# Patient Record
Sex: FEMALE | Race: WHITE | NOT HISPANIC OR LATINO | Employment: OTHER | URBAN - METROPOLITAN AREA
[De-identification: names, ages, dates, MRNs, and addresses within clinical notes are randomized per-mention and may not be internally consistent; named-entity substitution may affect disease eponyms.]

---

## 2017-11-24 ENCOUNTER — HOSPITAL ENCOUNTER (OUTPATIENT)
Dept: MAMMOGRAPHY | Facility: HOSPITAL | Age: 77
Discharge: HOME | End: 2017-11-24

## 2017-11-24 DIAGNOSIS — Z12.31 VISIT FOR SCREENING MAMMOGRAM: ICD-10-CM

## 2018-12-11 ENCOUNTER — HOSPITAL ENCOUNTER (OUTPATIENT)
Dept: MAMMOGRAPHY | Facility: CLINIC | Age: 78
Discharge: HOME | End: 2018-12-11

## 2018-12-11 DIAGNOSIS — Z12.31 VISIT FOR SCREENING MAMMOGRAM: ICD-10-CM

## 2020-01-08 ENCOUNTER — HOSPITAL ENCOUNTER (OUTPATIENT)
Dept: MAMMOGRAPHY | Facility: CLINIC | Age: 80
Discharge: HOME | End: 2020-01-08

## 2020-01-08 DIAGNOSIS — Z12.31 VISIT FOR SCREENING MAMMOGRAM: ICD-10-CM

## 2021-05-24 ENCOUNTER — RECORDS - HEALTHEAST (OUTPATIENT)
Dept: ADMINISTRATIVE | Facility: CLINIC | Age: 81
End: 2021-05-24

## 2021-05-25 ENCOUNTER — RECORDS - HEALTHEAST (OUTPATIENT)
Dept: ADMINISTRATIVE | Facility: CLINIC | Age: 81
End: 2021-05-25

## 2021-05-26 ENCOUNTER — RECORDS - HEALTHEAST (OUTPATIENT)
Dept: ADMINISTRATIVE | Facility: CLINIC | Age: 81
End: 2021-05-26

## 2021-05-27 ENCOUNTER — RECORDS - HEALTHEAST (OUTPATIENT)
Dept: ADMINISTRATIVE | Facility: CLINIC | Age: 81
End: 2021-05-27

## 2021-05-28 ENCOUNTER — RECORDS - HEALTHEAST (OUTPATIENT)
Dept: ADMINISTRATIVE | Facility: CLINIC | Age: 81
End: 2021-05-28

## 2021-06-02 ENCOUNTER — RECORDS - HEALTHEAST (OUTPATIENT)
Dept: ADMINISTRATIVE | Facility: CLINIC | Age: 81
End: 2021-06-02

## 2021-07-13 ENCOUNTER — RECORDS - HEALTHEAST (OUTPATIENT)
Dept: ADMINISTRATIVE | Facility: CLINIC | Age: 81
End: 2021-07-13

## 2021-07-21 ENCOUNTER — RECORDS - HEALTHEAST (OUTPATIENT)
Dept: ADMINISTRATIVE | Facility: CLINIC | Age: 81
End: 2021-07-21

## 2021-07-22 ENCOUNTER — RECORDS - HEALTHEAST (OUTPATIENT)
Dept: SCHEDULING | Facility: CLINIC | Age: 81
End: 2021-07-22

## 2021-07-22 DIAGNOSIS — Z12.31 OTHER SCREENING MAMMOGRAM: ICD-10-CM

## 2023-02-22 ENCOUNTER — OFFICE VISIT (OUTPATIENT)
Dept: FAMILY MEDICINE | Facility: CLINIC | Age: 83
End: 2023-02-22
Payer: COMMERCIAL

## 2023-02-22 VITALS
TEMPERATURE: 98.9 F | SYSTOLIC BLOOD PRESSURE: 126 MMHG | DIASTOLIC BLOOD PRESSURE: 88 MMHG | HEIGHT: 63 IN | BODY MASS INDEX: 23.39 KG/M2 | WEIGHT: 132 LBS | OXYGEN SATURATION: 99 % | HEART RATE: 80 BPM

## 2023-02-22 DIAGNOSIS — R39.89 URINARY PROBLEM: Primary | ICD-10-CM

## 2023-02-22 LAB
ALBUMIN UR-MCNC: NEGATIVE MG/DL
APPEARANCE UR: CLEAR
BACTERIA #/AREA URNS HPF: ABNORMAL /HPF
BILIRUB UR QL STRIP: NEGATIVE
COLOR UR AUTO: YELLOW
GLUCOSE UR STRIP-MCNC: NEGATIVE MG/DL
HGB UR QL STRIP: ABNORMAL
KETONES UR STRIP-MCNC: NEGATIVE MG/DL
LEUKOCYTE ESTERASE UR QL STRIP: ABNORMAL
NITRATE UR QL: NEGATIVE
PH UR STRIP: 6 [PH] (ref 5–7)
RBC #/AREA URNS AUTO: ABNORMAL /HPF
SP GR UR STRIP: 1.02 (ref 1–1.03)
UROBILINOGEN UR STRIP-ACNC: 0.2 E.U./DL
WBC #/AREA URNS AUTO: ABNORMAL /HPF

## 2023-02-22 PROCEDURE — 81001 URINALYSIS AUTO W/SCOPE: CPT | Performed by: FAMILY MEDICINE

## 2023-02-22 PROCEDURE — 99203 OFFICE O/P NEW LOW 30 MIN: CPT | Performed by: FAMILY MEDICINE

## 2023-02-22 RX ORDER — ATORVASTATIN CALCIUM 40 MG/1
40 TABLET, FILM COATED ORAL EVERY EVENING
COMMUNITY
Start: 2022-12-23 | End: 2023-07-14

## 2023-02-22 RX ORDER — METOPROLOL TARTRATE 25 MG/1
25 TABLET, FILM COATED ORAL 2 TIMES DAILY
COMMUNITY
Start: 2022-12-30 | End: 2024-02-21

## 2023-02-22 RX ORDER — LISINOPRIL AND HYDROCHLOROTHIAZIDE 12.5; 2 MG/1; MG/1
TABLET ORAL
COMMUNITY
Start: 2022-11-28 | End: 2024-05-24

## 2023-02-22 RX ORDER — BUDESONIDE 3 MG/1
9 CAPSULE, COATED PELLETS ORAL
COMMUNITY
Start: 2022-12-13 | End: 2024-06-27

## 2023-02-22 RX ORDER — AMOXICILLIN 500 MG/1
CAPSULE ORAL
COMMUNITY
Start: 2022-04-23 | End: 2023-12-07

## 2023-02-22 RX ORDER — CHLORHEXIDINE GLUCONATE ORAL RINSE 1.2 MG/ML
15 SOLUTION DENTAL DAILY PRN
COMMUNITY
Start: 2021-09-03

## 2023-02-22 NOTE — PROGRESS NOTES
"  Assessment & Plan     Urinary problem  No evidence of urinary tract infection.  We discussed things that she may need to return for.  She would like to change her clinic here so she can do that at the  as it is much closer than the [place] clinic.  - UA Macro with Reflex to Micro and Culture - lab collect  - Urine Microscopic         FUTURE APPOINTMENTS:       - Follow-up for annual visit or as needed    No follow-ups on file.    Gissel Ybarra MD  [facility] MICKY Hester is a 82 year old, presenting for the following health issues:  Urinary Problem    A little pain with urination.   Feeling the urge to go and can't.  Patito Goodson CMA    History of Present Illness       Reason for visit:  Bladder inf  Symptom onset:  Today    She eats 0-1 servings of fruits and vegetables daily.She exercises with enough effort to increase her heart rate 9 or less minutes per day.    She is taking medications regularly.       No pain now a  Just having a hard time with passing urine.  This is not happening right now in the past when she has had the symptoms she has had a urinary tract infection.  Today there is no evidence of this and she is also symptom-free right now she and her  moved here to you go from [place] in August unfortunately 2 months later he passed away she does have 2 children in the area and one child in [place].  She goes to [employer] and she is not quite ready to get out and do any volunteering or any thing interactive she would like to wait till the spring she is not having any problems with sleeping it is hard living alone but she is coping well.      Review of Systems   Constitutional, HEENT, cardiovascular, pulmonary, gi and gu systems are negative, except as otherwise noted.      Objective    /88 (BP Location: Right arm, Patient Position: Sitting, Cuff Size: Adult Regular)   Pulse 80   Temp 98.9  F (37.2  C) (Tympanic)   Ht 1.6 m (5' 3\") "   Wt 59.9 kg (132 lb)   SpO2 99%   BMI 23.38 kg/m    Body mass index is 23.38 kg/m .  Physical Exam   GENERAL: healthy, alert and no distress    Results for orders placed or performed in visit on 02/22/23 (from the past 24 hour(s))   UA Macro with Reflex to Micro and Culture - lab collect    Specimen: Urine, Clean Catch   Result Value Ref Range    Color Urine Yellow Colorless, Straw, Light Yellow, Yellow    Appearance Urine Clear Clear    Glucose Urine Negative Negative mg/dL    Bilirubin Urine Negative Negative    Ketones Urine Negative Negative mg/dL    Specific Gravity Urine 1.025 1.003 - 1.035    Blood Urine Trace (A) Negative    pH Urine 6.0 5.0 - 7.0    Protein Albumin Urine Negative Negative mg/dL    Urobilinogen Urine 0.2 0.2, 1.0 E.U./dL    Nitrite Urine Negative Negative    Leukocyte Esterase Urine Trace (A) Negative   Urine Microscopic   Result Value Ref Range    Bacteria Urine Few (A) None Seen /HPF    RBC Urine 0-2 0-2 /HPF /HPF    WBC Urine 0-5 0-5 /HPF /HPF    Narrative    Urine Culture not indicated       Gissel Ybarra M.D.

## 2023-04-12 ENCOUNTER — TELEPHONE (OUTPATIENT)
Dept: ANTICOAGULATION | Facility: CLINIC | Age: 83
End: 2023-04-12
Payer: COMMERCIAL

## 2023-04-12 DIAGNOSIS — Z95.2 S/P TAVR (TRANSCATHETER AORTIC VALVE REPLACEMENT): ICD-10-CM

## 2023-04-12 DIAGNOSIS — I35.9 AORTIC VALVE DISORDER: Primary | ICD-10-CM

## 2023-04-12 PROBLEM — I47.10 SVT (SUPRAVENTRICULAR TACHYCARDIA) (H): Status: ACTIVE | Noted: 2021-11-26

## 2023-04-12 PROBLEM — I63.9 CEREBELLAR STROKE (H): Status: ACTIVE | Noted: 2020-07-02

## 2023-04-12 PROBLEM — R30.0 DYSURIA: Status: ACTIVE | Noted: 2022-06-06

## 2023-04-12 PROBLEM — N18.30 CHRONIC RENAL FAILURE, STAGE 3 (MODERATE) (H): Status: ACTIVE | Noted: 2018-02-19

## 2023-04-12 PROBLEM — M85.80 OSTEOPENIA: Status: ACTIVE | Noted: 2017-03-13

## 2023-04-12 NOTE — TELEPHONE ENCOUNTER
Marta, from [facility] [place] [phone], calls because pt recently has her aoritc valve replaced and a thrombus was found on valve. She needs to be on coumadin. Pt is usually seen at a clinic in [place], but would like to transfer to [facility] in [place] since she lives in [place].   As of right now, she does not have an [facility] PCP or cardiologist. Pt will need to establish care before [facility] is able to manage INR.   Will talk with Marielle Hermosillo [facility], to see how we should proceed.   Lamar Barraza RN

## 2023-04-12 NOTE — TELEPHONE ENCOUNTER
Routing to provide she saw in February.     .ANTICOAGULATION MANAGEMENT    Kaciewalt Hermosillo was referred to [facility] by Cyrus with request for patient to be followed by our site's anticoagulation clinic      Referral information    Warfarin indication Leaflet thrombus   Current INR goal 2.0-3.0   Duration of therapy TBD by cardiology, anticipate for now at least 3-6 months   Date of last office visit 02/22/2023     If you are agreeable, the anticoagulation clinic will assume warfarin management for your patient with you as the responsible provider     Please confirm agreement by reviewing and igning pended referral    Marielle Hermosillo [facility]

## 2023-04-13 ENCOUNTER — TELEPHONE (OUTPATIENT)
Dept: ANTICOAGULATION | Facility: CLINIC | Age: 83
End: 2023-04-13
Payer: COMMERCIAL

## 2023-04-13 NOTE — TELEPHONE ENCOUNTER
Dr. Ybarra has a closed practice due to being at capacity, at this time Kacie would need to establish with another provider at that location before we can take her on for warfarin management.      Other options include [facility] managing warfarin and sending lab orders to [facility] for INR checks in the mean time until Kacie can formally transition care to [facility].      Marielle Hermosillo, PharmD BCACP  Anticoagulation Clinical Pharmacist

## 2023-04-13 NOTE — TELEPHONE ENCOUNTER
Left-non detailed message for TAVR coordinator with [facility] to return call to [facility].    PRATIBHA Lindsey, RN  Anticoagulation Clinic

## 2023-04-13 NOTE — TELEPHONE ENCOUNTER
LVM with Marta from [facility] informing that patient was scheduled on 4/26/23 at 230 with [facility]. [facility]. Writer needs to verify the appt was completed appropriately--waiting on response from CS on this.  Also--LVM with Marta asking when next INR is needed--requested call back--INR team will need to call patient to get scheduled at [place] lab for next INR.    Thanks! Gayla Blanton RN

## 2023-04-14 NOTE — TELEPHONE ENCOUNTER
Marta calling back from [facility] stating patient has not even started warfarin but needs to start before the weekend.  Patient does not have an appointment with a [facility] provider until 5/4/23.  Therefore, Marta will speak with her cardiologist and initiate warfarin today.  The patient will go to the [facility] for lab draws and [facility] will manage warfarin until care can be transferred to a [facility] provider.  A lab appointment has been scheduled for 4/18/23 and Marta will call the patient to notify her.    Marta will fax lab orders and notes to [facility].    SITA LindseyN, RN  Anticoagulation Clinic

## 2023-04-14 NOTE — TELEPHONE ENCOUNTER
Called back Marta and left a VM reminding her that INR orders will need to be sent to the [facility]. Provided fax number of [phone] for [facility].

## 2023-04-14 NOTE — TELEPHONE ENCOUNTER
Marta called back stating the patient will not be able to start warfarin until next Monday. She would like to therefore reschedule the next INR to 04/21/2023. Appt rescheduled.

## 2023-04-18 DIAGNOSIS — Z95.2 S/P TAVR (TRANSCATHETER AORTIC VALVE REPLACEMENT): Primary | ICD-10-CM

## 2023-04-21 ENCOUNTER — APPOINTMENT (OUTPATIENT)
Dept: LAB | Facility: CLINIC | Age: 83
End: 2023-04-21
Payer: COMMERCIAL

## 2023-04-21 LAB — INR BLD: 2.6 (ref 0.9–1.1)

## 2023-04-21 PROCEDURE — 85610 PROTHROMBIN TIME: CPT | Performed by: INTERNAL MEDICINE

## 2023-04-21 PROCEDURE — 36416 COLLJ CAPILLARY BLOOD SPEC: CPT | Performed by: INTERNAL MEDICINE

## 2023-05-04 ENCOUNTER — OFFICE VISIT (OUTPATIENT)
Dept: FAMILY MEDICINE | Facility: CLINIC | Age: 83
End: 2023-05-04
Payer: COMMERCIAL

## 2023-05-04 ENCOUNTER — TELEPHONE (OUTPATIENT)
Dept: FAMILY MEDICINE | Facility: CLINIC | Age: 83
End: 2023-05-04

## 2023-05-04 VITALS
HEIGHT: 63 IN | OXYGEN SATURATION: 97 % | TEMPERATURE: 98.4 F | HEART RATE: 67 BPM | SYSTOLIC BLOOD PRESSURE: 134 MMHG | DIASTOLIC BLOOD PRESSURE: 78 MMHG | WEIGHT: 131 LBS | BODY MASS INDEX: 23.21 KG/M2

## 2023-05-04 DIAGNOSIS — I47.10 SVT (SUPRAVENTRICULAR TACHYCARDIA) (H): ICD-10-CM

## 2023-05-04 DIAGNOSIS — E78.5 HYPERLIPIDEMIA, UNSPECIFIED HYPERLIPIDEMIA TYPE: ICD-10-CM

## 2023-05-04 DIAGNOSIS — C50.012 MALIGNANT NEOPLASM OF AREOLA OF LEFT BREAST IN FEMALE, UNSPECIFIED ESTROGEN RECEPTOR STATUS (H): ICD-10-CM

## 2023-05-04 DIAGNOSIS — Z00.00 ENCOUNTER FOR MEDICARE ANNUAL WELLNESS EXAM: Primary | ICD-10-CM

## 2023-05-04 DIAGNOSIS — N18.30 CHRONIC RENAL FAILURE, STAGE 3 (MODERATE), UNSPECIFIED WHETHER STAGE 3A OR 3B CKD (H): ICD-10-CM

## 2023-05-04 DIAGNOSIS — Z95.2 S/P TAVR (TRANSCATHETER AORTIC VALVE REPLACEMENT): Primary | ICD-10-CM

## 2023-05-04 DIAGNOSIS — Z95.2 S/P TAVR (TRANSCATHETER AORTIC VALVE REPLACEMENT): ICD-10-CM

## 2023-05-04 DIAGNOSIS — I77.9 MILD CAROTID ARTERY DISEASE (H): ICD-10-CM

## 2023-05-04 DIAGNOSIS — M85.80 OSTEOPENIA, UNSPECIFIED LOCATION: ICD-10-CM

## 2023-05-04 LAB
ANION GAP SERPL CALCULATED.3IONS-SCNC: 7 MMOL/L (ref 7–15)
BUN SERPL-MCNC: 20.3 MG/DL (ref 8–23)
CALCIUM SERPL-MCNC: 8.9 MG/DL (ref 8.8–10.2)
CHLORIDE SERPL-SCNC: 105 MMOL/L (ref 98–107)
CHOLEST SERPL-MCNC: 136 MG/DL
CREAT SERPL-MCNC: 0.96 MG/DL (ref 0.51–0.95)
DEPRECATED HCO3 PLAS-SCNC: 26 MMOL/L (ref 22–29)
GFR SERPL CREATININE-BSD FRML MDRD: 58 ML/MIN/1.73M2
GLUCOSE SERPL-MCNC: 97 MG/DL (ref 70–99)
HDLC SERPL-MCNC: 68 MG/DL
HGB BLD-MCNC: 13.9 G/DL (ref 11.7–15.7)
INR PPP: 5.95 (ref 0.85–1.15)
LDLC SERPL CALC-MCNC: 54 MG/DL
NONHDLC SERPL-MCNC: 68 MG/DL
POTASSIUM SERPL-SCNC: 4.5 MMOL/L (ref 3.4–5.3)
SODIUM SERPL-SCNC: 138 MMOL/L (ref 136–145)
TRIGL SERPL-MCNC: 70 MG/DL

## 2023-05-04 PROCEDURE — 99214 OFFICE O/P EST MOD 30 MIN: CPT | Mod: 25 | Performed by: PHYSICIAN ASSISTANT

## 2023-05-04 PROCEDURE — 80061 LIPID PANEL: CPT | Performed by: PHYSICIAN ASSISTANT

## 2023-05-04 PROCEDURE — 36415 COLL VENOUS BLD VENIPUNCTURE: CPT | Performed by: PHYSICIAN ASSISTANT

## 2023-05-04 PROCEDURE — 85018 HEMOGLOBIN: CPT | Performed by: PHYSICIAN ASSISTANT

## 2023-05-04 PROCEDURE — 80048 BASIC METABOLIC PNL TOTAL CA: CPT | Performed by: PHYSICIAN ASSISTANT

## 2023-05-04 PROCEDURE — G0438 PPPS, INITIAL VISIT: HCPCS | Performed by: PHYSICIAN ASSISTANT

## 2023-05-04 PROCEDURE — 85610 PROTHROMBIN TIME: CPT | Performed by: PHYSICIAN ASSISTANT

## 2023-05-04 RX ORDER — WARFARIN SODIUM 5 MG/1
1 TABLET ORAL DAILY
COMMUNITY
Start: 2023-04-14 | End: 2023-10-07

## 2023-05-04 RX ORDER — WARFARIN SODIUM 5 MG/1
TABLET ORAL
COMMUNITY
Start: 2023-04-14 | End: 2023-05-05 | Stop reason: ALTCHOICE

## 2023-05-04 ASSESSMENT — ENCOUNTER SYMPTOMS
CHILLS: 0
DIARRHEA: 0
JOINT SWELLING: 0
ABDOMINAL PAIN: 0
HEADACHES: 0
WEAKNESS: 0
EYE PAIN: 0
COUGH: 0
PALPITATIONS: 0
PARESTHESIAS: 0
SORE THROAT: 0
DIZZINESS: 0
MYALGIAS: 1
SHORTNESS OF BREATH: 0
FEVER: 0
NAUSEA: 0
ARTHRALGIAS: 1
DYSURIA: 0
NERVOUS/ANXIOUS: 0
HEARTBURN: 0
HEMATOCHEZIA: 0
FREQUENCY: 0
HEMATURIA: 0
CONSTIPATION: 1

## 2023-05-04 ASSESSMENT — ACTIVITIES OF DAILY LIVING (ADL)
CURRENT_FUNCTION: TRANSPORTATION REQUIRES ASSISTANCE
CURRENT_FUNCTION: NO ASSISTANCE NEEDED

## 2023-05-04 NOTE — PATIENT INSTRUCTIONS
Patient Education   Personalized Prevention Plan  You are due for the preventive services outlined below.  Your care team is available to assist you in scheduling these services.  If you have already completed any of these items, please share that information with your care team to update in your medical record.  Health Maintenance Due   Topic Date Due     Basic Metabolic Panel  Never done     Osteoporosis Screening  Never done     Cholesterol Lab  Never done     Kidney Microalbumin Urine Test  Never done     ANNUAL REVIEW OF HM ORDERS  Never done     Discuss Advance Care Planning  Never done     Hemoglobin  Never done     Annual Wellness Visit  Never done     Diptheria Tetanus Pertussis (DTAP/TDAP/TD) Vaccine (2 - Td or Tdap) 12/20/2022

## 2023-05-04 NOTE — PROGRESS NOTES
"  SUBJECTIVE:   Kacie is a 83 year old who presents for Preventive Visit.       View : No data to display.            Patient has been advised of split billing requirements and indicates understanding: Yes  Are you in the first 12 months of your Medicare coverage?  No    Healthy Habits:     In general, how would you rate your overall health?  Good    Frequency of exercise:  1 day/week    Duration of exercise:  Less than 15 minutes    Do you usually eat at least 4 servings of fruit and vegetables a day, include whole grains    & fiber and avoid regularly eating high fat or \"junk\" foods?  No    Taking medications regularly:  Yes    Medication side effects:  No muscle aches    Ability to successfully perform activities of daily living:  Transportation requires assistance and no assistance needed    Home Safety:  No safety concerns identified    Hearing Impairment:  No hearing concerns    In the past 6 months, have you been bothered by leaking of urine? Yes    In general, how would you rate your overall mental or emotional health?  Good      PHQ-2 Total Score: 0    Additional concerns today:  No      Have you ever done Advance Care Planning? (For example, a Health Directive, POLST, or a discussion with a medical provider or your loved ones about your wishes): Yes, patient states has an Advance Care Planning document and will bring a copy to the clinic.    Fall risk  Fallen 2 or more times in the past year?: No  Any fall with injury in the past year?: No    Cognitive Screening   1) Repeat 3 items (Leader, Season, Table)    2) Clock draw: NORMAL  3) 3 item recall: Recalls 2 objects   Results: NORMAL clock, 1-2 items recalled: COGNITIVE IMPAIRMENT LESS LIKELY    Mini-CogTM Copyright ALEXYS Cervantes. Licensed by the author for use in [facility]; reprinted with permission ([email or web address]@.[email or web address]). All rights reserved.      Do you have sleep apnea, excessive snoring or daytime drowsiness?: no    Reviewed and updated as needed " this visit by clinical staff    Allergies  Meds              Reviewed and updated as needed this visit by Provider                 Social History     Tobacco Use     Smoking status: Not on file     Smokeless tobacco: Not on file   Vaping Use     Vaping status: Not on file   Substance Use Topics     Alcohol use: Not on file             5/4/2023    12:36 PM   Alcohol Use   Prescreen: >3 drinks/day or >7 drinks/week? No     Do you have a current opioid prescription? No  Do you use any other controlled substances or medications that are not prescribed by a provider? None          Current providers sharing in care for this patient include:   Patient Care Team:  Xin Najera MD as PCP - General (Internal Medicine)  Gissel Ybarra MD as Assigned PCP    The following health maintenance items are reviewed in Epic and correct as of today:  Health Maintenance   Topic Date Due     BMP  Never done     DEXA  Never done     LIPID  Never done     MICROALBUMIN  Never done     ANNUAL REVIEW OF HM ORDERS  Never done     ADVANCE CARE PLANNING  Never done     HEMOGLOBIN  Never done     MEDICARE ANNUAL WELLNESS VISIT  Never done     DTAP/TDAP/TD IMMUNIZATION (2 - Td or Tdap) 12/20/2022     FALL RISK ASSESSMENT  05/04/2024     PHQ-2 (once per calendar year)  Completed     INFLUENZA VACCINE  Completed     Pneumococcal Vaccine: 65+ Years  Completed     URINALYSIS  Completed     ZOSTER IMMUNIZATION  Completed     COVID-19 Vaccine  Completed     IPV IMMUNIZATION  Aged Out     MENINGITIS IMMUNIZATION  Aged Out     BP Readings from Last 3 Encounters:   05/04/23 134/78   02/22/23 126/88    Wt Readings from Last 3 Encounters:   05/04/23 59.4 kg (131 lb)   02/22/23 59.9 kg (132 lb)              Pertinent mammograms are reviewed under the imaging tab.    Review of Systems  CONSTITUTIONAL: NEGATIVE for fever, chills, change in weight  INTEGUMENTARY/SKIN: NEGATIVE for worrisome rashes, moles or lesions  EYES: NEGATIVE for vision  "changes or irritation  ENT/MOUTH: NEGATIVE for ear, mouth and throat problems  RESP: NEGATIVE for significant cough or SOB  BREAST: NEGATIVE for masses, tenderness or discharge  CV: NEGATIVE for chest pain, palpitations or peripheral edema  GI: NEGATIVE for nausea, abdominal pain, heartburn, or change in bowel habits  : NEGATIVE for frequency, dysuria, or hematuria  MUSCULOSKELETAL: NEGATIVE for significant arthralgias or myalgia  NEURO: NEGATIVE for weakness, dizziness or paresthesias  ENDOCRINE: NEGATIVE for temperature intolerance, skin/hair changes  HEME: NEGATIVE for bleeding problems  PSYCHIATRIC: NEGATIVE for changes in mood or affect    OBJECTIVE:   /78   Pulse 67   Temp 98.4  F (36.9  C) (Tympanic)   Ht 1.6 m (5' 3\")   Wt 59.4 kg (131 lb)   SpO2 97%   BMI 23.21 kg/m   Estimated body mass index is 23.21 kg/m  as calculated from the following:    Height as of this encounter: 1.6 m (5' 3\").    Weight as of this encounter: 59.4 kg (131 lb).  Physical Exam  GENERAL APPEARANCE: healthy, alert and no distress  EYES: Eyes grossly normal to inspection, PERRL and conjunctivae and sclerae normal  HENT: ear canals and TM's normal, nose and mouth without ulcers or lesions, oropharynx clear and oral mucous membranes moist  NECK: no adenopathy, no asymmetry, masses, or scars and thyroid normal to palpation  RESP: lungs clear to auscultation - no rales, rhonchi or wheezes  CV: regular rate and rhythm  ABDOMEN: soft, nontender, no hepatosplenomegaly, no masses and bowel sounds normal  MS: no musculoskeletal defects are noted and gait is age appropriate without ataxia  SKIN: no suspicious lesions or rashes  NEURO: Normal strength and tone, sensory exam grossly normal, mentation intact and speech normal  PSYCH: mentation appears normal and affect normal/bright    Diagnostic Test Results:  pending    ASSESSMENT / PLAN:   (Z00.00) Encounter for Medicare annual wellness exam  (primary encounter diagnosis)  Comment: "   Plan:     (N18.30) Chronic renal failure, stage 3 (moderate), unspecified whether stage 3a or 3b CKD (H)  Comment: will recheck labs today - has been stable  Plan: BASIC METABOLIC PANEL, Hemoglobin            (M85.80) Osteopenia, unspecified location  Comment:   Plan: due for DEXA    (E78.5) Hyperlipidemia, unspecified hyperlipidemia type  Comment:   Plan: Lipid panel reflex to direct LDL Non-fasting            (I77.9) Mild carotid artery disease (H)  Comment:   Plan: was being followed by cardiology at [facility]     (I47.1) SVT (supraventricular tachycardia) (H)  Comment:   Plan: followed by outside cardiologist     (C50.012) Malignant neoplasm of areola of left breast in female, unspecified estrogen receptor status (H)  Comment: dx 1999  Plan: in remission     (Z95.2) S/P TAVR (transcatheter aortic valve replacement)  Comment: was started on warfarin by outside cardiologist. Last INR was 2 weeks ago so will need to check today as there has been no monitoring to this point. Will refer to coumadin clinic  Has some noticeable thin skin and bruising of her hands but denies any bleeding elsewhere.   Plan: warfarin ANTICOAGULANT (COUMADIN) 5 MG tablet,         JANTOVEN ANTICOAGULANT 5 MG tablet, INR,         Anticoagulation Clinic Referral              Patient has been advised of split billing requirements and indicates understanding: Yes      COUNSELING:  Reviewed preventive health counseling, as reflected in patient instructions        She has no history on file for tobacco use.      Appropriate preventive services were discussed with this patient, including applicable screening as appropriate for cardiovascular disease, diabetes, osteopenia/osteoporosis, and glaucoma.  As appropriate for age/gender, discussed screening for colorectal cancer, prostate cancer, breast cancer, and cervical cancer. Checklist reviewing preventive services available has been given to the patient.    Reviewed patients plan of care and  provided an AVS. The Basic Care Plan (routine screening as documented in Health Maintenance) for Kacie meets the Care Plan requirement. This Care Plan has been established and reviewed with the Patient.    Irina Francisco PA-C  [facility]

## 2023-05-05 ENCOUNTER — ANTICOAGULATION THERAPY VISIT (OUTPATIENT)
Dept: ANTICOAGULATION | Facility: CLINIC | Age: 83
End: 2023-05-05
Payer: COMMERCIAL

## 2023-05-05 DIAGNOSIS — Z95.2 S/P TAVR (TRANSCATHETER AORTIC VALVE REPLACEMENT): Primary | ICD-10-CM

## 2023-05-05 NOTE — TELEPHONE ENCOUNTER
On call note  Called with critical value of INR 5.95. left message for patient that if bleeding, needs to go to emergency department. If not, hold warfarin and call clinic in am for directions.

## 2023-05-05 NOTE — PROGRESS NOTES
"ANTICOAGULATION  MANAGEMENT: NEW REFERRAL      SUBJECTIVE/OBJECTIVE     Kacie Hermosillo, a 83 year old female  is newly referred to [facility].    Anticoagulation:    Previously on warfarin: No, new to anticoagulation   Warfarin initiation date (approximate): 4/17/23 started 5mg/day   Indication(s): increased gradient on TAVR, s/p TAVR 11/2/21   Goal Range: 2.5-3.5 (cardiologist will clarify if higher range is preferred but that is what PCP ordered)   Anticoagulation Bridge/Overlap: No   Referring provider: from PCP, [facility] started Warfarin then patient had to wait to get established with new PCP    General Dietary/Social Hx:    Typical vitamin K intake: low; consistent     Other dietary considerations: None and Herbal supplements or teas: drinks green tea in the AM, occasionally drinks cranberry juice    Social History:      In the past 2 weeks, patient estimates taking medications as instructed % of time: 100    Results:        Recent labs: (last 7 days)     05/04/23  1338   INR 5.95*       Wt Readings from Last 2 Encounters:   05/04/23 59.4 kg (131 lb)   02/22/23 59.9 kg (132 lb)      Estimated body mass index is 23.21 kg/m  as calculated from the following:    Height as of 5/4/23: 1.6 m (5' 3\").    Weight as of 5/4/23: 59.4 kg (131 lb).  No results found for: AST  Lab Results   Component Value Date    CR 0.96 (H) 05/04/2023     Estimated Creatinine Clearance: 41.6 mL/min (A) (based on SCr of 0.96 mg/dL (H)).    ASSESSMENT       Goal INR 2.5-3.5 standard for indication(s) above  Establishing initial warfarin maintenance dose (on warfarin < 30 days)     Starting warfarin dose adjustment recommended Hold Warfarin x 2 days then overall decrease by 14%    PLAN     Dosing Instructions: Hold x 2 days then overall decrease by 14% and recheck INR on Monday.       Summary  As of 5/5/2023    Full warfarin instructions:  5/5: Hold; 5/6: Hold; Otherwise 2.5 mg every Wed, Sat; 5 mg all " other days   Next INR check:  5/8/2023             Education provided:     Taking warfarin: purpose of warfarin and how it works, take warfarin at same time each day; preferably in the evening, prescribed tablet strength and color, importance of following [facility] instructions vs instructions on the prescription bottle, Importance of taking warfarin as instructed and warfarin tablet strength change; remove and/or discard previous strength from medication supply    Goal range and lab monitoring: goal range and significance of current result, Importance of therapeutic range, Importance of following up at instructed interval and frequency of lab work when starting warfarin and importance of following up when instructed (extends after stability established)    Dietary considerations: importance of consistent vitamin K intake, impact of vitamin K foods on INR, vitamin K content of foods and importance of notifying [facility] to changes in diet    Symptom monitoring: monitoring for bleeding signs and symptoms, monitoring for clotting signs and symptoms, monitoring for stroke signs and symptoms, when to seek medical attention/emergency care and if you hit your head or have a bad fall seek emergency care    Importance of notifying anticoagulation clinic for: changes in medications; a sooner lab recheck maybe needed and if you did not receive dosing instructions on the same day as your labs were checked    Contact [phone] with any changes, questions or concerns.     Education still needed:     New patient education mailed on 5/5/23      Telephone call with Kacie who verbalizes understanding and agrees to plan    Lab visit scheduled    Standing orders placed in Epic: Point of Care INR (Lab 5000)    Plan made with [employer] Pharmacist Zaira Rees, RN  Anticoagulation Clinic  5/5/2023

## 2023-05-05 NOTE — TELEPHONE ENCOUNTER
Received call from Patient  States that she did not get message from on call MD until 10:30 last night.   Patient had already taken her warfarin.  Relayed NEY Francisco's message  Relayed not to take warfarin again until she hears from [facility] and they will advise on next steps.  Discussed red flag symptoms with Patient  Verbalized understanding.  Theo Niño RN

## 2023-05-05 NOTE — TELEPHONE ENCOUNTER
Patient new establish care yesterday - was started on warfarin S/p  TAVR by outside cardiologist and has not had any follow up since other than INR 2 weeks ago that was 2.6 but had not been checked since    INR yesterday 5.95  MD on call spoke with patient and she is holding her warfarin    I did place a referral to our coumadin clinic to start managing. Can we please call and make sure they address this today - I suspect she will hold through the weekend and then we will need to repeat an INR on Monday. I will place standing orders for INR checks in clinic    Irina Francisco PA-C

## 2023-05-05 NOTE — PROGRESS NOTES
Unable to reach patient, left detailed messages with Warfarin instructions.  Hold Warfarin on 5/5 and 5/6 and take 5mg on 5/7 with INR check on 5/8.  Scheduled INR appointment for 5/8 and placed [id number] standing INR order.  New Warfarin education packet mailed to patient on 5/5.  Will try contacting patient again on 5/8.  Reviewed dosing plan with [facility].  Alec HERNANDEZ

## 2023-05-05 NOTE — TELEPHONE ENCOUNTER
[facility] starting INR management today.  Called patient and left message to call the INR clinic to review INR and discuss Warfarin dosing.  INR and Warfarin to be reviewed by [facility].  Alec HERNANDEZ

## 2023-05-05 NOTE — PROGRESS NOTES
Called and spoke to Marta at [facility] ([phone]) and she will clarify INR range with cardiologist and call me back today or Monday.  Alec HERNANDEZ

## 2023-05-05 NOTE — TELEPHONE ENCOUNTER
Call placed to INR clinic  Relayed that Patient has established care with [facility].  Patient is being referred to [facility]   Will route to nurse near Patient and will start following Patient.  Theo Niño RN

## 2023-05-05 NOTE — PROGRESS NOTES
Call placed to Patient   Relayed message and lab results to Patient. Verbalized understanding.  Theo Niño RN

## 2023-05-08 ENCOUNTER — LAB (OUTPATIENT)
Dept: LAB | Facility: CLINIC | Age: 83
End: 2023-05-08
Payer: COMMERCIAL

## 2023-05-08 ENCOUNTER — ANTICOAGULATION THERAPY VISIT (OUTPATIENT)
Dept: ANTICOAGULATION | Facility: CLINIC | Age: 83
End: 2023-05-08

## 2023-05-08 DIAGNOSIS — Z95.2 S/P TAVR (TRANSCATHETER AORTIC VALVE REPLACEMENT): Primary | ICD-10-CM

## 2023-05-08 DIAGNOSIS — Z95.2 S/P TAVR (TRANSCATHETER AORTIC VALVE REPLACEMENT): ICD-10-CM

## 2023-05-08 LAB — INR BLD: 3 (ref 0.9–1.1)

## 2023-05-08 PROCEDURE — 85610 PROTHROMBIN TIME: CPT

## 2023-05-08 PROCEDURE — 36416 COLLJ CAPILLARY BLOOD SPEC: CPT

## 2023-05-08 NOTE — PROGRESS NOTES
ANTICOAGULATION MANAGEMENT     Kacie Hermosillo 83 year old female is on warfarin with therapeutic INR result. (Goal INR 2.5-3.5)    Recent labs: (last 7 days)     05/08/23  1026   INR 3.0*       ASSESSMENT       Source(s): Chart Review and Patient/Caregiver Call       Warfarin doses taken: Warfarin taken as instructed, started on 4/17 with 5mg/day    Diet: No new diet changes identified    Medication/supplement changes: None noted    New illness, injury, or hospitalization: No    Signs or symptoms of bleeding or clotting: No    Previous result: Supratherapeutic  Held 2 days and overall decrease by 14%    Additional findings: Still waiting to hear back from cardiology clinic to confirm INR range         PLAN     Recommended plan for no diet, medication or health factor changes affecting INR     Dosing Instructions: Continue your current warfarin dose with next INR in 4 days       Summary  As of 5/8/2023    Full warfarin instructions:  2.5 mg every Wed, Sat; 5 mg all other days   Next INR check:  5/12/2023             Telephone call with Kacie who verbalizes understanding and agrees to plan    Lab visit scheduled    Education provided:     Goal range and lab monitoring: goal range and significance of current result    Contact [phone] with any changes, questions or concerns.     Plan made per [facility] anticoagulation protocol    Janna Rees, RN  Anticoagulation Clinic  5/8/2023    _______________________________________________________________________     Anticoagulation Episode Summary     Current INR goal:  2.5-3.5   TTR:  --   Target end date:  5/4/2024   Send INR reminders to:  VIRGILIO WEEKS    Indications    S/P TAVR (transcatheter aortic valve replacement) [Z95.2]           Comments:           Anticoagulation Care Providers     Provider Role Specialty Phone number    Irina Francisco PA-C [facility] [phone]

## 2023-05-12 ENCOUNTER — LAB (OUTPATIENT)
Dept: LAB | Facility: CLINIC | Age: 83
End: 2023-05-12
Payer: COMMERCIAL

## 2023-05-12 ENCOUNTER — ANTICOAGULATION THERAPY VISIT (OUTPATIENT)
Dept: ANTICOAGULATION | Facility: CLINIC | Age: 83
End: 2023-05-12

## 2023-05-12 DIAGNOSIS — Z95.2 S/P TAVR (TRANSCATHETER AORTIC VALVE REPLACEMENT): ICD-10-CM

## 2023-05-12 DIAGNOSIS — Z95.2 S/P TAVR (TRANSCATHETER AORTIC VALVE REPLACEMENT): Primary | ICD-10-CM

## 2023-05-12 DIAGNOSIS — N18.30 CHRONIC RENAL FAILURE, STAGE 3 (MODERATE) (H): Primary | ICD-10-CM

## 2023-05-12 LAB
CREAT UR-MCNC: 141.8 MG/DL
INR BLD: 4.2 (ref 0.9–1.1)
MICROALBUMIN UR-MCNC: <12 MG/L
MICROALBUMIN/CREAT UR: NORMAL MG/G{CREAT}

## 2023-05-12 PROCEDURE — 82570 ASSAY OF URINE CREATININE: CPT

## 2023-05-12 PROCEDURE — 82043 UR ALBUMIN QUANTITATIVE: CPT

## 2023-05-12 PROCEDURE — 85610 PROTHROMBIN TIME: CPT

## 2023-05-12 PROCEDURE — 36416 COLLJ CAPILLARY BLOOD SPEC: CPT

## 2023-05-12 NOTE — PROGRESS NOTES
ANTICOAGULATION MANAGEMENT     Kacie Hermosillo 83 year old female is on warfarin with supratherapeutic INR result. (Goal INR 2.5-3.5)    Recent labs: (last 7 days)     05/12/23  1315   INR 4.2*       ASSESSMENT       Source(s): Chart Review and Patient/Caregiver Call       Warfarin doses taken: Warfarin taken as instructed    Diet: No new diet changes identified    Medication/supplement changes: None noted    New illness, injury, or hospitalization: No    Signs or symptoms of bleeding or clotting: No    Previous result: Therapeutic last visit; previously outside of goal range    Additional findings: None         PLAN     Recommended plan for no diet, medication or health factor changes affecting INR     Dosing Instructions: hold dose then decrease your warfarin dose (8.3% change) with next INR in 5 days       Summary  As of 5/12/2023    Full warfarin instructions:  5/12: Hold; Otherwise 2.5 mg every Mon, Wed, Fri; 5 mg all other days   Next INR check:  5/17/2023             Telephone call with Kacie who verbalizes understanding and agrees to plan    Patient elected to schedule next visit 5/17    Education provided:     Goal range and lab monitoring: goal range and significance of current result    Contact [phone] with any changes, questions or concerns.     Plan made per [facility] anticoagulation protocol    Janna Rees, RN  Anticoagulation Clinic  5/12/2023    _______________________________________________________________________     Anticoagulation Episode Summary     Current INR goal:  2.5-3.5   TTR:  --   Target end date:  5/4/2024   Send INR reminders to:  VIRGILIO WEEKS    Indications    S/P TAVR (transcatheter aortic valve replacement) [Z95.2]           Comments:           Anticoagulation Care Providers     Provider Role Specialty Phone number    Irina Francisco PA-C [facility] [phone]

## 2023-05-17 ENCOUNTER — ANTICOAGULATION THERAPY VISIT (OUTPATIENT)
Dept: ANTICOAGULATION | Facility: CLINIC | Age: 83
End: 2023-05-17

## 2023-05-17 ENCOUNTER — LAB (OUTPATIENT)
Dept: LAB | Facility: CLINIC | Age: 83
End: 2023-05-17
Payer: COMMERCIAL

## 2023-05-17 DIAGNOSIS — Z95.2 S/P TAVR (TRANSCATHETER AORTIC VALVE REPLACEMENT): ICD-10-CM

## 2023-05-17 DIAGNOSIS — Z95.2 S/P TAVR (TRANSCATHETER AORTIC VALVE REPLACEMENT): Primary | ICD-10-CM

## 2023-05-17 LAB — INR BLD: 3.7 (ref 0.9–1.1)

## 2023-05-17 PROCEDURE — 85610 PROTHROMBIN TIME: CPT

## 2023-05-17 PROCEDURE — 36416 COLLJ CAPILLARY BLOOD SPEC: CPT

## 2023-05-17 NOTE — PROGRESS NOTES
ANTICOAGULATION MANAGEMENT     Kacie Hermosillo 83 year old female is on warfarin with supratherapeutic INR result. (Goal INR 2.5-3.5)    Recent labs: (last 7 days)     05/17/23  1328   INR 3.7*       ASSESSMENT       Source(s): Chart Review and Patient/Caregiver Call       Warfarin doses taken: Warfarin taken as instructed    Diet: No new diet changes identified    Medication/supplement changes: None noted    New illness, injury, or hospitalization: No    Signs or symptoms of bleeding or clotting: No    Previous result: Supratherapeutic    Additional findings: still no response from cardiology clinic-- will call in AM to follow-up on goal range clarification       PLAN     Recommended plan for no diet, medication or health factor changes affecting INR     Dosing Instructions: decrease your warfarin dose (9.1% change) with next INR no later than Tuesday       Summary  As of 5/17/2023    Full warfarin instructions:  5 mg every Sun, Tue, Thu; 2.5 mg all other days   Next INR check:  5/23/2023             Telephone call with Kacie who agrees to plan and repeated back plan correctly    Lab visit scheduled    Education provided:     Contact [phone] with any changes, questions or concerns.     Plan made per [facility] anticoagulation protocol    Ann Alvarez RN  Anticoagulation Clinic  5/17/2023    _______________________________________________________________________     Anticoagulation Episode Summary     Current INR goal:  2.5-3.5   TTR:  0.0 % (3 d)   Target end date:  5/4/2024   Send INR reminders to:  VIRGILIO WEEKS    Indications    S/P TAVR (transcatheter aortic valve replacement) [Z95.2]           Comments:           Anticoagulation Care Providers     Provider Role Specialty Phone number    Irina Francisco PA-C [facility] [phone]

## 2023-05-23 ENCOUNTER — ANTICOAGULATION THERAPY VISIT (OUTPATIENT)
Dept: ANTICOAGULATION | Facility: CLINIC | Age: 83
End: 2023-05-23

## 2023-05-23 ENCOUNTER — LAB (OUTPATIENT)
Dept: LAB | Facility: CLINIC | Age: 83
End: 2023-05-23
Payer: COMMERCIAL

## 2023-05-23 DIAGNOSIS — Z95.2 S/P TAVR (TRANSCATHETER AORTIC VALVE REPLACEMENT): ICD-10-CM

## 2023-05-23 DIAGNOSIS — Z95.2 S/P TAVR (TRANSCATHETER AORTIC VALVE REPLACEMENT): Primary | ICD-10-CM

## 2023-05-23 LAB — INR BLD: 3.3 (ref 0.9–1.1)

## 2023-05-23 PROCEDURE — 85610 PROTHROMBIN TIME: CPT

## 2023-05-23 PROCEDURE — 36416 COLLJ CAPILLARY BLOOD SPEC: CPT

## 2023-05-23 NOTE — PROGRESS NOTES
Call to [facility]a at [facility] ([phone]) to see about appropriate INR goal range.  She said she will speak directly to cardiologist later this afternoon and will call us back tomorrow with decision.    Praveena Morris RN

## 2023-05-23 NOTE — PROGRESS NOTES
"ANTICOAGULATION MANAGEMENT     Kacie Hermosillo 83 year old female is on warfarin with therapeutic INR result. (Goal INR 2.5-3.5)    Recent labs: (last 7 days)     05/23/23  1344   INR 3.3*       ASSESSMENT       Source(s): Chart Review and Patient/Caregiver Call       Warfarin doses taken: Warfarin taken as instructed    Diet: No new diet changes identified    Medication/supplement changes: None noted    New illness, injury, or hospitalization: No    Signs or symptoms of bleeding or clotting: No    Previous result: Supratherapeutic    Additional findings: Upcoming surgery/procedure \"Back Injection\" 5/30/23. Kacie says she does not have to go off Warfarin for this. She will be having an ablation in her back at some time and said she would let us know what date it is scheduled.     Dosing done today with goal range of 2.5-3.5. I informed Kacie this may be changing depending on what cardiologist recommends and that if the goal range changes we will need to cut back her Warfarin dosing.         PLAN     Recommended plan for no diet, medication or health factor changes affecting INR     Dosing Instructions: Continue your current warfarin dose with next INR in 1 week       Summary  As of 5/23/2023    Full warfarin instructions:  5 mg every Sun, Tue, Thu; 2.5 mg all other days   Next INR check:  5/30/2023             Telephone call with Kacie who verbalizes understanding and agrees to plan and who agrees to plan and repeated back plan correctly    Lab visit scheduled    Education provided:     Goal range and lab monitoring: goal range and significance of current result    Importance of notifying anticoagulation clinic for: upcoming surgeries and procedures 2 weeks in advance    Contact [phone] with any changes, questions or concerns.     Plan made per [facility] anticoagulation protocol    Praveena Morris RN  Anticoagulation Clinic  5/23/2023    _______________________________________________________________________ "     Anticoagulation Episode Summary     Current INR goal:  2.5-3.5   TTR:  31.4 % (1.3 wk)   Target end date:  5/4/2024   Send INR reminders to:  VIRGILIO WEEKS    Indications    S/P TAVR (transcatheter aortic valve replacement) [Z95.2]           Comments:           Anticoagulation Care Providers     Provider Role Specialty Phone number    Irina Francisco PA-C [facility] [phone]

## 2023-05-24 NOTE — PROGRESS NOTES
Dr. Edin Olguin said INR goal range 2.0-3.0 per nurse, Marta.    I will place an updated anticoagulation referral with this range.    Praveena Morris RN

## 2023-05-24 NOTE — PROGRESS NOTES
Call to Kacie to decrease warfarin dose due to change in goal range.    Decreased warfarin dose 10% to 5 mg every Tues/Sat and 2.5 mg row. Kacie repeated back directions correctly.    INR already scheduled for 5/30/23.    Praveena Morris RN

## 2023-05-30 ENCOUNTER — LAB (OUTPATIENT)
Dept: LAB | Facility: CLINIC | Age: 83
End: 2023-05-30
Payer: COMMERCIAL

## 2023-05-30 ENCOUNTER — ANTICOAGULATION THERAPY VISIT (OUTPATIENT)
Dept: ANTICOAGULATION | Facility: CLINIC | Age: 83
End: 2023-05-30

## 2023-05-30 DIAGNOSIS — Z95.2 S/P TAVR (TRANSCATHETER AORTIC VALVE REPLACEMENT): Primary | ICD-10-CM

## 2023-05-30 DIAGNOSIS — Z95.2 S/P TAVR (TRANSCATHETER AORTIC VALVE REPLACEMENT): ICD-10-CM

## 2023-05-30 LAB — INR BLD: 2.7 (ref 0.9–1.1)

## 2023-05-30 PROCEDURE — 36416 COLLJ CAPILLARY BLOOD SPEC: CPT

## 2023-05-30 PROCEDURE — 85610 PROTHROMBIN TIME: CPT

## 2023-05-30 NOTE — PROGRESS NOTES
ANTICOAGULATION MANAGEMENT     Kacie Hermosillo 83 year old female is on warfarin with therapeutic INR result. (Goal INR 2.0-3.0)    Recent labs: (last 7 days)     05/30/23  1350   INR 2.7*       ASSESSMENT       Source(s): Chart Review    Previous INR was Supratherapeutic decreased by 10%    Medication, diet, health changes since last INR chart reviewed; none identified     5/23/23 range changed from 2.5-3.5 to 2.0-3.0     5/30 back injection but did not need to hold Warfarin per patient              PLAN     Recommended plan for no diet, medication or health factor changes affecting INR     Dosing Instructions: Continue your current warfarin dose with next INR in 1 week       Summary  As of 5/30/2023    Full warfarin instructions:  5 mg every Tue, Sat; 2.5 mg all other days   Next INR check:  6/6/2023             Detailed voice message left for Kacie with dosing instructions and follow up date.   5/31: Spoke to patient and reviewed dosing.  Alec HERNANDEZ    Contact [phone] to schedule and with any changes, questions or concerns.     Education provided:     Goal range and lab monitoring: goal range and significance of current result    Contact [phone] with any changes, questions or concerns.     Plan made per [facility] anticoagulation protocol    Janna Rees, RN  Anticoagulation Clinic  5/30/2023    _______________________________________________________________________     Anticoagulation Episode Summary     Current INR goal:  2.0-3.0   TTR:  35.8 % (2.3 wk)   Target end date:  Indefinite   Send INR reminders to:  VIRGILIO WEEKS    Indications    S/P TAVR (transcatheter aortic valve replacement) [Z95.2]           Comments:  2.0-3.0 per Dr. Olguin on 5/24/23         Anticoagulation Care Providers     Provider Role Specialty Phone number    Irina Francisco PA-C Referring [facility] [phone]

## 2023-05-31 ENCOUNTER — TELEPHONE (OUTPATIENT)
Dept: FAMILY MEDICINE | Facility: CLINIC | Age: 83
End: 2023-05-31
Payer: COMMERCIAL

## 2023-05-31 NOTE — TELEPHONE ENCOUNTER
Patient Returning Call    Reason for call:  Wanting to get dosage for anticoagulation medication until next check on 6/6/2023    Information relayed to patient:  I will send a message to the anticoagulation team and ask them to call you     Patient has additional questions:  No    What are your questions/concerns:  Just concerned about medications     Okay to leave a detailed message?: Yes at Cell number on file:    No relevant phone numbers on file.

## 2023-06-06 ENCOUNTER — ANTICOAGULATION THERAPY VISIT (OUTPATIENT)
Dept: ANTICOAGULATION | Facility: CLINIC | Age: 83
End: 2023-06-06

## 2023-06-06 ENCOUNTER — LAB (OUTPATIENT)
Dept: LAB | Facility: CLINIC | Age: 83
End: 2023-06-06
Payer: COMMERCIAL

## 2023-06-06 DIAGNOSIS — Z95.2 S/P TAVR (TRANSCATHETER AORTIC VALVE REPLACEMENT): ICD-10-CM

## 2023-06-06 DIAGNOSIS — Z95.2 S/P TAVR (TRANSCATHETER AORTIC VALVE REPLACEMENT): Primary | ICD-10-CM

## 2023-06-06 LAB — INR BLD: 2.9 (ref 0.9–1.1)

## 2023-06-06 PROCEDURE — 85610 PROTHROMBIN TIME: CPT

## 2023-06-06 PROCEDURE — 36416 COLLJ CAPILLARY BLOOD SPEC: CPT

## 2023-06-06 NOTE — PROGRESS NOTES
ANTICOAGULATION MANAGEMENT     Kacie Hermosillo 83 year old female is on warfarin with therapeutic INR result. (Goal INR 2.0-3.0)    Recent labs: (last 7 days)     06/06/23  1524   INR 2.9*       ASSESSMENT       Source(s): Chart Review and Patient/Caregiver Call       Warfarin doses taken: Warfarin taken as instructed    Diet: No new diet changes identified    Medication/supplement changes: None noted    New illness, injury, or hospitalization: No    Signs or symptoms of bleeding or clotting: No    Previous result: Therapeutic last 2(+) visits    Additional findings: GR decreased to 2.0-3.0 on 5/24/23         PLAN     Recommended plan for no diet, medication or health factor changes affecting INR     Dosing Instructions: Continue your current warfarin dose with next INR in 2 weeks       Summary  As of 6/6/2023    Full warfarin instructions:  5 mg every Tue, Sat; 2.5 mg all other days   Next INR check:  6/20/2023             Telephone call with Kacie who agrees to plan and repeated back plan correctly    Lab visit scheduled    Education provided:     Goal range and lab monitoring: goal range and significance of current result and Importance of therapeutic range    Plan made per [facility] anticoagulation protocol    Fabiana Carvajal RN  Anticoagulation Clinic  6/6/2023    _______________________________________________________________________     Anticoagulation Episode Summary     Current INR goal:  2.0-3.0   TTR:  55.0 % (3.3 wk)   Target end date:  Indefinite   Send INR reminders to:  VIRGILIO WEEKS    Indications    S/P TAVR (transcatheter aortic valve replacement) [Z95.2]           Comments:  2.0-3.0 per Dr. Olguin on 5/24/23         Anticoagulation Care Providers     Provider Role Specialty Phone number    Irina Francisco PA-C Referring Family Medicine [phone]

## 2023-06-09 ENCOUNTER — TELEPHONE (OUTPATIENT)
Dept: FAMILY MEDICINE | Facility: CLINIC | Age: 83
End: 2023-06-09
Payer: COMMERCIAL

## 2023-06-09 NOTE — TELEPHONE ENCOUNTER
QUESTION    Who is Calling: patient    Update: pt is wondering when to come in next for INR, she is already scheduled for the 20th, but the message said that they will see her next week so she needs clarification.     Does caller want a call/response back: Yes     Okay to leave a detailed message?: Yes at Home number on file [phone] (home)

## 2023-06-20 ENCOUNTER — LAB (OUTPATIENT)
Dept: LAB | Facility: CLINIC | Age: 83
End: 2023-06-20
Payer: COMMERCIAL

## 2023-06-20 ENCOUNTER — ANTICOAGULATION THERAPY VISIT (OUTPATIENT)
Dept: ANTICOAGULATION | Facility: CLINIC | Age: 83
End: 2023-06-20

## 2023-06-20 DIAGNOSIS — Z95.2 S/P TAVR (TRANSCATHETER AORTIC VALVE REPLACEMENT): Primary | ICD-10-CM

## 2023-06-20 DIAGNOSIS — Z95.2 S/P TAVR (TRANSCATHETER AORTIC VALVE REPLACEMENT): ICD-10-CM

## 2023-06-20 LAB — INR BLD: 2.4 (ref 0.9–1.1)

## 2023-06-20 PROCEDURE — 36416 COLLJ CAPILLARY BLOOD SPEC: CPT

## 2023-06-20 PROCEDURE — 85610 PROTHROMBIN TIME: CPT

## 2023-06-20 NOTE — PROGRESS NOTES
ANTICOAGULATION MANAGEMENT     Kacie Hermosillo 83 year old female is on warfarin with therapeutic INR result. (Goal INR 2.0-3.0)    Recent labs: (last 7 days)     06/20/23  1125   INR 2.4*       ASSESSMENT       Source(s): Chart Review and Patient/Caregiver Call       Warfarin doses taken: Warfarin taken as instructed    Diet: No new diet changes identified    Medication/supplement changes: None noted    New illness, injury, or hospitalization: No    Signs or symptoms of bleeding or clotting: No    Previous result: Therapeutic last 2(+) visits    Additional findings: None         PLAN     Recommended plan for no diet, medication or health factor changes affecting INR     Dosing Instructions: Continue your current warfarin dose with next INR in 3 weeks       Summary  As of 6/20/2023    Full warfarin instructions:  5 mg every Tue, Sat; 2.5 mg all other days   Next INR check:  7/11/2023             Telephone call with Kacie who verbalizes understanding and agrees to plan    Lab visit scheduled    Education provided:     Goal range and lab monitoring: goal range and significance of current result    Contact [phone] with any changes, questions or concerns.     Plan made per [facility] anticoagulation protocol    Janna Rees, RN  Anticoagulation Clinic  6/20/2023    _______________________________________________________________________     Anticoagulation Episode Summary     Current INR goal:  2.0-3.0   TTR:  71.6 % (1.2 mo)   Target end date:  Indefinite   Send INR reminders to:  VIRGILIO WEEKS    Indications    S/P TAVR (transcatheter aortic valve replacement) [Z95.2]           Comments:  2.0-3.0 per Dr. Olguin on 5/24/23         Anticoagulation Care Providers     Provider Role Specialty Phone number    Irina Francisco PA-C Referring Family Medicine [phone]

## 2023-07-11 ENCOUNTER — ANTICOAGULATION THERAPY VISIT (OUTPATIENT)
Dept: ANTICOAGULATION | Facility: CLINIC | Age: 83
End: 2023-07-11

## 2023-07-11 ENCOUNTER — LAB (OUTPATIENT)
Dept: LAB | Facility: CLINIC | Age: 83
End: 2023-07-11
Payer: COMMERCIAL

## 2023-07-11 DIAGNOSIS — Z95.2 S/P TAVR (TRANSCATHETER AORTIC VALVE REPLACEMENT): Primary | ICD-10-CM

## 2023-07-11 DIAGNOSIS — Z95.2 S/P TAVR (TRANSCATHETER AORTIC VALVE REPLACEMENT): ICD-10-CM

## 2023-07-11 LAB — INR BLD: 2.9 (ref 0.9–1.1)

## 2023-07-11 PROCEDURE — 36416 COLLJ CAPILLARY BLOOD SPEC: CPT

## 2023-07-11 PROCEDURE — 85610 PROTHROMBIN TIME: CPT

## 2023-07-11 NOTE — PROGRESS NOTES
ANTICOAGULATION MANAGEMENT     Kacie Hermosillo 83 year old female is on warfarin with therapeutic INR result. (Goal INR 2.0-3.0)    Recent labs: (last 7 days)     07/11/23  1110   INR 2.9*       ASSESSMENT       Source(s): Chart Review and Patient/Caregiver Call       Warfarin doses taken: Warfarin taken as instructed    Diet: No new diet changes identified    Medication/supplement changes: None noted    New illness, injury, or hospitalization: No    Signs or symptoms of bleeding or clotting: No    Previous result: Therapeutic last 2(+) visits    Additional findings: None         PLAN     Recommended plan for no diet, medication or health factor changes affecting INR     Dosing Instructions: Continue your current warfarin dose with next INR in 4 weeks       Summary  As of 7/11/2023    Full warfarin instructions:  5 mg every Tue, Sat; 2.5 mg all other days   Next INR check:  8/8/2023             Telephone call with Kacie who verbalizes understanding and agrees to plan    Lab visit scheduled    Education provided:     Contact [phone] with any changes, questions or concerns.     Plan made per [facility] anticoagulation protocol    Ann Alvarez RN  Anticoagulation Clinic  7/11/2023    _______________________________________________________________________     Anticoagulation Episode Summary     Current INR goal:  2.0-3.0   TTR:  81.8 % (1.9 mo)   Target end date:  Indefinite   Send INR reminders to:  VIRGILIO WEEKS    Indications    S/P TAVR (transcatheter aortic valve replacement) [Z95.2]           Comments:  2.0-3.0 per Dr. Olguin on 5/24/23         Anticoagulation Care Providers     Provider Role Specialty Phone number    Irina Francisco PA-C Referring Family Medicine [phone]

## 2023-07-12 DIAGNOSIS — E78.5 HYPERLIPIDEMIA, UNSPECIFIED HYPERLIPIDEMIA TYPE: Primary | ICD-10-CM

## 2023-07-12 NOTE — TELEPHONE ENCOUNTER
Routing refill request to provider for review/approval because:  Medication is reported/historical     eHrb Spencer RN

## 2023-07-14 RX ORDER — ATORVASTATIN CALCIUM 40 MG/1
TABLET, FILM COATED ORAL
Qty: 90 TABLET | Refills: 0 | Status: SHIPPED | OUTPATIENT
Start: 2023-07-14 | End: 2023-11-03

## 2023-08-10 ENCOUNTER — LAB (OUTPATIENT)
Dept: LAB | Facility: CLINIC | Age: 83
End: 2023-08-10
Payer: COMMERCIAL

## 2023-08-10 ENCOUNTER — ANTICOAGULATION THERAPY VISIT (OUTPATIENT)
Dept: ANTICOAGULATION | Facility: CLINIC | Age: 83
End: 2023-08-10

## 2023-08-10 DIAGNOSIS — Z95.2 S/P TAVR (TRANSCATHETER AORTIC VALVE REPLACEMENT): Primary | ICD-10-CM

## 2023-08-10 DIAGNOSIS — Z95.2 S/P TAVR (TRANSCATHETER AORTIC VALVE REPLACEMENT): ICD-10-CM

## 2023-08-10 LAB — INR BLD: 1.9 (ref 0.9–1.1)

## 2023-08-10 PROCEDURE — 36416 COLLJ CAPILLARY BLOOD SPEC: CPT

## 2023-08-10 PROCEDURE — 85610 PROTHROMBIN TIME: CPT

## 2023-08-10 NOTE — PROGRESS NOTES
ANTICOAGULATION MANAGEMENT     Kacie Hermosillo 83 year old female is on warfarin with subtherapeutic INR result. (Goal INR 2.0-3.0)    Recent labs: (last 7 days)     08/10/23  1319   INR 1.9*       ASSESSMENT     Source(s): Chart Review and Patient/Caregiver Call     Warfarin doses taken: Warfarin taken as instructed  Diet: No new diet changes identified  Medication/supplement changes: None noted  New illness, injury, or hospitalization: No  Signs or symptoms of bleeding or clotting: No  Previous result: Therapeutic last 2(+) visits  Additional findings: None       PLAN     Recommended plan for no diet, medication or health factor changes affecting INR     Dosing Instructions: Continue your current warfarin dose with next INR in 2 weeks       Summary  As of 8/10/2023      Full warfarin instructions:  5 mg every Tue, Sat; 2.5 mg all other days   Next INR check:  8/24/2023               Telephone call with Kacie who verbalizes understanding and agrees to plan    Lab visit scheduled    Education provided:   Goal range and lab monitoring: goal range and significance of current result  Contact [phone] with any changes, questions or concerns.     Plan made per [facility] anticoagulation protocol    Janna Rees RN  Anticoagulation Clinic  8/10/2023    _______________________________________________________________________     Anticoagulation Episode Summary       Current INR goal:  2.0-3.0   TTR:  84.6 % (2.9 mo)   Target end date:  Indefinite   Send INR reminders to:  VIRGILIO WEEKS    Indications    S/P TAVR (transcatheter aortic valve replacement) [Z95.2]             Comments:  2.0-3.0 per Dr. Olguin on 5/24/23             Anticoagulation Care Providers       Provider Role Specialty Phone number    Irina Francisco PA-C Referring [facility] Medicine [phone]

## 2023-08-24 ENCOUNTER — LAB (OUTPATIENT)
Dept: LAB | Facility: CLINIC | Age: 83
End: 2023-08-24
Payer: COMMERCIAL

## 2023-08-24 ENCOUNTER — ANTICOAGULATION THERAPY VISIT (OUTPATIENT)
Dept: ANTICOAGULATION | Facility: CLINIC | Age: 83
End: 2023-08-24

## 2023-08-24 DIAGNOSIS — Z95.2 S/P TAVR (TRANSCATHETER AORTIC VALVE REPLACEMENT): ICD-10-CM

## 2023-08-24 DIAGNOSIS — Z95.2 S/P TAVR (TRANSCATHETER AORTIC VALVE REPLACEMENT): Primary | ICD-10-CM

## 2023-08-24 LAB — INR BLD: 1.6 (ref 0.9–1.1)

## 2023-08-24 PROCEDURE — 85610 PROTHROMBIN TIME: CPT

## 2023-08-24 PROCEDURE — 36416 COLLJ CAPILLARY BLOOD SPEC: CPT

## 2023-08-24 NOTE — PROGRESS NOTES
ANTICOAGULATION MANAGEMENT     Kacie Hermosillo 83 year old female is on warfarin with subtherapeutic INR result. (Goal INR 2.0-3.0)    Recent labs: (last 7 days)     08/24/23  1338   INR 1.6*       ASSESSMENT     Source(s): Chart Review  Previous INR was Subtherapeutic  Medication, diet, health changes since last INR chart reviewed; none identified         PLAN     Unable to reach Kacie today.    Left message to take a booster dose of warfarin,  5 mg tonight. Request call back for assessment.    Follow up required to confirm warfarin dose taken and assess for changes and discuss dosing instructions and confirm understanding of instructions      Ann Alvarez RN  Anticoagulation Clinic  8/24/2023

## 2023-08-25 NOTE — PROGRESS NOTES
ANTICOAGULATION MANAGEMENT     Kacie Hermosillo 83 year old female is on warfarin with subtherapeutic INR result. (Goal INR 2.0-3.0)    Recent labs: (last 7 days)     08/24/23  1338   INR 1.6*       ASSESSMENT     Source(s): Chart Review and Patient/Caregiver Call     Warfarin doses taken: Warfarin taken as instructed  Diet: No new diet changes identified  Medication/supplement changes: None noted  New illness, injury, or hospitalization: No  Signs or symptoms of bleeding or clotting: No  Previous result: Subtherapeutic  Additional findings: None       PLAN     Recommended plan for no diet, medication or health factor changes affecting INR     Dosing Instructions: Increase your warfarin dose (11.1% change) with next INR in 2 weeks       Summary  As of 8/24/2023      Full warfarin instructions:  5 mg every Tue, Thu, Sat; 2.5 mg all other days   Next INR check:  9/7/2023               Telephone call with Kacie who verbalizes understanding and agrees to plan    Lab visit scheduled    Education provided:   Goal range and lab monitoring: goal range and significance of current result  Contact [phone] with any changes, questions or concerns.     Plan made per [facility] anticoagulation protocol    Janna eRes, RN  Anticoagulation Clinic  8/25/2023    _______________________________________________________________________     Anticoagulation Episode Summary       Current INR goal:  2.0-3.0   TTR:  73.0 % (3.4 mo)   Target end date:  Indefinite   Send INR reminders to:  VIRGILIO WEEKS    Indications    S/P TAVR (transcatheter aortic valve replacement) [Z95.2]             Comments:  2.0-3.0 per Dr. Olguin on 5/24/23             Anticoagulation Care Providers       Provider Role Specialty Phone number    Irina Francisco PA-C Referring Family Medicine [phone]

## 2023-09-07 ENCOUNTER — LAB (OUTPATIENT)
Dept: LAB | Facility: CLINIC | Age: 83
End: 2023-09-07
Payer: COMMERCIAL

## 2023-09-07 ENCOUNTER — ANTICOAGULATION THERAPY VISIT (OUTPATIENT)
Dept: ANTICOAGULATION | Facility: CLINIC | Age: 83
End: 2023-09-07

## 2023-09-07 DIAGNOSIS — Z95.2 S/P TAVR (TRANSCATHETER AORTIC VALVE REPLACEMENT): Primary | ICD-10-CM

## 2023-09-07 DIAGNOSIS — Z95.2 S/P TAVR (TRANSCATHETER AORTIC VALVE REPLACEMENT): ICD-10-CM

## 2023-09-07 LAB — INR BLD: 1.5 (ref 0.9–1.1)

## 2023-09-07 PROCEDURE — 85610 PROTHROMBIN TIME: CPT

## 2023-09-07 PROCEDURE — 36416 COLLJ CAPILLARY BLOOD SPEC: CPT

## 2023-09-07 NOTE — PROGRESS NOTES
ANTICOAGULATION MANAGEMENT     Kacie Hermosillo 83 year old female is on warfarin with subtherapeutic INR result. (Goal INR 2.0-3.0)    Recent labs: (last 7 days)     09/07/23  1030   INR 1.5*       ASSESSMENT     Source(s): Chart Review and Patient/Caregiver Call     Warfarin doses taken: Warfarin taken as instructed denied any missed doses   Diet: No new diet changes identified  Medication/supplement changes: None noted  New illness, injury, or hospitalization: No  Signs or symptoms of bleeding or clotting: No  Previous result: Subtherapeutic increased by 11.1%  Additional findings: None       PLAN     Recommended plan for no diet, medication or health factor changes affecting INR     Dosing Instructions: booster dose then Increase your warfarin dose (10% change) with next INR in 1 week       Summary  As of 9/7/2023      Full warfarin instructions:  9/7: 7.5 mg; Otherwise 2.5 mg every Mon, Wed, Fri; 5 mg all other days   Next INR check:  9/14/2023               Telephone call with Kacie who verbalizes understanding and agrees to plan    Lab visit scheduled    Education provided:   Goal range and lab monitoring: goal range and significance of current result  Contact [phone] with any changes, questions or concerns.     Plan made per [facility] anticoagulation protocol    Janna Rees, RN  Anticoagulation Clinic  9/7/2023    _______________________________________________________________________     Anticoagulation Episode Summary       Current INR goal:  2.0-3.0   TTR:  64.3 % (3.9 mo)   Target end date:  Indefinite   Send INR reminders to:  VIRGILIO WEEKS    Indications    S/P TAVR (transcatheter aortic valve replacement) [Z95.2]             Comments:  2.0-3.0 per Dr. Olguin on 5/24/23             Anticoagulation Care Providers       Provider Role Specialty Phone number    Irina Francisco PA-C Referring [facility] Medicine [phone]

## 2023-09-14 ENCOUNTER — ANTICOAGULATION THERAPY VISIT (OUTPATIENT)
Dept: ANTICOAGULATION | Facility: CLINIC | Age: 83
End: 2023-09-14

## 2023-09-14 ENCOUNTER — LAB (OUTPATIENT)
Dept: LAB | Facility: CLINIC | Age: 83
End: 2023-09-14
Payer: COMMERCIAL

## 2023-09-14 DIAGNOSIS — Z95.2 S/P TAVR (TRANSCATHETER AORTIC VALVE REPLACEMENT): Primary | ICD-10-CM

## 2023-09-14 DIAGNOSIS — Z95.2 S/P TAVR (TRANSCATHETER AORTIC VALVE REPLACEMENT): ICD-10-CM

## 2023-09-14 LAB — INR BLD: 2.4 (ref 0.9–1.1)

## 2023-09-14 PROCEDURE — 36416 COLLJ CAPILLARY BLOOD SPEC: CPT

## 2023-09-14 PROCEDURE — 85610 PROTHROMBIN TIME: CPT

## 2023-09-14 NOTE — PROGRESS NOTES
ANTICOAGULATION MANAGEMENT     Kacie Hermosillo 83 year old female is on warfarin with therapeutic INR result. (Goal INR 2.0-3.0)    Recent labs: (last 7 days)     09/14/23  0909   INR 2.4*       ASSESSMENT     Source(s): Chart Review  Previous INR was Subtherapeutic-boost and 10% increase  Medication, diet, health changes since last INR chart reviewed; none identified         PLAN     Recommended plan for no diet, medication or health factor changes affecting INR     Dosing Instructions: Continue your current warfarin dose with next INR in 7-10 days       Summary  As of 9/14/2023      Full warfarin instructions:  2.5 mg every Mon, Wed, Fri; 5 mg all other days   Next INR check:  9/25/2023               Detailed voice message left for Kacie with dosing instructions and follow up date.     Contact [phone] to schedule and with any changes, questions or concerns.     Education provided:   Please call back if any changes to your diet, medications or how you've been taking warfarin  Goal range and lab monitoring: goal range and significance of current result  Contact [phone] with any changes, questions or concerns.     Plan made per [facility] anticoagulation protocol    Praveena Morris RN  Anticoagulation Clinic  9/14/2023    _______________________________________________________________________     Anticoagulation Episode Summary       Current INR goal:  2.0-3.0   TTR:  63.2 % (4.1 mo)   Target end date:  Indefinite   Send INR reminders to:  VIRGILIO WEEKS    Indications    S/P TAVR (transcatheter aortic valve replacement) [Z95.2]             Comments:  2.0-3.0 per Dr. Olguin on 5/24/23             Anticoagulation Care Providers       Provider Role Specialty Phone number    Irina Francisco PA-C Referring Family Medicine [phone]

## 2023-09-21 ENCOUNTER — LAB (OUTPATIENT)
Dept: LAB | Facility: CLINIC | Age: 83
End: 2023-09-21
Payer: COMMERCIAL

## 2023-09-21 ENCOUNTER — ANTICOAGULATION THERAPY VISIT (OUTPATIENT)
Dept: ANTICOAGULATION | Facility: CLINIC | Age: 83
End: 2023-09-21

## 2023-09-21 DIAGNOSIS — Z95.2 S/P TAVR (TRANSCATHETER AORTIC VALVE REPLACEMENT): Primary | ICD-10-CM

## 2023-09-21 DIAGNOSIS — Z95.2 S/P TAVR (TRANSCATHETER AORTIC VALVE REPLACEMENT): ICD-10-CM

## 2023-09-21 LAB — INR BLD: 2.7 (ref 0.9–1.1)

## 2023-09-21 PROCEDURE — 36416 COLLJ CAPILLARY BLOOD SPEC: CPT

## 2023-09-21 PROCEDURE — 85610 PROTHROMBIN TIME: CPT

## 2023-09-21 NOTE — PROGRESS NOTES
ANTICOAGULATION MANAGEMENT     Kacie Hermosillo 83 year old female is on warfarin with therapeutic INR result. (Goal INR 2.0-3.0)    Recent labs: (last 7 days)     09/21/23  0948   INR 2.7*       ASSESSMENT     Source(s): Chart Review and Patient/Caregiver Call     Warfarin doses taken: Warfarin taken as instructed  Diet: No new diet changes identified  Medication/supplement changes: None noted  New illness, injury, or hospitalization: No  Signs or symptoms of bleeding or clotting: No  Previous result: Therapeutic last visit; previously outside of goal range  Additional findings: None       PLAN     Recommended plan for no diet, medication or health factor changes affecting INR     Dosing Instructions: Continue your current warfarin dose with next INR in 3 weeks       Summary  As of 9/21/2023      Full warfarin instructions:  2.5 mg every Mon, Wed, Fri; 5 mg all other days   Next INR check:  10/12/2023               Telephone call with Kacie who verbalizes understanding and agrees to plan    Lab visit scheduled    Education provided:   Contact [phone] with any changes, questions or concerns.     Plan made per [facility] anticoagulation protocol    Ann Alvarez RN  Anticoagulation Clinic  9/21/2023    _______________________________________________________________________     Anticoagulation Episode Summary       Current INR goal:  2.0-3.0   TTR:  65.2 % (4.3 mo)   Target end date:  Indefinite   Send INR reminders to:  VIRGILIO WEEKS    Indications    S/P TAVR (transcatheter aortic valve replacement) [Z95.2]             Comments:  2.0-3.0 per Dr. Olguin on 5/24/23             Anticoagulation Care Providers       Provider Role Specialty Phone number    Irina Francisco PA-C Referring Family Medicine [phone]

## 2023-10-05 DIAGNOSIS — Z95.2 S/P TAVR (TRANSCATHETER AORTIC VALVE REPLACEMENT): ICD-10-CM

## 2023-10-07 RX ORDER — WARFARIN SODIUM 5 MG/1
TABLET ORAL
Qty: 90 TABLET | Refills: 3 | Status: SHIPPED | OUTPATIENT
Start: 2023-10-07

## 2023-10-12 ENCOUNTER — ANTICOAGULATION THERAPY VISIT (OUTPATIENT)
Dept: ANTICOAGULATION | Facility: CLINIC | Age: 83
End: 2023-10-12

## 2023-10-12 ENCOUNTER — LAB (OUTPATIENT)
Dept: LAB | Facility: CLINIC | Age: 83
End: 2023-10-12
Payer: COMMERCIAL

## 2023-10-12 DIAGNOSIS — Z95.2 S/P TAVR (TRANSCATHETER AORTIC VALVE REPLACEMENT): ICD-10-CM

## 2023-10-12 DIAGNOSIS — Z95.2 S/P TAVR (TRANSCATHETER AORTIC VALVE REPLACEMENT): Primary | ICD-10-CM

## 2023-10-12 LAB — INR BLD: 3.3 (ref 0.9–1.1)

## 2023-10-12 PROCEDURE — 36416 COLLJ CAPILLARY BLOOD SPEC: CPT

## 2023-10-12 PROCEDURE — 85610 PROTHROMBIN TIME: CPT

## 2023-10-12 NOTE — PROGRESS NOTES
ANTICOAGULATION MANAGEMENT     Kacie Hermosillo 83 year old female is on warfarin with supratherapeutic INR result. (Goal INR 2.0-3.0)    Recent labs: (last 7 days)     10/12/23  0957   INR 3.3*       ASSESSMENT     Source(s): Chart Review  Previous INR was Therapeutic last 2(+) visits  Medication, diet, health changes since last INR chart reviewed; none identified         PLAN     Recommended plan for no diet, medication or health factor changes affecting INR     Dosing Instructions: Continue your current warfarin dose with next INR in 2 weeks       Summary  As of 10/12/2023      Full warfarin instructions:  2.5 mg every Mon, Wed, Fri; 5 mg all other days   Next INR check:  10/26/2023               Detailed voice message left for Kacie with dosing instructions and follow up date.     Contact [phone] to schedule and with any changes, questions or concerns.     Education provided:   Please call back if any changes to your diet, medications or how you've been taking warfarin  Goal range and lab monitoring: goal range and significance of current result and Importance of following up at instructed interval  Symptom monitoring: monitoring for bleeding signs and symptoms  Contact [phone] with any changes, questions or concerns.     Plan made per [facility] anticoagulation protocol    Praveena Morris RN  Anticoagulation Clinic  10/12/2023    _______________________________________________________________________     Anticoagulation Episode Summary       Current INR goal:  2.0-3.0   TTR:  63.1% (5 mo)   Target end date:  Indefinite   Send INR reminders to:  VIRGILIO WEEKS    Indications    S/P TAVR (transcatheter aortic valve replacement) [Z95.2]             Comments:  2.0-3.0 per Dr. Olguin on 5/24/23             Anticoagulation Care Providers       Provider Role Specialty Phone number    Irina Francisco PA-C Referring Family Medicine [phone]

## 2023-10-26 ENCOUNTER — ANTICOAGULATION THERAPY VISIT (OUTPATIENT)
Dept: ANTICOAGULATION | Facility: CLINIC | Age: 83
End: 2023-10-26

## 2023-10-26 ENCOUNTER — LAB (OUTPATIENT)
Dept: LAB | Facility: CLINIC | Age: 83
End: 2023-10-26
Payer: COMMERCIAL

## 2023-10-26 DIAGNOSIS — Z95.2 S/P TAVR (TRANSCATHETER AORTIC VALVE REPLACEMENT): Primary | ICD-10-CM

## 2023-10-26 DIAGNOSIS — Z95.2 S/P TAVR (TRANSCATHETER AORTIC VALVE REPLACEMENT): ICD-10-CM

## 2023-10-26 LAB — INR BLD: 2.4 (ref 0.9–1.1)

## 2023-10-26 PROCEDURE — 85610 PROTHROMBIN TIME: CPT

## 2023-10-26 PROCEDURE — 36416 COLLJ CAPILLARY BLOOD SPEC: CPT

## 2023-10-26 NOTE — PROGRESS NOTES
ANTICOAGULATION MANAGEMENT     Kacie Hermosillo 83 year old female is on warfarin with therapeutic INR result. (Goal INR 2.0-3.0)    Recent labs: (last 7 days)     10/26/23  1006   INR 2.4*       ASSESSMENT     Source(s): Chart Review  Previous INR was Supratherapeutic  Medication, diet, health changes since last INR chart reviewed; none identified         PLAN     Recommended plan for no diet, medication or health factor changes affecting INR     Dosing Instructions: Continue your current warfarin dose with next INR in 3 weeks       Summary  As of 10/26/2023      Full warfarin instructions:  2.5 mg every Mon, Wed, Fri; 5 mg all other days   Next INR check:  11/16/2023               Detailed voice message left for Kacie with dosing instructions and follow up date.     Contact [phone] to schedule and with any changes, questions or concerns.     Education provided:   Please call back if any changes to your diet, medications or how you've been taking warfarin  Goal range and lab monitoring: goal range and significance of current result  Symptom monitoring: monitoring for bleeding signs and symptoms and monitoring for clotting signs and symptoms  Contact [phone] with any changes, questions or concerns.     Plan made per [facility] anticoagulation protocol    Praveena Morris RN  Anticoagulation Clinic  10/26/2023    _______________________________________________________________________     Anticoagulation Episode Summary       Current INR goal:  2.0-3.0   TTR:  63.4% (5.5 mo)   Target end date:  Indefinite   Send INR reminders to:  VIRGILIO WEEKS    Indications    S/P TAVR (transcatheter aortic valve replacement) [Z95.2]             Comments:  2.0-3.0 per Dr. Olguin on 5/24/23             Anticoagulation Care Providers       Provider Role Specialty Phone number    Irina Francisco PA-C Referring Family Medicine [phone]

## 2023-11-03 DIAGNOSIS — E78.5 HYPERLIPIDEMIA, UNSPECIFIED HYPERLIPIDEMIA TYPE: ICD-10-CM

## 2023-11-03 RX ORDER — ATORVASTATIN CALCIUM 40 MG/1
40 TABLET, FILM COATED ORAL EVERY MORNING
Qty: 90 TABLET | Refills: 1 | Status: SHIPPED | OUTPATIENT
Start: 2023-11-03 | End: 2024-02-07

## 2023-11-03 NOTE — TELEPHONE ENCOUNTER
"Prescription approved per [facility] Refill Protocol.     Requested Prescriptions   Pending Prescriptions Disp Refills    atorvastatin (LIPITOR) 40 MG tablet [Pharmacy Med Name: ATORVASTATIN CALCIUM 40MG TABS] 90 tablet 1     Sig: TAKE ONE TABLET BY MOUTH EVERY EVENING       [facility]ins Protocol Passed - 11/3/2023 11:07 AM        Passed - LDL on file in past 12 months     Recent Labs   Lab Test 05/04/23  1338   LDL 54             Passed - No abnormal creatine kinase in past 12 months     No lab results found.             Passed - Recent (12 mo) or future (30 days) visit within the authorizing provider's specialty     Patient has had an office visit with the authorizing provider or a provider within the authorizing providers department within the previous 12 mos or has a future within next 30 days. See \"Patient Info\" tab in inbasket, or \"Choose Columns\" in Meds & Orders section of the refill encounter.              Passed - Medication is active on med list        Passed - Patient is age 18 or older        Passed - No active pregnancy on record        Passed - No positive pregnancy test in past 12 months                 Maxine Gabriel RN 11/03/23 11:58 AM   "

## 2023-11-16 ENCOUNTER — LAB (OUTPATIENT)
Dept: LAB | Facility: CLINIC | Age: 83
End: 2023-11-16
Payer: COMMERCIAL

## 2023-11-16 ENCOUNTER — ANTICOAGULATION THERAPY VISIT (OUTPATIENT)
Dept: ANTICOAGULATION | Facility: CLINIC | Age: 83
End: 2023-11-16

## 2023-11-16 DIAGNOSIS — Z95.2 S/P TAVR (TRANSCATHETER AORTIC VALVE REPLACEMENT): ICD-10-CM

## 2023-11-16 LAB — INR BLD: 2.1 (ref 0.9–1.1)

## 2023-11-16 PROCEDURE — 85610 PROTHROMBIN TIME: CPT

## 2023-11-16 PROCEDURE — 36416 COLLJ CAPILLARY BLOOD SPEC: CPT

## 2023-11-16 NOTE — PROGRESS NOTES
ANTICOAGULATION MANAGEMENT     Kacie Hermosillo 83 year old female is on warfarin with therapeutic INR result. (Goal INR 2.0-3.0)    Recent labs: (last 7 days)     11/16/23  0942   INR 2.1*       ASSESSMENT     Source(s): Chart Review  Previous INR was Therapeutic last 2(+) visits  Medication, diet, health changes since last INR chart reviewed; none identified         PLAN       Dosing Instructions: Continue your current warfarin dose with next INR in 4 weeks       Summary  As of 11/16/2023      Full warfarin instructions:  2.5 mg every Mon, Wed, Fri; 5 mg all other days   Next INR check:  12/14/2023               Detailed voice message left for Kacie with dosing instructions and follow up date.     Contact [phone] to schedule and with any changes, questions or concerns.     Education provided:   Please call back if any changes to your diet, medications or how you've been taking warfarin  Contact [phone] with any changes, questions or concerns.     Plan made per [facility] anticoagulation protocol    Gissel Lopes RN  Anticoagulation Clinic  11/16/2023    _______________________________________________________________________     Anticoagulation Episode Summary       Current INR goal:  2.0-3.0   TTR:  67.5% (6.2 mo)   Target end date:  Indefinite   Send INR reminders to:  VIRGILIO WEEKS    Indications    S/P TAVR (transcatheter aortic valve replacement) [Z95.2]             Comments:  2.0-3.0 per Dr. Olguin on 5/24/23             Anticoagulation Care Providers       Provider Role Specialty Phone number    Irina Francisco PA-C Referring Family Medicine [phone]

## 2023-11-25 ENCOUNTER — OFFICE VISIT (OUTPATIENT)
Dept: FAMILY MEDICINE | Facility: CLINIC | Age: 83
End: 2023-11-25
Payer: COMMERCIAL

## 2023-11-25 VITALS
SYSTOLIC BLOOD PRESSURE: 121 MMHG | HEART RATE: 74 BPM | BODY MASS INDEX: 22.92 KG/M2 | TEMPERATURE: 98.5 F | DIASTOLIC BLOOD PRESSURE: 77 MMHG | WEIGHT: 129.38 LBS | OXYGEN SATURATION: 97 %

## 2023-11-25 DIAGNOSIS — N30.01 ACUTE CYSTITIS WITH HEMATURIA: Primary | ICD-10-CM

## 2023-11-25 LAB
ALBUMIN UR-MCNC: >=300 MG/DL
APPEARANCE UR: ABNORMAL
BACTERIA #/AREA URNS HPF: ABNORMAL /HPF
BILIRUB UR QL STRIP: ABNORMAL
COLOR UR AUTO: YELLOW
GLUCOSE UR STRIP-MCNC: NEGATIVE MG/DL
HGB UR QL STRIP: ABNORMAL
HYALINE CASTS #/AREA URNS LPF: ABNORMAL /LPF
KETONES UR STRIP-MCNC: 15 MG/DL
LEUKOCYTE ESTERASE UR QL STRIP: ABNORMAL
NITRATE UR QL: POSITIVE
PH UR STRIP: 5.5 [PH] (ref 5–8)
RBC #/AREA URNS AUTO: ABNORMAL /HPF
SP GR UR STRIP: 1.02 (ref 1–1.03)
SQUAMOUS #/AREA URNS AUTO: ABNORMAL /LPF
UROBILINOGEN UR STRIP-ACNC: 1 E.U./DL
WBC #/AREA URNS AUTO: ABNORMAL /HPF

## 2023-11-25 PROCEDURE — 81001 URINALYSIS AUTO W/SCOPE: CPT | Performed by: PHYSICIAN ASSISTANT

## 2023-11-25 PROCEDURE — 87186 SC STD MICRODIL/AGAR DIL: CPT | Performed by: PHYSICIAN ASSISTANT

## 2023-11-25 PROCEDURE — 99214 OFFICE O/P EST MOD 30 MIN: CPT | Performed by: PHYSICIAN ASSISTANT

## 2023-11-25 PROCEDURE — 87086 URINE CULTURE/COLONY COUNT: CPT | Performed by: PHYSICIAN ASSISTANT

## 2023-11-25 RX ORDER — NITROFURANTOIN 25; 75 MG/1; MG/1
100 CAPSULE ORAL 2 TIMES DAILY
Qty: 14 CAPSULE | Refills: 0 | Status: SHIPPED | OUTPATIENT
Start: 2023-11-25 | End: 2023-12-02

## 2023-11-25 ASSESSMENT — PAIN SCALES - GENERAL: PAINLEVEL: NO PAIN (0)

## 2023-11-25 NOTE — PROGRESS NOTES
SUBJECTIVE:   Kacie Hermosillo is a 83 year old female presenting with a chief complaint of   Chief Complaint   Patient presents with    UTI     Frequent urination, blood in urin        She is an established patient of [facility].    UTI    Onset of symptoms was 2hour(s).  Course of illness is same  Severity mild  Current and associated symptoms frequency, urgency, and blood in urine  Treatment and measures tried None  Predisposing factors include history of frequent UTI's--last one was over a year ago  Patient denies rigors, flank pain, temperature > 101 degrees F., and vomiting      -is on warfarin, no other bleeding noted    Review of Systems    Past Medical History:   Diagnosis Date    Breast cancer (H) 1999    Hx antineoplastic chemotherapy 1999    Hx of radiation therapy 1999     Family History   Problem Relation Age of Onset    Breast Cancer Maternal Aunt     Hereditary Breast and Ovarian Cancer Syndrome No family hx of      Current Outpatient Medications   Medication Sig Dispense Refill    aspirin (ASA) 81 MG EC tablet Take 81 mg by mouth daily      atorvastatin (LIPITOR) 40 MG tablet TAKE ONE TABLET BY MOUTH EVERY EVENING 90 tablet 1    lisinopril-hydrochlorothiazide (ZESTORETIC) 20-12.5 MG tablet Take 0.5 Tablets by mouth once daily.      metoprolol tartrate (LOPRESSOR) 25 MG tablet Take 25 mg by mouth 2 times daily      nitroFURantoin macrocrystal-monohydrate (MACROBID) 100 MG capsule Take 1 capsule (100 mg) by mouth 2 times daily for 7 days 14 capsule 0    warfarin ANTICOAGULANT (JANTOVEN ANTICOAGULANT) 5 MG tablet 1/2 tab (2.5mg) on Mon, Wed, Fri and 1 tab (5mg) all other days 90 tablet 3    amoxicillin (AMOXIL) 500 MG capsule TAKE 4 CAPSULES BY MOUTH 1 HOUR PRIOR TO DENTAL APPOINTMENT (Patient not taking: Reported on 11/25/2023)      budesonide (ENTOCORT EC) 3 MG EC capsule Take 9 mg by mouth (Patient not taking: Reported on 11/25/2023)      chlorhexidine (PERIDEX) 0.12 % solution BRUSH INTO TISSUES  DAILY (Patient not taking: Reported on 11/25/2023)       Social History     Tobacco Use    Smoking status: Never    Smokeless tobacco: Never   Substance Use Topics    Alcohol use: Not on file       OBJECTIVE  /77   Pulse 74   Temp 98.5  F (36.9  C)   Wt 58.7 kg (129 lb 6 oz)   SpO2 97%   BMI 22.92 kg/m      Physical Exam  Vitals reviewed.   Constitutional:       Appearance: Normal appearance.   Cardiovascular:      Rate and Rhythm: Normal rate and regular rhythm.   Pulmonary:      Effort: Pulmonary effort is normal.      Breath sounds: Normal breath sounds and air entry.   Abdominal:      General: Abdomen is flat. Bowel sounds are normal. There is no distension.      Palpations: Abdomen is soft.      Tenderness: There is no abdominal tenderness. There is no right CVA tenderness or left CVA tenderness.   Neurological:      Mental Status: She is alert.         Labs:  Results for orders placed or performed in visit on 11/25/23 (from the past 24 hour(s))   UA with Microscopic reflex to Culture - Clinic Collect    Specimen: Urine, Midstream   Result Value Ref Range    Color Urine Yellow Colorless, Straw, Light Yellow, Yellow    Appearance Urine Slightly Cloudy (A) Clear    Glucose Urine Negative Negative mg/dL    Bilirubin Urine Small (A) Negative    Ketones Urine 15 (A) Negative mg/dL    Specific Gravity Urine 1.025 1.005 - 1.030    Blood Urine Large (A) Negative    pH Urine 5.5 5.0 - 8.0    Protein Albumin Urine >=300 (A) Negative mg/dL    Urobilinogen Urine 1.0 0.2, 1.0 E.U./dL    Nitrite Urine Positive (A) Negative    Leukocyte Esterase Urine Small (A) Negative   Urine Microscopic Exam   Result Value Ref Range    Bacteria Urine Moderate (A) None Seen /HPF    RBC Urine  (A) 0-2 /HPF /HPF    WBC Urine 10-25 (A) 0-5 /HPF /HPF    Squamous Epithelials Urine Few (A) None Seen /LPF    Hyaline Casts Urine 2-5 (A) None Seen /LPF       ASSESSMENT:      ICD-10-CM    1. Acute cystitis with hematuria  N30.01 UA  with Microscopic reflex to Culture - Clinic Collect     Urine Microscopic Exam     Urine Culture     nitroFURantoin macrocrystal-monohydrate (MACROBID) 100 MG capsule           Medical Decision Making:    Differential Diagnosis:  UTI: UTI, Dysuria, Pyelonephritis, and Kidney Stone    Serious Comorbid Conditions:  Adult:  Anticoagulation    PLAN:    UTI Adult:  macrobid BID x 7d.  To follow-up with INR clinic on Monday    Followup:    If not improving or if condition worsens, follow up with your Primary Care Provider    There are no Patient Instructions on file for this visit.

## 2023-11-26 LAB — BACTERIA UR CULT: ABNORMAL

## 2023-12-07 ENCOUNTER — OFFICE VISIT (OUTPATIENT)
Dept: FAMILY MEDICINE | Facility: CLINIC | Age: 83
End: 2023-12-07
Payer: COMMERCIAL

## 2023-12-07 ENCOUNTER — TELEPHONE (OUTPATIENT)
Dept: FAMILY MEDICINE | Facility: CLINIC | Age: 83
End: 2023-12-07

## 2023-12-07 VITALS
DIASTOLIC BLOOD PRESSURE: 73 MMHG | SYSTOLIC BLOOD PRESSURE: 119 MMHG | HEART RATE: 71 BPM | OXYGEN SATURATION: 96 % | TEMPERATURE: 97.4 F | RESPIRATION RATE: 20 BRPM

## 2023-12-07 DIAGNOSIS — N39.0 URINARY TRACT INFECTION IN FEMALE: Primary | ICD-10-CM

## 2023-12-07 DIAGNOSIS — R30.0 DYSURIA: ICD-10-CM

## 2023-12-07 DIAGNOSIS — N18.31 CHRONIC RENAL FAILURE, STAGE 3A (H): ICD-10-CM

## 2023-12-07 DIAGNOSIS — Z79.01 LONG TERM CURRENT USE OF ANTICOAGULANT THERAPY: ICD-10-CM

## 2023-12-07 LAB
ALBUMIN UR-MCNC: 100 MG/DL
APPEARANCE UR: ABNORMAL
BACTERIA #/AREA URNS HPF: ABNORMAL /HPF
BILIRUB UR QL STRIP: NEGATIVE
COLOR UR AUTO: YELLOW
GLUCOSE UR STRIP-MCNC: NEGATIVE MG/DL
HGB UR QL STRIP: ABNORMAL
KETONES UR STRIP-MCNC: ABNORMAL MG/DL
LEUKOCYTE ESTERASE UR QL STRIP: ABNORMAL
NITRATE UR QL: POSITIVE
PH UR STRIP: 5.5 [PH] (ref 5–8)
RBC #/AREA URNS AUTO: ABNORMAL /HPF
SP GR UR STRIP: 1.02 (ref 1–1.03)
SQUAMOUS #/AREA URNS AUTO: ABNORMAL /LPF
UROBILINOGEN UR STRIP-ACNC: 0.2 E.U./DL
WBC #/AREA URNS AUTO: >100 /HPF
WBC CLUMPS #/AREA URNS HPF: PRESENT /HPF
YEAST #/AREA URNS HPF: ABNORMAL /HPF

## 2023-12-07 PROCEDURE — 87086 URINE CULTURE/COLONY COUNT: CPT | Performed by: NURSE PRACTITIONER

## 2023-12-07 PROCEDURE — 81001 URINALYSIS AUTO W/SCOPE: CPT | Performed by: NURSE PRACTITIONER

## 2023-12-07 PROCEDURE — 99214 OFFICE O/P EST MOD 30 MIN: CPT | Performed by: NURSE PRACTITIONER

## 2023-12-07 PROCEDURE — 87186 SC STD MICRODIL/AGAR DIL: CPT | Performed by: NURSE PRACTITIONER

## 2023-12-07 RX ORDER — CEPHALEXIN 500 MG/1
500 CAPSULE ORAL 3 TIMES DAILY
Qty: 21 CAPSULE | Refills: 0 | Status: SHIPPED | OUTPATIENT
Start: 2023-12-07 | End: 2023-12-14

## 2023-12-07 NOTE — PATIENT INSTRUCTIONS
As expected, your bladder infection is back.    We will call you if the urine culture shows that the bacteria causing the infection is resistant to the antibiotic we prescribed.    Push fluids.    Return if fevers or side/back pain develop or follow-up if not doing much better after 3 days of antibiotics.    Call your INR nurse to schedule an INR spotcheck on Monday due to the cephalexin antibiotic.    Consider discussing vaginal estrogen with your primary care provider to prevent UTIs. May not be appropriate with breast cancer in the past - primary care to discuss if interested.

## 2023-12-07 NOTE — TELEPHONE ENCOUNTER
Pt called back and is wondering if the abx needs to be extended. She said symptoms improved but came back again today after the 7 days of abx. please advise  Ada Maguire RN on 12/7/2023 at 1:12 PM     Pt would like a call back at [phone]

## 2023-12-07 NOTE — TELEPHONE ENCOUNTER
Are you asking if I can see her? I have some availability for tomorrow if that is what you are asking.     Lore

## 2023-12-07 NOTE — PROGRESS NOTES
Assessment & Plan     Dysuria    - UA Macroscopic with reflex to Microscopic and Culture - Clinic Collect  - Urine Microscopic Exam  - Urine Culture    Urinary tract infection in female    - cephALEXin (KEFLEX) 500 MG capsule  Dispense: 21 capsule; Refill: 0    Long term current use of anticoagulant therapy      Chronic renal failure, stage 3a (H)      Patient who is on warfarin with a history of recently treated UTI with nitrofurantoin.     Usual UTI symptoms returned today.  No signs of complicated UTI today.    History and UA consistent with UTI.  Push fluids.  Return in 3 days if no better, sooner if worsening.    Brief discussion about vaginal estrogen to prevent UTIs in older people.  Explained that she can discuss risk and benefit with her primary care provider given history of breast cancer.  Patient and family very worried about repeated UTIs.    Spotcheck INR on Monday.  Patient will contact her INR nurse to set this up.             Return in about 2 days (around 12/9/2023) for If no better.    Sulma Garay [facility] RAJINDER Hester is a 83 year old female who presents to clinic today for the following health issues:  Chief Complaint   Patient presents with    UTI     X this morning. No itching, burning sensation. Frequency and urgency. No foul oder, blood with urine per pt daughter. No vaginal discharge. UTI about 2 weeks ago per pt daughter and just completed abx 5 days.      HPI    Urinary symptoms starting today.  He is on warfarin.  Has not had any other UTIs other than 2 weeks ago.  Has had runs of difficult to treat UTIs in the past.    Symptoms Include:   Hematuria, Frequency [x], urgency [x]    Denies:  Flank pain, fevers     History includes: Was treated for UTI with Macrobid starting on November 25.  Get better in the expected timeframe.  She has a slightly lower GFR of 58.  Culture showed pansensitive E. coli with the exception of  ampicillin.    Family was told she was given Macrobid due to decreased effects on the INR.  Is on warfarin.  Has had a TAVR.          Review of Systems    See HPI        Objective    /73 (BP Location: Left arm, Patient Position: Sitting, Cuff Size: Adult Regular)   Pulse 71   Temp 97.4  F (36.3  C) (Oral)   Resp 20   SpO2 96%   Physical Exam  Constitutional:       Appearance: Normal appearance.   Pulmonary:      Effort: Pulmonary effort is normal.   Abdominal:      Tenderness: There is no right CVA tenderness or left CVA tenderness.   Neurological:      Mental Status: She is alert.            Results for orders placed or performed in visit on 12/07/23 (from the past 24 hour(s))   UA Macroscopic with reflex to Microscopic and Culture - Clinic Collect    Specimen: Urine, Clean Catch   Result Value Ref Range    Color Urine Yellow Colorless, Straw, Light Yellow, Yellow    Appearance Urine Slightly Cloudy (A) Clear    Glucose Urine Negative Negative mg/dL    Bilirubin Urine Negative Negative    Ketones Urine Trace (A) Negative mg/dL    Specific Gravity Urine 1.025 1.005 - 1.030    Blood Urine Large (A) Negative    pH Urine 5.5 5.0 - 8.0    Protein Albumin Urine 100 (A) Negative mg/dL    Urobilinogen Urine 0.2 0.2, 1.0 E.U./dL    Nitrite Urine Positive (A) Negative    Leukocyte Esterase Urine Moderate (A) Negative   Urine Microscopic Exam   Result Value Ref Range    Bacteria Urine Many (A) None Seen /HPF    RBC Urine  (A) 0-2 /HPF /HPF    WBC Urine >100 (A) 0-5 /HPF /HPF    Squamous Epithelials Urine Few (A) None Seen /LPF    WBC Clumps Urine Present (A) None Seen /HPF    Budding Yeast Urine Many (A) None Seen /HPF

## 2023-12-07 NOTE — TELEPHONE ENCOUNTER
Patient would like to be seen for UTI.  She has painful urination.  Was seen in urgent care last week and took the antibiotics.  The patient reports the symptoms started this morning.  The patient did state the symptoms did resolve and returned this am.  No fevers. No nausea or vomiting. No blood in the urine.  Please call [phone] with call back. Ok to work in this afternoon.    Thank you    Marielle PEÑA RN

## 2023-12-08 LAB — BACTERIA UR CULT: ABNORMAL

## 2023-12-18 ENCOUNTER — ANTICOAGULATION THERAPY VISIT (OUTPATIENT)
Dept: ANTICOAGULATION | Facility: CLINIC | Age: 83
End: 2023-12-18

## 2023-12-18 ENCOUNTER — LAB (OUTPATIENT)
Dept: LAB | Facility: CLINIC | Age: 83
End: 2023-12-18
Payer: COMMERCIAL

## 2023-12-18 DIAGNOSIS — Z95.2 S/P TAVR (TRANSCATHETER AORTIC VALVE REPLACEMENT): Primary | ICD-10-CM

## 2023-12-18 DIAGNOSIS — Z95.2 S/P TAVR (TRANSCATHETER AORTIC VALVE REPLACEMENT): ICD-10-CM

## 2023-12-18 LAB — INR BLD: 2.5 (ref 0.9–1.1)

## 2023-12-18 PROCEDURE — 85610 PROTHROMBIN TIME: CPT

## 2023-12-18 PROCEDURE — 36416 COLLJ CAPILLARY BLOOD SPEC: CPT

## 2023-12-18 NOTE — PROGRESS NOTES
ANTICOAGULATION MANAGEMENT     Kacie Hermosillo 83 year old female is on warfarin with therapeutic INR result. (Goal INR 2.0-3.0)    Recent labs: (last 7 days)     12/18/23  1345   INR 2.5*       ASSESSMENT     Warfarin Lab Questionnaire    Warfarin Doses Last 7 Days      12/18/2023     1:40 PM   Dose in Tablet or Mg   TAB or MG? tablet (tab)           12/18/2023   Warfarin Lab Questionnaire   Missed doses within past 14 days? No   Changes in diet or alcohol within past 14 days? No   Medication changes since last result? Yes: Macrobid 11/25-12/2, Keflex 12/7-12/14   Injuries or illness since last result? UTI   New shortness of breath, severe headaches or sudden changes in vision since last result? No   Abnormal bleeding since last result? No   Upcoming surgery, procedure? No   Best number to call with results? [phone]     Previous result: Therapeutic last 2(+) visits  Additional findings: None       PLAN     Recommended plan for temporary change(s) affecting INR     Dosing Instructions: Continue your current warfarin dose with next INR in 4 weeks       Summary  As of 12/18/2023      Full warfarin instructions:  2.5 mg every Mon, Wed, Fri; 5 mg all other days   Next INR check:  1/15/2024               Detailed voice message left for Kacie with dosing instructions and follow up date.     Contact [phone] to schedule and with any changes, questions or concerns.     Education provided:   Goal range and lab monitoring: goal range and significance of current result  Contact [phone] with any changes, questions or concerns.     Plan made per [facility] anticoagulation protocol    Janna Rees, RN  Anticoagulation Clinic  12/18/2023    _______________________________________________________________________     Anticoagulation Episode Summary       Current INR goal:  2.0-3.0   TTR:  72.3% (7.3 mo)   Target end date:  Indefinite   Send INR reminders to:  VIRGILIO WEEKS    Indications    S/P TAVR (transcatheter  aortic valve replacement) [Z95.2]             Comments:  2.0-3.0 per Dr. Olguin on 5/24/23             Anticoagulation Care Providers       Provider Role Specialty Phone number    Irina Francisco PA-C [facility] [phone]

## 2023-12-25 ENCOUNTER — OFFICE VISIT (OUTPATIENT)
Dept: FAMILY MEDICINE | Facility: CLINIC | Age: 83
End: 2023-12-25
Payer: COMMERCIAL

## 2023-12-25 VITALS
OXYGEN SATURATION: 97 % | HEART RATE: 79 BPM | SYSTOLIC BLOOD PRESSURE: 128 MMHG | DIASTOLIC BLOOD PRESSURE: 83 MMHG | RESPIRATION RATE: 16 BRPM | TEMPERATURE: 97.9 F

## 2023-12-25 DIAGNOSIS — N30.01 ACUTE CYSTITIS WITH HEMATURIA: ICD-10-CM

## 2023-12-25 DIAGNOSIS — R30.0 DYSURIA: Primary | ICD-10-CM

## 2023-12-25 LAB
ALBUMIN UR-MCNC: >=300 MG/DL
APPEARANCE UR: ABNORMAL
BACTERIA #/AREA URNS HPF: ABNORMAL /HPF
BILIRUB UR QL STRIP: NEGATIVE
COLOR UR AUTO: YELLOW
GLUCOSE UR STRIP-MCNC: NEGATIVE MG/DL
HGB UR QL STRIP: ABNORMAL
KETONES UR STRIP-MCNC: ABNORMAL MG/DL
LEUKOCYTE ESTERASE UR QL STRIP: ABNORMAL
NITRATE UR QL: NEGATIVE
PH UR STRIP: 5.5 [PH] (ref 5–8)
RBC #/AREA URNS AUTO: ABNORMAL /HPF
SP GR UR STRIP: >=1.03 (ref 1–1.03)
SQUAMOUS #/AREA URNS AUTO: ABNORMAL /LPF
UROBILINOGEN UR STRIP-ACNC: 0.2 E.U./DL
WBC #/AREA URNS AUTO: ABNORMAL /HPF
WBC CLUMPS #/AREA URNS HPF: PRESENT /HPF

## 2023-12-25 PROCEDURE — 87086 URINE CULTURE/COLONY COUNT: CPT | Performed by: STUDENT IN AN ORGANIZED HEALTH CARE EDUCATION/TRAINING PROGRAM

## 2023-12-25 PROCEDURE — 81001 URINALYSIS AUTO W/SCOPE: CPT | Performed by: STUDENT IN AN ORGANIZED HEALTH CARE EDUCATION/TRAINING PROGRAM

## 2023-12-25 PROCEDURE — 99213 OFFICE O/P EST LOW 20 MIN: CPT | Performed by: STUDENT IN AN ORGANIZED HEALTH CARE EDUCATION/TRAINING PROGRAM

## 2023-12-25 PROCEDURE — 87186 SC STD MICRODIL/AGAR DIL: CPT | Performed by: STUDENT IN AN ORGANIZED HEALTH CARE EDUCATION/TRAINING PROGRAM

## 2023-12-25 RX ORDER — CEFDINIR 300 MG/1
300 CAPSULE ORAL 2 TIMES DAILY
Qty: 10 CAPSULE | Refills: 0 | Status: SHIPPED | OUTPATIENT
Start: 2023-12-25 | End: 2023-12-30

## 2023-12-25 NOTE — PROGRESS NOTES
Assessment & Plan     Acute cystitis with hematuria  Dysuria  Will treat patient's acute uncomplicated cystitis, with antibiotics at this time, culture pending and will call if a different antibiotic is needed   Educated patient that for symptomatic relief she my use Azo (Phenazopyridine) as needed, side effects of medications reviewed.   additionally encouraged patient to increase fluid intake, practice good hygiene (wiping front to back, voiding after sexual intercourse), and avoidance of deodorant sprays.   Educated patient if recurrence is a concern to follow up with her PCP to discuss low-dose antimicrobial prophylaxis therapy; Discussed  a topical vaginal estrogen cream, started at a low dose per patient request, this typically does not have a significant increase of risk for thromboembolism- vaginally vs oral or transdermal.   Additionally we discussed if symptoms do not improve after starting today's treatment (or if symptoms worsen) to follow up in 5-7 days.  - UA Macroscopic with reflex to Microscopic and Culture - Clinic Collect  - Urine Microscopic Exam  - Urine Culture  - cefdinir (OMNICEF) 300 MG capsule  Dispense: 10 capsule; Refill: 0  - conjugated estrogens (PREMARIN) 0.625 MG/GM vaginal cream  Dispense: 30 g; Refill: 0     22 minutes spent by me on the date of the encounter doing chart review, history and exam, documentation and further activities per the note    No follow-ups on file.    Laenne Mays MD  [facility]     Kaice is a 83 year old female who presents to clinic today for the following health issues:  Chief Complaint   Patient presents with    UTI     Started this morning, pain with urinating, urgency to urinate, 3x in the last 5wks that pt dx with UTI, no blood in urine     HPI    6AM could tell something was different. Stabbing pain in her pelvis along with urinating more often, urgency.     UTI    Onset of symptoms was this  morning.  Course of illness is worsening  Severity moderate  Current and associated symptoms dysuria, frequency, urgency, suprapubic pain and pressure, and voiding in small amounts  Treatment and measures tried None  Predisposing factors include history of frequent UTI's  Patient denies rigors, flank pain, temperature > 101 degrees F., vomiting, vaginal discharge, and vaginal odor or vaginal itching    Review of Systems  Constitutional, HEENT, cardiovascular, pulmonary, gi and gu systems are negative, except as otherwise noted.      Objective    /83   Pulse 79   Temp 97.9  F (36.6  C) (Oral)   Resp 16   SpO2 97%   Physical Exam   GENERAL: healthy, alert and no distress  RESP: lungs clear to auscultation - no rales, rhonchi or wheezes  CV: regular rate and rhythm, normal S1 S2, no S3 or S4, no murmur, click or rub, no peripheral edema and peripheral pulses strong  ABDOMEN: soft, nontender, no hepatosplenomegaly, no masses and bowel sounds normal  BACK: no CVA tenderness, no paralumbar tenderness    Results for orders placed or performed in visit on 12/25/23 (from the past 24 hour(s))   UA Macroscopic with reflex to Microscopic and Culture - Clinic Collect    Specimen: Urine, Clean Catch   Result Value Ref Range    Color Urine Yellow Colorless, Straw, Light Yellow, Yellow    Appearance Urine Cloudy (A) Clear    Glucose Urine Negative Negative mg/dL    Bilirubin Urine Negative Negative    Ketones Urine Trace (A) Negative mg/dL    Specific Gravity Urine >=1.030 1.005 - 1.030    Blood Urine Large (A) Negative    pH Urine 5.5 5.0 - 8.0    Protein Albumin Urine >=300 (A) Negative mg/dL    Urobilinogen Urine 0.2 0.2, 1.0 E.U./dL    Nitrite Urine Negative Negative    Leukocyte Esterase Urine Moderate (A) Negative   Urine Microscopic Exam   Result Value Ref Range    Bacteria Urine Moderate (A) None Seen /HPF    RBC Urine  (A) 0-2 /HPF /HPF    WBC Urine 25-50 (A) 0-5 /HPF /HPF    Squamous Epithelials Urine Few  (A) None Seen /LPF    WBC Clumps Urine Present (A) None Seen /HPF

## 2023-12-26 ENCOUNTER — TELEPHONE (OUTPATIENT)
Dept: ANTICOAGULATION | Facility: CLINIC | Age: 83
End: 2023-12-26
Payer: COMMERCIAL

## 2023-12-26 ENCOUNTER — TELEPHONE (OUTPATIENT)
Dept: FAMILY MEDICINE | Facility: CLINIC | Age: 83
End: 2023-12-26
Payer: COMMERCIAL

## 2023-12-26 DIAGNOSIS — Z95.2 S/P TAVR (TRANSCATHETER AORTIC VALVE REPLACEMENT): Primary | ICD-10-CM

## 2023-12-26 DIAGNOSIS — N30.01 ACUTE CYSTITIS WITH HEMATURIA: Primary | ICD-10-CM

## 2023-12-26 RX ORDER — ESTRADIOL 0.1 MG/G
0.5 CREAM VAGINAL
Qty: 42 G | Refills: 0 | Status: SHIPPED | OUTPATIENT
Start: 2023-12-28 | End: 2024-09-10

## 2023-12-26 NOTE — TELEPHONE ENCOUNTER
ANTICOAGULATION  MANAGEMENT     Interacting Medication Review    Interacting medication(s):  Cefdinir  with warfarin.    Duration: 5 days (12/25 to 12/30)    Indication: UTI    New medication?: Yes, interaction may increase INR and risk of bleeding. Closer INR monitoring recommended.        PLAN     Continue your current warfarin dose with next INR in 2-3 days        Summary  As of 12/26/2023      Full warfarin instructions:  2.5 mg every Mon, Wed, Fri; 5 mg all other days   Next INR check:  12/28/2023               Telephone call with Kacie who verbalizes understanding and agrees to plan    New recheck date updated on anticoagulation calendar     Plan made per [facility] anticoagulation protocol    Praveena Morris RN  Anticoagulation Clinic

## 2023-12-26 NOTE — TELEPHONE ENCOUNTER
Reason for Call:  Other prescription    Detailed comments: patients daughter called and states patient had a medication for UTI    1)  Conjugated Estrogen  Premarin but [employer] is charging too much.    Wants to know if [facility] may have cheaper for her?    Would like to pickup today.    Please contact her.  Thank you.    Phone Number Patient can be reached at: Other phone number:  [phone] *    Best Time: any    Can we leave a detailed message on this number? NO    Call taken on 12/26/2023 at 1:32 PM by Cesilia Faulkner

## 2023-12-27 LAB — BACTERIA UR CULT: ABNORMAL

## 2023-12-27 NOTE — TELEPHONE ENCOUNTER
Call and spoke with daughter and daughter states she would like rx sent to [facility] in [place]. Daughter is also wondering if there is another cream that provider can prescribe that could work for UTI also as this mediation costed $400 at [employer].    Dolores Jordan on 12/26/2023 at 7:41 PM

## 2023-12-27 NOTE — TELEPHONE ENCOUNTER
Called and spoke with patient, Premarin was $400 will plan to provide prescription for estrace cream to see if this is a more affordable alternative.     Leanne Mays MD

## 2023-12-28 ENCOUNTER — LAB (OUTPATIENT)
Dept: LAB | Facility: CLINIC | Age: 83
End: 2023-12-28
Payer: COMMERCIAL

## 2023-12-28 ENCOUNTER — ANTICOAGULATION THERAPY VISIT (OUTPATIENT)
Dept: ANTICOAGULATION | Facility: CLINIC | Age: 83
End: 2023-12-28

## 2023-12-28 DIAGNOSIS — Z95.2 S/P TAVR (TRANSCATHETER AORTIC VALVE REPLACEMENT): ICD-10-CM

## 2023-12-28 DIAGNOSIS — Z95.2 S/P TAVR (TRANSCATHETER AORTIC VALVE REPLACEMENT): Primary | ICD-10-CM

## 2023-12-28 LAB — INR BLD: 1.6 (ref 0.9–1.1)

## 2023-12-28 PROCEDURE — 36416 COLLJ CAPILLARY BLOOD SPEC: CPT

## 2023-12-28 PROCEDURE — 85610 PROTHROMBIN TIME: CPT

## 2023-12-28 NOTE — PROGRESS NOTES
ANTICOAGULATION MANAGEMENT     Kacie Hermosillo 83 year old female is on warfarin with subtherapeutic INR result. (Goal INR 2.0-3.0)    Recent labs: (last 7 days)     12/28/23  1511   INR 1.6*       ASSESSMENT     Source(s): Chart Review  Previous INR was Therapeutic last 2(+) visits  Medication, diet, health changes since last INR-- cefdinir 12/25-12/30       PLAN     Unable to reach Kacie today.    Left message to continue current dose of warfarin 5 mg tonight. Request call back for assessment.    Follow up required to confirm warfarin dose taken and assess for changes and discuss out of range result       Ann Alvarez RN  Anticoagulation Clinic  12/28/2023

## 2023-12-29 NOTE — PROGRESS NOTES
ANTICOAGULATION MANAGEMENT     Kacie Hermosillo 83 year old female is on warfarin with subtherapeutic INR result. (Goal INR 2.0-3.0)    Recent labs: (last 7 days)     12/28/23  1511   INR 1.6*       ASSESSMENT     Source(s): Chart Review and Patient/Caregiver Call     Warfarin doses taken: Warfarin taken as instructed  Diet: slightly altered with the holidays  Medication/supplement changes: continues with cefdinir-- last doses tomorrow  New illness, injury, or hospitalization: No  Signs or symptoms of bleeding or clotting: No  Previous result: Therapeutic last 2(+) visits  Additional findings: None       PLAN     Recommended plan for temporary change(s) affecting INR     Dosing Instructions: Continue your current warfarin dose with next INR in 1 week       Summary  As of 12/28/2023      Full warfarin instructions:  2.5 mg every Mon, Wed, Fri; 5 mg all other days   Next INR check:  1/4/2024               Telephone call with Kacie who verbalizes understanding and agrees to plan    Check at provider office visit    Education provided:   Contact [phone] with any changes, questions or concerns.     Plan made per [facility] anticoagulation protocol    Ann Alvaerz RN  Anticoagulation Clinic  12/29/2023    _______________________________________________________________________     Anticoagulation Episode Summary       Current INR goal:  2.0-3.0   TTR:  71.6% (7.6 mo)   Target end date:  Indefinite   Send INR reminders to:  VIRGILIO WEEKS    Indications    S/P TAVR (transcatheter aortic valve replacement) [Z95.2]             Comments:  2.0-3.0 per Dr. Olguin on 5/24/23             Anticoagulation Care Providers       Provider Role Specialty Phone number    Irina Francisco PA-C Referring Family Medicine [phone]

## 2024-01-04 ENCOUNTER — ANTICOAGULATION THERAPY VISIT (OUTPATIENT)
Dept: ANTICOAGULATION | Facility: CLINIC | Age: 84
End: 2024-01-04

## 2024-01-04 ENCOUNTER — OFFICE VISIT (OUTPATIENT)
Dept: FAMILY MEDICINE | Facility: CLINIC | Age: 84
End: 2024-01-04
Payer: COMMERCIAL

## 2024-01-04 VITALS
BODY MASS INDEX: 23.57 KG/M2 | TEMPERATURE: 97.3 F | OXYGEN SATURATION: 97 % | WEIGHT: 133 LBS | HEART RATE: 69 BPM | SYSTOLIC BLOOD PRESSURE: 128 MMHG | DIASTOLIC BLOOD PRESSURE: 66 MMHG | HEIGHT: 63 IN

## 2024-01-04 DIAGNOSIS — M70.62 TROCHANTERIC BURSITIS OF LEFT HIP: ICD-10-CM

## 2024-01-04 DIAGNOSIS — Z95.2 S/P TAVR (TRANSCATHETER AORTIC VALVE REPLACEMENT): Primary | ICD-10-CM

## 2024-01-04 DIAGNOSIS — I47.10 SVT (SUPRAVENTRICULAR TACHYCARDIA) (H): ICD-10-CM

## 2024-01-04 DIAGNOSIS — I77.9 MILD CAROTID ARTERY DISEASE (H): ICD-10-CM

## 2024-01-04 DIAGNOSIS — N18.31 CHRONIC RENAL FAILURE, STAGE 3A (H): ICD-10-CM

## 2024-01-04 DIAGNOSIS — C50.012 MALIGNANT NEOPLASM OF AREOLA OF LEFT BREAST IN FEMALE, UNSPECIFIED ESTROGEN RECEPTOR STATUS (H): ICD-10-CM

## 2024-01-04 DIAGNOSIS — Z95.2 S/P TAVR (TRANSCATHETER AORTIC VALVE REPLACEMENT): ICD-10-CM

## 2024-01-04 DIAGNOSIS — N39.0 RECURRENT UTI: Primary | ICD-10-CM

## 2024-01-04 LAB
ALBUMIN UR-MCNC: NEGATIVE MG/DL
APPEARANCE UR: CLEAR
BACTERIA #/AREA URNS HPF: ABNORMAL /HPF
BILIRUB UR QL STRIP: NEGATIVE
COLOR UR AUTO: YELLOW
GLUCOSE UR STRIP-MCNC: NEGATIVE MG/DL
HGB UR QL STRIP: ABNORMAL
INR BLD: 3.4 (ref 0.9–1.1)
KETONES UR STRIP-MCNC: NEGATIVE MG/DL
LEUKOCYTE ESTERASE UR QL STRIP: NEGATIVE
NITRATE UR QL: NEGATIVE
PH UR STRIP: 5.5 [PH] (ref 5–7)
RBC #/AREA URNS AUTO: ABNORMAL /HPF
SP GR UR STRIP: >=1.03 (ref 1–1.03)
SQUAMOUS #/AREA URNS AUTO: ABNORMAL /LPF
UROBILINOGEN UR STRIP-ACNC: 0.2 E.U./DL
WBC #/AREA URNS AUTO: ABNORMAL /HPF

## 2024-01-04 PROCEDURE — 36416 COLLJ CAPILLARY BLOOD SPEC: CPT | Performed by: PHYSICIAN ASSISTANT

## 2024-01-04 PROCEDURE — 85610 PROTHROMBIN TIME: CPT | Performed by: PHYSICIAN ASSISTANT

## 2024-01-04 PROCEDURE — 81001 URINALYSIS AUTO W/SCOPE: CPT | Performed by: PHYSICIAN ASSISTANT

## 2024-01-04 PROCEDURE — 99215 OFFICE O/P EST HI 40 MIN: CPT | Performed by: PHYSICIAN ASSISTANT

## 2024-01-04 NOTE — PROGRESS NOTES
Assessment & Plan     (N39.0) Recurrent UTI  (primary encounter diagnosis)  Comment: she will star the premarin to see if any help. Fortunately other than the last 1.5 months with 3 recurrent infections, she had gone a few years without infection. Hopefully we are able to clear this infection and it will not continue but we did discuss the use of prophylactic antibiotics should she continue to have frequent UTI's. She has been off abx for almost a week so did repeat UA today and it does appear to be clear of infection. Will monitor  Plan: UA Macroscopic with reflex to Microscopic and         Culture - Clinic Collect, UA Microscopic with         Reflex to Culture            (M70.62) Trochanteric bursitis of left hip  Comment: pain seems consistent with IT band issues. Will have her start with physical therapy  Plan: Physical Therapy Referral            (Z95.2) S/P TAVR (transcatheter aortic valve replacement)  Comment:   Plan: followed by cardiology    (I77.9) Mild carotid artery disease (H24)  Comment:   Plan: followed by cardiology    (C50.012) Malignant neoplasm of areola of left breast in female, unspecified estrogen receptor status (H)  Comment:   Plan: in remission    (N18.31) Chronic renal failure, stage 3a (H)  Comment:   Plan: stable.     (I47.10) SVT (supraventricular tachycardia)  Comment:   Plan: on warfarin. Followed by coumadin clinic           47 minutes spent by me on the date of the encounter doing chart review, review of outside records, review of test results, interpretation of tests, patient visit, documentation, and discussion with family     JO Ruelas [facility] MICKY Hester is a 83 year old, presenting for the following health issues:  UC Follow-Up        1/4/2024     1:32 PM   Additional Questions   Roomed by SHARMIN Celis       History of Present Illness       Reason for visit:  Follow up from my urgent care visits    She eats 0-1 servings of fruits  and vegetables daily.She consumes 1 sweetened beverage(s) daily.She exercises with enough effort to increase her heart rate 9 or less minutes per day.  She exercises with enough effort to increase her heart rate 3 or less days per week.   She is taking medications regularly.         ED/UC Followup:    Facility:  [facility]   Date of visit: 12/7/23 and 12/25/23  Reason for visit: UTI  Current Status: She said she is doing better.     Here to follow up on recent UTI history  Has had 3 cultured UTI's - all caused by E coli, all with same suceptibility pattern  First was on 11/25 - treated with macrobd  2nd was on 12/7 - treated with keflex  3rd was on 12/25 - treated with omnicef     She did have the typical urinary symptoms with all 3 (frequency, urgency, some pressure and pain)  Those symptoms did seem to improve on the antibiotics but within a few days of stopping would return    She has had UTI's before but has been several months/years  At the last UC visit he also prescribed premarin cream for her to start - she did not pick it up right away because it was going to cost her $400   Her daughter was able to find it cheaper so she does have it but hasn't started it    She is feeling much better today - finished the antibiotic on 12/30 so has been off all medication for ~5 days    She also has a few other concerns -     She struggles with her bowls and some 'urgency' issues  Will feel like she has to go and if she doesn't get to a bathroom in time she will have an accident. This happened while in the hospital  She notes she can have very 'explosive' stools but can also struggle with very hard stools where she has to strain    Has some urinary incontinence at times as well     Also having some pain in her left leg  No specific injury  Hurts to lay on her left side  She has a h/o low back pain/chronic back pain. Was going to a chiropractor for awhile and felt it helped her back but has not helped her  "leg at all  She does do pool exercises [facility] and will walk on the track. The warm water feels good on her leg and even the walking on the track seems to be okay   Worse when she sits for awhile    Review of Systems   Remainder of ROS obtained and found to be negative other than that which was documented above        Objective    /66   Pulse 69   Temp 97.3  F (36.3  C) (Tympanic)   Ht 1.6 m (5' 3\")   Wt 60.3 kg (133 lb)   SpO2 97%   BMI 23.56 kg/m    Body mass index is 23.56 kg/m .  Physical Exam   GENERAL: healthy, alert and no distress  EYES: Eyes grossly normal to inspection  RESP: lungs clear to auscultation - no rales, rhonchi or wheezes  CV: regular rates and rhythm, normal S1 S2, no S3 or S4, and no murmur, click or rub    Diagnostic Tests:   Repeat UA and INR today before leaving    Reviewed previous UA's and cultures        "

## 2024-01-04 NOTE — PROGRESS NOTES
ANTICOAGULATION MANAGEMENT     Kacie Hermosillo 83 year old female is on warfarin with supratherapeutic INR result. (Goal INR 2.0-3.0)    Recent labs: (last 7 days)     01/04/24  1509   INR 3.4*       ASSESSMENT     Source(s): Chart Review and Patient/Caregiver Call     Warfarin doses taken: More warfarin taken than planned which may be affecting INR - pt thinks that she accidentally took an extra dose of warfarin over the past week, unsure of exact date.   Diet: No new diet changes identified  Medication/supplement changes: None noted - Cefdinir competed on 12/29/23. Reports feeling much better, no further s/s of UTI  New illness, injury, or hospitalization: No  Signs or symptoms of bleeding or clotting: No  Previous result: Subtherapeutic  Additional findings: None       PLAN     Recommended plan for temporary change(s) affecting INR     Dosing Instructions: partial hold then continue your current warfarin dose with next INR in 2 weeks       Summary  As of 1/4/2024      Full warfarin instructions:  1/4: 2.5 mg; Otherwise 2.5 mg every Mon, Wed, Fri; 5 mg all other days   Next INR check:  1/15/2024               Telephone call with Kacie who verbalizes understanding and agrees to plan    Pt already has a lab appointment scheduled on 1/15/24.     Education provided:   Goal range and lab monitoring: goal range and significance of current result and Importance of therapeutic range    Plan made per [facility] anticoagulation protocol    Travis Martinez RN  Anticoagulation Clinic  1/4/2024    _______________________________________________________________________     Anticoagulation Episode Summary       Current INR goal:  2.0-3.0   TTR:  71.1% (7.8 mo)   Target end date:  Indefinite   Send INR reminders to:  VIRGILIO WEEKS    Indications    S/P TAVR (transcatheter aortic valve replacement) [Z95.2]             Comments:  2.0-3.0 per Dr. Olguin on 5/24/23             Anticoagulation Care Providers       Provider Role Specialty  Phone number    Irina Francisco PA-C [facility] Medicine [phone]

## 2024-01-05 ENCOUNTER — THERAPY VISIT (OUTPATIENT)
Dept: PHYSICAL THERAPY | Facility: CLINIC | Age: 84
End: 2024-01-05
Payer: COMMERCIAL

## 2024-01-05 DIAGNOSIS — M70.62 TROCHANTERIC BURSITIS OF LEFT HIP: ICD-10-CM

## 2024-01-05 PROCEDURE — 97530 THERAPEUTIC ACTIVITIES: CPT | Mod: GP

## 2024-01-05 PROCEDURE — 97161 PT EVAL LOW COMPLEX 20 MIN: CPT | Mod: GP

## 2024-01-05 PROCEDURE — 97110 THERAPEUTIC EXERCISES: CPT | Mod: GP

## 2024-01-05 NOTE — PROGRESS NOTES
PHYSICAL THERAPY EVALUATION  Type of Visit: Evaluation    See electronic medical record for Abuse and Falls Screening details.    Subjective       Presenting condition or subjective complaint: B hip/leg pain. She originally noticed while working with a chiro. She is unsure if that treatment caused her symptoms. She describes pain present all day in thighs (knees to hips)  Date of onset: 11/01/23    Relevant medical history: Arthritis; Cancer   Dates & types of surgery:      Prior diagnostic imaging/testing results:       Prior therapy history for the same diagnosis, illness or injury: No      Living Environment  Social support: Alone   Type of home: [facility]   Stairs to enter the home: No   Is there a railing: No   Ramp: No   Stairs inside the home: No   Is there a railing: No   Help at home: Home management tasks (cooking, cleaning)  Equipment owned: Walker; Walker with wheels     Employment:      Hobbies/Interests: empbroidering.    Patient goals for therapy: walking without limping.    Pain assessment: See objective evaluation for additional pain details     Objective   HIP EVALUATION  PAIN: Pain Level at Rest: 8/10  Pain Level with Use: 9/10  Pain is Exacerbated By: sitting (15-20), walking (15-20), transitional movements (STS, bed mobility, car transfers).   Pain is Relieved By: otc medications and rest    POSTURE:  Forward flexed posture with rounded shoulders and forward head. Pt sits and rests in supine with increased hip flexion, L hip slightly internally rotated, and unable to lay L LE fully extended.     GAIT:   Weightbearing Status: WBAT  Assistive Device(s): None  Gait Deviations: Antalgic  Base of support increased  Stance time decreased  Stride length decreased  Anna decreased  LLE increased hip internal rotation and decreased knee/hip extension      ROM/STRENGTH:   (Degrees) Left AROM Left MMT  Right AROM Right MMT   Hip Flexion  4  4+   Hip Extension       Hip Abduction  3+  3+   Hip Adduction        Hip Internal Rotation  4  4+   Hip External Rotation  4  4+   Knee Flexion  4  4+   Knee Extension  4  4+   Lumbar Side glide 75% 75%   Lumbar Flexion 75% (high pain)   Lumbar Extension Not to neutral.    Pain: with all lumbar motions (likely due to WB)    PELVIC/SI SCREEN: WNL    LE FLEXIBILITY:  unable to fully extend L hip to neutral or fully extend L knee when hip is in a less flexed position. Slight HS restriction B.     SPECIAL TESTS:  Pt unable to side lie due to pain. Unable to test positively for trochanteric pain.     PALPATION:  TTP at B (L>R) iliac crest, ASIS, greater trochanter, Piriformis, TFL, and ITB.     JOINT MOBILITY: B hip joint mobility WNL with no early or bony end feels.     Assessment & Plan   CLINICAL IMPRESSIONS  Medical Diagnosis: Trochanteric Bursitis of Left Hip    Treatment Diagnosis: Bilateral thigh pain, gait abnormalities.   Impression/Assessment: Patient is a 83 year old female with B LE and hip pain complaints.  The following significant findings have been identified: Pain, Decreased ROM/flexibility, Decreased strength, Decreased proprioception, Impaired gait, Impaired muscle performance, Decreased activity tolerance, and Impaired posture. These impairments interfere with their ability to perform self care tasks, recreational activities, household chores, driving , household mobility, and community mobility as compared to previous level of function.     Clinical Decision Making (Complexity):  Clinical Presentation: Stable/Uncomplicated  Clinical Presentation Rationale: based on medical and personal factors listed in PT evaluation  Clinical Decision Making (Complexity): Low complexity    PLAN OF CARE  Treatment Interventions:  Modalities: Cryotherapy, E-stim, Hot Pack, Ultrasound  Interventions: Gait Training, Manual Therapy, Neuromuscular Re-education, Therapeutic Activity, Therapeutic Exercise, Self-Care/Home Management    Long Term Goals     PT Goal 1  Goal Identifier: LTG 1  - Sit or stand tolerance  Goal Description: Pt will report no more than 2/10 discomfort with sitting or standing for at least 30 minutes in order to improve tolerance to required daily activities.  Target Date: 03/15/24  PT Goal 2  Goal Identifier: LTG 2 - Transitional movements  Goal Description: Pt will report no more than 2/10 pain with transitional movements in all settings in order to improve tolerance to required dialy mobility.  Target Date: 03/15/24  PT Goal 3  Goal Identifier: LTG 3 - Walking  Goal Description: Pt will demonstrate a TUG score of no more than 13.5 seconds with least restrictive gait aid in order to reduce risk of falling.  Target Date: 03/15/24      Frequency of Treatment: 1x/week  Duration of Treatment: 10 weeks    Education Assessment:        Risks and benefits of evaluation/treatment have been explained.   Patient/Family/caregiver agrees with Plan of Care.     Evaluation Time:           Signing Clinician: DENISE Carrasco [facility]                                                                                   OUTPATIENT PHYSICAL THERAPY      PLAN OF TREATMENT FOR OUTPATIENT REHABILITATION   Patient's Last Name, First Name, Kacie Dubose YOB: 1940   Provider's Name   [facility]   Medical Record No.  [id number]     Onset Date: 11/01/23  Start of Care Date: 01/05/24     Medical Diagnosis:  Trochanteric Bursitis of Left Hip      PT Treatment Diagnosis:  Bilateral thigh pain, gait abnormalities. Plan of Treatment  Frequency/Duration: 1x/week/ 10 weeks    Certification date from 01/05/24 to 03/15/24         See note for plan of treatment details and functional goals     Elif Muro, PT                         I CERTIFY THE NEED FOR THESE SERVICES FURNISHED UNDER        THIS PLAN OF TREATMENT AND WHILE UNDER MY CARE     (Physician attestation of this document indicates review and  certification of the therapy plan).              Referring Provider:  Irina Francisco    Initial Assessment  See Epic Evaluation- Start of Care Date: 01/05/24

## 2024-01-11 ENCOUNTER — THERAPY VISIT (OUTPATIENT)
Dept: PHYSICAL THERAPY | Facility: CLINIC | Age: 84
End: 2024-01-11
Payer: COMMERCIAL

## 2024-01-11 DIAGNOSIS — M70.62 TROCHANTERIC BURSITIS OF LEFT HIP: Primary | ICD-10-CM

## 2024-01-11 PROCEDURE — 97140 MANUAL THERAPY 1/> REGIONS: CPT | Mod: GP | Performed by: PHYSICAL THERAPIST

## 2024-01-11 PROCEDURE — 97110 THERAPEUTIC EXERCISES: CPT | Mod: GP | Performed by: PHYSICAL THERAPIST

## 2024-01-18 ENCOUNTER — ANTICOAGULATION THERAPY VISIT (OUTPATIENT)
Dept: ANTICOAGULATION | Facility: CLINIC | Age: 84
End: 2024-01-18

## 2024-01-18 ENCOUNTER — LAB (OUTPATIENT)
Dept: LAB | Facility: CLINIC | Age: 84
End: 2024-01-18
Payer: COMMERCIAL

## 2024-01-18 DIAGNOSIS — Z95.2 S/P TAVR (TRANSCATHETER AORTIC VALVE REPLACEMENT): Primary | ICD-10-CM

## 2024-01-18 DIAGNOSIS — Z95.2 S/P TAVR (TRANSCATHETER AORTIC VALVE REPLACEMENT): ICD-10-CM

## 2024-01-18 LAB — INR BLD: 2.6 (ref 0.9–1.1)

## 2024-01-18 PROCEDURE — 36416 COLLJ CAPILLARY BLOOD SPEC: CPT

## 2024-01-18 PROCEDURE — 85610 PROTHROMBIN TIME: CPT

## 2024-01-18 NOTE — PROGRESS NOTES
ANTICOAGULATION MANAGEMENT     Kacie Hermosillo 83 year old female is on warfarin with therapeutic INR result. (Goal INR 2.0-3.0)    Recent labs: (last 7 days)     01/18/24  1018   INR 2.6*       ASSESSMENT     Source(s): Chart Review and Patient/Caregiver Call     Warfarin doses taken: Warfarin taken as instructed  Diet: No new diet changes identified  Medication/supplement changes: None noted  New illness, injury, or hospitalization: No  Signs or symptoms of bleeding or clotting: No  Previous result: Supratherapeutic  Additional findings:  [place]. Patient requested checking INR lab on 2-15.       PLAN     Recommended plan for no diet, medication or health factor changes affecting INR     Dosing Instructions: Continue your current warfarin dose with next INR in 4 weeks       Summary  As of 1/18/2024      Full warfarin instructions:  2.5 mg every Mon, Wed, Fri; 5 mg all other days   Next INR check:  2/15/2024               Telephone call with Kacie who verbalizes understanding and agrees to plan    Lab visit scheduled    Education provided:   Please call back if any changes to your diet, medications or how you've been taking warfarin  Contact [phone]  with any changes, questions or concerns.     Plan made per [facility] anticoagulation protocol    Jodie Lunsford, RN  Anticoagulation Clinic  1/18/2024    _______________________________________________________________________     Anticoagulation Episode Summary       Current INR goal:  2.0-3.0   TTR:  69.9% (8.3 mo)   Target end date:  Indefinite   Send INR reminders to:  VIRGILIO WEEKS    Indications    S/P TAVR (transcatheter aortic valve replacement) [Z95.2]             Comments:  2.0-3.0 per Dr. Olguin on 5/24/23             Anticoagulation Care Providers       Provider Role Specialty Phone number    Irina Francisco PA-C Referring Family Medicine [phone]

## 2024-01-19 ENCOUNTER — THERAPY VISIT (OUTPATIENT)
Dept: PHYSICAL THERAPY | Facility: CLINIC | Age: 84
End: 2024-01-19
Payer: COMMERCIAL

## 2024-01-19 DIAGNOSIS — M70.62 TROCHANTERIC BURSITIS OF LEFT HIP: Primary | ICD-10-CM

## 2024-01-19 PROCEDURE — 97140 MANUAL THERAPY 1/> REGIONS: CPT | Mod: GP | Performed by: PHYSICAL THERAPIST

## 2024-01-19 PROCEDURE — 97110 THERAPEUTIC EXERCISES: CPT | Mod: GP | Performed by: PHYSICAL THERAPIST

## 2024-01-19 PROCEDURE — 97112 NEUROMUSCULAR REEDUCATION: CPT | Mod: GP | Performed by: PHYSICAL THERAPIST

## 2024-02-07 ENCOUNTER — MYC REFILL (OUTPATIENT)
Dept: FAMILY MEDICINE | Facility: CLINIC | Age: 84
End: 2024-02-07
Payer: COMMERCIAL

## 2024-02-07 DIAGNOSIS — E78.5 HYPERLIPIDEMIA, UNSPECIFIED HYPERLIPIDEMIA TYPE: ICD-10-CM

## 2024-02-08 RX ORDER — ATORVASTATIN CALCIUM 40 MG/1
40 TABLET, FILM COATED ORAL EVERY MORNING
Qty: 90 TABLET | Refills: 1 | Status: SHIPPED | OUTPATIENT
Start: 2024-02-08 | End: 2024-05-24

## 2024-02-20 ENCOUNTER — LAB (OUTPATIENT)
Dept: LAB | Facility: CLINIC | Age: 84
End: 2024-02-20
Payer: COMMERCIAL

## 2024-02-20 ENCOUNTER — ANTICOAGULATION THERAPY VISIT (OUTPATIENT)
Dept: ANTICOAGULATION | Facility: CLINIC | Age: 84
End: 2024-02-20

## 2024-02-20 DIAGNOSIS — Z95.2 S/P TAVR (TRANSCATHETER AORTIC VALVE REPLACEMENT): Primary | ICD-10-CM

## 2024-02-20 DIAGNOSIS — Z95.2 S/P TAVR (TRANSCATHETER AORTIC VALVE REPLACEMENT): ICD-10-CM

## 2024-02-20 LAB — INR BLD: 1.3 (ref 0.9–1.1)

## 2024-02-20 PROCEDURE — 85610 PROTHROMBIN TIME: CPT

## 2024-02-20 PROCEDURE — 36416 COLLJ CAPILLARY BLOOD SPEC: CPT

## 2024-02-20 NOTE — PROGRESS NOTES
"ANTICOAGULATION MANAGEMENT       ASSESSMENT     Source(s): Chart Review and Patient/Caregiver Call     Warfarin doses taken: Less warfarin taken than planned which may be affecting INR-she's not sure exactly how she was taking Warfarin while she was on vacation. She forgot her dosing instructions at home so took it as she thought she remembered the dosing should be. She does know she was taking the dose \"backwards\" when she got home starting 2/15/24.  Diet:  2 week trip may be affecting diet and INR  Medication/supplement changes: None noted  New illness, injury, or hospitalization: No  Signs or symptoms of bleeding or clotting: No  Previous result: Therapeutic last visit; previously outside of goal range  Additional findings:  Kacie read dosing back to me for every day until her INR on 2/27/24       PLAN     Recommended plan for temporary change(s) affecting INR     Dosing Instructions: booster dose then continue your current warfarin dose with next INR in 1 week       Summary  As of 2/20/2024      Full warfarin instructions:  2/20: 7.5 mg; Otherwise 2.5 mg every Mon, Wed, Fri; 5 mg all other days   Next INR check:  2/27/2024               Telephone call with Kacie who verbalizes understanding and agrees to plan and who agrees to plan and repeated back plan correctly    Lab visit scheduled    Education provided:   Goal range and lab monitoring: goal range and significance of current result, Importance of therapeutic range, and Importance of following up at instructed interval  Symptom monitoring: monitoring for clotting signs and symptoms, monitoring for stroke signs and symptoms, and when to seek medical attention/emergency care  Importance of notifying anticoagulation clinic for: needing clarification of dosing  Contact [phone] with any changes, questions or concerns.     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

## 2024-02-20 NOTE — PROGRESS NOTES
ANTICOAGULATION MANAGEMENT     Kacie Hermosillo 83 year old female is on warfarin with subtherapeutic INR result. (Goal INR 2.0-3.0)    Recent labs: (last 7 days)     02/20/24  1052   INR 1.3*       ASSESSMENT     Source(s): Chart Review  Previous INR was Therapeutic last visit; previously outside of goal range  Medication, diet, health changes since last INR chart reviewed; none identified    Last INR 5 weeks ago. [place] 1[place]29[place]14.     PLAN     Unable to reach Kacie today.    Left message to take a booster dose of warfarin,  7.5 mg tonight. Request call back for assessment. MyChart Message also.    Follow up required to confirm warfarin dose taken and assess for changes, discuss out of range result , and discuss dosing instructions and confirm understanding of instructions    Plan made with MURALI ULI Morris RN  Anticoagulation Clinic  2/20/2024

## 2024-02-21 DIAGNOSIS — I10 ESSENTIAL HYPERTENSION: Primary | ICD-10-CM

## 2024-02-21 DIAGNOSIS — I47.10 SVT (SUPRAVENTRICULAR TACHYCARDIA) (H): ICD-10-CM

## 2024-02-21 RX ORDER — METOPROLOL TARTRATE 25 MG/1
25 TABLET, FILM COATED ORAL 2 TIMES DAILY
Qty: 180 TABLET | Refills: 3 | Status: ON HOLD | OUTPATIENT
Start: 2024-02-21

## 2024-02-29 ENCOUNTER — ANTICOAGULATION THERAPY VISIT (OUTPATIENT)
Dept: ANTICOAGULATION | Facility: CLINIC | Age: 84
End: 2024-02-29

## 2024-02-29 ENCOUNTER — LAB (OUTPATIENT)
Dept: LAB | Facility: CLINIC | Age: 84
End: 2024-02-29
Payer: COMMERCIAL

## 2024-02-29 DIAGNOSIS — N18.30 CHRONIC RENAL FAILURE, STAGE 3 (MODERATE) (H): ICD-10-CM

## 2024-02-29 DIAGNOSIS — Z95.2 S/P TAVR (TRANSCATHETER AORTIC VALVE REPLACEMENT): ICD-10-CM

## 2024-02-29 DIAGNOSIS — Z95.2 S/P TAVR (TRANSCATHETER AORTIC VALVE REPLACEMENT): Primary | ICD-10-CM

## 2024-02-29 LAB
HGB BLD-MCNC: 14.3 G/DL (ref 11.7–15.7)
INR BLD: 2 (ref 0.9–1.1)

## 2024-02-29 PROCEDURE — 85610 PROTHROMBIN TIME: CPT

## 2024-02-29 PROCEDURE — 36415 COLL VENOUS BLD VENIPUNCTURE: CPT

## 2024-02-29 PROCEDURE — 85018 HEMOGLOBIN: CPT

## 2024-02-29 NOTE — PROGRESS NOTES
ANTICOAGULATION MANAGEMENT     Kacie Hermosillo 83 year old female is on warfarin with therapeutic INR result. (Goal INR 2.0-3.0)    Recent labs: (last 7 days)     02/29/24  1121   INR 2.0*       ASSESSMENT     Source(s): Chart Review  Previous INR was Subtherapeutic-boost and return to previous dosing  Medication, diet, health changes since last INR chart reviewed; none identified         PLAN     Recommended plan for no diet, medication or health factor changes affecting INR     Dosing Instructions: Continue your current warfarin dose with next INR in 2 weeks       Summary  As of 2/29/2024      Full warfarin instructions:  2.5 mg every Mon, Wed, Fri; 5 mg all other days   Next INR check:  3/14/2024               Detailed voice message left for Kacie with dosing instructions and follow up date.   Sent [employer] message with dosing and follow up instructions    Contact [phone] to schedule and with any changes, questions or concerns.     Education provided:   Please call back if any changes to your diet, medications or how you've been taking warfarin  Goal range and lab monitoring: goal range and significance of current result and Importance of following up at instructed interval  Symptom monitoring: monitoring for bleeding signs and symptoms and monitoring for clotting signs and symptoms  Written instructions provided  Contact [phone] with any changes, questions or concerns.     Plan made per [facility] anticoagulation protocol    Praveena Morris RN  Anticoagulation Clinic  2/29/2024    _______________________________________________________________________     Anticoagulation Episode Summary       Current INR goal:  2.0-3.0   TTR:  65.1% (9.7 mo)   Target end date:  Indefinite   Send INR reminders to:  VIRGILIO WEEKS    Indications    S/P TAVR (transcatheter aortic valve replacement) [Z95.2]             Comments:  2.0-3.0 per Dr. 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

## 2024-03-03 ENCOUNTER — HEALTH MAINTENANCE LETTER (OUTPATIENT)
Age: 84
End: 2024-03-03

## 2024-03-06 PROBLEM — M70.62 TROCHANTERIC BURSITIS OF LEFT HIP: Status: RESOLVED | Noted: 2024-01-05 | Resolved: 2024-03-06

## 2024-03-06 NOTE — PROGRESS NOTES
01/19/24 0500   Appointment Info   Signing clinician's name / credentials Katina Zambrano PT DPT   Total/Authorized Visits 10   Visits Used 3   Medical Diagnosis Trochanteric Bursitis of Left Hip   PT Tx Diagnosis Bilateral thigh pain, gait abnormalities.   Other pertinent information leaving for [place] 1/30   Quick Adds Certification   Progress Note/Certification   Start of Care Date 01/05/24   Onset of illness/injury or Date of Surgery 11/01/23   Therapy Frequency 1x/week   Predicted Duration 10 weeks   Certification date from 01/05/24   Certification date to 03/15/24   GOALS   PT Goals 3   PT Goal 1   Goal Identifier LTG 1 - Sit or stand tolerance   Goal Description Pt will report no more than 2/10 discomfort with sitting or standing for at least 30 minutes in order to improve tolerance to required daily activities.   Goal Progress reports no pain with sitting, just up and moving   Target Date 03/15/24   PT Goal 2   Goal Identifier LTG 2 - Transitional movements   Goal Description Pt will report no more than 2/10 pain with transitional movements in all settings in order to improve tolerance to required dialy mobility.   Target Date 03/15/24   Goal Progress contineus w/ walking   PT Goal 3   Goal Identifier LTG 3 - Walking   Goal Description Pt will demonstrate a TUG score of no more than 13.5 seconds with least restrictive gait aid in order to reduce risk of falling.   Goal Progress 15 seconds   Target Date 03/15/24   Subjective Report   Subjective Report Kacie reports that is doing alright, some anterior hip pain. Using walker inside house, notes that this takes most of the stress off the area.   Objective Measures   Objective Measures Objective Measure 1   Treatment Interventions (PT)   Interventions Therapeutic Procedure/Exercise;Therapeutic Activity;Manual Therapy;Neuromuscular Re-education   Therapeutic Procedure/Exercise   Therapeutic Procedures: strength, endurance, ROM, flexibillity minutes (05422) 25  "  Ther Proc 1 Nustep   Ther Proc 1 - Details Level 4 x 5 minutes, for general mobility and strengthening   PTRx Ther Proc 1 Single Knee to Chest   PTRx Ther Proc 1 - Details x 30 sec B   PTRx Ther Proc 2 Piriformis Stretch Above 90 Degress Supine   PTRx Ther Proc 2 - Details x 30 seconds B   PTRx Ther Proc 3 Piriformis Stretch Below 90 Degrees Supine   PTRx Ther Proc 3 - Details HEP   PTRx Ther Proc 4 Roll Outs Hooklying   PTRx Ther Proc 4 - Details x 10 - small motion.    PTRx Ther Proc 5 Bridging #1   PTRx Ther Proc 5 - Details x 10 , adding 3 second holds   PTRx Ther Proc 6 Sit to Stand No Hands   PTRx Ther Proc 6 - Details Chair w/ airex cushion, no UE assist. cueing eccentric control.   PTRx Ther Proc 7 Seated Hip Abduction with Band   PTRx Ther Proc 7 - Details YTB   Skilled Intervention guided strengthening and stretching to address symptoms, requires cueing and modifications as appropriate for exercises   Patient Response/Progress fatigues quickly with exercises, L hip limits more then R hip   Therapeutic Procedures Ther Proc 2;Ther Proc 3   Ther Proc 2 Standing hip abduction   Ther Proc 2 - Details at counter   Ther Proc 3 Standing hip extension   Ther Proc 3 - Details x 10   Therapeutic Activity   Therapeutic Activities: dynamic activities to improve functional performance minutes (27285) 2   Ther Act 2 Pain management   Ther Act 2 - Details review ice, heat, activity modification for home management   Neuromuscular Re-education   Neuromuscular re-ed of mvmt, balance, coord, kinesthetic sense, posture, proprioception minutes (63609) 12   Neuromuscular Re-education Neuro Re-ed 2;Neuro Re-ed 3   Neuro Re-ed 1 Toe taps   Neuro Re-ed 1 - Details onto 4\" step,   Skilled Intervention balance and proprioception   Patient Response/Progress fatigues quickly, difficulty w/ SL stance/toe taps requires UE assist   Neuro Re-ed 2 Side stepping   Neuro Re-ed 2 - Details x 15' B, fatigues quickly   Neuro Re-ed 3 Balance "   Neuro Re-ed 3 - Details Narrow ANTELMO, EC, head turns, static EC   Manual Therapy   Manual Therapy: Mobilization, MFR, MLD, friction massage minutes (09904) 15   Manual Therapy 1 STM   Manual Therapy 1 - Details STM to L lateral leg, through ITB, gluteal musculature. Completed B, but more time on L > R   Skilled Intervention improve mobility, decreased pain   Patient Response/Progress tolerates well   Plan   Home program HO provided, ptrx   Plan for next session Continue progression mobility, functional strength. MT as needed. balance   Total Session Time   Timed Code Treatment Minutes 54   Total Treatment Time (sum of timed and untimed services) 54       DISCHARGE  Reason for Discharge: Patient chooses to discontinue therapy.  Patient has failed to schedule further appointments.    Equipment Issued: na    Discharge Plan: Patient to continue home program.    Referring Provider:  Irina Hubbard*

## 2024-03-14 ENCOUNTER — ANTICOAGULATION THERAPY VISIT (OUTPATIENT)
Dept: ANTICOAGULATION | Facility: CLINIC | Age: 84
End: 2024-03-14

## 2024-03-14 ENCOUNTER — TELEPHONE (OUTPATIENT)
Dept: FAMILY MEDICINE | Facility: CLINIC | Age: 84
End: 2024-03-14

## 2024-03-14 ENCOUNTER — LAB (OUTPATIENT)
Dept: LAB | Facility: CLINIC | Age: 84
End: 2024-03-14
Payer: COMMERCIAL

## 2024-03-14 DIAGNOSIS — Z95.2 S/P TAVR (TRANSCATHETER AORTIC VALVE REPLACEMENT): ICD-10-CM

## 2024-03-14 DIAGNOSIS — Z95.2 S/P TAVR (TRANSCATHETER AORTIC VALVE REPLACEMENT): Primary | ICD-10-CM

## 2024-03-14 LAB — INR BLD: 3.3 (ref 0.9–1.1)

## 2024-03-14 PROCEDURE — 85610 PROTHROMBIN TIME: CPT

## 2024-03-14 PROCEDURE — 36416 COLLJ CAPILLARY BLOOD SPEC: CPT

## 2024-03-14 NOTE — PROGRESS NOTES
ANTICOAGULATION MANAGEMENT     Kacie Hermosillo 83 year old female is on warfarin with supratherapeutic INR result. (Goal INR 2.0-3.0)    Recent labs: (last 7 days)     03/14/24  1019   INR 3.3*       ASSESSMENT     Source(s): Chart Review and Patient/Caregiver Call     Warfarin doses taken: Warfarin taken as instructed  Diet: No new diet changes identified  Medication/supplement changes: None noted  New illness, injury, or hospitalization: No  Signs or symptoms of bleeding or clotting: No  Previous result: Therapeutic last visit; previously outside of goal range  Additional findings: None       PLAN     Recommended plan for no diet, medication or health factor changes affecting INR     Dosing Instructions: decrease your warfarin dose (9.1% change) with next INR in 2 weeks       Summary  As of 3/14/2024      Full warfarin instructions:  5 mg every Sun, Tue, Thu; 2.5 mg all other days   Next INR check:  3/28/2024               Telephone call with Kacie who agrees to plan and repeated back plan correctly    Lab visit scheduled    Education provided:   Contact [phone] with any changes, questions or concerns.     Plan made per [facility] anticoagulation protocol    Ann Alvarez RN  Anticoagulation Clinic  3/14/2024    _______________________________________________________________________     Anticoagulation Episode Summary       Current INR goal:  2.0-3.0   TTR:  65.6% (10.2 mo)   Target end date:  Indefinite   Send INR reminders to:  VIRGILIO WEEKS    Indications    S/P TAVR (transcatheter aortic valve replacement) [Z95.2]             Comments:  2.0-3.0 per Dr. 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

## 2024-03-14 NOTE — TELEPHONE ENCOUNTER
See anticoagulation encounter from today.    Ann Alvarez RN  [facility] Anticoagulation  [phone]

## 2024-03-26 ENCOUNTER — ANTICOAGULATION THERAPY VISIT (OUTPATIENT)
Dept: ANTICOAGULATION | Facility: CLINIC | Age: 84
End: 2024-03-26

## 2024-03-26 ENCOUNTER — LAB (OUTPATIENT)
Dept: LAB | Facility: CLINIC | Age: 84
End: 2024-03-26
Payer: COMMERCIAL

## 2024-03-26 DIAGNOSIS — Z95.2 S/P TAVR (TRANSCATHETER AORTIC VALVE REPLACEMENT): ICD-10-CM

## 2024-03-26 DIAGNOSIS — Z95.2 S/P TAVR (TRANSCATHETER AORTIC VALVE REPLACEMENT): Primary | ICD-10-CM

## 2024-03-26 LAB — INR BLD: 2.7 (ref 0.9–1.1)

## 2024-03-26 PROCEDURE — 85610 PROTHROMBIN TIME: CPT

## 2024-03-26 PROCEDURE — 36415 COLL VENOUS BLD VENIPUNCTURE: CPT

## 2024-03-26 NOTE — PROGRESS NOTES
ANTICOAGULATION MANAGEMENT     Kacie Hermosillo 83 year old female is on warfarin with therapeutic INR result. (Goal INR 2.0-3.0)    Recent labs: (last 7 days)     03/26/24  1138   INR 2.7*       ASSESSMENT     Source(s): Chart Review  Previous INR was Supratherapeutic dosing decreased   Medication, diet, health changes since last INR chart reviewed; none identified         PLAN     Recommended plan for no diet, medication or health factor changes affecting INR     Dosing Instructions: Continue your current warfarin dose with next INR in 3 weeks       Summary  As of 3/26/2024      Full warfarin instructions:  5 mg every Sun, Tue, Thu; 2.5 mg all other days   Next INR check:  4/16/2024               Detailed voice message left for Kacie with dosing instructions and follow up date.   Sent [employer] message with dosing and follow up instructions    Contact [phone] to schedule and with any changes, questions or concerns.     Education provided:   Goal range and lab monitoring: goal range and significance of current result  Contact [phone] with any changes, questions or concerns.     Plan made per [facility] anticoagulation protocol    Janna Rees, RN  Anticoagulation Clinic  3/26/2024    _______________________________________________________________________     Anticoagulation Episode Summary       Current INR goal:  2.0-3.0   TTR:  65.0% (10.6 mo)   Target end date:  Indefinite   Send INR reminders to:  VIRGILIO WEEKS    Indications    S/P TAVR (transcatheter aortic valve replacement) [Z95.2]             Comments:  2.0-3.0 per Dr. 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

## 2024-04-18 ENCOUNTER — ANTICOAGULATION THERAPY VISIT (OUTPATIENT)
Dept: ANTICOAGULATION | Facility: CLINIC | Age: 84
End: 2024-04-18

## 2024-04-18 ENCOUNTER — LAB (OUTPATIENT)
Dept: LAB | Facility: CLINIC | Age: 84
End: 2024-04-18
Payer: COMMERCIAL

## 2024-04-18 DIAGNOSIS — Z95.2 S/P TAVR (TRANSCATHETER AORTIC VALVE REPLACEMENT): ICD-10-CM

## 2024-04-18 DIAGNOSIS — Z95.2 S/P TAVR (TRANSCATHETER AORTIC VALVE REPLACEMENT): Primary | ICD-10-CM

## 2024-04-18 LAB — INR BLD: 2.1 (ref 0.9–1.1)

## 2024-04-18 PROCEDURE — 36416 COLLJ CAPILLARY BLOOD SPEC: CPT

## 2024-04-18 PROCEDURE — 85610 PROTHROMBIN TIME: CPT

## 2024-04-18 NOTE — PROGRESS NOTES
ANTICOAGULATION MANAGEMENT     Kacie Hermosillo 84 year old female is on warfarin with therapeutic INR result. (Goal INR 2.0-3.0)    Recent labs: (last 7 days)     04/18/24  1037   INR 2.1*       ASSESSMENT     Source(s): Chart Review  Previous INR was Therapeutic last visit; previously outside of goal range  Medication, diet, health changes since last INR chart reviewed; none identified         PLAN     Recommended plan for no diet, medication or health factor changes affecting INR     Dosing Instructions: Continue your current warfarin dose with next INR in 4 weeks       Summary  As of 4/18/2024      Full warfarin instructions:  5 mg every Sun, Tue, Thu; 2.5 mg all other days   Next INR check:  5/16/2024               Detailed voice message left for Kacie with dosing instructions and follow up date. Informed she can check at [facility] on 5/6 if she'd prefer.    Contact [phone] to schedule and with any changes, questions or concerns.     Education provided:   Please call back if any changes to your diet, medications or how you've been taking warfarin  Goal range and lab monitoring: goal range and significance of current result    Plan made per [facility] anticoagulation protocol    Ann Alvarez RN  Anticoagulation Clinic  4/18/2024    _______________________________________________________________________     Anticoagulation Episode Summary       Current INR goal:  2.0-3.0   TTR:  67.4% (11.3 mo)   Target end date:  Indefinite   Send INR reminders to:  VIRGILIO WEEKS    Indications    S/P TAVR (transcatheter aortic valve replacement) [Z95.2]             Comments:  2.0-3.0 per Dr. 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

## 2024-04-22 ENCOUNTER — TRANSFERRED RECORDS (OUTPATIENT)
Dept: HEALTH INFORMATION MANAGEMENT | Facility: CLINIC | Age: 84
End: 2024-04-22
Payer: COMMERCIAL

## 2024-05-21 ENCOUNTER — TRANSFERRED RECORDS (OUTPATIENT)
Dept: HEALTH INFORMATION MANAGEMENT | Facility: CLINIC | Age: 84
End: 2024-05-21
Payer: COMMERCIAL

## 2024-05-23 ENCOUNTER — DOCUMENTATION ONLY (OUTPATIENT)
Dept: ANTICOAGULATION | Facility: CLINIC | Age: 84
End: 2024-05-23
Payer: COMMERCIAL

## 2024-05-23 DIAGNOSIS — Z95.2 S/P TAVR (TRANSCATHETER AORTIC VALVE REPLACEMENT): Primary | ICD-10-CM

## 2024-05-23 DIAGNOSIS — I63.9 CEREBELLAR STROKE (H): ICD-10-CM

## 2024-05-23 NOTE — PROGRESS NOTES
ANTICOAGULATION CLINIC REFERRAL RENEWAL REQUEST       An annual renewal order is required for all patients referred to [facility] Anticoagulation Clinic.?  Please review and sign the pended referral order for Kacie Hermosillo.       ANTICOAGULATION SUMMARY      Warfarin indication(s)   Stroke and Bio TAVR    Mechanical heart valve present?  NO       Current goal range   INR: 2.0-3.0     Goal appropriate for indication? Goal of 2.0-3.0 verified with Dr. Olguin , on 5/24/23 due to cardiac CT result on 4/10/23     Time in Therapeutic Range (TTR)  (Goal > 60%) 67.4%       Office visit with referring provider's group within last year yes on 1/4/2024       Praveena Morris RN  [facility] Anticoagulation Clinic

## 2024-05-24 ENCOUNTER — MYC MEDICAL ADVICE (OUTPATIENT)
Dept: ANTICOAGULATION | Facility: CLINIC | Age: 84
End: 2024-05-24

## 2024-05-24 ENCOUNTER — OFFICE VISIT (OUTPATIENT)
Dept: FAMILY MEDICINE | Facility: CLINIC | Age: 84
End: 2024-05-24
Payer: COMMERCIAL

## 2024-05-24 ENCOUNTER — ANTICOAGULATION THERAPY VISIT (OUTPATIENT)
Dept: ANTICOAGULATION | Facility: CLINIC | Age: 84
End: 2024-05-24

## 2024-05-24 VITALS
HEIGHT: 63 IN | OXYGEN SATURATION: 98 % | TEMPERATURE: 97.9 F | SYSTOLIC BLOOD PRESSURE: 128 MMHG | BODY MASS INDEX: 23.57 KG/M2 | DIASTOLIC BLOOD PRESSURE: 72 MMHG | HEART RATE: 70 BPM | WEIGHT: 133 LBS

## 2024-05-24 DIAGNOSIS — I63.9 CEREBELLAR STROKE (H): ICD-10-CM

## 2024-05-24 DIAGNOSIS — N18.31 CHRONIC RENAL FAILURE, STAGE 3A (H): ICD-10-CM

## 2024-05-24 DIAGNOSIS — E78.5 HYPERLIPIDEMIA, UNSPECIFIED HYPERLIPIDEMIA TYPE: ICD-10-CM

## 2024-05-24 DIAGNOSIS — Z95.2 S/P TAVR (TRANSCATHETER AORTIC VALVE REPLACEMENT): Primary | ICD-10-CM

## 2024-05-24 DIAGNOSIS — M81.0 AGE-RELATED OSTEOPOROSIS WITHOUT CURRENT PATHOLOGICAL FRACTURE: ICD-10-CM

## 2024-05-24 DIAGNOSIS — I10 ESSENTIAL HYPERTENSION: ICD-10-CM

## 2024-05-24 DIAGNOSIS — Z12.31 VISIT FOR SCREENING MAMMOGRAM: ICD-10-CM

## 2024-05-24 DIAGNOSIS — Z00.00 ENCOUNTER FOR MEDICARE ANNUAL WELLNESS EXAM: Primary | ICD-10-CM

## 2024-05-24 DIAGNOSIS — Z95.2 S/P TAVR (TRANSCATHETER AORTIC VALVE REPLACEMENT): ICD-10-CM

## 2024-05-24 LAB
ANION GAP SERPL CALCULATED.3IONS-SCNC: 10 MMOL/L (ref 7–15)
BUN SERPL-MCNC: 13.6 MG/DL (ref 8–23)
CALCIUM SERPL-MCNC: 9.2 MG/DL (ref 8.8–10.2)
CHLORIDE SERPL-SCNC: 109 MMOL/L (ref 98–107)
CHOLEST SERPL-MCNC: 126 MG/DL
CREAT SERPL-MCNC: 1.09 MG/DL (ref 0.51–0.95)
DEPRECATED HCO3 PLAS-SCNC: 24 MMOL/L (ref 22–29)
EGFRCR SERPLBLD CKD-EPI 2021: 50 ML/MIN/1.73M2
FASTING STATUS PATIENT QL REPORTED: YES
FASTING STATUS PATIENT QL REPORTED: YES
GLUCOSE SERPL-MCNC: 104 MG/DL (ref 70–99)
HDLC SERPL-MCNC: 62 MG/DL
INR BLD: 2.7 (ref 0.9–1.1)
LDLC SERPL CALC-MCNC: 51 MG/DL
NONHDLC SERPL-MCNC: 64 MG/DL
POTASSIUM SERPL-SCNC: 4.5 MMOL/L (ref 3.4–5.3)
SODIUM SERPL-SCNC: 143 MMOL/L (ref 135–145)
TRIGL SERPL-MCNC: 64 MG/DL

## 2024-05-24 PROCEDURE — 85610 PROTHROMBIN TIME: CPT | Performed by: PHYSICIAN ASSISTANT

## 2024-05-24 PROCEDURE — 80048 BASIC METABOLIC PNL TOTAL CA: CPT | Performed by: PHYSICIAN ASSISTANT

## 2024-05-24 PROCEDURE — 80061 LIPID PANEL: CPT | Performed by: PHYSICIAN ASSISTANT

## 2024-05-24 PROCEDURE — 36415 COLL VENOUS BLD VENIPUNCTURE: CPT | Performed by: PHYSICIAN ASSISTANT

## 2024-05-24 PROCEDURE — G0439 PPPS, SUBSEQ VISIT: HCPCS | Performed by: PHYSICIAN ASSISTANT

## 2024-05-24 RX ORDER — LISINOPRIL AND HYDROCHLOROTHIAZIDE 12.5; 2 MG/1; MG/1
1 TABLET ORAL DAILY
Qty: 90 TABLET | Refills: 1 | Status: ON HOLD | OUTPATIENT
Start: 2024-05-24

## 2024-05-24 RX ORDER — ATORVASTATIN CALCIUM 40 MG/1
40 TABLET, FILM COATED ORAL EVERY MORNING
Qty: 90 TABLET | Refills: 3 | Status: ON HOLD | OUTPATIENT
Start: 2024-05-24

## 2024-05-24 SDOH — HEALTH STABILITY: PHYSICAL HEALTH: ON AVERAGE, HOW MANY DAYS PER WEEK DO YOU ENGAGE IN MODERATE TO STRENUOUS EXERCISE (LIKE A BRISK WALK)?: 0 DAYS

## 2024-05-24 ASSESSMENT — SOCIAL DETERMINANTS OF HEALTH (SDOH): HOW OFTEN DO YOU GET TOGETHER WITH FRIENDS OR RELATIVES?: TWICE A WEEK

## 2024-05-24 NOTE — PATIENT INSTRUCTIONS
"For the cold/allergy symptoms:   Can try a daily zyrtec or claritin (if there is a component of allergies)    Try Afrin nasal spray - this is a decongestant. I only want you to use it over the weekend to see if it helps relieve the nasal congestion (which may also help with the drainage in the throat)    Can also try mucinex decongestant     Warm tea with honey can help a sore throat and also sometimes help the calm down the cough      Preventive Care Advice   This is general advice we often give to help people stay healthy. Your care team may have specific advice just for you. Please talk to your care team about your own preventive care needs.  Lifestyle  Exercise at least 150 minutes each week (30 minutes a day, 5 days a week).  Do muscle strengthening activities 2 days a week. These help control your weight and prevent disease.  No smoking.  Wear sunscreen to prevent skin cancer.  Have your home tested for radon every 2 to 5 years. Radon is a colorless, odorless gas that can harm your lungs. To learn more, go to www.health.[facility].mn. and search for \"Radon in Homes.\"  Keep guns unloaded and locked up in a safe place like a safe or gun vault, or, use a gun lock and hide the keys. Always lock away bullets separately. To learn more, visit [place].gov and search for \"safe gun storage.\"  Nutrition  Eat 5 or more servings of fruits and vegetables each day.  Try wheat bread, brown rice and whole grain pasta (instead of white bread, rice, and pasta).  Get enough calcium and vitamin D. Check the label on foods and aim for 100% of the RDA (recommended daily allowance).  Regular exams  Have a dental exam and cleaning every 6 months.  See your health care team every year to talk about:  Any changes in your health.  Any medicines your care team has prescribed.  Preventive care, family planning, and ways to prevent chronic diseases.  Shots (vaccines)   HPV shots (up to age 26), if you've never had them before.  Hepatitis B " shots (up to age 59), if you've never had them before.  COVID-19 shot: Get this shot when it's due.  Flu shot: Get a flu shot every year.  Tetanus shot: Get a tetanus shot every 10 years.  Pneumococcal, hepatitis A, and RSV shots: Ask your care team if you need these based on your risk.  Shingles shot (for age 50 and up).  General health tests  Diabetes screening:  Starting at age 35, Get screened for diabetes at least every 3 years.  If you are younger than age 35, ask your care team if you should be screened for diabetes.  Cholesterol test: At age 39, start having a cholesterol test every 5 years, or more often if advised.  Bone density scan (DEXA): At age 50, ask your care team if you should have this scan for osteoporosis (brittle bones).  Hepatitis C: Get tested at least once in your life.  Abdominal aortic aneurysm screening: Talk to your doctor about having this screening if you:  Have ever smoked; and  Are biologically male; and  Are between the ages of 65 and 75.  STIs (sexually transmitted infections)  Before age 24: Ask your care team if you should be screened for STIs.  After age 24: Get screened for STIs if you're at risk. You are at risk for STIs (including HIV) if:  You are sexually active with more than one person.  You don't use condoms every time.  You or a partner was diagnosed with a sexually transmitted infection.  If you are at risk for HIV, ask about PrEP medicine to prevent HIV.  Get tested for HIV at least once in your life, whether you are at risk for HIV or not.  Cancer screening tests  Cervical cancer screening: If you have a cervix, begin getting regular cervical cancer screening tests at age 21. Most people who have regular screenings with normal results can stop after age 65. Talk about this with your provider.  Breast cancer scan (mammogram): If you've ever had breasts, begin having regular mammograms starting at age 40. This is a scan to check for breast cancer.  Colon cancer  screening: It is important to start screening for colon cancer at age 45.  Have a colonoscopy test every 10 years (or more often if you're at risk) Or, ask your provider about stool tests like a FIT test every year or Cologuard test every 3 years.  To learn more about your testing options, visit: www.[employer]/428070.pdf.  For help making a decision, visit: mariely/ry47411.  Prostate cancer screening test: If you have a prostate and are age 55 to 69, ask your provider if you would benefit from a yearly prostate cancer screening test.  Lung cancer screening: If you are a current or former smoker age 50 to 80, ask your care team if ongoing lung cancer screenings are right for you.  For informational purposes only. Not to replace the advice of your health care provider. Copyright   2023 [employer]. All rights reserved. Clinically reviewed by the [facility] Transitions Program. [employer] [id number] - REV 04/24.

## 2024-05-24 NOTE — PROGRESS NOTES
Preventive Care Visit  [facility] MICKY Francisco PA-C, Family Medicine  May 24, 2024      Assessment & Plan       ICD-10-CM    1. Encounter for Medicare annual wellness exam  Z00.00 DX Bone Density      2. Visit for screening mammogram  Z12.31       3. Chronic renal failure, stage 3a (H)  N18.31 BASIC METABOLIC PANEL     lisinopril-hydrochlorothiazide (ZESTORETIC) 20-12.5 MG tablet     BASIC METABOLIC PANEL      4. Hyperlipidemia, unspecified hyperlipidemia type  E78.5 Lipid panel reflex to direct LDL Non-fasting     atorvastatin (LIPITOR) 40 MG tablet     Lipid panel reflex to direct LDL Non-fasting      5. Essential hypertension  I10 lisinopril-hydrochlorothiazide (ZESTORETIC) 20-12.5 MG tablet      6. Age-related osteoporosis without current pathological fracture  M81.0 DX Bone Density      7. S/P TAVR (transcatheter aortic valve replacement)  Z95.2 INR point of care      8. Cerebellar stroke (H)  I63.9 INR point of care            Patient has been advised of split billing requirements and indicates understanding: Yes        Counseling  Appropriate preventive services were discussed with this patient, including applicable screening as appropriate for fall prevention, nutrition, physical activity, Tobacco-use cessation, weight loss and cognition.  Checklist reviewing preventive services available has been given to the patient.  Reviewed patient's diet, addressing concerns and/or questions.   She is at risk for psychosocial distress and has been provided with information to reduce risk.   Updated plan of care.  Patient reported difficulty with activities of daily living were addressed today.Information on urinary incontinence and treatment options given to patient.       Travis Hester is a 84 year old, presenting for the following:  Physical        5/24/2024     9:59 AM   Additional Questions   Roomed by SHARMIN Celis       Health Care Directive  Patient does not have a Health Care  Directive or Living Will: Patient states has Advance Directive and will bring in a copy to clinic.    HPI    Seeing [facility] regarding back pain - they asked that she get a DEXA scan      5/24/2024   General Health   How would you rate your overall physical health? Good   Feel stress (tense, anxious, or unable to sleep) Only a little   (!) STRESS CONCERN      5/24/2024   Nutrition   Diet: Regular (no restrictions)         5/24/2024   Exercise   Days per week of moderate/strenous exercise 0 days   (!) EXERCISE CONCERN      5/24/2024   Social Factors   Frequency of gathering with friends or relatives Twice a week   Worry food won't last until get money to buy more No   Food not last or not have enough money for food? No   Do you have housing?  Yes   Are you worried about losing your housing? No   Lack of transportation? No   Unable to get utilities (heat,electricity)? No         5/24/2024   Fall Risk   Fallen 2 or more times in the past year? No   Trouble with walking or balance? No          5/24/2024   Activities of Daily Living- Home Safety   Needs help with the following daily activites Shopping   Safety concerns in the home None of the above         5/24/2024   Dental   Dentist two times every year? Yes         5/24/2024   Hearing Screening   Hearing concerns? None of the above         5/24/2024   Driving Risk Screening   Patient/family members have concerns about driving No         5/24/2024   General Alertness/Fatigue Screening   Have you been more tired than usual lately? No         5/24/2024   Urinary Incontinence Screening   Bothered by leaking urine in past 6 months Yes         5/24/2024   TB Screening   Were you born outside of the [place]? No         Today's PHQ-2 Score:       5/24/2024    10:04 AM   PHQ-2 ( 1999 Pf[employer])   Q1: Little interest or pleasure in doing things 0   Q2: Feeling down, depressed or hopeless 0   PHQ-2 Score 0   Q1: Little interest or pleasure in doing things Not at all   Q2: Feeling down,  depressed or hopeless Not at all   PHQ-2 Score 0           5/24/2024   Substance Use   Alcohol more than 3/day or more than 7/wk No   Do you have a current opioid prescription? No   How severe/bad is pain from 1 to 10? 8/10   Do you use any other substances recreationally? No     Social History     Tobacco Use    Smoking status: Never    Smokeless tobacco: Never          Mammogram Screening - After age 74- determine frequency with patient based on health status, life expectancy and patient goals        Reviewed and updated as needed this visit by Provider                    BP Readings from Last 3 Encounters:   05/24/24 128/72   01/04/24 128/66   12/25/23 128/83    Wt Readings from Last 3 Encounters:   05/24/24 60.3 kg (133 lb)   01/04/24 60.3 kg (133 lb)   11/25/23 58.7 kg (129 lb 6 oz)                  Current providers sharing in care for this patient include:  Patient Care Team:  Irina Francisco PA-C as PCP - General (Family Medicine)  Irina Francisco PA-C as Assigned PCP    The following health maintenance items are reviewed in Epic and correct as of today:  Health Maintenance   Topic Date Due    RSV VACCINE (Pregnancy & 60+) (1 - 1-dose 60+ series) Never done    MAMMO SCREENING  01/08/2021    DTAP/TDAP/TD IMMUNIZATION (2 - Td or Tdap) 12/20/2022    COVID-19 Vaccine (7 - 2023-24 season) 02/05/2024    BMP  05/04/2024    LIPID  05/04/2024    ANNUAL REVIEW OF HM ORDERS  05/04/2024    MICROALBUMIN  05/12/2024    HEMOGLOBIN  02/28/2025    MEDICARE ANNUAL WELLNESS VISIT  05/24/2025    FALL RISK ASSESSMENT  05/24/2025    ADVANCE CARE PLANNING  05/05/2028    DEXA  03/16/2032    PHQ-2 (once per calendar year)  Completed    INFLUENZA VACCINE  Completed    Pneumococcal Vaccine: 65+ Years  Completed    URINALYSIS  Completed    ZOSTER IMMUNIZATION  Completed    IPV IMMUNIZATION  Aged Out    HPV IMMUNIZATION  Aged Out    MENINGITIS IMMUNIZATION  Aged Out    RSV MONOCLONAL ANTIBODY  Aged Out  "        Review of Systems  CONSTITUTIONAL: NEGATIVE for fever, chills, change in weight  INTEGUMENTARY/SKIN: NEGATIVE for worrisome rashes, moles or lesions  EYES: NEGATIVE for vision changes or irritation  ENT/MOUTH: NEGATIVE for ear, mouth and throat problems  RESP: NEGATIVE for significant cough or SOB  BREAST: NEGATIVE for masses, tenderness or discharge  CV: NEGATIVE for chest pain, palpitations or peripheral edema  GI: NEGATIVE for nausea, abdominal pain, heartburn, or change in bowel habits  : NEGATIVE for frequency, dysuria, or hematuria  MUSCULOSKELETAL: NEGATIVE for significant arthralgias or myalgia  NEURO: NEGATIVE for weakness, dizziness or paresthesias  ENDOCRINE: NEGATIVE for temperature intolerance, skin/hair changes  HEME: NEGATIVE for bleeding problems  PSYCHIATRIC: NEGATIVE for changes in mood or affect     Objective    Exam  /72   Pulse 70   Temp 97.9  F (36.6  C) (Tympanic)   Ht 1.6 m (5' 3\")   Wt 60.3 kg (133 lb)   SpO2 98%   BMI 23.56 kg/m     Estimated body mass index is 23.56 kg/m  as calculated from the following:    Height as of this encounter: 1.6 m (5' 3\").    Weight as of this encounter: 60.3 kg (133 lb).    Physical Exam  GENERAL: alert and no distress  EYES: Eyes grossly normal to inspection, PERRL and conjunctivae and sclerae normal  HENT: ear canals and TM's normal, nose and mouth without ulcers or lesions  NECK: no adenopathy, no asymmetry, masses, or scars  RESP: lungs clear to auscultation - no rales, rhonchi or wheezes  CV: regular rate and rhythm, normal S1 S2, no S3 or S4, no murmur, click or rub, no peripheral edema  ABDOMEN: soft, nontender, no hepatosplenomegaly, no masses and bowel sounds normal  MS: no gross musculoskeletal defects noted, no edema  SKIN: no suspicious lesions or rashes  NEURO: Normal strength and tone, mentation intact and speech normal  PSYCH: mentation appears normal, affect normal/bright         5/24/2024   Mini Cog   Clock Draw Score 2 " Normal   3 Item Recall 2 objects recalled   Mini Cog Total Score 4            Signed Electronically by: Irina Francisco PA-C

## 2024-05-24 NOTE — PROGRESS NOTES
ANTICOAGULATION MANAGEMENT     Kacie Hermosillo 84 year old female is on warfarin with therapeutic INR result. (Goal INR 2.0-3.0)    Recent labs: (last 7 days)     05/24/24  1043   INR 2.7*       ASSESSMENT     Source(s): Chart Review  Previous INR was Therapeutic last 2(+) visits  Medication, diet, health changes since last INR chart reviewed; none identified         PLAN     Recommended plan for no diet, medication or health factor changes affecting INR     Dosing Instructions: Continue your current warfarin dose with next INR in 6 weeks       Summary  As of 5/24/2024      Full warfarin instructions:  5 mg every Sun, Tue, Thu; 2.5 mg all other days   Next INR check:  7/5/2024               Detailed voice message left for Kacie with dosing instructions and follow up date.   Sent [employer] message with dosing and follow up instructions    Contact [phone] to schedule and with any changes, questions or concerns.     Education provided:   Please call back if any changes to your diet, medications or how you've been taking warfarin  Goal range and lab monitoring: goal range and significance of current result  Symptom monitoring: monitoring for bleeding signs and symptoms and monitoring for clotting signs and symptoms  Written instructions provided  Contact [phone] with any changes, questions or concerns.     Plan made per [facility] anticoagulation protocol    Praveena Morris RN  Anticoagulation Clinic  5/24/2024    _______________________________________________________________________     Anticoagulation Episode Summary       Current INR goal:  2.0-3.0   TTR:  71.9% (1 y)   Target end date:  Indefinite   Send INR reminders to:  VIRGILIO WEEKS    Indications    S/P TAVR (transcatheter aortic valve replacement) [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

## 2024-05-28 ENCOUNTER — TRANSFERRED RECORDS (OUTPATIENT)
Dept: HEALTH INFORMATION MANAGEMENT | Facility: CLINIC | Age: 84
End: 2024-05-28
Payer: COMMERCIAL

## 2024-05-31 ENCOUNTER — TELEPHONE (OUTPATIENT)
Dept: FAMILY MEDICINE | Facility: CLINIC | Age: 84
End: 2024-05-31

## 2024-05-31 DIAGNOSIS — C50.012 MALIGNANT NEOPLASM OF AREOLA OF LEFT BREAST IN FEMALE, UNSPECIFIED ESTROGEN RECEPTOR STATUS (H): Primary | ICD-10-CM

## 2024-05-31 DIAGNOSIS — R93.7 ABNORMAL MAGNETIC RESONANCE IMAGING OF SPINE: ICD-10-CM

## 2024-05-31 NOTE — TELEPHONE ENCOUNTER
Received call from Zohra Travis NP from [facility] calling to report an incidental finding on her MRI of L/S that was ordered for her lumbar radiculopathy.    Patient has a H/O breast CA.     MRI findings show wide spread metastatic disease throughout her spine, patient was notified yesterday by NP and they will treat her lumbar radiculopathy she has follow up appointment scheduled for 6/3/24 with [facility].    Noted patient had VV with Irina BOWLING today but this was canceled.    Zohra Travis NP is wanting to make sure patient will be treated for CA. Oncology referral pended, message routed to provider for consideration.    Julie Behrendt RN

## 2024-05-31 NOTE — TELEPHONE ENCOUNTER
Tried contacting patient - had a VV set up with me to discuss MRI although it was done through [facility] and I do not have the report.     Saw note (after visit was canceled) regarding findings and did try to call patient at 5:15pm just to check in and see if she had questions. Did not answer home phone and no voicemail set up for mobile phone.     Referral to oncology placed. Will try to reach out to patient next week    Irina Francisco PA-C

## 2024-06-02 ENCOUNTER — PATIENT OUTREACH (OUTPATIENT)
Dept: ONCOLOGY | Facility: CLINIC | Age: 84
End: 2024-06-02
Payer: COMMERCIAL

## 2024-06-03 ENCOUNTER — TRANSFERRED RECORDS (OUTPATIENT)
Dept: HEALTH INFORMATION MANAGEMENT | Facility: CLINIC | Age: 84
End: 2024-06-03
Payer: COMMERCIAL

## 2024-06-03 NOTE — PROGRESS NOTES
"New Patient Oncology Nurse Navigator Note     Referring provider: Irina Francisco PA-C     Referring Clinic/Organization: [facility]     Referred to (specialty:) Medical Oncology      Date Referral Received: May 31, 2024     Evaluation for:  C50.012 (ICD-10-CM) - Malignant neoplasm of areola of left breast in female, unspecified estrogen receptor status (H)  R93.7 (ICD-10-CM) - Abnormal magnetic resonance imaging of spine     Clinical History (per Nurse review of records provided):      Breast cancer (H) 1999  Chemotherapy 1999  Radiation therapy 1999     She has a history of T12 compression fracture which added to her pain after a fall.     10/11/22 - lumbar MRI at [facility] and report needed    Radiofrequency ablation at [facility] on 8/15/23(right) and 8/22/223 (left).     Kacie met with Dr. Ramirez Hwang at [facility] on 4/22/24 for cervical neck pain and bilateral lumbar back pain with lumbar pain present for past four years.     Lumbar Xrays performed:      6/3/24 lumbar MRI Report embedded in Dr. Coley 6/6 progress note:      6/3 1023 - Telephoned and left voice message for Kacie requesting call back.     6/4 1158 - Telephoned and spoke with Kacie. She recalls very little about her diagnosis or care in 1999 although does state at least radiation was at a \"little place right on [address] across from [facility].\" This may have been [facility]. ([facility] at that time).      6/7 1216 - Telephoned and spoke with Kacie to assist in scheduling medical oncology consult. She will check with her daughter about transportation for 6/11 at 11:00am, and call me back. She returned call and is willing to move forward with that appointment.    She prefers to be seen at our [place] location.    Patient resides in [place], [place].     Do you have any previous cancer diagnoses? Yes    Have you received radiation therapy in the past? Yes    Records Location: Care " Everywhere, Media, and See Bookmarked material     Records Needed: 1999  Path reports-biopsy and surgery (per protocol)  Pathology reviews (per protocol)  Breast imaging for past 5 years  Systemic imaging (per protocol)  Med onc notes, rad notes, OP note, surgeons note (per protocol)  Genetic results (per protocol)  Echo or MUGA results (per protocol)  Radiation summary (per protocol)  Chemotherapy summary (per protocol)  2024  [facility] consult and progress notes  MRI report and imaging from [facility]  Imaging and report needed from 10/11/22 MRI at [facility].   Imaging and report needed from 6/324 lumbar MRI

## 2024-06-04 ENCOUNTER — PRE VISIT (OUTPATIENT)
Dept: ONCOLOGY | Facility: CLINIC | Age: 84
End: 2024-06-04
Payer: COMMERCIAL

## 2024-06-04 ENCOUNTER — TELEPHONE (OUTPATIENT)
Dept: FAMILY MEDICINE | Facility: CLINIC | Age: 84
End: 2024-06-04
Payer: COMMERCIAL

## 2024-06-04 NOTE — TELEPHONE ENCOUNTER
Patient called today to schedule with oncology.   RN notified patient of the phone number on referral and let her know that the oncology nurse navigator had tried to call her.     She will call them.  Maxine Gabriel RN on 6/4/2024 at 10:07 AM

## 2024-06-04 NOTE — TELEPHONE ENCOUNTER
Action 2024 11:09 AM ABT   Action Taken Records from [facility] received and sent to HIM for upload and to DAVID Chairez     Action 2024 12:46 PM ABT   Action Taken Called [facility] to check if  they have more records that was mentioned before (IB from RN stating that Dr. Johnson and Dr. Prather were both previous providers with [facility]), spoke to Radha and she states that 90 pages were faxed on 24 attn to Teddy ([facility] member) with a date range from  - May 2008. Verbally requested records again but was given some push back.    Checked incoming faxes from - - nothing for this patient. Reached out to [facility] team, they do not have records besides what was scanned into chart. - sent another faxed request for records.    3:50 PM  Request was denied again by [facility], call back to [facility] and spoke to Radha again, states that she found the 90 pages and can try refaxing records again If she is able to open the file)     Action 2024 8:21 AM ABT   Action Taken Paper Records for RT from [facility] received and emailed to HIM for upload and to DAVID Chairez.    91 cover     Action 2024 3:35 PM ABT   Action Taken Called [facility] HIM and spoke to Randy, they have DICOM/paper records that can be mailed out (they are sending all that they have). Faxed over FedEx label to Randy @ [phone]  [employer] Trackin     Action 2024 9:56 AM ABT   Action Taken Called [facility] Him and spoke to Radha, states that a paper chart was ordered but unsure how long it will take for them to retrieve paper records.      RECORDS STATUS - BREAST    RECORDS REQUESTED FROM: [facility] / [facility] /[facility]    Appt Date: TBD NN WQ    Records Needed:   Path reports-biopsy and surgery (per protocol)-maybe @ [facility]   Pathology reviews (per protocol)  Breast imaging for past 5 years  Systemic imaging (per protocol) - early  scans were done at [facility] onc notes, rad notes, OP note, surgeons note (per  protocol)  Genetic results (per protocol)- No  Echo or MUGA results (per protocol)- Yes @ [facility] in [facility] (can't recall the exact name)   Radiation summary (per protocol)- [facility]   Chemotherapy summary (per protocol)- [facility]   2024  [facility] consult and progress notes  MRI report and imaging from [facility]    Action    Action Taken 6/4/2024 3:38pm KEB   I called pt Kacie - she answered questions about her medical hx. I called [facility] to see if they need an ADALI Ph: [phone]-I spoke to Radha in [facility] said they will need an ADALI. I called Kacie again- she will stop by [facility] ([address] in [place]) on Thursday around lunch time.     I called [facility] [phone]- I sent a request for recs and imaging to Fax: [phone]- they will mail her an ADALI and include our fax # on the release.     I called pt Kacie again - I explained that [facility] will be mailing her an ADALI and she will still need to complete an ADALI form for [facility]. Kacie doesn't have access to a printer.     I sent the [facility] Team a message letting them know that Kacie needs to complete these two ROIs. Neither outside facility will take verbal consent over the phone.       NOTES DETAILS STATUS   OFFICE NOTE from referring provider  Irina Francisco PA-C    OFFICE NOTE from medical oncologist     OFFICE NOTE from surgeon     OFFICE NOTE from radiation oncologist     DISCHARGE SUMMARY from hospital     DISCHARGE REPORT from the ER     OPERATIVE REPORT     MEDICATION LIST     CLINICAL TRIAL TREATMENTS TO DATE     LABS     REQUEST BLOCKS FOR ALL BREAST CANCER PTS     PATHOLOGY REPORTS  (Tissue diagnosis, Stage, ER/VT percentage positive and intensity of staining, HER2 IHC, FISH, and all biopsies from breast and any distant metastasis)                     PATHOLOGY FEDEX TRACKING:   TRACKING #:   GENONOMIC TESTING     TYPE:   (Next Generation Sequencing, including Foundation One testing, and Oncotype  score)     IMAGING (NEED IMAGES & REPORT)     CT SCANS     MRI     MAMMO     ULTRASOUND     PET     BONE SCAN     BRAIN MRI     IMAGE DISC FEDEX TRACKING    TRACKING # :

## 2024-06-11 ENCOUNTER — LAB (OUTPATIENT)
Dept: LAB | Facility: CLINIC | Age: 84
End: 2024-06-11
Payer: COMMERCIAL

## 2024-06-11 ENCOUNTER — PATIENT OUTREACH (OUTPATIENT)
Dept: ONCOLOGY | Facility: HOSPITAL | Age: 84
End: 2024-06-11

## 2024-06-11 ENCOUNTER — TELEPHONE (OUTPATIENT)
Dept: FAMILY MEDICINE | Facility: CLINIC | Age: 84
End: 2024-06-11
Payer: COMMERCIAL

## 2024-06-11 ENCOUNTER — E-VISIT (OUTPATIENT)
Dept: FAMILY MEDICINE | Facility: CLINIC | Age: 84
End: 2024-06-11
Payer: COMMERCIAL

## 2024-06-11 ENCOUNTER — ONCOLOGY VISIT (OUTPATIENT)
Dept: ONCOLOGY | Facility: HOSPITAL | Age: 84
End: 2024-06-11
Attending: PHYSICIAN ASSISTANT
Payer: COMMERCIAL

## 2024-06-11 ENCOUNTER — HOSPITAL ENCOUNTER (EMERGENCY)
Facility: HOSPITAL | Age: 84
Discharge: HOME | End: 2024-06-11
Attending: EMERGENCY MEDICINE | Admitting: EMERGENCY MEDICINE
Payer: COMMERCIAL

## 2024-06-11 ENCOUNTER — LAB (OUTPATIENT)
Dept: INFUSION THERAPY | Facility: HOSPITAL | Age: 84
End: 2024-06-11
Attending: PHYSICIAN ASSISTANT
Payer: COMMERCIAL

## 2024-06-11 ENCOUNTER — NURSE TRIAGE (OUTPATIENT)
Dept: NURSING | Facility: CLINIC | Age: 84
End: 2024-06-11

## 2024-06-11 ENCOUNTER — MYC MEDICAL ADVICE (OUTPATIENT)
Dept: FAMILY MEDICINE | Facility: CLINIC | Age: 84
End: 2024-06-11

## 2024-06-11 VITALS
DIASTOLIC BLOOD PRESSURE: 82 MMHG | HEART RATE: 66 BPM | HEIGHT: 63 IN | SYSTOLIC BLOOD PRESSURE: 162 MMHG | OXYGEN SATURATION: 96 % | WEIGHT: 133.6 LBS | RESPIRATION RATE: 18 BRPM | TEMPERATURE: 98.6 F | BODY MASS INDEX: 23.67 KG/M2

## 2024-06-11 VITALS
SYSTOLIC BLOOD PRESSURE: 168 MMHG | DIASTOLIC BLOOD PRESSURE: 72 MMHG | HEIGHT: 63 IN | RESPIRATION RATE: 16 BRPM | WEIGHT: 133.6 LBS | OXYGEN SATURATION: 96 % | BODY MASS INDEX: 23.67 KG/M2 | HEART RATE: 68 BPM | TEMPERATURE: 98.4 F

## 2024-06-11 DIAGNOSIS — C50.012 MALIGNANT NEOPLASM OF AREOLA OF LEFT BREAST IN FEMALE, UNSPECIFIED ESTROGEN RECEPTOR STATUS (H): ICD-10-CM

## 2024-06-11 DIAGNOSIS — N30.00 ACUTE CYSTITIS WITHOUT HEMATURIA: Primary | ICD-10-CM

## 2024-06-11 DIAGNOSIS — N39.0 URINARY TRACT INFECTION WITHOUT HEMATURIA, SITE UNSPECIFIED: ICD-10-CM

## 2024-06-11 DIAGNOSIS — R93.7 ABNORMAL MAGNETIC RESONANCE IMAGING OF SPINE: ICD-10-CM

## 2024-06-11 DIAGNOSIS — N30.00 ACUTE CYSTITIS WITHOUT HEMATURIA: ICD-10-CM

## 2024-06-11 LAB
ALBUMIN SERPL BCG-MCNC: 4 G/DL (ref 3.5–5.2)
ALBUMIN UR-MCNC: 10 MG/DL
ALP SERPL-CCNC: 91 U/L (ref 40–150)
ALT SERPL W P-5'-P-CCNC: 27 U/L (ref 0–50)
ANION GAP SERPL CALCULATED.3IONS-SCNC: 10 MMOL/L (ref 7–15)
APPEARANCE UR: CLEAR
AST SERPL W P-5'-P-CCNC: 33 U/L (ref 0–45)
BACTERIA #/AREA URNS HPF: ABNORMAL /HPF
BASOPHILS # BLD AUTO: 0.1 10E3/UL (ref 0–0.2)
BASOPHILS NFR BLD AUTO: 1 %
BILIRUB SERPL-MCNC: 0.6 MG/DL
BILIRUB UR QL STRIP: NEGATIVE
BUN SERPL-MCNC: 18.3 MG/DL (ref 8–23)
CALCIUM SERPL-MCNC: 9.3 MG/DL (ref 8.8–10.2)
CHLORIDE SERPL-SCNC: 107 MMOL/L (ref 98–107)
COLOR UR AUTO: ABNORMAL
CREAT SERPL-MCNC: 1.02 MG/DL (ref 0.51–0.95)
DEPRECATED HCO3 PLAS-SCNC: 25 MMOL/L (ref 22–29)
EGFRCR SERPLBLD CKD-EPI 2021: 54 ML/MIN/1.73M2
EOSINOPHIL # BLD AUTO: 0.1 10E3/UL (ref 0–0.7)
EOSINOPHIL NFR BLD AUTO: 1 %
ERYTHROCYTE [DISTWIDTH] IN BLOOD BY AUTOMATED COUNT: 15.2 % (ref 10–15)
GLUCOSE SERPL-MCNC: 109 MG/DL (ref 70–99)
GLUCOSE UR STRIP-MCNC: NEGATIVE MG/DL
HCT VFR BLD AUTO: 45.1 % (ref 35–47)
HGB BLD-MCNC: 14.7 G/DL (ref 11.7–15.7)
HGB UR QL STRIP: ABNORMAL
IMM GRANULOCYTES # BLD: 0 10E3/UL
IMM GRANULOCYTES NFR BLD: 0 %
KETONES UR STRIP-MCNC: NEGATIVE MG/DL
LEUKOCYTE ESTERASE UR QL STRIP: ABNORMAL
LYMPHOCYTES # BLD AUTO: 1.5 10E3/UL (ref 0.8–5.3)
LYMPHOCYTES NFR BLD AUTO: 15 %
MCH RBC QN AUTO: 29.4 PG (ref 26.5–33)
MCHC RBC AUTO-ENTMCNC: 32.6 G/DL (ref 31.5–36.5)
MCV RBC AUTO: 90 FL (ref 78–100)
MONOCYTES # BLD AUTO: 0.9 10E3/UL (ref 0–1.3)
MONOCYTES NFR BLD AUTO: 9 %
MUCOUS THREADS #/AREA URNS LPF: PRESENT /LPF
NEUTROPHILS # BLD AUTO: 7.2 10E3/UL (ref 1.6–8.3)
NEUTROPHILS NFR BLD AUTO: 73 %
NITRATE UR QL: POSITIVE
NRBC # BLD AUTO: 0 10E3/UL
NRBC BLD AUTO-RTO: 0 /100
PH UR STRIP: 5.5 [PH] (ref 5–7)
PLATELET # BLD AUTO: 289 10E3/UL (ref 150–450)
POTASSIUM SERPL-SCNC: 4.3 MMOL/L (ref 3.4–5.3)
PROT SERPL-MCNC: 6.9 G/DL (ref 6.4–8.3)
RBC # BLD AUTO: 5 10E6/UL (ref 3.8–5.2)
RBC URINE: 2 /HPF
SODIUM SERPL-SCNC: 142 MMOL/L (ref 135–145)
SP GR UR STRIP: 1.01 (ref 1–1.03)
UROBILINOGEN UR STRIP-MCNC: <2 MG/DL
WBC # BLD AUTO: 9.8 10E3/UL (ref 4–11)
WBC URINE: 142 /HPF

## 2024-06-11 PROCEDURE — 99204 OFFICE O/P NEW MOD 45 MIN: CPT | Performed by: INTERNAL MEDICINE

## 2024-06-11 PROCEDURE — 87086 URINE CULTURE/COLONY COUNT: CPT | Performed by: EMERGENCY MEDICINE

## 2024-06-11 PROCEDURE — 99283 EMERGENCY DEPT VISIT LOW MDM: CPT

## 2024-06-11 PROCEDURE — 81001 URINALYSIS AUTO W/SCOPE: CPT | Performed by: EMERGENCY MEDICINE

## 2024-06-11 PROCEDURE — G0463 HOSPITAL OUTPT CLINIC VISIT: HCPCS | Performed by: INTERNAL MEDICINE

## 2024-06-11 PROCEDURE — 250N000013 HC RX MED GY IP 250 OP 250 PS 637: Performed by: EMERGENCY MEDICINE

## 2024-06-11 PROCEDURE — 87186 SC STD MICRODIL/AGAR DIL: CPT | Performed by: EMERGENCY MEDICINE

## 2024-06-11 PROCEDURE — 99421 OL DIG E/M SVC 5-10 MIN: CPT | Performed by: PHYSICIAN ASSISTANT

## 2024-06-11 PROCEDURE — 85025 COMPLETE CBC W/AUTO DIFF WBC: CPT

## 2024-06-11 PROCEDURE — 36415 COLL VENOUS BLD VENIPUNCTURE: CPT

## 2024-06-11 PROCEDURE — 80053 COMPREHEN METABOLIC PANEL: CPT

## 2024-06-11 PROCEDURE — 86300 IMMUNOASSAY TUMOR CA 15-3: CPT

## 2024-06-11 RX ORDER — CEFDINIR 300 MG/1
300 CAPSULE ORAL 2 TIMES DAILY
Qty: 20 CAPSULE | Refills: 0 | Status: SHIPPED | OUTPATIENT
Start: 2024-06-11 | End: 2024-06-21

## 2024-06-11 RX ORDER — CEPHALEXIN 500 MG/1
500 CAPSULE ORAL 3 TIMES DAILY
Qty: 28 CAPSULE | Refills: 0 | Status: SHIPPED | OUTPATIENT
Start: 2024-06-11 | End: 2024-06-11

## 2024-06-11 RX ORDER — CEPHALEXIN 500 MG/1
500 CAPSULE ORAL ONCE
Status: COMPLETED | OUTPATIENT
Start: 2024-06-11 | End: 2024-06-11

## 2024-06-11 RX ORDER — CEPHALEXIN 500 MG/1
500 CAPSULE ORAL 3 TIMES DAILY
Qty: 21 CAPSULE | Refills: 0 | Status: SHIPPED | OUTPATIENT
Start: 2024-06-11 | End: 2024-06-18

## 2024-06-11 RX ADMIN — CEPHALEXIN 500 MG: 500 CAPSULE ORAL at 21:21

## 2024-06-11 ASSESSMENT — COLUMBIA-SUICIDE SEVERITY RATING SCALE - C-SSRS
6. HAVE YOU EVER DONE ANYTHING, STARTED TO DO ANYTHING, OR PREPARED TO DO ANYTHING TO END YOUR LIFE?: NO
2. HAVE YOU ACTUALLY HAD ANY THOUGHTS OF KILLING YOURSELF IN THE PAST MONTH?: NO
1. IN THE PAST MONTH, HAVE YOU WISHED YOU WERE DEAD OR WISHED YOU COULD GO TO SLEEP AND NOT WAKE UP?: NO

## 2024-06-11 ASSESSMENT — ACTIVITIES OF DAILY LIVING (ADL)
ADLS_ACUITY_SCORE: 33
ADLS_ACUITY_SCORE: 33

## 2024-06-11 ASSESSMENT — PAIN SCALES - GENERAL: PAINLEVEL: EXTREME PAIN (9)

## 2024-06-11 NOTE — TELEPHONE ENCOUNTER
Please call family and patient -   I can treat based on her last culture in December and use a similar antibiotic based on those results. Ideally we would get a current sample as noted in previous message. I will place orders for a UA and if they are able to come and leave urine sample that would be preferred and then start the antibiotic (sent to [facility]) while awaiting results.     Please also notify anti coag clinic as she will need to have her INR checked more frquently due to medication    Irina Francisco PA-C

## 2024-06-11 NOTE — LETTER
"6/11/2024      Kacie Hermosillo  [address] [facility] [address]      Dear Colleague,    Thank you for referring your patient, Kacie Hermosillo, to the [facility]. Please see a copy of my visit note below.    Oncology Rooming Note    June 11, 2024 11:05 AM   Kacie Hermosillo is a 84 year old female who presents for:    Chief Complaint   Patient presents with     Oncology Clinic Visit     Initial consult related to Malignant neoplasm of areola of left breast in female, unspecified estrogen receptor status.     Initial Vitals: BP (!) 178/75 (BP Location: Right arm, Patient Position: Sitting, Cuff Size: Adult Regular)   Pulse 68   Temp 98.4  F (36.9  C) (Oral)   Resp 16   Ht 1.6 m (5' 3\")   Wt 60.6 kg (133 lb 9.6 oz)   SpO2 96%   BMI 23.67 kg/m   Estimated body mass index is 23.67 kg/m  as calculated from the following:    Height as of this encounter: 1.6 m (5' 3\").    Weight as of this encounter: 60.6 kg (133 lb 9.6 oz). Body surface area is 1.64 meters squared.  Extreme Pain (9) Comment: Data Unavailable   No LMP recorded. Patient is postmenopausal.  Allergies reviewed: Yes  Medications reviewed: Yes    Medications: Medication refills not needed today.  Pharmacy name entered into Sape:    [facility] [place], [place] - [address] [facility] [place], [place] - [address] [id number] [place], [place] - [address] [facility] [id number] [place], [place] - [address] [facility] [address]    Frailty Screening:   Is the patient here for a new oncology consult visit in cancer care? 1. Yes. Over the past month, have you experienced difficulty or required a caregiver to assist with:   1. Balance, walking or general mobility (including any falls)? NO  2. Completion of self-care tasks such as bathing, dressing, toileting, grooming/hygiene?  NO  3. Concentration or " memory that affects your daily life?  NO       Clinical concerns: Initial consult.        Evelia Tate [facility] Hematology and Oncology Consult Note    Patient: Kacie Hermosillo  MRN: [id number]  Date of Service: Jun 11, 2024       Reason for Visit    I was consulted by   Irina Francisco PA-C   For suspicion of metastatic breast cancer      Encounter Diagnoses Assessment and Plan:    Problem List Items Addressed This Visit       Malignant neoplasm of areola of left breast in female (H)    Relevant Orders    Comprehensive metabolic panel    CBC with Platelets & Differential    Ca27.29  breast tumor marker    PET Oncology (Eyes to Thighs)     Other Visit Diagnoses       Abnormal magnetic resonance imaging of spine              Patient with a history of breast cancer in 1995.  She was treated with partial mastectomy radiation therapy and 5 years of tamoxifen.  Pathology and operative report are not available at this time.  A recent MRI of the lumbar spine for back pain showed widespread metastatic disease.    Exam of the breast today shows enlargement of the left breast and a possible left axillary lymph node.    PET scan along with blood count, blood chemistry and breast CA 27.29 tumor marker have been ordered.  Follow-up when results are available.      ______________________________________________________________________________    Staging History  Cancer Staging   No matching staging information was found for the patient.    ECOG Performance    History of Present Illness    Ms. Kacie Hermosillo is a 84 year old woman with a history of aortic valve disease and TA RV valve replacement.  She has a long history of back pain.  A recent MRI of the lumbar spine for her back pain showed widespread metastatic disease.  The patient describes her back pain as 4 out of 10.  She does get relief from Tylenol.  It is not apparent that her back pain has increased over time.    Otherwise  "she feels well.  She is maintaining her weight.  Her appetite and digestion are normal.  She does not have chills, fevers or sweats.  She does not have cough, chest pain or shortness of breath at rest.  She has constipation and diarrhea for which she is taking Citrucel fiber.  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    Review of systems.  Pertinent Findings are included in the History of Present Illness    Physical Exam    BP (!) 168/72 (BP Location: Right arm, Patient Position: Sitting, Cuff Size: Adult Regular)   Pulse 68   Temp 98.4  F (36.9  C) (Oral)   Resp 16   Ht 1.6 m (5' 3\")   Wt 60.6 kg (133 lb 9.6 oz)   SpO2 96%   BMI 23.67 kg/m       GENERAL APPEARANCE: 84-year-old woman in no apparent distress.  HEAD: Atraumatic; normocephalic; without lesions.  EYES: Conjunctiva, corneas and eyelids normal; pupils equal, round, reactive to light; No Icterus.  MOUTH/OROPHARYNX: Oral mucosa intact.  NECK: Supple with no nodes.  LUNGS:  Clear to auscultation and percussion with no extra sounds.  HEART: Regular rhythm and rate; S1 and S2 normal; systolic murmur noted  ABDOMEN: Soft; no masses or tenderness, no hepatosplenomegaly.  NEUROLOGIC: Alert and oriented.  No obvious focal findings.  EXTREMITIES: No cyanosis, or edema.  SKIN: No abnormal bruising or bleeding. No suspicious lesions noted on exposed skin.  PSYCHIATRIC: Mental status forgetful no apparent psychiatric issues  The right breast is normal to inspection and palpation.  The left breast shows lumpectomy and radiation changes.  There is enlargement of the breast.  There is a soft swelling in the left axilla about 2 cm in diameter.    Medications:    Current Outpatient Medications   Medication Sig Dispense Refill     aspirin (ASA) 81 MG EC tablet Take 81 mg by mouth daily       atorvastatin (LIPITOR) 40 MG tablet Take 1 tablet (40 mg) by mouth every morning 90 tablet 3     estradiol (ESTRACE) 0.1 MG/GM vaginal cream " Place 0.5 g vaginally twice a week Can use daily for 2 weeks and then twice a week after that. 42 g 0     lisinopril-hydrochlorothiazide (ZESTORETIC) 20-12.5 MG tablet Take 1 tablet by mouth daily 90 tablet 1     metoprolol tartrate (LOPRESSOR) 25 MG tablet Take 1 tablet (25 mg) by mouth 2 times daily 180 tablet 3     warfarin ANTICOAGULANT (JANTOVEN ANTICOAGULANT) 5 MG tablet 1/2 tab (2.5mg) on Mon, Wed, Fri and 1 tab (5mg) all other days 90 tablet 3     budesonide (ENTOCORT EC) 3 MG EC capsule Take 9 mg by mouth (Patient not taking: Reported on 11/25/2023)       chlorhexidine (PERIDEX) 0.12 % solution BRUSH INTO TISSUES DAILY (Patient not taking: Reported on 11/25/2023)       conjugated estrogens (PREMARIN) 0.625 MG/GM vaginal cream Place 0.5 g vaginally twice a week (Patient not taking: Reported on 6/11/2024) 30 g 0     No current facility-administered medications for this visit.           Past History    Past Medical History:   Diagnosis Date     Breast cancer (H) 1999     Hx antineoplastic chemotherapy 1999     Hx of radiation therapy 1999     Past Surgical History:   Procedure Laterality Date     BIOPSY BREAST       HYSTERECTOMY       LUMPECTOMY BREAST Left 1999     OOPHORECTOMY Bilateral      No Known Allergies  Family History   Problem Relation Age of Onset     Breast Cancer Maternal Aunt      Hereditary Breast and Ovarian Cancer Syndrome No family hx of      Social History     Socioeconomic History     Marital status:      Spouse name: None     Number of children: None     Years of education: None     Highest education level: None   Tobacco Use     Smoking status: Never     Smokeless tobacco: Never     Social Determinants of Health     Financial Resource Strain: Low Risk  (5/24/2024)    Financial Resource Strain      Within the past 12 months, have you or your family members you live with been unable to get utilities (heat, electricity) when it was really needed?: No   Food Insecurity: Low Risk   (5/24/2024)    Food Insecurity      Within the past 12 months, did you worry that your food would run out before you got money to buy more?: No      Within the past 12 months, did the food you bought just not last and you didn t have money to get more?: No   Transportation Needs: Low Risk  (5/24/2024)    Transportation Needs      Within the past 12 months, has lack of transportation kept you from medical appointments, getting your medicines, non-medical meetings or appointments, work, or from getting things that you need?: No   Physical Activity: Unknown (5/24/2024)    Exercise Vital Sign      Days of Exercise per Week: 0 days   Stress: No Stress Concern Present (5/24/2024)    [place] [employer] - Occupational Stress Questionnaire      Feeling of Stress : Only a little   Social Connections: Unknown (5/24/2024)    Social Connection and Isolation Panel [NHANES]      Frequency of Social Gatherings with Friends and Family: Twice a week   Housing Stability: Low Risk  (5/24/2024)    Housing Stability      Do you have housing? : Yes      Are you worried about losing your housing?: No           Lab Results    No results found for this or any previous visit (from the past 240 hour(s)).    Imaging Results    No results found.      I spent 50 minutes on the patient's visit today.  This included preparation for the visit, face-to-face time with the patient and documentation following the visit.  It did not include teaching or procedure time.    Signed by: Peter E. Friedell, MD          Again, thank you for allowing me to participate in the care of your patient.        Sincerely,        Peter E. Friedell, MD

## 2024-06-11 NOTE — TELEPHONE ENCOUNTER
Patient called stating she is having problems starting the e-visit as requested.     Writer spent a long time trying to walk patient through step by step but she is unsuccessful in her attempts.     Please call patient to further advise for her suspected UTI.     Message routed to [facility] team for follow up.     Julie Behrendt RN     Tia from respiratory here to assist/trouble shoot patients CPAP

## 2024-06-11 NOTE — PROGRESS NOTES
[facility]: Cancer Care                                                                                          I called Kacie to let her know that we would like some additional blood work to be done.  Tumor marker, CA 27.29 was not drawn today.    I called our lab department and they are unable to add it to the labs that were already drawn.    Kacie is scheduled to have a PET scan on Tuesday, 6/25.  I am hopeful we can coordinate to have her get labs drawn that same day.    I was unable to get a hold of Kacie.  I left her a detailed message indicating the intention of my call.  Will wait for her to call me back.    Signature:  Arlene Franco  RN Care Coordinator  [facility]  Cancer [facility]

## 2024-06-11 NOTE — TELEPHONE ENCOUNTER
Call placed to patient.  Relayed NEY Francisco's message.  States that she has appointment with another provider at 3 pm.  Will be here about 4 pm to leave UA.  Theo Niño RN

## 2024-06-11 NOTE — TELEPHONE ENCOUNTER
Pt gave verbal consent to speak with daughter in-law Alec.     Pt was seen today for a possible UTI - pt is trying to get a urine sample and states that she has been drinking a lot of fluids but that she is unable to urinate or only go a few drops and that her bladder feels full.     Pt states that she has been drinking fluids for the past 4 hours and that she cannot urinate, has the urge to go and states that that her bladder hurts but that she cannot get any urine to come out     Per disposition: Go to ED Now     Pt will be going to ED now for evaluation     Care advice given per protocol and when to call back. Pt verbalized understanding and agrees to plan of care.    Heaven Peres RN  [place] Nurse Advisor  6:51 PM 6/11/2024        Reason for Disposition   [1] Unable to urinate (or only a few drops) > 4 hours AND [2] bladder feels very full (e.g., palpable bladder or strong urge to urinate)    Additional Information   Negative: Shock suspected (e.g., cold/pale/clammy skin, too weak to stand, low BP, rapid pulse)   Negative: Sounds like a life-threatening emergency to the triager   Negative: Followed a female genital area injury (e.g., labia, vagina, vulva)   Negative: Followed a male genital area injury (e.g., penis, scrotum)   Negative: Vaginal discharge   Negative: Pus (white, yellow) or bloody discharge from end of penis   Negative: [1] Taking antibiotic for urinary tract infection (UTI) AND [2] female   Negative: [1] Taking antibiotic for urinary tract infection (UTI) AND [2] male   Negative: [1] Pain or burning with passing urine (urination) AND [2] pregnant   Negative: [1] Pain or burning with passing urine (urination) AND [2] postpartum (from 0 to 6 weeks after delivery)   Negative: [1] Pain or burning with passing urine (urination) AND [2] female   Negative: [1] Pain or burning with passing urine (urination) AND [2] male   Negative: Pain or itching in the vulvar area   Negative: Pain in scrotum is  main symptom   Negative: Blood in the urine is main symptom   Negative: Symptoms arising from use of a urinary catheter (e.g., Coude, Aragon)    Protocols used: Urinary Symptoms-A-AH

## 2024-06-11 NOTE — TELEPHONE ENCOUNTER
Irina  Patient and daughter called and said Kacie probably has a UTI.  Symptoms are  burning on urination, difficulty urinating, no fever.  Urine is light colored.  Daughter said you might order an antibiotic?

## 2024-06-11 NOTE — TELEPHONE ENCOUNTER
It would be best to see if they could do an e visit and would daughter be able to get a urine sample from patient so that we are able to culture something out so that if the antibiotic we start doesn't work we can figure out how to adjust treatment?     Irina Francisco PA-C

## 2024-06-11 NOTE — PROGRESS NOTES
[facility]: Cancer Care                                                                                          Situation: Chart reviewed by RN Care Coordinator.    Background: Kacie was seen in clinic today for consultation regarding metastatic breast cancer.    Assessment: History of breast cancer in 1995.  Was treated with partial mastectomy, radiation therapy and 5 years of tamoxifen.  Recent MRI shows widespread metastatic disease.    Plan/Recommendations: PET scan to be done.  Labs to be drawn today.  Patient is to follow-up in a couple weeks to review all results.      Signature:  Arlene Franco RN Care Coordinator  [facility]  Cancer [facility]

## 2024-06-11 NOTE — PROGRESS NOTES
[facility] Hematology and Oncology Consult Note    Patient: Kacie Hermosillo  MRN: [id number]  Date of Service: Jun 11, 2024       Reason for Visit    I was consulted by   Irina Francisco PA-C   For suspicion of metastatic breast cancer      Encounter Diagnoses Assessment and Plan:    Problem List Items Addressed This Visit       Malignant neoplasm of areola of left breast in female (H)    Relevant Orders    Comprehensive metabolic panel    CBC with Platelets & Differential    Ca27.29  breast tumor marker    PET Oncology (Eyes to Thighs)     Other Visit Diagnoses       Abnormal magnetic resonance imaging of spine              Patient with a history of breast cancer in 1995.  She was treated with partial mastectomy radiation therapy and 5 years of tamoxifen.  Pathology and operative report are not available at this time.  A recent MRI of the lumbar spine for back pain showed widespread metastatic disease.    Exam of the breast today shows enlargement of the left breast and a possible left axillary lymph node.    PET scan along with blood count, blood chemistry and breast CA 27.29 tumor marker have been ordered.  Follow-up when results are available.      ______________________________________________________________________________    Staging History  Cancer Staging   No matching staging information was found for the patient.    ECOG Performance    History of Present Illness    Ms. Kacie Hermosillo is a 84 year old woman with a history of aortic valve disease and TA RV valve replacement.  She has a long history of back pain.  A recent MRI of the lumbar spine for her back pain showed widespread metastatic disease.  The patient describes her back pain as 4 out of 10.  She does get relief from Tylenol.  It is not apparent that her back pain has increased over time.    Otherwise she feels well.  She is maintaining her weight.  Her appetite and digestion are normal.  She does not have chills, fevers or  "sweats.  She does not have cough, chest pain or shortness of breath at rest.  She has constipation and diarrhea for which she is taking Citrucel fiber.  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    Review of systems.  Pertinent Findings are included in the History of Present Illness    Physical Exam    BP (!) 168/72 (BP Location: Right arm, Patient Position: Sitting, Cuff Size: Adult Regular)   Pulse 68   Temp 98.4  F (36.9  C) (Oral)   Resp 16   Ht 1.6 m (5' 3\")   Wt 60.6 kg (133 lb 9.6 oz)   SpO2 96%   BMI 23.67 kg/m       GENERAL APPEARANCE: 84-year-old woman in no apparent distress.  HEAD: Atraumatic; normocephalic; without lesions.  EYES: Conjunctiva, corneas and eyelids normal; pupils equal, round, reactive to light; No Icterus.  MOUTH/OROPHARYNX: Oral mucosa intact.  NECK: Supple with no nodes.  LUNGS:  Clear to auscultation and percussion with no extra sounds.  HEART: Regular rhythm and rate; S1 and S2 normal; systolic murmur noted  ABDOMEN: Soft; no masses or tenderness, no hepatosplenomegaly.  NEUROLOGIC: Alert and oriented.  No obvious focal findings.  EXTREMITIES: No cyanosis, or edema.  SKIN: No abnormal bruising or bleeding. No suspicious lesions noted on exposed skin.  PSYCHIATRIC: Mental status forgetful no apparent psychiatric issues  The right breast is normal to inspection and palpation.  The left breast shows lumpectomy and radiation changes.  There is enlargement of the breast.  There is a soft swelling in the left axilla about 2 cm in diameter.    Medications:    Current Outpatient Medications   Medication Sig Dispense Refill    aspirin (ASA) 81 MG EC tablet Take 81 mg by mouth daily      atorvastatin (LIPITOR) 40 MG tablet Take 1 tablet (40 mg) by mouth every morning 90 tablet 3    estradiol (ESTRACE) 0.1 MG/GM vaginal cream Place 0.5 g vaginally twice a week Can use daily for 2 weeks and then twice a week after that. 42 g 0    " lisinopril-hydrochlorothiazide (ZESTORETIC) 20-12.5 MG tablet Take 1 tablet by mouth daily 90 tablet 1    metoprolol tartrate (LOPRESSOR) 25 MG tablet Take 1 tablet (25 mg) by mouth 2 times daily 180 tablet 3    warfarin ANTICOAGULANT (JANTOVEN ANTICOAGULANT) 5 MG tablet 1/2 tab (2.5mg) on Mon, Wed, Fri and 1 tab (5mg) all other days 90 tablet 3    budesonide (ENTOCORT EC) 3 MG EC capsule Take 9 mg by mouth (Patient not taking: Reported on 11/25/2023)      chlorhexidine (PERIDEX) 0.12 % solution BRUSH INTO TISSUES DAILY (Patient not taking: Reported on 11/25/2023)      conjugated estrogens (PREMARIN) 0.625 MG/GM vaginal cream Place 0.5 g vaginally twice a week (Patient not taking: Reported on 6/11/2024) 30 g 0     No current facility-administered medications for this visit.           Past History    Past Medical History:   Diagnosis Date    Breast cancer (H) 1999    Hx antineoplastic chemotherapy 1999    Hx of radiation therapy 1999     Past Surgical History:   Procedure Laterality Date    BIOPSY BREAST      HYSTERECTOMY      LUMPECTOMY BREAST Left 1999    OOPHORECTOMY Bilateral      No Known Allergies  Family History   Problem Relation Age of Onset    Breast Cancer Maternal Aunt     Hereditary Breast and Ovarian Cancer Syndrome No family hx of      Social History     Socioeconomic History    Marital status:      Spouse name: None    Number of children: None    Years of education: None    Highest education level: None   Tobacco Use    Smoking status: Never    Smokeless tobacco: Never     Social Determinants of Health     Financial Resource Strain: Low Risk  (5/24/2024)    Financial Resource Strain     Within the past 12 months, have you or your family members you live with been unable to get utilities (heat, electricity) when it was really needed?: No   Food Insecurity: Low Risk  (5/24/2024)    Food Insecurity     Within the past 12 months, did you worry that your food would run out before you got money to  buy more?: No     Within the past 12 months, did the food you bought just not last and you didn t have money to get more?: No   Transportation Needs: Low Risk  (5/24/2024)    Transportation Needs     Within the past 12 months, has lack of transportation kept you from medical appointments, getting your medicines, non-medical meetings or appointments, work, or from getting things that you need?: No   Physical Activity: Unknown (5/24/2024)    Exercise Vital Sign     Days of Exercise per Week: 0 days   Stress: No Stress Concern Present (5/24/2024)    [employer] [place] [employer] - Occupational Stress Questionnaire     Feeling of Stress : Only a little   Social Connections: Unknown (5/24/2024)    Social Connection and Isolation Panel [NHANES]     Frequency of Social Gatherings with Friends and Family: Twice a week   Housing Stability: Low Risk  (5/24/2024)    Housing Stability     Do you have housing? : Yes     Are you worried about losing your housing?: No           Lab Results    No results found for this or any previous visit (from the past 240 hour(s)).    Imaging Results    No results found.      I spent 50 minutes on the patient's visit today.  This included preparation for the visit, face-to-face time with the patient and documentation following the visit.  It did not include teaching or procedure time.    Signed by: Peter E. Friedell, MD

## 2024-06-11 NOTE — TELEPHONE ENCOUNTER
Spoke with pt and message given, explained how to do evisit. pt will call back if unable  Ada Maguire RN on 6/11/2024 at 12:53 PM

## 2024-06-11 NOTE — PROGRESS NOTES
"Oncology Rooming Note    June 11, 2024 11:05 AM   Kacie Heromsillo is a 84 year old female who presents for:    Chief Complaint   Patient presents with    Oncology Clinic Visit     Initial consult related to Malignant neoplasm of areola of left breast in female, unspecified estrogen receptor status.     Initial Vitals: BP (!) 178/75 (BP Location: Right arm, Patient Position: Sitting, Cuff Size: Adult Regular)   Pulse 68   Temp 98.4  F (36.9  C) (Oral)   Resp 16   Ht 1.6 m (5' 3\")   Wt 60.6 kg (133 lb 9.6 oz)   SpO2 96%   BMI 23.67 kg/m   Estimated body mass index is 23.67 kg/m  as calculated from the following:    Height as of this encounter: 1.6 m (5' 3\").    Weight as of this encounter: 60.6 kg (133 lb 9.6 oz). Body surface area is 1.64 meters squared.  Extreme Pain (9) Comment: Data Unavailable   No LMP recorded. Patient is postmenopausal.  Allergies reviewed: Yes  Medications reviewed: Yes    Medications: Medication refills not needed today.  Pharmacy name entered into [employer]:    [facility] [place], [place] [facility] [address] [facility] [facility] [place] [place] - [address] [id number] [place], [place] - [address] [facility] #[id number] [place], [place] - [address] AT [facility] [place] [address]    Frailty Screening:   Is the patient here for a new oncology consult visit in cancer care? 1. Yes. Over the past month, have you experienced difficulty or required a caregiver to assist with:   1. Balance, walking or general mobility (including any falls)? NO  2. Completion of self-care tasks such as bathing, dressing, toileting, grooming/hygiene?  NO  3. Concentration or memory that affects your daily life?  NO       Clinical concerns: Initial consult.        Evelia Tate CMA              "

## 2024-06-11 NOTE — TELEPHONE ENCOUNTER
Call placed to Patient.  No answer.  Left message with call back number for Patient to return call.  Theo Niño RN

## 2024-06-12 ENCOUNTER — TELEPHONE (OUTPATIENT)
Dept: FAMILY MEDICINE | Facility: CLINIC | Age: 84
End: 2024-06-12
Payer: COMMERCIAL

## 2024-06-12 ENCOUNTER — TELEPHONE (OUTPATIENT)
Dept: ANTICOAGULATION | Facility: CLINIC | Age: 84
End: 2024-06-12
Payer: COMMERCIAL

## 2024-06-12 NOTE — TELEPHONE ENCOUNTER
Patient calls to inquire about which antibiotic to start. She was seen in ED yesterday for UTI and had Evisit as well.   ED provider ordered UA/UC. UC pending. He prescribed Keflex. I advised patient to start antibiotic that was ordered by ED Keflex. As the ER result nurse will be the one getting Culture results and ensuring susceptibility etc. She will not  cefdinir that was also prescribed.     Tiffany Lomeli RN

## 2024-06-12 NOTE — TELEPHONE ENCOUNTER
"ANTICOAGULATION  MANAGEMENT: Discharge Review    Kacie Hermosillo chart reviewed for anticoagulation continuity of care    Emergency room visit on 6/11/24 for UTI.    Discharge disposition: Home    Results:  No results for input(s): \"INR\", \"[id number]\", \"F2\", \"ALMWH\", \"AAUFH\" in the last 168 hours.    Anticoagulation inpatient management:     not applicable     Anticoagulation discharge instructions:     Warfarin dosing: home regimen continued   Bridging: No   INR goal change: No      Medication changes affecting anticoagulation: Yes: started on keflex    Additional factors affecting anticoagulation: Yes: UTI     PLAN     Routing to provider for clarification of antibiotics as ER added keflex but did not remove the cefdinir that was ordered yesterday.      Ann Alvarez RN  [facility] Anticoagulation  [phone]  "

## 2024-06-12 NOTE — TELEPHONE ENCOUNTER
Called and spoke with patient.  She has not picked up either medication yet.  States that the ER did not discuss the other antibiotic at all and was unaware both were still ordered.     Will route to [facility] staff to review with PCP or covering provider and notify patient which medication she should  and take.     Notified patient to schedule an INR appointment ~4 days after starting either medication. Patient verbalized understanding and in agreement with this plan.      Ann Alvarez RN  [facility] Anticoagulation  [phone]

## 2024-06-12 NOTE — ED PROVIDER NOTES
EMERGENCY DEPARTMENT ENCOUNTER      NAME: Kacie Hermosillo  AGE: 84 year old female  YOB: 1940  MRN: [id number]  EVALUATION DATE & TIME: 2024  7:58 PM    PCP: Irina Francisco    ED PROVIDER: Edin Romero D.O.      Chief Complaint   Patient presents with    Urinary Retention    Dysuria       FINAL IMPRESSION:  1. Urinary tract infection without hematuria, site unspecified        ED COURSE & MEDICAL DECISION MAKIN:09 PM I met with the patient to gather history and to perform my initial exam. I discussed the plan for care while in the Emergency Department.  8:56 PM We discussed the plan for discharge and the patient is agreeable. Reviewed supportive cares, symptomatic treatment, outpatient follow up, and reasons to return to the Emergency Department. Patient to be discharged by ED RN.           Pertinent Labs & Imaging studies reviewed. (See chart for details)  84 year old female presents to the Emergency Department for evaluation of dysuria and difficulty with urination.  Initial concern was for urinary retention versus UTI versus other acute process.  She had no symptoms that be suggestive of pyelonephritis.  She was clinically stable here.  When she arrived she reported that she was unable to urinate at home, however she was able to completely evacuate her bladder here.  Therefore urinary catheter was not indicated.  UA was performed, and does confirm a UTI.  Due to her underlying renal function she is not a candidate for Pyridium, but will discharge on Keflex and have follow-up as an outpatient with primary care provider.  Patient was comfortable with this plan.  Return precautions were discussed.    Medical Decision Making  Obtained supplemental history:Supplemental history obtained?: Documented in chart  Reviewed external records: External records reviewed?: Documented in chart  Care impacted by chronic illness:Cancer/Chemotherapy, Cerebrovascular Disease, Chronic Kidney  Disease, Heart Disease, and Hypertension  Care significantly affected by social determinants of health:N/A  Did you consider but not order tests?: Work up considered but not performed and documented in chart, if applicable  Did you interpret images independently?: Independent interpretation of ECG and images noted in documentation, when applicable.  Consultation discussion with other provider:Did you involve another provider (consultant, , pharmacy, etc.)?: No  Discharge. I prescribed additional prescription strength medication(s) as charted. See documentation for any additional details.    At the conclusion of the encounter I discussed the results of all of the tests and the disposition. The questions were answered. The patient or family acknowledged understanding and was agreeable with the care plan.        HPI    Patient information was obtained from: Patient    Use of : N/A        Kacie Hermosillo is a 84 year old female who presents urinary symptoms.    Per patient and her family member, she has been having difficulty urinating all day with dysuria. She went in the clinic and was unable to urinate during the appointment. They sent her home with a specimen cup and she was eventually able to get about 20 ml urine in it. Upon arrival, patient was able to urinate and feels like she emptied her bladder.   Patient denies any fever, nausea, vomiting, diarrhea, or abdominal pain.      PAST MEDICAL HISTORY:  Past Medical History:   Diagnosis Date    Breast cancer (H) 1999    Hx antineoplastic chemotherapy 1999    Hx of radiation therapy 1999       PAST SURGICAL HISTORY:  Past Surgical History:   Procedure Laterality Date    BIOPSY BREAST      HYSTERECTOMY      LUMPECTOMY BREAST Left 1999    OOPHORECTOMY Bilateral          CURRENT MEDICATIONS:    No current facility-administered medications for this encounter.     Current Outpatient Medications   Medication Sig Dispense Refill    cephALEXin (KEFLEX) 500 MG  capsule Take 1 capsule (500 mg) by mouth 3 times daily for 7 days 21 capsule 0    aspirin (ASA) 81 MG EC tablet Take 81 mg by mouth daily      atorvastatin (LIPITOR) 40 MG tablet Take 1 tablet (40 mg) by mouth every morning 90 tablet 3    budesonide (ENTOCORT EC) 3 MG EC capsule Take 9 mg by mouth (Patient not taking: Reported on 11/25/2023)      cefdinir (OMNICEF) 300 MG capsule Take 1 capsule (300 mg) by mouth 2 times daily for 10 days 20 capsule 0    chlorhexidine (PERIDEX) 0.12 % solution BRUSH INTO TISSUES DAILY (Patient not taking: Reported on 11/25/2023)      conjugated estrogens (PREMARIN) 0.625 MG/GM vaginal cream Place 0.5 g vaginally twice a week (Patient not taking: Reported on 6/11/2024) 30 g 0    estradiol (ESTRACE) 0.1 MG/GM vaginal cream Place 0.5 g vaginally twice a week Can use daily for 2 weeks and then twice a week after that. 42 g 0    lisinopril-hydrochlorothiazide (ZESTORETIC) 20-12.5 MG tablet Take 1 tablet by mouth daily 90 tablet 1    metoprolol tartrate (LOPRESSOR) 25 MG tablet Take 1 tablet (25 mg) by mouth 2 times daily 180 tablet 3    warfarin ANTICOAGULANT (JANTOVEN ANTICOAGULANT) 5 MG tablet 1/2 tab (2.5mg) on Mon, Wed, Fri and 1 tab (5mg) all other days 90 tablet 3         ALLERGIES:  No Known Allergies    FAMILY HISTORY:  Family History   Problem Relation Age of Onset    Breast Cancer Maternal Aunt     Hereditary Breast and Ovarian Cancer Syndrome No family hx of        SOCIAL HISTORY:  Social History     Socioeconomic History    Marital status:    Tobacco Use    Smoking status: Never    Smokeless tobacco: Never     Social Determinants of Health     Financial Resource Strain: Low Risk  (5/24/2024)    Financial Resource Strain     Within the past 12 months, have you or your family members you live with been unable to get utilities (heat, electricity) when it was really needed?: No   Food Insecurity: Low Risk  (5/24/2024)    Food Insecurity     Within the past 12 months, did  "you worry that your food would run out before you got money to buy more?: No     Within the past 12 months, did the food you bought just not last and you didn t have money to get more?: No   Transportation Needs: Low Risk  (5/24/2024)    Transportation Needs     Within the past 12 months, has lack of transportation kept you from medical appointments, getting your medicines, non-medical meetings or appointments, work, or from getting things that you need?: No   Physical Activity: Unknown (5/24/2024)    Exercise Vital Sign     Days of Exercise per Week: 0 days   Stress: No Stress Concern Present (5/24/2024)    Citizen of [place] of Occupational Health - Occupational Stress Questionnaire     Feeling of Stress : Only a little   Social Connections: Unknown (5/24/2024)    Social Connection and Isolation Panel [NHANES]     Frequency of Social Gatherings with Friends and Family: Twice a week   Housing Stability: Low Risk  (5/24/2024)    Housing Stability     Do you have housing? : Yes     Are you worried about losing your housing?: No       VITALS:  Patient Vitals for the past 24 hrs:   BP Temp Temp src Pulse Resp SpO2 Height Weight   06/11/24 1954 129/72 98.6  F (37  C) Oral 75 18 94 % 1.6 m (5' 3\") 60.6 kg (133 lb 9.6 oz)       PHYSICAL EXAM    VITAL SIGNS: /72   Pulse 75   Temp 98.6  F (37  C) (Oral)   Resp 18   Ht 1.6 m (5' 3\")   Wt 60.6 kg (133 lb 9.6 oz)   SpO2 94%   BMI 23.67 kg/m      General Appearance: Well-appearing, well-nourished, no acute distress   Head:  Normocephalic, without obvious abnormality, atraumatic  Eyes:  PERRL, conjunctiva/corneas clear, EOM's intact,  ENT:  Lips, mucosa, and tongue normal, membranes are moist without pallor  Neck:  Normal ROM, symmetrical, trachea midline    Cardio:  Regular rate and rhythm, no murmur, rub or gallop, 2+ pulses symmetric in all extremities  Pulm:  Clear to auscultation bilaterally, respirations unlabored,  Abdomen:  Soft, non-tender, no rebound or " guarding.  Musculoskeletal: Full ROM, no edema, no cyanosis, good ROM of major joints  Integument:  Warm, Dry, No erythema, No rash.    Neurologic:  Alert & oriented.  No focal deficits appreciated.      LABS  Results for orders placed or performed during the hospital encounter of 06/11/24 (from the past 24 hour(s))   UA with Microscopic reflex to Culture    Specimen: Urine, Midstream   Result Value Ref Range    Color Urine Light Yellow Colorless, Straw, Light Yellow, Yellow    Appearance Urine Clear Clear    Glucose Urine Negative Negative mg/dL    Bilirubin Urine Negative Negative    Ketones Urine Negative Negative mg/dL    Specific Gravity Urine 1.012 1.001 - 1.030    Blood Urine 0.06 mg/dL (A) Negative    pH Urine 5.5 5.0 - 7.0    Protein Albumin Urine 10 (A) Negative mg/dL    Urobilinogen Urine <2.0 <2.0 mg/dL    Nitrite Urine Positive (A) Negative    Leukocyte Esterase Urine 500 Ju/uL (A) Negative    Bacteria Urine Moderate (A) None Seen /HPF    Mucus Urine Present (A) None Seen /LPF    RBC Urine 2 <=2 /HPF    WBC Urine 142 (H) <=5 /HPF    Narrative    Urine Culture ordered based on laboratory criteria         RADIOLOGY  No orders to display            MEDICATIONS GIVEN IN THE EMERGENCY:  Medications   cephALEXin (KEFLEX) capsule 500 mg (500 mg Oral $Given 6/11/24 2121)       NEW PRESCRIPTIONS STARTED AT TODAY'S ER VISIT  Current Discharge Medication List        START taking these medications    Details   cephALEXin (KEFLEX) 500 MG capsule Take 1 capsule (500 mg) by mouth 3 times daily for 7 days  Qty: 21 capsule, Refills: 0              I, Katey Montgomery, am serving as a scribe to document services personally performed by Edin Romero D.O., based on my observations and the provider's statements to me.  I, Edin Romero D.O., attest that Katey Montgomery is acting in a scribe capacity, has observed my performance of the services and has documented them in accordance with my direction.     Edin Romero  MALGORZATA  Emergency Medicine  [facility] EMERGENCY DEPARTMENT  [facility] [facility] [address]  [phone]  Dept: [phone]       Edin Romero DO  06/11/24 2128

## 2024-06-12 NOTE — ED TRIAGE NOTES
Patient was seen in her clinic this morning for UTI symptoms. Patient was unable to urinate during appointment. Patient was sent home with a specimen cup, but has only urinated once with 20 mL of output. Clinic recommended being seen for retention.      Triage Assessment (Adult)       Row Name 06/11/24 1956          Triage Assessment    Airway WDL WDL        Respiratory WDL    Respiratory WDL WDL        Skin Circulation/Temperature WDL    Skin Circulation/Temperature WDL WDL        Cardiac WDL    Cardiac WDL WDL        Peripheral/Neurovascular WDL    Peripheral Neurovascular WDL WDL        Cognitive/Neuro/Behavioral WDL    Cognitive/Neuro/Behavioral WDL WDL

## 2024-06-13 LAB
BACTERIA UR CULT: ABNORMAL
CANCER AG27-29 SERPL-ACNC: 16.2 U/ML

## 2024-06-13 NOTE — TELEPHONE ENCOUNTER
Call placed to patient   No answer; voicemail left requesting call back to Clinic RN at [phone]    Herb Spencer, Clinic RN  [facility]

## 2024-06-13 NOTE — PROGRESS NOTES
[facility]: Cancer Care                                                                                          Unable To Contact    Clinical Data: RN Care Coordinator Outreach    Outreach attempted x 1.  Left message on patient's voicemail with call back information and requested return call.    Plan: RN Care Coordinator will try to reach patient again by the end of the day.    Patient is to have a lab drawn prior to follow-up in clinic.  I am trying to coordinate this to be done same day as her PET scan.      Signature:  Arlene Franco RN Care Coordinator  [facility]  [facility]

## 2024-06-13 NOTE — TELEPHONE ENCOUNTER
Either would be okay assuming her culture ends up with the same sensitivities as the last one (still pending). If she hasn't picked either one up yet, I would have her do the sandrita Francisco PA-C

## 2024-06-14 ENCOUNTER — TELEPHONE (OUTPATIENT)
Dept: NURSING | Facility: CLINIC | Age: 84
End: 2024-06-14
Payer: COMMERCIAL

## 2024-06-14 NOTE — TELEPHONE ENCOUNTER
"[facility]    Reason for call: Lab Result Notification     Lab Result (including Rx patient on, if applicable).  If culture, copy of lab report at bottom.  Lab Result: See below    ED Rx: cephALEXin (KEFLEX) 500 MG capsule - Take 1 capsule (500 mg) by mouth 3 times daily for 7 days (SUSCEPTIBLE)    Creatinine Level (mg/dl)   Creatinine   Date Value Ref Range Status   06/11/2024 1.02 (H) 0.51 - 0.95 mg/dL Final    Creatinine clearance (ml/min), if applicable    Serum creatinine: 1.02 mg/dL (H) 06/11/24 1214  Estimated creatinine clearance: 39.3 mL/min (A)   Dysuria,   RN Recommendations/Instructions per [place] ED lab result protocol:   [facility] ED lab result protocol utilized: Urine culture      Patient's current Symptoms:   \"It's working\".  \"I can't get over how fast UTI drugs work\".  Patient denies any new or worsening symptoms.     Patient/care giver notified to contact your PCP clinic or return to the Emergency department if your:  Symptoms do not resolve after completing antibiotic.  Symptoms worsen or other concerning symptoms.       KISHORE STRONG, DAVID  "

## 2024-06-17 ENCOUNTER — MYC MEDICAL ADVICE (OUTPATIENT)
Dept: ANTICOAGULATION | Facility: CLINIC | Age: 84
End: 2024-06-17
Payer: COMMERCIAL

## 2024-06-18 NOTE — TELEPHONE ENCOUNTER
Call placed to Patient.  States that she was able to  antibiotic.  Is feeling much better now.  Theo Niño RN

## 2024-06-20 ENCOUNTER — TELEPHONE (OUTPATIENT)
Dept: ANTICOAGULATION | Facility: CLINIC | Age: 84
End: 2024-06-20
Payer: COMMERCIAL

## 2024-06-20 NOTE — TELEPHONE ENCOUNTER
ANTICOAGULATION     Kacie Hermosillo is overdue for an INR check.     Spoke with pt and scheduled lab appointment on 6/25    Praveena Morris RN

## 2024-06-24 ENCOUNTER — TELEPHONE (OUTPATIENT)
Dept: FAMILY MEDICINE | Facility: CLINIC | Age: 84
End: 2024-06-24
Payer: COMMERCIAL

## 2024-06-24 NOTE — TELEPHONE ENCOUNTER
Wanted to see who called her. No telephone encounter available on reason for missed call. Patient has PET scan tomorrow. Called number in notes for that appointment and got number for patient to call for information on PET scan tomorrow.    Gayle Perez RN on 6/24/2024 at 12:01 PM

## 2024-06-25 ENCOUNTER — HOSPITAL ENCOUNTER (OUTPATIENT)
Dept: PET IMAGING | Facility: HOSPITAL | Age: 84
Discharge: HOME | End: 2024-06-25
Attending: INTERNAL MEDICINE
Payer: COMMERCIAL

## 2024-06-25 ENCOUNTER — ANTICOAGULATION THERAPY VISIT (OUTPATIENT)
Dept: ANTICOAGULATION | Facility: CLINIC | Age: 84
End: 2024-06-25

## 2024-06-25 ENCOUNTER — LAB (OUTPATIENT)
Dept: LAB | Facility: CLINIC | Age: 84
End: 2024-06-25
Payer: COMMERCIAL

## 2024-06-25 ENCOUNTER — TELEPHONE (OUTPATIENT)
Dept: FAMILY MEDICINE | Facility: CLINIC | Age: 84
End: 2024-06-25

## 2024-06-25 ENCOUNTER — APPOINTMENT (OUTPATIENT)
Dept: LAB | Facility: HOSPITAL | Age: 84
End: 2024-06-25
Attending: INTERNAL MEDICINE
Payer: COMMERCIAL

## 2024-06-25 DIAGNOSIS — C50.012 MALIGNANT NEOPLASM OF AREOLA OF LEFT BREAST IN FEMALE, UNSPECIFIED ESTROGEN RECEPTOR STATUS (H): ICD-10-CM

## 2024-06-25 DIAGNOSIS — I63.9 CEREBELLAR STROKE (H): ICD-10-CM

## 2024-06-25 DIAGNOSIS — Z95.2 S/P TAVR (TRANSCATHETER AORTIC VALVE REPLACEMENT): Primary | ICD-10-CM

## 2024-06-25 DIAGNOSIS — N18.30 CHRONIC RENAL FAILURE, STAGE 3 (MODERATE) (H): Primary | ICD-10-CM

## 2024-06-25 DIAGNOSIS — Z95.2 S/P TAVR (TRANSCATHETER AORTIC VALVE REPLACEMENT): ICD-10-CM

## 2024-06-25 LAB
GLUCOSE BLDC GLUCOMTR-MCNC: 87 MG/DL (ref 70–99)
INR BLD: 2.2 (ref 0.9–1.1)

## 2024-06-25 PROCEDURE — A9552 F18 FDG: HCPCS | Performed by: INTERNAL MEDICINE

## 2024-06-25 PROCEDURE — 250N000011 HC RX IP 250 OP 636: Mod: JZ | Performed by: INTERNAL MEDICINE

## 2024-06-25 PROCEDURE — 343N000001 HC RX 343: Performed by: INTERNAL MEDICINE

## 2024-06-25 PROCEDURE — 85610 PROTHROMBIN TIME: CPT

## 2024-06-25 PROCEDURE — 36416 COLLJ CAPILLARY BLOOD SPEC: CPT

## 2024-06-25 PROCEDURE — 78816 PET IMAGE W/CT FULL BODY: CPT | Mod: PI

## 2024-06-25 PROCEDURE — 82962 GLUCOSE BLOOD TEST: CPT

## 2024-06-25 PROCEDURE — 71260 CT THORAX DX C+: CPT

## 2024-06-25 RX ORDER — IOPAMIDOL 755 MG/ML
66 INJECTION, SOLUTION INTRAVASCULAR ONCE
Status: COMPLETED | OUTPATIENT
Start: 2024-06-25 | End: 2024-06-25

## 2024-06-25 RX ORDER — FLUDEOXYGLUCOSE F 18 200 MCI/ML
8-18 INJECTION, SOLUTION INTRAVENOUS ONCE
Status: COMPLETED | OUTPATIENT
Start: 2024-06-25 | End: 2024-06-25

## 2024-06-25 RX ADMIN — FLUDEOXYGLUCOSE F 18 10.15 MILLICURIE: 200 INJECTION, SOLUTION INTRAVENOUS at 09:26

## 2024-06-25 RX ADMIN — IOPAMIDOL 66 ML: 755 INJECTION, SOLUTION INTRAVENOUS at 10:34

## 2024-06-25 NOTE — TELEPHONE ENCOUNTER
Kacie's daughter called back and all her questions have been answered.  Will close this encounter.

## 2024-06-25 NOTE — PROGRESS NOTES
ANTICOAGULATION MANAGEMENT     Kacie Hermosillo 84 year old female is on warfarin with therapeutic INR result. (Goal INR 2.0-3.0)    Recent labs: (last 7 days)     06/25/24  1519   INR 2.2*       ASSESSMENT     Source(s): Chart Review and Patient/Caregiver Call     Warfarin doses taken: Warfarin taken as instructed  Diet: No new diet changes identified  Medication/supplement changes: Completed Keflex over a week ago and feels just fine.  New illness, injury, or hospitalization: No  Signs or symptoms of bleeding or clotting: No  Previous result: Therapeutic last 2(+) visits  Additional findings: None       PLAN     Recommended plan for no diet, medication or health factor changes affecting INR     Dosing Instructions: Continue your current warfarin dose with next INR in 6 weeks       Summary  As of 6/25/2024      Full warfarin instructions:  5 mg every Sun, Tue, Thu; 2.5 mg all other days   Next INR check:  8/6/2024               Telephone call with Kacie who verbalizes understanding and agrees to plan    Patient offered & declined to schedule next visit    Education provided:   Please call back if any changes to your diet, medications or how you've been taking warfarin  Goal range and lab monitoring: goal range and significance of current result  Importance of notifying anticoagulation clinic for: changes in medications; a sooner lab recheck maybe needed  Contact [phone]  with any changes, questions or concerns.     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

## 2024-06-27 ENCOUNTER — ONCOLOGY VISIT (OUTPATIENT)
Dept: ONCOLOGY | Facility: HOSPITAL | Age: 84
End: 2024-06-27
Attending: INTERNAL MEDICINE
Payer: COMMERCIAL

## 2024-06-27 VITALS
RESPIRATION RATE: 16 BRPM | DIASTOLIC BLOOD PRESSURE: 54 MMHG | WEIGHT: 133.9 LBS | HEART RATE: 74 BPM | HEIGHT: 63 IN | SYSTOLIC BLOOD PRESSURE: 118 MMHG | BODY MASS INDEX: 23.73 KG/M2 | TEMPERATURE: 97.8 F | OXYGEN SATURATION: 97 %

## 2024-06-27 DIAGNOSIS — C79.51 MALIGNANT NEOPLASM METASTATIC TO BONE (H): Primary | ICD-10-CM

## 2024-06-27 PROCEDURE — 99214 OFFICE O/P EST MOD 30 MIN: CPT | Performed by: INTERNAL MEDICINE

## 2024-06-27 PROCEDURE — G0463 HOSPITAL OUTPT CLINIC VISIT: HCPCS | Performed by: INTERNAL MEDICINE

## 2024-06-27 RX ORDER — ACETAMINOPHEN 325 MG/1
650 TABLET ORAL EVERY 8 HOURS PRN
Status: ON HOLD | COMMUNITY

## 2024-06-27 RX ORDER — METHYLCELLULOSE 500 MG/1
500 TABLET ORAL EVERY MORNING
Status: ON HOLD | COMMUNITY

## 2024-06-27 ASSESSMENT — PAIN SCALES - GENERAL: PAINLEVEL: EXTREME PAIN (8)

## 2024-06-27 NOTE — H&P (VIEW-ONLY)
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  She was treated with partial mastectomy radiation therapy and 5 years of tamoxifen.  Pathology and operative report are not available at this time.  A recent MRI of the lumbar spine for back pain showed widespread metastatic disease.    PET scan is only positive for bony metastases.  27.29 tumor marker is normal at 16.2.   Referral is placed to interventional radiology for CT biopsy of the left posterior iliac crest.      ______________________________________________________________________________      History of Present Illness    Ms. Kacie Hermosillo is a 84 year old woman with a history of aortic valve disease and TA RV valve replacement.  She has a long history of back pain.  A recent MRI of the lumbar spine for her back pain showed widespread metastatic disease.  The patient describes her back pain as 4 out of 10.  She does get relief from Tylenol.  It is not apparent that her back pain has increased over time.    Otherwise she feels well.  She is maintaining her weight.  Her appetite and digestion are normal.  She does not have chills, fevers or sweats.  She does not have cough, chest pain or shortness of breath at rest.  She has constipation and diarrhea for which she is taking Citrucel fiber.  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    Review of systems.  Pertinent Findings are included in the History of Present  "Illness    Physical Exam    /54 (BP Location: Left arm, Patient Position: Sitting, Cuff Size: Adult Regular)   Pulse 74   Temp 97.8  F (36.6  C) (Oral)   Resp 16   Ht 1.6 m (5' 3\")   Wt 60.7 kg (133 lb 14.4 oz)   SpO2 97%   BMI 23.72 kg/m       Not examined this visit    Medications:    Current Outpatient Medications   Medication Sig Dispense Refill    acetaminophen (TYLENOL) 325 MG tablet Take 650 mg by mouth every 8 hours as needed for mild pain      aspirin (ASA) 81 MG EC tablet Take 81 mg by mouth daily      atorvastatin (LIPITOR) 40 MG tablet Take 1 tablet (40 mg) by mouth every morning 90 tablet 3    estradiol (ESTRACE) 0.1 MG/GM vaginal cream Place 0.5 g vaginally twice a week Can use daily for 2 weeks and then twice a week after that. 42 g 0    lisinopril-hydrochlorothiazide (ZESTORETIC) 20-12.5 MG tablet Take 1 tablet by mouth daily 90 tablet 1    methylcellulose (CITRUCEL) 500 MG TABS tablet Take 500 mg by mouth daily      metoprolol tartrate (LOPRESSOR) 25 MG tablet Take 1 tablet (25 mg) by mouth 2 times daily 180 tablet 3    UNABLE TO FIND D Charlie 1 capsule daily for UTI prevention.      warfarin ANTICOAGULANT (JANTOVEN ANTICOAGULANT) 5 MG tablet 1/2 tab (2.5mg) on Mon, Wed, Fri and 1 tab (5mg) all other days 90 tablet 3    chlorhexidine (PERIDEX) 0.12 % solution BRUSH INTO TISSUES DAILY (Patient not taking: Reported on 11/25/2023)       No current facility-administered medications for this visit.           Past History    Past Medical History:   Diagnosis Date    Breast cancer (H) 1999    Hx antineoplastic chemotherapy 1999    Hx of radiation therapy 1999     Past Surgical History:   Procedure Laterality Date    BIOPSY BREAST      HYSTERECTOMY      LUMPECTOMY BREAST Left 1999    OOPHORECTOMY Bilateral      No Known Allergies  Family History   Problem Relation Age of Onset    Breast Cancer Maternal Aunt     Hereditary Breast and Ovarian Cancer Syndrome No family hx of      Social History "     Socioeconomic History    Marital status:      Spouse name: None    Number of children: None    Years of education: None    Highest education level: None   Tobacco Use    Smoking status: Never    Smokeless tobacco: Never     Social Determinants of Health     Financial Resource Strain: Low Risk  (5/24/2024)    Financial Resource Strain     Within the past 12 months, have you or your family members you live with been unable to get utilities (heat, electricity) when it was really needed?: No   Food Insecurity: Low Risk  (5/24/2024)    Food Insecurity     Within the past 12 months, did you worry that your food would run out before you got money to buy more?: No     Within the past 12 months, did the food you bought just not last and you didn t have money to get more?: No   Transportation Needs: Low Risk  (5/24/2024)    Transportation Needs     Within the past 12 months, has lack of transportation kept you from medical appointments, getting your medicines, non-medical meetings or appointments, work, or from getting things that you need?: No   Physical Activity: Unknown (5/24/2024)    Exercise Vital Sign     Days of Exercise per Week: 0 days   Stress: No Stress Concern Present (5/24/2024)    [place] [employer] - Occupational Stress Questionnaire     Feeling of Stress : Only a little   Social Connections: Unknown (5/24/2024)    Social Connection and Isolation Panel [NHANES]     Frequency of Social Gatherings with Friends and Family: Twice a week   Housing Stability: Low Risk  (5/24/2024)    Housing Stability     Do you have housing? : Yes     Are you worried about losing your housing?: No           Lab Results    Recent Results (from the past 240 hour(s))   Glucose by meter    Collection Time: 06/25/24  9:27 AM   Result Value Ref Range    GLUCOSE BY METER POCT 87 70 - 99 mg/dL   INR point of care    Collection Time: 06/25/24  3:19 PM   Result Value Ref Range    INR 2.2 (H) 0.9 - 1.1        Imaging Results    PET Oncology Whole Body    Result Date: 6/25/2024  EXAM: PET ONCOLOGY WHOLE BODY, CT CHEST/ABDOMEN/PELVIS W CONTRAST LOCATION: [facility] DATE: 6/25/2024 INDICATION: Breast cancer, left, restaging. Malignant neoplasm of nipple and areola, left female breast. Prior left breast lumpectomy. Prior radiation therapy and hormonal therapy. Recent lumbar spine MRI suggesting widespread metastatic disease. Subsequent treatment strategy. COMPARISON: CT chest abdomen pelvis 9/29/2021 reviewed. CONTRAST: 66 mL Isovue-370 TECHNIQUE: Serum glucose level 87 mg/dL. One hour post intravenous administration of 10.2 mCi F-18 FDG, PET imaging was performed from the skull vertex to feet, utilizing attenuation correction with concurrent axial CT and PET/CT image fusion. Separate diagnostic CT of the chest, abdomen, and pelvis was performed. Dose reduction techniques were used. PET/CT FINDINGS: Posttreatment changes left breast. No evidence of local recurrence. Surgical clips left axillary region. No FDG avid adenopathy in the left axilla or elsewhere. Several scattered sclerotic skeletal lesions consistent with osseous metastases, some of the larger of which demonstrate focal FDG activity, including T8 vertebral body, L1 vertebral body (SUVmax 5.9), L2 vertebral body, right L4 vertebral body (SUVmax 6.0), several sites in the bony pelvis including the sacrum and both iliac bones as well as the left posterior acetabulum and proximal left femur. Some of the sclerotic lesions are tiny, too small for PET characterization. CT FINDINGS: Moderate senescent intracranial changes. TAVR. Mild atherosclerotic calcifications including the coronary arteries. Bandlike and reticular opacities scattered throughout the lungs suggesting areas of scarring and/or atelectasis. Cholecystectomy. Biliary prominence from reservoir effect postcholecystectomy. Benign calcified splenic granuloma. Moderate colonic  diverticulosis, greatest in the sigmoid region. Hysterectomy. Bony demineralization. Severe chronic compression fracture T12. Mild scattered degenerative changes in the spine.     IMPRESSION: Several scattered osseous metastases, predominantly involving the lower spine and bony pelvis.    CT Chest/Abdomen/Pelvis w Contrast    Result Date: 6/25/2024  EXAM: PET ONCOLOGY WHOLE BODY, CT CHEST/ABDOMEN/PELVIS W CONTRAST LOCATION: [facility] DATE: 6/25/2024 INDICATION: Breast cancer, left, restaging. Malignant neoplasm of nipple and areola, left female breast. Prior left breast lumpectomy. Prior radiation therapy and hormonal therapy. Recent lumbar spine MRI suggesting widespread metastatic disease. Subsequent treatment strategy. COMPARISON: CT chest abdomen pelvis 9/29/2021 reviewed. CONTRAST: 66 mL Isovue-370 TECHNIQUE: Serum glucose level 87 mg/dL. One hour post intravenous administration of 10.2 mCi F-18 FDG, PET imaging was performed from the skull vertex to feet, utilizing attenuation correction with concurrent axial CT and PET/CT image fusion. Separate diagnostic CT of the chest, abdomen, and pelvis was performed. Dose reduction techniques were used. PET/CT FINDINGS: Posttreatment changes left breast. No evidence of local recurrence. Surgical clips left axillary region. No FDG avid adenopathy in the left axilla or elsewhere. Several scattered sclerotic skeletal lesions consistent with osseous metastases, some of the larger of which demonstrate focal FDG activity, including T8 vertebral body, L1 vertebral body (SUVmax 5.9), L2 vertebral body, right L4 vertebral body (SUVmax 6.0), several sites in the bony pelvis including the sacrum and both iliac bones as well as the left posterior acetabulum and proximal left femur. Some of the sclerotic lesions are tiny, too small for PET characterization. CT FINDINGS: Moderate senescent intracranial changes. TAVR. Mild atherosclerotic calcifications  including the coronary arteries. Bandlike and reticular opacities scattered throughout the lungs suggesting areas of scarring and/or atelectasis. Cholecystectomy. Biliary prominence from reservoir effect postcholecystectomy. Benign calcified splenic granuloma. Moderate colonic diverticulosis, greatest in the sigmoid region. Hysterectomy. Bony demineralization. Severe chronic compression fracture T12. Mild scattered degenerative changes in the spine.     IMPRESSION: Several scattered osseous metastases, predominantly involving the lower spine and bony pelvis.       I spent 32 minutes on the patient's visit today.  This included preparation for the visit, face-to-face time with the patient and documentation following the visit.  It did not include teaching or procedure time.    Signed by: Peter E. Friedell, MD

## 2024-06-27 NOTE — LETTER
"6/27/2024      Kacie Hermosillo  [address] [facility] [address]      Dear Colleague,    Thank you for referring your patient, Kacie Hermosillo, to the [facility]. Please see a copy of my visit note below.    Oncology Rooming Note    June 27, 2024 2:35 PM   Kacie Hermosillo is a 84 year old female who presents for:    Chief Complaint   Patient presents with     Oncology Clinic Visit     Return visit to review CT and PET scan 06/25/24. 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       Initial Vitals: /54 (BP Location: Left arm, Patient Position: Sitting, Cuff Size: Adult Regular)   Pulse 74   Temp 97.8  F (36.6  C) (Oral)   Resp 16   Ht 1.6 m (5' 3\")   Wt 60.7 kg (133 lb 14.4 oz)   SpO2 97%   BMI 23.72 kg/m   Estimated body mass index is 23.72 kg/m  as calculated from the following:    Height as of this encounter: 1.6 m (5' 3\").    Weight as of this encounter: 60.7 kg (133 lb 14.4 oz). Body surface area is 1.64 meters squared.  Extreme Pain (8) Comment: Data Unavailable   No LMP recorded. Patient is postmenopausal.  Allergies reviewed: Yes  Medications reviewed: Yes    Medications: Medication refills not needed today.  Pharmacy name entered into [employer]    [facility] [place], [place] [facility] [address] [facility] [place], [place] [facility] [address] [id number] [place], [place] - [address] [id number] [place], [place] - [address] [facility] [address]    Frailty Screening:   Is the patient here for a new oncology consult visit in cancer care? 2. No      Clinical concerns: lower back pain 8/10.  Dr Friedell was notified.      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  She was treated with partial mastectomy radiation therapy and 5 years of tamoxifen.  Pathology and operative report are not available at this time.  A recent MRI of the lumbar spine for back pain showed widespread metastatic disease.    PET scan is only positive for bony metastases.  27.29 tumor marker is normal at 16.2.   Referral is placed to interventional radiology for CT biopsy of the left posterior iliac crest.      ______________________________________________________________________________      History of Present Illness    Ms. Kacie Hermosillo is a 84 year old woman with a history of aortic valve disease and TA RV valve replacement.  She has a long history of back pain.  A recent MRI of the lumbar spine for her back pain showed widespread metastatic disease.  The patient describes her back pain as 4 out of 10.  She does get relief from Tylenol.  It is not apparent that her back pain has increased over time.    Otherwise she feels well.  She is maintaining her weight.  Her appetite and digestion are normal.  She does not have chills, fevers or sweats.  She does not have cough, chest pain or shortness of breath at rest.  She has constipation and diarrhea for which she is taking Citrucel fiber.  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    Review of systems.  Pertinent Findings are included in the History of Present Illness    Physical Exam    /54 (BP Location: Left arm, Patient Position: Sitting, Cuff Size: Adult Regular)   Pulse 74   Temp  "97.8  F (36.6  C) (Oral)   Resp 16   Ht 1.6 m (5' 3\")   Wt 60.7 kg (133 lb 14.4 oz)   SpO2 97%   BMI 23.72 kg/m       Not examined this visit    Medications:    Current Outpatient Medications   Medication Sig Dispense Refill     acetaminophen (TYLENOL) 325 MG tablet Take 650 mg by mouth every 8 hours as needed for mild pain       aspirin (ASA) 81 MG EC tablet Take 81 mg by mouth daily       atorvastatin (LIPITOR) 40 MG tablet Take 1 tablet (40 mg) by mouth every morning 90 tablet 3     estradiol (ESTRACE) 0.1 MG/GM vaginal cream Place 0.5 g vaginally twice a week Can use daily for 2 weeks and then twice a week after that. 42 g 0     lisinopril-hydrochlorothiazide (ZESTORETIC) 20-12.5 MG tablet Take 1 tablet by mouth daily 90 tablet 1     methylcellulose (CITRUCEL) 500 MG TABS tablet Take 500 mg by mouth daily       metoprolol tartrate (LOPRESSOR) 25 MG tablet Take 1 tablet (25 mg) by mouth 2 times daily 180 tablet 3     UNABLE TO FIND D Charlie 1 capsule daily for UTI prevention.       warfarin ANTICOAGULANT (JANTOVEN ANTICOAGULANT) 5 MG tablet 1/2 tab (2.5mg) on Mon, Wed, Fri and 1 tab (5mg) all other days 90 tablet 3     chlorhexidine (PERIDEX) 0.12 % solution BRUSH INTO TISSUES DAILY (Patient not taking: Reported on 11/25/2023)       No current facility-administered medications for this visit.           Past History    Past Medical History:   Diagnosis Date     Breast cancer (H) 1999     Hx antineoplastic chemotherapy 1999     Hx of radiation therapy 1999     Past Surgical History:   Procedure Laterality Date     BIOPSY BREAST       HYSTERECTOMY       LUMPECTOMY BREAST Left 1999     OOPHORECTOMY Bilateral      No Known Allergies  Family History   Problem Relation Age of Onset     Breast Cancer Maternal Aunt      Hereditary Breast and Ovarian Cancer Syndrome No family hx of      Social History     Socioeconomic History     Marital status:      Spouse name: None     Number of children: None     Years of " education: None     Highest education level: None   Tobacco Use     Smoking status: Never     Smokeless tobacco: Never     Social Determinants of Health     Financial Resource Strain: Low Risk  (5/24/2024)    Financial Resource Strain      Within the past 12 months, have you or your family members you live with been unable to get utilities (heat, electricity) when it was really needed?: No   Food Insecurity: Low Risk  (5/24/2024)    Food Insecurity      Within the past 12 months, did you worry that your food would run out before you got money to buy more?: No      Within the past 12 months, did the food you bought just not last and you didn t have money to get more?: No   Transportation Needs: Low Risk  (5/24/2024)    Transportation Needs      Within the past 12 months, has lack of transportation kept you from medical appointments, getting your medicines, non-medical meetings or appointments, work, or from getting things that you need?: No   Physical Activity: Unknown (5/24/2024)    Exercise Vital Sign      Days of Exercise per Week: 0 days   Stress: No Stress Concern Present (5/24/2024)    [place] [employer] - Occupational Stress Questionnaire      Feeling of Stress : Only a little   Social Connections: Unknown (5/24/2024)    Social Connection and Isolation Panel [NHANES]      Frequency of Social Gatherings with Friends and Family: Twice a week   Housing Stability: Low Risk  (5/24/2024)    Housing Stability      Do you have housing? : Yes      Are you worried about losing your housing?: No           Lab Results    Recent Results (from the past 240 hour(s))   Glucose by meter    Collection Time: 06/25/24  9:27 AM   Result Value Ref Range    GLUCOSE BY METER POCT 87 70 - 99 mg/dL   INR point of care    Collection Time: 06/25/24  3:19 PM   Result Value Ref Range    INR 2.2 (H) 0.9 - 1.1       Imaging Results    PET Oncology Whole Body    Result Date: 6/25/2024  EXAM: PET ONCOLOGY WHOLE BODY, CT  CHEST/ABDOMEN/PELVIS W CONTRAST LOCATION: [facility] DATE: 6/25/2024 INDICATION: Breast cancer, left, restaging. Malignant neoplasm of nipple and areola, left female breast. Prior left breast lumpectomy. Prior radiation therapy and hormonal therapy. Recent lumbar spine MRI suggesting widespread metastatic disease. Subsequent treatment strategy. COMPARISON: CT chest abdomen pelvis 9/29/2021 reviewed. CONTRAST: 66 mL Isovue-370 TECHNIQUE: Serum glucose level 87 mg/dL. One hour post intravenous administration of 10.2 mCi F-18 FDG, PET imaging was performed from the skull vertex to feet, utilizing attenuation correction with concurrent axial CT and PET/CT image fusion. Separate diagnostic CT of the chest, abdomen, and pelvis was performed. Dose reduction techniques were used. PET/CT FINDINGS: Posttreatment changes left breast. No evidence of local recurrence. Surgical clips left axillary region. No FDG avid adenopathy in the left axilla or elsewhere. Several scattered sclerotic skeletal lesions consistent with osseous metastases, some of the larger of which demonstrate focal FDG activity, including T8 vertebral body, L1 vertebral body (SUVmax 5.9), L2 vertebral body, right L4 vertebral body (SUVmax 6.0), several sites in the bony pelvis including the sacrum and both iliac bones as well as the left posterior acetabulum and proximal left femur. Some of the sclerotic lesions are tiny, too small for PET characterization. CT FINDINGS: Moderate senescent intracranial changes. TAVR. Mild atherosclerotic calcifications including the coronary arteries. Bandlike and reticular opacities scattered throughout the lungs suggesting areas of scarring and/or atelectasis. Cholecystectomy. Biliary prominence from reservoir effect postcholecystectomy. Benign calcified splenic granuloma. Moderate colonic diverticulosis, greatest in the sigmoid region. Hysterectomy. Bony demineralization. Severe chronic compression  fracture T12. Mild scattered degenerative changes in the spine.     IMPRESSION: Several scattered osseous metastases, predominantly involving the lower spine and bony pelvis.    CT Chest/Abdomen/Pelvis w Contrast    Result Date: 6/25/2024  EXAM: PET ONCOLOGY WHOLE BODY, CT CHEST/ABDOMEN/PELVIS W CONTRAST LOCATION: [facility] DATE: 6/25/2024 INDICATION: Breast cancer, left, restaging. Malignant neoplasm of nipple and areola, left female breast. Prior left breast lumpectomy. Prior radiation therapy and hormonal therapy. Recent lumbar spine MRI suggesting widespread metastatic disease. Subsequent treatment strategy. COMPARISON: CT chest abdomen pelvis 9/29/2021 reviewed. CONTRAST: 66 mL Isovue-370 TECHNIQUE: Serum glucose level 87 mg/dL. One hour post intravenous administration of 10.2 mCi F-18 FDG, PET imaging was performed from the skull vertex to feet, utilizing attenuation correction with concurrent axial CT and PET/CT image fusion. Separate diagnostic CT of the chest, abdomen, and pelvis was performed. Dose reduction techniques were used. PET/CT FINDINGS: Posttreatment changes left breast. No evidence of local recurrence. Surgical clips left axillary region. No FDG avid adenopathy in the left axilla or elsewhere. Several scattered sclerotic skeletal lesions consistent with osseous metastases, some of the larger of which demonstrate focal FDG activity, including T8 vertebral body, L1 vertebral body (SUVmax 5.9), L2 vertebral body, right L4 vertebral body (SUVmax 6.0), several sites in the bony pelvis including the sacrum and both iliac bones as well as the left posterior acetabulum and proximal left femur. Some of the sclerotic lesions are tiny, too small for PET characterization. CT FINDINGS: Moderate senescent intracranial changes. TAVR. Mild atherosclerotic calcifications including the coronary arteries. Bandlike and reticular opacities scattered throughout the lungs suggesting areas of  scarring and/or atelectasis. Cholecystectomy. Biliary prominence from reservoir effect postcholecystectomy. Benign calcified splenic granuloma. Moderate colonic diverticulosis, greatest in the sigmoid region. Hysterectomy. Bony demineralization. Severe chronic compression fracture T12. Mild scattered degenerative changes in the spine.     IMPRESSION: Several scattered osseous metastases, predominantly involving the lower spine and bony pelvis.       I spent 32 minutes on the patient's visit today.  This included preparation for the visit, face-to-face time with the patient and documentation following the visit.  It did not include teaching or procedure time.    Signed by: Peter E. Friedell, MD          Again, thank you for allowing me to participate in the care of your patient.        Sincerely,        Peter E. Friedell, MD

## 2024-06-27 NOTE — PROGRESS NOTES
"Oncology Rooming Note    June 27, 2024 2:35 PM   Kacie Hermosillo is a 84 year old female who presents for:    Chief Complaint   Patient presents with    Oncology Clinic Visit     Return visit to review CT and PET scan 06/25/24. 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       Initial Vitals: /54 (BP Location: Left arm, Patient Position: Sitting, Cuff Size: Adult Regular)   Pulse 74   Temp 97.8  F (36.6  C) (Oral)   Resp 16   Ht 1.6 m (5' 3\")   Wt 60.7 kg (133 lb 14.4 oz)   SpO2 97%   BMI 23.72 kg/m   Estimated body mass index is 23.72 kg/m  as calculated from the following:    Height as of this encounter: 1.6 m (5' 3\").    Weight as of this encounter: 60.7 kg (133 lb 14.4 oz). Body surface area is 1.64 meters squared.  Extreme Pain (8) Comment: Data Unavailable   No LMP recorded. Patient is postmenopausal.  Allergies reviewed: Yes  Medications reviewed: Yes    Medications: Medication refills not needed today.  Pharmacy name entered into [facility]:    [facility] [place], [place] - [address] [facility] [place], [place] - [address] [id number] [place], [place] - [address] [id number] [place], [place] - [address] [facility] [address]    Frailty Screening:   Is the patient here for a new oncology consult visit in cancer care? 2. No      Clinical concerns: lower back pain 8/10.  Dr Friedell was notified.      Laura Freeman, [facility]            "

## 2024-06-27 NOTE — PROGRESS NOTES
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  She was treated with partial mastectomy radiation therapy and 5 years of tamoxifen.  Pathology and operative report are not available at this time.  A recent MRI of the lumbar spine for back pain showed widespread metastatic disease.    PET scan is only positive for bony metastases.  27.29 tumor marker is normal at 16.2.   Referral is placed to interventional radiology for CT biopsy of the left posterior iliac crest.      ______________________________________________________________________________      History of Present Illness    Ms. Kacie Hermosillo is a 84 year old woman with a history of aortic valve disease and TA RV valve replacement.  She has a long history of back pain.  A recent MRI of the lumbar spine for her back pain showed widespread metastatic disease.  The patient describes her back pain as 4 out of 10.  She does get relief from Tylenol.  It is not apparent that her back pain has increased over time.    Otherwise she feels well.  She is maintaining her weight.  Her appetite and digestion are normal.  She does not have chills, fevers or sweats.  She does not have cough, chest pain or shortness of breath at rest.  She has constipation and diarrhea for which she is taking Citrucel fiber.  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    Review of systems.  Pertinent Findings are included in the History of Present  "Illness    Physical Exam    /54 (BP Location: Left arm, Patient Position: Sitting, Cuff Size: Adult Regular)   Pulse 74   Temp 97.8  F (36.6  C) (Oral)   Resp 16   Ht 1.6 m (5' 3\")   Wt 60.7 kg (133 lb 14.4 oz)   SpO2 97%   BMI 23.72 kg/m       Not examined this visit    Medications:    Current Outpatient Medications   Medication Sig Dispense Refill    acetaminophen (TYLENOL) 325 MG tablet Take 650 mg by mouth every 8 hours as needed for mild pain      aspirin (ASA) 81 MG EC tablet Take 81 mg by mouth daily      atorvastatin (LIPITOR) 40 MG tablet Take 1 tablet (40 mg) by mouth every morning 90 tablet 3    estradiol (ESTRACE) 0.1 MG/GM vaginal cream Place 0.5 g vaginally twice a week Can use daily for 2 weeks and then twice a week after that. 42 g 0    lisinopril-hydrochlorothiazide (ZESTORETIC) 20-12.5 MG tablet Take 1 tablet by mouth daily 90 tablet 1    methylcellulose (CITRUCEL) 500 MG TABS tablet Take 500 mg by mouth daily      metoprolol tartrate (LOPRESSOR) 25 MG tablet Take 1 tablet (25 mg) by mouth 2 times daily 180 tablet 3    UNABLE TO FIND D Charlie 1 capsule daily for UTI prevention.      warfarin ANTICOAGULANT (JANTOVEN ANTICOAGULANT) 5 MG tablet 1/2 tab (2.5mg) on Mon, Wed, Fri and 1 tab (5mg) all other days 90 tablet 3    chlorhexidine (PERIDEX) 0.12 % solution BRUSH INTO TISSUES DAILY (Patient not taking: Reported on 11/25/2023)       No current facility-administered medications for this visit.           Past History    Past Medical History:   Diagnosis Date    Breast cancer (H) 1999    Hx antineoplastic chemotherapy 1999    Hx of radiation therapy 1999     Past Surgical History:   Procedure Laterality Date    BIOPSY BREAST      HYSTERECTOMY      LUMPECTOMY BREAST Left 1999    OOPHORECTOMY Bilateral      No Known Allergies  Family History   Problem Relation Age of Onset    Breast Cancer Maternal Aunt     Hereditary Breast and Ovarian Cancer Syndrome No family hx of      Social History "     Socioeconomic History    Marital status:      Spouse name: None    Number of children: None    Years of education: None    Highest education level: None   Tobacco Use    Smoking status: Never    Smokeless tobacco: Never     Social Determinants of Health     Financial Resource Strain: Low Risk  (5/24/2024)    Financial Resource Strain     Within the past 12 months, have you or your family members you live with been unable to get utilities (heat, electricity) when it was really needed?: No   Food Insecurity: Low Risk  (5/24/2024)    Food Insecurity     Within the past 12 months, did you worry that your food would run out before you got money to buy more?: No     Within the past 12 months, did the food you bought just not last and you didn t have money to get more?: No   Transportation Needs: Low Risk  (5/24/2024)    Transportation Needs     Within the past 12 months, has lack of transportation kept you from medical appointments, getting your medicines, non-medical meetings or appointments, work, or from getting things that you need?: No   Physical Activity: Unknown (5/24/2024)    Exercise Vital Sign     Days of Exercise per Week: 0 days   Stress: No Stress Concern Present (5/24/2024)    [employer] [place] [employer] - Occupational Stress Questionnaire     Feeling of Stress : Only a little   Social Connections: Unknown (5/24/2024)    Social Connection and Isolation Panel [NHANES]     Frequency of Social Gatherings with Friends and Family: Twice a week   Housing Stability: Low Risk  (5/24/2024)    Housing Stability     Do you have housing? : Yes     Are you worried about losing your housing?: No           Lab Results    Recent Results (from the past 240 hour(s))   Glucose by meter    Collection Time: 06/25/24  9:27 AM   Result Value Ref Range    GLUCOSE BY METER POCT 87 70 - 99 mg/dL   INR point of care    Collection Time: 06/25/24  3:19 PM   Result Value Ref Range    INR 2.2 (H) 0.9 - 1.1        Imaging Results    PET Oncology Whole Body    Result Date: 6/25/2024  EXAM: PET ONCOLOGY WHOLE BODY, CT CHEST/ABDOMEN/PELVIS W CONTRAST LOCATION: [facility] DATE: 6/25/2024 INDICATION: Breast cancer, left, restaging. Malignant neoplasm of nipple and areola, left female breast. Prior left breast lumpectomy. Prior radiation therapy and hormonal therapy. Recent lumbar spine MRI suggesting widespread metastatic disease. Subsequent treatment strategy. COMPARISON: CT chest abdomen pelvis 9/29/2021 reviewed. CONTRAST: 66 mL Isovue-370 TECHNIQUE: Serum glucose level 87 mg/dL. One hour post intravenous administration of 10.2 mCi F-18 FDG, PET imaging was performed from the skull vertex to feet, utilizing attenuation correction with concurrent axial CT and PET/CT image fusion. Separate diagnostic CT of the chest, abdomen, and pelvis was performed. Dose reduction techniques were used. PET/CT FINDINGS: Posttreatment changes left breast. No evidence of local recurrence. Surgical clips left axillary region. No FDG avid adenopathy in the left axilla or elsewhere. Several scattered sclerotic skeletal lesions consistent with osseous metastases, some of the larger of which demonstrate focal FDG activity, including T8 vertebral body, L1 vertebral body (SUVmax 5.9), L2 vertebral body, right L4 vertebral body (SUVmax 6.0), several sites in the bony pelvis including the sacrum and both iliac bones as well as the left posterior acetabulum and proximal left femur. Some of the sclerotic lesions are tiny, too small for PET characterization. CT FINDINGS: Moderate senescent intracranial changes. TAVR. Mild atherosclerotic calcifications including the coronary arteries. Bandlike and reticular opacities scattered throughout the lungs suggesting areas of scarring and/or atelectasis. Cholecystectomy. Biliary prominence from reservoir effect postcholecystectomy. Benign calcified splenic granuloma. Moderate colonic  diverticulosis, greatest in the sigmoid region. Hysterectomy. Bony demineralization. Severe chronic compression fracture T12. Mild scattered degenerative changes in the spine.     IMPRESSION: Several scattered osseous metastases, predominantly involving the lower spine and bony pelvis.    CT Chest/Abdomen/Pelvis w Contrast    Result Date: 6/25/2024  EXAM: PET ONCOLOGY WHOLE BODY, CT CHEST/ABDOMEN/PELVIS W CONTRAST LOCATION: [facility] DATE: 6/25/2024 INDICATION: Breast cancer, left, restaging. Malignant neoplasm of nipple and areola, left female breast. Prior left breast lumpectomy. Prior radiation therapy and hormonal therapy. Recent lumbar spine MRI suggesting widespread metastatic disease. Subsequent treatment strategy. COMPARISON: CT chest abdomen pelvis 9/29/2021 reviewed. CONTRAST: 66 mL Isovue-370 TECHNIQUE: Serum glucose level 87 mg/dL. One hour post intravenous administration of 10.2 mCi F-18 FDG, PET imaging was performed from the skull vertex to feet, utilizing attenuation correction with concurrent axial CT and PET/CT image fusion. Separate diagnostic CT of the chest, abdomen, and pelvis was performed. Dose reduction techniques were used. PET/CT FINDINGS: Posttreatment changes left breast. No evidence of local recurrence. Surgical clips left axillary region. No FDG avid adenopathy in the left axilla or elsewhere. Several scattered sclerotic skeletal lesions consistent with osseous metastases, some of the larger of which demonstrate focal FDG activity, including T8 vertebral body, L1 vertebral body (SUVmax 5.9), L2 vertebral body, right L4 vertebral body (SUVmax 6.0), several sites in the bony pelvis including the sacrum and both iliac bones as well as the left posterior acetabulum and proximal left femur. Some of the sclerotic lesions are tiny, too small for PET characterization. CT FINDINGS: Moderate senescent intracranial changes. TAVR. Mild atherosclerotic calcifications  including the coronary arteries. Bandlike and reticular opacities scattered throughout the lungs suggesting areas of scarring and/or atelectasis. Cholecystectomy. Biliary prominence from reservoir effect postcholecystectomy. Benign calcified splenic granuloma. Moderate colonic diverticulosis, greatest in the sigmoid region. Hysterectomy. Bony demineralization. Severe chronic compression fracture T12. Mild scattered degenerative changes in the spine.     IMPRESSION: Several scattered osseous metastases, predominantly involving the lower spine and bony pelvis.       I spent 32 minutes on the patient's visit today.  This included preparation for the visit, face-to-face time with the patient and documentation following the visit.  It did not include teaching or procedure time.    Signed by: Peter E. Friedell, MD

## 2024-06-28 ENCOUNTER — E-VISIT (OUTPATIENT)
Dept: URGENT CARE | Facility: CLINIC | Age: 84
End: 2024-06-28
Payer: COMMERCIAL

## 2024-06-28 ENCOUNTER — TELEPHONE (OUTPATIENT)
Dept: ANTICOAGULATION | Facility: CLINIC | Age: 84
End: 2024-06-28

## 2024-06-28 ENCOUNTER — PATIENT OUTREACH (OUTPATIENT)
Dept: ONCOLOGY | Facility: HOSPITAL | Age: 84
End: 2024-06-28

## 2024-06-28 DIAGNOSIS — N39.0 ACUTE UTI (URINARY TRACT INFECTION): ICD-10-CM

## 2024-06-28 DIAGNOSIS — I63.9 CEREBELLAR STROKE (H): ICD-10-CM

## 2024-06-28 DIAGNOSIS — N30.00 ACUTE CYSTITIS WITHOUT HEMATURIA: Primary | ICD-10-CM

## 2024-06-28 DIAGNOSIS — Z95.2 S/P TAVR (TRANSCATHETER AORTIC VALVE REPLACEMENT): Primary | ICD-10-CM

## 2024-06-28 PROCEDURE — 99421 OL DIG E/M SVC 5-10 MIN: CPT | Performed by: EMERGENCY MEDICINE

## 2024-06-28 RX ORDER — SULFAMETHOXAZOLE/TRIMETHOPRIM 800-160 MG
1 TABLET ORAL 2 TIMES DAILY
Qty: 14 TABLET | Refills: 0 | Status: SHIPPED | OUTPATIENT
Start: 2024-06-28 | End: 2024-07-05

## 2024-06-28 NOTE — PATIENT INSTRUCTIONS
Urinary Tract Infection (UTI) in Women: Care Instructions  Overview     A urinary tract infection (UTI) is an infection caused by bacteria. It can happen anywhere in the urinary tract. A UTI can happen in the:  Kidneys.  Ureters, the tubes that connect the kidneys to the bladder.  Bladder.  Urethra, where the urine comes out.  Most UTIs are bladder infections. They often cause pain or burning when you urinate.  Most UTIs can be cured with antibiotics. If you are prescribed antibiotics, be sure to complete your treatment so that the infection does not get worse.  Follow-up care is a key part of your treatment and safety. Be sure to make and go to all appointments, and call your doctor if you are having problems. It's also a good idea to know your test results and keep a list of the medicines you take.  How can you care for yourself at home?  Take your antibiotics as directed. Do not stop taking them just because you feel better. You need to take the full course of antibiotics.  Drink extra water and other fluids for the next day or two. This will help make the urine less concentrated and help wash out the bacteria that are causing the infection. (If you have kidney, heart, or liver disease and have to limit fluids, talk with your doctor before you increase the amount of fluids you drink.)  Avoid drinks that are carbonated or have caffeine. They can irritate the bladder.  Urinate often. Try to empty your bladder each time.  To relieve pain, take a hot bath or lay a heating pad set on low over your lower belly or genital area. Never go to sleep with a heating pad in place.  To prevent UTIs  Drink plenty of water each day. This helps you urinate often, which clears bacteria from your system. (If you have kidney, heart, or liver disease and have to limit fluids, talk with your doctor before you increase the amount of fluids you drink.)  Urinate when you need to.  If you are sexually active, urinate right after you have  "sex.  Change sanitary pads often.  Avoid douches, bubble baths, feminine hygiene sprays, and other feminine hygiene products that have deodorants.  After going to the bathroom, wipe from front to back.  When should you call for help?   Call your doctor now or seek immediate medical care if:    You have new or worse fever, chills, nausea, or vomiting.     You have new pain in your back just below your rib cage. This is called flank pain.     There is new blood or pus in your urine.     You have any problems with your antibiotic medicine.   Watch closely for changes in your health, and be sure to contact your doctor if:    You are not getting better after taking an antibiotic for 2 days.     Your symptoms go away but then come back.   Where can you learn more?  Go to https://www.[employer].net/patiented  Enter K848 in the search box to learn more about \"Urinary Tract Infection (UTI) in Women: Care Instructions.\"  Current as of: November 15, 2023               Content Version: 14.0    3635-8251 [employer].   Care instructions adapted under license by your healthcare professional. If you have questions about a medical condition or this instruction, always ask your healthcare professional. [employer] disclaims any warranty or liability for your use of this information.      "

## 2024-06-28 NOTE — TELEPHONE ENCOUNTER
"ANTICOAGULATION  MANAGEMENT     Interacting Medication Review    Interacting medication(s): Sulfamethoxazole-Trimethoprim (Bactrim) with warfarin.    Duration: 7 days (6/28 to 7/5)    Indication: UTI    New medication?: Yes, interaction may increase INR and risk of bleeding. Closer INR monitoring recommended. and With Sulfamethoxazole-Trimethoprim, [facility] protocol Appendix G recommends empiric reduction of warfarin dose in therapeutic/supratherapeutic patients.        PLAN     Temporarily adjust warfarin dose during interaction (10% change) with next INR in 5 days        Summary  As of 6/28/2024      Full warfarin instructions:  5 mg every Tue, Thu; 2.5 mg all other days   Next INR check:  7/1/2024               Telephone call with Kacie who verbalizes understanding and agrees to plan    New recheck date updated on anticoagulation calendar     ACC priority set/moved to \"high\" for new major drug interaction    Plan made per [facility] anticoagulation protocol    Jalyn Mclean RN  Anticoagulation Clinic   "

## 2024-06-28 NOTE — PROGRESS NOTES
[facility]: Cancer Care                                                                                          Situation: Chart reviewed by RN Care Coordinator.    Background: Patient was seen in clinic yesterday for follow-up regarding breast cancer.  There is suspicion of metastatic disease.      Assessment: History of breast cancer.  Recent MRI of the lumbar spine shows widespread metastatic disease.    Plan/Recommendations: Referral for CT-guided bone biopsy placed patient is to return once results are in.      Signature:  Arlene Franco  RN Care Coordinator  [facility]

## 2024-07-01 ENCOUNTER — ANTICOAGULATION THERAPY VISIT (OUTPATIENT)
Dept: ANTICOAGULATION | Facility: CLINIC | Age: 84
End: 2024-07-01

## 2024-07-01 ENCOUNTER — LAB (OUTPATIENT)
Dept: LAB | Facility: CLINIC | Age: 84
End: 2024-07-01
Payer: COMMERCIAL

## 2024-07-01 DIAGNOSIS — Z95.2 S/P TAVR (TRANSCATHETER AORTIC VALVE REPLACEMENT): Primary | ICD-10-CM

## 2024-07-01 DIAGNOSIS — I63.9 CEREBELLAR STROKE (H): ICD-10-CM

## 2024-07-01 DIAGNOSIS — Z95.2 S/P TAVR (TRANSCATHETER AORTIC VALVE REPLACEMENT): ICD-10-CM

## 2024-07-01 LAB — INR BLD: 2.8 (ref 0.9–1.1)

## 2024-07-01 PROCEDURE — 36416 COLLJ CAPILLARY BLOOD SPEC: CPT

## 2024-07-01 PROCEDURE — 85610 PROTHROMBIN TIME: CPT

## 2024-07-01 NOTE — PROGRESS NOTES
ANTICOAGULATION MANAGEMENT     Kacie Hermosillo 84 year old female is on warfarin with therapeutic INR result. (Goal INR 2.0-3.0)    Recent labs: (last 7 days)     07/01/24  1523   INR 2.8*       ASSESSMENT     Source(s): Chart Review  Previous INR was Therapeutic last 2(+) visits  Medication, diet, health changes since last INR chart reviewed; none identified  Bactrim bid 6/28-7/4 for UTI. Empiric 10% dose reduction of Warfarin started 6/28.         PLAN     Recommended plan for temporary change(s) affecting INR     Dosing Instructions: Continue your current warfarin dose with next INR in 4 days       Summary  As of 7/1/2024      Full warfarin instructions:  5 mg every Tue, Thu; 2.5 mg all other days   Next INR check:  7/5/2024               Detailed voice message left for Kacie with dosing instructions and follow up date.   Sent [employer] message with dosing and follow up instructions    Contact [phone]  to schedule and with any changes, questions or concerns.     Education provided: Goal range and lab monitoring: goal range and significance of current result and Importance of following up at instructed interval  Written instructions provided  Contact [phone] with any changes, questions or concerns.     Plan made per [facility] anticoagulation protocol    Praveena Morris RN  Anticoagulation Clinic  7/1/2024    _______________________________________________________________________     Anticoagulation Episode Summary       Current INR goal:  2.0-3.0   TTR:  72.5% (1 y)   Target end date:  Indefinite   Send INR reminders to:  [address]    Indications    S/P TAVR (transcatheter aortic valve replacement) [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

## 2024-07-03 ENCOUNTER — TELEPHONE (OUTPATIENT)
Dept: INTERVENTIONAL RADIOLOGY/VASCULAR | Facility: CLINIC | Age: 84
End: 2024-07-03
Payer: COMMERCIAL

## 2024-07-03 ENCOUNTER — TELEPHONE (OUTPATIENT)
Dept: ANTICOAGULATION | Facility: CLINIC | Age: 84
End: 2024-07-03
Payer: COMMERCIAL

## 2024-07-03 DIAGNOSIS — Z95.2 S/P TAVR (TRANSCATHETER AORTIC VALVE REPLACEMENT): Primary | ICD-10-CM

## 2024-07-03 DIAGNOSIS — I63.9 CEREBELLAR STROKE (H): ICD-10-CM

## 2024-07-03 NOTE — TELEPHONE ENCOUNTER
----- Message from Radha MCNALLY sent at 7/3/2024  3:06 PM CDT -----  Regarding: FW: Upcoming bx  Looks like this is for the [place] team.  ----- Message -----  From: Aurora Hernandez, RN  Sent: 7/3/2024   2:51 PM CDT  To: [facility]  Subject: Upcoming bx                                      Hi!    Kacie has a deep bone biopsy (L Iliac crest) on 7/11. INR will need to be 1.8 or less.     Thank you!    Aurora ELAM, RN  Interventional Radiology  [phone]

## 2024-07-03 NOTE — TELEPHONE ENCOUNTER
MIRTHA-PROCEDURAL ANTICOAGULATION  MANAGEMENT    Writer  requesting pre-procedure hold orders for warfarin and review for bridging      Procedure date: 7/11/24       Procedure:  deep bone biopsy      Procedure location and phone number (if external): [place]     Number of warfarin hold days requested and/or target INR: INR < 1.8    Pre-op date: Not applicable      Routing to Anticoagulation Pharmacist for review.        Ann Alvarez RN

## 2024-07-03 NOTE — TELEPHONE ENCOUNTER
SUZAN-PROCEDURAL ANTICOAGULATION  MANAGEMENT    ASSESSMENT     Warfarin interruption plan for Deep Bone Biopsy on 7/11/24.    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     8/1/2023-Continue warfarin indefinitely per Dr. Olguin ([facility])    Suzan-Procedure Risk stratification for thromboembolism: Unable to stratify (2022 Chest guidelines)    There are currently no guidelines addressing suzan-procedural bridging in this indication. The decision to bridging is based on the risk of bleeding weighed against the risk of clotting.      RECOMMENDATION     Pre-Procedure:  7/5 INR; notify pharmacist if outside goal range    Hold warfarin for 3 days, until after procedure starting: Mon 7/8     No Bridge; full reversal not anticipated    Post-Procedure:  Resume warfarin dose if okay with provider doing procedure on night of procedure, 7/11/24 PM: 7.5 mg x 1 day, then 5 mg x 1 day then then resume current dose    Recheck INR ~ 7 days after resuming warfarin     Plan routed to referring provider for approval  ?   Zaira Tena, [facility]    SUBJECTIVE/OBJECTIVE     Kacie Hermosillo, a 84 year old female    Wt Readings from Last 3 Encounters:   06/27/24 60.7 kg (133 lb 14.4 oz)   06/11/24 60.6 kg (133 lb 9.6 oz)   06/11/24 60.6 kg (133 lb 9.6 oz)      Ideal body weight: 52.4 kg (115 lb 8.3 oz)  Adjusted ideal body weight: 55.7 kg (122 lb 14 oz)     Estimated body mass index is 23.72 kg/m  as  "calculated from the following:    Height as of 6/27/24: 1.6 m (5' 3\").    Weight as of 6/27/24: 60.7 kg (133 lb 14.4 oz).    Lab Results   Component Value Date    INR 2.8 (H) 07/01/2024    INR 2.2 (H) 06/25/2024    INR 2.7 (H) 05/24/2024     Lab Results   Component Value Date    HGB 14.7 06/11/2024    HCT 45.1 06/11/2024     06/11/2024     Lab Results   Component Value Date    CR 1.02 (H) 06/11/2024    CR 1.09 (H) 05/24/2024    CR 0.96 (H) 05/04/2023     Estimated Creatinine Clearance: 39.3 mL/min (A) (based on SCr of 1.02 mg/dL (H)).    "

## 2024-07-05 ENCOUNTER — ANTICOAGULATION THERAPY VISIT (OUTPATIENT)
Dept: ANTICOAGULATION | Facility: CLINIC | Age: 84
End: 2024-07-05

## 2024-07-05 ENCOUNTER — LAB (OUTPATIENT)
Dept: LAB | Facility: CLINIC | Age: 84
End: 2024-07-05
Payer: COMMERCIAL

## 2024-07-05 DIAGNOSIS — I63.9 CEREBELLAR STROKE (H): ICD-10-CM

## 2024-07-05 DIAGNOSIS — Z95.2 S/P TAVR (TRANSCATHETER AORTIC VALVE REPLACEMENT): ICD-10-CM

## 2024-07-05 DIAGNOSIS — Z95.2 S/P TAVR (TRANSCATHETER AORTIC VALVE REPLACEMENT): Primary | ICD-10-CM

## 2024-07-05 LAB — INR BLD: 5.3 (ref 0.9–1.1)

## 2024-07-05 PROCEDURE — 36416 COLLJ CAPILLARY BLOOD SPEC: CPT

## 2024-07-05 PROCEDURE — 85610 PROTHROMBIN TIME: CPT

## 2024-07-05 NOTE — PROGRESS NOTES
ANTICOAGULATION MANAGEMENT     Kacie Hermosillo 84 year old female is on warfarin with supratherapeutic INR result. (Goal INR 2.0-3.0)    Recent labs: (last 7 days)     07/05/24  1457   INR 5.3*       ASSESSMENT     Source(s): Chart Review and Patient/Caregiver Call     Warfarin doses taken: Empiric 10% dose reduction started 6/28/24 (from 5 mg Sun/Tue/Thur and 2.5 mg all other days TO 5 mg Tue/Thur and 2.5 mg all other days)  Diet:  Did have one alcoholic beverage last night, doesn't drink much.  Medication/supplement changes: Bactrim 6/28-7/5.  New illness, injury, or hospitalization: No  Signs or symptoms of bleeding or clotting: No  Previous result: Therapeutic last 2(+) visits  Additional findings: Bone biopsy 7/11/24, goal is to have INR 1.8 or less. See hold plan from 7/3/24 telephone encounter.       PLAN     Recommended plan for temporary change(s) affecting INR     Dosing Instructions: hold 2 doses then continue your current warfarin dose with next INR in 3 days       Summary  As of 7/5/2024      Full warfarin instructions:  7/5: Hold; 7/6: Hold; 7/9: Hold; Otherwise 5 mg every Tue, Thu; 2.5 mg all other days   Next INR check:  7/8/2024               Telephone call with Kacie who verbalizes understanding and agrees to plan and who agrees to plan and repeated back plan correctly    Lab visit scheduled    Education provided: Goal range and lab monitoring: goal range and significance of current result, Importance of therapeutic range, and Importance of following up at instructed interval  Symptom monitoring: monitoring for bleeding signs and symptoms  Contact [phone] with any changes, questions or concerns.     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

## 2024-07-05 NOTE — TELEPHONE ENCOUNTER
Due to critical INR of 5.3 on 7/5/24, plan was modified.  Discuss with RPH after INR is done on 7/8/24.  Praveena Morris RN

## 2024-07-06 ENCOUNTER — TELEPHONE (OUTPATIENT)
Dept: INTERVENTIONAL RADIOLOGY/VASCULAR | Facility: CLINIC | Age: 84
End: 2024-07-06
Payer: COMMERCIAL

## 2024-07-08 ENCOUNTER — ANTICOAGULATION THERAPY VISIT (OUTPATIENT)
Dept: ANTICOAGULATION | Facility: CLINIC | Age: 84
End: 2024-07-08

## 2024-07-08 ENCOUNTER — LAB (OUTPATIENT)
Dept: LAB | Facility: CLINIC | Age: 84
End: 2024-07-08
Payer: COMMERCIAL

## 2024-07-08 DIAGNOSIS — Z95.2 S/P TAVR (TRANSCATHETER AORTIC VALVE REPLACEMENT): ICD-10-CM

## 2024-07-08 DIAGNOSIS — I63.9 CEREBELLAR STROKE (H): ICD-10-CM

## 2024-07-08 LAB — INR BLD: 2 (ref 0.9–1.1)

## 2024-07-08 PROCEDURE — 36416 COLLJ CAPILLARY BLOOD SPEC: CPT

## 2024-07-08 PROCEDURE — 85610 PROTHROMBIN TIME: CPT

## 2024-07-08 NOTE — PROGRESS NOTES
ANTICOAGULATION MANAGEMENT     Kacie Hermosillo 84 year old female is on warfarin with therapeutic INR result. (Goal INR 2.0-3.0)    Recent labs: (last 7 days)     07/08/24  1435   INR 2.0*       ASSESSMENT     Source(s): Chart Review and Patient/Caregiver Call     Warfarin doses taken: Held for elevated INR  recently which may be affecting INR  Diet: No new diet changes identified  Medication/supplement changes:  Bactrim from 6/28/24-7/5/24  New illness, injury, or hospitalization: No  Signs or symptoms of bleeding or clotting: No  Previous result: Supratherapeutic  Additional findings: Upcoming surgery/procedure deep bone bx on 7/11/24. INR has to be 1.8 or less. Zaira Tena, [facility] recommends a 2 day hold instead of a 3 day hold prior to procedure due to elevated INR previously. Then follow procedure plan TE dated 7/3/24. [facility] also recommends going back to previous dosing plan before it was reduced for bactrim interaction.  Day by day dosing discussed with pt  Pt seeing doctor on 7/18/24 to discuss bx results. Pt will recheck INR in [facility] lab which is close to the doctors office before appointment.       PLAN     Recommended plan for ongoing change(s) affecting INR     Dosing Instructions:  7/8/24 2.5 mg, 7/9/24 hold,7/10/24 hold,7/11/24 7.5 mg in the evening of procedure if approved by doctor,7/12/24 5 mg, 7/13/24 2.5 mg,7/14/24 5 mg,7/15/24 2.5 mg,7/16/24 5 mg, 7/17/24 2.5 mg  with next INR in 10 days       Summary  As of 7/8/2024      Full warfarin instructions:  7/9: Hold; 7/10: Hold; 7/11: 7.5 mg; 7/12: 5 mg; Otherwise 5 mg every Sun, Tue, Thu; 2.5 mg all other days   Next INR check:  7/18/2024               Telephone call with Kacie who agrees to plan and repeated back plan correctly(mychart sent)    Lab visit scheduled    Education provided: Please call back if any changes to your diet, medications or how you've been taking warfarin  Contact [phone] with any changes, questions or concerns.      Plan made with [facility] Pharmacist Zaira Lopes, RN  Anticoagulation Clinic  7/8/2024    _______________________________________________________________________     Anticoagulation Episode Summary       Current INR goal:  2.0-3.0   TTR:  71.0% (1 y)   Target end date:  Indefinite   Send INR reminders to:  VIRGILIO WEEKS    Indications    S/P TAVR (transcatheter aortic valve replacement) [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

## 2024-07-10 ENCOUNTER — TELEPHONE (OUTPATIENT)
Dept: INTERVENTIONAL RADIOLOGY/VASCULAR | Facility: CLINIC | Age: 84
End: 2024-07-10
Payer: COMMERCIAL

## 2024-07-11 ENCOUNTER — HOSPITAL ENCOUNTER (OUTPATIENT)
Dept: CT IMAGING | Facility: HOSPITAL | Age: 84
Discharge: HOME | End: 2024-07-11
Attending: INTERNAL MEDICINE | Admitting: INTERNAL MEDICINE
Payer: COMMERCIAL

## 2024-07-11 VITALS
OXYGEN SATURATION: 98 % | SYSTOLIC BLOOD PRESSURE: 104 MMHG | RESPIRATION RATE: 14 BRPM | DIASTOLIC BLOOD PRESSURE: 59 MMHG | TEMPERATURE: 98.1 F | HEART RATE: 73 BPM

## 2024-07-11 DIAGNOSIS — C79.51 MALIGNANT NEOPLASM METASTATIC TO BONE (H): ICD-10-CM

## 2024-07-11 LAB
HGB BLD-MCNC: 13.5 G/DL (ref 11.7–15.7)
INR PPP: 1.36 (ref 0.85–1.15)
PLATELET # BLD AUTO: 247 10E3/UL (ref 150–450)

## 2024-07-11 PROCEDURE — 272N000045 CT BONE BIOPSY DEEP

## 2024-07-11 PROCEDURE — 85049 AUTOMATED PLATELET COUNT: CPT | Performed by: RADIOLOGY

## 2024-07-11 PROCEDURE — 88342 IMHCHEM/IMCYTCHM 1ST ANTB: CPT | Mod: TC | Performed by: INTERNAL MEDICINE

## 2024-07-11 PROCEDURE — 250N000011 HC RX IP 250 OP 636: Performed by: RADIOLOGY

## 2024-07-11 PROCEDURE — 85018 HEMOGLOBIN: CPT | Performed by: RADIOLOGY

## 2024-07-11 PROCEDURE — 85610 PROTHROMBIN TIME: CPT | Performed by: RADIOLOGY

## 2024-07-11 PROCEDURE — 99152 MOD SED SAME PHYS/QHP 5/>YRS: CPT

## 2024-07-11 PROCEDURE — 36415 COLL VENOUS BLD VENIPUNCTURE: CPT | Performed by: RADIOLOGY

## 2024-07-11 PROCEDURE — 88333 PATH CONSLTJ SURG CYTO XM 1: CPT | Mod: TC | Performed by: INTERNAL MEDICINE

## 2024-07-11 PROCEDURE — 99207 HER 2 NEU FISH: CPT

## 2024-07-11 PROCEDURE — 88377 M/PHMTRC ALYS ISHQUANT/SEMIQ: CPT | Performed by: INTERNAL MEDICINE

## 2024-07-11 RX ORDER — ACETAMINOPHEN 325 MG/1
650 TABLET ORAL EVERY 4 HOURS PRN
Status: DISCONTINUED | OUTPATIENT
Start: 2024-07-11 | End: 2024-07-12 | Stop reason: HOSPADM

## 2024-07-11 RX ORDER — FLUMAZENIL 0.1 MG/ML
0.2 INJECTION, SOLUTION INTRAVENOUS
Status: DISCONTINUED | OUTPATIENT
Start: 2024-07-11 | End: 2024-07-12 | Stop reason: HOSPADM

## 2024-07-11 RX ORDER — FENTANYL CITRATE 50 UG/ML
25-50 INJECTION, SOLUTION INTRAMUSCULAR; INTRAVENOUS EVERY 5 MIN PRN
Status: DISCONTINUED | OUTPATIENT
Start: 2024-07-11 | End: 2024-07-12 | Stop reason: HOSPADM

## 2024-07-11 RX ORDER — NALOXONE HYDROCHLORIDE 0.4 MG/ML
0.2 INJECTION, SOLUTION INTRAMUSCULAR; INTRAVENOUS; SUBCUTANEOUS
Status: DISCONTINUED | OUTPATIENT
Start: 2024-07-11 | End: 2024-07-12 | Stop reason: HOSPADM

## 2024-07-11 RX ORDER — NALOXONE HYDROCHLORIDE 0.4 MG/ML
0.4 INJECTION, SOLUTION INTRAMUSCULAR; INTRAVENOUS; SUBCUTANEOUS
Status: DISCONTINUED | OUTPATIENT
Start: 2024-07-11 | End: 2024-07-12 | Stop reason: HOSPADM

## 2024-07-11 RX ADMIN — FENTANYL CITRATE 25 MCG: 50 INJECTION, SOLUTION INTRAMUSCULAR; INTRAVENOUS at 11:11

## 2024-07-11 RX ADMIN — MIDAZOLAM HYDROCHLORIDE 0.5 MG: 1 INJECTION, SOLUTION INTRAMUSCULAR; INTRAVENOUS at 11:09

## 2024-07-11 RX ADMIN — MIDAZOLAM HYDROCHLORIDE 1 MG: 1 INJECTION, SOLUTION INTRAMUSCULAR; INTRAVENOUS at 10:56

## 2024-07-11 RX ADMIN — FENTANYL CITRATE 50 MCG: 50 INJECTION, SOLUTION INTRAMUSCULAR; INTRAVENOUS at 11:00

## 2024-07-11 NOTE — INTERVAL H&P NOTE
I have reviewed the surgical (or preoperative) H&P that is linked to this encounter, and examined the patient. There are no significant changes    Clinical Conditions Present on Arrival:  Clinically Significant Risk Factors Present on Admission                # Drug Induced Coagulation Defect: home medication list includes an anticoagulant medication  # Drug Induced Platelet Defect: home medication list includes an antiplatelet medication

## 2024-07-11 NOTE — SEDATION DOCUMENTATION
Patient Name: Kacie Hermosillo  Medical Record Number: [id number]  Today's Date: 7/11/2024    Procedure: Deep bone Biopsy  Proceduralist: Dr. Thomas    Procedure Start: 1100  Procedure end: 1122  Sedation medications administered: 1.5 mg midazolam and 75 mcg fentanyl   Sedation time: 22 minutes      Other Notes: Pt arrived to CT room 1 from  [facility] [place] [facility] . Consent reviewed. Pt denies any questions or concerns regarding procedure. Pt positioned Prone and monitored per protocol. Pt tolerated procedure without any noted complications. VSS on Transfer. Pt transferred back to  [facility] [place] 5 .

## 2024-07-12 LAB
PATH REPORT.COMMENTS IMP SPEC: ABNORMAL
PATH REPORT.COMMENTS IMP SPEC: YES
PATH REPORT.FINAL DX SPEC: ABNORMAL
PATH REPORT.GROSS SPEC: ABNORMAL
PATH REPORT.MICROSCOPIC SPEC OTHER STN: ABNORMAL
PATH REPORT.MICROSCOPIC SPEC OTHER STN: ABNORMAL
PATH REPORT.RELEVANT HX SPEC: ABNORMAL
PHOTO IMAGE: ABNORMAL

## 2024-07-12 PROCEDURE — 88341 IMHCHEM/IMCYTCHM EA ADD ANTB: CPT | Mod: 26 | Performed by: PATHOLOGY

## 2024-07-12 PROCEDURE — 88360 TUMOR IMMUNOHISTOCHEM/MANUAL: CPT | Mod: 26 | Performed by: PATHOLOGY

## 2024-07-12 PROCEDURE — 88305 TISSUE EXAM BY PATHOLOGIST: CPT | Mod: 26 | Performed by: PATHOLOGY

## 2024-07-12 PROCEDURE — 88333 PATH CONSLTJ SURG CYTO XM 1: CPT | Mod: 26 | Performed by: PATHOLOGY

## 2024-07-12 PROCEDURE — 88342 IMHCHEM/IMCYTCHM 1ST ANTB: CPT | Mod: 26 | Performed by: PATHOLOGY

## 2024-07-12 NOTE — PROGRESS NOTES
Chart reviewed and plan made with [facility] RN at time of encounter.    Zaira Tena, [facility]

## 2024-07-18 ENCOUNTER — ONCOLOGY VISIT (OUTPATIENT)
Dept: ONCOLOGY | Facility: HOSPITAL | Age: 84
End: 2024-07-18
Attending: INTERNAL MEDICINE
Payer: COMMERCIAL

## 2024-07-18 ENCOUNTER — ANTICOAGULATION THERAPY VISIT (OUTPATIENT)
Dept: ANTICOAGULATION | Facility: CLINIC | Age: 84
End: 2024-07-18

## 2024-07-18 ENCOUNTER — PATIENT OUTREACH (OUTPATIENT)
Dept: ONCOLOGY | Facility: HOSPITAL | Age: 84
End: 2024-07-18

## 2024-07-18 ENCOUNTER — LAB (OUTPATIENT)
Dept: LAB | Facility: CLINIC | Age: 84
End: 2024-07-18
Payer: COMMERCIAL

## 2024-07-18 VITALS
RESPIRATION RATE: 18 BRPM | OXYGEN SATURATION: 95 % | TEMPERATURE: 98.1 F | BODY MASS INDEX: 23.39 KG/M2 | WEIGHT: 132 LBS | DIASTOLIC BLOOD PRESSURE: 68 MMHG | HEART RATE: 73 BPM | SYSTOLIC BLOOD PRESSURE: 100 MMHG | HEIGHT: 63 IN

## 2024-07-18 DIAGNOSIS — D49.89 NEOPLASM OF UNSPECIFIED BEHAVIOR OF OTHER SPECIFIED SITES: ICD-10-CM

## 2024-07-18 DIAGNOSIS — Z95.2 S/P TAVR (TRANSCATHETER AORTIC VALVE REPLACEMENT): ICD-10-CM

## 2024-07-18 DIAGNOSIS — C50.911 MALIGNANT NEOPLASM OF BOTH BREASTS IN FEMALE, ESTROGEN RECEPTOR NEGATIVE, UNSPECIFIED SITE OF BREAST (H): ICD-10-CM

## 2024-07-18 DIAGNOSIS — I63.9 CEREBELLAR STROKE (H): ICD-10-CM

## 2024-07-18 DIAGNOSIS — Z17.1 MALIGNANT NEOPLASM OF BOTH BREASTS IN FEMALE, ESTROGEN RECEPTOR NEGATIVE, UNSPECIFIED SITE OF BREAST (H): ICD-10-CM

## 2024-07-18 DIAGNOSIS — R53.83 FATIGUE: ICD-10-CM

## 2024-07-18 DIAGNOSIS — Z12.31 ENCOUNTER FOR SCREENING MAMMOGRAM FOR MALIGNANT NEOPLASM OF BREAST: ICD-10-CM

## 2024-07-18 DIAGNOSIS — Z95.2 S/P TAVR (TRANSCATHETER AORTIC VALVE REPLACEMENT): Primary | ICD-10-CM

## 2024-07-18 DIAGNOSIS — C50.912 MALIGNANT NEOPLASM OF BOTH BREASTS IN FEMALE, ESTROGEN RECEPTOR NEGATIVE, UNSPECIFIED SITE OF BREAST (H): ICD-10-CM

## 2024-07-18 DIAGNOSIS — C79.51 MALIGNANT NEOPLASM METASTATIC TO BONE (H): Primary | ICD-10-CM

## 2024-07-18 LAB — INR BLD: 2.7 (ref 0.9–1.1)

## 2024-07-18 PROCEDURE — 36416 COLLJ CAPILLARY BLOOD SPEC: CPT

## 2024-07-18 PROCEDURE — G0463 HOSPITAL OUTPT CLINIC VISIT: HCPCS | Performed by: INTERNAL MEDICINE

## 2024-07-18 PROCEDURE — 85610 PROTHROMBIN TIME: CPT

## 2024-07-18 PROCEDURE — 99215 OFFICE O/P EST HI 40 MIN: CPT | Performed by: INTERNAL MEDICINE

## 2024-07-18 PROCEDURE — G2211 COMPLEX E/M VISIT ADD ON: HCPCS | Performed by: INTERNAL MEDICINE

## 2024-07-18 RX ORDER — LIDOCAINE/PRILOCAINE 2.5 %-2.5%
CREAM (GRAM) TOPICAL PRN
Qty: 30 G | Refills: 2 | Status: ON HOLD | OUTPATIENT
Start: 2024-07-18

## 2024-07-18 ASSESSMENT — PAIN SCALES - GENERAL: PAINLEVEL: SEVERE PAIN (7)

## 2024-07-18 NOTE — PROGRESS NOTES
ANTICOAGULATION MANAGEMENT     Kacie Hermosillo 84 year old female is on warfarin with therapeutic INR result. (Goal INR 2.0-3.0)    Recent labs: (last 7 days)     07/18/24  1500   INR 2.7*       ASSESSMENT     Source(s): Chart Review  Previous INR was Subtherapeutic-bone biopsy 7/11  Medication, diet, health changes since last INR chart reviewed; none identified    Need to verify when antibiotic was done. Port placement being done soon?     PLAN     Unable to reach Kacie today.    Left message to continue current dose of warfarin 5 mg tonight. Request call back for assessment.    Follow up required to confirm warfarin dose taken and assess for changes and discuss dosing instructions and confirm understanding of instructions    Praveena Morris RN  Anticoagulation Clinic  7/18/2024

## 2024-07-18 NOTE — LETTER
"7/18/2024      Kacie Hermosillo  [address] [facility] [address]      Dear Colleague,    Thank you for referring your patient, Kacie Hermosillo, to the [facility]. Please see a copy of my visit note below.    Oncology Rooming Note    July 18, 2024 3:23 PM   Kacie Hermosillo is a 84 year old female who presents for:    Chief Complaint   Patient presents with     Oncology Clinic Visit     Returning patient consult: Malignant neoplasm metastatic to bone, Malignant neoplasm of areola of left breast in female Bone Bx results follow up.     Initial Vitals: /68   Pulse 73   Temp 98.1  F (36.7  C) (Tympanic)   Resp 18   Ht 1.6 m (5' 2.99\")   Wt 59.9 kg (132 lb)   SpO2 95%   BMI 23.39 kg/m   Estimated body mass index is 23.39 kg/m  as calculated from the following:    Height as of this encounter: 1.6 m (5' 2.99\").    Weight as of this encounter: 59.9 kg (132 lb). Body surface area is 1.63 meters squared.  Severe Pain (7) Comment: Data Unavailable   No LMP recorded. Patient is postmenopausal.  Allergies reviewed: Yes  Medications reviewed: Yes    Medications: Medication refills not needed today.  Pharmacy name entered into [facility]:    [facility] [place], [place] [facility] [address] [facility] [place], [place] [facility] [address] [id number] [place], [place] - [address] [id number] [place], [place] - [address] [facility] [address]    Frailty Screening:   Is the patient here for a new oncology consult visit in cancer care? 2. No      Clinical concerns:  RETURN CCSL - bone biopsy results follow up.            Jodi Rockwell MA              [facility] Hematology and Oncology Progress Note    Patient: Kacie Hermosillo  MRN: [id number]  Date of Service: Jul 18, 2024             ECOG Performance    1 - Can't do physically strenuous work, but fully ambyulatory " "and can do light sedentary work          ______________________________________________________________________________  Oncologic history  Metastatic breast cancer with bony metastatic disease.  ER negative AL negative HER2/amarilis 2+ FISH is pending    History of Present Illness    Ms. Kacie Hermosillo is a pleasant 84-year-old white female who was having back pain.  She had an MRI on 28 May which showed multiple areas of bony metastatic disease.  She does has a history of breast cancer in 1995 which was treated with surgery and radiation followed by chemotherapy as per the patient she also took a pill for 5 years.  She then had a PET scan done which showed PET avid metastatic disease in the bony skeleton.  Patient was then referred for a bone biopsy which was done and she is here to discuss the results.  She does complain of back pain which she thinks is chronic in nature she does think that it has become slightly worse lately.  She is accompanied by her daughter.      Review of systems  A comprehensive 12 point review of system was done that was negative except what is mentioned in the history of present illness    Past History    Past Medical History:   Diagnosis Date     Breast cancer (H) 01/01/1999     Heart valve replaced      Hx antineoplastic chemotherapy 01/01/1999     Hx of radiation therapy 01/01/1999     Hypertension        Past Surgical History:   Procedure Laterality Date     BIOPSY BREAST       HYSTERECTOMY       LUMPECTOMY BREAST Left 1999     OOPHORECTOMY Bilateral        Physical Exam    /68   Pulse 73   Temp 98.1  F (36.7  C) (Tympanic)   Resp 18   Ht 1.6 m (5' 2.99\")   Wt 59.9 kg (132 lb)   SpO2 95%   BMI 23.39 kg/m      General: alert, awake, not in acute distress  HEENT: Head: Normal, normocephalic, atraumatic.  Eye: Normal external eye, conjunctiva, lids cornea, MARI.  Nose: Normal external nose, mucus membranes and septum.  Pharynx: Normal buccal mucosa. Normal pharynx.  Neck / " Thyroid: Supple, no masses, nodes, nodules or enlargement.  Lymphatics: No abnormally enlarged lymph nodes.  Chest: Normal chest wall and respirations. Clear to auscultation.  No crackles or rhonchi's  Heart: S1 S2 RRR, no murmur.   Abdomen: abdomen is soft without significant tenderness, masses, organomegaly or guarding  Extremities: normal strength, tone, and muscle mass  Skin: normal. no rash or abnormalities  CNS: non focal.    Lab Results    Recent Results (from the past 240 hour(s))   Hemoglobin    Collection Time: 07/11/24  9:34 AM   Result Value Ref Range    Hemoglobin 13.5 11.7 - 15.7 g/dL   Platelet count    Collection Time: 07/11/24  9:34 AM   Result Value Ref Range    Platelet Count 247 150 - 450 10e3/uL   INR    Collection Time: 07/11/24  9:34 AM   Result Value Ref Range    INR 1.36 (H) 0.85 - 1.15   Surgical Pathology Exam    Collection Time: 07/11/24 10:55 AM   Result Value Ref Range    Case Report       Surgical Pathology Report                         Case: [id number]                                  Authorizing Provider:  Friedell, Peter E, MD      Collected:           07/11/2024 10:55 AM          Ordering Location:     [facility]      Received:            07/11/2024 11:32 AM                                 [facility] CT                                                           Pathologist:           Ra Sevilla MD                                                        Specimen:    Iliac Crest, Left, Multiple bone lesions, hx of breast cancer                              Final Diagnosis       BONE, LEFT ILIAC CREST, NEEDLE BIOPSY:  -METASTATIC CARCINOMA, CONSISTENT WITH ORIGIN IN THE BREAST  -PLEASE SEE COMMENT        Comment       The combined morphologic and immunophenotypic features of this lesion are most consistent with metastatic breast carcinoma.  Suggest correlation with clinical and radiographic findings.    Due to the indeterminate status of HER2 expression by  immunohistochemistry, additional analysis by FISH is being performed (ordered 7/12/2024).  The results will be reported separately in the electronic health record.        Clinical Information       Multiple bone lesions.      Gross Description       A(A). Iliac Crest, Left, Multiple bone lesions, hx of breast cancer:  Biopsy was performed under CT guidance by Dr. Fabricio Martinez with 3 pass(es) from which:                 3 Air-dried smear(s)  1 vial with 3 core(s) in 10% Formalin   Specimen in 10% Formalin at 10:55 on 7/11/24  1 core block(s) are prepared at [facility].    Assisted by:  JUANITO Chacon    GROSS DESCRIPTION:  The specimen consists of fragments of pink-tan to gray tissue in aggregate measuring 0.9 x 0.4 x 0.2 cm in greatest dimensions.  TE-1C  Time placed in formalin: 10:55 AM, 7/11/2024.    IMMEDIATE CYTOLOGIC EVALUATION (CORE IMPRINTS):  Passes 1-3: Adequate.  Ra Sevilla MD      Microscopic Description       Diff-Quik stained touch imprint slides show blood, leukocytes, and occasional atypical epithelial cell clusters.  The atypical epithelial cells are relatively small, but have high nuclear-cytoplasmic ratios, coarsely granular chromatin, and inconspicuous nucleoli.  H&E-stained needle biopsy sections show fragments of bone with hypercellular marrow (35-40%) with several clusters of relatively bland but atypical epithelial cells infiltrating stroma, provoking a myxoid reaction.  The fragments of bone show mild to moderate reactive changes.  Otherwise, all 3 cell lines are represented.  Due to clinical concern for the possibility of breast carcinoma, immunohistochemical stains are performed.  The results are as follows:    GATA3: Strongly positive in the vast majority of epithelial cells  Cytokeratin-7: Strongly positive in the vast majority of epithelial cells  Cytokeratin-20: Negative  TTF-1: Negative  Estrogen receptor: Negative (absence of nuclear staining, all cells)  Progesterone  receptor: Negative (absence of nuclear staining, all cells)  HER2: Indeterminate (2+ out of 3 membrane staining); additional analysis by FISH is pending (ordered 7/12/2024)    All controls stain appropriately.        Special Stains       Note - Estrogen and Progesterone Receptor Immunoperoxidase Stains:  These stains were done using the following criteria:    Specimen fixative: Formalin-fixed paraffin-embedded sections    Detection system: Biotin-free multimer-based technology detection system ([employer])    Retrieval method: CC1 pretreatment; a darius-based buffer with a slightly basic PH used at an elevated                                        temperature (95+5 degrees C)    Clone:   Estrogen receptor-SP1, Rabbit monoclonal ([place])     Progesterone receptor-1E2, Rabbit monoclonal ([place])        Scoring method (CAP/ASCO guidelines):                                       Intensity of nuclear stain: strong vs weak vs absent                                       Percentage of cells stained:                                         Indeterminate (Internal control cells present; no immunoreactivity of either cells or internal controls)                                         Negative (Percentage of cells with nuclear positivity is less than 1%)                                         Positive (Percentage of cells with nuclear positivity is equal to or greater than 1%)    REFERENCES:  1) Viji DUPONT, Jane GALVEZ, Frank M, et al. Estrogen and progesterone receptor testing in breast cancer:      ASCO/CAP guideline update. Arch Pathol Lab Med doi: 10.5858/arpa.1920-5431-RL.      * This assay has not been validated on decalcified tissues. Results should be interpreted with caution given       the possibility of false negativity on decalcified specimens.     Note - HER2/amarilis Immunoperoxidase Stain:    This stain was done using the following criteria:    Specimen fixative: Formalin-fixed paraffin-embedded sections     Detection system: Biotin-free multimer-based technology detection system ([employer])    Retrieval method: CC1 pretreatment; a darius-based buffer with a slightly basic PH used at an elevated     temperature (95 plus or minus 5 degrees C)    Clone:   4B5, Rabbit monoclonal ([employer])      Scoring method (CAP/ASCO guidelines):     IHC 3+ - Positive (circumferential membrane staining that is complete, intense, and     within greater than 10% of tumor cells)       IHC 2+ - Equivocal (circumferential membrane staining that is incomplete and/or     weak/moderate and within greater than 10% of tumor cells or complete and     circumferential membrane staining that is intense and less than or equal to10% of tumor     cells)       IHC1+ - Negative (incomplete membrane staining that is faint/barely perceptible     and within greater than 10% of tumor cells)       IHC 0 - Negative (no staining is observed or membrane staining that is incomplete and     is faint/barely perceptible and within less than or equal to 10% of tumor cells)    REFERENCES:  1) Jennifer AC, Jane MOSQUERAH, Viji KH, et al. HER2 testing in breast cancer: American Society of Clinical      Oncology/College of American Pathologists clinical practice guideline focused update. Arch Pathol Lab      Med. 2018;142(11):6958-3244.     * HER2/amarilis stain performed using the [place] [employer]'s FDA approved methodology.     ** This assay has not been validated on decalcified tissues. Results should be interpreted with caution given       the possibility of false negativity on decalcified specimens.       MCRS Yes (A) N/A    Performing Labs       The technical component of this testing was completed at [facility].    Stain controls for all stains resulted within this report have been reviewed and show appropriate reactivity.       Case Images     HER 2 AMARILIS FISH    Collection Time: 07/11/24 10:55 AM   Result Value  Ref Range    Interpretation       Three unstained slides of paraffin-embedded tissue were received and processed by the [facility] on 7-15-24 for HER2 FISH testing. However, the areas of metastatic carcinoma marked on the IHC slide were not identified on the accompaning H&E-stained slide or on the slide processed for FISH; thus, assessment by FISH of the HER2 status of this specimen could not be performed. If additional tissue is obtained in the future, FISH can be performed at that time.       Billing Comment:  Credited all but probe fee      INR point of care    Collection Time: 07/18/24  3:00 PM   Result Value Ref Range    INR 2.7 (H) 0.9 - 1.1         Imaging    CT Bone Biopsy Deep    Result Date: 7/11/2024  EXAM: 1. PERCUTANEOUS BIOPSY LEFT ILIAC BONE LESION 2. CT GUIDANCE 3. CONSCIOUS SEDATION LOCATION: [facility] DATE: 7/11/2024 INDICATION:  Malignant neoplasm metastatic to bone (H) TECHNIQUE: Dose reduction techniques were used. PROCEDURE: Informed consent obtained. Site marked. Prior images reviewed. Required items made available. Patient identity confirmed verbally and with arm band. Patient reevaluated immediately before administering sedation. Universal protocol was followed. Time out performed. The site was prepped and draped in sterile fashion. 10 mL of 1% lidocaine was infused into the local soft tissues. Using standard technique and under direct CT guidance, a core bone biopsy device was used to obtain three core biopsies. Tissue was submitted to Pathology. The patient tolerated the procedure well. No immediate complications. SEDATION: Versed 1.5 mg. Fentanyl 75 mcg. The procedure was performed with administration intravenous conscious sedation with appropriate preoperative, intraoperative, and postoperative evaluation. 22 minutes of supervised face to face conscious sedation time was provided by a radiology nurse under my direct supervision.     IMPRESSION: 1.   Successful CT-guided biopsy left iliac pelvic bone lesion biopsy. Reference CPT Codes: 71342, 23695, 06342    PET Oncology Whole Body    Result Date: 6/25/2024  EXAM: PET ONCOLOGY WHOLE BODY, CT CHEST/ABDOMEN/PELVIS W CONTRAST LOCATION: [facility] DATE: 6/25/2024 INDICATION: Breast cancer, left, restaging. Malignant neoplasm of nipple and areola, left female breast. Prior left breast lumpectomy. Prior radiation therapy and hormonal therapy. Recent lumbar spine MRI suggesting widespread metastatic disease. Subsequent treatment strategy. COMPARISON: CT chest abdomen pelvis 9/29/2021 reviewed. CONTRAST: 66 mL Isovue-370 TECHNIQUE: Serum glucose level 87 mg/dL. One hour post intravenous administration of 10.2 mCi F-18 FDG, PET imaging was performed from the skull vertex to feet, utilizing attenuation correction with concurrent axial CT and PET/CT image fusion. Separate diagnostic CT of the chest, abdomen, and pelvis was performed. Dose reduction techniques were used. PET/CT FINDINGS: Posttreatment changes left breast. No evidence of local recurrence. Surgical clips left axillary region. No FDG avid adenopathy in the left axilla or elsewhere. Several scattered sclerotic skeletal lesions consistent with osseous metastases, some of the larger of which demonstrate focal FDG activity, including T8 vertebral body, L1 vertebral body (SUVmax 5.9), L2 vertebral body, right L4 vertebral body (SUVmax 6.0), several sites in the bony pelvis including the sacrum and both iliac bones as well as the left posterior acetabulum and proximal left femur. Some of the sclerotic lesions are tiny, too small for PET characterization. CT FINDINGS: Moderate senescent intracranial changes. TAVR. Mild atherosclerotic calcifications including the coronary arteries. Bandlike and reticular opacities scattered throughout the lungs suggesting areas of scarring and/or atelectasis. Cholecystectomy. Biliary prominence from reservoir  effect postcholecystectomy. Benign calcified splenic granuloma. Moderate colonic diverticulosis, greatest in the sigmoid region. Hysterectomy. Bony demineralization. Severe chronic compression fracture T12. Mild scattered degenerative changes in the spine.     IMPRESSION: Several scattered osseous metastases, predominantly involving the lower spine and bony pelvis.    CT Chest/Abdomen/Pelvis w Contrast    Result Date: 6/25/2024  EXAM: PET ONCOLOGY WHOLE BODY, CT CHEST/ABDOMEN/PELVIS W CONTRAST LOCATION: [facility] DATE: 6/25/2024 INDICATION: Breast cancer, left, restaging. Malignant neoplasm of nipple and areola, left female breast. Prior left breast lumpectomy. Prior radiation therapy and hormonal therapy. Recent lumbar spine MRI suggesting widespread metastatic disease. Subsequent treatment strategy. COMPARISON: CT chest abdomen pelvis 9/29/2021 reviewed. CONTRAST: 66 mL Isovue-370 TECHNIQUE: Serum glucose level 87 mg/dL. One hour post intravenous administration of 10.2 mCi F-18 FDG, PET imaging was performed from the skull vertex to feet, utilizing attenuation correction with concurrent axial CT and PET/CT image fusion. Separate diagnostic CT of the chest, abdomen, and pelvis was performed. Dose reduction techniques were used. PET/CT FINDINGS: Posttreatment changes left breast. No evidence of local recurrence. Surgical clips left axillary region. No FDG avid adenopathy in the left axilla or elsewhere. Several scattered sclerotic skeletal lesions consistent with osseous metastases, some of the larger of which demonstrate focal FDG activity, including T8 vertebral body, L1 vertebral body (SUVmax 5.9), L2 vertebral body, right L4 vertebral body (SUVmax 6.0), several sites in the bony pelvis including the sacrum and both iliac bones as well as the left posterior acetabulum and proximal left femur. Some of the sclerotic lesions are tiny, too small for PET characterization. CT FINDINGS:  Moderate senescent intracranial changes. TAVR. Mild atherosclerotic calcifications including the coronary arteries. Bandlike and reticular opacities scattered throughout the lungs suggesting areas of scarring and/or atelectasis. Cholecystectomy. Biliary prominence from reservoir effect postcholecystectomy. Benign calcified splenic granuloma. Moderate colonic diverticulosis, greatest in the sigmoid region. Hysterectomy. Bony demineralization. Severe chronic compression fracture T12. Mild scattered degenerative changes in the spine.     IMPRESSION: Several scattered osseous metastases, predominantly involving the lower spine and bony pelvis.         Assessment and Plan    Metastatic breast cancer  This is an 84-year-old white female who was diagnosed with breast cancer in 1995 now presenting with multiple metastases.  A biopsy was done which has been reported is consistent with metastatic cancer of breast origin.  The tumor cells are strongly GATA3 positive and positive for CK7.  This is negative for ER and MT and HER2 was 2+.  HER2/amarilis by FISH is pending.  The diagnosis prognosis and various treatment options for metastatic breast cancers were discussed with the patient and her daughter.  It is interesting to note that she had breast cancer in 1995 and did receive 5 years of hormonal therapy.  I would like to obtain the pathology report from that surgery if it is at all possible.  However as far as treatment options are concerned hormonal therapy is not an option.  We need to wait for the HER2/amarilis testing by FISH to make a final decision for the most appropriate treatment for this 84-year-old probably is going to be HER2 directed therapy like Fam trastuzumab.  This will be effective if she is HER2 positive by FISH and is also an approved treatment for patients while HER2 low.  If patient is found to be HER2 negative by FISH other options would be a combination of chemotherapy with immunotherapy but that would be  significantly toxic for this patient so targeting HER2/amarilis will continue to be the best option.  All this was discussed with the patient and her daughter in detail they had multiple questions all of them were answered to their satisfaction.  I will proceed with getting a port put in and consider starting her on fam-trastuzumab in about 2 weeks.  1) await HER2/amarilis testing by FISH but targeting HER2 is probably the best treatment option so we will consider planning to start patient on fam-trastuzumab  2) plan to get a port put in and a cardiac echo prior to starting treatment  3) plan to obtain renal pathology report from her original diagnosis of breast cancer.  Will also do a mammogram to rule out any new lesion in the breast  Signed by: Hadley Gann MD        CC: Irina Francisco PA-C     Again, thank you for allowing me to participate in the care of your patient.        Sincerely,        Hadley Gann MD

## 2024-07-18 NOTE — PROGRESS NOTES
ANTICOAGULATION MANAGEMENT     Kacie Hermosillo 84 year old female is on warfarin with {Ther/Sub/Supra:219432} INR result. (Goal INR {Anticoag Goal:327316})    Recent labs: (last 7 days)     07/18/24  1500   INR 2.7*       ASSESSMENT     {accassessment:907993}       PLAN     {INRPLAN:082667}

## 2024-07-18 NOTE — PROGRESS NOTES
[facility]: Cancer Care Plan of Care Education Note                                    Discussion with Patient:                                                      Met with Kacie when she came to clinic for follow up to review her  pathology. She has decided to transfer care from Dr. Friedell to Dr. Gann.  Plan is for her to receive Enhertu, treatment plan is not yet entered so will call to follow up and review the specifics about the treatment plan once writer is available to review take home meds, etc.  -IR Referral for port placement was made today and port demo was used for education.  She understands that IR will call  her directly to schedule.Port placement will need to be done prior to her treatment start.She would like emla cream for her port, order will be placed.  -Chemo folder was provided and materials were reviewed. Reviewed the Home Instructions after Chemo Handout in detail and she expressed understanding.  Confirmed that she does have a thermometer at home.  She does struggle with oral fluids and discussed strategies to manage. She will monitor her fluid intake more closely, will start implementing using water bottle to help with this.  She does relay that she has no difficulty with eating.  -Educated about what to bring to his infusion visit, light lunch or snacks that are peanut free, any special drinks that he prefers. Shared that snacks, coffee, tea  juice and soda are available in the infusion area.  Also suggested that she may wish to bring light reading material, ear- phones, phone, computer and/or charging cords. Warm blankets are provided    -She does identify needs for transportation when her children are unable to bring her to her apts. SW referral placed today and she understands that Domonique will reach out to her early next week to discuss.  She has weakness/fatigue and  difficulty walking longer distances. She requests order for wheel chair, Order placed today and provided for  her to take to DME store of her choice.    -Will call her early next week to review specifics in treatment plan/take home meds and will confirm that port placement is scheduled.       Assessment:                                                          Plan of Care Education     Plan of Care Education Review:   Assessment completed with:: Patient;Children    Plan of Care Education   Yearly learning assessment completed?: Yes (see Education tab)  Diagnosis:: Malignant neoplasm metastatic to bone (H  Does patient understand diagnosis?: Yes  Vascular access education provided for:: Port  Supportive services education provided for:: Social work (transportation)  Safety/self care at home reviewed with patient:: Yes  Coping - concerns/fears reviewed with patient:: Yes  When to call provider:: Bleeding;Increased shortness of breath;New/worsening pain;Shaking chills;Temperature >100.4F;Uncontrolled diarrhea/constipation;Uncontrolled nausea/vomiting  Reasons for deferring treatment reviewed with patient:: Yes  Additional education provided for: : Neutropenic precautions;Bleeding precautions  Procedure education provided for: : Port/PICC placement    Evaluation of Learning  Patient Education Provided: Yes  Readiness:: Eager  Method:: Booklet/Handout;Explanation  Response:: Verbalizes understanding           Intervention/Education provided during outreach:                                                       Will call early next week to follow up and complete the chemo teach once the treatment plan is officially in place.  Will monitor for scheduling    Confirmed patient has clinic and triage numbers    Signature:  Mihaela Koo RN

## 2024-07-18 NOTE — PROGRESS NOTES
"Oncology Rooming Note    July 18, 2024 3:23 PM   Kacie Hermosillo is a 84 year old female who presents for:    Chief Complaint   Patient presents with    Oncology Clinic Visit     Returning patient consult: Malignant neoplasm metastatic to bone, Malignant neoplasm of areola of left breast in female Bone Bx results follow up.     Initial Vitals: /68   Pulse 73   Temp 98.1  F (36.7  C) (Tympanic)   Resp 18   Ht 1.6 m (5' 2.99\")   Wt 59.9 kg (132 lb)   SpO2 95%   BMI 23.39 kg/m   Estimated body mass index is 23.39 kg/m  as calculated from the following:    Height as of this encounter: 1.6 m (5' 2.99\").    Weight as of this encounter: 59.9 kg (132 lb). Body surface area is 1.63 meters squared.  Severe Pain (7) Comment: Data Unavailable   No LMP recorded. Patient is postmenopausal.  Allergies reviewed: Yes  Medications reviewed: Yes    Medications: Medication refills not needed today.  Pharmacy name entered into [facility]:    [facility] [place], [place] - [address] [facility] [place], [place] [address] [id number] [place], [place] - [address] [id number] [place], [place] - [address] [facility] [address]    Frailty Screening:   Is the patient here for a new oncology consult visit in cancer care? 2. No      Clinical concerns:  RETURN [facility] - bone biopsy results follow up.            Jodi Rockwell MA            "

## 2024-07-18 NOTE — PROGRESS NOTES
"[facility] Hematology and Oncology Progress Note    Patient: Kacie Hermosillo  MRN: [id number]  Date of Service: Jul 18, 2024             ECOG Performance    1 - Can't do physically strenuous work, but fully ambyulatory and can do light sedentary work          ______________________________________________________________________________  Oncologic history  Metastatic breast cancer with bony metastatic disease.  ER negative DE negative HER2/amarilis 2+ FISH is pending    History of Present Illness    Ms. Kacie Hermosillo is a pleasant 84-year-old white female who was having back pain.  She had an MRI on 28 May which showed multiple areas of bony metastatic disease.  She does has a history of breast cancer in 1995 which was treated with surgery and radiation followed by chemotherapy as per the patient she also took a pill for 5 years.  She then had a PET scan done which showed PET avid metastatic disease in the bony skeleton.  Patient was then referred for a bone biopsy which was done and she is here to discuss the results.  She does complain of back pain which she thinks is chronic in nature she does think that it has become slightly worse lately.  She is accompanied by her daughter.      Review of systems  A comprehensive 12 point review of system was done that was negative except what is mentioned in the history of present illness    Past History    Past Medical History:   Diagnosis Date    Breast cancer (H) 01/01/1999    Heart valve replaced     Hx antineoplastic chemotherapy 01/01/1999    Hx of radiation therapy 01/01/1999    Hypertension        Past Surgical History:   Procedure Laterality Date    BIOPSY BREAST      HYSTERECTOMY      LUMPECTOMY BREAST Left 1999    OOPHORECTOMY Bilateral        Physical Exam    /68   Pulse 73   Temp 98.1  F (36.7  C) (Tympanic)   Resp 18   Ht 1.6 m (5' 2.99\")   Wt 59.9 kg (132 lb)   SpO2 95%   BMI 23.39 kg/m      General: alert, awake, not in acute " distress  HEENT: Head: Normal, normocephalic, atraumatic.  Eye: Normal external eye, conjunctiva, lids cornea, MARI.  Nose: Normal external nose, mucus membranes and septum.  Pharynx: Normal buccal mucosa. Normal pharynx.  Neck / Thyroid: Supple, no masses, nodes, nodules or enlargement.  Lymphatics: No abnormally enlarged lymph nodes.  Chest: Normal chest wall and respirations. Clear to auscultation.  No crackles or rhonchi's  Heart: S1 S2 RRR, no murmur.   Abdomen: abdomen is soft without significant tenderness, masses, organomegaly or guarding  Extremities: normal strength, tone, and muscle mass  Skin: normal. no rash or abnormalities  CNS: non focal.    Lab Results    Recent Results (from the past 240 hour(s))   Hemoglobin    Collection Time: 07/11/24  9:34 AM   Result Value Ref Range    Hemoglobin 13.5 11.7 - 15.7 g/dL   Platelet count    Collection Time: 07/11/24  9:34 AM   Result Value Ref Range    Platelet Count 247 150 - 450 10e3/uL   INR    Collection Time: 07/11/24  9:34 AM   Result Value Ref Range    INR 1.36 (H) 0.85 - 1.15   Surgical Pathology Exam    Collection Time: 07/11/24 10:55 AM   Result Value Ref Range    Case Report       Surgical Pathology Report                         Case: [id number]                                  Authorizing Provider:  Friedell, Peter E, MD      Collected:           07/11/2024 10:55 AM          Ordering Location:     [facility]      Received:            07/11/2024 11:32 AM                                 [facility] CT                                                           Pathologist:           Ra Sevilla MD                                                        Specimen:    Iliac Crest, Left, Multiple bone lesions, hx of breast cancer                              Final Diagnosis       BONE, LEFT ILIAC CREST, NEEDLE BIOPSY:  -METASTATIC CARCINOMA, CONSISTENT WITH ORIGIN IN THE BREAST  -PLEASE SEE COMMENT        Comment       The combined  morphologic and immunophenotypic features of this lesion are most consistent with metastatic breast carcinoma.  Suggest correlation with clinical and radiographic findings.    Due to the indeterminate status of HER2 expression by immunohistochemistry, additional analysis by FISH is being performed (ordered 7/12/2024).  The results will be reported separately in the electronic health record.        Clinical Information       Multiple bone lesions.      Gross Description       A(A). Iliac Crest, Left, Multiple bone lesions, hx of breast cancer:  Biopsy was performed under CT guidance by Dr. Fabricio Martinez with 3 pass(es) from which:                 3 Air-dried smear(s)  1 vial with 3 core(s) in 10% Formalin   Specimen in 10% Formalin at 10:55 on 7/11/24  1 core block(s) are prepared at [facility].    Assisted by:  JUANITO Chacon    GROSS DESCRIPTION:  The specimen consists of fragments of pink-tan to gray tissue in aggregate measuring 0.9 x 0.4 x 0.2 cm in greatest dimensions.  TE-1C  Time placed in formalin: 10:55 AM, 7/11/2024.    IMMEDIATE CYTOLOGIC EVALUATION (CORE IMPRINTS):  Passes 1-3: Adequate.  Ra Sevilla MD      Microscopic Description       Diff-Quik stained touch imprint slides show blood, leukocytes, and occasional atypical epithelial cell clusters.  The atypical epithelial cells are relatively small, but have high nuclear-cytoplasmic ratios, coarsely granular chromatin, and inconspicuous nucleoli.  H&E-stained needle biopsy sections show fragments of bone with hypercellular marrow (35-40%) with several clusters of relatively bland but atypical epithelial cells infiltrating stroma, provoking a myxoid reaction.  The fragments of bone show mild to moderate reactive changes.  Otherwise, all 3 cell lines are represented.  Due to clinical concern for the possibility of breast carcinoma, immunohistochemical stains are performed.  The results are as follows:    GATA3: Strongly positive in the vast  majority of epithelial cells  Cytokeratin-7: Strongly positive in the vast majority of epithelial cells  Cytokeratin-20: Negative  TTF-1: Negative  Estrogen receptor: Negative (absence of nuclear staining, all cells)  Progesterone receptor: Negative (absence of nuclear staining, all cells)  HER2: Indeterminate (2+ out of 3 membrane staining); additional analysis by FISH is pending (ordered 7/12/2024)    All controls stain appropriately.        Special Stains       Note - Estrogen and Progesterone Receptor Immunoperoxidase Stains:  These stains were done using the following criteria:    Specimen fixative: Formalin-fixed paraffin-embedded sections    Detection system: Biotin-free multimer-based technology detection system ([employer])    Retrieval method: CC1 pretreatment; a darius-based buffer with a slightly basic PH used at an elevated                                        temperature (95+5 degrees C)    Clone:   Estrogen receptor-SP1, Rabbit monoclonal ([place])     Progesterone receptor-1E2, Rabbit monoclonal ([place])        Scoring method (CAP/ASCO guidelines):                                       Intensity of nuclear stain: strong vs weak vs absent                                       Percentage of cells stained:                                         Indeterminate (Internal control cells present; no immunoreactivity of either cells or internal controls)                                         Negative (Percentage of cells with nuclear positivity is less than 1%)                                         Positive (Percentage of cells with nuclear positivity is equal to or greater than 1%)    REFERENCES:  1) Viji KH, Jane MOSQUERAH, Tanyat M, et al. Estrogen and progesterone receptor testing in breast cancer:      ASCO/CAP guideline update. Arch Pathol Lab Med doi: 10.5858/arpa.8061-8529-BR.      * This assay has not been validated on decalcified tissues. Results should be interpreted with caution given       the  possibility of false negativity on decalcified specimens.     Note - HER2/amarilis Immunoperoxidase Stain:    This stain was done using the following criteria:    Specimen fixative: Formalin-fixed paraffin-embedded sections    Detection system: Biotin-free multimer-based technology detection system ([employer])    Retrieval method: CC1 pretreatment; a darius-based buffer with a slightly basic PH used at an elevated     temperature (95 plus or minus 5 degrees C)    Clone:   4B5, Rabbit monoclonal ([place] [employer])      Scoring method (CAP/ASCO guidelines):     IHC 3+ - Positive (circumferential membrane staining that is complete, intense, and     within greater than 10% of tumor cells)       IHC 2+ - Equivocal (circumferential membrane staining that is incomplete and/or     weak/moderate and within greater than 10% of tumor cells or complete and     circumferential membrane staining that is intense and less than or equal to10% of tumor     cells)       IHC1+ - Negative (incomplete membrane staining that is faint/barely perceptible     and within greater than 10% of tumor cells)       IHC 0 - Negative (no staining is observed or membrane staining that is incomplete and     is faint/barely perceptible and within less than or equal to 10% of tumor cells)    REFERENCES:  1) Jennifer AC, Jane GALVEZ, Viji KH, et al. HER2 testing in breast cancer: American Society of Clinical      Oncology/College of American Pathologists clinical practice guideline focused update. Arch Pathol Lab      Med. 2018;142(11):4138-4626.     * HER2/amarilis stain performed using the [place] [employer]'s FDA approved methodology.     ** This assay has not been validated on decalcified tissues. Results should be interpreted with caution given       the possibility of false negativity on decalcified specimens.       MCRS Yes (A) N/A    Performing Labs       The technical component of this testing was completed at [facility]  [facility].    Stain controls for all stains resulted within this report have been reviewed and show appropriate reactivity.       Case Images     HER 2 DOMENIC FISH    Collection Time: 07/11/24 10:55 AM   Result Value Ref Range    Interpretation       Three unstained slides of paraffin-embedded tissue were received and processed by the [facility] on 7-15-24 for HER2 FISH testing. However, the areas of metastatic carcinoma marked on the IHC slide were not identified on the accompaning H&E-stained slide or on the slide processed for FISH; thus, assessment by FISH of the HER2 status of this specimen could not be performed. If additional tissue is obtained in the future, FISH can be performed at that time.       Billing Comment:  Credited all but probe fee      INR point of care    Collection Time: 07/18/24  3:00 PM   Result Value Ref Range    INR 2.7 (H) 0.9 - 1.1         Imaging    CT Bone Biopsy Deep    Result Date: 7/11/2024  EXAM: 1. PERCUTANEOUS BIOPSY LEFT ILIAC BONE LESION 2. CT GUIDANCE 3. CONSCIOUS SEDATION LOCATION: [facility] DATE: 7/11/2024 INDICATION:  Malignant neoplasm metastatic to bone (H) TECHNIQUE: Dose reduction techniques were used. PROCEDURE: Informed consent obtained. Site marked. Prior images reviewed. Required items made available. Patient identity confirmed verbally and with arm band. Patient reevaluated immediately before administering sedation. Universal protocol was followed. Time out performed. The site was prepped and draped in sterile fashion. 10 mL of 1% lidocaine was infused into the local soft tissues. Using standard technique and under direct CT guidance, a core bone biopsy device was used to obtain three core biopsies. Tissue was submitted to Pathology. The patient tolerated the procedure well. No immediate complications. SEDATION: Versed 1.5 mg. Fentanyl 75 mcg. The procedure was performed with administration intravenous conscious sedation  with appropriate preoperative, intraoperative, and postoperative evaluation. 22 minutes of supervised face to face conscious sedation time was provided by a radiology nurse under my direct supervision.     IMPRESSION: 1.  Successful CT-guided biopsy left iliac pelvic bone lesion biopsy. Reference CPT Codes: 12003, 06401, 26323    PET Oncology Whole Body    Result Date: 6/25/2024  EXAM: PET ONCOLOGY WHOLE BODY, CT CHEST/ABDOMEN/PELVIS W CONTRAST LOCATION: [facility] DATE: 6/25/2024 INDICATION: Breast cancer, left, restaging. Malignant neoplasm of nipple and areola, left female breast. Prior left breast lumpectomy. Prior radiation therapy and hormonal therapy. Recent lumbar spine MRI suggesting widespread metastatic disease. Subsequent treatment strategy. COMPARISON: CT chest abdomen pelvis 9/29/2021 reviewed. CONTRAST: 66 mL Isovue-370 TECHNIQUE: Serum glucose level 87 mg/dL. One hour post intravenous administration of 10.2 mCi F-18 FDG, PET imaging was performed from the skull vertex to feet, utilizing attenuation correction with concurrent axial CT and PET/CT image fusion. Separate diagnostic CT of the chest, abdomen, and pelvis was performed. Dose reduction techniques were used. PET/CT FINDINGS: Posttreatment changes left breast. No evidence of local recurrence. Surgical clips left axillary region. No FDG avid adenopathy in the left axilla or elsewhere. Several scattered sclerotic skeletal lesions consistent with osseous metastases, some of the larger of which demonstrate focal FDG activity, including T8 vertebral body, L1 vertebral body (SUVmax 5.9), L2 vertebral body, right L4 vertebral body (SUVmax 6.0), several sites in the bony pelvis including the sacrum and both iliac bones as well as the left posterior acetabulum and proximal left femur. Some of the sclerotic lesions are tiny, too small for PET characterization. CT FINDINGS: Moderate senescent intracranial changes. TAVR. Mild  atherosclerotic calcifications including the coronary arteries. Bandlike and reticular opacities scattered throughout the lungs suggesting areas of scarring and/or atelectasis. Cholecystectomy. Biliary prominence from reservoir effect postcholecystectomy. Benign calcified splenic granuloma. Moderate colonic diverticulosis, greatest in the sigmoid region. Hysterectomy. Bony demineralization. Severe chronic compression fracture T12. Mild scattered degenerative changes in the spine.     IMPRESSION: Several scattered osseous metastases, predominantly involving the lower spine and bony pelvis.    CT Chest/Abdomen/Pelvis w Contrast    Result Date: 6/25/2024  EXAM: PET ONCOLOGY WHOLE BODY, CT CHEST/ABDOMEN/PELVIS W CONTRAST LOCATION: [facility] DATE: 6/25/2024 INDICATION: Breast cancer, left, restaging. Malignant neoplasm of nipple and areola, left female breast. Prior left breast lumpectomy. Prior radiation therapy and hormonal therapy. Recent lumbar spine MRI suggesting widespread metastatic disease. Subsequent treatment strategy. COMPARISON: CT chest abdomen pelvis 9/29/2021 reviewed. CONTRAST: 66 mL Isovue-370 TECHNIQUE: Serum glucose level 87 mg/dL. One hour post intravenous administration of 10.2 mCi F-18 FDG, PET imaging was performed from the skull vertex to feet, utilizing attenuation correction with concurrent axial CT and PET/CT image fusion. Separate diagnostic CT of the chest, abdomen, and pelvis was performed. Dose reduction techniques were used. PET/CT FINDINGS: Posttreatment changes left breast. No evidence of local recurrence. Surgical clips left axillary region. No FDG avid adenopathy in the left axilla or elsewhere. Several scattered sclerotic skeletal lesions consistent with osseous metastases, some of the larger of which demonstrate focal FDG activity, including T8 vertebral body, L1 vertebral body (SUVmax 5.9), L2 vertebral body, right L4 vertebral body (SUVmax 6.0), several  sites in the bony pelvis including the sacrum and both iliac bones as well as the left posterior acetabulum and proximal left femur. Some of the sclerotic lesions are tiny, too small for PET characterization. CT FINDINGS: Moderate senescent intracranial changes. TAVR. Mild atherosclerotic calcifications including the coronary arteries. Bandlike and reticular opacities scattered throughout the lungs suggesting areas of scarring and/or atelectasis. Cholecystectomy. Biliary prominence from reservoir effect postcholecystectomy. Benign calcified splenic granuloma. Moderate colonic diverticulosis, greatest in the sigmoid region. Hysterectomy. Bony demineralization. Severe chronic compression fracture T12. Mild scattered degenerative changes in the spine.     IMPRESSION: Several scattered osseous metastases, predominantly involving the lower spine and bony pelvis.         Assessment and Plan    Metastatic breast cancer  This is an 84-year-old white female who was diagnosed with breast cancer in 1995 now presenting with multiple metastases.  A biopsy was done which has been reported is consistent with metastatic cancer of breast origin.  The tumor cells are strongly GATA3 positive and positive for CK7.  This is negative for ER and NC and HER2 was 2+.  HER2/amarilis by FISH is pending.  The diagnosis prognosis and various treatment options for metastatic breast cancers were discussed with the patient and her daughter.  It is interesting to note that she had breast cancer in 1995 and did receive 5 years of hormonal therapy.  I would like to obtain the pathology report from that surgery if it is at all possible.  However as far as treatment options are concerned hormonal therapy is not an option.  We need to wait for the HER2/amarilis testing by FISH to make a final decision for the most appropriate treatment for this 84-year-old probably is going to be HER2 directed therapy like Fam trastuzumab.  This will be effective if she is HER2  positive by FISH and is also an approved treatment for patients while HER2 low.  If patient is found to be HER2 negative by FISH other options would be a combination of chemotherapy with immunotherapy but that would be significantly toxic for this patient so targeting HER2/amarilis will continue to be the best option.  All this was discussed with the patient and her daughter in detail they had multiple questions all of them were answered to their satisfaction.  I will proceed with getting a port put in and consider starting her on fam-trastuzumab in about 2 weeks.    Patient has difficulty walking and needs a wheelchair and her inability to move prevents the patient from accomplishing MR ADL and places the patient at a reasonable risk of morbidity or mortality if she attempts to perform an MR ADL. not having a wheelchair also prevents her from completing an MR ADL within a reasonable timeframe.      1) await HER2/amarilis testing by FISH but targeting HER2 is probably the best treatment option so we will consider planning to start patient on fam-trastuzumab  2) plan to get a port put in and a cardiac echo prior to starting treatment  3) plan to obtain renal pathology report from her original diagnosis of breast cancer.  Will also do a mammogram to rule out any new lesion in the breast  Signed by: Hadley Gann MD        CC: Irina Francisco PA-C

## 2024-07-18 NOTE — PROGRESS NOTES
[facility]: Cancer Care                                                                                          -7/18/24 Called [facility] to follow up on the Her-2 testing that is in process at this time. Spoke with one of the directors of the [facility] and she relays that they had received a small sample that has been cut through repeatedly, unable to identify tumor at this time. Sample is not diagnostic. If there is more tissure available on a block they could try again.  -Called [facility] and they relayed that all of the specimen #[id number]  was sent to [place]  -Called [facility]  and they verified that there is still tissue in the block, there is not a lot but there is some tissue that remains.  -Called and spoke with Dr. Sevilla, Pathologist, and discussed the above. He created new request in case builder for new testing,  to please cut from the block.(New Her-2 testing needed to be created as new slides will be made).   Will watch for path to result  7/23/24 0725  Called [facility] and confirmed that new slides for Her-2 were sent to [facility] for processing.  7/23/24 6214 Called [facility] at the [place] and confirmed that they did receive the new slides for the Her-2 testing on 7/19. A new entry under the lab tab should be created in the future to monitor the result from Epic,  Will continue to monitor for the result    Signature:  Mihaela Koo RN

## 2024-07-19 ENCOUNTER — TELEPHONE (OUTPATIENT)
Dept: ANTICOAGULATION | Facility: CLINIC | Age: 84
End: 2024-07-19
Payer: COMMERCIAL

## 2024-07-19 NOTE — PROGRESS NOTES
ANTICOAGULATION MANAGEMENT     Kacie Hermosillo 84 year old female is on warfarin with therapeutic INR result. (Goal INR 2.0-3.0)    Recent labs: (last 7 days)     07/18/24  1500   INR 2.7*       ASSESSMENT     Source(s): Chart Review     Warfarin doses taken: Reviewed in chart  Diet: No new diet changes identified  Medication/supplement changes: None noted  New illness, injury, or hospitalization: No-saw oncology today regarding bone biopsy results.  Signs or symptoms of bleeding or clotting: No  Previous result: Subtherapeutic as needed for bone biopsy  Additional findings: Per Oncology visit 7/18/24 -    MRI on 28 May which showed multiple areas of bony metastatic disease. She does has a history of breast cancer in 1995 which was treated with surgery and radiation followed by chemotherapy as per the patient she also took a pill for 5 years. She then had a PET scan done which showed PET avid metastatic disease in the bony skeleton. Patient was then referred for a bone biopsy which was done and she is here to discuss the results. She does complain of back pain which she thinks is chronic in nature she does think that it has become slightly worse lately. She is accompanied by her daughter.     -Plan is for her to receive Enhertu (no interaction with Warfarin)  -IR Referral for port placement was made today and port demo was used for education.    -She does identify needs for transportation when her children are unable to bring her to her apts. SW referral placed today and she understands that Domonique will reach out to her early next week to discuss.          PLAN     Unable to reach Kacie by phone today.    Left message to take 2.5 mg Friday and Saturday, 5 mg Sunday this weekend. Request call back for assessment. MyChart Message sent also    Follow up required to confirm warfarin dose taken and assess for changes    [facility] being consulted due to multiple variables.    Praveena Morris, RN  Anticoagulation  Clinic  7/19/2024

## 2024-07-19 NOTE — PROGRESS NOTES
ANTICOAGULATION MANAGEMENT         ASSESSMENT     Source(s): Chart Review and Patient/Caregiver Call     Warfarin doses taken: Warfarin taken as instructed  Diet: No new diet changes identified  Medication/supplement changes: None noted  New illness, injury, or hospitalization: No  Signs or symptoms of bleeding or clotting: No  Previous result: Subtherapeutic  Additional findings:  Port placement is scheduled 7/30/24. Message to [facility] today for hold plan.       PLAN     Recommended plan for temporary change(s) and ongoing change(s) affecting INR     Dosing Instructions: Continue your current warfarin dose with next INR in 1 week       Summary  As of 7/18/2024      Full warfarin instructions:  5 mg every Sun, Tue, Thu; 2.5 mg all other days   Next INR check:  7/25/2024               Telephone call with Kacie who verbalizes understanding and agrees to plan and who agrees to plan and repeated back plan correctly    Lab visit scheduled    Education provided: Please call back if any changes to your diet, medications or how you've been taking warfarin  Goal range and lab monitoring: goal range and significance of current result and Importance of following up at instructed interval  Contact [phone] with any changes, questions or concerns.     Plan made with [facility] Pharmacist Zaira Morris, RN  Anticoagulation Clinic  7/19/2024    _______________________________________________________________________     Anticoagulation Episode Summary       Current INR goal:  2.0-3.0   TTR:  69.2% (1 y)   Target end date:  Indefinite   Send INR reminders to:  VIRGILIO WEEKS    Indications    S/P TAVR (transcatheter aortic valve replacement) [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 Olguin on 5/24/23  (4/10/23-Cardiac CT: Radiographic features of valve leaflet thickening with concurrent   hypoattenuation and reduced leaflet motion all highly suggestive of valve   leaflet thrombosis)              Anticoagulation Care Providers       Provider Role Specialty Phone number    Irina Francisco PA-C Referring Family Medicine [phone]

## 2024-07-19 NOTE — TELEPHONE ENCOUNTER
MIRTHA-PROCEDURAL ANTICOAGULATION  MANAGEMENT    Kacie requesting pre-procedure hold orders for warfarin and review for bridging      Procedure date: 7/30/24       Procedure:  Port placement      Procedure location and phone number (if external): [place]     Number of warfarin hold days requested and/or target INR: unknown    Pre-op date: Not applicable      Routing to Anticoagulation Pharmacist for review.      Praveena Morris RN

## 2024-07-22 ENCOUNTER — PATIENT OUTREACH (OUTPATIENT)
Dept: CARE COORDINATION | Facility: CLINIC | Age: 84
End: 2024-07-22
Payer: COMMERCIAL

## 2024-07-22 NOTE — PROGRESS NOTES
Dosing calendar updated per [employer] Message received from patient about doses taken this Saturday and Sunday.  INR appointment is made for 7/25.  Praveena Morris RN

## 2024-07-22 NOTE — TELEPHONE ENCOUNTER
Social Work - Intervention  [facility]  Data/Intervention:    Patient Name: Kacie Hermosillo Goes By: Kacie    /Age: 1940 (84 year old)     Visit Type: telephone  Referral Source: JORDEN Craft  Reason for Referral: Transportation     Psychosocial Information/Concerns:  Kacie is an 84 year old with a diagnosis of metastatic breast cancer with bony metastatic disease. She has a history of breast cancer. She follows with Dr. Gann. She is coping well at time of call. She has good family support. She knows her daughters will drive her to most of her appts but would like to discuss other options. SW gave information for [employer] [facility] - [phone]. Also she doesn't believe [employer] comes to her area. SW can also assist with ordering a cab ride if no other options.     SW encouraged Kacie and her family to call as needs arise.      Intervention/Education/Resources Provided:  Introduced SW role   Provided transportation resources   Assessed support system     Assessment/Plan:  Provided patient/family with contact information and availability.    KASSIDY An, [facility]  Adult Oncology Clinics  [place] (M,W), [place] (T) & [place] (Th)  *I am off Friday  Office: [phone]

## 2024-07-23 RX ORDER — PROCHLORPERAZINE MALEATE 10 MG
10 TABLET ORAL EVERY 6 HOURS PRN
Qty: 30 TABLET | Refills: 2 | Status: ON HOLD | OUTPATIENT
Start: 2024-07-28

## 2024-07-23 RX ORDER — ONDANSETRON 8 MG/1
8 TABLET, FILM COATED ORAL EVERY 8 HOURS PRN
Qty: 30 TABLET | Refills: 2 | Status: ON HOLD | OUTPATIENT
Start: 2024-07-28

## 2024-07-23 RX ORDER — DEXAMETHASONE 4 MG/1
8 TABLET ORAL DAILY
Qty: 6 TABLET | Refills: 2 | Status: SHIPPED | OUTPATIENT
Start: 2024-07-28 | End: 2024-10-06

## 2024-07-23 NOTE — PROGRESS NOTES
[facility]: Cancer Care                                                                                          Chemo education was started on 7/18/24 when she was at clinic for follow up with Dr. Gann. (See separate encounter for additional details) At the time of her visit the treatment plan was not in place so writer shared that she would call to follow up with the specifics of her treatment plan.  -Port placement was scheduled on 7/30/24. Emla cream already ordered and she has picked up from the pharmacy. Instructed how to use  emla and did advise not to apply near the glue after she has the port placed. May consider waiting to apply until C2    -Treatment plan was placed for Enhertu, 21 day cycle.    -Take home meds ordered are:  Ondansetron (ZOFRAN), Prochlorperazine (Compazine) to be taken as needed for nausea.    Dexamethasone (4 mg tablet)Take 2 tablets (8 mg) by mouth daily Take for 3 days, starting the day after chemo. Take with food. Reviewed side effects of steroid and how to take.    Medications released from the treatment plan and sent to [facility] in [place]. She understands that she will need to  3 medications at the pharmacy.    -Of note, order for wheel chair was placed on 7/18. Per [facility] additional documentation  is needed. Will discuss with Dr. Gann about adding the additional needed information his note. Kacie states that her  back is very weak  and sore from her disease and she needs  the wheel chair as she is simply unable to ambulate for any amount of distance.    Signature:  Mihaela Koo RN

## 2024-07-24 ENCOUNTER — TELEPHONE (OUTPATIENT)
Dept: INTERVENTIONAL RADIOLOGY/VASCULAR | Facility: HOSPITAL | Age: 84
End: 2024-07-24
Payer: COMMERCIAL

## 2024-07-24 NOTE — TELEPHONE ENCOUNTER
Prachit instructions were shared with Kacie and daughter, Sulma.    Sulma has some concerns that after her last procedure 2 weeks ago at [facility], Kacie collapsed when she arrived home due to dehydration.    I suggested that Sulma discuss Kacie's symptoms with the care team on 7/30. The team could give her extra IV fluids as necessary and monitor her closely. Sulma also plans to bring in some extra food for Kacie as well. No further questions or issues.

## 2024-07-24 NOTE — TELEPHONE ENCOUNTER
"MIRTHA-PROCEDURAL ANTICOAGULATION  MANAGEMENT    ASSESSMENT     Warfarin interruption plan for Port placement on 7/30/24.    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 Consult pharmacist if INR outside goal range or high end of goal range ( 2.7-3.0)  ?   Zaira Tena [facility]    SUBJECTIVE/OBJECTIVE     Kacie Hermosillo, a 84 year old female    Goal INR Range: 2.0-3.0     Wt Readings from Last 3 Encounters:   07/18/24 59.9 kg (132 lb)   06/27/24 60.7 kg (133 lb 14.4 oz)   06/11/24 60.6 kg (133 lb 9.6 oz)      Ideal body weight: 52.4 kg (115 lb 7.7 oz)  Adjusted ideal body weight: 55.4 kg (122 lb 1.4 oz)     Estimated body mass index is 23.39 kg/m  as calculated from the following:    Height as of 7/18/24: 1.6 m (5' 2.99\").    Weight as of 7/18/24: 59.9 kg (132 lb).    Lab Results   Component Value Date    INR 2.7 (H) 07/18/2024    INR 1.36 (H) 07/11/2024    INR 2.0 (H) 07/08/2024     Lab Results   Component Value Date    HGB 13.5 07/11/2024    HCT 45.1 06/11/2024     07/11/2024     Lab Results   Component Value Date    CR 1.02 (H) 06/11/2024    CR 1.09 (H) 05/24/2024    CR 0.96 (H) 05/04/2023     Estimated Creatinine Clearance: 38.8 mL/min (A) (based on SCr of 1.02 mg/dL (H)).    "

## 2024-07-25 ENCOUNTER — ANTICOAGULATION THERAPY VISIT (OUTPATIENT)
Dept: ANTICOAGULATION | Facility: CLINIC | Age: 84
End: 2024-07-25

## 2024-07-25 ENCOUNTER — LAB (OUTPATIENT)
Dept: LAB | Facility: CLINIC | Age: 84
End: 2024-07-25
Payer: COMMERCIAL

## 2024-07-25 DIAGNOSIS — I63.9 CEREBELLAR STROKE (H): ICD-10-CM

## 2024-07-25 DIAGNOSIS — Z95.2 S/P TAVR (TRANSCATHETER AORTIC VALVE REPLACEMENT): Primary | ICD-10-CM

## 2024-07-25 DIAGNOSIS — Z95.2 S/P TAVR (TRANSCATHETER AORTIC VALVE REPLACEMENT): ICD-10-CM

## 2024-07-25 LAB — INR BLD: 2.3 (ref 0.9–1.1)

## 2024-07-25 PROCEDURE — 36416 COLLJ CAPILLARY BLOOD SPEC: CPT

## 2024-07-25 PROCEDURE — 85610 PROTHROMBIN TIME: CPT

## 2024-07-25 NOTE — PROGRESS NOTES
ANTICOAGULATION MANAGEMENT     Kacie Hermosillo 84 year old female is on warfarin with therapeutic INR result. (Goal INR 2.0-3.0)    Recent labs: (last 7 days)     07/25/24  1315   INR 2.3*       ASSESSMENT     Source(s): Chart Review and Patient/Caregiver Call     Warfarin doses taken: Warfarin taken as instructed  Diet: No new diet changes identified  Medication/supplement changes:  7/28 scripts for Zofran and Compazine available, 8/2 Decadron for 3 days, starting the day after chemo   New illness, injury, or hospitalization: metastatic breast cancer with bony metastatic disease  Signs or symptoms of bleeding or clotting: No  Previous result: Therapeutic last visit; previously outside of goal range  Additional findings:  7/30 port placement, INR <3.0 per IR protocol, planning to start chemo (ENHERTU) on 8/1/24       PLAN     Recommended plan for temporary change(s) affecting INR     Dosing Instructions: Continue your current warfarin dose with next INR in 1 week       Summary  As of 7/25/2024      Full warfarin instructions:  5 mg every Sun, Tue, Thu; 2.5 mg all other days   Next INR check:  8/1/2024               Telephone call with Kacie who verbalizes understanding and agrees to plan    Venous INR order placed so INR can be checked during chemo lab appointment on 8/1/24     Education provided: Goal range and lab monitoring: goal range and significance of current result  Contact [phone] with any changes, questions or concerns.     Plan made per [facility] anticoagulation protocol    Janna Rees, RN  Anticoagulation Clinic  7/25/2024    _______________________________________________________________________     Anticoagulation Episode Summary       Current INR goal:  2.0-3.0   TTR:  69.2% (1 y)   Target end date:  Indefinite   Send INR reminders to:  VIRGILIO WEEKS    Indications    S/P TAVR (transcatheter aortic valve replacement) [Z95.2]  Cerebellar stroke (H) [I63.9]             Comments:  2.0-3.0 per  Dr. 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

## 2024-07-26 LAB
CYTOGENETICS ADDENDUM: NORMAL
INTERPRETATION: NORMAL

## 2024-07-29 NOTE — PROGRESS NOTES
Interventional Radiology - Pre-Procedure Note:  Outpatient - [facility]  07/30/2024     Procedure Requested: Port placement  Requested by: Neymar Gann MD    History and Physical Reviewed: H&P documented within 30 days (by Neymar Gann MD on 07/18/2024). I have personally reviewed the patient's medical history and have updated the medical record as necessary.    Brief HPI: Kacie Hermosillo is a 84 year old female with a PMH of HTN, TAVR, and recurrent breast cancer now metastatic to bone (originally diagnosed 1995) s/p chemotherapy, radiation, and LEFT mastectomy who presents for a port placement. Further treatment plan includes chemotherapy. No plans for radiation or surgery. Requesting port placement.     IMAGING:  EXAM: PET ONCOLOGY WHOLE BODY, CT CHEST/ABDOMEN/PELVIS W CONTRAST  LOCATION: [facility]  DATE: 6/25/2024     INDICATION: Breast cancer, left, restaging. Malignant neoplasm of nipple and areola, left female breast. Prior left breast lumpectomy. Prior radiation therapy and hormonal therapy. Recent lumbar spine MRI suggesting widespread metastatic disease.   Subsequent treatment strategy.  COMPARISON: CT chest abdomen pelvis 9/29/2021 reviewed.  CONTRAST: 66 mL Isovue-370  TECHNIQUE: Serum glucose level 87 mg/dL. One hour post intravenous administration of 10.2 mCi F-18 FDG, PET imaging was performed from the skull vertex to feet, utilizing attenuation correction with concurrent axial CT and PET/CT image fusion. Separate   diagnostic CT of the chest, abdomen, and pelvis was performed. Dose reduction techniques were used.     PET/CT FINDINGS: Posttreatment changes left breast. No evidence of local recurrence. Surgical clips left axillary region. No FDG avid adenopathy in the left axilla or elsewhere. Several scattered sclerotic skeletal lesions consistent with osseous   metastases, some of the larger of which demonstrate  focal FDG activity, including T8 vertebral body, L1 vertebral body (SUVmax 5.9), L2 vertebral body, right L4 vertebral body (SUVmax 6.0), several sites in the bony pelvis including the sacrum and both   iliac bones as well as the left posterior acetabulum and proximal left femur. Some of the sclerotic lesions are tiny, too small for PET characterization.     CT FINDINGS: Moderate senescent intracranial changes. TAVR. Mild atherosclerotic calcifications including the coronary arteries. Bandlike and reticular opacities scattered throughout the lungs suggesting areas of scarring and/or atelectasis.   Cholecystectomy. Biliary prominence from reservoir effect postcholecystectomy. Benign calcified splenic granuloma. Moderate colonic diverticulosis, greatest in the sigmoid region. Hysterectomy. Bony demineralization. Severe chronic compression fracture   T12. Mild scattered degenerative changes in the spine.                                                                      IMPRESSION:  Several scattered osseous metastases, predominantly involving the lower spine and bony pelvis.    NPO: Midnight  ANTICOAGULANTS:  Warfarin daily, recommend INR <3.0 for procedure, INR today 2.36  ASA 81 mg  ANTIBIOTICS: Ancef 2 g    ALLERGIES  No Known Allergies      LABS:  INR   Date Value Ref Range Status   07/30/2024 2.36 (H) 0.85 - 1.15 Final      Hemoglobin   Date Value Ref Range Status   07/11/2024 13.5 11.7 - 15.7 g/dL Final     Platelet Count   Date Value Ref Range Status   07/11/2024 247 150 - 450 10e3/uL Final     Creatinine   Date Value Ref Range Status   06/11/2024 1.02 (H) 0.51 - 0.95 mg/dL Final     Potassium   Date Value Ref Range Status   06/11/2024 4.3 3.4 - 5.3 mmol/L Final         EXAM:  BP (!) 143/69   Pulse 64   Temp 98.2  F (36.8  C)   Resp 16   SpO2 99%    General: Stable. In no acute distress.    Neuro: Alert and oriented x 3. No focal deficits.  Psych: Appropriate mood and affect. Linear/coherent thought  process.   Resp: Normal respirations. Lungs clear to auscultation bilaterally.  Cardio: S1S2, regular rate and rhythm, without murmur, clicks or rubs.  Skin: Warm and dry. Without excoriations, ecchymosis, erythema, lesions or open sores on chest.      Pre-Sedation Assessment:  Mallampati Airway Classification:  II - Faucial pillars and soft palate may be seen, but uvula is masked by the base of the tongue  Previous reaction to anesthesia/sedation:  No  Sedation plan based on assessment: Moderate (conscious) sedation  ASA Classification: Class 3 - SEVERE SYSTEMIC DISEASE, DEFINITE FUNCTIONAL LIMITATIONS.   Code Status: FULL CODE      ASSESSMENT/PLAN:   Port placement with sedation, laterality per proceduralist. No contraindications to RIGHT sided placement.    Procedural education reviewed with patient/family in detail including, but not limited to risks, benefits and alternatives with understanding verbalized by patient/family.    Total time spent on the date of the encounter: 20 minutes.      MCKAYLA DUMONT CNP  Interventional Radiology

## 2024-07-30 ENCOUNTER — HOSPITAL ENCOUNTER (OUTPATIENT)
Dept: INTERVENTIONAL RADIOLOGY/VASCULAR | Facility: HOSPITAL | Age: 84
Discharge: HOME | End: 2024-07-30
Attending: INTERNAL MEDICINE | Admitting: RADIOLOGY
Payer: COMMERCIAL

## 2024-07-30 VITALS
OXYGEN SATURATION: 99 % | HEART RATE: 68 BPM | SYSTOLIC BLOOD PRESSURE: 148 MMHG | RESPIRATION RATE: 18 BRPM | DIASTOLIC BLOOD PRESSURE: 68 MMHG | TEMPERATURE: 98.2 F

## 2024-07-30 DIAGNOSIS — C79.51 MALIGNANT NEOPLASM METASTATIC TO BONE (H): ICD-10-CM

## 2024-07-30 LAB — INR PPP: 2.36 (ref 0.85–1.15)

## 2024-07-30 PROCEDURE — 99152 MOD SED SAME PHYS/QHP 5/>YRS: CPT

## 2024-07-30 PROCEDURE — C1788 PORT, INDWELLING, IMP: HCPCS

## 2024-07-30 PROCEDURE — C1769 GUIDE WIRE: HCPCS

## 2024-07-30 PROCEDURE — 36415 COLL VENOUS BLD VENIPUNCTURE: CPT

## 2024-07-30 PROCEDURE — 85610 PROTHROMBIN TIME: CPT

## 2024-07-30 PROCEDURE — 250N000009 HC RX 250

## 2024-07-30 PROCEDURE — 272N000500 HC NEEDLE CR2

## 2024-07-30 PROCEDURE — 250N000011 HC RX IP 250 OP 636

## 2024-07-30 PROCEDURE — 250N000011 HC RX IP 250 OP 636: Performed by: RADIOLOGY

## 2024-07-30 RX ORDER — ONDANSETRON 2 MG/ML
4 INJECTION INTRAMUSCULAR; INTRAVENOUS
Status: DISCONTINUED | OUTPATIENT
Start: 2024-07-30 | End: 2024-07-31 | Stop reason: HOSPADM

## 2024-07-30 RX ORDER — HEPARIN SODIUM (PORCINE) LOCK FLUSH IV SOLN 100 UNIT/ML 100 UNIT/ML
500 SOLUTION INTRAVENOUS ONCE
Status: COMPLETED | OUTPATIENT
Start: 2024-07-30 | End: 2024-07-30

## 2024-07-30 RX ORDER — NALOXONE HYDROCHLORIDE 0.4 MG/ML
0.4 INJECTION, SOLUTION INTRAMUSCULAR; INTRAVENOUS; SUBCUTANEOUS
Status: DISCONTINUED | OUTPATIENT
Start: 2024-07-30 | End: 2024-07-31 | Stop reason: HOSPADM

## 2024-07-30 RX ORDER — NALOXONE HYDROCHLORIDE 0.4 MG/ML
0.2 INJECTION, SOLUTION INTRAMUSCULAR; INTRAVENOUS; SUBCUTANEOUS
Status: DISCONTINUED | OUTPATIENT
Start: 2024-07-30 | End: 2024-07-31 | Stop reason: HOSPADM

## 2024-07-30 RX ORDER — SODIUM CHLORIDE 9 MG/ML
INJECTION, SOLUTION INTRAVENOUS CONTINUOUS
Status: DISCONTINUED | OUTPATIENT
Start: 2024-07-30 | End: 2024-07-31 | Stop reason: HOSPADM

## 2024-07-30 RX ORDER — LIDOCAINE 40 MG/G
CREAM TOPICAL
Status: DISCONTINUED | OUTPATIENT
Start: 2024-07-30 | End: 2024-07-31 | Stop reason: HOSPADM

## 2024-07-30 RX ORDER — CEFAZOLIN SODIUM/WATER 2 G/20 ML
2 SYRINGE (ML) INTRAVENOUS
Status: COMPLETED | OUTPATIENT
Start: 2024-07-30 | End: 2024-07-30

## 2024-07-30 RX ORDER — FLUMAZENIL 0.1 MG/ML
0.2 INJECTION, SOLUTION INTRAVENOUS
Status: DISCONTINUED | OUTPATIENT
Start: 2024-07-30 | End: 2024-07-31 | Stop reason: HOSPADM

## 2024-07-30 RX ORDER — FENTANYL CITRATE 50 UG/ML
25-50 INJECTION, SOLUTION INTRAMUSCULAR; INTRAVENOUS EVERY 5 MIN PRN
Status: DISCONTINUED | OUTPATIENT
Start: 2024-07-30 | End: 2024-07-31 | Stop reason: HOSPADM

## 2024-07-30 RX ADMIN — Medication 2 G: at 09:43

## 2024-07-30 RX ADMIN — MIDAZOLAM HYDROCHLORIDE 0.5 MG: 1 INJECTION, SOLUTION INTRAMUSCULAR; INTRAVENOUS at 10:11

## 2024-07-30 RX ADMIN — LIDOCAINE HYDROCHLORIDE 1 ML: 10 INJECTION, SOLUTION INFILTRATION; PERINEURAL at 10:14

## 2024-07-30 RX ADMIN — FENTANYL CITRATE 25 MCG: 50 INJECTION, SOLUTION INTRAMUSCULAR; INTRAVENOUS at 10:13

## 2024-07-30 RX ADMIN — MIDAZOLAM HYDROCHLORIDE 1 MG: 1 INJECTION, SOLUTION INTRAMUSCULAR; INTRAVENOUS at 10:02

## 2024-07-30 RX ADMIN — FENTANYL CITRATE 50 MCG: 50 INJECTION, SOLUTION INTRAMUSCULAR; INTRAVENOUS at 10:04

## 2024-07-30 RX ADMIN — Medication 500 UNITS: at 10:13

## 2024-07-30 NOTE — IR NOTE
Patient Name: Kacie Hermosillo  Medical Record Number: [id number]  Today's Date: 7/30/2024    Procedure: port placement  Proceduralist: Giuseppe    Sedation medications administered: 1.5 mg midazolam and 75 mcg fentanyl   Sedation time: 25 minutes

## 2024-07-30 NOTE — DISCHARGE INSTRUCTIONS
Port Placement Procedure Discharge Instructions:  You had a port placed. A port is a small medical device that is placed under the skin and is connected to a vein with a catheter (thin, flexible tube). Ports can be used to administer IV medications (including chemotherapy), fluids or blood products or for blood lab draws. Please follow the below instructions after your procedure:    Care Instructions:  - If you received sedation for your procedure, do not drive or operate heavy machinery for the rest of the day.  - You may shower beginning tomorrow (post procedure day #1). Do not scrub site until well healed; pat dry gently with a towel.  - You likely have skin adhesive over your port site. Skin adhesive works like a bandage to keep the site covered and protected. Do not use antibiotic ointment or creams/lotions over adhesive as it can break it down. The skin adhesive will peel off on its own (typically in 5-14 days).  - Avoid submerging the port site under water (ex: tub baths, Jacuzzis, lakes, hot tubs and pools) for 10 days or until your site is well healed.  - You may have some discomfort, minimal swelling, redness and/or bruising at your port site/procedure site. You may take over the counter pain medication for discomfort (follow the package directions) or apply an ice pack wrapped in a towel over the site (rotating 20 minutes with ice pack on and 20 minutes with ice pack off) for comfort as needed. It can take several days for these to resolve.  - Avoid heavy lifting (greater than 10 pounds) and strenuous activities for 2 days following your procedure.   - If you experience significant bleeding at site, apply pressure with hands above the clavicle bone, sit upright and seek immediate medical assistance.  - Ports need to be flushed approximately every 4-6 weeks, if not being used more frequently. Follow up with the provider who ordered your port placement for further instructions for this.    Seek medical  evaluation or contact Tristen LOMAS RN Line at [phone] if you experience the following:  - Uncontrolled bleeding from port site  - Fever (greater than 101 F (38.3C))  - Purulent (yellow/green/foul smelling) drainage from port insertion site  - Increasing pain at port site  - Increasing redness at port site

## 2024-07-30 NOTE — PRE-PROCEDURE
GENERAL PRE-PROCEDURE:   Procedure:  Port placement  Date/Time:  7/30/2024 9:37 AM    Written consent obtained?: Yes    Risks and benefits: Risks, benefits and alternatives were discussed    Consent given by:  Patient  Patient states understanding of procedure being performed: Yes    Patient's understanding of procedure matches consent: Yes    Procedure consent matches procedure scheduled: Yes    Expected level of sedation:  Moderate  Appropriately NPO:  Yes  ASA Class:  3  Mallampati  :  Grade 2- soft palate, base of uvula, tonsillar pillars, and portion of posterior pharyngeal wall visible  Lungs:  Lungs clear with good breath sounds bilaterally  Heart:  Normal heart sounds and rate  History & Physical reviewed:  History and physical reviewed and no updates needed  Statement of review:  I have reviewed the lab findings, diagnostic data, medications, and the plan for sedation

## 2024-07-31 ENCOUNTER — PATIENT OUTREACH (OUTPATIENT)
Dept: ONCOLOGY | Facility: HOSPITAL | Age: 84
End: 2024-07-31
Payer: COMMERCIAL

## 2024-07-31 ENCOUNTER — DOCUMENTATION ONLY (OUTPATIENT)
Dept: ONCOLOGY | Facility: CLINIC | Age: 84
End: 2024-07-31

## 2024-07-31 ENCOUNTER — TELEPHONE (OUTPATIENT)
Dept: ANTICOAGULATION | Facility: CLINIC | Age: 84
End: 2024-07-31
Payer: COMMERCIAL

## 2024-07-31 DIAGNOSIS — Z95.2 S/P TAVR (TRANSCATHETER AORTIC VALVE REPLACEMENT): Primary | ICD-10-CM

## 2024-07-31 DIAGNOSIS — I63.9 CEREBELLAR STROKE (H): ICD-10-CM

## 2024-07-31 NOTE — TELEPHONE ENCOUNTER
"MIRTHA-PROCEDURAL ANTICOAGULATION  MANAGEMENT    ASSESSMENT     Warfarin interruption plan for Back Injection on 8/6/24.    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 Olguin, [facility], to hold warfarin 3-5 dayswithout bridge for cortizone knee injection      RECOMMENDATION     Spoke to DAVID Hallman at [facility]-No aspirin hold.     Pre-Procedure:  Hold warfarin for 5 days, until after procedure starting: Thur 8/1/24   No Bridge  Okay to continue aspirin    Post-Procedure:  Resume warfarin dose if okay with provider doing procedure on night of procedure, 8/6 PM: 7.5 mg x 1 day then 5 mg x 1 day then continue current dose  Recheck INR ~ 7 days after resuming warfarin     Plan routed to referring provider for approval  ?   Zaira Tena, [facility]    SUBJECTIVE/OBJECTIVE     Kaciewalt Hermosillo, a 84 year old female    Goal INR Range: 2.0-3.0     Wt Readings from Last 3 Encounters:   07/18/24 59.9 kg (132 lb)   06/27/24 60.7 kg (133 lb 14.4 oz)   06/11/24 60.6 kg (133 lb 9.6 oz)      Ideal body weight: 52.4 kg (115 lb 7.7 oz)  Adjusted ideal body weight: 55.4 kg (122 lb 1.4 oz)     Estimated body mass index is 23.39 kg/m  as calculated from the following:    Height as of 7/18/24: 1.6 m (5' 2.99\").    Weight as of 7/18/24: 59.9 kg (132 lb).    Lab Results   Component Value Date    INR 2.36 (H) 07/30/2024    INR 2.3 (H) 07/25/2024    INR 2.7 (H) 07/18/2024     Lab Results   Component Value Date    HGB 13.5 07/11/2024    HCT 45.1 06/11/2024     07/11/2024     Lab Results   Component Value Date    CR 1.02 (H) 06/11/2024    CR 1.09 (H) 05/24/2024    CR 0.96 (H) 05/04/2023     Estimated Creatinine Clearance: 38.8 mL/min (A) (based on SCr of 1.02 mg/dL (H)).    "

## 2024-07-31 NOTE — PROGRESS NOTES
Situation: Patient chart reviewed by care coordinator.    Background: Patient is scheduled for Enhertu infusion on 8/1/24. Per [facility] [employer] team. Insurance has denied the Enhertu stating it is not medically necessary. Letter of Medical Necessity was completed today by Dr. Gann and will be sent to insurance to review. Per [facility] [employer], Appeals on denials take at bare minimum 7-10 days.     Plan/Recommendations: Dr. Gann has contacted patient/her daughter and discussed the above and recommends that patient's appointments be rescheduled in two weeks. Scheduling team has reached out to patient and rescheduled apts to 8/15/24    Mihaela Koo RN

## 2024-07-31 NOTE — TELEPHONE ENCOUNTER
MIRTHA-PROCEDURAL ANTICOAGULATION  MANAGEMENT    Kacie requesting pre-procedure hold orders for warfarin and review for bridging      Procedure date: 8/6/24       Procedure:  Cortisone injection      Procedure location and phone number (if external):  [facility] in [place]     Number of warfarin hold days requested and/or target INR: 5 days    Pre-op date: Not applicable      Routing to Anticoagulation Pharmacist for review.     [facility] pool: [employer] WEEKS    Per [employer] Message from daughter this morning: Kacie has a cortisone injection scheduled on Tuesday 8/6 at 10:30 at [facility] in [place]. They told her to stop warfarin 5 days before. Today is the last day to take the warfarin. Should she stop the aspirin as well? She will resume the warfarin on Tuesday 8/6 after the shot.   Thanks- Barbie Kearns daughter   [phone]     Praveena Morris RN

## 2024-07-31 NOTE — TELEPHONE ENCOUNTER
I spoke with Kacie and her daughter Sulma. I will also send a [employer] Message with hold and resumption instructions for Warfarin.  They had no other questions at this time.      Summary  As of 7/31/2024      Full warfarin instructions:  8/1: Hold; 8/2: Hold; 8/3: Hold; 8/4: Hold; 8/5: Hold; 8/6: 7.5 mg; 8/7: 5 mg; Otherwise 5 mg every Sun, Tue, Thu; 2.5 mg all other days   Next INR check:  8/13/2024                 Praveena Morris RN  Anticoagulation Clinic

## 2024-08-04 ENCOUNTER — E-VISIT (OUTPATIENT)
Dept: URGENT CARE | Facility: CLINIC | Age: 84
End: 2024-08-04
Payer: COMMERCIAL

## 2024-08-04 DIAGNOSIS — N39.0 ACUTE UTI (URINARY TRACT INFECTION): Primary | ICD-10-CM

## 2024-08-04 PROCEDURE — 99421 OL DIG E/M SVC 5-10 MIN: CPT | Performed by: PHYSICIAN ASSISTANT

## 2024-08-04 RX ORDER — CEFDINIR 300 MG/1
300 CAPSULE ORAL 2 TIMES DAILY
Qty: 14 CAPSULE | Refills: 0 | Status: SHIPPED | OUTPATIENT
Start: 2024-08-04 | End: 2024-08-11

## 2024-08-04 NOTE — PATIENT INSTRUCTIONS
Dear Kacie Hermosillo    After reviewing your responses, I've been able to diagnose you with a urinary tract infection, which is a common infection of the bladder with bacteria.  This is not a sexually transmitted infection, though urinating immediately after intercourse can help prevent infections.  Drinking lots of fluids is also helpful to clear your current infection and prevent the next one.      I have sent a prescription for antibiotics to your pharmacy to treat this infection.    It is important that you take all of your prescribed medication even if your symptoms are improving after a few doses.  Taking all of your medicine helps prevent the symptoms from returning.     If your symptoms worsen, you develop pain in your back or stomach, develop fevers, or are not improving in 5 days, please contact your primary care provider for an appointment or visit any of our convenient Walk-in or Urgent Care Centers to be seen, which can be found on our website here.    Thanks again for choosing us as your health care partner,    Simone Sutherland PA-C

## 2024-08-05 ENCOUNTER — PATIENT OUTREACH (OUTPATIENT)
Dept: ONCOLOGY | Facility: HOSPITAL | Age: 84
End: 2024-08-05
Payer: COMMERCIAL

## 2024-08-05 NOTE — PROGRESS NOTES
[facility]: Cancer Care                                                                                          Called  Kacie and relayed that the Enhertu chemo  was approved and she should keep her apt on 8/15 as scheduled. Confirmed that she has picked up her premeds for compazine, ondansetron and dexamethasone and understands how to take them. Also confirmed that the amended MD note was successful with helping her get the wheel chair approved. They will be deliverng her wheel chair this week. She would also like me to call Sulma, her daughter with an update.  Called Sulma to relay the above update and also reviewed premeds with her. They did complete ADALI while at clinic on the day of the consult , writer did see this completed, but this was not scanned into chart by ancillary staff. . Encouraged that they complete new AADLI when they return to clinic on 8/15    Signature:  Mihaela Koo RN

## 2024-08-12 ENCOUNTER — LAB (OUTPATIENT)
Dept: LAB | Facility: CLINIC | Age: 84
End: 2024-08-12
Payer: COMMERCIAL

## 2024-08-12 ENCOUNTER — ANTICOAGULATION THERAPY VISIT (OUTPATIENT)
Dept: ANTICOAGULATION | Facility: CLINIC | Age: 84
End: 2024-08-12

## 2024-08-12 DIAGNOSIS — Z95.2 S/P TAVR (TRANSCATHETER AORTIC VALVE REPLACEMENT): ICD-10-CM

## 2024-08-12 DIAGNOSIS — I63.9 CEREBELLAR STROKE (H): ICD-10-CM

## 2024-08-12 DIAGNOSIS — Z95.2 S/P TAVR (TRANSCATHETER AORTIC VALVE REPLACEMENT): Primary | ICD-10-CM

## 2024-08-12 LAB — INR BLD: 2.2 (ref 0.9–1.1)

## 2024-08-12 PROCEDURE — 36416 COLLJ CAPILLARY BLOOD SPEC: CPT

## 2024-08-12 PROCEDURE — 85610 PROTHROMBIN TIME: CPT

## 2024-08-12 NOTE — PROGRESS NOTES
ANTICOAGULATION MANAGEMENT     Kacie Hermosillo 84 year old female is on warfarin with therapeutic INR result. (Goal INR 2.0-3.0)    Recent labs: (last 7 days)     08/12/24  1205   INR 2.2*       ASSESSMENT     Source(s): Chart Review and Patient/Caregiver Call     Warfarin doses taken: Warfarin taken as instructed recently held for 5 days, resumed on 8/6  Diet: No new diet changes identified  Medication/supplement changes:  8/4-8/11 Cefdinir , 8/16 Dexamethasone for 3 days starting the day after chemo  New illness, injury, or hospitalization: 8/6 ortho injection  Signs or symptoms of bleeding or clotting: No  Previous result: Therapeutic last 2(+) visits  Additional findings:  8/15 upcoming 1st ENHERTU chemo infusion  (picked up her premeds for compazine, ondansetron, dexamethasone)       PLAN     Recommended plan for temporary change(s) affecting INR     Dosing Instructions: Continue your current warfarin dose with next INR in 1 week       Summary  As of 8/12/2024      Full warfarin instructions:  5 mg every Sun, Tue, Thu; 2.5 mg all other days   Next INR check:  8/19/2024               Telephone call with Kacie who verbalizes understanding and agrees to plan    Lab visit scheduled    Education provided: Goal range and lab monitoring: goal range and significance of current result  Contact [phone] with any changes, questions or concerns.     Plan made per [facility] anticoagulation protocol    Janna Rees, RN  Anticoagulation Clinic  8/12/2024    _______________________________________________________________________     Anticoagulation Episode Summary       Current INR goal:  2.0-3.0   TTR:  70.7% (1 y)   Target end date:  Indefinite   Send INR reminders to:  [facility] [place]    Indications    S/P TAVR (transcatheter aortic valve replacement) [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

## 2024-08-15 ENCOUNTER — LAB (OUTPATIENT)
Dept: INFUSION THERAPY | Facility: HOSPITAL | Age: 84
End: 2024-08-15
Attending: PHYSICIAN ASSISTANT
Payer: COMMERCIAL

## 2024-08-15 ENCOUNTER — PATIENT OUTREACH (OUTPATIENT)
Dept: ONCOLOGY | Facility: HOSPITAL | Age: 84
End: 2024-08-15

## 2024-08-15 ENCOUNTER — ONCOLOGY VISIT (OUTPATIENT)
Dept: ONCOLOGY | Facility: HOSPITAL | Age: 84
End: 2024-08-15
Attending: INTERNAL MEDICINE
Payer: COMMERCIAL

## 2024-08-15 VITALS
OXYGEN SATURATION: 96 % | BODY MASS INDEX: 23.88 KG/M2 | RESPIRATION RATE: 16 BRPM | DIASTOLIC BLOOD PRESSURE: 89 MMHG | HEIGHT: 63 IN | TEMPERATURE: 97.9 F | HEART RATE: 59 BPM | SYSTOLIC BLOOD PRESSURE: 159 MMHG | WEIGHT: 134.8 LBS

## 2024-08-15 DIAGNOSIS — C79.51 MALIGNANT NEOPLASM METASTATIC TO BONE (H): Primary | ICD-10-CM

## 2024-08-15 LAB
ALBUMIN SERPL BCG-MCNC: 3.7 G/DL (ref 3.5–5.2)
ALP SERPL-CCNC: 94 U/L (ref 40–150)
ALT SERPL W P-5'-P-CCNC: 28 U/L (ref 0–50)
ANION GAP SERPL CALCULATED.3IONS-SCNC: 10 MMOL/L (ref 7–15)
AST SERPL W P-5'-P-CCNC: 31 U/L (ref 0–45)
BASOPHILS # BLD AUTO: 0.1 10E3/UL (ref 0–0.2)
BASOPHILS NFR BLD AUTO: 1 %
BILIRUB SERPL-MCNC: 0.6 MG/DL
BUN SERPL-MCNC: 23.4 MG/DL (ref 8–23)
CALCIUM SERPL-MCNC: 8.5 MG/DL (ref 8.8–10.4)
CHLORIDE SERPL-SCNC: 105 MMOL/L (ref 98–107)
CREAT SERPL-MCNC: 1.05 MG/DL (ref 0.51–0.95)
EGFRCR SERPLBLD CKD-EPI 2021: 52 ML/MIN/1.73M2
EOSINOPHIL # BLD AUTO: 0.1 10E3/UL (ref 0–0.7)
EOSINOPHIL NFR BLD AUTO: 2 %
ERYTHROCYTE [DISTWIDTH] IN BLOOD BY AUTOMATED COUNT: 15.6 % (ref 10–15)
GLUCOSE SERPL-MCNC: 94 MG/DL (ref 70–99)
HCO3 SERPL-SCNC: 23 MMOL/L (ref 22–29)
HCT VFR BLD AUTO: 37.8 % (ref 35–47)
HGB BLD-MCNC: 12.8 G/DL (ref 11.7–15.7)
IMM GRANULOCYTES # BLD: 0 10E3/UL
IMM GRANULOCYTES NFR BLD: 0 %
LYMPHOCYTES # BLD AUTO: 1 10E3/UL (ref 0.8–5.3)
LYMPHOCYTES NFR BLD AUTO: 15 %
MCH RBC QN AUTO: 30 PG (ref 26.5–33)
MCHC RBC AUTO-ENTMCNC: 33.9 G/DL (ref 31.5–36.5)
MCV RBC AUTO: 89 FL (ref 78–100)
MONOCYTES # BLD AUTO: 0.6 10E3/UL (ref 0–1.3)
MONOCYTES NFR BLD AUTO: 9 %
NEUTROPHILS # BLD AUTO: 4.9 10E3/UL (ref 1.6–8.3)
NEUTROPHILS NFR BLD AUTO: 73 %
NRBC # BLD AUTO: 0 10E3/UL
NRBC BLD AUTO-RTO: 0 /100
PLATELET # BLD AUTO: 247 10E3/UL (ref 150–450)
POTASSIUM SERPL-SCNC: 4.6 MMOL/L (ref 3.4–5.3)
PROT SERPL-MCNC: 6.4 G/DL (ref 6.4–8.3)
RBC # BLD AUTO: 4.26 10E6/UL (ref 3.8–5.2)
SODIUM SERPL-SCNC: 138 MMOL/L (ref 135–145)
WBC # BLD AUTO: 6.8 10E3/UL (ref 4–11)

## 2024-08-15 PROCEDURE — 36591 DRAW BLOOD OFF VENOUS DEVICE: CPT | Performed by: PHYSICIAN ASSISTANT

## 2024-08-15 PROCEDURE — 250N000011 HC RX IP 250 OP 636: Performed by: PHYSICIAN ASSISTANT

## 2024-08-15 PROCEDURE — 80053 COMPREHEN METABOLIC PANEL: CPT | Performed by: PHYSICIAN ASSISTANT

## 2024-08-15 PROCEDURE — G2211 COMPLEX E/M VISIT ADD ON: HCPCS | Performed by: INTERNAL MEDICINE

## 2024-08-15 PROCEDURE — 99215 OFFICE O/P EST HI 40 MIN: CPT | Performed by: INTERNAL MEDICINE

## 2024-08-15 PROCEDURE — G0463 HOSPITAL OUTPT CLINIC VISIT: HCPCS | Performed by: INTERNAL MEDICINE

## 2024-08-15 PROCEDURE — 85025 COMPLETE CBC W/AUTO DIFF WBC: CPT | Performed by: PHYSICIAN ASSISTANT

## 2024-08-15 RX ORDER — HEPARIN SODIUM (PORCINE) LOCK FLUSH IV SOLN 100 UNIT/ML 100 UNIT/ML
300-600 SOLUTION INTRAVENOUS
Status: COMPLETED | OUTPATIENT
Start: 2024-08-15 | End: 2024-08-15

## 2024-08-15 RX ADMIN — HEPARIN 500 UNITS: 100 SYRINGE at 11:29

## 2024-08-15 ASSESSMENT — PAIN SCALES - GENERAL: PAINLEVEL: NO PAIN (0)

## 2024-08-15 NOTE — PROGRESS NOTES
"[facility] Hematology and Oncology Progress Note    Patient: Kacie Hermosillo  MRN: [id number]  Date of Service: Aug 15, 2024             ECOG Performance    2 - Ambulatory and independent in all ADLs; cannot work; up > 50% of the time          ______________________________________________________________________________  Oncologic history  Metastatic breast cancer with bone mets only ER negative SC negative HER2/amarilis 2+ and negative by FISH.  HER2/amarilis low    Remote diagnosis of breast cancer in 1995 treated with surgery chemotherapy radiation and 5 years of hormonal therapy.  Pathology report not available.    History of Present Illness    Ms. Kacie Hermosillo is here in follow-up.  The plan was to start her on fam-trastuzumab as I did not think she would tolerate a combination of chemotherapy and targeted therapy.  She does complain of back pain which she takes Tylenol.  She reports some improvement in her pain with Tylenol but reports that the pain has been there for many years.  She denies any significant decrease in appetite or weight loss.    Review of systems  A comprehensive 12 point review of system was done that was negative except what is mentioned in the history of present illness    Past History    Past Medical History:   Diagnosis Date    Breast cancer (H) 01/01/1999    Heart valve replaced     Hx antineoplastic chemotherapy 01/01/1999    Hx of radiation therapy 01/01/1999    Hypertension        Past Surgical History:   Procedure Laterality Date    BIOPSY BREAST      HYSTERECTOMY      IR CHEST PORT PLACEMENT > 5 YRS OF AGE  7/30/2024    LUMPECTOMY BREAST Left 1999    OOPHORECTOMY Bilateral        Physical Exam    BP (!) 159/89   Pulse 59   Temp 97.9  F (36.6  C)   Resp 16   Ht 1.6 m (5' 3\")   Wt 61.1 kg (134 lb 12.8 oz)   SpO2 96%   BMI 23.88 kg/m      General: alert, awake, not in acute distress  HEENT: Head: Normal, normocephalic, atraumatic.  Eye: Normal external eye, conjunctiva, " lids cornea, MARI.  Nose: Normal external nose, mucus membranes and septum.  Pharynx: Normal buccal mucosa. Normal pharynx.  Neck / Thyroid: Supple, no masses, nodes, nodules or enlargement.  Lymphatics: No abnormally enlarged lymph nodes.  Chest: Normal chest wall and respirations. Clear to auscultation.  No crackles or rhonchi's  Heart: S1 S2 RRR, no murmur.   Abdomen: abdomen is soft without significant tenderness, masses, organomegaly or guarding  Extremities: normal strength, tone, and muscle mass  Skin: normal. no rash or abnormalities  CNS: non focal.    Lab Results    Recent Results (from the past 240 hour(s))   INR point of care    Collection Time: 08/12/24 12:05 PM   Result Value Ref Range    INR 2.2 (H) 0.9 - 1.1   Comprehensive metabolic panel    Collection Time: 08/15/24 10:08 AM   Result Value Ref Range    Sodium 138 135 - 145 mmol/L    Potassium 4.6 3.4 - 5.3 mmol/L    Carbon Dioxide (CO2) 23 22 - 29 mmol/L    Anion Gap 10 7 - 15 mmol/L    Urea Nitrogen 23.4 (H) 8.0 - 23.0 mg/dL    Creatinine 1.05 (H) 0.51 - 0.95 mg/dL    GFR Estimate 52 (L) >60 mL/min/1.73m2    Calcium 8.5 (L) 8.8 - 10.4 mg/dL    Chloride 105 98 - 107 mmol/L    Glucose 94 70 - 99 mg/dL    Alkaline Phosphatase 94 40 - 150 U/L    AST 31 0 - 45 U/L    ALT 28 0 - 50 U/L    Protein Total 6.4 6.4 - 8.3 g/dL    Albumin 3.7 3.5 - 5.2 g/dL    Bilirubin Total 0.6 <=1.2 mg/dL   CBC with platelets and differential    Collection Time: 08/15/24 10:08 AM   Result Value Ref Range    WBC Count 6.8 4.0 - 11.0 10e3/uL    RBC Count 4.26 3.80 - 5.20 10e6/uL    Hemoglobin 12.8 11.7 - 15.7 g/dL    Hematocrit 37.8 35.0 - 47.0 %    MCV 89 78 - 100 fL    MCH 30.0 26.5 - 33.0 pg    MCHC 33.9 31.5 - 36.5 g/dL    RDW 15.6 (H) 10.0 - 15.0 %    Platelet Count 247 150 - 450 10e3/uL    % Neutrophils 73 %    % Lymphocytes 15 %    % Monocytes 9 %    % Eosinophils 2 %    % Basophils 1 %    % Immature Granulocytes 0 %    NRBCs per 100 WBC 0 <1 /100    Absolute  Neutrophils 4.9 1.6 - 8.3 10e3/uL    Absolute Lymphocytes 1.0 0.8 - 5.3 10e3/uL    Absolute Monocytes 0.6 0.0 - 1.3 10e3/uL    Absolute Eosinophils 0.1 0.0 - 0.7 10e3/uL    Absolute Basophils 0.1 0.0 - 0.2 10e3/uL    Absolute Immature Granulocytes 0.0 <=0.4 10e3/uL    Absolute NRBCs 0.0 10e3/uL         Imaging    IR Chest Port Placement > 5 Yrs of Age    Result Date: 7/30/2024  [facility] LOCATION: [facility] DATE: 7/30/2024 PROCEDURE: IMPLANTABLE VENOUS CHEST PORT PLACEMENT (POWER INJECTABLE) INTERVENTIONAL RADIOLOGIST: Gaurav Chin MD. INDICATION: 84-year-old female with breast carcinoma in need of chemotherapy access. CONSENT: The risks, benefits and alternatives of implantable venous chest port placement were discussed with the patient  in detail. All questions were answered. Informed consent was given to proceed with the procedure. MODERATE SEDATION: Versed 1.5 mg IV; Fentanyl 75 mcg IV.  During the timeout, immediately prior to the administration of medications, the patient was reassessed for adequacy to receive conscious sedation. Under physician supervision, Versed and fentanyl were administered for moderate sedation. Pulse oximetry, heart rate and blood pressure were continuously monitored by an independent trained observer. The physician spent 25 minutes of face-to-face sedation time with the patient. CONTRAST: None. ANTIBIOTICS: Ancef 2 g IV. ADDITIONAL MEDICATIONS: None. FLUOROSCOPIC TIME: 0.4 minutes. RADIATION DOSE: Air Kerma: 2 mGy. COMPLICATIONS: No immediate complications. STERILE BARRIER TECHNIQUE: Maximum sterile barrier technique was used. Cutaneous antisepsis was performed at the operative site with application of 2% chlorhexidine and large sterile drape. Prior to the procedure, the  and assistant performed hand hygiene and wore hat, mask, sterile gown, and sterile gloves during the entire procedure. PROCEDURE:  Using real-time ultrasound guidance the  right internal jugular vein was accessed. A subcutaneous pocket was created and irrigated with sterile normal saline. The catheter tubing was tunneled in an antegrade fashion from the port pocket to the dermatotomy site. Over a guidewire, and under direct fluoroscopic visualization a peel-away sheath was advanced over the wire. Through the peel-away sheath, the catheter tubing was advanced until the tip was at the cavoatrial junction. The catheter tubing was cut to length and attached firmly to the port. The port was placed within the subcutaneous pocket and tested. The port pocket incision was closed with layered absorbable suture and surgical glue. The dermatotomy site was closed with surgical glue. FINDINGS: Ultrasound demonstrates an anechoic and compressible jugular vein. A permanent image was stored. At the completion of the study the port tip lies near the cavoatrial junction.     IMPRESSION:  1.  Successful implantable venous chest port placement as detailed above. 2.  The implantable venous chest port was placed under fluoroscopic guidance and is ready for use immediately.          Assessment and Plan    Metastatic breast cancer with bone mets HER2/amarilis low  Patient is here in follow-up.  The plan was to start her on fam-trastuzumab.  We did send a letter of medical necessity and it was finally approved by her insurance.  A port has been put in.  The plan was to start treatment today but she has not had her echo done.  I would like a recent cardiac echo before starting fam-trastuzumab.  We have been able to get a stat appointment for tomorrow.  We will get the echo done and start treatment early next week.  The side effects of chemotherapy including but not limited to alopecia tiredness fatigue GI symptoms of nausea vomiting diarrhea liver function abnormalities changes in systolic function of the heart and rare symptom of interstitial lung disease was discussed with her at length.  She will get her first  dose early next week and I will see her 3 weeks after that for her second dose.  Once she starts tolerating this treatment well I will alternate her visits between [facility] and [facility] as [facility] is much closer for her.    Bone metastases  Patient does has bone metastases we will plan to start her on Zometa along with her fam-trastuzumab.    Signed by: Hadley Gann MD        CC: Irina Francisco PA-C

## 2024-08-15 NOTE — PROGRESS NOTES
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    Weight as of this encounter: 61.1 kg (134 lb 12.8 oz). Body surface area is 1.65 meters squared.  No Pain (0) Comment: Data Unavailable   No LMP recorded. Patient is postmenopausal.  Allergies reviewed: Yes  Medications reviewed: Yes    Medications: Medication refills not needed today.  Pharmacy name entered into [employer]:    [facility] [place], [place] - [address] [facility] [place], [place] - [address] [id number] - [place] - [address] [facility] #[id number] - [place], [place] - [address] [facility][id number] - [place], [place] - [address] [place] [address] DRUG STORE #[id number] - [place], [place] - [address] AT [address] [place] [address] & [facility]    Frailty Screening:   Is the patient here for a new oncology consult visit in cancer care? 2. No      Clinical concerns:  first treatment       Vi Sawyer            "

## 2024-08-15 NOTE — LETTER
"8/15/2024      Kacie Hermosillo  [address] [facility] [address]      Dear Colleague,    Thank you for referring your patient, Kacie Hermosillo, to the [facility] CANCER [facility]. Please see a copy of my visit note below.    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    Weight as of this encounter: 61.1 kg (134 lb 12.8 oz). Body surface area is 1.65 meters squared.  No Pain (0) Comment: Data Unavailable   No LMP recorded. Patient is postmenopausal.  Allergies reviewed: Yes  Medications reviewed: Yes    Medications: Medication refills not needed today.  Pharmacy name entered into [employer]:    [facility] [place], [place] - [address]  [facility] [place], [place] - [address] [facility] [address][id number] - [place], [place] - [address] [facility] #[id number] - [place], [place] - [address] [facility][id number] - [place], [place] - [address] [place] [address] DRUG STORE #[id number] - [place], [place] - [address] [facility] AT [address] [place] [address] & [facility]    Frailty Screening:   Is the patient here for a new oncology consult visit in cancer care? 2. No      Clinical concerns:  first treatment       Vi Sawyer              [facility] Hematology and Oncology Progress Note    Patient: Kacie Hermosillo  MRN: [id number]  Date of Service: Aug 15, 2024 " "            ECOG Performance    2 - Ambulatory and independent in all ADLs; cannot work; up > 50% of the time          ______________________________________________________________________________  Oncologic history  Metastatic breast cancer with bone mets only ER negative DC negative HER2/amarilis 2+ and negative by FISH.  HER2/amarilis low    Remote diagnosis of breast cancer in 1995 treated with surgery chemotherapy radiation and 5 years of hormonal therapy.  Pathology report not available.    History of Present Illness    Ms. Kacie Hermosillo is here in follow-up.  The plan was to start her on fam-trastuzumab as I did not think she would tolerate a combination of chemotherapy and targeted therapy.  She does complain of back pain which she takes Tylenol.  She reports some improvement in her pain with Tylenol but reports that the pain has been there for many years.  She denies any significant decrease in appetite or weight loss.    Review of systems  A comprehensive 12 point review of system was done that was negative except what is mentioned in the history of present illness    Past History    Past Medical History:   Diagnosis Date     Breast cancer (H) 01/01/1999     Heart valve replaced      Hx antineoplastic chemotherapy 01/01/1999     Hx of radiation therapy 01/01/1999     Hypertension        Past Surgical History:   Procedure Laterality Date     BIOPSY BREAST       HYSTERECTOMY       IR CHEST PORT PLACEMENT > 5 YRS OF AGE  7/30/2024     LUMPECTOMY BREAST Left 1999     OOPHORECTOMY Bilateral        Physical Exam    BP (!) 159/89   Pulse 59   Temp 97.9  F (36.6  C)   Resp 16   Ht 1.6 m (5' 3\")   Wt 61.1 kg (134 lb 12.8 oz)   SpO2 96%   BMI 23.88 kg/m      General: alert, awake, not in acute distress  HEENT: Head: Normal, normocephalic, atraumatic.  Eye: Normal external eye, conjunctiva, lids cornea, MARI.  Nose: Normal external nose, mucus membranes and septum.  Pharynx: Normal buccal mucosa. Normal " pharynx.  Neck / Thyroid: Supple, no masses, nodes, nodules or enlargement.  Lymphatics: No abnormally enlarged lymph nodes.  Chest: Normal chest wall and respirations. Clear to auscultation.  No crackles or rhonchi's  Heart: S1 S2 RRR, no murmur.   Abdomen: abdomen is soft without significant tenderness, masses, organomegaly or guarding  Extremities: normal strength, tone, and muscle mass  Skin: normal. no rash or abnormalities  CNS: non focal.    Lab Results    Recent Results (from the past 240 hour(s))   INR point of care    Collection Time: 08/12/24 12:05 PM   Result Value Ref Range    INR 2.2 (H) 0.9 - 1.1   Comprehensive metabolic panel    Collection Time: 08/15/24 10:08 AM   Result Value Ref Range    Sodium 138 135 - 145 mmol/L    Potassium 4.6 3.4 - 5.3 mmol/L    Carbon Dioxide (CO2) 23 22 - 29 mmol/L    Anion Gap 10 7 - 15 mmol/L    Urea Nitrogen 23.4 (H) 8.0 - 23.0 mg/dL    Creatinine 1.05 (H) 0.51 - 0.95 mg/dL    GFR Estimate 52 (L) >60 mL/min/1.73m2    Calcium 8.5 (L) 8.8 - 10.4 mg/dL    Chloride 105 98 - 107 mmol/L    Glucose 94 70 - 99 mg/dL    Alkaline Phosphatase 94 40 - 150 U/L    AST 31 0 - 45 U/L    ALT 28 0 - 50 U/L    Protein Total 6.4 6.4 - 8.3 g/dL    Albumin 3.7 3.5 - 5.2 g/dL    Bilirubin Total 0.6 <=1.2 mg/dL   CBC with platelets and differential    Collection Time: 08/15/24 10:08 AM   Result Value Ref Range    WBC Count 6.8 4.0 - 11.0 10e3/uL    RBC Count 4.26 3.80 - 5.20 10e6/uL    Hemoglobin 12.8 11.7 - 15.7 g/dL    Hematocrit 37.8 35.0 - 47.0 %    MCV 89 78 - 100 fL    MCH 30.0 26.5 - 33.0 pg    MCHC 33.9 31.5 - 36.5 g/dL    RDW 15.6 (H) 10.0 - 15.0 %    Platelet Count 247 150 - 450 10e3/uL    % Neutrophils 73 %    % Lymphocytes 15 %    % Monocytes 9 %    % Eosinophils 2 %    % Basophils 1 %    % Immature Granulocytes 0 %    NRBCs per 100 WBC 0 <1 /100    Absolute Neutrophils 4.9 1.6 - 8.3 10e3/uL    Absolute Lymphocytes 1.0 0.8 - 5.3 10e3/uL    Absolute Monocytes 0.6 0.0 - 1.3 10e3/uL     Absolute Eosinophils 0.1 0.0 - 0.7 10e3/uL    Absolute Basophils 0.1 0.0 - 0.2 10e3/uL    Absolute Immature Granulocytes 0.0 <=0.4 10e3/uL    Absolute NRBCs 0.0 10e3/uL         Imaging    IR Chest Port Placement > 5 Yrs of Age    Result Date: 7/30/2024  [facility] LOCATION: [facility] DATE: 7/30/2024 PROCEDURE: IMPLANTABLE VENOUS CHEST PORT PLACEMENT (POWER INJECTABLE) INTERVENTIONAL RADIOLOGIST: Gaurav Chin MD. INDICATION: 84-year-old female with breast carcinoma in need of chemotherapy access. CONSENT: The risks, benefits and alternatives of implantable venous chest port placement were discussed with the patient  in detail. All questions were answered. Informed consent was given to proceed with the procedure. MODERATE SEDATION: Versed 1.5 mg IV; Fentanyl 75 mcg IV.  During the timeout, immediately prior to the administration of medications, the patient was reassessed for adequacy to receive conscious sedation. Under physician supervision, Versed and fentanyl were administered for moderate sedation. Pulse oximetry, heart rate and blood pressure were continuously monitored by an independent trained observer. The physician spent 25 minutes of face-to-face sedation time with the patient. CONTRAST: None. ANTIBIOTICS: Ancef 2 g IV. ADDITIONAL MEDICATIONS: None. FLUOROSCOPIC TIME: 0.4 minutes. RADIATION DOSE: Air Kerma: 2 mGy. COMPLICATIONS: No immediate complications. STERILE BARRIER TECHNIQUE: Maximum sterile barrier technique was used. Cutaneous antisepsis was performed at the operative site with application of 2% chlorhexidine and large sterile drape. Prior to the procedure, the  and assistant performed hand hygiene and wore hat, mask, sterile gown, and sterile gloves during the entire procedure. PROCEDURE:  Using real-time ultrasound guidance the right internal jugular vein was accessed. A subcutaneous pocket was created and irrigated with sterile normal saline. The  catheter tubing was tunneled in an antegrade fashion from the port pocket to the dermatotomy site. Over a guidewire, and under direct fluoroscopic visualization a peel-away sheath was advanced over the wire. Through the peel-away sheath, the catheter tubing was advanced until the tip was at the cavoatrial junction. The catheter tubing was cut to length and attached firmly to the port. The port was placed within the subcutaneous pocket and tested. The port pocket incision was closed with layered absorbable suture and surgical glue. The dermatotomy site was closed with surgical glue. FINDINGS: Ultrasound demonstrates an anechoic and compressible jugular vein. A permanent image was stored. At the completion of the study the port tip lies near the cavoatrial junction.     IMPRESSION:  1.  Successful implantable venous chest port placement as detailed above. 2.  The implantable venous chest port was placed under fluoroscopic guidance and is ready for use immediately.          Assessment and Plan    Metastatic breast cancer with bone mets HER2/amarilis low  Patient is here in follow-up.  The plan was to start her on fam-trastuzumab.  We did send a letter of medical necessity and it was finally approved by her insurance.  A port has been put in.  The plan was to start treatment today but she has not had her echo done.  I would like a recent cardiac echo before starting fam-trastuzumab.  We have been able to get a stat appointment for tomorrow.  We will get the echo done and start treatment early next week.  The side effects of chemotherapy including but not limited to alopecia tiredness fatigue GI symptoms of nausea vomiting diarrhea liver function abnormalities changes in systolic function of the heart and rare symptom of interstitial lung disease was discussed with her at length.  She will get her first dose early next week and I will see her 3 weeks after that for her second dose.  Once she starts tolerating this treatment  well I will alternate her visits between [facility] and [facility] as [facility] is much closer for her.    Bone metastases  Patient does has bone metastases we will plan to start her on Zometa along with her fam-trastuzumab.    Signed by: Hadley Gann MD        CC: Irina Francisco PA-C       Again, thank you for allowing me to participate in the care of your patient.        Sincerely,        Hadley Gann MD

## 2024-08-15 NOTE — PROGRESS NOTES
[facility]: Cancer Care                                                                                          Kacie scheduled for Echo at [facility] 8/16/24. D1C1 chemo rescheduled for 8/19/24. Per Dr. Gann she does not need to see provider or have labs redrawn on 8/19/24. Kacie will see Dr. Gann, have labs, and treatment 3 weeks out from D1C1.    Infusion staff updated.    Sulma Varner RN  08/15/24  11:46 AM

## 2024-08-16 ENCOUNTER — HOSPITAL ENCOUNTER (OUTPATIENT)
Dept: CARDIOLOGY | Facility: HOSPITAL | Age: 84
Discharge: HOME | End: 2024-08-16
Attending: INTERNAL MEDICINE | Admitting: INTERNAL MEDICINE
Payer: COMMERCIAL

## 2024-08-16 DIAGNOSIS — C79.51 MALIGNANT NEOPLASM METASTATIC TO BONE (H): ICD-10-CM

## 2024-08-16 DIAGNOSIS — D49.89 NEOPLASM OF UNSPECIFIED BEHAVIOR OF OTHER SPECIFIED SITES: ICD-10-CM

## 2024-08-16 LAB — LVEF ECHO: NORMAL

## 2024-08-16 PROCEDURE — 93306 TTE W/DOPPLER COMPLETE: CPT | Mod: 26 | Performed by: INTERNAL MEDICINE

## 2024-08-16 PROCEDURE — 255N000002 HC RX 255 OP 636: Performed by: INTERNAL MEDICINE

## 2024-08-16 PROCEDURE — C8929 TTE W OR WO FOL WCON,DOPPLER: HCPCS

## 2024-08-16 RX ADMIN — PERFLUTREN 2 ML: 6.52 INJECTION, SUSPENSION INTRAVENOUS at 15:15

## 2024-08-19 ENCOUNTER — MYC MEDICAL ADVICE (OUTPATIENT)
Dept: ONCOLOGY | Facility: HOSPITAL | Age: 84
End: 2024-08-19

## 2024-08-19 ENCOUNTER — ANTICOAGULATION THERAPY VISIT (OUTPATIENT)
Dept: ANTICOAGULATION | Facility: CLINIC | Age: 84
End: 2024-08-19

## 2024-08-19 ENCOUNTER — INFUSION THERAPY VISIT (OUTPATIENT)
Dept: INFUSION THERAPY | Facility: HOSPITAL | Age: 84
End: 2024-08-19
Attending: INTERNAL MEDICINE
Payer: COMMERCIAL

## 2024-08-19 VITALS
SYSTOLIC BLOOD PRESSURE: 138 MMHG | DIASTOLIC BLOOD PRESSURE: 92 MMHG | RESPIRATION RATE: 16 BRPM | TEMPERATURE: 97.4 F | HEART RATE: 67 BPM | OXYGEN SATURATION: 98 %

## 2024-08-19 DIAGNOSIS — Z17.1 MALIGNANT NEOPLASM OF BOTH BREASTS IN FEMALE, ESTROGEN RECEPTOR NEGATIVE, UNSPECIFIED SITE OF BREAST (H): ICD-10-CM

## 2024-08-19 DIAGNOSIS — C50.912 MALIGNANT NEOPLASM OF BOTH BREASTS IN FEMALE, ESTROGEN RECEPTOR NEGATIVE, UNSPECIFIED SITE OF BREAST (H): ICD-10-CM

## 2024-08-19 DIAGNOSIS — Z95.2 S/P TAVR (TRANSCATHETER AORTIC VALVE REPLACEMENT): Primary | ICD-10-CM

## 2024-08-19 DIAGNOSIS — C50.911 MALIGNANT NEOPLASM OF BOTH BREASTS IN FEMALE, ESTROGEN RECEPTOR NEGATIVE, UNSPECIFIED SITE OF BREAST (H): ICD-10-CM

## 2024-08-19 DIAGNOSIS — Z95.2 S/P TAVR (TRANSCATHETER AORTIC VALVE REPLACEMENT): ICD-10-CM

## 2024-08-19 DIAGNOSIS — C79.51 MALIGNANT NEOPLASM METASTATIC TO BONE (H): Primary | ICD-10-CM

## 2024-08-19 DIAGNOSIS — I63.9 CEREBELLAR STROKE (H): ICD-10-CM

## 2024-08-19 LAB — INR PPP: 2.52 (ref 0.85–1.15)

## 2024-08-19 PROCEDURE — 96413 CHEMO IV INFUSION 1 HR: CPT

## 2024-08-19 PROCEDURE — 258N000003 HC RX IP 258 OP 636: Performed by: INTERNAL MEDICINE

## 2024-08-19 PROCEDURE — 36591 DRAW BLOOD OFF VENOUS DEVICE: CPT

## 2024-08-19 PROCEDURE — 250N000011 HC RX IP 250 OP 636: Performed by: INTERNAL MEDICINE

## 2024-08-19 PROCEDURE — 85610 PROTHROMBIN TIME: CPT

## 2024-08-19 PROCEDURE — 96375 TX/PRO/DX INJ NEW DRUG ADDON: CPT

## 2024-08-19 RX ORDER — ALBUTEROL SULFATE 0.83 MG/ML
2.5 SOLUTION RESPIRATORY (INHALATION)
Status: DISCONTINUED | OUTPATIENT
Start: 2024-08-19 | End: 2024-08-19 | Stop reason: HOSPADM

## 2024-08-19 RX ORDER — ALBUTEROL SULFATE 90 UG/1
1-2 AEROSOL, METERED RESPIRATORY (INHALATION)
Status: CANCELLED
Start: 2024-08-19

## 2024-08-19 RX ORDER — ALBUTEROL SULFATE 90 UG/1
1-2 AEROSOL, METERED RESPIRATORY (INHALATION)
Status: DISCONTINUED | OUTPATIENT
Start: 2024-08-19 | End: 2024-08-19 | Stop reason: HOSPADM

## 2024-08-19 RX ORDER — PALONOSETRON 0.05 MG/ML
0.25 INJECTION, SOLUTION INTRAVENOUS ONCE
Status: COMPLETED | OUTPATIENT
Start: 2024-08-19 | End: 2024-08-19

## 2024-08-19 RX ORDER — METHYLPREDNISOLONE SODIUM SUCCINATE 125 MG/2ML
125 INJECTION, POWDER, LYOPHILIZED, FOR SOLUTION INTRAMUSCULAR; INTRAVENOUS
Status: CANCELLED
Start: 2024-08-19

## 2024-08-19 RX ORDER — HEPARIN SODIUM (PORCINE) LOCK FLUSH IV SOLN 100 UNIT/ML 100 UNIT/ML
5 SOLUTION INTRAVENOUS
Status: DISCONTINUED | OUTPATIENT
Start: 2024-08-19 | End: 2024-08-19 | Stop reason: HOSPADM

## 2024-08-19 RX ORDER — DIPHENHYDRAMINE HYDROCHLORIDE 50 MG/ML
50 INJECTION INTRAMUSCULAR; INTRAVENOUS
Status: CANCELLED
Start: 2024-08-19

## 2024-08-19 RX ORDER — ALBUTEROL SULFATE 0.83 MG/ML
2.5 SOLUTION RESPIRATORY (INHALATION)
Status: CANCELLED | OUTPATIENT
Start: 2024-08-19

## 2024-08-19 RX ORDER — MEPERIDINE HYDROCHLORIDE 50 MG/ML
25 INJECTION INTRAMUSCULAR; INTRAVENOUS; SUBCUTANEOUS EVERY 30 MIN PRN
Status: DISCONTINUED | OUTPATIENT
Start: 2024-08-19 | End: 2024-08-19 | Stop reason: HOSPADM

## 2024-08-19 RX ORDER — EPINEPHRINE 1 MG/ML
0.3 INJECTION, SOLUTION INTRAMUSCULAR; SUBCUTANEOUS EVERY 5 MIN PRN
Status: CANCELLED | OUTPATIENT
Start: 2024-08-19

## 2024-08-19 RX ORDER — MEPERIDINE HYDROCHLORIDE 50 MG/ML
25 INJECTION INTRAMUSCULAR; INTRAVENOUS; SUBCUTANEOUS EVERY 30 MIN PRN
Status: CANCELLED | OUTPATIENT
Start: 2024-08-19

## 2024-08-19 RX ORDER — EPINEPHRINE 1 MG/ML
0.3 INJECTION, SOLUTION INTRAMUSCULAR; SUBCUTANEOUS EVERY 5 MIN PRN
Status: DISCONTINUED | OUTPATIENT
Start: 2024-08-19 | End: 2024-08-19 | Stop reason: HOSPADM

## 2024-08-19 RX ORDER — DIPHENHYDRAMINE HYDROCHLORIDE 50 MG/ML
50 INJECTION INTRAMUSCULAR; INTRAVENOUS
Status: DISCONTINUED | OUTPATIENT
Start: 2024-08-19 | End: 2024-08-19 | Stop reason: HOSPADM

## 2024-08-19 RX ORDER — LORAZEPAM 2 MG/ML
0.5 INJECTION INTRAMUSCULAR EVERY 4 HOURS PRN
Status: CANCELLED | OUTPATIENT
Start: 2024-08-19

## 2024-08-19 RX ORDER — METHYLPREDNISOLONE SODIUM SUCCINATE 125 MG/2ML
125 INJECTION, POWDER, LYOPHILIZED, FOR SOLUTION INTRAMUSCULAR; INTRAVENOUS
Status: DISCONTINUED | OUTPATIENT
Start: 2024-08-19 | End: 2024-08-19 | Stop reason: HOSPADM

## 2024-08-19 RX ORDER — PALONOSETRON 0.05 MG/ML
0.25 INJECTION, SOLUTION INTRAVENOUS ONCE
Status: CANCELLED | OUTPATIENT
Start: 2024-08-19

## 2024-08-19 RX ORDER — HEPARIN SODIUM (PORCINE) LOCK FLUSH IV SOLN 100 UNIT/ML 100 UNIT/ML
5 SOLUTION INTRAVENOUS
Status: CANCELLED | OUTPATIENT
Start: 2024-08-19

## 2024-08-19 RX ORDER — HEPARIN SODIUM,PORCINE 10 UNIT/ML
5-20 VIAL (ML) INTRAVENOUS DAILY PRN
Status: CANCELLED | OUTPATIENT
Start: 2024-08-19

## 2024-08-19 RX ADMIN — HEPARIN 5 ML: 100 SYRINGE at 15:42

## 2024-08-19 RX ADMIN — DEXTROSE MONOHYDRATE 250 ML: 50 INJECTION, SOLUTION INTRAVENOUS at 13:29

## 2024-08-19 RX ADMIN — FOSAPREPITANT: 150 INJECTION, POWDER, LYOPHILIZED, FOR SOLUTION INTRAVENOUS at 13:37

## 2024-08-19 RX ADMIN — PALONOSETRON 0.25 MG: 0.05 INJECTION, SOLUTION INTRAVENOUS at 13:30

## 2024-08-19 RX ADMIN — FAM-TRASTUZUMAB DERUXTECAN-NXKI 300 MG: 100 INJECTION, POWDER, LYOPHILIZED, FOR SOLUTION INTRAVENOUS at 14:16

## 2024-08-19 NOTE — PROGRESS NOTES
ANTICOAGULATION MANAGEMENT     Kacie Hermosillo 84 year old female is on warfarin with therapeutic INR result. (Goal INR 2.0-3.0)    Recent labs: (last 7 days)     08/19/24  1316   INR 2.52*       ASSESSMENT     Source(s): Chart Review  Previous INR was Therapeutic last 2(+) visits  Medication, diet, health changes since last INR chart reviewed; none identified  Patient's pre-meds and chemo  Fosaprepitant which can decrease serum concentration of warfarin  Decardron which may enhance the anticoagulation effect of warfarin  Enhertu which has no anticipated interaction with warfarin       PLAN     Recommended plan for temporary change(s) affecting INR     Dosing Instructions: Continue your current warfarin dose with next INR in 1 week       Summary  As of 8/19/2024      Full warfarin instructions:  5 mg every Sun, Tue, Thu; 2.5 mg all other days   Next INR check:  8/26/2024               Detailed voice message left for Kacie with dosing instructions and follow up date.   Sent [employer] message with dosing and follow up instructions    Contact [phone] to schedule and with any changes, questions or concerns.     Education provided: Please call back if any changes to your diet, medications or how you've been taking warfarin  Goal range and lab monitoring: goal range and significance of current result  Interaction IS anticipated between warfarin and fosaprepitant    Plan made with [facility] Pharmacist Zaira Alvarez, RN  Anticoagulation Clinic  8/19/2024    _______________________________________________________________________     Anticoagulation Episode Summary       Current INR goal:  2.0-3.0   TTR:  72.6% (1 y)   Target end date:  Indefinite   Send INR reminders to:  VIRGILIO WEEKS    Indications    S/P TAVR (transcatheter aortic valve replacement) [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

## 2024-08-19 NOTE — PROGRESS NOTES
Infusion Nursing Note:  Kacie Hermosillo presents today for Day 1 Cycle 1 Enhertu.    Patient seen by provider today: No   present during visit today: Not Applicable.    Note: Kacie arrives ambulatory to [facility] accompanied by her daughter. She has low back pain that is not new for her. She was planning on a cortisone shot but was postponed due to a recent UTI. She is planning to reschedule but has to be off blood thinners for 5 days. She is currently taking Tylenol for back pain. Kacie was premedicated with Emend/Dexamethasone and Aloxi. First dose of Enhertu infused of 90 minutes. She tolerated well. Hand out for Home Instructions after chemotherapy reviewed. Medications reviewed, side effects and when to call the provider. Encouraged 8-10 glasses of fluids for a few days after chemo. Home meds Zofran, Compazine and Dexamethasone have been picked up from the pharmacy. Instructed Kacie and her daughter on home Dexamethasone instructions as well as Zofran and Compazine. They verbalized understanding.      Intravenous Access:  Labs drawn without difficulty.  Implanted Port.    Treatment Conditions:  Lab Results   Component Value Date    HGB 12.8 08/15/2024    WBC 6.8 08/15/2024    ANEUTAUTO 4.9 08/15/2024     08/15/2024        Lab Results   Component Value Date     08/15/2024    POTASSIUM 4.6 08/15/2024    CR 1.05 (H) 08/15/2024    MIGUEL 8.5 (L) 08/15/2024    BILITOTAL 0.6 08/15/2024    ALBUMIN 3.7 08/15/2024    ALT 28 08/15/2024    AST 31 08/15/2024       Results reviewed, labs MET treatment parameters, ok to proceed with treatment.      Post Infusion Assessment:  Patient tolerated infusion without incident.  Blood return noted pre and post infusion.  Site patent and intact, free from redness, edema or discomfort.  No evidence of extravasations.  Access discontinued per protocol.       Discharge Plan:   Discharge instructions reviewed with: Family.  Patient and/or family  verbalized understanding of discharge instructions and all questions answered.  AVS to patient via [employer].  Patient will return 9/10 for next appointment.   Patient discharged in stable condition accompanied by: daughter.  Departure Mode: Ambulatory.      Lilli Hanley RN

## 2024-08-20 DIAGNOSIS — C50.912 MALIGNANT NEOPLASM OF BOTH BREASTS IN FEMALE, ESTROGEN RECEPTOR NEGATIVE, UNSPECIFIED SITE OF BREAST (H): ICD-10-CM

## 2024-08-20 DIAGNOSIS — Z17.1 MALIGNANT NEOPLASM OF BOTH BREASTS IN FEMALE, ESTROGEN RECEPTOR NEGATIVE, UNSPECIFIED SITE OF BREAST (H): ICD-10-CM

## 2024-08-20 DIAGNOSIS — C50.911 MALIGNANT NEOPLASM OF BOTH BREASTS IN FEMALE, ESTROGEN RECEPTOR NEGATIVE, UNSPECIFIED SITE OF BREAST (H): ICD-10-CM

## 2024-08-20 DIAGNOSIS — C79.51 MALIGNANT NEOPLASM METASTATIC TO BONE (H): ICD-10-CM

## 2024-08-20 RX ORDER — TRAMADOL HYDROCHLORIDE 50 MG/1
50 TABLET ORAL EVERY 8 HOURS PRN
Qty: 30 TABLET | Refills: 0 | Status: ON HOLD | OUTPATIENT
Start: 2024-08-20

## 2024-08-20 RX ORDER — TRAMADOL HYDROCHLORIDE 50 MG/1
50 TABLET ORAL EVERY 8 HOURS PRN
Qty: 30 TABLET | Refills: 0 | Status: SHIPPED | OUTPATIENT
Start: 2024-08-20 | End: 2024-08-20

## 2024-08-20 NOTE — TELEPHONE ENCOUNTER
[facility]: Cancer Care                                                                                          Called Sulma, patient's daughter, to follow up on the chart message. Clarified that per Dr. Gann, Enhertu causes  full hair loss  in 25-30 % of patients. Hair thinning or hair changes can also occur. Depending on how her mother experiences hair loss, if a wig/cranial prosthesis is desired, a prescription/order can be entered for this. They will advise in the future.  She was encouraged yesterday by infusion team to inquire about additional intervention to help manage back pain. Her mother does not want a narcotic. Shared that Dr. Gann recommends 50 mg of Tramadol, every 8 hours as needed for moderate pain that is not controlled by tylenol. Kacie can continue to take tylenol, max daily dose of  3000 mg, and not more than 1000 mg in 8 hours and then layer on the tramadol when needed. Confirmed no drug allergies, and they wish to have the prescription go to the [facility] in [place].    Signature:  Mihaela Koo RN

## 2024-08-22 ENCOUNTER — PATIENT OUTREACH (OUTPATIENT)
Dept: ONCOLOGY | Facility: HOSPITAL | Age: 84
End: 2024-08-22
Payer: COMMERCIAL

## 2024-08-22 NOTE — PROGRESS NOTES
[facility]: Cancer Care Follow-Up Note                                    Discussion with Patient:                                                      Called Kacie to follow up post D1C1 Enhertu administered on 8/19/24. She has taken her dexamethasone for 3 days post chemo. She reports that she is feeling better than she has in a while. She is eating well, has good energy and reports that her pain is now 0/10. Relayed that the dexamethasone can help with increasing energy and appetite and decreasing pain. Her pain has historically gotten up to 7/10 and she only takes one tylenol per day. Shared that  when the dexamethasone leaves her system she may need to start taking more medication to control her pain and give her better quality of life. She does have latitude to increase her tylenol dosing to 3000 mg per day, not more than 1000 mg in 8 hours. She can also start using the tramdol that was prescribed for her. She stated understanding.   She is having no nausea and is urinating well and having regular stools, no bowel movements.  She has our contact information  for triage and after hours number and was encouraged to call if any questions or concerns arise prior to her next apt.      Dates of Treatment:                                                      Infusion given in last 28 days       Administered MAR Action Medication Dose Rate Visit    08/19/2024 14:16 New Bag FAM-trastuzumab deruxtecan-nxki (ENHERTU) 300 mg in D5W 125 mL infusion 300 mg 83.3 mL/hr Infusion Therapy Visit on 08/19/2024 in [facility] [place]            Assessment:                                                      See documentation above.        Intervention/Education provided during outreach:                                                       Plan as stated above    Confirmed patient has clinic and triage numbers    Signature:  Mihaela Koo RN

## 2024-08-27 ENCOUNTER — MYC MEDICAL ADVICE (OUTPATIENT)
Dept: ANTICOAGULATION | Facility: CLINIC | Age: 84
End: 2024-08-27
Payer: COMMERCIAL

## 2024-08-29 ENCOUNTER — E-VISIT (OUTPATIENT)
Dept: URGENT CARE | Facility: CLINIC | Age: 84
End: 2024-08-29
Payer: COMMERCIAL

## 2024-08-29 ENCOUNTER — MYC MEDICAL ADVICE (OUTPATIENT)
Dept: FAMILY MEDICINE | Facility: CLINIC | Age: 84
End: 2024-08-29

## 2024-08-29 DIAGNOSIS — R30.0 DYSURIA: Primary | ICD-10-CM

## 2024-08-29 PROCEDURE — 99421 OL DIG E/M SVC 5-10 MIN: CPT | Performed by: PHYSICIAN ASSISTANT

## 2024-08-29 NOTE — TELEPHONE ENCOUNTER
Irina Francisco:    Patient submitted E-visit today, requesting medication. Please advise.        DAVID Quesada

## 2024-08-29 NOTE — TELEPHONE ENCOUNTER
No consent to communicate on file with daughter.   RN called patient. Explained the importance of treating the bacteria with the right antibiotic especially with her being on chemo.     Patient stated that she would need to try to find someone to take her to the clinic so she could leave a specimen. RN explained it could be this evening or early tomorrow morning given the weather. She has no one to give her a ride. Patient is unsure how she would have picked up the medication if received.     Daughter is in [place].     Patient will call back and let me know if she can get a ride to clinic.  Maxine Gabriel RN on 8/29/2024 at 4:32 PM

## 2024-08-29 NOTE — TELEPHONE ENCOUNTER
The daughter called and reports they submitted an evisit and have not heard back. The daughter is extremely upset this has not been addressed.  It was sent 4 hours ago. The daughter states the provider has sent in antibiotics in the past without urine.  The patient is getting chemo and is not feeling well at all.     The daughter was advised to have her seen in urgent care for more urgent needs. The daughter states she does not want that, she has been given antibiotics in the past without getting a urine. The daughter was notified PCP is not in clinic at this time.  She requested another provider review this message. Again she was advised to be seen in urgent care.    Pharmacy pended.    Thank you    Marielle PEÑA RN

## 2024-08-29 NOTE — TELEPHONE ENCOUNTER
Patient called and stated she would be coming to the clinic to give  urine sample.   Maxine Gabriel RN on 8/29/2024 at 4:46 PM

## 2024-08-29 NOTE — TELEPHONE ENCOUNTER
Patient called to notify she is unable to complete UA today due to the weather. Patient states she will complete this tomorrow morning.    DAVID Connor [facility]

## 2024-08-29 NOTE — TELEPHONE ENCOUNTER
The Evisit was submitted to JAIRO, not Irina.  In this situation, with her elderly mother on chemo getting a urine specimen is even more important so we can make sure we are not dealing with a resistant infection that would need different treatment    I cannot cancel the Evisit that was submitted to UC but I can orde a UA.  I would recommend we get this sample prior to treatment    Dr. Praveena Red, DO

## 2024-08-29 NOTE — PATIENT INSTRUCTIONS
Dear Kacie Hermosillo,     After reviewing your responses, I would like you to come in for a urine test to make sure we treat you correctly. This urine test is to evaluate you for a possible urinary tract infection, and should be scheduled for today or tomorrow. Schedule a Lab Only appointment here.     Lab appointments are not available at most locations on the weekends. If no Lab Only appointment is available, you should be seen in any of our convenient Walk-in or Urgent Care Centers, which can be found on our website here.     You will receive instructions with your results in [employer] once they are available.     If your symptoms worsen, you develop pain in your back or stomach, develop fevers, or are not improving in 5 days, please contact your primary care provider for an appointment or visit a Walk-in or Urgent Care Center to be seen.     Thanks again for choosing us as your health care partner,     Magaly Hawthorne PA-C

## 2024-08-30 ENCOUNTER — TELEPHONE (OUTPATIENT)
Dept: ONCOLOGY | Facility: HOSPITAL | Age: 84
End: 2024-08-30

## 2024-08-30 ENCOUNTER — LAB (OUTPATIENT)
Dept: LAB | Facility: CLINIC | Age: 84
End: 2024-08-30
Payer: COMMERCIAL

## 2024-08-30 DIAGNOSIS — N39.0 ACUTE UTI (URINARY TRACT INFECTION): Primary | ICD-10-CM

## 2024-08-30 DIAGNOSIS — R30.0 DYSURIA: ICD-10-CM

## 2024-08-30 LAB
ALBUMIN UR-MCNC: 30 MG/DL
APPEARANCE UR: ABNORMAL
BACTERIA #/AREA URNS HPF: ABNORMAL /HPF
BILIRUB UR QL STRIP: NEGATIVE
COLOR UR AUTO: YELLOW
GLUCOSE UR STRIP-MCNC: NEGATIVE MG/DL
HGB UR QL STRIP: ABNORMAL
KETONES UR STRIP-MCNC: NEGATIVE MG/DL
LEUKOCYTE ESTERASE UR QL STRIP: ABNORMAL
NITRATE UR QL: POSITIVE
PH UR STRIP: 5.5 [PH] (ref 5–7)
RBC #/AREA URNS AUTO: ABNORMAL /HPF
SP GR UR STRIP: 1.02 (ref 1–1.03)
SQUAMOUS #/AREA URNS AUTO: ABNORMAL /LPF
UROBILINOGEN UR STRIP-ACNC: 0.2 E.U./DL
WBC #/AREA URNS AUTO: ABNORMAL /HPF

## 2024-08-30 PROCEDURE — 87186 SC STD MICRODIL/AGAR DIL: CPT

## 2024-08-30 PROCEDURE — 81001 URINALYSIS AUTO W/SCOPE: CPT

## 2024-08-30 PROCEDURE — 87086 URINE CULTURE/COLONY COUNT: CPT

## 2024-08-30 PROCEDURE — 87088 URINE BACTERIA CULTURE: CPT

## 2024-08-30 RX ORDER — CEFDINIR 300 MG/1
300 CAPSULE ORAL 2 TIMES DAILY
Qty: 14 CAPSULE | Refills: 0 | Status: SHIPPED | OUTPATIENT
Start: 2024-08-30 | End: 2024-09-06

## 2024-08-30 NOTE — PROGRESS NOTES
1. Acute UTI (urinary tract infection)    - cefdinir (OMNICEF) 300 MG capsule; Take 1 capsule (300 mg) by mouth 2 times daily for 7 days.  Dispense: 14 capsule; Refill: 0  Medication sent to the [facility]

## 2024-08-30 NOTE — TELEPHONE ENCOUNTER
DIL left a message that Kacie has a UTI and is wondering if there is a specific abx that should be used considering her cancer treatment. She went on to say, call Kacie back with the recommendation. AVERY Foster RN, OCN, CBCN   Statement Selected

## 2024-08-31 LAB — BACTERIA UR CULT: ABNORMAL

## 2024-09-03 ENCOUNTER — TELEPHONE (OUTPATIENT)
Dept: ONCOLOGY | Facility: HOSPITAL | Age: 84
End: 2024-09-03
Payer: COMMERCIAL

## 2024-09-03 NOTE — TELEPHONE ENCOUNTER
Patient's daughter Sulma calls with some general questions. She is wondering if she can be given a list of antibiotics that are safe with patient's current medications. Currently patient is being treated for a UTI. Sulma is advised that antibiotics will be recommended at the time of infection depending on what is being treated. Patient is having diarrhea with the antibiotic, Sulma is advised on home cares and monitoring of diarrhea. If symptoms are getting worse Sulma will the primary care provider that prescribed antibiotic. She is also wondering what to do if there are emergent questions during after hours. Sulma is instructed that she will need to call the main clinic number and this will direct patient to an on call provider. Sulma is also requesting a refill of Dexamethasone, per chart review patient should have 2 refills at the pharmacy. Sulma verbalizes understanding and denies further questions at this time.    Tonya Peacock RN

## 2024-09-10 ENCOUNTER — LAB (OUTPATIENT)
Dept: INFUSION THERAPY | Facility: HOSPITAL | Age: 84
End: 2024-09-10
Attending: INTERNAL MEDICINE
Payer: COMMERCIAL

## 2024-09-10 ENCOUNTER — ONCOLOGY VISIT (OUTPATIENT)
Dept: ONCOLOGY | Facility: HOSPITAL | Age: 84
End: 2024-09-10
Attending: INTERNAL MEDICINE
Payer: COMMERCIAL

## 2024-09-10 ENCOUNTER — ANTICOAGULATION THERAPY VISIT (OUTPATIENT)
Dept: ANTICOAGULATION | Facility: CLINIC | Age: 84
End: 2024-09-10

## 2024-09-10 VITALS
WEIGHT: 133.1 LBS | HEIGHT: 63 IN | BODY MASS INDEX: 23.58 KG/M2 | SYSTOLIC BLOOD PRESSURE: 134 MMHG | RESPIRATION RATE: 16 BRPM | DIASTOLIC BLOOD PRESSURE: 78 MMHG | OXYGEN SATURATION: 99 % | HEART RATE: 62 BPM | TEMPERATURE: 98.6 F

## 2024-09-10 DIAGNOSIS — C79.51 MALIGNANT NEOPLASM METASTATIC TO BONE (H): Primary | ICD-10-CM

## 2024-09-10 DIAGNOSIS — I63.9 CEREBELLAR STROKE (H): ICD-10-CM

## 2024-09-10 DIAGNOSIS — Z17.1 MALIGNANT NEOPLASM OF BOTH BREASTS IN FEMALE, ESTROGEN RECEPTOR NEGATIVE, UNSPECIFIED SITE OF BREAST (H): ICD-10-CM

## 2024-09-10 DIAGNOSIS — N30.01 ACUTE CYSTITIS WITH HEMATURIA: ICD-10-CM

## 2024-09-10 DIAGNOSIS — Z12.31 ENCOUNTER FOR SCREENING MAMMOGRAM FOR MALIGNANT NEOPLASM OF BREAST: ICD-10-CM

## 2024-09-10 DIAGNOSIS — Z95.2 S/P TAVR (TRANSCATHETER AORTIC VALVE REPLACEMENT): ICD-10-CM

## 2024-09-10 DIAGNOSIS — Z95.2 S/P TAVR (TRANSCATHETER AORTIC VALVE REPLACEMENT): Primary | ICD-10-CM

## 2024-09-10 DIAGNOSIS — D49.89 NEOPLASM OF UNSPECIFIED BEHAVIOR OF OTHER SPECIFIED SITES: ICD-10-CM

## 2024-09-10 DIAGNOSIS — C50.911 MALIGNANT NEOPLASM OF BOTH BREASTS IN FEMALE, ESTROGEN RECEPTOR NEGATIVE, UNSPECIFIED SITE OF BREAST (H): ICD-10-CM

## 2024-09-10 DIAGNOSIS — C50.912 MALIGNANT NEOPLASM OF BOTH BREASTS IN FEMALE, ESTROGEN RECEPTOR NEGATIVE, UNSPECIFIED SITE OF BREAST (H): ICD-10-CM

## 2024-09-10 LAB
ALBUMIN SERPL BCG-MCNC: 3.4 G/DL (ref 3.5–5.2)
ALP SERPL-CCNC: 98 U/L (ref 40–150)
ALT SERPL W P-5'-P-CCNC: 31 U/L (ref 0–50)
ANION GAP SERPL CALCULATED.3IONS-SCNC: 13 MMOL/L (ref 7–15)
AST SERPL W P-5'-P-CCNC: 27 U/L (ref 0–45)
BASOPHILS # BLD AUTO: 0.1 10E3/UL (ref 0–0.2)
BASOPHILS NFR BLD AUTO: 2 %
BILIRUB SERPL-MCNC: 0.4 MG/DL
BUN SERPL-MCNC: 13.2 MG/DL (ref 8–23)
CALCIUM SERPL-MCNC: 8.2 MG/DL (ref 8.8–10.4)
CANCER AG15-3 SERPL-ACNC: 10 U/ML
CHLORIDE SERPL-SCNC: 99 MMOL/L (ref 98–107)
CREAT SERPL-MCNC: 0.97 MG/DL (ref 0.51–0.95)
EGFRCR SERPLBLD CKD-EPI 2021: 57 ML/MIN/1.73M2
EOSINOPHIL # BLD AUTO: 0.3 10E3/UL (ref 0–0.7)
EOSINOPHIL NFR BLD AUTO: 6 %
ERYTHROCYTE [DISTWIDTH] IN BLOOD BY AUTOMATED COUNT: 14.7 % (ref 10–15)
GLUCOSE SERPL-MCNC: 90 MG/DL (ref 70–99)
HCO3 SERPL-SCNC: 23 MMOL/L (ref 22–29)
HCT VFR BLD AUTO: 35.2 % (ref 35–47)
HGB BLD-MCNC: 12.1 G/DL (ref 11.7–15.7)
IMM GRANULOCYTES # BLD: 0 10E3/UL
IMM GRANULOCYTES NFR BLD: 0 %
INR PPP: 2.35 (ref 0.85–1.15)
LDH SERPL L TO P-CCNC: 247 U/L (ref 0–250)
LYMPHOCYTES # BLD AUTO: 1.1 10E3/UL (ref 0.8–5.3)
LYMPHOCYTES NFR BLD AUTO: 22 %
MCH RBC QN AUTO: 30 PG (ref 26.5–33)
MCHC RBC AUTO-ENTMCNC: 34.4 G/DL (ref 31.5–36.5)
MCV RBC AUTO: 87 FL (ref 78–100)
MONOCYTES # BLD AUTO: 0.6 10E3/UL (ref 0–1.3)
MONOCYTES NFR BLD AUTO: 11 %
NEUTROPHILS # BLD AUTO: 3 10E3/UL (ref 1.6–8.3)
NEUTROPHILS NFR BLD AUTO: 58 %
NRBC # BLD AUTO: 0 10E3/UL
NRBC BLD AUTO-RTO: 0 /100
PLATELET # BLD AUTO: 241 10E3/UL (ref 150–450)
POTASSIUM SERPL-SCNC: 3.9 MMOL/L (ref 3.4–5.3)
PROT SERPL-MCNC: 5.7 G/DL (ref 6.4–8.3)
RBC # BLD AUTO: 4.04 10E6/UL (ref 3.8–5.2)
SODIUM SERPL-SCNC: 135 MMOL/L (ref 135–145)
WBC # BLD AUTO: 5.1 10E3/UL (ref 4–11)

## 2024-09-10 PROCEDURE — 96413 CHEMO IV INFUSION 1 HR: CPT

## 2024-09-10 PROCEDURE — 99214 OFFICE O/P EST MOD 30 MIN: CPT | Performed by: INTERNAL MEDICINE

## 2024-09-10 PROCEDURE — 96367 TX/PROPH/DG ADDL SEQ IV INF: CPT

## 2024-09-10 PROCEDURE — 85610 PROTHROMBIN TIME: CPT

## 2024-09-10 PROCEDURE — 85004 AUTOMATED DIFF WBC COUNT: CPT

## 2024-09-10 PROCEDURE — G2211 COMPLEX E/M VISIT ADD ON: HCPCS | Performed by: INTERNAL MEDICINE

## 2024-09-10 PROCEDURE — 96375 TX/PRO/DX INJ NEW DRUG ADDON: CPT

## 2024-09-10 PROCEDURE — 250N000011 HC RX IP 250 OP 636: Performed by: INTERNAL MEDICINE

## 2024-09-10 PROCEDURE — G0463 HOSPITAL OUTPT CLINIC VISIT: HCPCS | Performed by: INTERNAL MEDICINE

## 2024-09-10 PROCEDURE — 86300 IMMUNOASSAY TUMOR CA 15-3: CPT

## 2024-09-10 PROCEDURE — 258N000003 HC RX IP 258 OP 636: Performed by: INTERNAL MEDICINE

## 2024-09-10 PROCEDURE — 82040 ASSAY OF SERUM ALBUMIN: CPT

## 2024-09-10 PROCEDURE — 83615 LACTATE (LD) (LDH) ENZYME: CPT

## 2024-09-10 PROCEDURE — 36591 DRAW BLOOD OFF VENOUS DEVICE: CPT

## 2024-09-10 RX ORDER — LORAZEPAM 2 MG/ML
0.5 INJECTION INTRAMUSCULAR EVERY 4 HOURS PRN
Status: CANCELLED | OUTPATIENT
Start: 2024-09-10

## 2024-09-10 RX ORDER — HEPARIN SODIUM (PORCINE) LOCK FLUSH IV SOLN 100 UNIT/ML 100 UNIT/ML
5 SOLUTION INTRAVENOUS
Status: CANCELLED | OUTPATIENT
Start: 2024-09-10

## 2024-09-10 RX ORDER — HEPARIN SODIUM (PORCINE) LOCK FLUSH IV SOLN 100 UNIT/ML 100 UNIT/ML
5 SOLUTION INTRAVENOUS
OUTPATIENT
Start: 2024-10-22

## 2024-09-10 RX ORDER — HEPARIN SODIUM,PORCINE 10 UNIT/ML
5-20 VIAL (ML) INTRAVENOUS DAILY PRN
OUTPATIENT
Start: 2024-10-22

## 2024-09-10 RX ORDER — HEPARIN SODIUM,PORCINE 10 UNIT/ML
5-20 VIAL (ML) INTRAVENOUS DAILY PRN
Status: CANCELLED | OUTPATIENT
Start: 2024-10-01

## 2024-09-10 RX ORDER — HEPARIN SODIUM (PORCINE) LOCK FLUSH IV SOLN 100 UNIT/ML 100 UNIT/ML
5 SOLUTION INTRAVENOUS
Status: CANCELLED | OUTPATIENT
Start: 2024-10-01

## 2024-09-10 RX ORDER — METHYLPREDNISOLONE SODIUM SUCCINATE 125 MG/2ML
125 INJECTION, POWDER, LYOPHILIZED, FOR SOLUTION INTRAMUSCULAR; INTRAVENOUS
Status: DISCONTINUED | OUTPATIENT
Start: 2024-09-10 | End: 2024-09-10 | Stop reason: HOSPADM

## 2024-09-10 RX ORDER — METHYLPREDNISOLONE SODIUM SUCCINATE 125 MG/2ML
125 INJECTION, POWDER, LYOPHILIZED, FOR SOLUTION INTRAMUSCULAR; INTRAVENOUS
Status: CANCELLED
Start: 2024-09-10

## 2024-09-10 RX ORDER — METHYLPREDNISOLONE SODIUM SUCCINATE 125 MG/2ML
125 INJECTION, POWDER, LYOPHILIZED, FOR SOLUTION INTRAMUSCULAR; INTRAVENOUS
Status: CANCELLED
Start: 2024-10-01

## 2024-09-10 RX ORDER — ALBUTEROL SULFATE 0.83 MG/ML
2.5 SOLUTION RESPIRATORY (INHALATION)
OUTPATIENT
Start: 2024-10-22

## 2024-09-10 RX ORDER — PALONOSETRON 0.05 MG/ML
0.25 INJECTION, SOLUTION INTRAVENOUS ONCE
Status: CANCELLED | OUTPATIENT
Start: 2024-09-10

## 2024-09-10 RX ORDER — EPINEPHRINE 1 MG/ML
0.3 INJECTION, SOLUTION INTRAMUSCULAR; SUBCUTANEOUS EVERY 5 MIN PRN
Status: DISCONTINUED | OUTPATIENT
Start: 2024-09-10 | End: 2024-09-10 | Stop reason: HOSPADM

## 2024-09-10 RX ORDER — MEPERIDINE HYDROCHLORIDE 50 MG/ML
25 INJECTION INTRAMUSCULAR; INTRAVENOUS; SUBCUTANEOUS EVERY 30 MIN PRN
Status: DISCONTINUED | OUTPATIENT
Start: 2024-09-10 | End: 2024-09-10 | Stop reason: HOSPADM

## 2024-09-10 RX ORDER — ALBUTEROL SULFATE 90 UG/1
1-2 AEROSOL, METERED RESPIRATORY (INHALATION)
Status: CANCELLED
Start: 2024-10-01

## 2024-09-10 RX ORDER — DIPHENHYDRAMINE HYDROCHLORIDE 50 MG/ML
50 INJECTION INTRAMUSCULAR; INTRAVENOUS
Status: CANCELLED
Start: 2024-10-01

## 2024-09-10 RX ORDER — EPINEPHRINE 1 MG/ML
0.3 INJECTION, SOLUTION INTRAMUSCULAR; SUBCUTANEOUS EVERY 5 MIN PRN
OUTPATIENT
Start: 2024-10-22

## 2024-09-10 RX ORDER — ALBUTEROL SULFATE 0.83 MG/ML
2.5 SOLUTION RESPIRATORY (INHALATION)
Status: DISCONTINUED | OUTPATIENT
Start: 2024-09-10 | End: 2024-09-10 | Stop reason: HOSPADM

## 2024-09-10 RX ORDER — ALBUTEROL SULFATE 90 UG/1
1-2 AEROSOL, METERED RESPIRATORY (INHALATION)
Start: 2024-10-22

## 2024-09-10 RX ORDER — MEPERIDINE HYDROCHLORIDE 50 MG/ML
25 INJECTION INTRAMUSCULAR; INTRAVENOUS; SUBCUTANEOUS EVERY 30 MIN PRN
Status: CANCELLED | OUTPATIENT
Start: 2024-09-10

## 2024-09-10 RX ORDER — METHYLPREDNISOLONE SODIUM SUCCINATE 125 MG/2ML
125 INJECTION, POWDER, LYOPHILIZED, FOR SOLUTION INTRAMUSCULAR; INTRAVENOUS
Start: 2024-10-22

## 2024-09-10 RX ORDER — ALBUTEROL SULFATE 0.83 MG/ML
2.5 SOLUTION RESPIRATORY (INHALATION)
Status: CANCELLED | OUTPATIENT
Start: 2024-09-10

## 2024-09-10 RX ORDER — ALBUTEROL SULFATE 0.83 MG/ML
2.5 SOLUTION RESPIRATORY (INHALATION)
Status: CANCELLED | OUTPATIENT
Start: 2024-10-01

## 2024-09-10 RX ORDER — ALBUTEROL SULFATE 90 UG/1
1-2 AEROSOL, METERED RESPIRATORY (INHALATION)
Status: CANCELLED
Start: 2024-09-10

## 2024-09-10 RX ORDER — ESTRADIOL 0.1 MG/G
0.5 CREAM VAGINAL
Qty: 42 G | Refills: 1 | Status: ON HOLD | OUTPATIENT
Start: 2024-09-12

## 2024-09-10 RX ORDER — HEPARIN SODIUM,PORCINE 10 UNIT/ML
5-20 VIAL (ML) INTRAVENOUS DAILY PRN
Status: CANCELLED | OUTPATIENT
Start: 2024-09-10

## 2024-09-10 RX ORDER — DIPHENHYDRAMINE HYDROCHLORIDE 50 MG/ML
50 INJECTION INTRAMUSCULAR; INTRAVENOUS
Start: 2024-10-22

## 2024-09-10 RX ORDER — ALBUTEROL SULFATE 90 UG/1
1-2 AEROSOL, METERED RESPIRATORY (INHALATION)
Status: DISCONTINUED | OUTPATIENT
Start: 2024-09-10 | End: 2024-09-10 | Stop reason: HOSPADM

## 2024-09-10 RX ORDER — PALONOSETRON 0.05 MG/ML
0.25 INJECTION, SOLUTION INTRAVENOUS ONCE
Status: COMPLETED | OUTPATIENT
Start: 2024-09-10 | End: 2024-09-10

## 2024-09-10 RX ORDER — HEPARIN SODIUM (PORCINE) LOCK FLUSH IV SOLN 100 UNIT/ML 100 UNIT/ML
5 SOLUTION INTRAVENOUS
Status: DISCONTINUED | OUTPATIENT
Start: 2024-09-10 | End: 2024-09-10 | Stop reason: HOSPADM

## 2024-09-10 RX ORDER — DIPHENHYDRAMINE HYDROCHLORIDE 50 MG/ML
50 INJECTION INTRAMUSCULAR; INTRAVENOUS
Status: CANCELLED
Start: 2024-09-10

## 2024-09-10 RX ORDER — EPINEPHRINE 1 MG/ML
0.3 INJECTION, SOLUTION INTRAMUSCULAR; SUBCUTANEOUS EVERY 5 MIN PRN
Status: CANCELLED | OUTPATIENT
Start: 2024-09-10

## 2024-09-10 RX ORDER — MEPERIDINE HYDROCHLORIDE 50 MG/ML
25 INJECTION INTRAMUSCULAR; INTRAVENOUS; SUBCUTANEOUS EVERY 30 MIN PRN
OUTPATIENT
Start: 2024-10-22

## 2024-09-10 RX ORDER — EPINEPHRINE 1 MG/ML
0.3 INJECTION, SOLUTION INTRAMUSCULAR; SUBCUTANEOUS EVERY 5 MIN PRN
Status: CANCELLED | OUTPATIENT
Start: 2024-10-01

## 2024-09-10 RX ORDER — DIPHENHYDRAMINE HYDROCHLORIDE 50 MG/ML
50 INJECTION INTRAMUSCULAR; INTRAVENOUS
Status: DISCONTINUED | OUTPATIENT
Start: 2024-09-10 | End: 2024-09-10 | Stop reason: HOSPADM

## 2024-09-10 RX ORDER — MEPERIDINE HYDROCHLORIDE 50 MG/ML
25 INJECTION INTRAMUSCULAR; INTRAVENOUS; SUBCUTANEOUS EVERY 30 MIN PRN
Status: CANCELLED | OUTPATIENT
Start: 2024-10-01

## 2024-09-10 RX ADMIN — PALONOSETRON 0.25 MG: 0.05 INJECTION, SOLUTION INTRAVENOUS at 10:30

## 2024-09-10 RX ADMIN — FAM-TRASTUZUMAB DERUXTECAN-NXKI 300 MG: 100 INJECTION, POWDER, LYOPHILIZED, FOR SOLUTION INTRAVENOUS at 11:08

## 2024-09-10 RX ADMIN — DEXTROSE MONOHYDRATE 250 ML: 50 INJECTION, SOLUTION INTRAVENOUS at 10:30

## 2024-09-10 RX ADMIN — FOSAPREPITANT: 150 INJECTION, POWDER, LYOPHILIZED, FOR SOLUTION INTRAVENOUS at 10:39

## 2024-09-10 RX ADMIN — ZOLEDRONIC ACID 3.3 MG: 4 INJECTION, SOLUTION, CONCENTRATE INTRAVENOUS at 11:48

## 2024-09-10 RX ADMIN — HEPARIN 5 ML: 100 SYRINGE at 12:07

## 2024-09-10 RX ADMIN — SODIUM CHLORIDE 250 ML: 9 INJECTION, SOLUTION INTRAVENOUS at 11:48

## 2024-09-10 ASSESSMENT — PAIN SCALES - GENERAL: PAINLEVEL: NO PAIN (0)

## 2024-09-10 NOTE — PROGRESS NOTES
[facility] Hematology and Oncology Progress Note    Patient: Kacie Hermosillo  MRN: [id number]  Date of Service: Sep 10, 2024             ECOG Performance    2 - Ambulatory and independent in all ADLs; cannot work; up > 50% of the time          ______________________________________________________________________________  Oncologic history  Metastatic breast cancer with bone mets only ER negative [place] negative HER2/amarilis 2+ and negative by FISH.  HER2/amarilis low    Remote diagnosis of breast cancer in 1995 treated with surgery chemotherapy radiation and 5 years of hormonal therapy.  Pathology report not available.    Treatment  Patient started on fam-trastuzumab on 8/19/2024    History of Present Illness    Ms. Kacie Hermosillo is here in follow-up.  She is here for her second cycle of chemotherapy with fam-trastuzumab.  She seems to have tolerated the first cycle well without any significant problem.  She denies having any significant nausea vomiting or diarrhea.  She did not develop any significant cytopenias.  She did feel fatigued after she stopped her dexamethasone.  She also developed a UTI with Klebsiella and was treated with antibiotics.  She is feeling well today.  She does complain of some pain in her lower back for which she takes Tylenol which is controlling her pain.    Review of systems  A comprehensive 12 point review of system was done that was negative except what is mentioned in the history of present illness    Past History    Past Medical History:   Diagnosis Date    Breast cancer (H) 01/01/1999    Heart valve replaced     Hx antineoplastic chemotherapy 01/01/1999    Hx of radiation therapy 01/01/1999    Hypertension        Past Surgical History:   Procedure Laterality Date    BIOPSY BREAST      HYSTERECTOMY      IR CHEST PORT PLACEMENT > 5 YRS OF AGE  7/30/2024    LUMPECTOMY BREAST Left 1999    OOPHORECTOMY Bilateral        Physical Exam    /78   Pulse 62   Temp 98.6  F (37  C)    "Resp 16   Ht 1.6 m (5' 3\")   Wt 60.4 kg (133 lb 1.6 oz)   SpO2 99%   BMI 23.58 kg/m      General: alert, awake, not in acute distress  HEENT: Head: Normal, normocephalic, atraumatic.  Eye: Normal external eye, conjunctiva, lids cornea, MARI.  Nose: Normal external nose, mucus membranes and septum.  Pharynx: Normal buccal mucosa. Normal pharynx.  Neck / Thyroid: Supple, no masses, nodes, nodules or enlargement.  Lymphatics: No abnormally enlarged lymph nodes.  Chest: Normal chest wall and respirations. Clear to auscultation.  No crackles or rhonchi's  Heart: S1 S2 RRR, no murmur.   Abdomen: abdomen is soft without significant tenderness, masses, organomegaly or guarding  Extremities: normal strength, tone, and muscle mass  Skin: normal. no rash or abnormalities  CNS: non focal.    Lab Results    Recent Results (from the past 240 hour(s))   Comprehensive metabolic panel    Collection Time: 09/10/24  8:44 AM   Result Value Ref Range    Sodium 135 135 - 145 mmol/L    Potassium 3.9 3.4 - 5.3 mmol/L    Carbon Dioxide (CO2) 23 22 - 29 mmol/L    Anion Gap 13 7 - 15 mmol/L    Urea Nitrogen 13.2 8.0 - 23.0 mg/dL    Creatinine 0.97 (H) 0.51 - 0.95 mg/dL    GFR Estimate 57 (L) >60 mL/min/1.73m2    Calcium 8.2 (L) 8.8 - 10.4 mg/dL    Chloride 99 98 - 107 mmol/L    Glucose 90 70 - 99 mg/dL    Alkaline Phosphatase 98 40 - 150 U/L    AST 27 0 - 45 U/L    ALT 31 0 - 50 U/L    Protein Total 5.7 (L) 6.4 - 8.3 g/dL    Albumin 3.4 (L) 3.5 - 5.2 g/dL    Bilirubin Total 0.4 <=1.2 mg/dL   Lactate Dehydrogenase    Collection Time: 09/10/24  8:44 AM   Result Value Ref Range    Lactate Dehydrogenase 247 0 - 250 U/L   INR    Collection Time: 09/10/24  8:44 AM   Result Value Ref Range    INR 2.35 (H) 0.85 - 1.15   CBC with platelets and differential    Collection Time: 09/10/24  8:44 AM   Result Value Ref Range    WBC Count 5.1 4.0 - 11.0 10e3/uL    RBC Count 4.04 3.80 - 5.20 10e6/uL    Hemoglobin 12.1 11.7 - 15.7 g/dL    Hematocrit 35.2 " 35.0 - 47.0 %    MCV 87 78 - 100 fL    MCH 30.0 26.5 - 33.0 pg    MCHC 34.4 31.5 - 36.5 g/dL    RDW 14.7 10.0 - 15.0 %    Platelet Count 241 150 - 450 10e3/uL    % Neutrophils 58 %    % Lymphocytes 22 %    % Monocytes 11 %    % Eosinophils 6 %    % Basophils 2 %    % Immature Granulocytes 0 %    NRBCs per 100 WBC 0 <1 /100    Absolute Neutrophils 3.0 1.6 - 8.3 10e3/uL    Absolute Lymphocytes 1.1 0.8 - 5.3 10e3/uL    Absolute Monocytes 0.6 0.0 - 1.3 10e3/uL    Absolute Eosinophils 0.3 0.0 - 0.7 10e3/uL    Absolute Basophils 0.1 0.0 - 0.2 10e3/uL    Absolute Immature Granulocytes 0.0 <=0.4 10e3/uL    Absolute NRBCs 0.0 10e3/uL         Imaging    Echocardiogram Complete    Result Date: 2024  [id number]^DAVID^CARLOS^[place] [address]  Name: URIAH OLIVEROS MRN: [id number] : 1940 Study Date: 2024 02:35 PM Age: 84 yrs Gender: Female Patient Location: [facility] Reason For Study: Malignant neoplasm metastatic to bone (H), Neoplasm of unspecifi Ordering Physician: CARLOS HERNANDEZ Referring Physician: CARLOS HERNANDEZ Performed By: DEXTER  BSA: 1.6 m2 Height: 63 in Weight: 133 lb HR: 65 BP: 156/82 mmHg ______________________________________________________________________________ Procedure Complete Echo Adult. Definity (NDC #22259-773) given intravenously. ______________________________________________________________________________ Interpretation Summary  There is borderline concentric left ventricular hypertrophy. The visual ejection fraction is 55-60%. Global peak LV longitudinal strain is averaged at -14.4%. This suggests abnormal strain (normal <-18%). There is mild to moderate lateral wall hypokinesis. The right ventricle is normal in size and function. There is a RYAN 3 bioprosthetic valve present in aortic valve position. The prosthetic valve gradients are normal. There is no paravalvular regurgitation  present. There is no comparison study available. ______________________________________________________________________________ Left Ventricle The left ventricle is normal in size. There is borderline concentric left ventricular hypertrophy. The visual ejection fraction is 55-60%. Global peak LV longitudinal strain is averaged at -14.4%. This suggests abnormal strain (normal <-18%). There is mild to moderate lateral wall hypokinesis.  Right Ventricle The right ventricle is normal in size and function.  Atria The left atrium is mildly dilated. Right atrial size is normal. There is no color Doppler evidence of an atrial shunt.  Mitral Valve The mitral valve leaflets appear thickened, but open well. There is mild mitral annular calcification. There is mild (1+) mitral regurgitation. There is no mitral valve stenosis.  Tricuspid Valve Tricuspid valve leaflets appear normal. There is mild (1+) tricuspid regurgitation.  Aortic Valve The aortic valve is trileaflet with aortic valve sclerosis. There is a RYAN 3 bioprosthetic valve present in aortic valve position. The prosthetic valve gradients are normal . There is no paravalvular regurgitation present.  Pulmonic Valve Normal pulmonic valve. There is trace pulmonic valvular regurgitation.  Vessels The aorta root is normal. Normal size ascending aorta. IVC diameter <2.1 cm collapsing >50% with sniff suggests a normal RA pressure of 3 mmHg.  Pericardium There is no pericardial effusion.  ______________________________________________________________________________ MMode/2D Measurements & Calculations IVSd: 1.1 cm LVIDd: 4.5 cm LVIDs: 3.0 cm LVPWd: 1.2 cm FS: 32.2 % LV mass(C)d: 178.6 grams LV mass(C)dI: 109.8 grams/m2  Ao root diam: 3.2 cm LA dimension: 3.3 cm asc Aorta Diam: 3.7 cm LA/Ao: 1.0 LVOT diam: 1.8 cm LVOT area: 2.5 cm2 Ao root diam index Ht(cm/m): 2.0 Ao root diam index BSA (cm/m2): 1.9 Asc Ao diam index BSA (cm/m2): 2.3 Asc Ao diam index Ht(cm/m): 2.3 EF  Biplane: 57.3 % LA Volume Indexed (AL/bp): 36.0 ml/m2 RV Base: 2.1 cm  RWT: 0.52 TAPSE: 1.8 cm  Time Measurements Aortic HR: 64.0 BPM MM HR: 69.0 BPM  Doppler Measurements & Calculations MV E max frantz: 98.0 cm/sec MV A max frantz: 119.3 cm/sec MV E/A: 0.82 MV dec slope: 371.0 cm/sec2 MV dec time: 0.26 sec Ao V2 max: 218.4 cm/sec Ao max P.0 mmHg Ao V2 mean: 170.5 cm/sec Ao mean P.8 mmHg Ao V2 VTI: 56.4 cm CARYL(I,D): 0.82 cm2 CARYL(V,D): 0.89 cm2 Ao acc time: 0.10 sec LV V1 max P.4 mmHg LV V1 max: 77.1 cm/sec LV V1 VTI: 18.3 cm CO(LVOT): 3.0 l/min CI(LVOT): 1.8 l/min/m2 SV(LVOT): 46.4 ml SI(LVOT): 28.5 ml/m2 PA acc time: 0.11 sec PI end-d frantz: 102.1 cm/sec  TR max frantz: 280.5 cm/sec TR max P.5 mmHg AV Frantz Ratio (DI): 0.35 CARYL Index (cm2/m2): 0.51 E/E': 16.6 E/E' av.3 Lateral E/e': 16.7 Medial E/e': 18.0 Peak E' Frantz: 5.9 cm/sec RV S Frantz: 11.1 cm/sec  ______________________________________________________________________________ Report approved by: Jatin Barroso 2024 04:21 PM            Assessment and Plan    Metastatic breast cancer with bone mets HER2/amarilis low  Patient is here for her second cycle of treatment.  She tolerated the first cycle of fam-trastuzumab without any significant problems.  I will give her second dose today without any changes she will come back in 3 weeks for her next cycle.  I will plan to restage after the fourth cycle.    Bone metastases  Patient does has bone metastases we will plan to start her on Zometa along with her fam-trastuzumab.  Orders have been put in and we will check with finance if it can be approved for today otherwise it would be started on her next visit    Recurrent UTIs  Patient has a history of recurrent UTIs she had another 1 about 10 days ago and had Klebsiella in her urine.  She was given antibiotics.  She was told to call us if she has symptoms again    Signed by: Hadley Gann MD        CC: Irina Francisco PA-C

## 2024-09-10 NOTE — LETTER
"9/10/2024      Kacie Hermosillo  [address] [facility] [address]      Dear Colleague,    Thank you for referring your patient, Kacie Hermosillo, to the [facility]. Please see a copy of my visit note below.    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    Weight as of this encounter: 60.4 kg (133 lb 1.6 oz). Body surface area is 1.64 meters squared.  No Pain (0) Comment: Data Unavailable   No LMP recorded. Patient is postmenopausal.  Allergies reviewed: Yes  Medications reviewed: Yes    Medications: Medication refills not needed today.  Pharmacy name entered into FusionOps:    [facility] [place], [place] - [address]  [facility] [place], [place] - [address] [id number] - [place], [place] - [address] [facility] STORE #[id number] - [place], [place] - [address] [facility][id number] - [place], [place] - [address] [place] [address] DRUG STORE #[id number] - [place], [place] - [address] [facility] AT [address] [place] [address] & [facility]    Frailty Screening:   Is the patient here for a new oncology consult visit in cancer care? 2. No      Clinical concerns: None      Ann Chowdhury LPN               [facility] Oncology Progress Note    Patient: Kacie Hermosillo  MRN: [id number]  Date of Service: Sep 10, 2024             ECOG " "Performance    2 - Ambulatory and independent in all ADLs; cannot work; up > 50% of the time          ______________________________________________________________________________  Oncologic history  Metastatic breast cancer with bone mets only ER negative OR negative HER2/amarilis 2+ and negative by FISH.  HER2/amarilis low    Remote diagnosis of breast cancer in 1995 treated with surgery chemotherapy radiation and 5 years of hormonal therapy.  Pathology report not available.    Treatment  Patient started on fam-trastuzumab on 8/19/2024    History of Present Illness    Ms. Kacie Hermosillo is here in follow-up.  She is here for her second cycle of chemotherapy with fam-trastuzumab.  She seems to have tolerated the first cycle well without any significant problem.  She denies having any significant nausea vomiting or diarrhea.  She did not develop any significant cytopenias.  She did feel fatigued after she stopped her dexamethasone.  She also developed a UTI with Klebsiella and was treated with antibiotics.  She is feeling well today.  She does complain of some pain in her lower back for which she takes Tylenol which is controlling her pain.    Review of systems  A comprehensive 12 point review of system was done that was negative except what is mentioned in the history of present illness    Past History    Past Medical History:   Diagnosis Date     Breast cancer (H) 01/01/1999     Heart valve replaced      Hx antineoplastic chemotherapy 01/01/1999     Hx of radiation therapy 01/01/1999     Hypertension        Past Surgical History:   Procedure Laterality Date     BIOPSY BREAST       HYSTERECTOMY       IR CHEST PORT PLACEMENT > 5 YRS OF AGE  7/30/2024     LUMPECTOMY BREAST Left 1999     OOPHORECTOMY Bilateral        Physical Exam    /78   Pulse 62   Temp 98.6  F (37  C)   Resp 16   Ht 1.6 m (5' 3\")   Wt 60.4 kg (133 lb 1.6 oz)   SpO2 99%   BMI 23.58 kg/m      General: alert, awake, not in acute " distress  HEENT: Head: Normal, normocephalic, atraumatic.  Eye: Normal external eye, conjunctiva, lids cornea, MARI.  Nose: Normal external nose, mucus membranes and septum.  Pharynx: Normal buccal mucosa. Normal pharynx.  Neck / Thyroid: Supple, no masses, nodes, nodules or enlargement.  Lymphatics: No abnormally enlarged lymph nodes.  Chest: Normal chest wall and respirations. Clear to auscultation.  No crackles or rhonchi's  Heart: S1 S2 RRR, no murmur.   Abdomen: abdomen is soft without significant tenderness, masses, organomegaly or guarding  Extremities: normal strength, tone, and muscle mass  Skin: normal. no rash or abnormalities  CNS: non focal.    Lab Results    Recent Results (from the past 240 hour(s))   Comprehensive metabolic panel    Collection Time: 09/10/24  8:44 AM   Result Value Ref Range    Sodium 135 135 - 145 mmol/L    Potassium 3.9 3.4 - 5.3 mmol/L    Carbon Dioxide (CO2) 23 22 - 29 mmol/L    Anion Gap 13 7 - 15 mmol/L    Urea Nitrogen 13.2 8.0 - 23.0 mg/dL    Creatinine 0.97 (H) 0.51 - 0.95 mg/dL    GFR Estimate 57 (L) >60 mL/min/1.73m2    Calcium 8.2 (L) 8.8 - 10.4 mg/dL    Chloride 99 98 - 107 mmol/L    Glucose 90 70 - 99 mg/dL    Alkaline Phosphatase 98 40 - 150 U/L    AST 27 0 - 45 U/L    ALT 31 0 - 50 U/L    Protein Total 5.7 (L) 6.4 - 8.3 g/dL    Albumin 3.4 (L) 3.5 - 5.2 g/dL    Bilirubin Total 0.4 <=1.2 mg/dL   Lactate Dehydrogenase    Collection Time: 09/10/24  8:44 AM   Result Value Ref Range    Lactate Dehydrogenase 247 0 - 250 U/L   INR    Collection Time: 09/10/24  8:44 AM   Result Value Ref Range    INR 2.35 (H) 0.85 - 1.15   CBC with platelets and differential    Collection Time: 09/10/24  8:44 AM   Result Value Ref Range    WBC Count 5.1 4.0 - 11.0 10e3/uL    RBC Count 4.04 3.80 - 5.20 10e6/uL    Hemoglobin 12.1 11.7 - 15.7 g/dL    Hematocrit 35.2 35.0 - 47.0 %    MCV 87 78 - 100 fL    MCH 30.0 26.5 - 33.0 pg    MCHC 34.4 31.5 - 36.5 g/dL    RDW 14.7 10.0 - 15.0 %    Platelet  Count 241 150 - 450 10e3/uL    % Neutrophils 58 %    % Lymphocytes 22 %    % Monocytes 11 %    % Eosinophils 6 %    % Basophils 2 %    % Immature Granulocytes 0 %    NRBCs per 100 WBC 0 <1 /100    Absolute Neutrophils 3.0 1.6 - 8.3 10e3/uL    Absolute Lymphocytes 1.1 0.8 - 5.3 10e3/uL    Absolute Monocytes 0.6 0.0 - 1.3 10e3/uL    Absolute Eosinophils 0.3 0.0 - 0.7 10e3/uL    Absolute Basophils 0.1 0.0 - 0.2 10e3/uL    Absolute Immature Granulocytes 0.0 <=0.4 10e3/uL    Absolute NRBCs 0.0 10e3/uL         Imaging    Echocardiogram Complete    Result Date: 2024  [id number] [id number]^DAVID^CARLOS^[place] [address]  Name: URIAH OLIVEROS MRN: [id number] : 1940 Study Date: 2024 02:35 PM Age: 84 yrs Gender: Female Patient Location: [facility] Reason For Study: Malignant neoplasm metastatic to bone (H), Neoplasm of unspecifi Ordering Physician: CARLOS HERNANDEZ Referring Physician: CARLOS HERNANDEZ Performed By: DEXTER  BSA: 1.6 m2 Height: 63 in Weight: 133 lb HR: 65 BP: 156/82 mmHg ______________________________________________________________________________ Procedure Complete Echo Adult. Definity (NDC #54581-811) given intravenously. ______________________________________________________________________________ Interpretation Summary  There is borderline concentric left ventricular hypertrophy. The visual ejection fraction is 55-60%. Global peak LV longitudinal strain is averaged at -14.4%. This suggests abnormal strain (normal <-18%). There is mild to moderate lateral wall hypokinesis. The right ventricle is normal in size and function. There is a RYAN 3 bioprosthetic valve present in aortic valve position. The prosthetic valve gradients are normal. There is no paravalvular regurgitation present. There is no comparison study available. ______________________________________________________________________________  Left Ventricle The left ventricle is normal in size. There is borderline concentric left ventricular hypertrophy. The visual ejection fraction is 55-60%. Global peak LV longitudinal strain is averaged at -14.4%. This suggests abnormal strain (normal <-18%). There is mild to moderate lateral wall hypokinesis.  Right Ventricle The right ventricle is normal in size and function.  Atria The left atrium is mildly dilated. Right atrial size is normal. There is no color Doppler evidence of an atrial shunt.  Mitral Valve The mitral valve leaflets appear thickened, but open well. There is mild mitral annular calcification. There is mild (1+) mitral regurgitation. There is no mitral valve stenosis.  Tricuspid Valve Tricuspid valve leaflets appear normal. There is mild (1+) tricuspid regurgitation.  Aortic Valve The aortic valve is trileaflet with aortic valve sclerosis. There is a RYAN 3 bioprosthetic valve present in aortic valve position. The prosthetic valve gradients are normal . There is no paravalvular regurgitation present.  Pulmonic Valve Normal pulmonic valve. There is trace pulmonic valvular regurgitation.  Vessels The aorta root is normal. Normal size ascending aorta. IVC diameter <2.1 cm collapsing >50% with sniff suggests a normal RA pressure of 3 mmHg.  Pericardium There is no pericardial effusion.  ______________________________________________________________________________ MMode/2D Measurements & Calculations IVSd: 1.1 cm LVIDd: 4.5 cm LVIDs: 3.0 cm LVPWd: 1.2 cm FS: 32.2 % LV mass(C)d: 178.6 grams LV mass(C)dI: 109.8 grams/m2  Ao root diam: 3.2 cm LA dimension: 3.3 cm asc Aorta Diam: 3.7 cm LA/Ao: 1.0 LVOT diam: 1.8 cm LVOT area: 2.5 cm2 Ao root diam index Ht(cm/m): 2.0 Ao root diam index BSA (cm/m2): 1.9 Asc Ao diam index BSA (cm/m2): 2.3 Asc Ao diam index Ht(cm/m): 2.3 EF Biplane: 57.3 % LA Volume Indexed (AL/bp): 36.0 ml/m2 RV Base: 2.1 cm  RWT: 0.52 TAPSE: 1.8 cm  Time Measurements Aortic HR: 64.0 BPM  MM HR: 69.0 BPM  Doppler Measurements & Calculations MV E max frantz: 98.0 cm/sec MV A max frantz: 119.3 cm/sec MV E/A: 0.82 MV dec slope: 371.0 cm/sec2 MV dec time: 0.26 sec Ao V2 max: 218.4 cm/sec Ao max P.0 mmHg Ao V2 mean: 170.5 cm/sec Ao mean P.8 mmHg Ao V2 VTI: 56.4 cm CARYL(I,D): 0.82 cm2 CARYL(V,D): 0.89 cm2 Ao acc time: 0.10 sec LV V1 max P.4 mmHg LV V1 max: 77.1 cm/sec LV V1 VTI: 18.3 cm CO(LVOT): 3.0 l/min CI(LVOT): 1.8 l/min/m2 SV(LVOT): 46.4 ml SI(LVOT): 28.5 ml/m2 PA acc time: 0.11 sec PI end-d frantz: 102.1 cm/sec  TR max frantz: 280.5 cm/sec TR max P.5 mmHg AV Frantz Ratio (DI): 0.35 CARYL Index (cm2/m2): 0.51 E/E': 16.6 E/E' av.3 Lateral E/e': 16.7 Medial E/e': 18.0 Peak E' Frantz: 5.9 cm/sec RV S Frantz: 11.1 cm/sec  ______________________________________________________________________________ Report approved by: Jatin Barroso 2024 04:21 PM            Assessment and Plan    Metastatic breast cancer with bone mets HER2/amarilis low  Patient is here for her second cycle of treatment.  She tolerated the first cycle of fam-trastuzumab without any significant problems.  I will give her second dose today without any changes she will come back in 3 weeks for her next cycle.  I will plan to restage after the fourth cycle.    Bone metastases  Patient does has bone metastases we will plan to start her on Zometa along with her fam-trastuzumab.  Orders have been put in and we will check with finance if it can be approved for today otherwise it would be started on her next visit    Recurrent UTIs  Patient has a history of recurrent UTIs she had another 1 about 10 days ago and had Klebsiella in her urine.  She was given antibiotics.  She was told to call us if she has symptoms again    Signed by: Hadley Gann MD        CC: Irina Francisco PA-C       Again, thank you for allowing me to participate in the care of your patient.        Sincerely,        Hadley Gann MD

## 2024-09-10 NOTE — PROGRESS NOTES
"ANTICOAGULATION MANAGEMENT     Kacie Hermosillo 84 year old female is on warfarin with therapeutic INR result. (Goal INR 2.0-3.0)    Recent labs: (last 7 days)     09/10/24  0844   INR 2.35*       ASSESSMENT     Source(s): Chart Review and Patient/Caregiver Call     Warfarin doses taken: Warfarin taken as instructed  Diet: No new diet changes identified  Medication/supplement changes: None noted  New illness, injury, or hospitalization: No  Signs or symptoms of bleeding or clotting: No  Previous result: Therapeutic last 2(+) visits  Additional findings: had chemo today       PLAN     Recommended plan for temporary change(s) affecting INR     Dosing Instructions: Continue your current warfarin dose with next INR in 1 week due to chemo medications (did not check 1 week post-chemo last round so unsure how the medications are affecting the INR)    Summary  As of 9/10/2024      Full warfarin instructions:  5 mg every Sun, Tue, Thu; 2.5 mg all other days   Next INR check:  9/17/2024               Telephone call with Kacie who verbalizes understanding and agrees to plan    Patient offered & declined to schedule next visit-- stated \"I am not going to make it anyway since I can't drive so no sense in scheduling something\"    Education provided: Interaction IS anticipated between warfarin and both fosaprepitant and decadron  Symptom monitoring: when to seek medical attention/emergency care  Contact [phone] with any changes, questions or concerns.     Plan made per [facility] anticoagulation protocol    Ann Alvarez RN  9/10/2024  Anticoagulation Clinic  [employer] for routing messages: clifton WEEKS  [facility] patient phone line: [phone]        _______________________________________________________________________     Anticoagulation Episode Summary       Current INR goal:  2.0-3.0   TTR:  78.7% (1 y)   Target end date:  Indefinite   Send INR reminders to:  VIRGILIO WEEKS    Indications    S/P TAVR (transcatheter " aortic valve replacement) [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

## 2024-09-10 NOTE — PROGRESS NOTES
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    Weight as of this encounter: 60.4 kg (133 lb 1.6 oz). Body surface area is 1.64 meters squared.  No Pain (0) Comment: Data Unavailable   No LMP recorded. Patient is postmenopausal.  Allergies reviewed: Yes  Medications reviewed: Yes    Medications: Medication refills not needed today.  Pharmacy name entered into P[employer]:    [facility] [place]  [place] - [address] [facility] [place], [place] - [address] [facility] #[id number] - [place] - [address] [facility] STORE #[id number] - [place], [place] - [address] [facility] #[id number] - [place], [place] - [address] [place] [address] DRUG STORE #[id number] - [place], [place] - [address] AT [address] [place] [address] & [facility]    Frailty Screening:   Is the patient here for a new oncology consult visit in cancer care? 2. No      Clinical concerns: None      Ann Chowdhury LPN             "

## 2024-09-18 ENCOUNTER — MYC MEDICAL ADVICE (OUTPATIENT)
Dept: ANTICOAGULATION | Facility: CLINIC | Age: 84
End: 2024-09-18
Payer: COMMERCIAL

## 2024-09-23 ENCOUNTER — TELEPHONE (OUTPATIENT)
Dept: ONCOLOGY | Facility: HOSPITAL | Age: 84
End: 2024-09-23
Payer: COMMERCIAL

## 2024-09-23 ENCOUNTER — PATIENT OUTREACH (OUTPATIENT)
Dept: ONCOLOGY | Facility: HOSPITAL | Age: 84
End: 2024-09-23
Payer: COMMERCIAL

## 2024-09-23 NOTE — PROGRESS NOTES
[facility]: Cancer Care                                                                                          Sulma. patient's daughter, consent to communicate is on file dated 7/22/24, called about her mother's  chemo and treatment tolerance.   Sulma stated that her mother has experienced  diarrhea last week, took 2 tablets after first loose stool and one after each subsequent stool, total  of 4 tablets. The following day, she had another  episodes of diarrhea, took 2 imodium and the diarrhea stopped. She has not had diarrhea since that time, 4 days have passed. She is also fatigued. Sulma states that her mother is complaining about her treatment and how she feels but she is not helping herself to feel better. Her mom is not sure that she wants to continue  treatment ,  wonders about spacing her treatments further apart. She does not take care of herself well, only drinks 20 oz of fluid per day. They have encouraged her to increase her intake but she does not do this. She is also having some memory problems. They are looking at options of how to best support her Shared that with the last clinic visit, Dr. Gann's note indicated that she was tolerating treatment well. Fatigue is not uncommon with treatment. Also shared that diarrhea is a side effect of treatment. Reviewed the directions for imodium and shared that Kacie can take 8 tablets of imodium per day. She she only had 2 days of diarrhea with 4 tablets on one day and 2 the next, her diarrhea is controlled at this time on her treatment. If she takes/ gets close to taking the maximum amount of imodium then alternate medication can be added.  She is not at that point at this time with the symptoms above.  Per Sulma, Kacie is very stubborn about drinking water and rarely gets above 20 oz. Discussed the importance of good hydration to help replenish after diarrhea,  prevent dehydration which also causes fatigue, and to also keep flushing the urinary system to  help prevent infection which she has a history of. Encouraged to continue t support hydration and include items other than water, broth, juice, popsicles, ice chips, jello, gatorade, soda etc. Did share that there are protocols for treatment and the efficacy of treatment is based on treatment given at certain time intervals. There are times that treatment is dose reduced, depending on symptoms/tolerance/ blood counts. Her symptoms are overall managed and we will continue to support Kacie with her treatment side effects. With the concerns that they are having inquired if they wanted to change follow up to be with Dr Gann instead, she will consider. She would like writer to also reiterate the above to Kacie as she thinks that she will listen to writer differently than she will to her. They will continue to support Kacie.    - Called Kacie to follow up. Reviewed the information above and verified how she is taking imodium, only 2 days of diarrhea, no diarrhea now for 4 days but does feel that her bowels are gurgly today. She does have imodium to take if she does have any episodes today,. We did discuss oral intake and goal to drink a few glasses of fluid per day, needs more that 20 oz per day which is her baseline. Reviewed fluid options other than water that can help her with  her fluid goals. Shared that fatigue is an expected side effect of treatment, shared to alternate rest and activity, stay hydrated, eat small frequent meals. She has not side effects other than diarrhea, controlled with imodium  and fatigue. Support and encouragement provided. Asked that she call out office if any questions or concerns arise.     -Spoke with Sulma after talking with Kacie, she did feel encouraged after we had spoken, They do want to change apt to be with Dr. Gann so Kacie can discuss quality of life issues, survival  time with and without treatment.  Apts rescheduled to 10/2.    Signature:  Mihaela Koo RN

## 2024-09-23 NOTE — TELEPHONE ENCOUNTER
Mihaela Koo RN care coordinator called Sulma regarding questions/concerns. See further documentation in patient outreach encounter.    Tonya Peacock RN

## 2024-09-23 NOTE — TELEPHONE ENCOUNTER
Patient's daughter Sulma calls and leaves a message on triage voicemail with questions regarding patient's chemo plan. They are wondering if treatments can be pushed out to longer intervals in between. The other option being discussed is stopping treatment. Sulma is requesting that Mihaela Koo RN care coordinator call her back to discuss.    Tonya Peacock RN

## 2024-10-02 ENCOUNTER — ONCOLOGY VISIT (OUTPATIENT)
Dept: ONCOLOGY | Facility: HOSPITAL | Age: 84
End: 2024-10-02
Attending: INTERNAL MEDICINE
Payer: COMMERCIAL

## 2024-10-02 ENCOUNTER — PATIENT OUTREACH (OUTPATIENT)
Dept: ONCOLOGY | Facility: HOSPITAL | Age: 84
End: 2024-10-02

## 2024-10-02 ENCOUNTER — INFUSION THERAPY VISIT (OUTPATIENT)
Dept: INFUSION THERAPY | Facility: HOSPITAL | Age: 84
End: 2024-10-02
Attending: INTERNAL MEDICINE
Payer: COMMERCIAL

## 2024-10-02 ENCOUNTER — ANTICOAGULATION THERAPY VISIT (OUTPATIENT)
Dept: ANTICOAGULATION | Facility: CLINIC | Age: 84
End: 2024-10-02

## 2024-10-02 VITALS
SYSTOLIC BLOOD PRESSURE: 140 MMHG | HEART RATE: 68 BPM | DIASTOLIC BLOOD PRESSURE: 78 MMHG | WEIGHT: 130 LBS | TEMPERATURE: 97.7 F | HEIGHT: 63 IN | BODY MASS INDEX: 23.04 KG/M2 | RESPIRATION RATE: 16 BRPM | OXYGEN SATURATION: 98 %

## 2024-10-02 DIAGNOSIS — I63.9 CEREBELLAR STROKE (H): ICD-10-CM

## 2024-10-02 DIAGNOSIS — C79.51 MALIGNANT NEOPLASM METASTATIC TO BONE (H): Primary | ICD-10-CM

## 2024-10-02 DIAGNOSIS — N39.0 URINARY TRACT INFECTION WITHOUT HEMATURIA, SITE UNSPECIFIED: ICD-10-CM

## 2024-10-02 DIAGNOSIS — I50.32 CHRONIC HEART FAILURE WITH PRESERVED EJECTION FRACTION (H): ICD-10-CM

## 2024-10-02 DIAGNOSIS — Z95.2 S/P TAVR (TRANSCATHETER AORTIC VALVE REPLACEMENT): ICD-10-CM

## 2024-10-02 DIAGNOSIS — Z95.2 S/P TAVR (TRANSCATHETER AORTIC VALVE REPLACEMENT): Primary | ICD-10-CM

## 2024-10-02 DIAGNOSIS — N39.0 URINARY TRACT INFECTION WITHOUT HEMATURIA, SITE UNSPECIFIED: Primary | ICD-10-CM

## 2024-10-02 LAB
ALBUMIN SERPL BCG-MCNC: 3.5 G/DL (ref 3.5–5.2)
ALBUMIN UR-MCNC: 30 MG/DL
ALP SERPL-CCNC: 93 U/L (ref 40–150)
ALT SERPL W P-5'-P-CCNC: 24 U/L (ref 0–50)
ANION GAP SERPL CALCULATED.3IONS-SCNC: 14 MMOL/L (ref 7–15)
APPEARANCE UR: ABNORMAL
AST SERPL W P-5'-P-CCNC: 24 U/L (ref 0–45)
BACTERIA #/AREA URNS HPF: ABNORMAL /HPF
BASOPHILS # BLD AUTO: 0.1 10E3/UL (ref 0–0.2)
BASOPHILS NFR BLD AUTO: 1 %
BILIRUB SERPL-MCNC: 0.4 MG/DL
BILIRUB UR QL STRIP: NEGATIVE
BUN SERPL-MCNC: 22.2 MG/DL (ref 8–23)
CALCIUM SERPL-MCNC: 9 MG/DL (ref 8.8–10.4)
CHLORIDE SERPL-SCNC: 97 MMOL/L (ref 98–107)
COLOR UR AUTO: YELLOW
CREAT SERPL-MCNC: 1.18 MG/DL (ref 0.51–0.95)
EGFRCR SERPLBLD CKD-EPI 2021: 45 ML/MIN/1.73M2
EOSINOPHIL # BLD AUTO: 0.1 10E3/UL (ref 0–0.7)
EOSINOPHIL NFR BLD AUTO: 2 %
ERYTHROCYTE [DISTWIDTH] IN BLOOD BY AUTOMATED COUNT: 14.8 % (ref 10–15)
GLUCOSE SERPL-MCNC: 96 MG/DL (ref 70–99)
GLUCOSE UR STRIP-MCNC: NEGATIVE MG/DL
HCO3 SERPL-SCNC: 20 MMOL/L (ref 22–29)
HCT VFR BLD AUTO: 32.3 % (ref 35–47)
HGB BLD-MCNC: 11.1 G/DL (ref 11.7–15.7)
HGB UR QL STRIP: ABNORMAL
IMM GRANULOCYTES # BLD: 0.1 10E3/UL
IMM GRANULOCYTES NFR BLD: 1 %
INR PPP: 2.53 (ref 0.85–1.15)
KETONES UR STRIP-MCNC: NEGATIVE MG/DL
LEUKOCYTE ESTERASE UR QL STRIP: ABNORMAL
LYMPHOCYTES # BLD AUTO: 1.1 10E3/UL (ref 0.8–5.3)
LYMPHOCYTES NFR BLD AUTO: 13 %
MCH RBC QN AUTO: 29.9 PG (ref 26.5–33)
MCHC RBC AUTO-ENTMCNC: 34.4 G/DL (ref 31.5–36.5)
MCV RBC AUTO: 87 FL (ref 78–100)
MONOCYTES # BLD AUTO: 0.8 10E3/UL (ref 0–1.3)
MONOCYTES NFR BLD AUTO: 11 %
NEUTROPHILS # BLD AUTO: 5.7 10E3/UL (ref 1.6–8.3)
NEUTROPHILS NFR BLD AUTO: 73 %
NITRATE UR QL: NEGATIVE
NRBC # BLD AUTO: 0 10E3/UL
NRBC BLD AUTO-RTO: 0 /100
PH UR STRIP: 6 [PH] (ref 5–7)
PLATELET # BLD AUTO: 266 10E3/UL (ref 150–450)
POTASSIUM SERPL-SCNC: 3.9 MMOL/L (ref 3.4–5.3)
PROT SERPL-MCNC: 5.8 G/DL (ref 6.4–8.3)
RBC # BLD AUTO: 3.71 10E6/UL (ref 3.8–5.2)
RBC URINE: 6 /HPF
SODIUM SERPL-SCNC: 131 MMOL/L (ref 135–145)
SP GR UR STRIP: 1.01 (ref 1–1.03)
SQUAMOUS EPITHELIAL: 18 /HPF
UROBILINOGEN UR STRIP-MCNC: <2 MG/DL
WBC # BLD AUTO: 7.9 10E3/UL (ref 4–11)
WBC CLUMPS #/AREA URNS HPF: PRESENT /HPF
WBC URINE: >182 /HPF

## 2024-10-02 PROCEDURE — 250N000011 HC RX IP 250 OP 636: Performed by: INTERNAL MEDICINE

## 2024-10-02 PROCEDURE — 85004 AUTOMATED DIFF WBC COUNT: CPT | Performed by: INTERNAL MEDICINE

## 2024-10-02 PROCEDURE — 80053 COMPREHEN METABOLIC PANEL: CPT | Performed by: INTERNAL MEDICINE

## 2024-10-02 PROCEDURE — 85610 PROTHROMBIN TIME: CPT

## 2024-10-02 PROCEDURE — 99215 OFFICE O/P EST HI 40 MIN: CPT | Performed by: INTERNAL MEDICINE

## 2024-10-02 PROCEDURE — G0463 HOSPITAL OUTPT CLINIC VISIT: HCPCS | Mod: 25 | Performed by: INTERNAL MEDICINE

## 2024-10-02 PROCEDURE — 96375 TX/PRO/DX INJ NEW DRUG ADDON: CPT

## 2024-10-02 PROCEDURE — 96413 CHEMO IV INFUSION 1 HR: CPT

## 2024-10-02 PROCEDURE — 96367 TX/PROPH/DG ADDL SEQ IV INF: CPT

## 2024-10-02 PROCEDURE — 36591 DRAW BLOOD OFF VENOUS DEVICE: CPT | Performed by: INTERNAL MEDICINE

## 2024-10-02 PROCEDURE — 81001 URINALYSIS AUTO W/SCOPE: CPT

## 2024-10-02 PROCEDURE — 258N000003 HC RX IP 258 OP 636: Performed by: INTERNAL MEDICINE

## 2024-10-02 RX ORDER — MEPERIDINE HYDROCHLORIDE 50 MG/ML
25 INJECTION INTRAMUSCULAR; INTRAVENOUS; SUBCUTANEOUS EVERY 30 MIN PRN
Status: DISCONTINUED | OUTPATIENT
Start: 2024-10-02 | End: 2024-10-02 | Stop reason: HOSPADM

## 2024-10-02 RX ORDER — HEPARIN SODIUM (PORCINE) LOCK FLUSH IV SOLN 100 UNIT/ML 100 UNIT/ML
5 SOLUTION INTRAVENOUS
Status: CANCELLED | OUTPATIENT
Start: 2024-10-02

## 2024-10-02 RX ORDER — DIPHENHYDRAMINE HYDROCHLORIDE 50 MG/ML
50 INJECTION INTRAMUSCULAR; INTRAVENOUS
Status: DISCONTINUED | OUTPATIENT
Start: 2024-10-02 | End: 2024-10-02 | Stop reason: HOSPADM

## 2024-10-02 RX ORDER — DIPHENHYDRAMINE HYDROCHLORIDE 50 MG/ML
50 INJECTION INTRAMUSCULAR; INTRAVENOUS
Status: CANCELLED
Start: 2024-10-02

## 2024-10-02 RX ORDER — HEPARIN SODIUM,PORCINE 10 UNIT/ML
5-20 VIAL (ML) INTRAVENOUS DAILY PRN
Status: CANCELLED | OUTPATIENT
Start: 2024-10-02

## 2024-10-02 RX ORDER — METHYLPREDNISOLONE SODIUM SUCCINATE 125 MG/2ML
125 INJECTION INTRAMUSCULAR; INTRAVENOUS
Status: CANCELLED
Start: 2024-10-02

## 2024-10-02 RX ORDER — EPINEPHRINE 1 MG/ML
0.3 INJECTION, SOLUTION INTRAMUSCULAR; SUBCUTANEOUS EVERY 5 MIN PRN
Status: DISCONTINUED | OUTPATIENT
Start: 2024-10-02 | End: 2024-10-02 | Stop reason: HOSPADM

## 2024-10-02 RX ORDER — EPINEPHRINE 1 MG/ML
0.3 INJECTION, SOLUTION INTRAMUSCULAR; SUBCUTANEOUS EVERY 5 MIN PRN
Status: CANCELLED | OUTPATIENT
Start: 2024-10-02

## 2024-10-02 RX ORDER — PALONOSETRON 0.05 MG/ML
0.25 INJECTION, SOLUTION INTRAVENOUS ONCE
Status: COMPLETED | OUTPATIENT
Start: 2024-10-02 | End: 2024-10-02

## 2024-10-02 RX ORDER — MEPERIDINE HYDROCHLORIDE 50 MG/ML
25 INJECTION INTRAMUSCULAR; INTRAVENOUS; SUBCUTANEOUS EVERY 30 MIN PRN
Status: CANCELLED | OUTPATIENT
Start: 2024-10-02

## 2024-10-02 RX ORDER — METHYLPREDNISOLONE SODIUM SUCCINATE 125 MG/2ML
125 INJECTION INTRAMUSCULAR; INTRAVENOUS
Status: DISCONTINUED | OUTPATIENT
Start: 2024-10-02 | End: 2024-10-02 | Stop reason: HOSPADM

## 2024-10-02 RX ORDER — ALBUTEROL SULFATE 0.83 MG/ML
2.5 SOLUTION RESPIRATORY (INHALATION)
Status: CANCELLED | OUTPATIENT
Start: 2024-10-02

## 2024-10-02 RX ORDER — LEVOFLOXACIN 500 MG/1
500 TABLET, FILM COATED ORAL DAILY
Qty: 5 TABLET | Refills: 0 | Status: ON HOLD | OUTPATIENT
Start: 2024-10-02 | End: 2024-10-09

## 2024-10-02 RX ORDER — ALBUTEROL SULFATE 90 UG/1
1-2 INHALANT RESPIRATORY (INHALATION)
Status: DISCONTINUED | OUTPATIENT
Start: 2024-10-02 | End: 2024-10-02 | Stop reason: HOSPADM

## 2024-10-02 RX ORDER — LORAZEPAM 2 MG/ML
0.5 INJECTION INTRAMUSCULAR EVERY 4 HOURS PRN
Status: CANCELLED | OUTPATIENT
Start: 2024-10-02

## 2024-10-02 RX ORDER — ALBUTEROL SULFATE 0.83 MG/ML
2.5 SOLUTION RESPIRATORY (INHALATION)
Status: DISCONTINUED | OUTPATIENT
Start: 2024-10-02 | End: 2024-10-02 | Stop reason: HOSPADM

## 2024-10-02 RX ORDER — PALONOSETRON 0.05 MG/ML
0.25 INJECTION, SOLUTION INTRAVENOUS ONCE
Status: CANCELLED | OUTPATIENT
Start: 2024-10-02

## 2024-10-02 RX ORDER — ALBUTEROL SULFATE 90 UG/1
1-2 INHALANT RESPIRATORY (INHALATION)
Status: CANCELLED
Start: 2024-10-02

## 2024-10-02 RX ORDER — HEPARIN SODIUM (PORCINE) LOCK FLUSH IV SOLN 100 UNIT/ML 100 UNIT/ML
5 SOLUTION INTRAVENOUS
Status: DISCONTINUED | OUTPATIENT
Start: 2024-10-02 | End: 2024-10-02 | Stop reason: HOSPADM

## 2024-10-02 RX ADMIN — SODIUM CHLORIDE 500 ML: 9 INJECTION, SOLUTION INTRAVENOUS at 10:15

## 2024-10-02 RX ADMIN — HEPARIN 5 ML: 100 SYRINGE at 11:55

## 2024-10-02 RX ADMIN — DEXAMETHASONE SODIUM PHOSPHATE: 10 INJECTION, SOLUTION INTRAMUSCULAR; INTRAVENOUS at 10:49

## 2024-10-02 RX ADMIN — DEXTROSE MONOHYDRATE 250 ML: 50 INJECTION, SOLUTION INTRAVENOUS at 10:38

## 2024-10-02 RX ADMIN — PALONOSETRON 0.25 MG: 0.05 INJECTION, SOLUTION INTRAVENOUS at 10:27

## 2024-10-02 RX ADMIN — FAM-TRASTUZUMAB DERUXTECAN-NXKI 300 MG: 100 INJECTION, POWDER, LYOPHILIZED, FOR SOLUTION INTRAVENOUS at 11:23

## 2024-10-02 ASSESSMENT — PAIN SCALES - GENERAL: PAINLEVEL: NO PAIN (0)

## 2024-10-02 NOTE — PROGRESS NOTES
ANTICOAGULATION MANAGEMENT     Kacie Hermosillo 84 year old female is on warfarin with therapeutic INR result. (Goal INR 2.0-3.0)    Recent labs: (last 7 days)     10/02/24  0835   INR 2.53*       ASSESSMENT     Source(s): Chart Review  Previous INR was Therapeutic last 2(+) visits  Medication, diet, health changes since last INR   10/2/24 Enhertu chemo infusion today  Patient's pre-meds and chemo (8/19/24 [facility] recommended recheck INR 1 week post infusion which patient has refused)  Fosaprepitant which can decrease serum concentration of warfarin  Decardron which may enhance the anticoagulation effect of warfarin  Enhertu which has no anticipated interaction with warfarin  10/2/24: Positive UA-started on Levaquin for 5 days         PLAN     Recommended plan for temporary change(s) affecting INR     Dosing Instructions: Continue your current warfarin dose with next INR in 5 days       Summary  As of 10/2/2024      Full warfarin instructions:  5 mg every Sun, Tue, Thu; 2.5 mg all other days   Next INR check:  10/7/2024               Detailed voice message left for Kacie with dosing instructions and follow up date.   Sent [email or web address] message with dosing and follow up instructions  1500: Spoke to patient's daughter, Sulma.  She said that it is very tough for the family to keep up with all the appointments and she will talk to the other family members to see if someone can take her in for an INR check early next week.       Contact [phone] to schedule and with any changes, questions or concerns.     Education provided: Goal range and lab monitoring: goal range and significance of current result  Contact [phone] with any changes, questions or concerns.     Plan made per [facility] anticoagulation protocol    Janna Rees, RN  10/2/2024  Anticoagulation Clinic  [employer] for routing messages: clifton WEEKS  [facility] patient phone line:  [phone]        _______________________________________________________________________     Anticoagulation Episode Summary       Current INR goal:  2.0-3.0   TTR:  79.0% (1 y)   Target end date:  Indefinite   Send INR reminders to:  VIRGILIO WEEKS    Indications    S/P TAVR (transcatheter aortic valve replacement) [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

## 2024-10-02 NOTE — PROGRESS NOTES
[facility]: Cancer Care                                                                                          Called Sulma, daughter of Kacie, consent to communicate on file dated 7/22/24. Relayed that the UA today was positive and antibiotic, Levaquin, was sent to [facility] at 2:16. Reviewed instructions, Take 1 tablet (500 mg) by mouth daily for 5 days.  Also encouraged good hydration.    Will follow up later this week with Brain MRI imaging results. She has writer's contact information for future reference if any questions or concerns arise.    Signature:  Mihaela Koo RN

## 2024-10-02 NOTE — PROGRESS NOTES
"Oncology Rooming Note    October 2, 2024 9:05 AM   Kacie Hermosillo is a 84 year old female who presents for:    Chief Complaint   Patient presents with    Oncology Clinic Visit     Malignant neoplasm metastatic to bone     Initial Vitals: BP (!) 140/78 (BP Location: Left arm, Patient Position: Sitting, Cuff Size: Adult Regular)   Pulse 68   Temp 97.7  F (36.5  C) (Tympanic)   Resp 16   Ht 1.6 m (5' 3\")   Wt 59 kg (130 lb)   SpO2 98%   BMI 23.03 kg/m   Estimated body mass index is 23.03 kg/m  as calculated from the following:    Height as of this encounter: 1.6 m (5' 3\").    Weight as of this encounter: 59 kg (130 lb). Body surface area is 1.62 meters squared.  No Pain (0) Comment: Data Unavailable   No LMP recorded. Patient is postmenopausal.  Allergies reviewed: Yes  Medications reviewed: Yes    Medications: Medication refills not needed today.  Pharmacy name entered into [employer]:    [facility] [place]  [place], [place] - [address] [address] [facility] [place], [place] - [address] [facility][id number] - [place], [place] - [address] [facility] #[id number] - [place], [place] - [address] [facility][id number] - [place], [place] - [address] [place] [address] DRUG STORE #[id number] - [place], [place] - [address] [facility] AT [address] [place] [address] & [facility]    Frailty Screening:   Is the patient here for a new oncology consult visit in cancer care? 2. No      Clinical concerns:   Seizures, about 6-8 times in the past 3 months. 2 seizures yesterday, lasting about 15-20 seconds each time per Barbie - Daughter. Reported that pt will go unresponsive, stiff, no recollection eyes rolled back.     UTI symptoms - burning sensation when voiding, vaginal pain off/on, slight urgency and frequency, haven't notice any urine color changes. X2 days      Lucila Braga MA            "

## 2024-10-02 NOTE — LETTER
"10/2/2024      Kacie Hermosillo  [address] [place] [address]      Dear Colleague,    Thank you for referring your patient, Kacie Hermosillo, to the [facility]. Please see a copy of my visit note below.    Oncology Rooming Note    October 2, 2024 9:05 AM   Kacie Hermosillo is a 84 year old female who presents for:    Chief Complaint   Patient presents with     Oncology Clinic Visit     Malignant neoplasm metastatic to bone     Initial Vitals: BP (!) 140/78 (BP Location: Left arm, Patient Position: Sitting, Cuff Size: Adult Regular)   Pulse 68   Temp 97.7  F (36.5  C) (Tympanic)   Resp 16   Ht 1.6 m (5' 3\")   Wt 59 kg (130 lb)   SpO2 98%   BMI 23.03 kg/m   Estimated body mass index is 23.03 kg/m  as calculated from the following:    Height as of this encounter: 1.6 m (5' 3\").    Weight as of this encounter: 59 kg (130 lb). Body surface area is 1.62 meters squared.  No Pain (0) Comment: Data Unavailable   No LMP recorded. Patient is postmenopausal.  Allergies reviewed: Yes  Medications reviewed: Yes    Medications: Medication refills not needed today.  Pharmacy name entered into [employer]:    [facility], [place] - [address]  [facility] [place], [place] - [address] [facility] #[id number] - [place], [place] - [address] [facility] #[id number] - [place], [place] - [address] [facility] #[id number] - [place], [place] - [address] [place] [address] DRUG STORE #[id number] - [place], [place] - [address] AT [address] [place] [address] & [facility]    Frailty Screening:   Is the patient here for a new oncology consult visit in cancer care? 2. No      Clinical concerns:   Seizures, about 6-8 times in the past 3 months. 2 seizures yesterday, lasting about 15-20 seconds each time per Barbie - Daughter. Reported that pt will go unresponsive, stiff, no " recollection eyes rolled back.     UTI symptoms - burning sensation when voiding, vaginal pain off/on, slight urgency and frequency, haven't notice any urine color changes. X2 days      Lucila Braga MA              [facility] Hematology and Oncology Progress Note    Patient: Kacie Hermosillo  MRN: [id number]  Date of Service: Oct 2, 2024             ECOG Performance    2 - Ambulatory and independent in all ADLs; cannot work; up > 50% of the time          ______________________________________________________________________________  Oncologic history  Metastatic breast cancer with bone mets only ER negative CT negative HER2/amarilis 2+ and negative by FISH.  HER2/amarilis low    Remote diagnosis of breast cancer in 1995 treated with surgery chemotherapy radiation and 5 years of hormonal therapy.  Pathology report not available.    Treatment  Patient started on fam-trastuzumab on 8/19/2024    History of Present Illness    Ms. Kacie Hermosillo is here in follow-up.  She had some issues in her last cycle of chemo and is accompanied by her daughter and has multiple questions.  She has diarrhea for a couple of days when she had 8-10 loose stools.  She did not take any Imodium.  When she did start taking the Imodium her stools resolved.  She is also having back pain but on the days she takes 2 Tylenol in the morning the pain is well-controlled.  She does complains of being tired and fatigued.  She and her daughter had multiple questions about continuing treatment and the side effects.    Review of systems  A comprehensive 12 point review of system was done that was negative except what is mentioned in the history of present illness    Past History    Past Medical History:   Diagnosis Date     Breast cancer (H) 01/01/1999     Heart valve replaced      Hx antineoplastic chemotherapy 01/01/1999     Hx of radiation therapy 01/01/1999     Hypertension        Past Surgical History:   Procedure Laterality Date     BIOPSY BREAST    "    HYSTERECTOMY       IR CHEST PORT PLACEMENT > 5 YRS OF AGE  7/30/2024     LUMPECTOMY BREAST Left 1999     OOPHORECTOMY Bilateral        Physical Exam    BP (!) 140/78 (BP Location: Left arm, Patient Position: Sitting, Cuff Size: Adult Regular)   Pulse 68   Temp 97.7  F (36.5  C) (Tympanic)   Resp 16   Ht 1.6 m (5' 3\")   Wt 59 kg (130 lb)   SpO2 98%   BMI 23.03 kg/m      General: alert, awake, not in acute distress  HEENT: Head: Normal, normocephalic, atraumatic.  Eye: Normal external eye, conjunctiva, lids cornea, MARI.  Nose: Normal external nose, mucus membranes and septum.  Pharynx: Normal buccal mucosa. Normal pharynx.  Neck / Thyroid: Supple, no masses, nodes, nodules or enlargement.  Lymphatics: No abnormally enlarged lymph nodes.  Chest: Normal chest wall and respirations. Clear to auscultation.  No crackles or rhonchi's  Heart: S1 S2 RRR, no murmur.   Abdomen: abdomen is soft without significant tenderness, masses, organomegaly or guarding  Extremities: normal strength, tone, and muscle mass  Skin: normal. no rash or abnormalities  CNS: non focal.    Lab Results    Recent Results (from the past 240 hour(s))   Comprehensive metabolic panel    Collection Time: 10/02/24  8:35 AM   Result Value Ref Range    Sodium 131 (L) 135 - 145 mmol/L    Potassium 3.9 3.4 - 5.3 mmol/L    Carbon Dioxide (CO2) 20 (L) 22 - 29 mmol/L    Anion Gap 14 7 - 15 mmol/L    Urea Nitrogen 22.2 8.0 - 23.0 mg/dL    Creatinine 1.18 (H) 0.51 - 0.95 mg/dL    GFR Estimate 45 (L) >60 mL/min/1.73m2    Calcium 9.0 8.8 - 10.4 mg/dL    Chloride 97 (L) 98 - 107 mmol/L    Glucose 96 70 - 99 mg/dL    Alkaline Phosphatase 93 40 - 150 U/L    AST 24 0 - 45 U/L    ALT 24 0 - 50 U/L    Protein Total 5.8 (L) 6.4 - 8.3 g/dL    Albumin 3.5 3.5 - 5.2 g/dL    Bilirubin Total 0.4 <=1.2 mg/dL   INR    Collection Time: 10/02/24  8:35 AM   Result Value Ref Range    INR 2.53 (H) 0.85 - 1.15   CBC with platelets and differential    Collection Time: 10/02/24 "  8:35 AM   Result Value Ref Range    WBC Count 7.9 4.0 - 11.0 10e3/uL    RBC Count 3.71 (L) 3.80 - 5.20 10e6/uL    Hemoglobin 11.1 (L) 11.7 - 15.7 g/dL    Hematocrit 32.3 (L) 35.0 - 47.0 %    MCV 87 78 - 100 fL    MCH 29.9 26.5 - 33.0 pg    MCHC 34.4 31.5 - 36.5 g/dL    RDW 14.8 10.0 - 15.0 %    Platelet Count 266 150 - 450 10e3/uL    % Neutrophils 73 %    % Lymphocytes 13 %    % Monocytes 11 %    % Eosinophils 2 %    % Basophils 1 %    % Immature Granulocytes 1 %    NRBCs per 100 WBC 0 <1 /100    Absolute Neutrophils 5.7 1.6 - 8.3 10e3/uL    Absolute Lymphocytes 1.1 0.8 - 5.3 10e3/uL    Absolute Monocytes 0.8 0.0 - 1.3 10e3/uL    Absolute Eosinophils 0.1 0.0 - 0.7 10e3/uL    Absolute Basophils 0.1 0.0 - 0.2 10e3/uL    Absolute Immature Granulocytes 0.1 <=0.4 10e3/uL    Absolute NRBCs 0.0 10e3/uL   UA reflex to Microscopic - lab collect    Collection Time: 10/02/24  9:16 AM   Result Value Ref Range    Color Urine Yellow Colorless, Straw, Light Yellow, Yellow    Appearance Urine Cloudy (A) Clear    Glucose Urine Negative Negative mg/dL    Bilirubin Urine Negative Negative    Ketones Urine Negative Negative mg/dL    Specific Gravity Urine 1.012 1.001 - 1.030    Blood Urine 0.1 mg/dL (A) Negative    pH Urine 6.0 5.0 - 7.0    Protein Albumin Urine 30 (A) Negative mg/dL    Urobilinogen Urine <2.0 <2.0 mg/dL    Nitrite Urine Negative Negative    Leukocyte Esterase Urine 500 Ju/uL (A) Negative    Bacteria Urine Few (A) None Seen /HPF    RBC Urine 6 (H) <=2 /HPF    WBC Urine >182 (H) <=5 /HPF    Squamous Epithelials Urine 18 (H) <=1 /HPF    WBC Clumps Urine Present (A) None Seen /HPF         Imaging    No results found.        Assessment and Plan    Metastatic breast cancer with bone mets HER2/amarilis low  Patient is here in follow-up.  She is due for her third cycle of chemotherapy.  She and her daughter had multiple questions about continuing chemo.  They were interested in knowing average survival's with and without  treatment.  This was discussed at length with them.  I explained to them that the benefit in survival is significant in patients with metastatic breast cancer.  Getting information on whether she is having a response or not will be very helpful in making decisions.  Most of the side effect she is having are manageable.  I would recommend 1 more cycle of chemo and seeing her in 4 weeks and doing a PET scan before that.  We can then have a discussion on decisions moving forward.  After asking multiple questions they are okay with.    Bone metastases  Patient to continue taking Zometa periodically    Recurrent UTIs  Patient has a history of recurrent UTIs she was complaining of symptoms today UA and reflex culture will be done today.  We will follow-up on that    Cardiac monitoring  Patient does need cardiac monitoring as she needs monitoring of her ejection fraction on HER2 directed agent.  An echo will be done prior to her next visit    Seizure-like symptoms  Patient's daughter described symptoms that they saw directly and if she phased out and had rapid movement of her hand for a little bit she has no postictal state and they do not look like classic seizures however they are frequent enough and do not have a  Nation the brain metastases need to be ruled out.  A brain MRI will be done within the next week.      Signed by: Hadley Gann MD        CC: Irina Francisco PA-C       Again, thank you for allowing me to participate in the care of your patient.        Sincerely,        Hadley Gann MD

## 2024-10-02 NOTE — PROGRESS NOTES
[facility] Hematology and Oncology Progress Note    Patient: Kacie Hermosillo  MRN: [id number]  Date of Service: Oct 2, 2024             ECOG Performance    2 - Ambulatory and independent in all ADLs; cannot work; up > 50% of the time          ______________________________________________________________________________  Oncologic history  Metastatic breast cancer with bone mets only ER negative MD negative HER2/amarilis 2+ and negative by FISH.  HER2/amarilis low    Remote diagnosis of breast cancer in 1995 treated with surgery chemotherapy radiation and 5 years of hormonal therapy.  Pathology report not available.    Treatment  Patient started on fam-trastuzumab on 8/19/2024    History of Present Illness    Ms. Kacie Hermosillo is here in follow-up.  She had some issues in her last cycle of chemo and is accompanied by her daughter and has multiple questions.  She has diarrhea for a couple of days when she had 8-10 loose stools.  She did not take any Imodium.  When she did start taking the Imodium her stools resolved.  She is also having back pain but on the days she takes 2 Tylenol in the morning the pain is well-controlled.  She does complains of being tired and fatigued.  She and her daughter had multiple questions about continuing treatment and the side effects.    Review of systems  A comprehensive 12 point review of system was done that was negative except what is mentioned in the history of present illness    Past History    Past Medical History:   Diagnosis Date    Breast cancer (H) 01/01/1999    Heart valve replaced     Hx antineoplastic chemotherapy 01/01/1999    Hx of radiation therapy 01/01/1999    Hypertension        Past Surgical History:   Procedure Laterality Date    BIOPSY BREAST      HYSTERECTOMY      IR CHEST PORT PLACEMENT > 5 YRS OF AGE  7/30/2024    LUMPECTOMY BREAST Left 1999    OOPHORECTOMY Bilateral        Physical Exam    BP (!) 140/78 (BP Location: Left arm, Patient Position: Sitting,  "Cuff Size: Adult Regular)   Pulse 68   Temp 97.7  F (36.5  C) (Tympanic)   Resp 16   Ht 1.6 m (5' 3\")   Wt 59 kg (130 lb)   SpO2 98%   BMI 23.03 kg/m      General: alert, awake, not in acute distress  HEENT: Head: Normal, normocephalic, atraumatic.  Eye: Normal external eye, conjunctiva, lids cornea, MARI.  Nose: Normal external nose, mucus membranes and septum.  Pharynx: Normal buccal mucosa. Normal pharynx.  Neck / Thyroid: Supple, no masses, nodes, nodules or enlargement.  Lymphatics: No abnormally enlarged lymph nodes.  Chest: Normal chest wall and respirations. Clear to auscultation.  No crackles or rhonchi's  Heart: S1 S2 RRR, no murmur.   Abdomen: abdomen is soft without significant tenderness, masses, organomegaly or guarding  Extremities: normal strength, tone, and muscle mass  Skin: normal. no rash or abnormalities  CNS: non focal.    Lab Results    Recent Results (from the past 240 hour(s))   Comprehensive metabolic panel    Collection Time: 10/02/24  8:35 AM   Result Value Ref Range    Sodium 131 (L) 135 - 145 mmol/L    Potassium 3.9 3.4 - 5.3 mmol/L    Carbon Dioxide (CO2) 20 (L) 22 - 29 mmol/L    Anion Gap 14 7 - 15 mmol/L    Urea Nitrogen 22.2 8.0 - 23.0 mg/dL    Creatinine 1.18 (H) 0.51 - 0.95 mg/dL    GFR Estimate 45 (L) >60 mL/min/1.73m2    Calcium 9.0 8.8 - 10.4 mg/dL    Chloride 97 (L) 98 - 107 mmol/L    Glucose 96 70 - 99 mg/dL    Alkaline Phosphatase 93 40 - 150 U/L    AST 24 0 - 45 U/L    ALT 24 0 - 50 U/L    Protein Total 5.8 (L) 6.4 - 8.3 g/dL    Albumin 3.5 3.5 - 5.2 g/dL    Bilirubin Total 0.4 <=1.2 mg/dL   INR    Collection Time: 10/02/24  8:35 AM   Result Value Ref Range    INR 2.53 (H) 0.85 - 1.15   CBC with platelets and differential    Collection Time: 10/02/24  8:35 AM   Result Value Ref Range    WBC Count 7.9 4.0 - 11.0 10e3/uL    RBC Count 3.71 (L) 3.80 - 5.20 10e6/uL    Hemoglobin 11.1 (L) 11.7 - 15.7 g/dL    Hematocrit 32.3 (L) 35.0 - 47.0 %    MCV 87 78 - 100 fL    MCH " 29.9 26.5 - 33.0 pg    MCHC 34.4 31.5 - 36.5 g/dL    RDW 14.8 10.0 - 15.0 %    Platelet Count 266 150 - 450 10e3/uL    % Neutrophils 73 %    % Lymphocytes 13 %    % Monocytes 11 %    % Eosinophils 2 %    % Basophils 1 %    % Immature Granulocytes 1 %    NRBCs per 100 WBC 0 <1 /100    Absolute Neutrophils 5.7 1.6 - 8.3 10e3/uL    Absolute Lymphocytes 1.1 0.8 - 5.3 10e3/uL    Absolute Monocytes 0.8 0.0 - 1.3 10e3/uL    Absolute Eosinophils 0.1 0.0 - 0.7 10e3/uL    Absolute Basophils 0.1 0.0 - 0.2 10e3/uL    Absolute Immature Granulocytes 0.1 <=0.4 10e3/uL    Absolute NRBCs 0.0 10e3/uL   UA reflex to Microscopic - lab collect    Collection Time: 10/02/24  9:16 AM   Result Value Ref Range    Color Urine Yellow Colorless, Straw, Light Yellow, Yellow    Appearance Urine Cloudy (A) Clear    Glucose Urine Negative Negative mg/dL    Bilirubin Urine Negative Negative    Ketones Urine Negative Negative mg/dL    Specific Gravity Urine 1.012 1.001 - 1.030    Blood Urine 0.1 mg/dL (A) Negative    pH Urine 6.0 5.0 - 7.0    Protein Albumin Urine 30 (A) Negative mg/dL    Urobilinogen Urine <2.0 <2.0 mg/dL    Nitrite Urine Negative Negative    Leukocyte Esterase Urine 500 Ju/uL (A) Negative    Bacteria Urine Few (A) None Seen /HPF    RBC Urine 6 (H) <=2 /HPF    WBC Urine >182 (H) <=5 /HPF    Squamous Epithelials Urine 18 (H) <=1 /HPF    WBC Clumps Urine Present (A) None Seen /HPF         Imaging    No results found.        Assessment and Plan    Metastatic breast cancer with bone mets HER2/amarilis low  Patient is here in follow-up.  She is due for her third cycle of chemotherapy.  She and her daughter had multiple questions about continuing chemo.  They were interested in knowing average survival's with and without treatment.  This was discussed at length with them.  I explained to them that the benefit in survival is significant in patients with metastatic breast cancer.  Getting information on whether she is having a response or not  will be very helpful in making decisions.  Most of the side effect she is having are manageable.  I would recommend 1 more cycle of chemo and seeing her in 4 weeks and doing a PET scan before that.  We can then have a discussion on decisions moving forward.  After asking multiple questions they are okay with.    Bone metastases  Patient to continue taking Zometa periodically    Recurrent UTIs  Patient has a history of recurrent UTIs she was complaining of symptoms today UA and reflex culture will be done today.  We will follow-up on that    Cardiac monitoring  Patient does need cardiac monitoring as she needs monitoring of her ejection fraction on HER2 directed agent.  An echo will be done prior to her next visit    Seizure-like symptoms  Patient's daughter described symptoms that they saw directly and if she phased out and had rapid movement of her hand for a little bit she has no postictal state and they do not look like classic seizures however they are frequent enough and do not have a  Nation the brain metastases need to be ruled out.  A brain MRI will be done within the next week.      Signed by: Hadley Gann MD        CC: Irina Francisco PA-C

## 2024-10-02 NOTE — PROGRESS NOTES
Infusion Nursing Note:  Kacie Hermosillo presents today for C3D1 Enhertu.    Patient seen by provider today: Yes: Dr. Gann   present during visit today: Not Applicable.    Note: Dr. Gann ordered patient to have 500ml of normal saline IV for some s/s of dehydration, such as slight dizziness when standing.      Intravenous Access:  Implanted Port.    Treatment Conditions:  Results reviewed, labs MET treatment parameters, ok to proceed with treatment.      Post Infusion Assessment:  Patient tolerated infusion without incident.  Site patent and intact, free from redness, edema or discomfort.  No evidence of extravasations.  Access discontinued per protocol.       Discharge Plan:   Patient and/or family verbalized understanding of discharge instructions and all questions answered.      Lilibeth Jennings RN

## 2024-10-03 ENCOUNTER — ANCILLARY PROCEDURE (OUTPATIENT)
Dept: MRI IMAGING | Facility: CLINIC | Age: 84
End: 2024-10-03
Attending: INTERNAL MEDICINE
Payer: COMMERCIAL

## 2024-10-03 ENCOUNTER — PATIENT OUTREACH (OUTPATIENT)
Dept: ONCOLOGY | Facility: HOSPITAL | Age: 84
End: 2024-10-03

## 2024-10-03 DIAGNOSIS — N39.0 URINARY TRACT INFECTION WITHOUT HEMATURIA, SITE UNSPECIFIED: ICD-10-CM

## 2024-10-03 DIAGNOSIS — C79.51 MALIGNANT NEOPLASM METASTATIC TO BONE (H): ICD-10-CM

## 2024-10-03 PROCEDURE — A9585 GADOBUTROL INJECTION: HCPCS | Performed by: RADIOLOGY

## 2024-10-03 PROCEDURE — 70553 MRI BRAIN STEM W/O & W/DYE: CPT | Performed by: RADIOLOGY

## 2024-10-03 RX ORDER — HEPARIN SODIUM (PORCINE) LOCK FLUSH IV SOLN 100 UNIT/ML 100 UNIT/ML
5 SOLUTION INTRAVENOUS
Status: DISPENSED | OUTPATIENT
Start: 2024-10-03

## 2024-10-03 RX ORDER — GADOBUTROL 604.72 MG/ML
6 INJECTION INTRAVENOUS ONCE
Status: COMPLETED | OUTPATIENT
Start: 2024-10-03 | End: 2024-10-03

## 2024-10-03 RX ADMIN — HEPARIN SODIUM (PORCINE) LOCK FLUSH IV SOLN 100 UNIT/ML 5 ML: 100 SOLUTION at 10:31

## 2024-10-03 RX ADMIN — GADOBUTROL 6 ML: 604.72 INJECTION INTRAVENOUS at 10:44

## 2024-10-03 NOTE — PROGRESS NOTES
[facility]: Cancer Care Follow-Up Note                                    Discussion with Patient:                                                      Called Kacie and ARABELLA that writer was calling regarding imaging results, Will call Sulma to relay. Invited call back if any questions on results.  Called Sulma, daughter of Kacie, to follow up on the Brain MRI results from earlier today. Shared that Dr. Gann reviewed and the brain MRI is normal, did not show any cancer. Keep future apts for ECHO , PET and follow up as scheduled. She will relay the results to her mother.      Dates of Treatment:                                                      Infusion given in last 28 days       Administered MAR Action Medication Dose Rate Visit    09/10/2024 11:08 New Bag FAM-trastuzumab deruxtecan-nxki (ENHERTU) 300 mg in D5W 125 mL infusion 300 mg 250 mL/hr Infusion Therapy [facility]10[facility]    10/02/2024 11:23 New Bag FAM-trastuzumab deruxtecan-nxki (ENHERTU) 300 mg in D5W 125 mL infusion 300 mg 250 mL/hr Infusion Therapy Visit on 10/02/2024 in [facility]            Assessment:                                                      Brain MRI normal, no cancer        Intervention/Education provided during outreach:                                                       See above    Confirmed patient has clinic and triage numbers, They will keep the apts as scheduled.    Signature:  Mihaela Koo RN

## 2024-10-06 ENCOUNTER — APPOINTMENT (OUTPATIENT)
Dept: CT IMAGING | Facility: HOSPITAL | Age: 84
DRG: 312 | End: 2024-10-06
Attending: EMERGENCY MEDICINE
Payer: COMMERCIAL

## 2024-10-06 ENCOUNTER — HOSPITAL ENCOUNTER (INPATIENT)
Facility: HOSPITAL | Age: 84
LOS: 3 days | Discharge: NURSING, REHAB OR LONG-TERM CARE | DRG: 312 | End: 2024-10-09
Attending: EMERGENCY MEDICINE | Admitting: INTERNAL MEDICINE
Payer: COMMERCIAL

## 2024-10-06 ENCOUNTER — APPOINTMENT (OUTPATIENT)
Dept: RADIOLOGY | Facility: HOSPITAL | Age: 84
DRG: 312 | End: 2024-10-06
Attending: EMERGENCY MEDICINE
Payer: COMMERCIAL

## 2024-10-06 ENCOUNTER — OFFICE VISIT (OUTPATIENT)
Dept: FAMILY MEDICINE | Facility: CLINIC | Age: 84
End: 2024-10-06
Payer: COMMERCIAL

## 2024-10-06 VITALS
SYSTOLIC BLOOD PRESSURE: 86 MMHG | OXYGEN SATURATION: 97 % | TEMPERATURE: 97.4 F | HEART RATE: 75 BPM | RESPIRATION RATE: 18 BRPM | DIASTOLIC BLOOD PRESSURE: 55 MMHG

## 2024-10-06 DIAGNOSIS — S01.81XA LACERATION OF FOREHEAD, INITIAL ENCOUNTER: ICD-10-CM

## 2024-10-06 DIAGNOSIS — C79.51 MALIGNANT NEOPLASM METASTATIC TO BONE (H): ICD-10-CM
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
## 2024-10-06 DIAGNOSIS — R55 SYNCOPE, UNSPECIFIED SYNCOPE TYPE: Primary | ICD-10-CM

## 2024-10-06 DIAGNOSIS — I47.29 NSVT (NONSUSTAINED VENTRICULAR TACHYCARDIA) (H): ICD-10-CM

## 2024-10-06 DIAGNOSIS — S09.90XA CLOSED HEAD INJURY WITHOUT LOSS OF CONSCIOUSNESS, INITIAL ENCOUNTER: ICD-10-CM

## 2024-10-06 DIAGNOSIS — E87.1 HYPONATREMIA: ICD-10-CM

## 2024-10-06 DIAGNOSIS — R79.1 ELEVATED INR: ICD-10-CM

## 2024-10-06 DIAGNOSIS — E83.51 HYPOCALCEMIA: ICD-10-CM

## 2024-10-06 DIAGNOSIS — Z79.01 CHRONIC ANTICOAGULATION: ICD-10-CM

## 2024-10-06 DIAGNOSIS — Z17.1 MALIGNANT NEOPLASM OF BOTH BREASTS IN FEMALE, ESTROGEN RECEPTOR NEGATIVE, UNSPECIFIED SITE OF BREAST (H): ICD-10-CM

## 2024-10-06 DIAGNOSIS — S01.81XA LACERATION OF BROW WITHOUT COMPLICATION, INITIAL ENCOUNTER: ICD-10-CM
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
## 2024-10-06 DIAGNOSIS — C50.911 MALIGNANT NEOPLASM OF BOTH BREASTS IN FEMALE, ESTROGEN RECEPTOR NEGATIVE, UNSPECIFIED SITE OF BREAST (H): ICD-10-CM

## 2024-10-06 DIAGNOSIS — C50.912 MALIGNANT NEOPLASM OF BOTH BREASTS IN FEMALE, ESTROGEN RECEPTOR NEGATIVE, UNSPECIFIED SITE OF BREAST (H): ICD-10-CM

## 2024-10-06 DIAGNOSIS — S22.080A T12 COMPRESSION FRACTURE, INITIAL ENCOUNTER (H): ICD-10-CM

## 2024-10-06 DIAGNOSIS — S59.901A ELBOW INJURY, RIGHT, INITIAL ENCOUNTER: ICD-10-CM

## 2024-10-06 LAB
ALBUMIN SERPL BCG-MCNC: 3.4 G/DL (ref 3.5–5.2)
ALP SERPL-CCNC: 83 U/L (ref 40–150)
ALT SERPL W P-5'-P-CCNC: 80 U/L (ref 0–50)
ANION GAP SERPL CALCULATED.3IONS-SCNC: 13 MMOL/L (ref 7–15)
APTT PPP: 45 SECONDS (ref 22–38)
AST SERPL W P-5'-P-CCNC: 50 U/L (ref 0–45)
BASOPHILS # BLD AUTO: 0 10E3/UL (ref 0–0.2)
BASOPHILS NFR BLD AUTO: 0 %
BILIRUB SERPL-MCNC: 0.3 MG/DL
BUN SERPL-MCNC: 36.1 MG/DL (ref 8–23)
CALCIUM SERPL-MCNC: 7.2 MG/DL (ref 8.8–10.4)
CHLORIDE SERPL-SCNC: 94 MMOL/L (ref 98–107)
CREAT SERPL-MCNC: 1.38 MG/DL (ref 0.51–0.95)
D DIMER PPP FEU-MCNC: 1.21 UG/ML FEU (ref 0–0.5)
EGFRCR SERPLBLD CKD-EPI 2021: 38 ML/MIN/1.73M2
EOSINOPHIL # BLD AUTO: 0 10E3/UL (ref 0–0.7)
EOSINOPHIL NFR BLD AUTO: 0 %
ERYTHROCYTE [DISTWIDTH] IN BLOOD BY AUTOMATED COUNT: 15.3 % (ref 10–15)
ETHANOL SERPL-MCNC: <0.01 G/DL
GLUCOSE SERPL-MCNC: 184 MG/DL (ref 70–99)
HCO3 SERPL-SCNC: 20 MMOL/L (ref 22–29)
HCT VFR BLD AUTO: 30.5 % (ref 35–47)
HGB BLD-MCNC: 10.7 G/DL (ref 11.7–15.7)
IMM GRANULOCYTES # BLD: 0.2 10E3/UL
IMM GRANULOCYTES NFR BLD: 1 %
INR PPP: 4.62 (ref 0.85–1.15)
LYMPHOCYTES # BLD AUTO: 0.9 10E3/UL (ref 0.8–5.3)
LYMPHOCYTES NFR BLD AUTO: 6 %
MAGNESIUM SERPL-MCNC: 1.9 MG/DL (ref 1.7–2.3)
MCH RBC QN AUTO: 30.6 PG (ref 26.5–33)
MCHC RBC AUTO-ENTMCNC: 35.1 G/DL (ref 31.5–36.5)
MCV RBC AUTO: 87 FL (ref 78–100)
MONOCYTES # BLD AUTO: 0.6 10E3/UL (ref 0–1.3)
MONOCYTES NFR BLD AUTO: 4 %
NEUTROPHILS # BLD AUTO: 12.8 10E3/UL (ref 1.6–8.3)
NEUTROPHILS NFR BLD AUTO: 89 %
NRBC # BLD AUTO: 0 10E3/UL
NRBC BLD AUTO-RTO: 0 /100
PLATELET # BLD AUTO: 332 10E3/UL (ref 150–450)
POTASSIUM SERPL-SCNC: 3.2 MMOL/L (ref 3.4–5.3)
PROT SERPL-MCNC: 5.6 G/DL (ref 6.4–8.3)
RBC # BLD AUTO: 3.5 10E6/UL (ref 3.8–5.2)
SODIUM SERPL-SCNC: 127 MMOL/L (ref 135–145)
TROPONIN T SERPL HS-MCNC: 28 NG/L
TROPONIN T SERPL HS-MCNC: 32 NG/L
WBC # BLD AUTO: 14.4 10E3/UL (ref 4–11)

## 2024-10-06 PROCEDURE — 0HQ1XZZ REPAIR FACE SKIN, EXTERNAL APPROACH: ICD-10-PCS | Performed by: EMERGENCY MEDICINE

## 2024-10-06 PROCEDURE — 80053 COMPREHEN METABOLIC PANEL: CPT | Performed by: EMERGENCY MEDICINE

## 2024-10-06 PROCEDURE — 72128 CT CHEST SPINE W/O DYE: CPT

## 2024-10-06 PROCEDURE — 271N000002 HC RX 271: Performed by: EMERGENCY MEDICINE

## 2024-10-06 PROCEDURE — 72125 CT NECK SPINE W/O DYE: CPT

## 2024-10-06 PROCEDURE — 85730 THROMBOPLASTIN TIME PARTIAL: CPT | Performed by: EMERGENCY MEDICINE

## 2024-10-06 PROCEDURE — 70450 CT HEAD/BRAIN W/O DYE: CPT

## 2024-10-06 PROCEDURE — 73080 X-RAY EXAM OF ELBOW: CPT | Mod: RT

## 2024-10-06 PROCEDURE — 93005 ELECTROCARDIOGRAM TRACING: CPT | Performed by: EMERGENCY MEDICINE

## 2024-10-06 PROCEDURE — 87040 BLOOD CULTURE FOR BACTERIA: CPT | Performed by: EMERGENCY MEDICINE

## 2024-10-06 PROCEDURE — 250N000013 HC RX MED GY IP 250 OP 250 PS 637: Performed by: EMERGENCY MEDICINE

## 2024-10-06 PROCEDURE — 85379 FIBRIN DEGRADATION QUANT: CPT | Performed by: EMERGENCY MEDICINE

## 2024-10-06 PROCEDURE — 258N000003 HC RX IP 258 OP 636: Performed by: INTERNAL MEDICINE

## 2024-10-06 PROCEDURE — 71046 X-RAY EXAM CHEST 2 VIEWS: CPT

## 2024-10-06 PROCEDURE — 90714 TD VACC NO PRESV 7 YRS+ IM: CPT | Performed by: EMERGENCY MEDICINE

## 2024-10-06 PROCEDURE — 36415 COLL VENOUS BLD VENIPUNCTURE: CPT | Performed by: EMERGENCY MEDICINE

## 2024-10-06 PROCEDURE — 999N000104 CT LUMBAR SPINE RECONSTRUCTED

## 2024-10-06 PROCEDURE — 120N000004 HC R&B MS OVERFLOW

## 2024-10-06 PROCEDURE — 250N000011 HC RX IP 250 OP 636: Performed by: EMERGENCY MEDICINE

## 2024-10-06 PROCEDURE — 82077 ASSAY SPEC XCP UR&BREATH IA: CPT | Performed by: EMERGENCY MEDICINE

## 2024-10-06 PROCEDURE — 85025 COMPLETE CBC W/AUTO DIFF WBC: CPT | Performed by: EMERGENCY MEDICINE

## 2024-10-06 PROCEDURE — 99223 1ST HOSP IP/OBS HIGH 75: CPT | Performed by: INTERNAL MEDICINE

## 2024-10-06 PROCEDURE — 90471 IMMUNIZATION ADMIN: CPT | Performed by: EMERGENCY MEDICINE

## 2024-10-06 PROCEDURE — 99207 PR NON-BILLABLE SERV PER CHARTING: CPT | Performed by: FAMILY MEDICINE

## 2024-10-06 PROCEDURE — 85610 PROTHROMBIN TIME: CPT | Performed by: EMERGENCY MEDICINE

## 2024-10-06 PROCEDURE — 258N000003 HC RX IP 258 OP 636: Performed by: EMERGENCY MEDICINE

## 2024-10-06 PROCEDURE — 74177 CT ABD & PELVIS W/CONTRAST: CPT

## 2024-10-06 PROCEDURE — 84484 ASSAY OF TROPONIN QUANT: CPT | Performed by: EMERGENCY MEDICINE

## 2024-10-06 PROCEDURE — 83735 ASSAY OF MAGNESIUM: CPT | Performed by: EMERGENCY MEDICINE

## 2024-10-06 PROCEDURE — 250N000009 HC RX 250: Performed by: EMERGENCY MEDICINE

## 2024-10-06 RX ORDER — LIDOCAINE 40 MG/G
CREAM TOPICAL
Status: DISCONTINUED | OUTPATIENT
Start: 2024-10-06 | End: 2024-10-09 | Stop reason: HOSPADM

## 2024-10-06 RX ORDER — SODIUM CHLORIDE 9 MG/ML
INJECTION, SOLUTION INTRAVENOUS CONTINUOUS
Status: DISCONTINUED | OUTPATIENT
Start: 2024-10-06 | End: 2024-10-09

## 2024-10-06 RX ORDER — ACETAMINOPHEN 325 MG/1
975 TABLET ORAL 3 TIMES DAILY
Status: DISCONTINUED | OUTPATIENT
Start: 2024-10-07 | End: 2024-10-09 | Stop reason: HOSPADM

## 2024-10-06 RX ORDER — ASPIRIN 81 MG/1
81 TABLET ORAL EVERY MORNING
Status: DISCONTINUED | OUTPATIENT
Start: 2024-10-07 | End: 2024-10-09 | Stop reason: HOSPADM

## 2024-10-06 RX ORDER — TRAMADOL HYDROCHLORIDE 50 MG/1
50 TABLET ORAL EVERY 8 HOURS PRN
Status: DISCONTINUED | OUTPATIENT
Start: 2024-10-06 | End: 2024-10-09 | Stop reason: HOSPADM

## 2024-10-06 RX ORDER — CALCIUM CARBONATE 500 MG/1
1000 TABLET, CHEWABLE ORAL 4 TIMES DAILY PRN
Status: DISCONTINUED | OUTPATIENT
Start: 2024-10-06 | End: 2024-10-09 | Stop reason: HOSPADM

## 2024-10-06 RX ORDER — POTASSIUM CHLORIDE 1500 MG/1
20 TABLET, EXTENDED RELEASE ORAL ONCE
Status: COMPLETED | OUTPATIENT
Start: 2024-10-06 | End: 2024-10-06

## 2024-10-06 RX ORDER — LIDOCAINE 40 MG/G
CREAM TOPICAL
Status: DISCONTINUED | OUTPATIENT
Start: 2024-10-06 | End: 2024-10-06

## 2024-10-06 RX ORDER — METHYLCELLULOSE 4000CPS 30 %
POWDER (GRAM) MISCELLANEOUS ONCE
Status: COMPLETED | OUTPATIENT
Start: 2024-10-06 | End: 2024-10-06

## 2024-10-06 RX ORDER — METOPROLOL TARTRATE 25 MG/1
25 TABLET, FILM COATED ORAL 2 TIMES DAILY
Status: DISCONTINUED | OUTPATIENT
Start: 2024-10-06 | End: 2024-10-08

## 2024-10-06 RX ORDER — IOPAMIDOL 755 MG/ML
65 INJECTION, SOLUTION INTRAVASCULAR ONCE
Status: COMPLETED | OUTPATIENT
Start: 2024-10-06 | End: 2024-10-06

## 2024-10-06 RX ORDER — LISINOPRIL AND HYDROCHLOROTHIAZIDE 12.5; 2 MG/1; MG/1
1 TABLET ORAL DAILY
Status: DISCONTINUED | OUTPATIENT
Start: 2024-10-07 | End: 2024-10-08

## 2024-10-06 RX ORDER — ACETAMINOPHEN 325 MG/1
650 TABLET ORAL ONCE
Status: COMPLETED | OUTPATIENT
Start: 2024-10-06 | End: 2024-10-06

## 2024-10-06 RX ORDER — LEVOFLOXACIN 500 MG/1
500 TABLET, FILM COATED ORAL DAILY
Status: COMPLETED | OUTPATIENT
Start: 2024-10-07 | End: 2024-10-07

## 2024-10-06 RX ORDER — ATORVASTATIN CALCIUM 40 MG/1
40 TABLET, FILM COATED ORAL EVERY MORNING
Status: DISCONTINUED | OUTPATIENT
Start: 2024-10-07 | End: 2024-10-09 | Stop reason: HOSPADM

## 2024-10-06 RX ADMIN — Medication 3 ML: at 18:24

## 2024-10-06 RX ADMIN — IOPAMIDOL 65 ML: 755 INJECTION, SOLUTION INTRAVENOUS at 20:12

## 2024-10-06 RX ADMIN — SODIUM CHLORIDE 500 ML: 9 INJECTION, SOLUTION INTRAVENOUS at 21:59

## 2024-10-06 RX ADMIN — SODIUM CHLORIDE: 9 INJECTION, SOLUTION INTRAVENOUS at 23:05

## 2024-10-06 RX ADMIN — POTASSIUM CHLORIDE 20 MEQ: 1500 TABLET, EXTENDED RELEASE ORAL at 23:00

## 2024-10-06 RX ADMIN — CLOSTRIDIUM TETANI TOXOID ANTIGEN (FORMALDEHYDE INACTIVATED) AND CORYNEBACTERIUM DIPHTHERIAE TOXOID ANTIGEN (FORMALDEHYDE INACTIVATED) 0.5 ML: 5; 2 INJECTION, SUSPENSION INTRAMUSCULAR at 19:59

## 2024-10-06 RX ADMIN — METHYLCELLULOSE: 2 POWDER, FOR SOLUTION ORAL at 18:24

## 2024-10-06 RX ADMIN — ACETAMINOPHEN 650 MG: 325 TABLET ORAL at 21:14

## 2024-10-06 ASSESSMENT — COLUMBIA-SUICIDE SEVERITY RATING SCALE - C-SSRS
2. HAVE YOU ACTUALLY HAD ANY THOUGHTS OF KILLING YOURSELF IN THE PAST MONTH?: NO
6. HAVE YOU EVER DONE ANYTHING, STARTED TO DO ANYTHING, OR PREPARED TO DO ANYTHING TO END YOUR LIFE?: NO
1. IN THE PAST MONTH, HAVE YOU WISHED YOU WERE DEAD OR WISHED YOU COULD GO TO SLEEP AND NOT WAKE UP?: NO

## 2024-10-06 ASSESSMENT — ACTIVITIES OF DAILY LIVING (ADL)
ADLS_ACUITY_SCORE: 35

## 2024-10-06 NOTE — ED NOTES
Patient had 10 beat run of VT at 1825. Nurse was bedside during the episode, patient denies symptoms. Rhythm strip printed and given to Dr. Woodward for review.

## 2024-10-06 NOTE — PROGRESS NOTES
ASSESSMENT/PLAN:      ICD-10-CM    1. Fall, initial encounter  W19.XXXA       2. Closed head injury without loss of consciousness, initial encounter  S09.90XA       3. Laceration of brow without complication, initial encounter  S01.81XA       4. Chronic anticoagulation  Z79.01       5. Elbow injury, right, initial encounter  S59.901A           Patient Instructions   [facility] ER for further evaluation and imaging of fall hitting head, no LOC, patient on chronic anticoagulation therapy ,now dizziness, laceration of the right brow, injured right elbow patient       Reviewed medication instructions and side effects. Follow up if experiences side effects.     I reviewed supportive care, otc meds to use if needed, expected course, and signs of concern.  Follow up as needed or if she does not improve within  1-2 days or if worsens in any way.  Reviewed red flag symptoms and is to go to the ER if experiences any of these.     The use of Dragon/[employer] dictation services may have been used to construct the content in this note; any grammatical or spelling errors are non-intentional. Please contact the author of this note directly if you are in need of any clarification.                  Patient presents with:  Fall: Pt states I was a little dizzy this morning. Fall and injured my right elbow and cut above right eyebrow       Subjective     Kacie Hermosillo is a 84 year old female who presents to clinic today for the following health issues:    HPI       Patient with history of fall this a.m.  Hit head, no LOC, not having dizziness. laceration of the right brow,injured right elbow   patient on chronic anticoagulation therapy          Past Medical History:   Diagnosis Date    Breast cancer (H) 01/01/1999    Heart valve replaced     Hx antineoplastic chemotherapy 01/01/1999    Hx of radiation therapy 01/01/1999    Hypertension      Social History     Tobacco Use    Smoking status: Never    Smokeless tobacco: Never    Substance Use Topics    Alcohol use: Yes     Comment: Rarely       Current Outpatient Medications   Medication Sig Dispense Refill    acetaminophen (TYLENOL) 325 MG tablet Take 650 mg by mouth every 8 hours as needed for mild pain      aspirin (ASA) 81 MG EC tablet Take 81 mg by mouth daily      atorvastatin (LIPITOR) 40 MG tablet Take 1 tablet (40 mg) by mouth every morning 90 tablet 3    dexAMETHasone (DECADRON) 4 MG tablet Take 2 tablets (8 mg) by mouth daily Take for 3 days, starting the day after chemo. Take with food. 6 tablet 2    estradiol (ESTRACE) 0.1 MG/GM vaginal cream Place 0.5 g vaginally twice a week. Can use daily for 2 weeks and then twice a week after that. 42 g 1    levofloxacin (LEVAQUIN) 500 MG tablet Take 1 tablet (500 mg) by mouth daily for 5 days. 5 tablet 0    lidocaine-prilocaine (EMLA) 2.5-2.5 % external cream Apply topically as needed for other (Apply small amount to port site 30-60 minutes prior to port access to minimize discomfort) 30 g 2    lisinopril-hydrochlorothiazide (ZESTORETIC) 20-12.5 MG tablet Take 1 tablet by mouth daily 90 tablet 1    methylcellulose (CITRUCEL) 500 MG TABS tablet Take 500 mg by mouth daily      metoprolol tartrate (LOPRESSOR) 25 MG tablet Take 1 tablet (25 mg) by mouth 2 times daily 180 tablet 3    ondansetron (ZOFRAN) 8 MG tablet Take 1 tablet (8 mg) by mouth every 8 hours as needed for nausea (vomiting) 30 tablet 2    prochlorperazine (COMPAZINE) 10 MG tablet Take 1 tablet (10 mg) by mouth every 6 hours as needed for nausea or vomiting 30 tablet 2    traMADol (ULTRAM) 50 MG tablet Take 1 tablet (50 mg) by mouth every 8 hours as needed for moderate to severe pain 30 tablet 0    UNABLE TO FIND D Charlie 1 capsule daily for UTI prevention.      warfarin ANTICOAGULANT (JANTOVEN ANTICOAGULANT) 5 MG tablet 1/2 tab (2.5mg) on Mon, Wed, Fri and 1 tab (5mg) all other days 90 tablet 3    chlorhexidine (PERIDEX) 0.12 % solution BRUSH INTO TISSUES DAILY (Patient not  taking: Reported on 11/25/2023)       No Known Allergies      ROS are negative, except as otherwise noted HPI      Objective    BP (!) 86/55   Pulse 75   Temp 97.4  F (36.3  C) (Oral)   Resp 18   SpO2 97%   There is no height or weight on file to calculate BMI.  Physical Exam   GENERAL: alert and mild distress  HENT: -Contusion and laceration over right brow, mild oozing of blood from laceration, no active bleeding   NEURO:Alert, normal speech, answering questions to recall recent fall, gait not assessed patient in  wheelchair    Diagnostic Test Results:  Labs reviewed in Epic  none

## 2024-10-06 NOTE — ED PROVIDER NOTES
EMERGENCY DEPARTMENT ENCOUNTER      NAME: Kacie Hermosillo  AGE: 84 year old female  YOB: 1940  MRN: [id number]  EVALUATION DATE & TIME: 10/6/2024  5:37 PM    PCP: Irina Francisco    ED PROVIDER: Carlyle Woodward MD      Chief Complaint   Patient presents with    Fall         FINAL IMPRESSION:  1. Fall, initial encounter    2. NSVT (nonsustained ventricular tachycardia) (H)    3. Laceration of forehead, initial encounter    4. Hyponatremia    5. Elevated INR    6. T12 compression fracture, initial encounter (H)          ED COURSE & MEDICAL DECISION MAKING:    Pertinent Labs & Imaging studies reviewed. (See chart for details)  84 year old female presents to the Emergency Department for evaluation of syncope versus lightheadedness versus trip injuring head on blood thinners and injury and right elbow    Patient has metastatic breast cancer undergoing chemotherapy.  Recently seen by oncology for diarrhea.    Diarrhea is better today.  Patient states she did not pass out but was dizzy and fell but does not recall all the details    On exam is a laceration right brow skin tear right elbow    No midline back tenderness but while laying on the gurney says her back is hurting    ED Course as of 10/06/24 2158   Sun Oct 06, 2024   1859 DDX diagnosis includes mechanical fall versus syncope with seizure, subarachnoid hemorrhage, ruptured abdominal aortic aneurysm, aortic dissection, perforated gastric/duodenal ulcer, ruptured ectopic pregnancy, stroke/TIA, acute coronary syndrome, pulmonary embolism, arrhythmia (atrioventricular block/symptomatic bradycardia, Jennifer-Parkinson-White syndrome, Brugada syndrome, hypertrophic cardiomyopathy, long QT syndrome, arrhythmogenic right ventricular dysplasia), aortic stenosis, hypoglycemia, vasovagal, or orthostatic hypotension.     1900 Is doing chemotherapy for metastatic lung cancer is on warfarin   1900 D-Dimer Quantitative(!): 1.21  Warfarin supratherapeutic at  4.62 therefore highly doubt pulmonary emboli as cause of syncope.  He did have a short nonsustained run of V. tach while here   1900 Appears asymptomatic when the attack occurred   1900 Troponin T, High Sensitivity(!): 28  Will trend   1900 Elevated white blood cell count will obtain chest x-ray to look for pneumonia as well as UA   1900 No abdominal tenderness.  Doubt appendicitis   1901 Labs show hyponatremia.  Likely from recent diarrhea.  Mild increase in AST and ALT but has known metastatic cancer.  Bilirubin normal therefore obstructive process less likely   1902 Will obtain CT head and neck since head trauma on warfarin that is supratherapeutic   1932 Primary survey negative.  Skin survey notable for laceration right brow, right elbow skin tear and pain, no back pain but laying on a gurney says her back hurts because it is uncomfortable.  Does have known compression fractures.  Last tetanus 2012.   2100 Right elbow x-ray negative for fracture   2148 Creatinine(!): 1.38   2148 Sodium(!): 127   2148 Potassium(!): 3.2  Consistent with dehydration   2148 WBC(!): 14.4  Concern for possible infection.  UA is pending.  X-ray without pneumonia.  Abdomen pelvis CT does not show any obvious source for infection.   2148 Did have diarrhea recently but no diarrhea here in the ER.  Will send C. difficile and stool culture   2148 Will give oral potassium   2153 Send a blood culture   2153 Imaging does not reveal source for infection   2153 Likely mildly hypotensive secondary to dehydration   2153 Will send stool   2154 Rossy D-dimer but INR supratherapeutic currently doubt pulmonary emboli especially with no chest pain or shortness of breath   2154 Mildly elevated LFTs with normal bilirubin doubt cholangitis   2154 Tetanus updated since last tetanus 2012   Imaging shows T12 compression fracture.  Likely new.  Neuro intact.    Discussed with hospitalist who agrees to admit patient to cardiac telemetry    Laceration repaired  with absorbable sutures.  And run of V. tach here.  Will give oral potassium for slightly decreased potassium and check magnesium    Elevated white blood cell count, no known source of infection    5:39 PM I met with the patient and family and performed my initial physical exam. I spoke with them about the workup going forward.  6:37 PM RN noticed that patient was having a few runs of V. tach.  Upon looking at the strip, patient had a 12 run of V. tach.  The strips were saved into the chart.  Will continue monitoring.  7:17 PM I performed the laceration repair on the patient.   9:38 PM I updated patient.   9:45 PM I spoke with Dr. Hopkins, hospitalist, about the patient and the plan for admission.     Medical Decision Making  Obtained supplemental history:Supplemental history obtained?: Family Member/Significant Other  Reviewed external records: External records reviewed?: Outpatient Record: 10/6/2020 [facility]  Care impacted by chronic illness:Anticoagulated State and Cancer/Chemotherapy  Care significantly affected by social determinants of health:N/A  Did you consider but not order tests?: Work up considered but not performed and documented in chart, if applicable  Did you interpret images independently?: Independent interpretation of ECG and images noted in documentation, when applicable.  Consultation discussion with other provider:Did you involve another provider (consultant, MH, pharmacy, etc.)?: I discussed the care with another health care provider, see documentation for details.  Admit.    At the conclusion of the encounter I discussed the results of all of the tests and the disposition. The questions were answered. The patient or family acknowledged understanding and was agreeable with the care plan.     MEDICATIONS GIVEN IN THE EMERGENCY:  Medications   lidocaine 1 % 0.1-1 mL (has no administration in time range)   lidocaine (LMX4) cream (has no administration in time range)    sodium chloride (PF) 0.9% PF flush 3 mL (3 mLs Intracatheter $Given 10/6/24 1801)   sodium chloride (PF) 0.9% PF flush 3 mL (has no administration in time range)   sodium chloride 0.9% BOLUS 500 mL (has no administration in time range)   potassium chloride jerzy ER (KLOR-CON M20) CR tablet 20 mEq (has no administration in time range)   lido-EPINEPHrine-tetracaine (LET) topical gel GEL (3 mLs Topical $Given 10/6/24 1824)   methylcellulose powder ( Topical $Given 10/6/24 1824)   acetaminophen (TYLENOL) tablet 650 mg (650 mg Oral $Given 10/6/24 2114)   Td (tetanus & diphtheria toxoids) -  adult formulation - for ages 7 years and older (0.5 mLs Intramuscular $Given 10/6/24 1959)   iopamidol (ISOVUE-370) solution 65 mL (65 mLs Intravenous $Given 10/6/24 2012)       NEW PRESCRIPTIONS STARTED AT TODAY'S ER VISIT  New Prescriptions    No medications on file          =================================================================    HPI    Patient information was obtained from: Patient    Use of : N/A        Kacie Hermosillo is a 84 year old female with a pertinent history of anticoagulation use, severe aortic valve stenosis, chronic renal failure stage III, cerebellar stroke, hypertension, hyperlipidemia, malignant neoplasm metastatic to bone, who presents to this ED via EMS for evaluation of injuries following a fall.    Patient shares that she lives independently at home.  This morning, she went to get out of bed and she fell onto a carpeted region.  She states that she remembers getting weak and falling.  She states that upon falling she was unable to get up herself, therefore she laid on the ground for quite some time until her family came and found her.  Family shared that patient did not call for help, but rather family was trying to contact her and was unable to get a hold of her so they went to find out what was wrong.  That is when they found her.  Family shares that patient was unable to walk after  getting up from the fall.    Patient shares having right elbow pain, right shoulder pain, and a cut above her right eyebrow.    Family shares that patient did just recently have a chemotherapy infusion last week.  They state that it was her third infusion.  They state that normally after her infusions her first 2 days are fine, but her third day following the treatment is bad.  Today is the third day following treatment.  Family shares the patient has breast cancer that is metastasized to the bone.    Family shares that patient does take Coumadin at a baby aspirin daily.  Denies taking her pain medications today.  Family shares that patient has bilateral lower extremity swelling that seems to be at baseline today.    Otherwise in normal state of health. No further concerns at this time.    Per chart review: Patient was seen on 10/2/2024 at [facility] for evaluation of malignant neoplasm metastatic to the bone.  Patient started on family-trastuzumab on 8/19/2024.  Is taking somewhat periodically.  UA and reflex culture done.  A brain MRI was ordered for the next week.  Patient was seen on 10/6/2024 at [facility] for evaluation of fall.  Due to severity of patient's symptoms, patient was sent to the emergency department for further evaluation and treatment.    REVIEW OF SYSTEMS   As per HPI.    PAST MEDICAL HISTORY:  Past Medical History:   Diagnosis Date    Breast cancer (H) 01/01/1999    Heart valve replaced     Hx antineoplastic chemotherapy 01/01/1999    Hx of radiation therapy 01/01/1999    Hypertension        PAST SURGICAL HISTORY:  Past Surgical History:   Procedure Laterality Date    BIOPSY BREAST      HYSTERECTOMY      IR CHEST PORT PLACEMENT > 5 YRS OF AGE  7/30/2024    LUMPECTOMY BREAST Left 1999    OOPHORECTOMY Bilateral            CURRENT MEDICATIONS:    acetaminophen (TYLENOL) 325 MG tablet  aspirin (ASA) 81 MG EC tablet  atorvastatin (LIPITOR) 40 MG  tablet  chlorhexidine (PERIDEX) 0.12 % solution  estradiol (ESTRACE) 0.1 MG/GM vaginal cream  levofloxacin (LEVAQUIN) 500 MG tablet  lidocaine-prilocaine (EMLA) 2.5-2.5 % external cream  lisinopril-hydrochlorothiazide (ZESTORETIC) 20-12.5 MG tablet  methylcellulose (CITRUCEL) 500 MG TABS tablet  metoprolol tartrate (LOPRESSOR) 25 MG tablet  ondansetron (ZOFRAN) 8 MG tablet  prochlorperazine (COMPAZINE) 10 MG tablet  traMADol (ULTRAM) 50 MG tablet  UNABLE TO FIND  warfarin ANTICOAGULANT (JANTOVEN ANTICOAGULANT) 5 MG tablet        ALLERGIES:  No Known Allergies    FAMILY HISTORY:  Family History   Problem Relation Age of Onset    Breast Cancer Maternal Aunt     Hereditary Breast and Ovarian Cancer Syndrome No family hx of        SOCIAL HISTORY:   Social History     Socioeconomic History    Marital status:    Tobacco Use    Smoking status: Never    Smokeless tobacco: Never   Vaping Use    Vaping status: Never Used   Substance and Sexual Activity    Alcohol use: Yes     Comment: Rarely     Social Determinants of Health     Financial Resource Strain: Low Risk  (5/24/2024)    Financial Resource Strain     Within the past 12 months, have you or your family members you live with been unable to get utilities (heat, electricity) when it was really needed?: No   Food Insecurity: Low Risk  (5/24/2024)    Food Insecurity     Within the past 12 months, did you worry that your food would run out before you got money to buy more?: No     Within the past 12 months, did the food you bought just not last and you didn t have money to get more?: No   Transportation Needs: Low Risk  (5/24/2024)    Transportation Needs     Within the past 12 months, has lack of transportation kept you from medical appointments, getting your medicines, non-medical meetings or appointments, work, or from getting things that you need?: No   Physical Activity: Unknown (5/24/2024)    Exercise Vital Sign     Days of Exercise per Week: 0 days   Stress: No  "Stress Concern Present (5/24/2024)    [employer] - Occupational Stress Questionnaire     Feeling of Stress : Only a little   Social Connections: Unknown (5/24/2024)    Social Connection and Isolation Panel [NHANES]     Frequency of Social Gatherings with Friends and Family: Twice a week   Housing Stability: Low Risk  (5/24/2024)    Housing Stability     Do you have housing? : Yes     Are you worried about losing your housing?: No       VITALS:  BP 95/53   Pulse 84   Temp 97.9  F (36.6  C) (Temporal)   Resp 14   Ht 1.6 m (5' 3\")   Wt 59 kg (130 lb 1.1 oz)   SpO2 96%   BMI 23.04 kg/m      PHYSICAL EXAM    BP 95/53   Pulse 84   Temp 97.9  F (36.6  C) (Temporal)   Resp 14   Ht 1.6 m (5' 3\")   Wt 59 kg (130 lb 1.1 oz)   SpO2 96%   BMI 23.04 kg/m      General Appearance: Alert, cooperative, normal speech and facial symmetry,  appears stated age.    Primary survey:     Airway patent  Breath sounds: bilateral breath sounds  Cardiovascular: 2+ radial pulses and DP pulses  Disability GCS 15    Secondary survey    Head:  Normocephalic, without obvious abnormality, atraumatic  Eyes:  PERRL,pupils midsized, conjunctiva/corneas clear, EOM's intact, no orbital injury  ENT: Laceration right brow. mild tenderness to palpation right brow.  No epistaxis.  Extraocular movements are intact.    Neck:  No midline cervical spine tenderness.  No paraspinal tenderness.  Chest:  No tenderness or deformity, no crepitus  Cardio:  Regular rate and rhythm, S1 and S2 normal, no murmur, rub    or gallop, 2+ pulses symmetric in all extremities, edema of the lower extremities bilaterally.  Pulm:  Clear to auscultation bilaterally, respirations unlabored   Back:   no CVA tenderness, no spinal tenderness  Abdomen:  Soft, non-tender, no rebound or guarding, no pelvic pain to compression  Extremities:  No obvious deformity, all joints palpated in place with full range of motion.   no tenderness over joints or " extremities, no cyanosis or edema, full function and range of motion, pulses equal in all extremities, normal cap refill  Skin:   Skin tears to right elbow.  Laceration over the right eyebrow.  Neuro:  Awake, alert, responsive to voice, follows commands, normal speech, No gross motor weakness or sensory loss, moves all extremities, baseline ambulation, GCS 15.    Results for orders placed or performed during the hospital encounter of 10/06/24   Head CT w/o contrast    Impression    IMPRESSION:  HEAD CT:  1.  No acute intracranial process.  2.  Innumerable sclerotic lesions throughout visualized bones of head, chest, spine, and pelvis; most consistent with metastases.    CERVICAL SPINE CT:  1.  No definite fracture.  2.  Degenerative changes, as described.    THORACIC SPINE CT:  1.  Slight loss of height superior aspect of T4, probably chronic.   2.  Marked compression of T12, age indeterminate however there is suggestion of acute/subacute component. Associated retropulsion with mild canal narrowing.   3.  Degenerative changes, as described.    LUMBAR SPINE CT:  1.  No definite fracture.  2.  Degenerative changes, as described.   CT Cervical Spine w/o Contrast    Impression    IMPRESSION:  HEAD CT:  1.  No acute intracranial process.  2.  Innumerable sclerotic lesions throughout visualized bones of head, chest, spine, and pelvis; most consistent with metastases.    CERVICAL SPINE CT:  1.  No definite fracture.  2.  Degenerative changes, as described.    THORACIC SPINE CT:  1.  Slight loss of height superior aspect of T4, probably chronic.   2.  Marked compression of T12, age indeterminate however there is suggestion of acute/subacute component. Associated retropulsion with mild canal narrowing.   3.  Degenerative changes, as described.    LUMBAR SPINE CT:  1.  No definite fracture.  2.  Degenerative changes, as described.   Chest XR,  PA & LAT    Impression    IMPRESSION: Heart size and pulmonary vessels normal.  Previous TAVR. Calcified granuloma left base with lungs otherwise clear. No pleural effusion evident. Right-sided Port-A-Cath. Surgical clips left axilla. Compression of low thoracic vertebral body.     CT Thoracic Spine w/o Contrast    Impression    IMPRESSION:  HEAD CT:  1.  No acute intracranial process.  2.  Innumerable sclerotic lesions throughout visualized bones of head, chest, spine, and pelvis; most consistent with metastases.    CERVICAL SPINE CT:  1.  No definite fracture.  2.  Degenerative changes, as described.    THORACIC SPINE CT:  1.  Slight loss of height superior aspect of T4, probably chronic.   2.  Marked compression of T12, age indeterminate however there is suggestion of acute/subacute component. Associated retropulsion with mild canal narrowing.   3.  Degenerative changes, as described.    LUMBAR SPINE CT:  1.  No definite fracture.  2.  Degenerative changes, as described.   CT Abdomen Pelvis w Contrast    Impression    IMPRESSION:   1.  Nothing for bleeds in the abdomen or pelvis.    2.  Diffuse skeletal metastases similar to previous.   Elbow XR, G/E 3 views, right    Impression    IMPRESSION: No acute displaced fracture identified. Joint spaces are maintained. There is no elbow joint effusion. Bones are demineralized.   CT Lumbar Spine Reconstructed    Impression    IMPRESSION:  HEAD CT:  1.  No acute intracranial process.  2.  Innumerable sclerotic lesions throughout visualized bones of head, chest, spine, and pelvis; most consistent with metastases.    CERVICAL SPINE CT:  1.  No definite fracture.  2.  Degenerative changes, as described.    THORACIC SPINE CT:  1.  Slight loss of height superior aspect of T4, probably chronic.   2.  Marked compression of T12, age indeterminate however there is suggestion of acute/subacute component. Associated retropulsion with mild canal narrowing.   3.  Degenerative changes, as described.    LUMBAR SPINE CT:  1.  No definite fracture.  2.  Degenerative  changes, as described.   Comprehensive metabolic panel   Result Value Ref Range    Sodium 127 (L) 135 - 145 mmol/L    Potassium 3.2 (L) 3.4 - 5.3 mmol/L    Carbon Dioxide (CO2) 20 (L) 22 - 29 mmol/L    Anion Gap 13 7 - 15 mmol/L    Urea Nitrogen 36.1 (H) 8.0 - 23.0 mg/dL    Creatinine 1.38 (H) 0.51 - 0.95 mg/dL    GFR Estimate 38 (L) >60 mL/min/1.73m2    Calcium 7.2 (L) 8.8 - 10.4 mg/dL    Chloride 94 (L) 98 - 107 mmol/L    Glucose 184 (H) 70 - 99 mg/dL    Alkaline Phosphatase 83 40 - 150 U/L    AST 50 (H) 0 - 45 U/L    ALT 80 (H) 0 - 50 U/L    Protein Total 5.6 (L) 6.4 - 8.3 g/dL    Albumin 3.4 (L) 3.5 - 5.2 g/dL    Bilirubin Total 0.3 <=1.2 mg/dL   Result Value Ref Range    INR 4.62 (H) 0.85 - 1.15   Partial thromboplastin time   Result Value Ref Range    aPTT 45 (H) 22 - 38 Seconds   Result Value Ref Range    Alcohol ethyl <0.01 <=0.01 g/dL   D dimer quantitative   Result Value Ref Range    D-Dimer Quantitative 1.21 (H) 0.00 - 0.50 ug/mL FEU   Result Value Ref Range    Troponin T, High Sensitivity 28 (H) <=14 ng/L   CBC with platelets and differential   Result Value Ref Range    WBC Count 14.4 (H) 4.0 - 11.0 10e3/uL    RBC Count 3.50 (L) 3.80 - 5.20 10e6/uL    Hemoglobin 10.7 (L) 11.7 - 15.7 g/dL    Hematocrit 30.5 (L) 35.0 - 47.0 %    MCV 87 78 - 100 fL    MCH 30.6 26.5 - 33.0 pg    MCHC 35.1 31.5 - 36.5 g/dL    RDW 15.3 (H) 10.0 - 15.0 %    Platelet Count 332 150 - 450 10e3/uL    % Neutrophils 89 %    % Lymphocytes 6 %    % Monocytes 4 %    % Eosinophils 0 %    % Basophils 0 %    % Immature Granulocytes 1 %    NRBCs per 100 WBC 0 <1 /100    Absolute Neutrophils 12.8 (H) 1.6 - 8.3 10e3/uL    Absolute Lymphocytes 0.9 0.8 - 5.3 10e3/uL    Absolute Monocytes 0.6 0.0 - 1.3 10e3/uL    Absolute Eosinophils 0.0 0.0 - 0.7 10e3/uL    Absolute Basophils 0.0 0.0 - 0.2 10e3/uL    Absolute Immature Granulocytes 0.2 <=0.4 10e3/uL    Absolute NRBCs 0.0 10e3/uL       RADIOLOGY:  Reviewed all pertinent imaging. Please see  official radiology report.  CT Lumbar Spine Reconstructed   Final Result   IMPRESSION:   HEAD CT:   1.  No acute intracranial process.   2.  Innumerable sclerotic lesions throughout visualized bones of head, chest, spine, and pelvis; most consistent with metastases.      CERVICAL SPINE CT:   1.  No definite fracture.   2.  Degenerative changes, as described.      THORACIC SPINE CT:   1.  Slight loss of height superior aspect of T4, probably chronic.    2.  Marked compression of T12, age indeterminate however there is suggestion of acute/subacute component. Associated retropulsion with mild canal narrowing.    3.  Degenerative changes, as described.      LUMBAR SPINE CT:   1.  No definite fracture.   2.  Degenerative changes, as described.      CT Abdomen Pelvis w Contrast   Final Result   IMPRESSION:    1.  Nothing for bleeds in the abdomen or pelvis.      2.  Diffuse skeletal metastases similar to previous.      CT Thoracic Spine w/o Contrast   Final Result   IMPRESSION:   HEAD CT:   1.  No acute intracranial process.   2.  Innumerable sclerotic lesions throughout visualized bones of head, chest, spine, and pelvis; most consistent with metastases.      CERVICAL SPINE CT:   1.  No definite fracture.   2.  Degenerative changes, as described.      THORACIC SPINE CT:   1.  Slight loss of height superior aspect of T4, probably chronic.    2.  Marked compression of T12, age indeterminate however there is suggestion of acute/subacute component. Associated retropulsion with mild canal narrowing.    3.  Degenerative changes, as described.      LUMBAR SPINE CT:   1.  No definite fracture.   2.  Degenerative changes, as described.      CT Cervical Spine w/o Contrast   Final Result   IMPRESSION:   HEAD CT:   1.  No acute intracranial process.   2.  Innumerable sclerotic lesions throughout visualized bones of head, chest, spine, and pelvis; most consistent with metastases.      CERVICAL SPINE CT:   1.  No definite fracture.    2.  Degenerative changes, as described.      THORACIC SPINE CT:   1.  Slight loss of height superior aspect of T4, probably chronic.    2.  Marked compression of T12, age indeterminate however there is suggestion of acute/subacute component. Associated retropulsion with mild canal narrowing.    3.  Degenerative changes, as described.      LUMBAR SPINE CT:   1.  No definite fracture.   2.  Degenerative changes, as described.      Head CT w/o contrast   Final Result   IMPRESSION:   HEAD CT:   1.  No acute intracranial process.   2.  Innumerable sclerotic lesions throughout visualized bones of head, chest, spine, and pelvis; most consistent with metastases.      CERVICAL SPINE CT:   1.  No definite fracture.   2.  Degenerative changes, as described.      THORACIC SPINE CT:   1.  Slight loss of height superior aspect of T4, probably chronic.    2.  Marked compression of T12, age indeterminate however there is suggestion of acute/subacute component. Associated retropulsion with mild canal narrowing.    3.  Degenerative changes, as described.      LUMBAR SPINE CT:   1.  No definite fracture.   2.  Degenerative changes, as described.      Chest XR,  PA & LAT   Final Result   IMPRESSION: Heart size and pulmonary vessels normal. Previous TAVR. Calcified granuloma left base with lungs otherwise clear. No pleural effusion evident. Right-sided Port-A-Cath. Surgical clips left axilla. Compression of low thoracic vertebral body.         Elbow XR, G/E 3 views, right   Final Result   IMPRESSION: No acute displaced fracture identified. Joint spaces are maintained. There is no elbow joint effusion. Bones are demineralized.          EKG:    Performed at: 1750    Impression: Sinus rhythm with premature atrial complexes  Left axis deviation  LBBB  Abnormal ECG    Rate: 76 bpm  Rhythm: Sinus  Axis: -41  VT Interval: 146 MS  QRS Interval: 124 MS  QTc Interval: 481 MS  ST Changes: N/A  Comparison: No previous ECGs available.    I have  independently reviewed and interpreted the EKG(s) documented above.    PROCEDURES:   PROCEDURE: Laceration Repair   INDICATIONS: Laceration   PROCEDURE PROVIDER: Dr Dewey Woodward   SITE: Above right eyebrow   TYPE/SIZE: simple, clean, and no foreign body visualized  2.5 cm (total length)   FUNCTIONAL ASSESSMENT: Distal sensation, circulation, and motor intact   MEDICATION: Topical LET   PREPARATION: irrigation with Normal saline   DEBRIDEMENT: no debridement and wound explored, no foreign body found   CLOSURE:  Superficial layer closed with 5 stitches of 5-0 Fast Absorbing simple interrupted    Total number of sutures/staples placed: 5         I, Teresa Moore, am serving as a scribe to document services personally performed by Carlyle Woodward MD based on my observation and the provider's statements to me. I, Carlyle Woodward MD, attest that Teresa Moore is acting in a scribe capacity, has observed my performance of the services and has documented them in accordance with my direction.    Calryle Woodward MD  [facility] EMERGENCY DEPARTMENT  [address] [place] [id number]  [phone]        Carlyle Woodward MD  10/06/24 0376

## 2024-10-06 NOTE — PATIENT INSTRUCTIONS
[facility] for further evaluation and imaging of fall hitting head, no LOC, patient on chronic anticoagulation therapy ,now dizziness, laceration of the right brow, injured right elbow patient

## 2024-10-06 NOTE — ED TRIAGE NOTES
She lives alone and fell this morning. One of her daughters called her this afternoon and that is how they found out she fell. She layed on the floor for some time then got up on her own. She is not sure if there was LOC. She sustained a head laceration. She is on blood thinners. Trauma alert called.

## 2024-10-07 ENCOUNTER — APPOINTMENT (OUTPATIENT)
Dept: CARDIOLOGY | Facility: HOSPITAL | Age: 84
DRG: 312 | End: 2024-10-07
Attending: INTERNAL MEDICINE
Payer: COMMERCIAL

## 2024-10-07 ENCOUNTER — APPOINTMENT (OUTPATIENT)
Dept: OCCUPATIONAL THERAPY | Facility: HOSPITAL | Age: 84
DRG: 312 | End: 2024-10-07
Attending: HOSPITALIST
Payer: COMMERCIAL

## 2024-10-07 ENCOUNTER — APPOINTMENT (OUTPATIENT)
Dept: NEUROLOGY | Facility: HOSPITAL | Age: 84
DRG: 312 | End: 2024-10-07
Attending: INTERNAL MEDICINE
Payer: COMMERCIAL

## 2024-10-07 PROBLEM — R55 SYNCOPE: Status: ACTIVE | Noted: 2024-10-07

## 2024-10-07 PROBLEM — Z66 DNR (DO NOT RESUSCITATE): Status: ACTIVE | Noted: 2024-10-07

## 2024-10-07 LAB
ALBUMIN SERPL BCG-MCNC: 2.9 G/DL (ref 3.5–5.2)
ALBUMIN UR-MCNC: NEGATIVE MG/DL
ALP SERPL-CCNC: 72 U/L (ref 40–150)
ALT SERPL W P-5'-P-CCNC: 59 U/L (ref 0–50)
ANION GAP SERPL CALCULATED.3IONS-SCNC: 10 MMOL/L (ref 7–15)
APPEARANCE UR: CLEAR
AST SERPL W P-5'-P-CCNC: 42 U/L (ref 0–45)
BASOPHILS # BLD AUTO: 0 10E3/UL (ref 0–0.2)
BASOPHILS NFR BLD AUTO: 0 %
BILIRUB SERPL-MCNC: 0.4 MG/DL
BILIRUB UR QL STRIP: NEGATIVE
BUN SERPL-MCNC: 29.9 MG/DL (ref 8–23)
CALCIUM SERPL-MCNC: 6.7 MG/DL (ref 8.8–10.4)
CHLORIDE SERPL-SCNC: 101 MMOL/L (ref 98–107)
COLOR UR AUTO: ABNORMAL
CREAT SERPL-MCNC: 1.29 MG/DL (ref 0.51–0.95)
EGFRCR SERPLBLD CKD-EPI 2021: 41 ML/MIN/1.73M2
EOSINOPHIL # BLD AUTO: 0.1 10E3/UL (ref 0–0.7)
EOSINOPHIL NFR BLD AUTO: 1 %
ERYTHROCYTE [DISTWIDTH] IN BLOOD BY AUTOMATED COUNT: 15.1 % (ref 10–15)
GLUCOSE SERPL-MCNC: 92 MG/DL (ref 70–99)
GLUCOSE UR STRIP-MCNC: NEGATIVE MG/DL
HCO3 SERPL-SCNC: 20 MMOL/L (ref 22–29)
HCT VFR BLD AUTO: 26.2 % (ref 35–47)
HGB BLD-MCNC: 9.3 G/DL (ref 11.7–15.7)
HGB UR QL STRIP: ABNORMAL
IMM GRANULOCYTES # BLD: 0.1 10E3/UL
IMM GRANULOCYTES NFR BLD: 1 %
INR PPP: 4.14 (ref 0.85–1.15)
KETONES UR STRIP-MCNC: NEGATIVE MG/DL
LEUKOCYTE ESTERASE UR QL STRIP: NEGATIVE
LVEF ECHO: NORMAL
LYMPHOCYTES # BLD AUTO: 1.4 10E3/UL (ref 0.8–5.3)
LYMPHOCYTES NFR BLD AUTO: 18 %
MAGNESIUM SERPL-MCNC: 1.9 MG/DL (ref 1.7–2.3)
MCH RBC QN AUTO: 30.7 PG (ref 26.5–33)
MCHC RBC AUTO-ENTMCNC: 35.5 G/DL (ref 31.5–36.5)
MCV RBC AUTO: 87 FL (ref 78–100)
MONOCYTES # BLD AUTO: 0.4 10E3/UL (ref 0–1.3)
MONOCYTES NFR BLD AUTO: 5 %
NEUTROPHILS # BLD AUTO: 5.8 10E3/UL (ref 1.6–8.3)
NEUTROPHILS NFR BLD AUTO: 75 %
NITRATE UR QL: NEGATIVE
NRBC # BLD AUTO: 0 10E3/UL
NRBC BLD AUTO-RTO: 0 /100
NT-PROBNP SERPL-MCNC: 495 PG/ML (ref 0–1800)
PH UR STRIP: 5.5 [PH] (ref 5–7)
PLATELET # BLD AUTO: 255 10E3/UL (ref 150–450)
POTASSIUM SERPL-SCNC: 3.8 MMOL/L (ref 3.4–5.3)
PROT SERPL-MCNC: 4.8 G/DL (ref 6.4–8.3)
RBC # BLD AUTO: 3.03 10E6/UL (ref 3.8–5.2)
RBC URINE: <1 /HPF
SODIUM SERPL-SCNC: 131 MMOL/L (ref 135–145)
SP GR UR STRIP: 1.02 (ref 1–1.03)
SQUAMOUS EPITHELIAL: <1 /HPF
TROPONIN T SERPL HS-MCNC: 32 NG/L
TROPONIN T SERPL HS-MCNC: 33 NG/L
UROBILINOGEN UR STRIP-MCNC: <2 MG/DL
WBC # BLD AUTO: 7.7 10E3/UL (ref 4–11)
WBC URINE: 1 /HPF

## 2024-10-07 PROCEDURE — 99232 SBSQ HOSP IP/OBS MODERATE 35: CPT | Performed by: HOSPITALIST

## 2024-10-07 PROCEDURE — 99223 1ST HOSP IP/OBS HIGH 75: CPT | Performed by: INTERNAL MEDICINE

## 2024-10-07 PROCEDURE — 36415 COLL VENOUS BLD VENIPUNCTURE: CPT | Performed by: INTERNAL MEDICINE

## 2024-10-07 PROCEDURE — 250N000013 HC RX MED GY IP 250 OP 250 PS 637: Performed by: INTERNAL MEDICINE

## 2024-10-07 PROCEDURE — 120N000004 HC R&B MS OVERFLOW

## 2024-10-07 PROCEDURE — 95816 EEG AWAKE AND DROWSY: CPT

## 2024-10-07 PROCEDURE — 81001 URINALYSIS AUTO W/SCOPE: CPT | Performed by: EMERGENCY MEDICINE

## 2024-10-07 PROCEDURE — 83880 ASSAY OF NATRIURETIC PEPTIDE: CPT | Performed by: HOSPITALIST

## 2024-10-07 PROCEDURE — 80053 COMPREHEN METABOLIC PANEL: CPT | Performed by: INTERNAL MEDICINE

## 2024-10-07 PROCEDURE — 93306 TTE W/DOPPLER COMPLETE: CPT

## 2024-10-07 PROCEDURE — 83735 ASSAY OF MAGNESIUM: CPT | Performed by: INTERNAL MEDICINE

## 2024-10-07 PROCEDURE — 250N000009 HC RX 250: Performed by: INTERNAL MEDICINE

## 2024-10-07 PROCEDURE — 258N000003 HC RX IP 258 OP 636: Performed by: INTERNAL MEDICINE

## 2024-10-07 PROCEDURE — 93306 TTE W/DOPPLER COMPLETE: CPT | Mod: 26 | Performed by: GENERAL ACUTE CARE HOSPITAL

## 2024-10-07 PROCEDURE — 85025 COMPLETE CBC W/AUTO DIFF WBC: CPT | Performed by: INTERNAL MEDICINE

## 2024-10-07 PROCEDURE — 85610 PROTHROMBIN TIME: CPT | Performed by: INTERNAL MEDICINE

## 2024-10-07 PROCEDURE — 84484 ASSAY OF TROPONIN QUANT: CPT | Performed by: INTERNAL MEDICINE

## 2024-10-07 PROCEDURE — 97166 OT EVAL MOD COMPLEX 45 MIN: CPT | Mod: GO

## 2024-10-07 RX ORDER — NALOXONE HYDROCHLORIDE 0.4 MG/ML
0.4 INJECTION, SOLUTION INTRAMUSCULAR; INTRAVENOUS; SUBCUTANEOUS
Status: DISCONTINUED | OUTPATIENT
Start: 2024-10-07 | End: 2024-10-09 | Stop reason: HOSPADM

## 2024-10-07 RX ORDER — POTASSIUM CHLORIDE 1500 MG/1
20 TABLET, EXTENDED RELEASE ORAL ONCE
Status: COMPLETED | OUTPATIENT
Start: 2024-10-07 | End: 2024-10-07

## 2024-10-07 RX ORDER — NALOXONE HYDROCHLORIDE 0.4 MG/ML
0.2 INJECTION, SOLUTION INTRAMUSCULAR; INTRAVENOUS; SUBCUTANEOUS
Status: DISCONTINUED | OUTPATIENT
Start: 2024-10-07 | End: 2024-10-09 | Stop reason: HOSPADM

## 2024-10-07 RX ADMIN — ASPIRIN 81 MG: 81 TABLET, COATED ORAL at 09:26

## 2024-10-07 RX ADMIN — ATORVASTATIN CALCIUM 40 MG: 40 TABLET, FILM COATED ORAL at 09:27

## 2024-10-07 RX ADMIN — ACETAMINOPHEN 975 MG: 325 TABLET ORAL at 16:09

## 2024-10-07 RX ADMIN — ACETAMINOPHEN 975 MG: 325 TABLET ORAL at 09:26

## 2024-10-07 RX ADMIN — POTASSIUM CHLORIDE 20 MEQ: 1500 TABLET, EXTENDED RELEASE ORAL at 09:27

## 2024-10-07 RX ADMIN — LIDOCAINE: 40 CREAM TOPICAL at 05:16

## 2024-10-07 RX ADMIN — LEVOFLOXACIN 500 MG: 500 TABLET, FILM COATED ORAL at 09:26

## 2024-10-07 RX ADMIN — SODIUM CHLORIDE: 9 INJECTION, SOLUTION INTRAVENOUS at 13:45

## 2024-10-07 RX ADMIN — METHYLCELLULOSE 500 MG: 500 TABLET ORAL at 09:29

## 2024-10-07 RX ADMIN — ACETAMINOPHEN 975 MG: 325 TABLET ORAL at 20:50

## 2024-10-07 ASSESSMENT — ACTIVITIES OF DAILY LIVING (ADL)
DEPENDENT_IADLS:: CLEANING;LAUNDRY;SHOPPING;MEDICATION MANAGEMENT;TRANSPORTATION
ADLS_ACUITY_SCORE: 35
ADLS_ACUITY_SCORE: 38
ADLS_ACUITY_SCORE: 35
ADLS_ACUITY_SCORE: 36
ADLS_ACUITY_SCORE: 44
ADLS_ACUITY_SCORE: 35
ADLS_ACUITY_SCORE: 44
ADLS_ACUITY_SCORE: 36
ADLS_ACUITY_SCORE: 36
ADLS_ACUITY_SCORE: 44
ADLS_ACUITY_SCORE: 36
ADLS_ACUITY_SCORE: 44
ADLS_ACUITY_SCORE: 38
ADLS_ACUITY_SCORE: 38
ADLS_ACUITY_SCORE: 36
ADLS_ACUITY_SCORE: 36
ADLS_ACUITY_SCORE: 44
ADLS_ACUITY_SCORE: 38

## 2024-10-07 NOTE — PROGRESS NOTES
"   10/07/24 1500   Appointment Info   Signing Clinician's Name / Credentials (OT) DILIP La   Living Environment   People in Home alone   Current Living Arrangements house   Home Accessibility no concerns   Transportation Anticipated family or friend will provide   Living Environment Comments Pt lives in single-level home. Bathroom had walk-in shower w/ grab bars and shower chair   Self-Care   Usual Activity Tolerance moderate   Current Activity Tolerance fair   Equipment Currently Used at Home walker, standard;grab bar, tub/shower   Fall history within last six months yes   Number of times patient has fallen within last six months   (Pt reports she has not fallen in the last 6 months. Daughter reports that she falls \"all of the time\" but doesn't tell anyone.)   Activity/Exercise/Self-Care Comment Pt reports being ind w/ ADLs at baseline. Per daughter, pt has been struggling and falling frequently at home.   Instrumental Activities of Daily Living (IADL)   IADL Comments Pt reports being ind w/ IADLs at baseline. Daughter says she helps w/ medication management, grocery shopping, meal preparation, and transportation   General Information   Onset of Illness/Injury or Date of Surgery 10/06/24   Referring Physician Anthony Sprague MD   Patient/Family Therapy Goal Statement (OT) Return home   Additional Occupational Profile Info/Pertinent History of Current Problem Per chart review, \"84 year old female with a past medical history of aortic valve replacement on warfarin anticoagulation use, chronic renal failure stage III, cerebellar stroke, hypertension, hyperlipidemia, breast cancer metastatic to bone, who presents to the ED for evaluation of injuries following a fall.\"   Existing Precautions/Restrictions fall   Limitations/Impairments safety/cognitive   General Observations and Info Initial BP measured supine>sit: 85/57. Pt returned to supine, BP: 92/50. Holding treatment for the day   Cognitive Status " Examination   Orientation Status orientation to person, place and time   Pain Assessment   Patient Currently in Pain No   Range of Motion Comprehensive   General Range of Motion no range of motion deficits identified   Strength Comprehensive (MMT)   General Manual Muscle Testing (MMT) Assessment no strength deficits identified   Muscle Tone Assessment   Muscle Tone Quick Adds No deficits were identified   Bed Mobility   Bed Mobility rolling right;scooting/bridging;supine-sit;sit-supine   Rolling Right [place] (Bed Mobility) independent   Scooting/Bridging [place] (Bed Mobility) dependent (less than 25% patient effort);2 person assist   Supine-Sit [place] (Bed Mobility) supervision;verbal cues   Sit-Supine [place] (Bed Mobility) minimum assist (75% patient effort)   Assistive Device (Bed Mobility) bed rails;draw sheet   Transfers   Transfer Comments Unable to trsf d/t hypotensive   Balance   Balance Assessment sitting static balance;sitting dynamic balance   Sitting Balance: Static independent   Sitting Balance: Dynamic independent   Position, Sitting Balance unsupported;sitting edge of bed   Activities of Daily Living   BADL Assessment/Intervention lower body dressing;grooming;toileting   Lower Body Dressing Assessment/Training   Comment, (Lower Body Dressing) Per clincial reasoning, anticipated pt would have difficulty w/ LB dressing tasks d/t decreased activity tolerance, balance deficits, and decreased strength/endurance.   [place] Level (Lower Body Dressing) not tested   Grooming Assessment/Training   [place] Level (Grooming) not tested   Comment, (Grooming) Per clincial reasoning, anticipated pt would have difficulty w/ g/h tasks d/t decreased activity tolerance, balance deficits, and decreased strength/endurance.   Toileting   Comment, (Toileting) Per clincial reasoning, anticipated pt would have difficulty w/ toileting trsfs and toileting tasks d/t decreased activity tolerance,  balance deficits, and decreased strength/endurance.   [place] Level (Toileting) not tested   Clinical Impression   Criteria for Skilled Therapeutic Interventions Met (OT) Yes, treatment indicated   OT Diagnosis Decreased activity tolerance, decreased safety and ind w/ ADLs/IADLs   Influenced by the following impairments Syncope, T12 fracture, hypotensive   OT Problem List-Impairments impacting ADL problems related to;activity tolerance impaired;balance;cognition;mobility;strength   Assessment of Occupational Performance 3-5 Performance Deficits   Identified Performance Deficits trsfs, mobility/ambulation, toileting, g/h, LB dressing, cognition   Planned Therapy Interventions (OT) ADL retraining;IADL retraining;balance training;cognition;strengthening;transfer training;home program guidelines;progressive activity/exercise   Clinical Decision Making Complexity (OT) detailed assessment/moderate complexity   Risk & Benefits of therapy have been explained evaluation/treatment results reviewed;care plan/treatment goals reviewed;risks/benefits reviewed;current/potential barriers reviewed;participants voiced agreement with care plan;participants included;patient;daughter   OT Total Evaluation Time   OT Eval, Moderate Complexity Minutes (44713) 15   OT Goals   Therapy Frequency (OT) 5 times/week   OT Predicted Duration/Target Date for Goal Attainment 10/14/24   OT Goals Lower Body Dressing;Hygiene/Grooming;Transfers;Toilet Transfer/Toileting;Cognition   OT: Hygiene/Grooming supervision/stand-by assist;using adaptive equipment;while standing   OT: Lower Body Dressing Supervision/stand-by assist;using adaptive equipment;including set-up/clothing retrieval   OT: Transfer Supervision/stand-by assist;with assistive device   OT: Toilet Transfer/Toileting Supervision/stand-by assist;toilet transfer;cleaning and garment management;using adaptive equipment   OT: Cognitive Patient/caregiver will verbalize understanding of  cognitive assessment results/recommendations as needed for safe discharge planning   OT Discharge Planning   OT Plan Toileting, g/h, LB dressing, trsfs, SLUMS   OT Discharge Recommendation (DC Rec) Transitional Care Facility   OT Rationale for DC Rec Pt currently lives alone and is ind at baseline w/ ADLs/IADLs. Pt currently experiencing frequent falls and returning home may be unsafe, cognitive deficits noted. Hypotensive symptoms may limit progress w/ therapy. TCU recommended to increase strength/endurance, safety and ind w/ ADLs/IADLs, and cognitive support.   OT Brief overview of current status Hypotensive - Ind supine>sit, min.A sit>supine.   Total Session Time   Total Session Time (sum of timed and untimed services) 15

## 2024-10-07 NOTE — PROGRESS NOTES
[facility]    Medicine Progress Note - Hospitalist Service    Date of Admission:  10/6/2024    Assessment & Plan    Principal Problem:    Syncope  Active Problems:    Chronic renal failure, stage 3 (moderate) (H)    Essential hypertension    S/P TAVR (transcatheter aortic valve replacement)    Malignant neoplasm metastatic to bone (H)    Hyponatremia    Elevated INR    NSVT (nonsustained ventricular tachycardia) (H)    T12 compression fracture, initial encounter (H)    Fall, initial encounter    Laceration of forehead, initial encounter    DNR (do not resuscitate)      84 year old female with a past medical history of aortic valve replacement on warfarin anticoagulation use, chronic renal failure stage III, cerebellar stroke, hypertension, hyperlipidemia, breast cancer metastatic to bone, who presents to the ED for evaluation of injuries following a fall.  Admitted with ALTAGRACIA, hyponatremia and hypokalemia, T12 fracture and had nonsustained V. tach run in the ER    10/7/2024-patient family at the bedside, does endorse recent diarrhea and after chemo, her electrolytes are off so that could be contributing to syncope event, there was also NSVT and other differential is neurological.  Patient EEG) plan.  She is being hydrated for correction of hyponatremia and electrolytes.  She may need PT OT as well for some deconditioning component from her chemo and metastatic disease.    Discussed with the patient at bedside confirmed to be DNR.    Will continue supportive care send appreciate cardiology follow-up echo pending.  No significant further telemetry events after electrolyte repletion.  She does appear to have a murmur on exam.  She just had an echo 2 months ago that showed preserved ejection fraction with some lateral wall hypokinesis and bioprosthetic aortic valve.  Supratherapeutic INR but no active bleed-had a full trauma workup, CT head benign there was T12 questionable acute compression  fracture, denies active pain there, will continue Coumadin management with pharmacy team.      Syncopal episode  - Etiology is unclear  - Concern for dehydration and hypotension versus seizure  - Patient also had a run of nonsustained V. tach in the ER  - Initial troponin slightly elevated  - Continue to monitor on telemetry   - Check echo and serial troponin  - Cardiology consult, appreciate input    Acute head trauma  - S/P fall  - CT head and neck is negative for acute changes.  - Will need repeat CT head in the morning as patient is anticoagulated with warfarin  -PT/OT evaluation when cleared by neurosurgery    Acute kidney injury  - Probably secondary dehydration  - Had episodes of diarrhea at home  - Hold home lisinopril/hydrochlorothiazide  - Start IV fluid hydration  - Monitor renal function    Marked compression of T12  - S/P fall   -CT thoracic spine shows Slight loss of height superior aspect of T4, probably chronic. Marked compression of T12, age indeterminate however there is suggestion of acute/subacute component. Associated retropulsion with mild canal narrowing.  -Neurosurgery consult, appreciate input  -Tylenol and tramadol for pain control    Hyponatremia and hypokalemia  -Probably secondary to dehydration  -Replace potassium per protocol  -Start IV fluid and monitor sodium level    Hypertension  - Blood pressure is on the low side  - Hold home lisinopril/HCTZ and metoprolol  - Check echo  - Continue IV fluid support    History of severe aortic stenosis  - S/P bioprosthetic valve replacement  -Hold warfarin as INR is elevated  -Monitor INR  -Restart warfarin when cleared by neurosurgery    History of recent UTI  - Resume Levaquin, patient has only 1 dose left.  - Check UA    History of metastatic breast cancer  - S/P chemotherapy  - Follow-up with oncology as outpatient  - Question of seizure as per neurology note on 10/2/24   - Negative brain MRI on 10/3/2024 for acute intracranial pathology  -  Check EEG          Diet: Low Saturated Fat Na <2400 mg    DVT Prophylaxis: Warfarin and INR>4  Aragon Catheter: Not present  Lines: PRESENT             Cardiac Monitoring: ACTIVE order. Indication: Tachyarrhythmias, acute (48 hours)  Code Status: No CPR- Do NOT Intubate      Clinically Significant Risk Factors Present on Admission        # Hypokalemia: Lowest K = 3.2 mmol/L in last 2 days, will replace as needed  # Hyponatremia: Lowest Na = 127 mmol/L in last 2 days, will monitor as appropriate      # Hypoalbuminemia: Lowest albumin = 2.9 g/dL at 10/7/2024  5:18 AM, will monitor as appropriate  # Drug Induced Coagulation Defect: home medication list includes an anticoagulant medication  # Drug Induced Platelet Defect: home medication list includes an antiplatelet medication   # Hypertension: Noted on problem list    # Anemia: based on hgb <11                  Disposition Plan     Medically Ready for Discharge: Anticipated in 2-4 Days             Anthony Sprague MD  Hospitalist Service  [facility]  Securely message with [employer] (more info)  Text page via [employer] Paging/Directory   ______________________________________________________________________    Interval History   Comfortable -denies active discomfort or chest pain    Physical Exam   Vital Signs: Temp: 97.7  F (36.5  C) Temp src: Oral BP: 97/54 Pulse: 87   Resp: 19 SpO2: 98 % O2 Device: None (Room air)    Weight: 130 lbs 1.14 oz    Alert awake  Vision Baseline  Neck supple  Oral mucosa moist  bilateral air entry heard,  S1-S2 normal  Abdomen is soft no tenderness  Extremities are fully mobile and there is no visible swelling noted  No skin cyanosis  Neurologically- no new Gross deficits from baseline-Moving all 4 extremities  Psych-mood okay and appropriate to circumstances      Medical Decision Making       40 MINUTES SPENT BY ME on the date of service doing chart review, history, exam, documentation & further activities per the note.       Data     I have personally reviewed the following data over the past 24 hrs:    7.7  \   9.3 (L)   / 255     131 (L) 101 29.9 (H) /  92   3.8 20 (L) 1.29 (H) \     ALT: 59 (H) AST: 42 AP: 72 TBILI: 0.4   ALB: 2.9 (L) TOT PROTEIN: 4.8 (L) LIPASE: N/A     Trop: 33 (H) BNP: 495     INR:  4.14 (H) PTT:  45 (H)   D-dimer:  1.21 (H) Fibrinogen:  N/A       Imaging results reviewed over the past 24 hrs:   Recent Results (from the past 24 hour(s))   Elbow XR, G/E 3 views, right    Narrative    EXAM: XR ELBOW RIGHT G/E 3 VIEWS  LOCATION: [facility]  DATE: 10/6/2024    INDICATION: Fall, elbow pain.  COMPARISON: None.      Impression    IMPRESSION: No acute displaced fracture identified. Joint spaces are maintained. There is no elbow joint effusion. Bones are demineralized.   Chest XR,  PA & LAT    Narrative    EXAM: XR CHEST 2 VIEWS  LOCATION: [facility]  DATE: 10/6/2024    INDICATION: syncope, elevated WBC  COMPARISON: None.      Impression    IMPRESSION: Heart size and pulmonary vessels normal. Previous TAVR. Calcified granuloma left base with lungs otherwise clear. No pleural effusion evident. Right-sided Port-A-Cath. Surgical clips left axilla. Compression of low thoracic vertebral body.     Head CT w/o contrast    Narrative    EXAM: CT HEAD W/O CONTRAST, CT CERVICAL SPINE W/O CONTRAST, CT THORACIC SPINE W/O CONTRAST, CT LUMBAR SPINE RECONSTRUCTED  LOCATION: [facility]  DATE: 10/6/2024    INDICATION: Head, neck, and back injury  COMPARISON: None.  TECHNIQUE:   1) Routine CT Head without IV contrast. Multiplanar reformats. Dose reduction techniques were used.   2) Routine CT Cervical Spine without IV contrast. Multiplanar reformats. Dose reduction techniques were used.   3) Routine CT Thoracic Spine without IV contrast. Multiplanar reformats. Dose reduction techniques were used.   4) Routine CT Lumbar Spine without IV contrast. Multiplanar  reformats. Dose reduction techniques were used.     FINDINGS:   HEAD CT:   INTRACRANIAL CONTENTS: No intracranial hemorrhage, extraaxial collection, or mass effect.  Small chronic infarction with mild chronic infarction left parietal occipital region. Small chronic infarctions cerebellar hemispheres bilaterally. Moderate   presumed chronic small vessel ischemic changes. Moderate generalized volume loss. No hydrocephalus.     VISUALIZED ORBITS/SINUSES/MASTOIDS: No intraorbital abnormality. No paranasal sinus mucosal disease. No middle ear or mastoid effusion.    BONES/SOFT TISSUES: No acute abnormality.    CERVICAL SPINE CT:  VERTEBRA: Normal vertebral body heights. Mild reversal of cervical lordosis. Slight anterior subluxation C2 on C3 and C7 on T1. No definite fracture.    CANAL/FORAMINA: Moderate degenerative changes with mild canal narrowing C4-5, moderate C5-6. Multilevel prominent neural foraminal narrowing.    PARASPINAL: No extraspinal abnormality. Visualized lung fields are clear.    THORACIC SPINE CT:  VERTEBRA: Slight loss of height superior aspect of T4, probably chronic. Marked compression of T12, age indeterminate however there is suggestion of acute/subacute component. Associated retropulsion with mild canal narrowing.  No definite other   fractures.     CANAL/FORAMINA: Multilevel mild neural foraminal narrowing.    PARASPINAL: No extraspinal abnormality.    LUMBAR SPINE CT:  VERTEBRA: Normal vertebral body heights and alignment. No fracture or posttraumatic subluxation.     CANAL/FORAMINA: Mild degenerative changes with mild canal narrowing at L3-4 and L4-5. Multilevel prominent neural foraminal narrowing. Slight posterior subluxation L2 on L3.    PARASPINAL: No extraspinal abnormality.    Innumerable sclerotic lesions throughout visualized bones; most consistent with metastases.      Impression    IMPRESSION:  HEAD CT:  1.  No acute intracranial process.  2.  Innumerable sclerotic lesions throughout  visualized bones of head, chest, spine, and pelvis; most consistent with metastases.    CERVICAL SPINE CT:  1.  No definite fracture.  2.  Degenerative changes, as described.    THORACIC SPINE CT:  1.  Slight loss of height superior aspect of T4, probably chronic.   2.  Marked compression of T12, age indeterminate however there is suggestion of acute/subacute component. Associated retropulsion with mild canal narrowing.   3.  Degenerative changes, as described.    LUMBAR SPINE CT:  1.  No definite fracture.  2.  Degenerative changes, as described.   CT Cervical Spine w/o Contrast    Narrative    EXAM: CT HEAD W/O CONTRAST, CT CERVICAL SPINE W/O CONTRAST, CT THORACIC SPINE W/O CONTRAST, CT LUMBAR SPINE RECONSTRUCTED  LOCATION: [facility]  DATE: 10/6/2024    INDICATION: Head, neck, and back injury  COMPARISON: None.  TECHNIQUE:   1) Routine CT Head without IV contrast. Multiplanar reformats. Dose reduction techniques were used.   2) Routine CT Cervical Spine without IV contrast. Multiplanar reformats. Dose reduction techniques were used.   3) Routine CT Thoracic Spine without IV contrast. Multiplanar reformats. Dose reduction techniques were used.   4) Routine CT Lumbar Spine without IV contrast. Multiplanar reformats. Dose reduction techniques were used.     FINDINGS:   HEAD CT:   INTRACRANIAL CONTENTS: No intracranial hemorrhage, extraaxial collection, or mass effect.  Small chronic infarction with mild chronic infarction left parietal occipital region. Small chronic infarctions cerebellar hemispheres bilaterally. Moderate   presumed chronic small vessel ischemic changes. Moderate generalized volume loss. No hydrocephalus.     VISUALIZED ORBITS/SINUSES/MASTOIDS: No intraorbital abnormality. No paranasal sinus mucosal disease. No middle ear or mastoid effusion.    BONES/SOFT TISSUES: No acute abnormality.    CERVICAL SPINE CT:  VERTEBRA: Normal vertebral body heights. Mild reversal of  cervical lordosis. Slight anterior subluxation C2 on C3 and C7 on T1. No definite fracture.    CANAL/FORAMINA: Moderate degenerative changes with mild canal narrowing C4-5, moderate C5-6. Multilevel prominent neural foraminal narrowing.    PARASPINAL: No extraspinal abnormality. Visualized lung fields are clear.    THORACIC SPINE CT:  VERTEBRA: Slight loss of height superior aspect of T4, probably chronic. Marked compression of T12, age indeterminate however there is suggestion of acute/subacute component. Associated retropulsion with mild canal narrowing.  No definite other   fractures.     CANAL/FORAMINA: Multilevel mild neural foraminal narrowing.    PARASPINAL: No extraspinal abnormality.    LUMBAR SPINE CT:  VERTEBRA: Normal vertebral body heights and alignment. No fracture or posttraumatic subluxation.     CANAL/FORAMINA: Mild degenerative changes with mild canal narrowing at L3-4 and L4-5. Multilevel prominent neural foraminal narrowing. Slight posterior subluxation L2 on L3.    PARASPINAL: No extraspinal abnormality.    Innumerable sclerotic lesions throughout visualized bones; most consistent with metastases.      Impression    IMPRESSION:  HEAD CT:  1.  No acute intracranial process.  2.  Innumerable sclerotic lesions throughout visualized bones of head, chest, spine, and pelvis; most consistent with metastases.    CERVICAL SPINE CT:  1.  No definite fracture.  2.  Degenerative changes, as described.    THORACIC SPINE CT:  1.  Slight loss of height superior aspect of T4, probably chronic.   2.  Marked compression of T12, age indeterminate however there is suggestion of acute/subacute component. Associated retropulsion with mild canal narrowing.   3.  Degenerative changes, as described.    LUMBAR SPINE CT:  1.  No definite fracture.  2.  Degenerative changes, as described.   CT Thoracic Spine w/o Contrast    Narrative    EXAM: CT HEAD W/O CONTRAST, CT CERVICAL SPINE W/O CONTRAST, CT THORACIC SPINE W/O  CONTRAST, CT LUMBAR SPINE RECONSTRUCTED  LOCATION: [facility]  DATE: 10/6/2024    INDICATION: Head, neck, and back injury  COMPARISON: None.  TECHNIQUE:   1) Routine CT Head without IV contrast. Multiplanar reformats. Dose reduction techniques were used.   2) Routine CT Cervical Spine without IV contrast. Multiplanar reformats. Dose reduction techniques were used.   3) Routine CT Thoracic Spine without IV contrast. Multiplanar reformats. Dose reduction techniques were used.   4) Routine CT Lumbar Spine without IV contrast. Multiplanar reformats. Dose reduction techniques were used.     FINDINGS:   HEAD CT:   INTRACRANIAL CONTENTS: No intracranial hemorrhage, extraaxial collection, or mass effect.  Small chronic infarction with mild chronic infarction left parietal occipital region. Small chronic infarctions cerebellar hemispheres bilaterally. Moderate   presumed chronic small vessel ischemic changes. Moderate generalized volume loss. No hydrocephalus.     VISUALIZED ORBITS/SINUSES/MASTOIDS: No intraorbital abnormality. No paranasal sinus mucosal disease. No middle ear or mastoid effusion.    BONES/SOFT TISSUES: No acute abnormality.    CERVICAL SPINE CT:  VERTEBRA: Normal vertebral body heights. Mild reversal of cervical lordosis. Slight anterior subluxation C2 on C3 and C7 on T1. No definite fracture.    CANAL/FORAMINA: Moderate degenerative changes with mild canal narrowing C4-5, moderate C5-6. Multilevel prominent neural foraminal narrowing.    PARASPINAL: No extraspinal abnormality. Visualized lung fields are clear.    THORACIC SPINE CT:  VERTEBRA: Slight loss of height superior aspect of T4, probably chronic. Marked compression of T12, age indeterminate however there is suggestion of acute/subacute component. Associated retropulsion with mild canal narrowing.  No definite other   fractures.     CANAL/FORAMINA: Multilevel mild neural foraminal narrowing.    PARASPINAL: No extraspinal  abnormality.    LUMBAR SPINE CT:  VERTEBRA: Normal vertebral body heights and alignment. No fracture or posttraumatic subluxation.     CANAL/FORAMINA: Mild degenerative changes with mild canal narrowing at L3-4 and L4-5. Multilevel prominent neural foraminal narrowing. Slight posterior subluxation L2 on L3.    PARASPINAL: No extraspinal abnormality.    Innumerable sclerotic lesions throughout visualized bones; most consistent with metastases.      Impression    IMPRESSION:  HEAD CT:  1.  No acute intracranial process.  2.  Innumerable sclerotic lesions throughout visualized bones of head, chest, spine, and pelvis; most consistent with metastases.    CERVICAL SPINE CT:  1.  No definite fracture.  2.  Degenerative changes, as described.    THORACIC SPINE CT:  1.  Slight loss of height superior aspect of T4, probably chronic.   2.  Marked compression of T12, age indeterminate however there is suggestion of acute/subacute component. Associated retropulsion with mild canal narrowing.   3.  Degenerative changes, as described.    LUMBAR SPINE CT:  1.  No definite fracture.  2.  Degenerative changes, as described.   CT Abdomen Pelvis w Contrast    Narrative    EXAM: CT ABDOMEN PELVIS W CONTRAST  LOCATION: [facility]  DATE: 10/6/2024    INDICATION: fall, elevated INR. Breast cancer.  COMPARISON: 6/25/2024 PET scan.  TECHNIQUE: CT scan of the abdomen and pelvis was performed following injection of IV contrast. Multiplanar reformats were obtained. Dose reduction techniques were used.  CONTRAST: 65ml [id number]    FINDINGS:   LOWER CHEST: Normal.    HEPATOBILIARY: Gallbladder removed. Prominent bile ducts similar to previous consistent with reservoir effect.    PANCREAS: Normal.    SPLEEN: Normal.    ADRENAL GLANDS: Normal.    KIDNEYS/BLADDER: Normal.    BOWEL: Scattered diverticula but nothing for acute diverticulitis. No hematomas or hemorrhage.    LYMPH NODES: No lymphadenopathy.    VASCULATURE:  Normal.    PELVIC ORGANS: Postop change.    MUSCULOSKELETAL: Diffuse tiny sclerotic skeletal metastases throughout the visualized skeleton similar to previous. Stable T12 advanced compression fracture.      Impression    IMPRESSION:   1.  Nothing for bleeds in the abdomen or pelvis.    2.  Diffuse skeletal metastases similar to previous.   CT Lumbar Spine Reconstructed    Narrative    EXAM: CT HEAD W/O CONTRAST, CT CERVICAL SPINE W/O CONTRAST, CT THORACIC SPINE W/O CONTRAST, CT LUMBAR SPINE RECONSTRUCTED  LOCATION: [facility]  DATE: 10/6/2024    INDICATION: Head, neck, and back injury  COMPARISON: None.  TECHNIQUE:   1) Routine CT Head without IV contrast. Multiplanar reformats. Dose reduction techniques were used.   2) Routine CT Cervical Spine without IV contrast. Multiplanar reformats. Dose reduction techniques were used.   3) Routine CT Thoracic Spine without IV contrast. Multiplanar reformats. Dose reduction techniques were used.   4) Routine CT Lumbar Spine without IV contrast. Multiplanar reformats. Dose reduction techniques were used.     FINDINGS:   HEAD CT:   INTRACRANIAL CONTENTS: No intracranial hemorrhage, extraaxial collection, or mass effect.  Small chronic infarction with mild chronic infarction left parietal occipital region. Small chronic infarctions cerebellar hemispheres bilaterally. Moderate   presumed chronic small vessel ischemic changes. Moderate generalized volume loss. No hydrocephalus.     VISUALIZED ORBITS/SINUSES/MASTOIDS: No intraorbital abnormality. No paranasal sinus mucosal disease. No middle ear or mastoid effusion.    BONES/SOFT TISSUES: No acute abnormality.    CERVICAL SPINE CT:  VERTEBRA: Normal vertebral body heights. Mild reversal of cervical lordosis. Slight anterior subluxation C2 on C3 and C7 on T1. No definite fracture.    CANAL/FORAMINA: Moderate degenerative changes with mild canal narrowing C4-5, moderate C5-6. Multilevel prominent neural  foraminal narrowing.    PARASPINAL: No extraspinal abnormality. Visualized lung fields are clear.    THORACIC SPINE CT:  VERTEBRA: Slight loss of height superior aspect of T4, probably chronic. Marked compression of T12, age indeterminate however there is suggestion of acute/subacute component. Associated retropulsion with mild canal narrowing.  No definite other   fractures.     CANAL/FORAMINA: Multilevel mild neural foraminal narrowing.    PARASPINAL: No extraspinal abnormality.    LUMBAR SPINE CT:  VERTEBRA: Normal vertebral body heights and alignment. No fracture or posttraumatic subluxation.     CANAL/FORAMINA: Mild degenerative changes with mild canal narrowing at L3-4 and L4-5. Multilevel prominent neural foraminal narrowing. Slight posterior subluxation L2 on L3.    PARASPINAL: No extraspinal abnormality.    Innumerable sclerotic lesions throughout visualized bones; most consistent with metastases.      Impression    IMPRESSION:  HEAD CT:  1.  No acute intracranial process.  2.  Innumerable sclerotic lesions throughout visualized bones of head, chest, spine, and pelvis; most consistent with metastases.    CERVICAL SPINE CT:  1.  No definite fracture.  2.  Degenerative changes, as described.    THORACIC SPINE CT:  1.  Slight loss of height superior aspect of T4, probably chronic.   2.  Marked compression of T12, age indeterminate however there is suggestion of acute/subacute component. Associated retropulsion with mild canal narrowing.   3.  Degenerative changes, as described.    LUMBAR SPINE CT:  1.  No definite fracture.  2.  Degenerative changes, as described.

## 2024-10-07 NOTE — PLAN OF CARE
Problem: Adult Inpatient Plan of Care  Goal: Optimal Comfort and Wellbeing  Intervention: Monitor Pain and Promote Comfort  Recent Flowsheet Documentation  Taken 10/7/2024 0516 by Isidoro Guerra RN  Pain Management Interventions: medication (see MAR)     Problem: Adult Inpatient Plan of Care  Goal: Absence of Hospital-Acquired Illness or Injury  Intervention: Prevent Infection  Recent Flowsheet Documentation  Taken 10/6/2024 2359 by Isidoro Guerra RN  Infection Prevention: hand hygiene promoted     Problem: Adult Inpatient Plan of Care  Goal: Absence of Hospital-Acquired Illness or Injury  Intervention: Identify and Manage Fall Risk  Recent Flowsheet Documentation  Taken 10/6/2024 2359 by Isidoro Guerra RN  Safety Promotion/Fall Prevention:   activity supervised   room near nurse's station  Intervention: Prevent Skin Injury  Recent Flowsheet Documentation  Taken 10/7/2024 0526 by Isidoro Guerra RN  Body Position:   turned   right   side-lying 30 degrees  Intervention: Prevent Infection  Recent Flowsheet Documentation  Taken 10/6/2024 2359 by Isidoro Guerra RN  Infection Prevention: hand hygiene promoted   Goal Outcome Evaluation:        Pt A/O x 4, c/o shoulder pain 5/10 x2 ,declined PRN pain med once and PRN lidocaine cream applied to shoulder at 0516 am, NS running at 75 ml/hr.   Bladder scan with 344 ml; oral input about 450 ml. Pt is bedrest, No stool this shift. 2nd bladder scan was up to 600 ml and pt urinated in the brief; 3rd bladder scan was 525 ml straight cath pt with 500 ml output. Continuous to monitor.

## 2024-10-07 NOTE — CONSULTS
[facility]    Neurosurgery Consultation     Date of Admission:  10/6/2024  Date of Consult (When I saw the patient): 10/07/24    Assessment & Plan   Kacie Hermosillo is a 84 year old female who was admitted on 10/6/2024. I was asked to see the patient for T12 fracture.    Principal Problem:    Syncope  Active Problems:    Chronic renal failure, stage 3 (moderate) (H)    Essential hypertension    S/P TAVR (transcatheter aortic valve replacement)    Malignant neoplasm metastatic to bone (H)    Hyponatremia    Elevated INR    NSVT (nonsustained ventricular tachycardia) (H)    T12 compression fracture, initial encounter (H)    Fall, initial encounter    Laceration of forehead, initial encounter    Plan:   Will attempt to obtain prior spine images for comparison   Discussed would recommend repeat thoracic MRI for further evaluation and characteristics of T12 fracture as patient with little to no pain in correlation of fracture low suspicion of fracture being acute   Patient would like to not have further images as her back is not her current concern   Activity as tolerated   Contact neurosurgery should pain increase with activity  Further recommendations once able to compare to prior images         I have discussed the following assessment and plan with Dr. Coello  who is in agreement with initial plan and will follow up with further consultation recommendations.    Cheryl Velázquez PA-C  [facility] Neurosurgery  O: [phone]          Code Status    No CPR- Do NOT Intubate    Reason for Consult   Reason for consult: I was asked by Dr. Sprague to evaluate this patient for T12 compression fracture.    Primary Care Physician   Irina Francisco    Chief Complaint   fall    History is obtained from the patient    History of Present Illness   Kacie Hermosillo is a 84 year old female with history of aortic valve replacement on warfarin anticoagulation use, chronic renal failure stage III,  cerebellar stroke, hypertension, hyperlipidemia, breast cancer metastatic to bone presented to ED after a fall while at home. She states that she got up to go to the bathroom and became dizzy causing her to fall forward hitting her head. She states that she was able to get up on her own and denies any LOC though in chart review appears that she laid on the floor until family came to help get her up. She currently denies any headaches, nausea emesis or visual changes. She denies any back pain or radicular lower extremity pain numbness tingling or weakness. Her daughter in law at bedside states that she had a fracture around 3 years ago of which she was treated with a brace for 3 months and thoughts are that it may be the same fracture seen on CT. She has been undergoing chemotherapy for bony spine metastasis with oncology and notes no changes in her previous back symptoms following the fall. Discussed that we could obtain updated MRI of which she states she would not be interested in further images at this time.     Past Medical History   I have reviewed this patient's medical history and updated it with pertinent information if needed.   Past Medical History:   Diagnosis Date    Breast cancer (H) 01/01/1999    Heart valve replaced     Hx antineoplastic chemotherapy 01/01/1999    Hx of radiation therapy 01/01/1999    Hypertension        Past Surgical History   I have reviewed this patient's surgical history and updated it with pertinent information if needed.  Past Surgical History:   Procedure Laterality Date    BIOPSY BREAST      HYSTERECTOMY      IR CHEST PORT PLACEMENT > 5 YRS OF AGE  7/30/2024    LUMPECTOMY BREAST Left 1999    OOPHORECTOMY Bilateral        Prior to Admission Medications   Prior to Admission Medications   Prescriptions Last Dose Informant Patient Reported? Taking?   UNABLE TO FIND 10/5/2024 at am  Yes Yes   Sig: Take 1 capsule by mouth every morning. D Charlie 1 capsule daily for UTI prevention.    acetaminophen (TYLENOL) 325 MG tablet 10/5/2024 at am  Yes Yes   Sig: Take 650 mg by mouth every 8 hours as needed for mild pain   aspirin (ASA) 81 MG EC tablet 10/5/2024 at am  Yes Yes   Sig: Take 81 mg by mouth every morning.   atorvastatin (LIPITOR) 40 MG tablet 10/5/2024 at am  No Yes   Sig: Take 1 tablet (40 mg) by mouth every morning   chlorhexidine (PERIDEX) 0.12 % solution More than a month at prn  Yes Yes   Sig: Take 15 mLs by mouth daily as needed.   estradiol (ESTRACE) 0.1 MG/GM vaginal cream Past Week at unknown  No Yes   Sig: Place 0.5 g vaginally twice a week. Can use daily for 2 weeks and then twice a week after that.   levofloxacin (LEVAQUIN) 500 MG tablet 10/5/2024 at am  No Yes   Sig: Take 1 tablet (500 mg) by mouth daily for 5 days.   lidocaine-prilocaine (EMLA) 2.5-2.5 % external cream Past Month at prn  No Yes   Sig: Apply topically as needed for other (Apply small amount to port site 30-60 minutes prior to port access to minimize discomfort)   lisinopril-hydrochlorothiazide (ZESTORETIC) 20-12.5 MG tablet 10/5/2024 at am  No Yes   Sig: Take 1 tablet by mouth daily   methylcellulose (CITRUCEL) 500 MG TABS tablet 10/5/2024 at am  Yes Yes   Sig: Take 500 mg by mouth every morning.   metoprolol tartrate (LOPRESSOR) 25 MG tablet 10/5/2024 at pm  No Yes   Sig: Take 1 tablet (25 mg) by mouth 2 times daily   ondansetron (ZOFRAN) 8 MG tablet Unknown at prn  No Yes   Sig: Take 1 tablet (8 mg) by mouth every 8 hours as needed for nausea (vomiting)   prochlorperazine (COMPAZINE) 10 MG tablet Unknown at prn  No Yes   Sig: Take 1 tablet (10 mg) by mouth every 6 hours as needed for nausea or vomiting   traMADol (ULTRAM) 50 MG tablet Unknown at prn  No Yes   Sig: Take 1 tablet (50 mg) by mouth every 8 hours as needed for moderate to severe pain   warfarin ANTICOAGULANT (JANTOVEN ANTICOAGULANT) 5 MG tablet 10/5/2024 at am (5)  No Yes   Si/2 tab (2.5mg) on Mon, Wed, Fri and 1 tab (5mg) all other days     "  Facility-Administered Medications Last Administration Doses Remaining   sodium chloride (PF) 0.9% PF flush 10-20 mL None recorded 2   sodium chloride (PF) 0.9% PF flush 10-20 mL 8/15/2024 11:29 AM 1        Allergies   No Known Allergies    Social History   I have reviewed this patient's social history and updated it with pertinent information if needed. Kacie Hermosillo  reports that she has never smoked. She has never used smokeless tobacco. She reports current alcohol use.    Family History   I have reviewed this patient's family history and updated it with pertinent information if needed.   Family History   Problem Relation Age of Onset    Breast Cancer Maternal Aunt     Hereditary Breast and Ovarian Cancer Syndrome No family hx of        Review of Systems   14 point review of systems negative with exception to HPI     Physical Exam   Temp: 98  F (36.7  C) Temp src: Oral BP: 99/54 Pulse: 92   Resp: 18 SpO2: 96 % O2 Device: None (Room air)    Vital Signs with Ranges  Temp:  [97.4  F (36.3  C)-98  F (36.7  C)] 98  F (36.7  C)  Pulse:  [] 92  Resp:  [11-24] 18  BP: ()/(41-59) 99/54  SpO2:  [95 %-99 %] 96 %  130 lbs 1.14 oz     , Blood pressure 99/54, pulse 92, temperature 98  F (36.7  C), temperature source Oral, resp. rate 18, height 1.6 m (5' 3\"), weight 59 kg (130 lb 1.1 oz), SpO2 96%.  130 lbs 1.14 oz  HEENT:  Normocephalic, atraumatic.  PERRLA.  EOM s intact.   Neck:  Supple, non-tender, without lymphadenopathy.  Heart:  No peripheral edema  Lungs:  No SOB  Abdomen:  Soft, non-tender, non-distended.  Normal bowel sounds.  Skin:  Warm and dry, good capillary refill.  Extremities:  Good radial and dorsalis pedis pulses bilaterally, no edema, cyanosis or clubbing.    NEUROLOGICAL EXAMINATION:   Mental status:  Alert and Oriented x 3, speech is fluent.  Cranial nerves:  II-XII intact.   Motor:  Strength is 5/5 throughout the upper and lower extremities  Deltoids:  Right: 5/5   Left:  5/5  Biceps:  " Right: 5/5   Left:  5/5  Triceps: Right: 5/5   Left:  5/5                       Wrist Extensors: Right: 5/5   Left:  5/5        Wrist Flexors:Right: 5/5   Left:  5/5             Hand : Right: 5/5   Left:  5/5         Hip Flexor: Right: 5/5   Left:  5/5                 Knee extension :Right: 5/5   Left:  5/5               Knee flexion: Right: 5/5   Left:  5/5              Plantar flexion:Right: 5/5   Left:  5/5        dorsiflexion:Right: 5/5   Left:  5/5                        EHL:Right: 5/5   Left:  5/5                     Sensation:  intact to LT  Reflexes:  Negative Babinski.  Negative Clonus.    Coordination:  Smooth finger to nose testing.   Negative pronator drift.   Gait:  not tested    Cervical examination reveals good range of motion.  No tenderness to palpation of the cervical spine or paraspinous muscles bilaterally.     Lumbar examination reveals no tenderness of the spine or paraspinous muscles.  Hip height is symmetrical. Straight leg raise is negative bilaterally.       Data     CBC RESULTS:   Recent Labs   Lab Test 10/07/24  0518   WBC 7.7   RBC 3.03*   HGB 9.3*   HCT 26.2*   MCV 87   MCH 30.7   MCHC 35.5   RDW 15.1*        Basic Metabolic Panel:  Lab Results   Component Value Date     10/07/2024      Lab Results   Component Value Date    POTASSIUM 3.8 10/07/2024     Lab Results   Component Value Date    CHLORIDE 101 10/07/2024     Lab Results   Component Value Date    MIGUEL 6.7 10/07/2024     Lab Results   Component Value Date    CO2 20 10/07/2024     Lab Results   Component Value Date    BUN 29.9 10/07/2024     Lab Results   Component Value Date    CR 1.29 10/07/2024     Lab Results   Component Value Date    GLC 92 10/07/2024    GLC 87 06/25/2024     INR:  Lab Results   Component Value Date    INR 4.14 10/07/2024    INR 4.62 10/06/2024    INR 2.53 10/02/2024    INR 2.35 09/10/2024    INR 2.52 08/19/2024    INR 2.2 08/12/2024    INR 2.36 07/30/2024    INR 2.3 07/25/2024    INR 2.7  07/18/2024    INR 1.36 07/11/2024    INR 2.0 07/08/2024    INR 5.3 07/05/2024    INR 2.8 07/01/2024    INR 2.2 06/25/2024    INR 2.7 05/24/2024    INR 2.1 04/18/2024    INR 2.7 03/26/2024    INR 3.3 03/14/2024    INR 2.0 02/29/2024    INR 5.95 05/04/2023      Images reviewed personally with noted chronic T4 compression fracture and T12 compression fracture   IMPRESSION:  HEAD CT:  1.  No acute intracranial process.  2.  Innumerable sclerotic lesions throughout visualized bones of head, chest, spine, and pelvis; most consistent with metastases.     CERVICAL SPINE CT:  1.  No definite fracture.  2.  Degenerative changes, as described.     THORACIC SPINE CT:  1.  Slight loss of height superior aspect of T4, probably chronic.   2.  Marked compression of T12, age indeterminate however there is suggestion of acute/subacute component. Associated retropulsion with mild canal narrowing.   3.  Degenerative changes, as described.     LUMBAR SPINE CT:  1.  No definite fracture.  2.  Degenerative changes, as described.

## 2024-10-07 NOTE — PLAN OF CARE
Problem: Infection  Goal: Absence of Infection Signs and Symptoms  Outcome: Progressing  Intervention: Prevent or Manage Infection  Recent Flowsheet Documentation  Taken 10/7/2024 1640 by Wally Reyes RN  Isolation Precautions: enteric precautions maintained     Problem: Syncope  Goal: Absence of Syncopal Symptoms  Outcome: Progressing   Goal Outcome Evaluation:       Pt denies any SOB and chest pain. VS stable. Pt denies any dizziness.  Pt alert and oriented x4.. oxygen saturation maintained well on RA. Tele has been NSR.

## 2024-10-07 NOTE — PROGRESS NOTES
Bedside EEG was performed that included photic stimulation; hyperventilation was deferred. The patient was awake and asleep throughout the recording. Pt has laceration from fall on front right side of hd(by FP2).    [id number] used.

## 2024-10-07 NOTE — CONSULTS
Thank you, Dr. Nicole ref. provider found, for asking the [facility] Care team to see Kacie Hermosillo to evaluate arrhythmias.      Assessment/Recommendations   Assessment:    Fall with syncope possibly related to orthostatic hypotension, rule out progression of aortic bioprosthesis thrombosis  PSVT, chronic, asymptomatic  Chronic left bundle branch block  History of severe aortic stenosis status post TAVR in 2021 with chronic anticoagulation with warfarin for subclinical thrombosis  Metastatic breast  cancer  History of hypertension  Chronic kidney disease    Plan:  No new management of nonsustained supraventricular tachycardia as needed.  There was no documentation of VT. What was interpreted as nonsustained VT is PSVT with aberrancy.  Echo to reassess aortic bioprosthesis hemodynamics  Further recommendation when results available      Clinically Significant Risk Factors Present on Admission        # Hypokalemia: Lowest K = 3.2 mmol/L in last 2 days, will replace as needed  # Hyponatremia: Lowest Na = 127 mmol/L in last 2 days, will monitor as appropriate      # Hypoalbuminemia: Lowest albumin = 2.9 g/dL at 10/7/2024  5:18 AM, will monitor as appropriate  # Drug Induced Coagulation Defect: home medication list includes an anticoagulant medication    # Drug Induced Platelet Defect: home medication list includes an antiplatelet medication       # Hypertension: Noted on problem list        # Anemia: based on hgb <11                             History of Present Illness/Subjective    Ms. Kacie Hermosillo is a 84 year old female who was admitted after falling down at home.  She reports that she got up from bed and became very lightheaded and fell down to the ground.  She thinks that she lost consciousness.  She denies any chest pain no heart palpitations prior to falling.  She was brought to the emergency room.  While on telemetry she was thought to have nonsustained VT.  Her cardiac history significant for  "severe aortic stenosis.  She had TAVR in 2021 at [place].  Reportedly preop coronary angiogram showed no significant coronary artery disease.  She has been taking warfarin for subclinical thrombosis of bioprosthesis.  She has history of low bundle-branch block and SVT.  She denies any prolonged heart racing.  She is also known to have metastatic breast cancer.  She denies any recent medication changes.  Her weight has been stable    ECG: Personally reviewed.  Normal sinus rhythm left bundle branch block  Telemetry: Normal sinus rhythm, no bradycardia, ran out of atrial tachycardia, no VT  ECHO (personnaly Reviewed): Pending         Physical Examination Review of Systems   BP 97/54   Pulse 87   Temp 97.7  F (36.5  C) (Oral)   Resp 19   Ht 1.6 m (5' 3\")   Wt 59 kg (130 lb 1.1 oz)   SpO2 98%   BMI 23.04 kg/m    Body mass index is 23.04 kg/m .  Wt Readings from Last 3 Encounters:   10/06/24 59 kg (130 lb 1.1 oz)   10/02/24 59 kg (130 lb)   09/10/24 60.4 kg (133 lb 1.6 oz)       Intake/Output Summary (Last 24 hours) at 10/7/2024 1145  Last data filed at 10/7/2024 0651  Gross per 24 hour   Intake 950 ml   Output 500 ml   Net 450 ml     General Appearance:   Alert, cooperative, no distress, appears stated age   Head/ENT: Laceration to right forehead      EYES:  No scleral icterus   Neck: Supple, symmetrical, trachea midline, no adenopathy, thyroid: not enlarged, symmetric, no carotid bruit or JVD   Chest/Lungs:   lungs are clear to auscultation, respirations unlabored. No tenderness or deformity   Cardiovascular:   Regular rhythm, S1, S2 normal, no rub or gallop.  Harsh systolic murmur at the aortic area   Abdomen:  Soft, non-tender, bowel sounds active all four quadrants,  no masses, no organomegaly   Extremities: no cyanosis or clubbing. No edema   Skin: Skin color, texture, turgor normal, no rashes or lesions.    Neurologic: Alert and oriented x 3, moving all four extremities.    Psychiatric: Normal affect.      " "10 system review of systems completed see history of present illness and inpatient H&P (reviewed) for further details.          Lab Results    Chemistry/lipid CBC Cardiac Enzymes/BNP/TSH/INR   Recent Labs   Lab Test 05/24/24  1043   CHOL 126   HDL 62   LDL 51   TRIG 64     Recent Labs   Lab Test 05/24/24  1043 05/04/23  1338   LDL 51 54     Recent Labs   Lab Test 10/07/24  0518   *   POTASSIUM 3.8   CHLORIDE 101   CO2 20*   GLC 92   BUN 29.9*   CR 1.29*   GFRESTIMATED 41*   MIGUEL 6.7*     Recent Labs   Lab Test 10/07/24  0518 10/06/24  1806 10/02/24  0835   CR 1.29* 1.38* 1.18*     No results for input(s): \"A1C\" in the last 04264 hours.       Recent Labs   Lab Test 10/07/24  0518   WBC 7.7   HGB 9.3*   HCT 26.2*   MCV 87        Recent Labs   Lab Test 10/07/24  0518 10/06/24  1806 10/02/24  0835   HGB 9.3* 10.7* 11.1*    No results for input(s): \"TROPONINI\" in the last 05396 hours.  Recent Labs   Lab Test 10/07/24  0518   NTBNPI 495     No results for input(s): \"TSH\" in the last 15520 hours.  Recent Labs   Lab Test 10/07/24  0517 10/06/24  1806 10/02/24  0835   INR 4.14* 4.62* 2.53*        Medical History  Surgical History Family History Social History   Past Medical History:   Diagnosis Date    Breast cancer (H) 01/01/1999    Heart valve replaced     Hx antineoplastic chemotherapy 01/01/1999    Hx of radiation therapy 01/01/1999    Hypertension      Past Surgical History:   Procedure Laterality Date    BIOPSY BREAST      HYSTERECTOMY      IR CHEST PORT PLACEMENT > 5 YRS OF AGE  7/30/2024    LUMPECTOMY BREAST Left 1999    OOPHORECTOMY Bilateral      No premature CAD, SCD,cardiomyopathy   Social History     Socioeconomic History    Marital status:      Spouse name: Not on file    Number of children: Not on file    Years of education: Not on file    Highest education level: Not on file   Occupational History    Not on file   Tobacco Use    Smoking status: Never    Smokeless tobacco: Never   Vaping Use "    Vaping status: Never Used   Substance and Sexual Activity    Alcohol use: Yes     Comment: Rarely    Drug use: Not on file    Sexual activity: Not on file   Other Topics Concern    Not on file   Social History Narrative    Not on file     Social Determinants of Health     Financial Resource Strain: Low Risk  (5/24/2024)    Financial Resource Strain     Within the past 12 months, have you or your family members you live with been unable to get utilities (heat, electricity) when it was really needed?: No   Food Insecurity: Low Risk  (5/24/2024)    Food Insecurity     Within the past 12 months, did you worry that your food would run out before you got money to buy more?: No     Within the past 12 months, did the food you bought just not last and you didn t have money to get more?: No   Transportation Needs: Low Risk  (5/24/2024)    Transportation Needs     Within the past 12 months, has lack of transportation kept you from medical appointments, getting your medicines, non-medical meetings or appointments, work, or from getting things that you need?: No   Physical Activity: Unknown (5/24/2024)    Exercise Vital Sign     Days of Exercise per Week: 0 days     Minutes of Exercise per Session: Not on file   Stress: No Stress Concern Present (5/24/2024)    [employer] [place] [employer] - Occupational Stress Questionnaire     Feeling of Stress : Only a little   Social Connections: Unknown (5/24/2024)    Social Connection and Isolation Panel [NHANES]     Frequency of Communication with Friends and Family: Not on file     Frequency of Social Gatherings with Friends and Family: Twice a week     Attends Caodaism Services: Not on file     Active Member of Clubs or Organizations: Not on file     Attends Club or Organization Meetings: Not on file     Marital Status: Not on file   Interpersonal Safety: Not on file   Housing Stability: Low Risk  (5/24/2024)    Housing Stability     Do you have housing? : Yes     Are you  worried about losing your housing?: No         Medications  Allergies   Current Facility-Administered Medications Ordered in Epic   Medication Dose Route Frequency Provider Last Rate Last Admin    acetaminophen (TYLENOL) tablet 975 mg  975 mg Oral TID Trevor Hopkins MD   975 mg at 10/07/24 0926    aspirin EC tablet 81 mg  81 mg Oral QATrevor Kohler MD   81 mg at 10/07/24 0926    atorvastatin (LIPITOR) tablet 40 mg  40 mg Oral Trevor Liu MD   40 mg at 10/07/24 0927    calcium carbonate (TUMS) chewable tablet 1,000 mg  1,000 mg Oral 4x Daily PRN Trevor Hopkins MD        heparin lock flush 100 unit/mL injection 5 mL  5 mL Intracatheter Q30 Days Neymar Gann MD   5 mL at 10/03/24 1031    lidocaine (LMX4) cream   Topical Q1H PRN Trevor Hopkins MD   Given at 10/07/24 0516    lidocaine 1 % 0.1-1 mL  0.1-1 mL Other Q1H PRN Trevor Hopkins MD        [Held by provider] lisinopril-hydrochlorothiazide (ZESTORETIC) 20-12.5 MG per tablet 1 tablet  1 tablet Oral Daily Trevor Hopkins MD        melatonin tablet 1 mg  1 mg Oral At Bedtime PRN Trevor Hopkins MD        methylcellulose (CITRUCEL) tablet 500 mg  500 mg Oral Trevro Liu MD   500 mg at 10/07/24 0929    [Held by provider] metoprolol tartrate (LOPRESSOR) tablet 25 mg  25 mg Oral BID Trevor Hopkins MD        naloxone (NARCAN) injection 0.2 mg  0.2 mg Intravenous Q2 Min PRN Trevor Hopkins MD        Or    naloxone (NARCAN) injection 0.4 mg  0.4 mg Intravenous Q2 Min PRN Trevor Hopkins MD        Or    naloxone (NARCAN) injection 0.2 mg  0.2 mg Intramuscular Q2 Min PRN Trevor Hopkins MD        Or    naloxone (NARCAN) injection 0.4 mg  0.4 mg Intramuscular Q2 Min PRN Trevor Hopkins MD        Patient is already receiving mechanical prophylaxis   Does not apply Continuous PRN Trevor Hopkins MD        sodium chloride (PF) 0.9% PF flush 10-20 mL  10-20 mL Intracatheter q1 min prn Alaina Lund PA-C        sodium chloride (PF) 0.9%  PF flush 10-20 mL  10-20 mL Intracatheter q1 min prn Alaina Lund PA-C   20 mL at 08/15/24 1129    sodium chloride (PF) 0.9% PF flush 3 mL  3 mL Intracatheter Q8H Carlyle Woodward MD   3 mL at 10/06/24 1801    sodium chloride (PF) 0.9% PF flush 3 mL  3 mL Intracatheter q1 min prn Carlyle Woodward MD        sodium chloride (PF) 0.9% PF flush 3 mL  3 mL Intracatheter Q8H Trevor Hopkins MD   3 mL at 10/06/24 2303    sodium chloride (PF) 0.9% PF flush 3 mL  3 mL Intracatheter q1 min prn Trevor Hopkins MD        sodium chloride 0.9 % infusion   Intravenous Continuous Trevor Hopkins MD 75 mL/hr at 10/06/24 2305 New Bag at 10/06/24 2305    traMADol (ULTRAM) tablet 50 mg  50 mg Oral Q8H PRN Trevor Hopkins MD         Current Outpatient Medications Ordered in Epic   Medication Sig Dispense Refill    acetaminophen (TYLENOL) 325 MG tablet Take 650 mg by mouth every 8 hours as needed for mild pain      aspirin (ASA) 81 MG EC tablet Take 81 mg by mouth every morning.      atorvastatin (LIPITOR) 40 MG tablet Take 1 tablet (40 mg) by mouth every morning 90 tablet 3    chlorhexidine (PERIDEX) 0.12 % solution Take 15 mLs by mouth daily as needed.      estradiol (ESTRACE) 0.1 MG/GM vaginal cream Place 0.5 g vaginally twice a week. Can use daily for 2 weeks and then twice a week after that. 42 g 1    levofloxacin (LEVAQUIN) 500 MG tablet Take 1 tablet (500 mg) by mouth daily for 5 days. 5 tablet 0    lidocaine-prilocaine (EMLA) 2.5-2.5 % external cream Apply topically as needed for other (Apply small amount to port site 30-60 minutes prior to port access to minimize discomfort) 30 g 2    lisinopril-hydrochlorothiazide (ZESTORETIC) 20-12.5 MG tablet Take 1 tablet by mouth daily 90 tablet 1    methylcellulose (CITRUCEL) 500 MG TABS tablet Take 500 mg by mouth every morning.      metoprolol tartrate (LOPRESSOR) 25 MG tablet Take 1 tablet (25 mg) by mouth 2 times daily 180 tablet 3    ondansetron (ZOFRAN) 8 MG tablet Take 1  tablet (8 mg) by mouth every 8 hours as needed for nausea (vomiting) 30 tablet 2    prochlorperazine (COMPAZINE) 10 MG tablet Take 1 tablet (10 mg) by mouth every 6 hours as needed for nausea or vomiting 30 tablet 2    traMADol (ULTRAM) 50 MG tablet Take 1 tablet (50 mg) by mouth every 8 hours as needed for moderate to severe pain 30 tablet 0    UNABLE TO FIND Take 1 capsule by mouth every morning. D Charlie 1 capsule daily for UTI prevention.      warfarin ANTICOAGULANT (JANTOVEN ANTICOAGULANT) 5 MG tablet 1/2 tab (2.5mg) on Mon, Wed, Fri and 1 tab (5mg) all other days 90 tablet 3     No Known Allergies

## 2024-10-07 NOTE — PLAN OF CARE
Goal Outcome Evaluation:      Plan of Care Reviewed With: patient, family          Outcome Evaluation: Discharge plans to be determined pending PT/OT recommendations and medical progression.

## 2024-10-07 NOTE — PLAN OF CARE
Problem: Adult Inpatient Plan of Care  Goal: Optimal Comfort and Wellbeing  Outcome: Progressing     Problem: Infection  Goal: Absence of Infection Signs and Symptoms  Outcome: Progressing     Problem: Syncope  Goal: Absence of Syncopal Symptoms  Outcome: Progressing   Goal Outcome Evaluation:  Patient and daughter updated on plan of care. Patient is still dizzy when she tries to get up to use the BSC. Up with assist of 1, gait belt and walker. Patient was unable to void for night shift they straight cathed x1. She voided for me x 2. Bladder scanned for 173ml and voided 100ml. Patient is not safe at home per family. Falling a lot and not telling anyone.Has skin tears and bruises all over her body. VSS. On enteric precautions to r/o c-diff. No stools this shift.

## 2024-10-07 NOTE — PROGRESS NOTES
Patient transferred to [facility]. Report given to floor RN. Daughter here and updated on plan of care. Patient has all belongings.

## 2024-10-07 NOTE — ED NOTES
Dr. Woodward at bedside suturing laceration. Will update radiology when ready. Charge nurse notified of cancer status. Will attempt to move patient to room.

## 2024-10-07 NOTE — MEDICATION SCRIBE - ADMISSION MEDICATION HISTORY
Medication Scribe Admission Medication History    Admission medication history is complete. The information provided in this note is only as accurate as the sources available at the time of the update.    Information Source(s): Patient and Family member via in-person and phone    Pertinent Information: Patient's medications are being co managed by patient and multiple daughters. Daughters are: Alec ([phone]), Sulma ([phone]), and aKcie. Spoke with Alec and Sulma     Warfarin: patient doses in the morning. Called Sulma and she was unaware of any changes and recited the instructions as included in PTA list. 10/2/24 INR clinic note was for change to the followin mg every Sun, Tue, Thu; 2.5 mg all other days. Note stated that this was related to daughter Kacie.     Changes made to PTA medication list:  Added: None  Deleted: None  Changed: None    Allergies reviewed with patient and updates made in EHR: yes    Medication History Completed By: Rodrigo De Los Santos 10/6/2024 8:53 PM    Current Facility-Administered Medications for the 10/6/24 encounter (Hospital Encounter)   Medication    sodium chloride (PF) 0.9% PF flush 10-20 mL    sodium chloride (PF) 0.9% PF flush 10-20 mL     PTA Med List   Medication Sig Note Last Dose    acetaminophen (TYLENOL) 325 MG tablet Take 650 mg by mouth every 8 hours as needed for mild pain  10/5/2024 at am    aspirin (ASA) 81 MG EC tablet Take 81 mg by mouth every morning.  10/5/2024 at am    atorvastatin (LIPITOR) 40 MG tablet Take 1 tablet (40 mg) by mouth every morning  10/5/2024 at am    chlorhexidine (PERIDEX) 0.12 % solution Take 15 mLs by mouth daily as needed.  More than a month at prn    estradiol (ESTRACE) 0.1 MG/GM vaginal cream Place 0.5 g vaginally twice a week. Can use daily for 2 weeks and then twice a week after that.  Past Week at unknown    levofloxacin (LEVAQUIN) 500 MG tablet Take 1 tablet (500 mg) by mouth daily for 5 days. 10/6/2024: Patient reports taking 4 out  of 5 doses so far.  10/5/2024 at am    lidocaine-prilocaine (EMLA) 2.5-2.5 % external cream Apply topically as needed for other (Apply small amount to port site 30-60 minutes prior to port access to minimize discomfort)  Past Month at prn    lisinopril-hydrochlorothiazide (ZESTORETIC) 20-12.5 MG tablet Take 1 tablet by mouth daily  10/5/2024 at am    methylcellulose (CITRUCEL) 500 MG TABS tablet Take 500 mg by mouth every morning.  10/5/2024 at am    metoprolol tartrate (LOPRESSOR) 25 MG tablet Take 1 tablet (25 mg) by mouth 2 times daily  10/5/2024 at pm    ondansetron (ZOFRAN) 8 MG tablet Take 1 tablet (8 mg) by mouth every 8 hours as needed for nausea (vomiting)  Unknown at prn    prochlorperazine (COMPAZINE) 10 MG tablet Take 1 tablet (10 mg) by mouth every 6 hours as needed for nausea or vomiting  Unknown at prn    traMADol (ULTRAM) 50 MG tablet Take 1 tablet (50 mg) by mouth every 8 hours as needed for moderate to severe pain  Unknown at prn    UNABLE TO FIND Take 1 capsule by mouth every morning. D Charlie 1 capsule daily for UTI prevention.  10/5/2024 at am    warfarin ANTICOAGULANT (JANTOVEN ANTICOAGULANT) 5 MG tablet 1/2 tab (2.5mg) on Mon, Wed, Fri and 1 tab (5mg) all other days 10/6/2024: Patient doses in the morning    Full warfarin instructions - as of 10/2/2024   5 mg every Sun, Tue, Thu; 2.5 mg all other days     10/5/2024 at am (5)

## 2024-10-07 NOTE — PROGRESS NOTES
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

## 2024-10-07 NOTE — CONSULTS
"Care Management Initial Consult    General Information  Assessment completed with: Patient, Other (daughter in law), patient and daughter in law Marta  Type of CM/SW Visit: Initial Assessment    Primary Care Provider verified and updated as needed: Yes   Readmission within the last 30 days: no previous admission in last 30 days      Reason for Consult: discharge planning  Advance Care Planning: Advance Care Planning Reviewed: no concerns identified          Communication Assessment  Patient's communication style: spoken language (English or Bilingual)             Cognitive  Cognitive/Neuro/Behavioral: WDL                      Living Environment:   People in home: alone     Current living Arrangements: town home (one level)      Able to return to prior arrangements: yes       Family/Social Support:  Care provided by: self  Provides care for: no one     Support system: Children          Description of Support System: Supportive, Involved    Support Assessment: Adequate family and caregiver support    Current Resources:   Patient receiving home care services: No        Community Resources: Housekeeping/Chore Agency, OP Infusion  Equipment currently used at home: walker, standard, grab bar, tub/shower (\"have walker but haven't been using it\")  Supplies currently used at home: None    Employment/Financial:  Employment Status: retired        Financial Concerns: none   Referral to Financial Worker: No       Does the patient's insurance plan have a 3 day qualifying hospital stay waiver?  Yes     Which insurance plan 3 day waiver is available? Alternative insurance waiver    Will the waiver be used for post-acute placement? Undetermined at this time    Lifestyle & Psychosocial Needs:  Social Determinants of Health     Food Insecurity: Low Risk  (5/24/2024)    Food Insecurity     Within the past 12 months, did you worry that your food would run out before you got money to buy more?: No     Within the past 12 months, did the " food you bought just not last and you didn t have money to get more?: No   Depression: Not at risk (5/24/2024)    PHQ-2     PHQ-2 Score: 0   Housing Stability: Low Risk  (5/24/2024)    Housing Stability     Do you have housing? : Yes     Are you worried about losing your housing?: No   Tobacco Use: Low Risk  (10/6/2024)    Patient History     Smoking Tobacco Use: Never     Smokeless Tobacco Use: Never     Passive Exposure: Not on file   Financial Resource Strain: Low Risk  (5/24/2024)    Financial Resource Strain     Within the past 12 months, have you or your family members you live with been unable to get utilities (heat, electricity) when it was really needed?: No   Alcohol Use: Not on file   Transportation Needs: Low Risk  (5/24/2024)    Transportation Needs     Within the past 12 months, has lack of transportation kept you from medical appointments, getting your medicines, non-medical meetings or appointments, work, or from getting things that you need?: No   Physical Activity: Unknown (5/24/2024)    Exercise Vital Sign     Days of Exercise per Week: 0 days     Minutes of Exercise per Session: Not on file   Interpersonal Safety: Not on file   Stress: No Stress Concern Present (5/24/2024)    [place] [employer] - Occupational Stress Questionnaire     Feeling of Stress : Only a little   Social Connections: Unknown (5/24/2024)    Social Connection and Isolation Panel [NHANES]     Frequency of Communication with Friends and Family: Not on file     Frequency of Social Gatherings with Friends and Family: Twice a week     Attends Pentec[employer]alism Services: Not on file     Active Member of Clubs or Organizations: Not on file     Attends Club or Organization Meetings: Not on file     Marital Status: Not on file   Health Literacy: Not on file       Functional Status:  Prior to admission patient needed assistance:   Dependent ADLs:: Independent  Dependent IADLs:: Cleaning, Laundry, Shopping, Medication  Management, Transportation       Mental Health Status:  Mental Health Status: No Current Concerns       Chemical Dependency Status:  Chemical Dependency Status: No Current Concerns             Values/Beliefs:  Spiritual, Cultural Beliefs, Mosque Practices, Values that affect care: no               Discussed  Partnership in Safe Discharge Planning  document with patient/family: No    Additional Information:  CM consult received due to elevated risk score.  Met with patient and daughter in law Marta at bedside to introduce CM role and perform initial assessment.  Kacie lives alone in a one level town house with no stairs to enter.  No in home services.  Patient states she has a walker at home that she has not been using but plans to start now.  Daughter in law reports they also have a wheelchair family uses if patient is needing to go longer distances or be out of home for long period of time.  Patient reports she is independent with ADLs.  Daughters Sulma and Katrina help with IADLs including medications and transportation.  Discussed possible need for home care services at discharge.  Patient reports she would not be interested.  Daughter in law states family may be interested and could discuss more with patient as she gets closer to discharge.  Family can transport.    Next Steps:  PT/OT recommendations pending.  CM to follow for medical progression of care, discharge recommendations, and final discharge plan.       Syd Gomez CRNI

## 2024-10-07 NOTE — H&P
[facility]    History and Physical - Hospitalist Service       Date of Admission:  10/6/2024    Assessment & Plan      84 year old female with a past medical history of aortic valve replacement on warfarin anticoagulation use, chronic renal failure stage III, cerebellar stroke, hypertension, hyperlipidemia, breast cancer metastatic to bone, who presents to the ED for evaluation of injuries following a fall.  Admitted with ALTAGRACIA, hyponatremia and hypokalemia, T12 fracture and had nonsustained V. tach run in the ER    Syncopal episode  - Etiology is unclear  - Concern for dehydration and hypotension versus seizure  - Patient also had a run of nonsustained V. tach in the ER  - Initial troponin slightly elevated  - Continue to monitor on telemetry   - Check echo and serial troponin  - Cardiology consult, appreciate input    Acute head trauma  - S/P fall  - CT head and neck is negative for acute changes.  - Will need repeat CT head in the morning as patient is anticoagulated with warfarin  -PT/OT evaluation when cleared by neurosurgery    Acute kidney injury  - Probably secondary dehydration  - Had episodes of diarrhea at home  - Hold home lisinopril/hydrochlorothiazide  - Start IV fluid hydration  - Monitor renal function    Marked compression of T12  - S/P fall   -CT thoracic spine shows Slight loss of height superior aspect of T4, probably chronic. Marked compression of T12, age indeterminate however there is suggestion of acute/subacute component. Associated retropulsion with mild canal narrowing.  -Neurosurgery consult, appreciate input  -Tylenol and tramadol for pain control    Hyponatremia and hypokalemia  -Probably secondary to dehydration  -Replace potassium per protocol  -Start IV fluid and monitor sodium level    Hypertension  - Blood pressure is on the low side  - Hold home lisinopril/HCTZ and metoprolol  - Check echo  - Continue IV fluid support    History of severe aortic stenosis  -  S/P bioprosthetic valve replacement  -Hold warfarin as INR is elevated  -Monitor INR  -Restart warfarin when cleared by neurosurgery    History of recent UTI  - Resume Levaquin, patient has only 1 dose left.  - Check UA    History of metastatic breast cancer  - S/P chemotherapy  - Follow-up with oncology as outpatient  - Question of seizure as per neurology note on 10/2/24   - Negative brain MRI on 10/3/2024 for acute intracranial pathology  - Check EEG          Diet: Regular Diet Adult    DVT Prophylaxis: Warfarin  Aragon Catheter: Not present  Lines: PRESENT             Cardiac Monitoring: ACTIVE order. Indication: Tachyarrhythmias, acute (48 hours)  Code Status: No CPR- Do NOT Intubate      Clinically Significant Risk Factors Present on Admission        # Hypokalemia: Lowest K = 3.2 mmol/L in last 2 days, will replace as needed  # Hyponatremia: Lowest Na = 127 mmol/L in last 2 days, will monitor as appropriate  # Hypocalcemia: Lowest Ca = 7.2 mg/dL in last 2 days, will monitor and replace as appropriate     # Hypoalbuminemia: Lowest albumin = 3.4 g/dL at 10/6/2024  6:06 PM, will monitor as appropriate  # Drug Induced Coagulation Defect: home medication list includes an anticoagulant medication  # Drug Induced Platelet Defect: home medication list includes an antiplatelet medication   # Hypertension: Noted on problem list    # Anemia: based on hgb <11                  Disposition Plan     Medically Ready for Discharge: Anticipated in 2-4 Days           Trevor Hopkins MD  Hospitalist Service  [facility]  Securely message with [employer] (more info)  Text page via [employer] Paging/Directory     ______________________________________________________________________    Chief Complaint   Fall with head trauma    History is obtained from the patient    History of Present Illness   Kacie Hermosillo is 84 year old female with a past medical history of anticoagulation use, severe aortic valve stenosis,  chronic renal failure stage III, cerebellar stroke, hypertension, hyperlipidemia, breast cancer metastatic to bone, who presents to this ED via EMS for evaluation of injuries following a fall.  Basically the patient shares that she lives independently at home.  This morning, she went to get out of bed and she fell onto a carpeted region.  She states that she remembers getting weak and falling.  She states that upon falling she was unable to get up herself, therefore she laid on the ground for quite some time until her family came and found her.  Family shared that patient did not call for help, but rather family was trying to contact her and was unable to get a hold of her so they went to find out what was wrong.  That is when they found her.  Family shares that patient was unable to walk after getting up from the fall.  Noted patient had nonsustained run of V. tach while in the emergency room.     Patient shares having right elbow pain, right shoulder pain, and a cut above her right eyebrow.  Noted that patient did just recently have a chemotherapy infusion last week.  They state that it was her third infusion.  They state that normally after her infusions her first 2 days are fine, but her third day following the treatment is bad.  Today is the third day following treatment.  Noted the patient is treated with Levaquin for recent UTI.      Past Medical History    Past Medical History:   Diagnosis Date    Breast cancer (H) 01/01/1999    Heart valve replaced     Hx antineoplastic chemotherapy 01/01/1999    Hx of radiation therapy 01/01/1999    Hypertension        Past Surgical History   Past Surgical History:   Procedure Laterality Date    BIOPSY BREAST      HYSTERECTOMY      IR CHEST PORT PLACEMENT > 5 YRS OF AGE  7/30/2024    LUMPECTOMY BREAST Left 1999    OOPHORECTOMY Bilateral        Prior to Admission Medications   Prior to Admission Medications   Prescriptions Last Dose Informant Patient Reported? Taking?    UNABLE TO FIND 10/5/2024 at am  Yes Yes   Sig: Take 1 capsule by mouth every morning. D Charlie 1 capsule daily for UTI prevention.   acetaminophen (TYLENOL) 325 MG tablet 10/5/2024 at am  Yes Yes   Sig: Take 650 mg by mouth every 8 hours as needed for mild pain   aspirin (ASA) 81 MG EC tablet 10/5/2024 at am  Yes Yes   Sig: Take 81 mg by mouth every morning.   atorvastatin (LIPITOR) 40 MG tablet 10/5/2024 at am  No Yes   Sig: Take 1 tablet (40 mg) by mouth every morning   chlorhexidine (PERIDEX) 0.12 % solution More than a month at prn  Yes Yes   Sig: Take 15 mLs by mouth daily as needed.   estradiol (ESTRACE) 0.1 MG/GM vaginal cream Past Week at unknown  No Yes   Sig: Place 0.5 g vaginally twice a week. Can use daily for 2 weeks and then twice a week after that.   levofloxacin (LEVAQUIN) 500 MG tablet 10/5/2024 at am  No Yes   Sig: Take 1 tablet (500 mg) by mouth daily for 5 days.   lidocaine-prilocaine (EMLA) 2.5-2.5 % external cream Past Month at prn  No Yes   Sig: Apply topically as needed for other (Apply small amount to port site 30-60 minutes prior to port access to minimize discomfort)   lisinopril-hydrochlorothiazide (ZESTORETIC) 20-12.5 MG tablet 10/5/2024 at am  No Yes   Sig: Take 1 tablet by mouth daily   methylcellulose (CITRUCEL) 500 MG TABS tablet 10/5/2024 at am  Yes Yes   Sig: Take 500 mg by mouth every morning.   metoprolol tartrate (LOPRESSOR) 25 MG tablet 10/5/2024 at pm  No Yes   Sig: Take 1 tablet (25 mg) by mouth 2 times daily   ondansetron (ZOFRAN) 8 MG tablet Unknown at prn  No Yes   Sig: Take 1 tablet (8 mg) by mouth every 8 hours as needed for nausea (vomiting)   prochlorperazine (COMPAZINE) 10 MG tablet Unknown at prn  No Yes   Sig: Take 1 tablet (10 mg) by mouth every 6 hours as needed for nausea or vomiting   traMADol (ULTRAM) 50 MG tablet Unknown at prn  No Yes   Sig: Take 1 tablet (50 mg) by mouth every 8 hours as needed for moderate to severe pain   warfarin ANTICOAGULANT (JANTOVEN  ANTICOAGULANT) 5 MG tablet 10/5/2024 at am (5)  No Yes   Si/2 tab (2.5mg) on Mon, Wed, Fri and 1 tab (5mg) all other days      Facility-Administered Medications Last Administration Doses Remaining   sodium chloride (PF) 0.9% PF flush 10-20 mL None recorded 2   sodium chloride (PF) 0.9% PF flush 10-20 mL 8/15/2024 11:29 AM 1           Review of Systems    The 10 point Review of Systems is negative other than noted in the HPI or here.     Social History   I have reviewed this patient's social history and updated it with pertinent information if needed.  Social History     Tobacco Use    Smoking status: Never    Smokeless tobacco: Never   Vaping Use    Vaping status: Never Used   Substance Use Topics    Alcohol use: Yes     Comment: Rarely         Family History   I have reviewed this patient's family history and updated it with pertinent information if needed.  Family History   Problem Relation Age of Onset    Breast Cancer Maternal Aunt     Hereditary Breast and Ovarian Cancer Syndrome No family hx of          Allergies   No Known Allergies     Physical Exam   Vital Signs: Temp: 97.9  F (36.6  C) Temp src: Temporal BP: 91/53 Pulse: 84   Resp: 14 SpO2: 96 % O2 Device: None (Room air)    Weight: 130 lbs 1.14 oz    General Appearance: Sleeping comfortably in bed with bruises over right forehead covered with dressing.  Respiratory: Decreased breath sounds on lung base with scattered rhonchi, no wheeze no rales.  Cardiovascular: Mechanical heart sound, soft systolic murmur in aortic area.  GI: Soft, normal bowel sounds, no tenderness.  Skin: Dry, warm, diffuse bruises at different stages on bilateral legs and hands.  Other: Patient is alert, awake oriented to place and person.  Moving 4 extremities.  No focal deficit.    Medical Decision Making       75 MINUTES SPENT BY ME on the date of service doing chart review, history, exam, documentation & further activities per the note.      Data     I have personally reviewed  the following data over the past 24 hrs:    14.4 (H)  \   10.7 (L)   / 332     127 (L) 94 (L) 36.1 (H) /  184 (H)   3.2 (L) 20 (L) 1.38 (H) \     ALT: 80 (H) AST: 50 (H) AP: 83 TBILI: 0.3   ALB: 3.4 (L) TOT PROTEIN: 5.6 (L) LIPASE: N/A     Trop: 32 (H) BNP: N/A     INR:  4.62 (H) PTT:  45 (H)   D-dimer:  1.21 (H) Fibrinogen:  N/A       Imaging results reviewed over the past 24 hrs:   Recent Results (from the past 24 hour(s))   Elbow XR, G/E 3 views, right    Narrative    EXAM: XR ELBOW RIGHT G/E 3 VIEWS  LOCATION: [facility]  DATE: 10/6/2024    INDICATION: Fall, elbow pain.  COMPARISON: None.      Impression    IMPRESSION: No acute displaced fracture identified. Joint spaces are maintained. There is no elbow joint effusion. Bones are demineralized.   Chest XR,  PA & LAT    Narrative    EXAM: XR CHEST 2 VIEWS  LOCATION: [facility]  DATE: 10/6/2024    INDICATION: syncope, elevated WBC  COMPARISON: None.      Impression    IMPRESSION: Heart size and pulmonary vessels normal. Previous TAVR. Calcified granuloma left base with lungs otherwise clear. No pleural effusion evident. Right-sided Port-A-Cath. Surgical clips left axilla. Compression of low thoracic vertebral body.     Head CT w/o contrast    Narrative    EXAM: CT HEAD W/O CONTRAST, CT CERVICAL SPINE W/O CONTRAST, CT THORACIC SPINE W/O CONTRAST, CT LUMBAR SPINE RECONSTRUCTED  LOCATION: [facility]  DATE: 10/6/2024    INDICATION: Head, neck, and back injury  COMPARISON: None.  TECHNIQUE:   1) Routine CT Head without IV contrast. Multiplanar reformats. Dose reduction techniques were used.   2) Routine CT Cervical Spine without IV contrast. Multiplanar reformats. Dose reduction techniques were used.   3) Routine CT Thoracic Spine without IV contrast. Multiplanar reformats. Dose reduction techniques were used.   4) Routine CT Lumbar Spine without IV contrast. Multiplanar reformats. Dose  reduction techniques were used.     FINDINGS:   HEAD CT:   INTRACRANIAL CONTENTS: No intracranial hemorrhage, extraaxial collection, or mass effect.  Small chronic infarction with mild chronic infarction left parietal occipital region. Small chronic infarctions cerebellar hemispheres bilaterally. Moderate   presumed chronic small vessel ischemic changes. Moderate generalized volume loss. No hydrocephalus.     VISUALIZED ORBITS/SINUSES/MASTOIDS: No intraorbital abnormality. No paranasal sinus mucosal disease. No middle ear or mastoid effusion.    BONES/SOFT TISSUES: No acute abnormality.    CERVICAL SPINE CT:  VERTEBRA: Normal vertebral body heights. Mild reversal of cervical lordosis. Slight anterior subluxation C2 on C3 and C7 on T1. No definite fracture.    CANAL/FORAMINA: Moderate degenerative changes with mild canal narrowing C4-5, moderate C5-6. Multilevel prominent neural foraminal narrowing.    PARASPINAL: No extraspinal abnormality. Visualized lung fields are clear.    THORACIC SPINE CT:  VERTEBRA: Slight loss of height superior aspect of T4, probably chronic. Marked compression of T12, age indeterminate however there is suggestion of acute/subacute component. Associated retropulsion with mild canal narrowing.  No definite other   fractures.     CANAL/FORAMINA: Multilevel mild neural foraminal narrowing.    PARASPINAL: No extraspinal abnormality.    LUMBAR SPINE CT:  VERTEBRA: Normal vertebral body heights and alignment. No fracture or posttraumatic subluxation.     CANAL/FORAMINA: Mild degenerative changes with mild canal narrowing at L3-4 and L4-5. Multilevel prominent neural foraminal narrowing. Slight posterior subluxation L2 on L3.    PARASPINAL: No extraspinal abnormality.    Innumerable sclerotic lesions throughout visualized bones; most consistent with metastases.      Impression    IMPRESSION:  HEAD CT:  1.  No acute intracranial process.  2.  Innumerable sclerotic lesions throughout visualized  bones of head, chest, spine, and pelvis; most consistent with metastases.    CERVICAL SPINE CT:  1.  No definite fracture.  2.  Degenerative changes, as described.    THORACIC SPINE CT:  1.  Slight loss of height superior aspect of T4, probably chronic.   2.  Marked compression of T12, age indeterminate however there is suggestion of acute/subacute component. Associated retropulsion with mild canal narrowing.   3.  Degenerative changes, as described.    LUMBAR SPINE CT:  1.  No definite fracture.  2.  Degenerative changes, as described.   CT Cervical Spine w/o Contrast    Narrative    EXAM: CT HEAD W/O CONTRAST, CT CERVICAL SPINE W/O CONTRAST, CT THORACIC SPINE W/O CONTRAST, CT LUMBAR SPINE RECONSTRUCTED  LOCATION: [facility]  DATE: 10/6/2024    INDICATION: Head, neck, and back injury  COMPARISON: None.  TECHNIQUE:   1) Routine CT Head without IV contrast. Multiplanar reformats. Dose reduction techniques were used.   2) Routine CT Cervical Spine without IV contrast. Multiplanar reformats. Dose reduction techniques were used.   3) Routine CT Thoracic Spine without IV contrast. Multiplanar reformats. Dose reduction techniques were used.   4) Routine CT Lumbar Spine without IV contrast. Multiplanar reformats. Dose reduction techniques were used.     FINDINGS:   HEAD CT:   INTRACRANIAL CONTENTS: No intracranial hemorrhage, extraaxial collection, or mass effect.  Small chronic infarction with mild chronic infarction left parietal occipital region. Small chronic infarctions cerebellar hemispheres bilaterally. Moderate   presumed chronic small vessel ischemic changes. Moderate generalized volume loss. No hydrocephalus.     VISUALIZED ORBITS/SINUSES/MASTOIDS: No intraorbital abnormality. No paranasal sinus mucosal disease. No middle ear or mastoid effusion.    BONES/SOFT TISSUES: No acute abnormality.    CERVICAL SPINE CT:  VERTEBRA: Normal vertebral body heights. Mild reversal of cervical lordosis.  Slight anterior subluxation C2 on C3 and C7 on T1. No definite fracture.    CANAL/FORAMINA: Moderate degenerative changes with mild canal narrowing C4-5, moderate C5-6. Multilevel prominent neural foraminal narrowing.    PARASPINAL: No extraspinal abnormality. Visualized lung fields are clear.    THORACIC SPINE CT:  VERTEBRA: Slight loss of height superior aspect of T4, probably chronic. Marked compression of T12, age indeterminate however there is suggestion of acute/subacute component. Associated retropulsion with mild canal narrowing.  No definite other   fractures.     CANAL/FORAMINA: Multilevel mild neural foraminal narrowing.    PARASPINAL: No extraspinal abnormality.    LUMBAR SPINE CT:  VERTEBRA: Normal vertebral body heights and alignment. No fracture or posttraumatic subluxation.     CANAL/FORAMINA: Mild degenerative changes with mild canal narrowing at L3-4 and L4-5. Multilevel prominent neural foraminal narrowing. Slight posterior subluxation L2 on L3.    PARASPINAL: No extraspinal abnormality.    Innumerable sclerotic lesions throughout visualized bones; most consistent with metastases.      Impression    IMPRESSION:  HEAD CT:  1.  No acute intracranial process.  2.  Innumerable sclerotic lesions throughout visualized bones of head, chest, spine, and pelvis; most consistent with metastases.    CERVICAL SPINE CT:  1.  No definite fracture.  2.  Degenerative changes, as described.    THORACIC SPINE CT:  1.  Slight loss of height superior aspect of T4, probably chronic.   2.  Marked compression of T12, age indeterminate however there is suggestion of acute/subacute component. Associated retropulsion with mild canal narrowing.   3.  Degenerative changes, as described.    LUMBAR SPINE CT:  1.  No definite fracture.  2.  Degenerative changes, as described.   CT Thoracic Spine w/o Contrast    Narrative    EXAM: CT HEAD W/O CONTRAST, CT CERVICAL SPINE W/O CONTRAST, CT THORACIC SPINE W/O CONTRAST, CT LUMBAR  SPINE RECONSTRUCTED  LOCATION: [facility]  DATE: 10/6/2024    INDICATION: Head, neck, and back injury  COMPARISON: None.  TECHNIQUE:   1) Routine CT Head without IV contrast. Multiplanar reformats. Dose reduction techniques were used.   2) Routine CT Cervical Spine without IV contrast. Multiplanar reformats. Dose reduction techniques were used.   3) Routine CT Thoracic Spine without IV contrast. Multiplanar reformats. Dose reduction techniques were used.   4) Routine CT Lumbar Spine without IV contrast. Multiplanar reformats. Dose reduction techniques were used.     FINDINGS:   HEAD CT:   INTRACRANIAL CONTENTS: No intracranial hemorrhage, extraaxial collection, or mass effect.  Small chronic infarction with mild chronic infarction left parietal occipital region. Small chronic infarctions cerebellar hemispheres bilaterally. Moderate   presumed chronic small vessel ischemic changes. Moderate generalized volume loss. No hydrocephalus.     VISUALIZED ORBITS/SINUSES/MASTOIDS: No intraorbital abnormality. No paranasal sinus mucosal disease. No middle ear or mastoid effusion.    BONES/SOFT TISSUES: No acute abnormality.    CERVICAL SPINE CT:  VERTEBRA: Normal vertebral body heights. Mild reversal of cervical lordosis. Slight anterior subluxation C2 on C3 and C7 on T1. No definite fracture.    CANAL/FORAMINA: Moderate degenerative changes with mild canal narrowing C4-5, moderate C5-6. Multilevel prominent neural foraminal narrowing.    PARASPINAL: No extraspinal abnormality. Visualized lung fields are clear.    THORACIC SPINE CT:  VERTEBRA: Slight loss of height superior aspect of T4, probably chronic. Marked compression of T12, age indeterminate however there is suggestion of acute/subacute component. Associated retropulsion with mild canal narrowing.  No definite other   fractures.     CANAL/FORAMINA: Multilevel mild neural foraminal narrowing.    PARASPINAL: No extraspinal abnormality.    LUMBAR  SPINE CT:  VERTEBRA: Normal vertebral body heights and alignment. No fracture or posttraumatic subluxation.     CANAL/FORAMINA: Mild degenerative changes with mild canal narrowing at L3-4 and L4-5. Multilevel prominent neural foraminal narrowing. Slight posterior subluxation L2 on L3.    PARASPINAL: No extraspinal abnormality.    Innumerable sclerotic lesions throughout visualized bones; most consistent with metastases.      Impression    IMPRESSION:  HEAD CT:  1.  No acute intracranial process.  2.  Innumerable sclerotic lesions throughout visualized bones of head, chest, spine, and pelvis; most consistent with metastases.    CERVICAL SPINE CT:  1.  No definite fracture.  2.  Degenerative changes, as described.    THORACIC SPINE CT:  1.  Slight loss of height superior aspect of T4, probably chronic.   2.  Marked compression of T12, age indeterminate however there is suggestion of acute/subacute component. Associated retropulsion with mild canal narrowing.   3.  Degenerative changes, as described.    LUMBAR SPINE CT:  1.  No definite fracture.  2.  Degenerative changes, as described.   CT Abdomen Pelvis w Contrast    Narrative    EXAM: CT ABDOMEN PELVIS W CONTRAST  LOCATION: [facility]  DATE: 10/6/2024    INDICATION: fall, elevated INR. Breast cancer.  COMPARISON: 6/25/2024 PET scan.  TECHNIQUE: CT scan of the abdomen and pelvis was performed following injection of IV contrast. Multiplanar reformats were obtained. Dose reduction techniques were used.  CONTRAST: 65ml [id number]    FINDINGS:   LOWER CHEST: Normal.    HEPATOBILIARY: Gallbladder removed. Prominent bile ducts similar to previous consistent with reservoir effect.    PANCREAS: Normal.    SPLEEN: Normal.    ADRENAL GLANDS: Normal.    KIDNEYS/BLADDER: Normal.    BOWEL: Scattered diverticula but nothing for acute diverticulitis. No hematomas or hemorrhage.    LYMPH NODES: No lymphadenopathy.    VASCULATURE: Normal.    PELVIC ORGANS:  Postop change.    MUSCULOSKELETAL: Diffuse tiny sclerotic skeletal metastases throughout the visualized skeleton similar to previous. Stable T12 advanced compression fracture.      Impression    IMPRESSION:   1.  Nothing for bleeds in the abdomen or pelvis.    2.  Diffuse skeletal metastases similar to previous.   CT Lumbar Spine Reconstructed    Narrative    EXAM: CT HEAD W/O CONTRAST, CT CERVICAL SPINE W/O CONTRAST, CT THORACIC SPINE W/O CONTRAST, CT LUMBAR SPINE RECONSTRUCTED  LOCATION: [facility]  DATE: 10/6/2024    INDICATION: Head, neck, and back injury  COMPARISON: None.  TECHNIQUE:   1) Routine CT Head without IV contrast. Multiplanar reformats. Dose reduction techniques were used.   2) Routine CT Cervical Spine without IV contrast. Multiplanar reformats. Dose reduction techniques were used.   3) Routine CT Thoracic Spine without IV contrast. Multiplanar reformats. Dose reduction techniques were used.   4) Routine CT Lumbar Spine without IV contrast. Multiplanar reformats. Dose reduction techniques were used.     FINDINGS:   HEAD CT:   INTRACRANIAL CONTENTS: No intracranial hemorrhage, extraaxial collection, or mass effect.  Small chronic infarction with mild chronic infarction left parietal occipital region. Small chronic infarctions cerebellar hemispheres bilaterally. Moderate   presumed chronic small vessel ischemic changes. Moderate generalized volume loss. No hydrocephalus.     VISUALIZED ORBITS/SINUSES/MASTOIDS: No intraorbital abnormality. No paranasal sinus mucosal disease. No middle ear or mastoid effusion.    BONES/SOFT TISSUES: No acute abnormality.    CERVICAL SPINE CT:  VERTEBRA: Normal vertebral body heights. Mild reversal of cervical lordosis. Slight anterior subluxation C2 on C3 and C7 on T1. No definite fracture.    CANAL/FORAMINA: Moderate degenerative changes with mild canal narrowing C4-5, moderate C5-6. Multilevel prominent neural foraminal  narrowing.    PARASPINAL: No extraspinal abnormality. Visualized lung fields are clear.    THORACIC SPINE CT:  VERTEBRA: Slight loss of height superior aspect of T4, probably chronic. Marked compression of T12, age indeterminate however there is suggestion of acute/subacute component. Associated retropulsion with mild canal narrowing.  No definite other   fractures.     CANAL/FORAMINA: Multilevel mild neural foraminal narrowing.    PARASPINAL: No extraspinal abnormality.    LUMBAR SPINE CT:  VERTEBRA: Normal vertebral body heights and alignment. No fracture or posttraumatic subluxation.     CANAL/FORAMINA: Mild degenerative changes with mild canal narrowing at L3-4 and L4-5. Multilevel prominent neural foraminal narrowing. Slight posterior subluxation L2 on L3.    PARASPINAL: No extraspinal abnormality.    Innumerable sclerotic lesions throughout visualized bones; most consistent with metastases.      Impression    IMPRESSION:  HEAD CT:  1.  No acute intracranial process.  2.  Innumerable sclerotic lesions throughout visualized bones of head, chest, spine, and pelvis; most consistent with metastases.    CERVICAL SPINE CT:  1.  No definite fracture.  2.  Degenerative changes, as described.    THORACIC SPINE CT:  1.  Slight loss of height superior aspect of T4, probably chronic.   2.  Marked compression of T12, age indeterminate however there is suggestion of acute/subacute component. Associated retropulsion with mild canal narrowing.   3.  Degenerative changes, as described.    LUMBAR SPINE CT:  1.  No definite fracture.  2.  Degenerative changes, as described.

## 2024-10-08 ENCOUNTER — APPOINTMENT (OUTPATIENT)
Dept: OCCUPATIONAL THERAPY | Facility: HOSPITAL | Age: 84
DRG: 312 | End: 2024-10-08
Payer: COMMERCIAL

## 2024-10-08 PROBLEM — E83.51 HYPOCALCEMIA: Status: ACTIVE | Noted: 2024-10-08

## 2024-10-08 PROBLEM — S22.080S T12 COMPRESSION FRACTURE, SEQUELA: Status: ACTIVE | Noted: 2024-10-06

## 2024-10-08 PROBLEM — I44.7 LBBB (LEFT BUNDLE BRANCH BLOCK): Status: ACTIVE | Noted: 2024-10-08

## 2024-10-08 PROBLEM — E83.39 HYPOPHOSPHATEMIA: Status: ACTIVE | Noted: 2024-10-08

## 2024-10-08 LAB
ANION GAP SERPL CALCULATED.3IONS-SCNC: 7 MMOL/L (ref 7–15)
BUN SERPL-MCNC: 19.8 MG/DL (ref 8–23)
CA-I BLD-MCNC: 4.2 MG/DL (ref 4.4–5.2)
CALCIUM SERPL-MCNC: 6.9 MG/DL (ref 8.8–10.4)
CHLORIDE SERPL-SCNC: 107 MMOL/L (ref 98–107)
CREAT SERPL-MCNC: 0.96 MG/DL (ref 0.51–0.95)
EGFRCR SERPLBLD CKD-EPI 2021: 58 ML/MIN/1.73M2
ERYTHROCYTE [DISTWIDTH] IN BLOOD BY AUTOMATED COUNT: 15.3 % (ref 10–15)
GLUCOSE SERPL-MCNC: 101 MG/DL (ref 70–99)
HCO3 SERPL-SCNC: 20 MMOL/L (ref 22–29)
HCT VFR BLD AUTO: 24.5 % (ref 35–47)
HGB BLD-MCNC: 8.6 G/DL (ref 11.7–15.7)
INR PPP: 2.61 (ref 0.85–1.15)
MCH RBC QN AUTO: 30.6 PG (ref 26.5–33)
MCHC RBC AUTO-ENTMCNC: 35.1 G/DL (ref 31.5–36.5)
MCV RBC AUTO: 87 FL (ref 78–100)
PHOSPHATE SERPL-MCNC: 1.9 MG/DL (ref 2.5–4.5)
PLATELET # BLD AUTO: 227 10E3/UL (ref 150–450)
POTASSIUM SERPL-SCNC: 4.1 MMOL/L (ref 3.4–5.3)
RBC # BLD AUTO: 2.81 10E6/UL (ref 3.8–5.2)
SODIUM SERPL-SCNC: 134 MMOL/L (ref 135–145)
TSH SERPL DL<=0.005 MIU/L-ACNC: 2.39 UIU/ML (ref 0.3–4.2)
WBC # BLD AUTO: 7.7 10E3/UL (ref 4–11)

## 2024-10-08 PROCEDURE — 84100 ASSAY OF PHOSPHORUS: CPT | Performed by: HOSPITALIST

## 2024-10-08 PROCEDURE — 120N000004 HC R&B MS OVERFLOW

## 2024-10-08 PROCEDURE — 84443 ASSAY THYROID STIM HORMONE: CPT | Performed by: HOSPITALIST

## 2024-10-08 PROCEDURE — 97535 SELF CARE MNGMENT TRAINING: CPT | Mod: GO

## 2024-10-08 PROCEDURE — 99232 SBSQ HOSP IP/OBS MODERATE 35: CPT | Performed by: INTERNAL MEDICINE

## 2024-10-08 PROCEDURE — 82533 TOTAL CORTISOL: CPT | Performed by: HOSPITALIST

## 2024-10-08 PROCEDURE — 85027 COMPLETE CBC AUTOMATED: CPT | Performed by: HOSPITALIST

## 2024-10-08 PROCEDURE — 99232 SBSQ HOSP IP/OBS MODERATE 35: CPT | Performed by: HOSPITALIST

## 2024-10-08 PROCEDURE — 250N000013 HC RX MED GY IP 250 OP 250 PS 637: Performed by: HOSPITALIST

## 2024-10-08 PROCEDURE — 80048 BASIC METABOLIC PNL TOTAL CA: CPT | Performed by: HOSPITALIST

## 2024-10-08 PROCEDURE — 258N000003 HC RX IP 258 OP 636: Performed by: INTERNAL MEDICINE

## 2024-10-08 PROCEDURE — 82330 ASSAY OF CALCIUM: CPT | Performed by: HOSPITALIST

## 2024-10-08 PROCEDURE — 95816 EEG AWAKE AND DROWSY: CPT | Mod: 26 | Performed by: PSYCHIATRY & NEUROLOGY

## 2024-10-08 PROCEDURE — 97129 THER IVNTJ 1ST 15 MIN: CPT | Mod: GO

## 2024-10-08 PROCEDURE — 85610 PROTHROMBIN TIME: CPT | Performed by: HOSPITALIST

## 2024-10-08 PROCEDURE — 250N000013 HC RX MED GY IP 250 OP 250 PS 637: Performed by: INTERNAL MEDICINE

## 2024-10-08 RX ORDER — IBUPROFEN 200 MG
1900 CAPSULE ORAL 2 TIMES DAILY
Status: DISCONTINUED | OUTPATIENT
Start: 2024-10-08 | End: 2024-10-09 | Stop reason: HOSPADM

## 2024-10-08 RX ORDER — WARFARIN SODIUM 5 MG/1
5 TABLET ORAL
Status: COMPLETED | OUTPATIENT
Start: 2024-10-08 | End: 2024-10-08

## 2024-10-08 RX ORDER — METOPROLOL TARTRATE 25 MG/1
25 TABLET, FILM COATED ORAL 2 TIMES DAILY
Status: DISCONTINUED | OUTPATIENT
Start: 2024-10-08 | End: 2024-10-09

## 2024-10-08 RX ADMIN — ACETAMINOPHEN 975 MG: 325 TABLET ORAL at 08:34

## 2024-10-08 RX ADMIN — Medication 1900 MG: at 20:35

## 2024-10-08 RX ADMIN — ACETAMINOPHEN 975 MG: 325 TABLET ORAL at 13:21

## 2024-10-08 RX ADMIN — SODIUM CHLORIDE 75 ML/HR: 9 INJECTION, SOLUTION INTRAVENOUS at 16:33

## 2024-10-08 RX ADMIN — ASPIRIN 81 MG: 81 TABLET, COATED ORAL at 08:34

## 2024-10-08 RX ADMIN — SODIUM CHLORIDE: 9 INJECTION, SOLUTION INTRAVENOUS at 03:20

## 2024-10-08 RX ADMIN — CHOLECALCIFEROL (VITAMIN D3) 10 MCG (400 UNIT) TABLET 10 MCG: at 13:23

## 2024-10-08 RX ADMIN — POTASSIUM & SODIUM PHOSPHATES POWDER PACK 280-160-250 MG 2 PACKET: 280-160-250 PACK at 14:23

## 2024-10-08 RX ADMIN — METHYLCELLULOSE 500 MG: 500 TABLET ORAL at 08:34

## 2024-10-08 RX ADMIN — ATORVASTATIN CALCIUM 40 MG: 40 TABLET, FILM COATED ORAL at 08:34

## 2024-10-08 RX ADMIN — Medication 1900 MG: at 13:23

## 2024-10-08 RX ADMIN — POTASSIUM & SODIUM PHOSPHATES POWDER PACK 280-160-250 MG 2 PACKET: 280-160-250 PACK at 17:00

## 2024-10-08 RX ADMIN — WARFARIN SODIUM 5 MG: 5 TABLET ORAL at 19:06

## 2024-10-08 RX ADMIN — POTASSIUM & SODIUM PHOSPHATES POWDER PACK 280-160-250 MG 2 PACKET: 280-160-250 PACK at 20:35

## 2024-10-08 RX ADMIN — ACETAMINOPHEN 975 MG: 325 TABLET ORAL at 20:35

## 2024-10-08 RX ADMIN — METOPROLOL TARTRATE 25 MG: 25 TABLET, FILM COATED ORAL at 20:36

## 2024-10-08 RX ADMIN — CHOLECALCIFEROL (VITAMIN D3) 10 MCG (400 UNIT) TABLET 10 MCG: at 20:35

## 2024-10-08 RX ADMIN — METOPROLOL TARTRATE 25 MG: 25 TABLET, FILM COATED ORAL at 13:22

## 2024-10-08 ASSESSMENT — ACTIVITIES OF DAILY LIVING (ADL)
DOING_ERRANDS_INDEPENDENTLY_DIFFICULTY: YES
ADLS_ACUITY_SCORE: 38
ADLS_ACUITY_SCORE: 34
ADLS_ACUITY_SCORE: 34
ADLS_ACUITY_SCORE: 36
ADLS_ACUITY_SCORE: 38
ADLS_ACUITY_SCORE: 36
FALL_HISTORY_WITHIN_LAST_SIX_MONTHS: YES
ADLS_ACUITY_SCORE: 36
ADLS_ACUITY_SCORE: 36
WEAR_GLASSES_OR_BLIND: YES
CHANGE_IN_FUNCTIONAL_STATUS_SINCE_ONSET_OF_CURRENT_ILLNESS/INJURY: YES
ADLS_ACUITY_SCORE: 36
ADLS_ACUITY_SCORE: 38
WALKING_OR_CLIMBING_STAIRS: STAIR CLIMBING DIFFICULTY, ASSISTANCE 1 PERSON;TRANSFERRING DIFFICULTY, ASSISTANCE 1 PERSON;AMBULATION DIFFICULTY, ASSISTANCE 1 PERSON
ADLS_ACUITY_SCORE: 38
ADLS_ACUITY_SCORE: 38
TOILETING_ISSUES: NO
ADLS_ACUITY_SCORE: 36
ADLS_ACUITY_SCORE: 38
ADLS_ACUITY_SCORE: 36
CONCENTRATING,_REMEMBERING_OR_MAKING_DECISIONS_DIFFICULTY: YES
ADLS_ACUITY_SCORE: 36
ADLS_ACUITY_SCORE: 38
ADLS_ACUITY_SCORE: 38
VISION_MANAGEMENT: GLASSES
WALKING_OR_CLIMBING_STAIRS_DIFFICULTY: YES
ADLS_ACUITY_SCORE: 36
DIFFICULTY_EATING/SWALLOWING: NO
ADLS_ACUITY_SCORE: 36
ADLS_ACUITY_SCORE: 36
DIFFICULTY_COMMUNICATING: NO
DRESSING/BATHING_DIFFICULTY: NO
HEARING_DIFFICULTY_OR_DEAF: NO
ADLS_ACUITY_SCORE: 36
ADLS_ACUITY_SCORE: 36
NUMBER_OF_TIMES_PATIENT_HAS_FALLEN_WITHIN_LAST_SIX_MONTHS: 1

## 2024-10-08 NOTE — PLAN OF CARE
Pt ox 3-4 with forgetfulness.  Pt refused flu shot because she gets it at her clinic. When daughter in law  Alec came in and they discussed pt getting flu shot because pt will be going to a TCU. Pt is in agreement now. Ordered Flu shot (fluzone)

## 2024-10-08 NOTE — PLAN OF CARE
[id number]    Pt denies pain. NO dizziness or syncope today.  B/p 105/58.  Restarted metoprolol  checked b/p before giving 97/53 hr 89. Held. Notified Dr. Sprague. Parameter placed on metoprolol. Working with PT/OT.  Encouraging pt to drink fluid.  Phos replacement.  Continue NS @75cc/hr. B/p 90/53   Problem: Adult Inpatient Plan of Care  Goal: Plan of Care Review  Description: The Plan of Care Review/Shift note should be completed every shift.  The Outcome Evaluation is a brief statement about your assessment that the patient is improving, declining, or no change.  This information will be displayed automatically on your shift  note.  10/8/2024 1449 by Verena Crawford RN  Outcome: Progressing  10/8/2024 1449 by Verena Crawford RN  Outcome: Progressing  Goal: Absence of Hospital-Acquired Illness or Injury  Intervention: Identify and Manage Fall Risk  Recent Flowsheet Documentation  Taken 10/8/2024 1300 by Verena Crawford RN  Safety Promotion/Fall Prevention:   activity supervised   room door open   room near nurse's station   supervised activity   safety round/check completed   clutter free environment maintained   lighting adjusted  Taken 10/8/2024 0900 by Verena Crawford RN  Safety Promotion/Fall Prevention:   activity supervised   room door open   room near nurse's station   supervised activity   safety round/check completed   clutter free environment maintained   lighting adjusted  Intervention: Prevent Skin Injury  Recent Flowsheet Documentation  Taken 10/8/2024 1300 by Verena Crawford RN  Body Position: weight shifting  Goal: Readiness for Transition of Care  Intervention: Mutually Develop Transition Plan  Recent Flowsheet Documentation  Taken 10/8/2024 0800 by Verena Crawford RN  Equipment Currently Used at Home:   walker, standard   grab bar, toilet   grab bar, tub/shower   tub bench   wheelchair, manual     Problem: Syncope  Goal: Absence of Syncopal Symptoms  Outcome: Progressing  Intervention: Manage Effect of  Syncopal Symptoms  Recent Flowsheet Documentation  Taken 10/8/2024 1300 by Verena Crawford, RN  Safety Promotion/Fall Prevention:   activity supervised   room door open   room near nurse's station   supervised activity   safety round/check completed   clutter free environment maintained   lighting adjusted  Taken 10/8/2024 0900 by Verena Crawford, RN  Safety Promotion/Fall Prevention:   activity supervised   room door open   room near nurse's station   supervised activity   safety round/check completed   clutter free environment maintained   lighting adjusted   Goal Outcome Evaluation:

## 2024-10-08 NOTE — PROGRESS NOTES
"    Cardiology Progress Note    Assessment:  Syncope likely related to orthostatic hypotension, no significant arrhythmias or bioprosthetic valve dysfunction  PSVT, chronic, brief asymptomatic episodes  Chronic left bundle branch block  History of severe aortic stenosis status post TAVR in 2021 with chronic anticoagulation with warfarin for subclinical thrombosis, stable echo  Metastatic breast  cancer  History of hypertension  Chronic kidney disease    Plan:  Continue current cardiac medications  Discontinue combination of lisinopril hydrochlorothiazide due to symptomatic hypotension  Monitor blood pressure and further adjust if becomes significantly elevated    Cardiology will sign off  Follow-up with cardiology at [facility]:   Denies any chest complaints today.    Objective:   /58 (BP Location: Left arm)   Pulse 114   Temp 98.2  F (36.8  C) (Oral)   Resp 18   Ht 1.6 m (5' 3\")   Wt 60.6 kg (133 lb 9.6 oz)   SpO2 99%   BMI 23.67 kg/m      Intake/Output Summary (Last 24 hours) at 10/8/2024 1234  Last data filed at 10/8/2024 1030  Gross per 24 hour   Intake 2365 ml   Output 650 ml   Net 1715 ml         Physical Exam:  GENERAL: no distress  NECK: No JVD  LUNGS: Clear to auscultation.  CARDIAC: regular  rhythm, S1 & S2 normal.  No heaves, thrills, gallops 2-6 systolic ejection murmur at the aortic area  ABDOMEN: flat, negative hepatosplenomegaly, soft and non-tender.  EXTREMITIES: No evidence of cyanosis, clubbing or edema.    Current Facility-Administered Medications Ordered in Epic   Medication Dose Route Frequency Provider Last Rate Last Admin    acetaminophen (TYLENOL) tablet 975 mg  975 mg Oral TID Trevor Hopkins MD   975 mg at 10/08/24 0834    aspirin EC tablet 81 mg  81 mg Oral QAM Trevor Hopkins MD   81 mg at 10/08/24 0834    atorvastatin (LIPITOR) tablet 40 mg  40 mg Oral Trevor Liu MD   40 mg at 10/08/24 0834    calcium carbonate (TUMS) chewable tablet 1,000 mg  1,000 mg Oral " 4x Daily PRN Trevor Hopkins MD        calcium citrate (CITRACAL) tablet 1,900 mg  1,900 mg Oral BID Anthony Sprague MD        And    cholecalciferol (VITAMIN D3) tablet 10 mcg  10 mcg Oral BID Anthony Sprague MD        heparin lock flush 100 unit/mL injection 5 mL  5 mL Intracatheter Q30 Days Neymar Gann MD   5 mL at 10/03/24 1031    [START ON 10/9/2024] influenza trivalent vaccine high-dose for ages 65 years and greater (FLUZONE-HD) injection 0.5 mL  0.5 mL Intramuscular Prior to discharge Anthony Sprague MD        lidocaine (LMX4) cream   Topical Q1H PRN Trevor Hopkins MD   Given at 10/07/24 0516    lidocaine 1 % 0.1-1 mL  0.1-1 mL Other Q1H PRN Trevor Hopkins MD        [Held by provider] lisinopril-hydrochlorothiazide (ZESTORETIC) 20-12.5 MG per tablet 1 tablet  1 tablet Oral Daily Trevor Hopkins MD        melatonin tablet 1 mg  1 mg Oral At Bedtime PRN Trevor Hopkins MD        methylcellulose (CITRUCEL) tablet 500 mg  500 mg Oral QAM Trevor Hopkins MD   500 mg at 10/08/24 0834    metoprolol tartrate (LOPRESSOR) tablet 25 mg  25 mg Oral BID Anthony Sprague MD        naloxone (NARCAN) injection 0.2 mg  0.2 mg Intravenous Q2 Min PRN Trevor Hopkins MD        Or    naloxone (NARCAN) injection 0.4 mg  0.4 mg Intravenous Q2 Min PRN Trevor Hopkins MD        Or    naloxone (NARCAN) injection 0.2 mg  0.2 mg Intramuscular Q2 Min PRN Trevor Hopkins MD        Or    naloxone (NARCAN) injection 0.4 mg  0.4 mg Intramuscular Q2 Min PRN Trevor Hopkins MD        Patient is already receiving mechanical prophylaxis   Does not apply Continuous PRN Trevor Hopkins MD        sodium chloride (PF) 0.9% PF flush 3 mL  3 mL Intracatheter Q8H Carlyle Woodward MD   3 mL at 10/07/24 2357    sodium chloride (PF) 0.9% PF flush 3 mL  3 mL Intracatheter q1 min prn Carlyle Woodward MD        sodium chloride (PF) 0.9% PF flush 3 mL  3 mL Intracatheter Q8H Trevor Hopkins MD   3 mL at 10/08/24 0834    sodium chloride (PF)  "0.9% PF flush 3 mL  3 mL Intracatheter q1 min prn Trevor Hopkins MD        sodium chloride 0.9 % infusion   Intravenous Continuous Trevor Hopkins MD 75 mL/hr at 10/08/24 0320 New Bag at 10/08/24 0320    traMADol (ULTRAM) tablet 50 mg  50 mg Oral Q8H PRN Trevor Hopkins MD        Warfarin Dose Required Daily - Pharmacist Managed  1 each Oral See Admin Instructions Anthony Sprague MD         No current [facility]-ordered outpatient medications on file.       Cardiographics:    Telemetry: Normal sinus rhythm no significant arrhythmias    Echocardiogram:   1. Left ventricular chamber size and wall thickness are normal. Systolic  function is hyperdynamic. The visually estimated left ventricular ejection  fraction is greater than 70%.  2. Right ventricular chamber size and systolic function are normal.  3. A well-seated 23 mm Wray Cecilia 3 bioprosthetic aortic valve is present.  Peak forward velocity is elevated at 3.5 m/s and mean outflow gradient  elevated at 25 mmHg. Dimensionless of index of 0.30 and acceleration time of  80 ms are both normal which may suggest that the increased peak velocity and  mean outflow gradient are due to a high flow state. No significant prosthetic  regurgitation is seen.  4. Compared to the prior study dated 8/16/2024, there has been an increase in  heart rate and left ventricular ejection fraction with a corresponding  increase in prosthetic aortic valve outflow gradient.     Lab Results    Chemistry/lipid CBC Cardiac Enzymes/BNP/TSH/INR   Recent Labs   Lab Test 05/24/24  1043   CHOL 126   HDL 62   LDL 51   TRIG 64     Recent Labs   Lab Test 05/24/24  1043 05/04/23  1338   LDL 51 54     Recent Labs   Lab Test 10/08/24  0445   *   POTASSIUM 4.1   CHLORIDE 107   CO2 20*   *   BUN 19.8   CR 0.96*   GFRESTIMATED 58*   MIGUEL 6.9*     Recent Labs   Lab Test 10/08/24  0445 10/07/24  0518 10/06/24  1806   CR 0.96* 1.29* 1.38*     No results for input(s): \"A1C\" in the last 72000 " "hours.       Recent Labs   Lab Test 10/08/24  0445   WBC 7.7   HGB 8.6*   HCT 24.5*   MCV 87        Recent Labs   Lab Test 10/08/24  0445 10/07/24  0518 10/06/24  1806   HGB 8.6* 9.3* 10.7*    No results for input(s): \"TROPONINI\" in the last 56576 hours.  Recent Labs   Lab Test 10/07/24  0518   NTBNPI 495     No results for input(s): \"TSH\" in the last 25972 hours.  Recent Labs   Lab Test 10/08/24  0445 10/07/24  0517 10/06/24  1806   INR 2.61* 4.14* 4.62*                   "

## 2024-10-08 NOTE — PROGRESS NOTES
[facility]    Medicine Progress Note - Hospitalist Service    Date of Admission:  10/6/2024    Assessment & Plan    Principal Problem:    Syncope  Active Problems:    Chronic renal failure, stage 3 (moderate) (H)    Essential hypertension    S/P TAVR (transcatheter aortic valve replacement)    Paroxysmal supraventricular tachycardia (H)    Malignant neoplasm metastatic to bone (H)    Hyponatremia    Elevated INR    T12 compression fracture, sequela    Fall, initial encounter    Laceration of forehead, initial encounter    DNR (do not resuscitate)    Hypocalcemia    Hypophosphatemia    LBBB (left bundle branch block)      84 year old female with a past medical history of aortic valve replacement on warfarin anticoagulation use, chronic renal failure stage III, cerebellar stroke, hypertension, hyperlipidemia, breast cancer metastatic to bone, who presents to the ED for evaluation of injuries following a fall.  Admitted with ALTAGRACIA, hyponatremia and hypokalemia, T12 fracture and had nonsustained V. tach run in the ER    10/7/2024-patient family at the bedside, does endorse recent diarrhea and after chemo, her electrolytes are off so that could be contributing to syncope event, there was also NSVT and other differential is neurological.  Patient EEG) plan.  She is being hydrated for correction of hyponatremia and electrolytes.  She may need PT OT as well for some deconditioning component from her chemo and metastatic disease.    - competed recent Levaquin,  Discussed with the patient at bedside confirmed to be DNR.    Will continue supportive care send appreciate cardiology follow-up echo pending.  No significant further telemetry events after electrolyte repletion.  She does appear to have a murmur on exam.  She just had an echo 2 months ago that showed preserved ejection fraction with some lateral wall hypokinesis and bioprosthetic aortic valve.  Supratherapeutic INR but no active bleed-had a  full trauma workup, CT head benign there was T12 questionable acute compression fracture, denies active pain there, will continue Coumadin management with pharmacy team.    10/8/2024-patient denies any active diarrhea or loose stools and likely can come off isolation.  She has been adequately hydrated-is up to 134 , continue today for soft BP.  mL saline and then discontinue, ALTAGRACIA has resolved, she does have hypocalcemia and will give p.o. calcium vitamin D- that would help so the osteoporosis as well on her long-term.  She also has low phosphorous of replete orally.  Her echo was not significantly changed and likely etiology for this is dehydration and deconditioning, the laceration forehead site looks fairly good.  - Cardiology consulted, appreciate input, echo not significantly changed.  Felt like more of PSVT, NSVT was ruled out. LBBB is chronic, Will continue telemetry while in the hospital, INR therapeutic today will resume warfarin  Discussed importance of hydration with the patient, neurosurgery team felt like T12 fracture is not acute is chronic, will continue bone health and PT OT.  - Hold home lisinopril/hydrochlorothiazide  - ALTAGRACIA resolved with IV fluid hydration  Calcium and phosphorous to be repleted  Plan for TCU likely, possibly stable as early as tomorrow  Discontinue combination of lisinopril hydrochlorothiazide due to symptomatic hypotension, holding parameters placed on metoprolol  Monitor blood pressure and further adjust if becomes significantly elevated  PRN midodrine if SBP<100, check TSH/cortisol       Syncopal episode  - Etiology is unclear  - Concern for dehydration and hypotension versus seizure  - Patient also had a run of nonsustained V. tach in the ER  - Initial troponin slightly elevated  - Continue to monitor on telemetry   - Cardiology consulted, appreciate input, echo not significantly changed    Acute head trauma  - S/P fall  - CT head and neck is negative for acute changes.  - Will  need repeat CT head in the morning as patient is anticoagulated with warfarin  -PT/OT evaluation when cleared by neurosurgery    Acute kidney injury  - Probably secondary dehydration  - Had episodes of diarrhea at home  - Hold home lisinopril/hydrochlorothiazide  - resolved with IV fluid hydration  - Monitor renal function    Marked compression of T12- old #  - S/P fall   -CT thoracic spine shows Slight loss of height superior aspect of T4, probably chronic. Marked compression of T12, age indeterminate however there is suggestion of acute/subacute component. Associated retropulsion with mild canal narrowing.  -Neurosurgery consult, appreciate input  -Tylenol and tramadol for pain control    Hyponatremia and hypokalemia  -Probably secondary to dehydration  -Replace potassium per protocol  -Start IV fluid and monitor sodium level    Hypertension  - Blood pressure is on the low side  - Hold home lisinopril/HCTZ and metoprolol  - Check echo  - Continue IV fluid support    History of severe aortic stenosis  - S/P bioprosthetic valve replacement  -Hold warfarin as INR is elevated  -Monitor INR  -Restart warfarin when cleared by neurosurgery    History of recent UTI  - competed recent Levaquin,    History of metastatic breast cancer  - S/P chemotherapy  - Follow-up with oncology as outpatient  - Question of seizure as per neurology note on 10/2/24   - Negative brain MRI on 10/3/2024 for acute intracranial pathology  -EEG does not suggest any significant seizures        Diet: Low Saturated Fat Na <2400 mg    DVT Prophylaxis: Warfarin and INR>4  Aragon Catheter: Not present  Lines: PRESENT      Port A Cath Single 07/30/24 Right Chest wall-Site Assessment: WDL      Cardiac Monitoring: ACTIVE order. Indication: Tachyarrhythmias, acute (48 hours)  Code Status: No CPR- Do NOT Intubate      Clinically Significant Risk Factors        # Hypokalemia: Lowest K = 3.2 mmol/L in last 2 days, will replace as needed  # Hyponatremia: Lowest  Na = 127 mmol/L in last 2 days, will monitor as appropriate  # Hypocalcemia: Lowest iCa = 4.2 mg/dL in last 2 days, will monitor and replace as appropriate     # Hypoalbuminemia: Lowest albumin = 2.9 g/dL at 10/7/2024  5:18 AM, will monitor as appropriate    # Coagulation Defect: INR = 2.61 (Ref range: 0.85 - 1.15) and/or PTT = 45 Seconds (Ref range: 22 - 38 Seconds), will monitor for bleeding    # Hypertension: Noted on problem list               # Financial/Environmental Concerns: none         Disposition Plan     Medically Ready for Discharge: Plan for TCU likely, possibly stable as early as tomorrow             Anthony Sprague MD  Hospitalist Service  [facility]  Securely message with [employer] (more info)  Text page via [facility] Paging/Directory   ______________________________________________________________________    Interval History   Comfortable -denies active discomfort or chest pain    Physical Exam   Vital Signs: Temp: 98.3  F (36.8  C) Temp src: Oral BP: 90/52 Pulse: 89   Resp: 18 SpO2: 98 % O2 Device: None (Room air)    Weight: 133 lbs 9.6 oz    Alert awake  Vision Baseline-right facial laceration area healing well  Neck supple  Oral mucosa moist  bilateral air entry heard,  S1-S2 normal  Abdomen is soft no tenderness  Extremities are fully mobile-possibly traces of swelling  No skin cyanosis  Neurologically- no new Gross deficits from baseline-Moving all 4 extremities  Psych-mood okay and appropriate to circumstances      Medical Decision Making       40 MINUTES SPENT BY ME on the date of service doing chart review, history, exam, documentation & further activities per the note.      Data     I have personally reviewed the following data over the past 24 hrs:    7.7  \   8.6 (L)   / 227     134 (L) 107 19.8 /  101 (H)   4.1 20 (L) 0.96 (H) \     INR:  2.61 (H) PTT:  N/A   D-dimer:  N/A Fibrinogen:  N/A       Imaging results reviewed over the past 24 hrs:   No results found for this  or any previous visit (from the past 24 hour(s)).

## 2024-10-08 NOTE — PLAN OF CARE
Problem: Adult Inpatient Plan of Care  Goal: Optimal Comfort and Wellbeing  Outcome: Progressing     Problem: Syncope  Goal: Absence of Syncopal Symptoms  Outcome: Progressing  Intervention: Manage Effect of Syncopal Symptoms  Recent Flowsheet Documentation  Taken 10/8/2024 0000 by Alaina Manjarrez, RN  Safety Promotion/Fall Prevention:   activity supervised   room door open   room near nurse's station   supervised activity   safety round/check completed   clutter free environment maintained   lighting adjusted  Taken 10/7/2024 2050 by Alaina Manjarrez, RN  Safety Promotion/Fall Prevention:   activity supervised   room door open   room near nurse's station   supervised activity   safety round/check completed   clutter free environment maintained   lighting adjusted   Goal Outcome Evaluation:       SR w/ BBB and occasional PAC's. Pt A&O x 4, but forgetful. Bed alarm on. NS running at 75ml/hr. K protocol ran, recheck in AM. VSS.

## 2024-10-08 NOTE — PHARMACY-ANTICOAGULATION SERVICE
Clinical Pharmacy - Warfarin Dosing Consult     Pharmacy has been consulted to manage this patient s warfarin therapy.  Indication: Bioprosthetic Heart Valve  Therapy Goal: INR 2-3  Provider/Team: [facility]  OP Anticoag Clinic: [facility]  Warfarin Prior to Admission: Yes  Warfarin PTA Regimen: 5 mg Sun, Tues, Thurs, 2.5 mg ROW  Significant drug interactions: Levofloxacin 5 day course completed yesterday  Recent documented change in oral intake/nutrition: Unknown  Dose Comments: Supratherapeutic on 10/5 held 10/6 and 10/7, back in range today. Patient typically has a good % time in range with this home regimen. With resolved ALTAGRACIA, 2 days held, and off antibiotics will go ahead and resume home dose today.    INR   Date Value Ref Range Status   10/08/2024 2.61 (H) 0.85 - 1.15 Final   10/07/2024 4.14 (H) 0.85 - 1.15 Final       Recommend warfarin 5 mg today.  Pharmacy will monitor Kacie VAR[place] daily and order warfarin doses to achieve specified goal.      Please contact pharmacy as soon as possible if the warfarin needs to be held for a procedure or if the warfarin goals change.      Magy Torres, OndinaD

## 2024-10-09 ENCOUNTER — APPOINTMENT (OUTPATIENT)
Dept: PHYSICAL THERAPY | Facility: HOSPITAL | Age: 84
DRG: 312 | End: 2024-10-09
Attending: HOSPITALIST
Payer: COMMERCIAL

## 2024-10-09 ENCOUNTER — PATIENT OUTREACH (OUTPATIENT)
Dept: ONCOLOGY | Facility: HOSPITAL | Age: 84
End: 2024-10-09
Payer: COMMERCIAL

## 2024-10-09 ENCOUNTER — DOCUMENTATION ONLY (OUTPATIENT)
Dept: ANTICOAGULATION | Facility: CLINIC | Age: 84
End: 2024-10-09
Payer: COMMERCIAL

## 2024-10-09 ENCOUNTER — APPOINTMENT (OUTPATIENT)
Dept: OCCUPATIONAL THERAPY | Facility: HOSPITAL | Age: 84
DRG: 312 | End: 2024-10-09
Payer: COMMERCIAL

## 2024-10-09 ENCOUNTER — DOCUMENTATION ONLY (OUTPATIENT)
Dept: GERIATRICS | Facility: CLINIC | Age: 84
End: 2024-10-09
Payer: COMMERCIAL

## 2024-10-09 VITALS
HEIGHT: 63 IN | HEART RATE: 77 BPM | RESPIRATION RATE: 18 BRPM | DIASTOLIC BLOOD PRESSURE: 66 MMHG | WEIGHT: 133.1 LBS | TEMPERATURE: 97.7 F | SYSTOLIC BLOOD PRESSURE: 117 MMHG | BODY MASS INDEX: 23.58 KG/M2 | OXYGEN SATURATION: 95 %

## 2024-10-09 DIAGNOSIS — Z95.2 S/P TAVR (TRANSCATHETER AORTIC VALVE REPLACEMENT): Primary | ICD-10-CM

## 2024-10-09 DIAGNOSIS — I63.9 CEREBELLAR STROKE (H): ICD-10-CM

## 2024-10-09 PROBLEM — R09.89 DECREASED CAROTID PULSE: Status: ACTIVE | Noted: 2017-03-13

## 2024-10-09 PROBLEM — K52.832 LYMPHOCYTIC COLITIS: Status: ACTIVE | Noted: 2021-10-05

## 2024-10-09 PROBLEM — M54.50 LOW BACK PAIN: Status: ACTIVE | Noted: 2019-03-31

## 2024-10-09 LAB
ANION GAP SERPL CALCULATED.3IONS-SCNC: 8 MMOL/L (ref 7–15)
BUN SERPL-MCNC: 16.7 MG/DL (ref 8–23)
CALCIUM SERPL-MCNC: 7.4 MG/DL (ref 8.8–10.4)
CHLORIDE SERPL-SCNC: 107 MMOL/L (ref 98–107)
CORTIS SERPL-MCNC: 13.8 UG/DL
CREAT SERPL-MCNC: 0.83 MG/DL (ref 0.51–0.95)
EGFRCR SERPLBLD CKD-EPI 2021: 69 ML/MIN/1.73M2
ERYTHROCYTE [DISTWIDTH] IN BLOOD BY AUTOMATED COUNT: 15.7 % (ref 10–15)
GLUCOSE SERPL-MCNC: 85 MG/DL (ref 70–99)
HCO3 SERPL-SCNC: 20 MMOL/L (ref 22–29)
HCT VFR BLD AUTO: 25.2 % (ref 35–47)
HGB BLD-MCNC: 8.4 G/DL (ref 11.7–15.7)
INR PPP: 1.57 (ref 0.85–1.15)
IRON BINDING CAPACITY (ROCHE): 151 UG/DL (ref 240–430)
IRON SATN MFR SERPL: 36 % (ref 15–46)
IRON SERPL-MCNC: 54 UG/DL (ref 37–145)
MCH RBC QN AUTO: 29.5 PG (ref 26.5–33)
MCHC RBC AUTO-ENTMCNC: 33.3 G/DL (ref 31.5–36.5)
MCV RBC AUTO: 88 FL (ref 78–100)
PHOSPHATE SERPL-MCNC: 2.5 MG/DL (ref 2.5–4.5)
PHOSPHATE SERPL-MCNC: 3 MG/DL (ref 2.5–4.5)
PLATELET # BLD AUTO: 220 10E3/UL (ref 150–450)
POTASSIUM SERPL-SCNC: 4.3 MMOL/L (ref 3.4–5.3)
RBC # BLD AUTO: 2.85 10E6/UL (ref 3.8–5.2)
SODIUM SERPL-SCNC: 135 MMOL/L (ref 135–145)
WBC # BLD AUTO: 9.9 10E3/UL (ref 4–11)

## 2024-10-09 PROCEDURE — 84100 ASSAY OF PHOSPHORUS: CPT | Performed by: HOSPITALIST

## 2024-10-09 PROCEDURE — 250N000013 HC RX MED GY IP 250 OP 250 PS 637: Performed by: INTERNAL MEDICINE

## 2024-10-09 PROCEDURE — 97116 GAIT TRAINING THERAPY: CPT | Mod: GP

## 2024-10-09 PROCEDURE — 250N000011 HC RX IP 250 OP 636: Performed by: HOSPITALIST

## 2024-10-09 PROCEDURE — G0008 ADMIN INFLUENZA VIRUS VAC: HCPCS | Performed by: HOSPITALIST

## 2024-10-09 PROCEDURE — 80048 BASIC METABOLIC PNL TOTAL CA: CPT | Performed by: HOSPITALIST

## 2024-10-09 PROCEDURE — 97161 PT EVAL LOW COMPLEX 20 MIN: CPT | Mod: GP

## 2024-10-09 PROCEDURE — 258N000003 HC RX IP 258 OP 636: Performed by: INTERNAL MEDICINE

## 2024-10-09 PROCEDURE — 250N000011 HC RX IP 250 OP 636: Performed by: INTERNAL MEDICINE

## 2024-10-09 PROCEDURE — 83550 IRON BINDING TEST: CPT | Performed by: HOSPITALIST

## 2024-10-09 PROCEDURE — 85027 COMPLETE CBC AUTOMATED: CPT | Performed by: HOSPITALIST

## 2024-10-09 PROCEDURE — 90662 IIV NO PRSV INCREASED AG IM: CPT | Performed by: HOSPITALIST

## 2024-10-09 PROCEDURE — 97535 SELF CARE MNGMENT TRAINING: CPT | Mod: GO

## 2024-10-09 PROCEDURE — 99239 HOSP IP/OBS DSCHRG MGMT >30: CPT | Performed by: INTERNAL MEDICINE

## 2024-10-09 PROCEDURE — 85610 PROTHROMBIN TIME: CPT | Performed by: HOSPITALIST

## 2024-10-09 PROCEDURE — 97110 THERAPEUTIC EXERCISES: CPT | Mod: GP

## 2024-10-09 PROCEDURE — 97110 THERAPEUTIC EXERCISES: CPT | Mod: GO

## 2024-10-09 PROCEDURE — 250N000013 HC RX MED GY IP 250 OP 250 PS 637: Performed by: HOSPITALIST

## 2024-10-09 RX ORDER — WARFARIN SODIUM 5 MG/1
5 TABLET ORAL
Status: DISCONTINUED | OUTPATIENT
Start: 2024-10-09 | End: 2024-10-09 | Stop reason: HOSPADM

## 2024-10-09 RX ORDER — IBUPROFEN 200 MG
1900 CAPSULE ORAL 2 TIMES DAILY
Status: SHIPPED
Start: 2024-10-09 | End: 2024-10-11

## 2024-10-09 RX ORDER — TRAMADOL HYDROCHLORIDE 50 MG/1
50 TABLET ORAL EVERY 8 HOURS PRN
Qty: 30 TABLET | Refills: 0 | Status: SHIPPED | OUTPATIENT
Start: 2024-10-09 | End: 2024-10-27

## 2024-10-09 RX ORDER — HEPARIN SODIUM,PORCINE 10 UNIT/ML
5-10 VIAL (ML) INTRAVENOUS
Status: DISCONTINUED | OUTPATIENT
Start: 2024-10-09 | End: 2024-10-09 | Stop reason: HOSPADM

## 2024-10-09 RX ORDER — HEPARIN SODIUM (PORCINE) LOCK FLUSH IV SOLN 100 UNIT/ML 100 UNIT/ML
5-10 SOLUTION INTRAVENOUS
Status: DISCONTINUED | OUTPATIENT
Start: 2024-10-09 | End: 2024-10-09 | Stop reason: HOSPADM

## 2024-10-09 RX ORDER — HEPARIN SODIUM,PORCINE 10 UNIT/ML
5-10 VIAL (ML) INTRAVENOUS EVERY 24 HOURS
Status: DISCONTINUED | OUTPATIENT
Start: 2024-10-09 | End: 2024-10-09 | Stop reason: HOSPADM

## 2024-10-09 RX ADMIN — ASPIRIN 81 MG: 81 TABLET, COATED ORAL at 08:22

## 2024-10-09 RX ADMIN — METHYLCELLULOSE 500 MG: 500 TABLET ORAL at 08:25

## 2024-10-09 RX ADMIN — Medication 1900 MG: at 08:24

## 2024-10-09 RX ADMIN — ATORVASTATIN CALCIUM 40 MG: 40 TABLET, FILM COATED ORAL at 08:22

## 2024-10-09 RX ADMIN — INFLUENZA A VIRUS A/VICTORIA/4897/2022 IVR-238 (H1N1) ANTIGEN (FORMALDEHYDE INACTIVATED), INFLUENZA A VIRUS A/CALIFORNIA/122/2022 SAN-022 (H3N2) ANTIGEN (FORMALDEHYDE INACTIVATED), AND INFLUENZA B VIRUS B/MICHIGAN/01/2021 ANTIGEN (FORMALDEHYDE INACTIVATED) 0.5 ML: 60; 60; 60 INJECTION, SUSPENSION INTRAMUSCULAR at 14:37

## 2024-10-09 RX ADMIN — HEPARIN, PORCINE (PF) 10 UNIT/ML INTRAVENOUS SYRINGE 10 ML: at 14:33

## 2024-10-09 RX ADMIN — CHOLECALCIFEROL (VITAMIN D3) 10 MCG (400 UNIT) TABLET 10 MCG: at 08:24

## 2024-10-09 RX ADMIN — SODIUM CHLORIDE: 9 INJECTION, SOLUTION INTRAVENOUS at 05:31

## 2024-10-09 RX ADMIN — ACETAMINOPHEN 975 MG: 325 TABLET ORAL at 14:33

## 2024-10-09 RX ADMIN — ACETAMINOPHEN 975 MG: 325 TABLET ORAL at 08:22

## 2024-10-09 RX ADMIN — METOPROLOL TARTRATE 12.5 MG: 25 TABLET, FILM COATED ORAL at 08:22

## 2024-10-09 ASSESSMENT — ACTIVITIES OF DAILY LIVING (ADL)
ADLS_ACUITY_SCORE: 36

## 2024-10-09 NOTE — PROGRESS NOTES
"   10/09/24 0900   Appointment Info   Signing Clinician's Name / Credentials (PT) Lilibeth Whitley PT   Rehab Comments (PT) Reting HR 83 , /60.   Living Environment   People in Home alone   Current Living Arrangements house  (Town home that is one level.)   Home Accessibility no concerns   Transportation Anticipated family or friend will provide   Living Environment Comments Pt has a walk in bath tub.  It has a chair. .   Self-Care   Current Activity Tolerance moderate   Equipment Currently Used at Home walker, rolling  (FWW)   Fall history within last six months yes   Number of times patient has fallen within last six months 1   Activity/Exercise/Self-Care Comment Pt is indep with mobility and does not use any AD.  Pt does not dirive.  Indep with ADLs.   General Information   Onset of Illness/Injury or Date of Surgery 10/06/24   Referring Physician Anthony Sprague MD   Patient/Family Therapy Goals Statement (PT) Pt is planning to go to TCU.   Pertinent History of Current Problem (include personal factors and/or comorbidities that impact the POC) Per the chart, Per chart review, \"84 year old female with a past medical history of aortic valve replacement on warfarin anticoagulation use, chronic renal failure stage III, cerebellar stroke, hypertension, hyperlipidemia, breast cancer metastatic to bone, who presents to the ED for evaluation of injuries following a fall.\"   Existing Precautions/Restrictions fall   Weight-Bearing Status - LLE weight-bearing as tolerated   Weight-Bearing Status - RLE weight-bearing as tolerated   Cognition   Orientation Status (Cognition) oriented x 4   Pain Assessment   Patient Currently in Pain No   Integumentary/Edema   Integumentary/Edema Comments Pt does have edema bilat LEs and she does have an sore on L ant shin.   Bruise on the R forehead.   Posture    Posture Comments Some cues for posture.   Range of Motion (ROM)   Range of Motion ROM is WNL   ROM Comment bilat LEs   Strength " (Manual Muscle Testing)   Strength Comments prox 4/5 and distal 5/5 bilat LEs   Transfers   Comment, (Transfers) Sit>sand with FWW with CGA   Gait/Stairs (Locomotion)   [place] Level (Gait) verbal cues;contact guard   Assistive Device (Gait) walker, front-wheeled  (FWW)   Distance in Feet (Gait) 10'   Pattern (Gait) step-through;swing-through   Deviations/Abnormal Patterns (Gait) gait speed decreased   Balance   Balance Comments CGA with FWW   Sensory Examination   Sensory Perception WNL   Sensory Perception Comments Bilat LEs   Clinical Impression   Criteria for Skilled Therapeutic Intervention Yes, treatment indicated   PT Diagnosis (PT) Impaired functional mobility.   Influenced by the following impairments weakness, dec bal, dec endurance.   Functional limitations due to impairments bed mobility, transfers, gait   Clinical Presentation (PT Evaluation Complexity) stable   Clinical Presentation Rationale Pt presents medically diagnosed.   Clinical Decision Making (Complexity) low complexity   Planned Therapy Interventions (PT) balance training;gait training;home exercise program;strengthening;transfer training   Risk & Benefits of therapy have been explained evaluation/treatment results reviewed;care plan/treatment goals reviewed;risks/benefits reviewed;patient;participants voiced agreement with care plan   PT Total Evaluation Time   PT Eval, Low Complexity Minutes (26700) 15   Physical Therapy Goals   PT Frequency Daily   PT Predicted Duration/Target Date for Goal Attainment 10/16/24   PT Goals Bed Mobility;Transfers;Gait;PT Goal 1   PT: Bed Mobility Supervision/stand-by assist;Supine to/from sit;Rolling   PT: Transfers Supervision/stand-by assist;Sit to/from stand;Bed to/from chair;Assistive device   PT: Gait Supervision/stand-by assist;Rolling walker;Greater than 200 feet   PT: Goal 1 Pt to tova bilat LE ex x 20 reps to increase strength for mobility.   Interventions   Interventions Quick Adds Gait  Training;Therapeutic Activity;Therapeutic Procedure   Therapeutic Procedure/Exercise   Ther. Procedure: strength, endurance, ROM, flexibillity Minutes (94910) 8   Treatment Detail/Skilled Intervention Pt did bilat LE ex x 10 reps with cues for technique.  HR 90 with ex.   Gait Training   Gait Training Minutes (67455) 10   Treatment Detail/Skilled Intervention Pt walked slowly with the FWW and did not have any LOB.  Some cues for posture.   Distance in Feet 120'   Wright Level (Gait Training) contact guard   Physical Assistance Level (Gait Training) verbal cues;1 person assist   Weight Bearing (Gait Training) weight-bearing as tolerated   Assistive Device (Gait Training) rolling walker  (FWW)   Pattern Analysis (Gait Training) swing-through gait   Gait Analysis Deviations decreased vu;decreased step length   Impairments (Gait Analysis/Training) balance impaired;strength decreased   PT Discharge Planning   PT Plan gait w FWW, LE ex, transfers and bed mobility.   PT Discharge Recommendation (DC Rec) Transitional Care Facility   PT Rationale for DC Rec The pt does live alone and does need assist with mobility at this time.   PT Brief overview of current status PT eval, LE ex, Sit<>stand with FWW with min A x 1 , ambulated 10' and 120' with the FWW with CGA.   PT Equipment Needed at Discharge walker, rolling  (FWW)   Total Session Time   Timed Code Treatment Minutes 18   Total Session Time (sum of timed and untimed services) 33

## 2024-10-09 NOTE — PLAN OF CARE
"  Problem: Adult Inpatient Plan of Care  Goal: Plan of Care Review  Description: The Plan of Care Review/Shift note should be completed every shift.  The Outcome Evaluation is a brief statement about your assessment that the patient is improving, declining, or no change.  This information will be displayed automatically on your shift  note.  Outcome: Progressing  Goal: Patient-Specific Goal (Individualized)  Description: You can add care plan individualizations to a care plan. Examples of Individualization might be:  \"Parent requests to be called daily at 9am for status\", \"I have a hard time hearing out of my right ear\", or \"Do not touch me to wake me up as it startles  me\".  Outcome: Progressing  Goal: Absence of Hospital-Acquired Illness or Injury  Outcome: Progressing  Goal: Optimal Comfort and Wellbeing  Outcome: Progressing  Goal: Readiness for Transition of Care  Outcome: Progressing     Problem: Risk for Delirium  Goal: Optimal Coping  Outcome: Progressing  Goal: Improved Behavioral Control  Outcome: Progressing  Goal: Improved Attention and Thought Clarity  Outcome: Progressing  Goal: Improved Sleep  Outcome: Progressing     Problem: Infection  Goal: Absence of Infection Signs and Symptoms  Outcome: Progressing     Problem: Syncope  Goal: Absence of Syncopal Symptoms  Outcome: Progressing   Goal Outcome Evaluation:  VS stable. He denies any Sob and chest pain. Pt discharged to Tcu and discharge packet given. All belongs send with her and she transported by her family.                       "

## 2024-10-09 NOTE — PROGRESS NOTES
Noted. Patient already given dosing from hospital discharge for today.  Will await next INR check from [facility].      Desiree Zaidi RN  [facility].

## 2024-10-09 NOTE — PROGRESS NOTES
[facility]: Cancer Care                                                                                          Alec, daughter in law of Kacie, called out office about the results of the Brain MRI that was completed on 10/3/24. Of note, consent to communicate is on file dated 7/18/2024. Shared that writer spoke with Sulma, daughter of Kacie, on 10/3/24 and relayed  results to her and plan was for her to communicate to Kacie. Of note, writer also had called and LM for Kacie and invited return call to review results/ address questions. Shared that Dr. Gann reviewed and the brain MRI  from 10/3/24 and relayed that it is normal, did not show any cancer. Keep future apts for ECHO , PET and follow up as scheduled. Alec expressed understanding and appreciation. She relayed that she is currently at bedside with Kacie who is admitted at [facility]. Plan is for discharge to TCU, they are unsure which site she will go to and the anticipated stay is also not determined at this time. They will keep our office updated with her progress. Writer will monitor.    Signature:  Mihaela Koo RN

## 2024-10-09 NOTE — PROGRESS NOTES
ANTICOAGULATION  MANAGEMENT: Discharge Review    Kacie Hermosillo chart reviewed for anticoagulation continuity of care    Hospital Admission on 10/6/224 for syncope, fall, hyponatremia, chronic renal failure stage 3, hypocalcemia, hypophosphatemia, +.    Discharge disposition: TCU @ [facility]    Results:    Recent labs: (last 7 days)     10/06/24  1806 10/07/24  0517 10/08/24  0445 10/09/24  0415   INR 4.62* 4.14* 2.61* 1.57*     Anticoagulation inpatient management:     held warfarin due to elevated INR's     Anticoagulation discharge instructions:     Warfarin dosing:  Take 5 mg today then usual dosing of 2.5 mg M/W/F and 5 mg all other days.   Bridging: No   INR goal change: No      Medication changes affecting anticoagulation: Yes: Levaquin stopped in hospital, Heparin in hospital, Tramadol for pain    Additional factors affecting anticoagulation: Yes: diet changes in hospital and TCU, activity changes - doing physical therapy     PLAN     Follow up with assisted physician.  The following labs/tests are   recommended: INR in 1-2 days and then every 2-3 days thereafter to ensure   stability.      Patient not contacted    Anticoagulation Calendar updated    Responsible party [facility] until she is discharged from TCU.    Praveena Morris RN  10/9/2024  Anticoagulation Clinic  Ataxion for routing messages: clifton WEEKS  [facility] patient phone line: [phone]

## 2024-10-09 NOTE — PLAN OF CARE
Physical Therapy Discharge Summary    Reason for therapy discharge:    Discharged to transitional care facility.    Progress towards therapy goal(s). See goals on Care Plan in [facility] electronic health record for goal details.  Goals not met.  Barriers to achieving goals:   discharge from facility.    Therapy recommendation(s):    Continued therapy is recommended.  Rationale/Recommendations:  To improve mobility and strength  .

## 2024-10-09 NOTE — PLAN OF CARE
Problem: Syncope  Goal: Absence of Syncopal Symptoms  Outcome: Progressing  Intervention: Manage Effect of Syncopal Symptoms  Recent Flowsheet Documentation  Taken 10/9/2024 0030 by Alaina Manjarrez, RN  Safety Promotion/Fall Prevention:   activity supervised   assistive device/personal items within reach   clutter free environment maintained   increase visualization of patient   nonskid shoes/slippers when out of bed   patient and family education   room organization consistent   safety round/check completed   supervised activity  Taken 10/8/2024 2030 by Alaina Manjarrez, RN  Safety Promotion/Fall Prevention:   activity supervised   assistive device/personal items within reach   clutter free environment maintained   increase visualization of patient   nonskid shoes/slippers when out of bed   patient and family education   room organization consistent   safety round/check completed   supervised activity     Problem: Adult Inpatient Plan of Care  Goal: Optimal Comfort and Wellbeing  Outcome: Progressing   Goal Outcome Evaluation:       Pt alert and oriented x3-4, forgetful. SR w/ BBB. VSS on RA. Slept well overnight. Pt denies pain. NS running at 75ml/hr. Pt incontinent at times. Ax1 to bathroom. K and P protocol ran, rechecks tomorrow AM.

## 2024-10-09 NOTE — PLAN OF CARE
Occupational Therapy Discharge Summary    Reason for therapy discharge:    Discharged to transitional care facility.    Progress towards therapy goal(s). See goals on Care Plan in [facility] electronic health record for goal details.  Goals partially met.  Barriers to achieving goals:   discharge from facility.    Therapy recommendation(s):    Continued therapy is recommended.  Rationale/Recommendations:  decreased ADLs.    Goal Outcome Evaluation:       Mary Reyes, OTR/L  10/9/2024

## 2024-10-09 NOTE — DISCHARGE SUMMARY
[facility]  Hospitalist Discharge Summary      Date of Admission:  10/6/2024  Date of Discharge:  10/9/2024  Discharging Provider: Darío Landa,   Discharge Service: Hospitalist Service        Follow-ups Needed After Discharge   Follow-up Appointments     Follow Up and recommended labs and tests      Follow up with [facility] physician.  The following labs/tests are   recommended: INR in 1-2 days and then every 2-3 days thereafter to ensure   stability.            Unresulted Labs Ordered in the Past 30 Days of this Admission       Date and Time Order Name Status Description    10/6/2024  9:53 PM Blood Culture Peripheral Blood Preliminary         These results will be followed up by Hospitalist results pool    Discharge Disposition   Discharged to short-term care facility  Condition at discharge: Stable      Hospital Course   84 year old female with history of aortic valve replacement on warfarin anticoagulation, CKD 3, prior cerebellar stroke, hypertension, hyperlipidemia, and history of breast cancer metastatic to bone, who presents for evaluation of injuries following syncopal event.  Admitted with syncope, ALTAGRACIA, hyponatremia and hypokalemia, T12 fracture and had PSVT.       Syncope: Suspect related to orthostatic hypotension in the setting of recent diarrhea related to chemotherapy.  Paroxysmal supraventricular tachycardia noted, there was no evidence of nonsustained V. tach.  CT head benign but there was T12 questionable acute compression fracture noted on imaging, patient remains asymptomatic.   EEG negative  TTE shows LVEF 70%, normal RV, bioprosthetic AV are present with no significant regurgitation.  --Received IV hydration  --Holding home lisinopril/hydrochlorothiazide due to symptomatic hypotension.  Continue to hold this medication after discharge per cardiology recommendation  --PT/OT recommends TCU placement  --Continue calcium and vitamin D after discharge  --New  home metoprolol dosing        Forehead laceration:  CT head and neck is negative for acute changes.  -- Continue local wound care      PSVT: Continue home metoprolol      History of severe aortic stenosis  History of bioprosthetic aortic valve replacement   TTE shows stability  -- Continue home warfarin      ALTAGRACIA CKD 3: Resolved with hydration  --Continue to hold home lisinopril/hydrochlorothiazide after discharge due to symptomatic hypotension on admission       Marked compression of T12- old #  - S/P fall   - CT thoracic spine shows Slight loss of height superior aspect of T4, probably chronic. Marked compression of T12, age indeterminate however there is suggestion of acute/subacute component. Associated retropulsion with mild canal narrowing.  - Neurosurgery consulted and recommended conservative cares  - continue Tylenol and tramadol for pain control       Hyponatremia and hypokalemia  -- resolved with IVF        History of recent UTI  - competed recent Levaquin     History of metastatic breast cancer  - S/P chemotherapy  - Follow-up with oncology as outpatient  - Question of seizure as per neurology note on 10/2/24   - Negative brain MRI on 10/3/2024 for acute intracranial pathology  - EEG on admission negative for any epileptiform findings        Consultations This Hospital Stay   CARDIOLOGY IP CONSULT  NEUROSURGERY IP CONSULT  CARE MANAGEMENT / SOCIAL WORK IP CONSULT  CARDIOLOGY IP CONSULT  PHYSICAL THERAPY ADULT IP CONSULT  OCCUPATIONAL THERAPY ADULT IP CONSULT  PHARMACY TO DOSE WARFARIN  PHYSICAL THERAPY ADULT IP CONSULT  OCCUPATIONAL THERAPY ADULT IP CONSULT    Code Status   No CPR- Do NOT Intubate    Time Spent on this Encounter   IDarío DO, personally saw the patient today and spent greater than 30 minutes discharging this patient.       Darío Landa DO  [facility]  [address] [place] [id number]  Phone: [phone]  Fax:  [phone]  ______________________________________________________________________    Physical Exam   Vital Signs: Temp: 97.7  F (36.5  C) Temp src: Oral BP: 117/66 Pulse: 77   Resp: 18 SpO2: 95 % O2 Device: None (Room air)    Weight: 133 lbs 1.6 oz    General: NAD  RESPIRATORY: Respirations nonlabored  NEUROLOGIC: Alert, speech is clear    Primary Care Physician   Irina Francisco    Discharge Orders      Primary Care - Care Coordination Referral      General info for SNF    Length of Stay Estimate: Short Term Care: Estimated # of Days <30  Condition at Discharge: Improving  Level of care:skilled   Rehabilitation Potential: Excellent  Admission H&P remains valid and up-to-date: Yes  Recent Chemotherapy: N/A  Use Nursing Home Standing Orders: Yes     Mantoux instructions    Give two-step Mantoux (PPD) Per Facility Policy Yes     Follow Up and recommended labs and tests    Follow up with group home physician.  The following labs/tests are recommended: INR in 1-2 days and then every 2-3 days thereafter to ensure stability.     Reason for your hospital stay    Fall, kidney injury, dehydration     Activity - Up with assistive device     No CPR- Do NOT Intubate     Physical Therapy Adult Consult    Evaluate and treat as clinically indicated.    Reason:  Deconditioning     Occupational Therapy Adult Consult    Evaluate and treat as clinically indicated.    Reason:  Deconditioning     Diet    Follow this diet upon discharge: Current Diet:Orders Placed This Encounter      Low Saturated Fat Na <2400 mg     \    Discharge Medications   Current Discharge Medication List        START taking these medications    Details   calcium citrate (CITRACAL) 950 (200 Ca) MG tablet Take 2 tablets (1,900 mg) by mouth 2 times daily.    Associated Diagnoses: Hypocalcemia      cholecalciferol (VITAMIN D3) 10 mcg (400 units) TABS tablet Take 1 tablet (10 mcg) by mouth 2 times daily.    Associated Diagnoses: Hypocalcemia           CONTINUE  these medications which have CHANGED    Details   traMADol (ULTRAM) 50 MG tablet Take 1 tablet (50 mg) by mouth every 8 hours as needed for moderate to severe pain.  Qty: 30 tablet, Refills: 0    Associated Diagnoses: Malignant neoplasm metastatic to bone (H); Malignant neoplasm of both breasts in female, estrogen receptor negative, unspecified site of breast (H)           CONTINUE these medications which have NOT CHANGED    Details   acetaminophen (TYLENOL) 325 MG tablet Take 650 mg by mouth every 8 hours as needed for mild pain      aspirin (ASA) 81 MG EC tablet Take 81 mg by mouth every morning.      atorvastatin (LIPITOR) 40 MG tablet Take 1 tablet (40 mg) by mouth every morning  Qty: 90 tablet, Refills: 3    Associated Diagnoses: Hyperlipidemia, unspecified hyperlipidemia type      chlorhexidine (PERIDEX) 0.12 % solution Take 15 mLs by mouth daily as needed.      estradiol (ESTRACE) 0.1 MG/GM vaginal cream Place 0.5 g vaginally twice a week. Can use daily for 2 weeks and then twice a week after that.  Qty: 42 g, Refills: 1    Associated Diagnoses: Acute cystitis with hematuria      lidocaine-prilocaine (EMLA) 2.5-2.5 % external cream Apply topically as needed for other (Apply small amount to port site 30-60 minutes prior to port access to minimize discomfort)  Qty: 30 g, Refills: 2    Associated Diagnoses: Malignant neoplasm metastatic to bone (H)      methylcellulose (CITRUCEL) 500 MG TABS tablet Take 500 mg by mouth every morning.      metoprolol tartrate (LOPRESSOR) 25 MG tablet Take 1 tablet (25 mg) by mouth 2 times daily  Qty: 180 tablet, Refills: 3    Associated Diagnoses: Essential hypertension; SVT (supraventricular tachycardia) (H)      ondansetron (ZOFRAN) 8 MG tablet Take 1 tablet (8 mg) by mouth every 8 hours as needed for nausea (vomiting)  Qty: 30 tablet, Refills: 2    Associated Diagnoses: Malignant neoplasm metastatic to bone (H)      prochlorperazine (COMPAZINE) 10 MG tablet Take 1 tablet (10  mg) by mouth every 6 hours as needed for nausea or vomiting  Qty: 30 tablet, Refills: 2    Associated Diagnoses: Malignant neoplasm metastatic to bone (H)      UNABLE TO FIND Take 1 capsule by mouth every morning. D Charlie 1 capsule daily for UTI prevention.      warfarin ANTICOAGULANT (JANTOVEN ANTICOAGULANT) 5 MG tablet 1/2 tab (2.5mg) on Mon, Wed, Fri and 1 tab (5mg) all other days  Qty: 90 tablet, Refills: 3    Associated Diagnoses: S/P TAVR (transcatheter aortic valve replacement)           STOP taking these medications       levofloxacin (LEVAQUIN) 500 MG tablet Comments:   Reason for Stopping:         lisinopril-hydrochlorothiazide (ZESTORETIC) 20-12.5 MG tablet Comments:   Reason for Stopping:             Allergies   No Known Allergies

## 2024-10-09 NOTE — PROGRESS NOTES
Care Management Discharge Note    Discharge Date: 10/09/2024       Discharge Disposition: Transitional Care    Discharge Services: None    Discharge DME: None    Discharge Transportation: family or friend will provide    Private pay costs discussed: Not applicable    Does the patient's insurance plan have a 3 day qualifying hospital stay waiver?  Yes     Which insurance plan 3 day waiver is available? Alternative insurance waiver    Will the waiver be used for post-acute placement? No    PAS Confirmation Code: [id number]  Patient/family educated on Medicare website which has current facility and service quality ratings: yes    Education Provided on the Discharge Plan: Yes  Persons Notified of Discharge Plans: patient   Patient/Family in Agreement with the Plan: yes    Handoff Referral Completed: Yes, [facility] PCP: Internal handoff referral completed    Additional Information:    9:00 AM  JERRY met with patient and daughter in  [place]. SW introduced self and CM role. Pt agreeable to discharge to transitional care, has not been to transitional care before. SW provided list of facilities in the [facility] and requested a minimum of 5 choices. Pt requests referral to [facility] and will review list with family and call SW with additional choices.   Referral sent to [facility].     1:26 PM  Patient accepted at [facility] with TCU bed available for today. JERRY met with patient and daughter in law [place], to provide update. Both pt and family are in agreement with discharge plan. Family transporting at 1630. PAS completed and on chart. Updated bedside RN and MD.    BRYANNA Tijerina on 10/09/24

## 2024-10-10 ENCOUNTER — PATIENT OUTREACH (OUTPATIENT)
Dept: CARE COORDINATION | Facility: CLINIC | Age: 84
End: 2024-10-10

## 2024-10-10 ENCOUNTER — TRANSITIONAL CARE UNIT VISIT (OUTPATIENT)
Dept: GERIATRICS | Facility: CLINIC | Age: 84
End: 2024-10-10
Payer: COMMERCIAL

## 2024-10-10 ENCOUNTER — ANTICOAGULATION THERAPY VISIT (OUTPATIENT)
Dept: ANTICOAGULATION | Facility: CLINIC | Age: 84
End: 2024-10-10

## 2024-10-10 ENCOUNTER — LAB REQUISITION (OUTPATIENT)
Dept: LAB | Facility: CLINIC | Age: 84
End: 2024-10-10
Payer: COMMERCIAL

## 2024-10-10 VITALS
OXYGEN SATURATION: 96 % | RESPIRATION RATE: 16 BRPM | HEART RATE: 82 BPM | WEIGHT: 141.6 LBS | HEIGHT: 63 IN | BODY MASS INDEX: 25.09 KG/M2 | DIASTOLIC BLOOD PRESSURE: 73 MMHG | SYSTOLIC BLOOD PRESSURE: 122 MMHG | TEMPERATURE: 97.3 F

## 2024-10-10 DIAGNOSIS — I63.9 CEREBELLAR STROKE (H): ICD-10-CM

## 2024-10-10 DIAGNOSIS — Z95.2 S/P TAVR (TRANSCATHETER AORTIC VALVE REPLACEMENT): ICD-10-CM

## 2024-10-10 DIAGNOSIS — N18.30 CHRONIC KIDNEY DISEASE, STAGE 3 UNSPECIFIED (H): ICD-10-CM

## 2024-10-10 DIAGNOSIS — Z95.2 S/P TAVR (TRANSCATHETER AORTIC VALVE REPLACEMENT): Primary | ICD-10-CM

## 2024-10-10 LAB — INR (EXTERNAL): 1.9

## 2024-10-10 PROCEDURE — 99310 SBSQ NF CARE HIGH MDM 45: CPT | Performed by: NURSE PRACTITIONER

## 2024-10-10 NOTE — LETTER
[facility]   To:     Please give to facility    From:   Salud Thmoas MA  Care Transitions Assistant    Ambulatory Care Management team  [facility]   Phone: [phone]  Patient Name:  Kacie Hermosillo YOB: 1940   Admit date: 10/9/2024      *Information Needed:  Please contact me when the patient will discharge (or if they will move to long term care)- include the discharge date, disposition, and main diagnosis   If the patient is discharged with home care services, please provide the name of the agency    Also- Please inform me if a care conference is being held and you would like to facilitate the virtual presence of an Ambulatory Care Management team member.       Thank you!

## 2024-10-10 NOTE — PROGRESS NOTES
ANTICOAGULATION MANAGEMENT     Kacie Hermosillo 84 year old female is on warfarin with subtherapeutic INR result. (Goal INR 2.0-3.0)    Recent labs: (last 7 days)     10/10/24  0000   INR 1.9       ASSESSMENT     Source(s): Chart Review and Home Care/Facility Nurse     Warfarin doses taken: Held for 2 days while inpatient  recently which may be affecting INR. Patient had 2.5 mg last night at [facility]  Diet:  New admit to [facility]  Medication/supplement changes:  Tramadol started on 10/9/24 Concurrent use of TRAMADOL and WARFARIN may result in an increase in prothrombin time and an increased risk of bleeding.   Patient will also has tylenol as needed for pain  New illness, injury, or hospitalization: Yes: 10/6/24 - 10/9/24 syncope and fall  Signs or symptoms of bleeding or clotting: No  Previous result: Subtherapeutic at time of discharge  Additional findings:  established patient with [facility]. Back to [place] after discharge from [facility]       PLAN     Recommended plan for temporary change(s) affecting INR     Dosing Instructions: Continue your current warfarin dose prior to admission which is an increase from the last 7 days dose with next INR in 4 days       Summary  As of 10/10/2024      Full warfarin instructions:  5 mg every Mon, Wed, Fri; 2.5 mg all other days   Next INR check:  10/14/2024               Telephone call with [facility] nurse ([facility]) who agrees to plan and repeated back plan correctly    Orders given to  Homecare nurse/facility to recheck    Education provided: Please call back if any changes to your diet, medications or how you've been taking warfarin  Interaction IS anticipated between warfarin and tramadol, tyelenol    Plan made per [facility] anticoagulation protocol    Desiree Zaidi RN  10/10/2024  Anticoagulation Clinic  Infotrieve for routing messages: [facility] (TCU/LTC/IGNACIO)  [facility] patient phone line:  [phone]        _______________________________________________________________________     Anticoagulation Episode Summary       Current INR goal:  2.0-3.0   TTR:  80.1% (1 y)   Target end date:  Indefinite   Send INR reminders to:  [facility] (TCU/LTC/IGNACIO)    Indications    S/P TAVR (transcatheter aortic valve replacement) [Z95.2]  Cerebellar stroke (H) [I63.9]             Comments:  10/9/24-[facility] admit, [facility] patient   2.0-3.0 per Dr. Olguin 5/24/23 (4/10/23-Cardiac CT: Radiographic features of valve leaflet thickening w concurrent hypoattenuation and reduced leaflet motion all highly suggestive of valve   leaflet thrombosis)             Anticoagulation Care Providers       Provider Role Specialty Phone number    Irina Francisco PA-C Referring Family Medicine [phone]

## 2024-10-10 NOTE — PROGRESS NOTES
Incoming fax from [facility]    Date of result 10/10/24    INR result 1.9    Route result to: [facility] (TCU/LTC/IGNACIO)

## 2024-10-10 NOTE — PROGRESS NOTES
[facility]ERIATRIC [facility]    Code Status:  DNR/DNI   Visit Type:   Chief Complaint   Patient presents with    TCU Admission     [facility] 10/6/2024 - 10/9/2024     Facility:  [facility] ([facility]) [[id number]]         HPI: Kacie Hermosillo is a 84 year old female who I am seeing today for admit to the TCU. Past medical history includes aortic valve replacement on warfarin anticoagulation, CKD 3, prior cerebellar stroke, hypertension, hyperlipidemia, and history of breast cancer metastatic to bone. Pt recently hospitalized with syncope related to orthostatic hypotension due to diarrhea from chemotherapy. Pt with ALTAGRACIA and electrolyte imbalances. PSVT. Pt with fall sustaining laceration to scalp and T12 fracture.       Transitional Care Course: Today pt sitting up in bed. Pt with laceration to right temporal region intact with sutures. Pt denies any headache or dizziness. Pt denies any SOB or CP. She has 3+ LE edema to BLE. Hemosiderin staining to BLE. She has 2 open areas to LE. Pt was taken off her hydrochlorothiazide during hospitalization. BP now WNL. She is having no further diarrhea. Appetite is good. Pt denies any pain in her back.       Assessment/Plan:        Syncope  PSVT  -due to orthostatic hypotension due to recent diarrhea from chemo.   -TTE shows LVEF 70%, normal RV, bioprosthetic AV are present with no significant regurgitation.  -Head CT negative.   -EEG negative.   -pt underwent fluid resuscitation. Hydrochlorothiazide held.   -continue metoprolol.     Fluid Overload   BLE VS ulcers   -today pt with 3+ bilateral LE edema with skin ulcers.   -Resume lisinopril/hydrochlorothiazide at a lower dose of 10/12.5 mg per day.    -monitor for hypotension  -follow up BMP.   -xeroform guaze to BLE with ABD and kerlix daily.   -Tubi  on am/off hs.   -Monitor Bp.     Forehead laceration  -CT head and neck is negative   -discontinue sutures to scalp wound  in 10 days.      severe aortic stenosis  History  of bioprosthetic aortic valve replacement   -TTE shows LVEF 70%, normal RV, bioprosthetic AV are present with no significant regurgitation  -INR 1.9.   -Continue warfarin  -Coumadin clinic to manage INR.      ALTAGRACIA on CKD 3  -improved with fluid resuscitation.   -Follow up BMP in am.   -lisinopril/hydrochlorothiazide has been on hold      T12 compression fracture, old   - CT thoracic spine shows Slight loss of height superior aspect of T4, probably chronic. Marked compression of T12, age indeterminate however there is suggestion of acute/subacute component. Associated retropulsion with mild canal narrowing.  - Neurosurgery consulted and recommended conservative cares  - continue Tylenol and tramadol for pain control     History of recent UTI  -competed Levaquin     History of metastatic breast cancer  - S/P chemotherapy  - Question of seizure as per neurology note on 10/2/24   - Negative brain MRI on 10/3/2024 for acute intracranial pathology  - EEG negative  -Chemo on hold.   -Follow up with Oncology out pt.      -Ok to PT, OT to eval and treat.   -Follow up CBC and BMP.          Active Ambulatory Problems     Diagnosis Date Noted    Aortic valve disorder 10/18/2012    Chronic renal failure, stage 3 (moderate) (H) 02/19/2018    Diastolic dysfunction 02/17/2016    Essential hypertension 02/17/2016    Dysuria 06/06/2022    Gastroesophageal reflux disease 02/17/2016    Hyperlipidemia 02/17/2016    Malignant neoplasm of areola of left breast in female (H) 02/17/2016    Cerebellar stroke (H) 07/02/2020    S/P TAVR (transcatheter aortic valve replacement) 11/26/2021    Osteopenia 03/13/2017    Paroxysmal supraventricular tachycardia (H) 11/26/2021    Varicose vein of leg 02/17/2016    Venous insufficiency 02/17/2016    Mild carotid artery disease (H) 05/04/2023    Malignant neoplasm metastatic to bone (H) 06/27/2024    Hyponatremia 10/06/2024    Elevated INR 10/06/2024    T12 compression fracture, sequela 10/06/2024     Fall, initial encounter 10/06/2024    Laceration of forehead, initial encounter 10/06/2024    Syncope 10/07/2024    DNR (do not resuscitate) 10/07/2024    Hypocalcemia 10/08/2024    Hypophosphatemia 10/08/2024    LBBB (left bundle branch block) 10/08/2024    Decreased carotid pulse 03/13/2017    Low back pain 03/31/2019    Lymphocytic colitis 10/05/2021    Severe aortic stenosis 02/17/2016     Resolved Ambulatory Problems     Diagnosis Date Noted    Trochanteric bursitis of left hip 01/05/2024     Past Medical History:   Diagnosis Date    Breast cancer (H) 01/01/1999    Heart valve replaced     Hx antineoplastic chemotherapy 01/01/1999    Hx of radiation therapy 01/01/1999    Hypertension      No Known Allergies    All Meds and Allergies reviewed in the record at the facility and is the most up-to-date.    Post Discharge Medication Reconciliation Status: discharge medications reconciled and changed, per note/orders  Current Outpatient Medications   Medication Sig Dispense Refill    acetaminophen (TYLENOL) 325 MG tablet Take 650 mg by mouth every 8 hours as needed for mild pain      aspirin (ASA) 81 MG EC tablet Take 81 mg by mouth every morning.      atorvastatin (LIPITOR) 40 MG tablet Take 1 tablet (40 mg) by mouth every morning 90 tablet 3    chlorhexidine (PERIDEX) 0.12 % solution Take 15 mLs by mouth daily as needed.      estradiol (ESTRACE) 0.1 MG/GM vaginal cream Place 0.5 g vaginally twice a week. Can use daily for 2 weeks and then twice a week after that. 42 g 1    lidocaine-prilocaine (EMLA) 2.5-2.5 % external cream Apply topically as needed for other (Apply small amount to port site 30-60 minutes prior to port access to minimize discomfort) 30 g 2    methylcellulose (CITRUCEL) 500 MG TABS tablet Take 500 mg by mouth every morning.      metoprolol tartrate (LOPRESSOR) 25 MG tablet Take 1 tablet (25 mg) by mouth 2 times daily 180 tablet 3    ondansetron (ZOFRAN) 8 MG tablet Take 1 tablet (8 mg) by mouth  "every 8 hours as needed for nausea (vomiting) 30 tablet 2    prochlorperazine (COMPAZINE) 10 MG tablet Take 1 tablet (10 mg) by mouth every 6 hours as needed for nausea or vomiting 30 tablet 2    traMADol (ULTRAM) 50 MG tablet Take 1 tablet (50 mg) by mouth every 8 hours as needed for moderate to severe pain. 30 tablet 0    WARFARIN SODIUM PO Take by mouth See Admin Instructions. Dosing in coordination with INR results      calcium citrate (CITRACAL) 950 (200 Ca) MG tablet Take 2 tablets (1,900 mg) by mouth 2 times daily. (Patient not taking: Reported on 10/10/2024)      cholecalciferol (VITAMIN D3) 10 mcg (400 units) TABS tablet Take 1 tablet (10 mcg) by mouth 2 times daily. (Patient not taking: Reported on 10/10/2024)      UNABLE TO FIND Take 1 capsule by mouth every morning. D Charlie 1 capsule daily for UTI prevention. (Patient not taking: Reported on 10/10/2024)      warfarin ANTICOAGULANT (JANTOVEN ANTICOAGULANT) 5 MG tablet 1/2 tab (2.5mg) on Mon, Wed, Fri and 1 tab (5mg) all other days (Patient not taking: Reported on 10/10/2024) 90 tablet 3     Current Facility-Administered Medications   Medication Dose Route Frequency Provider Last Rate Last Admin    sodium chloride (PF) 0.9% PF flush 10-20 mL  10-20 mL Intracatheter q1 min prn Alaina Lund PA-C        sodium chloride (PF) 0.9% PF flush 10-20 mL  10-20 mL Intracatheter q1 min prn Alaina Lund PA-C   20 mL at 08/15/24 1129     Facility-Administered Medications Ordered in Other Visits   Medication Dose Route Frequency Provider Last Rate Last Admin    heparin lock flush 100 unit/mL injection 5 mL  5 mL Intracatheter Q30 Days Neymar Gann MD   5 mL at 10/03/24 1031       REVIEW OF SYSTEMS:   10 point review of systems reviewed and pertinent positives in the HPI.     PHYSICAL EXAMINATION:  Physical Exam     Vital signs: /73   Pulse 82   Temp 97.3  F (36.3  C)   Resp 16   Ht 1.6 m (5' 3\")   Wt 64.2 kg (141 lb 9.6 oz)   " SpO2 96%   BMI 25.08 kg/m    General: Awake, Alert, oriented x3,lying in bed,  appropriately, follows simple commands, conversant  HEENT:Pink conjunctiva,  moist oral mucosa  NECK: Supple  CVS:  S1  S2, without murmur or gallop.   LUNG: Clear to auscultation, No wheezes, rales or rhonci.  BACK: No kyphosis of the thoracic spine  ABDOMEN: Soft, nontender to palpation, with positive bowel sounds  EXTREMITIES: Moves all extremities with diffuse weakness, 3+ pedal edema, no calf tenderness  SKIN: 2 open areas to BLE. Moderate slough removed with cleansing. Hemosiderin staining noted. Skin taunt and fragile.   NEUROLOGIC: Intact, pulses palpable  PSYCHIATRIC: Cognition intact      Labs:  All labs reviewed in the nursing home record and [facility]   @  Lab Results   Component Value Date    WBC 9.9 10/09/2024     Lab Results   Component Value Date    RBC 2.85 10/09/2024     Lab Results   Component Value Date    HGB 8.4 10/09/2024     Lab Results   Component Value Date    HCT 25.2 10/09/2024     Lab Results   Component Value Date    MCV 88 10/09/2024     Lab Results   Component Value Date    MCH 29.5 10/09/2024     Lab Results   Component Value Date    MCHC 33.3 10/09/2024     Lab Results   Component Value Date    RDW 15.7 10/09/2024     Lab Results   Component Value Date     10/09/2024        @Last Comprehensive Metabolic Panel:  Sodium   Date Value Ref Range Status   10/09/2024 135 135 - 145 mmol/L Final     Potassium   Date Value Ref Range Status   10/09/2024 4.3 3.4 - 5.3 mmol/L Final     Chloride   Date Value Ref Range Status   10/09/2024 107 98 - 107 mmol/L Final     Carbon Dioxide (CO2)   Date Value Ref Range Status   10/09/2024 20 (L) 22 - 29 mmol/L Final     Anion Gap   Date Value Ref Range Status   10/09/2024 8 7 - 15 mmol/L Final     Glucose   Date Value Ref Range Status   10/09/2024 85 70 - 99 mg/dL Final     GLUCOSE BY METER POCT   Date Value Ref Range Status   06/25/2024 87 70 - 99 mg/dL Final     Urea  Nitrogen   Date Value Ref Range Status   10/09/2024 16.7 8.0 - 23.0 mg/dL Final     Creatinine   Date Value Ref Range Status   10/09/2024 0.83 0.51 - 0.95 mg/dL Final     GFR Estimate   Date Value Ref Range Status   10/09/2024 69 >60 mL/min/1.73m2 Final     Comment:     eGFR calculated using 2021 CKD-EPI equation.     Calcium   Date Value Ref Range Status   10/09/2024 7.4 (L) 8.8 - 10.4 mg/dL Final     Comment:     Reference intervals for this test were updated on 7/16/2024 to reflect our healthy population more accurately. There may be differences in the flagging of prior results with similar values performed with this method. Those prior results can be interpreted in the context of the updated reference intervals.       > 45 minutes spent preparing for this visit, reviewing labs, meds, consults and imaging as well face to face time spent with pt and collaborating with nursing.     This note has been dictated using voice recognition software. Any grammatical or context distortions are unintentional and inherent to the software    Electronically signed by: Sissy Grant, CNP

## 2024-10-10 NOTE — LETTER
10/10/2024      Kacie Hermosillo  [address] [facility] [place] [address]        [facility]    Code Status:  DNR/DNI   Visit Type:   Chief Complaint   Patient presents with     TCU Admission     [facility] 10/6/2024 - 10/9/2024     Facility:  [facility] ([facility]) [[id number]]         HPI: Kacie Hermosillo is a 84 year old female who I am seeing today for admit to the TCU. Past medical history includes aortic valve replacement on warfarin anticoagulation, CKD 3, prior cerebellar stroke, hypertension, hyperlipidemia, and history of breast cancer metastatic to bone. Pt recently hospitalized with syncope related to orthostatic hypotension due to diarrhea from chemotherapy. Pt with ALTAGRACIA and electrolyte imbalances. PSVT. Pt with fall sustaining laceration to scalp and T12 fracture.       Transitional Care Course: Today pt sitting up in bed. Pt with laceration to right temporal region intact with sutures. Pt denies any headache or dizziness. Pt denies any SOB or CP. She has 3+ LE edema to BLE. Hemosiderin staining to BLE. She has 2 open areas to LE. Pt was taken off her hydrochlorothiazide during hospitalization. BP now WNL. She is having no further diarrhea. Appetite is good. Pt denies any pain in her back.       Assessment/Plan:        Syncope  PSVT  -due to orthostatic hypotension due to recent diarrhea from chemo.   -TTE shows LVEF 70%, normal RV, bioprosthetic AV are present with no significant regurgitation.  -Head CT negative.   -EEG negative.   -pt underwent fluid resuscitation. Hydrochlorothiazide held.   -continue metoprolol.     Fluid Overload   BLE VS ulcers   -today pt with 3+ bilateral LE edema with skin ulcers.   -Resume lisinopril/hydrochlorothiazide at a lower dose of 10/12.5 mg per day.    -monitor for hypotension  -follow up BMP.   -xeroform guaze to BLE with ABD and kerlix daily.   -Tubi  on am/off hs.   -Monitor Bp.     Forehead laceration  -CT head and neck is negative   -discontinue  sutures to scalp wound  in 10 days.      severe aortic stenosis  History of bioprosthetic aortic valve replacement   -TTE shows LVEF 70%, normal RV, bioprosthetic AV are present with no significant regurgitation  -INR 1.9.   -Continue warfarin  -Coumadin clinic to manage INR.      ALTAGRACIA on CKD 3  -improved with fluid resuscitation.   -Follow up BMP in am.   -lisinopril/hydrochlorothiazide has been on hold      T12 compression fracture, old   - CT thoracic spine shows Slight loss of height superior aspect of T4, probably chronic. Marked compression of T12, age indeterminate however there is suggestion of acute/subacute component. Associated retropulsion with mild canal narrowing.  - Neurosurgery consulted and recommended conservative cares  - continue Tylenol and tramadol for pain control     History of recent UTI  -competed Levaquin     History of metastatic breast cancer  - S/P chemotherapy  - Question of seizure as per neurology note on 10/2/24   - Negative brain MRI on 10/3/2024 for acute intracranial pathology  - EEG negative  -Chemo on hold.   -Follow up with Oncology out pt.      -Ok to PT, OT to eval and treat.   -Follow up CBC and BMP.          Active Ambulatory Problems     Diagnosis Date Noted     Aortic valve disorder 10/18/2012     Chronic renal failure, stage 3 (moderate) (H) 02/19/2018     Diastolic dysfunction 02/17/2016     Essential hypertension 02/17/2016     Dysuria 06/06/2022     Gastroesophageal reflux disease 02/17/2016     Hyperlipidemia 02/17/2016     Malignant neoplasm of areola of left breast in female (H) 02/17/2016     Cerebellar stroke (H) 07/02/2020     S/P TAVR (transcatheter aortic valve replacement) 11/26/2021     Osteopenia 03/13/2017     Paroxysmal supraventricular tachycardia (H) 11/26/2021     Varicose vein of leg 02/17/2016     Venous insufficiency 02/17/2016     Mild carotid artery disease (H) 05/04/2023     Malignant neoplasm metastatic to bone (H) 06/27/2024     Hyponatremia  10/06/2024     Elevated INR 10/06/2024     T12 compression fracture, sequela 10/06/2024     Fall, initial encounter 10/06/2024     Laceration of forehead, initial encounter 10/06/2024     Syncope 10/07/2024     DNR (do not resuscitate) 10/07/2024     Hypocalcemia 10/08/2024     Hypophosphatemia 10/08/2024     LBBB (left bundle branch block) 10/08/2024     Decreased carotid pulse 03/13/2017     Low back pain 03/31/2019     Lymphocytic colitis 10/05/2021     Severe aortic stenosis 02/17/2016     Resolved Ambulatory Problems     Diagnosis Date Noted     Trochanteric bursitis of left hip 01/05/2024     Past Medical History:   Diagnosis Date     Breast cancer (H) 01/01/1999     Heart valve replaced      Hx antineoplastic chemotherapy 01/01/1999     Hx of radiation therapy 01/01/1999     Hypertension      No Known Allergies    All Meds and Allergies reviewed in the record at the facility and is the most up-to-date.    Post Discharge Medication Reconciliation Status: discharge medications reconciled and changed, per note/orders  Current Outpatient Medications   Medication Sig Dispense Refill     acetaminophen (TYLENOL) 325 MG tablet Take 650 mg by mouth every 8 hours as needed for mild pain       aspirin (ASA) 81 MG EC tablet Take 81 mg by mouth every morning.       atorvastatin (LIPITOR) 40 MG tablet Take 1 tablet (40 mg) by mouth every morning 90 tablet 3     chlorhexidine (PERIDEX) 0.12 % solution Take 15 mLs by mouth daily as needed.       estradiol (ESTRACE) 0.1 MG/GM vaginal cream Place 0.5 g vaginally twice a week. Can use daily for 2 weeks and then twice a week after that. 42 g 1     lidocaine-prilocaine (EMLA) 2.5-2.5 % external cream Apply topically as needed for other (Apply small amount to port site 30-60 minutes prior to port access to minimize discomfort) 30 g 2     methylcellulose (CITRUCEL) 500 MG TABS tablet Take 500 mg by mouth every morning.       metoprolol tartrate (LOPRESSOR) 25 MG tablet Take 1  tablet (25 mg) by mouth 2 times daily 180 tablet 3     ondansetron (ZOFRAN) 8 MG tablet Take 1 tablet (8 mg) by mouth every 8 hours as needed for nausea (vomiting) 30 tablet 2     prochlorperazine (COMPAZINE) 10 MG tablet Take 1 tablet (10 mg) by mouth every 6 hours as needed for nausea or vomiting 30 tablet 2     traMADol (ULTRAM) 50 MG tablet Take 1 tablet (50 mg) by mouth every 8 hours as needed for moderate to severe pain. 30 tablet 0     WARFARIN SODIUM PO Take by mouth See Admin Instructions. Dosing in coordination with INR results       calcium citrate (CITRACAL) 950 (200 Ca) MG tablet Take 2 tablets (1,900 mg) by mouth 2 times daily. (Patient not taking: Reported on 10/10/2024)       cholecalciferol (VITAMIN D3) 10 mcg (400 units) TABS tablet Take 1 tablet (10 mcg) by mouth 2 times daily. (Patient not taking: Reported on 10/10/2024)       UNABLE TO FIND Take 1 capsule by mouth every morning. D Charlie 1 capsule daily for UTI prevention. (Patient not taking: Reported on 10/10/2024)       warfarin ANTICOAGULANT (JANTOVEN ANTICOAGULANT) 5 MG tablet 1/2 tab (2.5mg) on Mon, Wed, Fri and 1 tab (5mg) all other days (Patient not taking: Reported on 10/10/2024) 90 tablet 3     Current Facility-Administered Medications   Medication Dose Route Frequency Provider Last Rate Last Admin     sodium chloride (PF) 0.9% PF flush 10-20 mL  10-20 mL Intracatheter q1 min prn Alaina Lund PA-C         sodium chloride (PF) 0.9% PF flush 10-20 mL  10-20 mL Intracatheter q1 min prn Alaina Lund PA-C   20 mL at 08/15/24 1129     Facility-Administered Medications Ordered in Other Visits   Medication Dose Route Frequency Provider Last Rate Last Admin     heparin lock flush 100 unit/mL injection 5 mL  5 mL Intracatheter Q30 Days Neymar Gann MD   5 mL at 10/03/24 1031       REVIEW OF SYSTEMS:   10 point review of systems reviewed and pertinent positives in the HPI.     PHYSICAL EXAMINATION:  Physical Exam  "    Vital signs: /73   Pulse 82   Temp 97.3  F (36.3  C)   Resp 16   Ht 1.6 m (5' 3\")   Wt 64.2 kg (141 lb 9.6 oz)   SpO2 96%   BMI 25.08 kg/m    General: Awake, Alert, oriented x3,lying in bed,  appropriately, follows simple commands, conversant  HEENT:Pink conjunctiva,  moist oral mucosa  NECK: Supple  CVS:  S1  S2, without murmur or gallop.   LUNG: Clear to auscultation, No wheezes, rales or rhonci.  BACK: No kyphosis of the thoracic spine  ABDOMEN: Soft, nontender to palpation, with positive bowel sounds  EXTREMITIES: Moves all extremities with diffuse weakness, 3+ pedal edema, no calf tenderness  SKIN: 2 open areas to BLE. Moderate slough removed with cleansing. Hemosiderin staining noted. Skin taunt and fragile.   NEUROLOGIC: Intact, pulses palpable  PSYCHIATRIC: Cognition intact      Labs:  All labs reviewed in the nursing home record and [facility]   @  Lab Results   Component Value Date    WBC 9.9 10/09/2024     Lab Results   Component Value Date    RBC 2.85 10/09/2024     Lab Results   Component Value Date    HGB 8.4 10/09/2024     Lab Results   Component Value Date    HCT 25.2 10/09/2024     Lab Results   Component Value Date    MCV 88 10/09/2024     Lab Results   Component Value Date    MCH 29.5 10/09/2024     Lab Results   Component Value Date    MCHC 33.3 10/09/2024     Lab Results   Component Value Date    RDW 15.7 10/09/2024     Lab Results   Component Value Date     10/09/2024        @Last Comprehensive Metabolic Panel:  Sodium   Date Value Ref Range Status   10/09/2024 135 135 - 145 mmol/L Final     Potassium   Date Value Ref Range Status   10/09/2024 4.3 3.4 - 5.3 mmol/L Final     Chloride   Date Value Ref Range Status   10/09/2024 107 98 - 107 mmol/L Final     Carbon Dioxide (CO2)   Date Value Ref Range Status   10/09/2024 20 (L) 22 - 29 mmol/L Final     Anion Gap   Date Value Ref Range Status   10/09/2024 8 7 - 15 mmol/L Final     Glucose   Date Value Ref Range Status   10/09/2024 " 85 70 - 99 mg/dL Final     GLUCOSE BY METER POCT   Date Value Ref Range Status   06/25/2024 87 70 - 99 mg/dL Final     Urea Nitrogen   Date Value Ref Range Status   10/09/2024 16.7 8.0 - 23.0 mg/dL Final     Creatinine   Date Value Ref Range Status   10/09/2024 0.83 0.51 - 0.95 mg/dL Final     GFR Estimate   Date Value Ref Range Status   10/09/2024 69 >60 mL/min/1.73m2 Final     Comment:     eGFR calculated using 2021 CKD-EPI equation.     Calcium   Date Value Ref Range Status   10/09/2024 7.4 (L) 8.8 - 10.4 mg/dL Final     Comment:     Reference intervals for this test were updated on 7/16/2024 to reflect our healthy population more accurately. There may be differences in the flagging of prior results with similar values performed with this method. Those prior results can be interpreted in the context of the updated reference intervals.       > 45 minutes spent preparing for this visit, reviewing labs, meds, consults and imaging as well face to face time spent with pt and collaborating with nursing.     This note has been dictated using voice recognition software. Any grammatical or context distortions are unintentional and inherent to the software    Electronically signed by: Sissy Grant CNP       Sincerely,        Sissy Grant NP

## 2024-10-10 NOTE — PROGRESS NOTES
[facility]  TCU Monitoring    Clinical Data: Patient was identified from [facility] payor report and has transitioned to TCU/ARU for short term rehabilitation:    [facility] Name: [facility]  Transition Communication:  Notified facility of Ambulatory Care Management TCU monitoring process via Epic fax.    Assessment/Plan: Patient will be monitored by Ambulatory Care Management to identify patient's discharge plans/need. Care  will review chart and outreach to facility staff every 3-4 weeks and as needed.     Mariola Thomas  Care Transitions Assistant  [facility]

## 2024-10-11 ENCOUNTER — TELEPHONE (OUTPATIENT)
Dept: ONCOLOGY | Facility: HOSPITAL | Age: 84
End: 2024-10-11

## 2024-10-11 LAB
ANION GAP SERPL CALCULATED.3IONS-SCNC: 8 MMOL/L (ref 7–15)
BUN SERPL-MCNC: 12.9 MG/DL (ref 8–23)
CALCIUM SERPL-MCNC: 7.1 MG/DL (ref 8.8–10.4)
CHLORIDE SERPL-SCNC: 108 MMOL/L (ref 98–107)
CREAT SERPL-MCNC: 0.82 MG/DL (ref 0.51–0.95)
EGFRCR SERPLBLD CKD-EPI 2021: 70 ML/MIN/1.73M2
ERYTHROCYTE [DISTWIDTH] IN BLOOD BY AUTOMATED COUNT: 15.9 % (ref 10–15)
GLUCOSE SERPL-MCNC: 97 MG/DL (ref 70–99)
HCO3 SERPL-SCNC: 20 MMOL/L (ref 22–29)
HCT VFR BLD AUTO: 24.4 % (ref 35–47)
HGB BLD-MCNC: 7.9 G/DL (ref 11.7–15.7)
MCH RBC QN AUTO: 29.8 PG (ref 26.5–33)
MCHC RBC AUTO-ENTMCNC: 32.4 G/DL (ref 31.5–36.5)
MCV RBC AUTO: 92 FL (ref 78–100)
PLATELET # BLD AUTO: 190 10E3/UL (ref 150–450)
POTASSIUM SERPL-SCNC: 3.9 MMOL/L (ref 3.4–5.3)
RBC # BLD AUTO: 2.65 10E6/UL (ref 3.8–5.2)
SODIUM SERPL-SCNC: 136 MMOL/L (ref 135–145)
WBC # BLD AUTO: 12.5 10E3/UL (ref 4–11)

## 2024-10-11 PROCEDURE — 85027 COMPLETE CBC AUTOMATED: CPT | Mod: ORL | Performed by: NURSE PRACTITIONER

## 2024-10-11 PROCEDURE — P9604 ONE-WAY ALLOW PRORATED TRIP: HCPCS | Mod: ORL | Performed by: NURSE PRACTITIONER

## 2024-10-11 PROCEDURE — 80048 BASIC METABOLIC PNL TOTAL CA: CPT | Mod: ORL | Performed by: NURSE PRACTITIONER

## 2024-10-11 PROCEDURE — 36415 COLL VENOUS BLD VENIPUNCTURE: CPT | Mod: ORL | Performed by: NURSE PRACTITIONER

## 2024-10-11 RX ORDER — LISINOPRIL AND HYDROCHLOROTHIAZIDE 10; 12.5 MG/1; MG/1
1 TABLET ORAL DAILY
COMMUNITY
End: 2024-10-14

## 2024-10-11 NOTE — TELEPHONE ENCOUNTER
Charlotte Kearns called and reports her mom is having diarrhea and that the staff at [facility] does not have orders for unlimited imodium. Would I please call and give them the orders.    I then called [facility] to speak with Yvrose, nurse caring for Kacie. Yvrose was not aware of diarrhea. She took down the directions for the use of imodium however, only up to 8/day. Yvrose expressed understanding and will pass the message along to other care givers. AVERY Foster RN, OCN, CBCN

## 2024-10-12 LAB — BACTERIA BLD CULT: NO GROWTH

## 2024-10-13 ENCOUNTER — LAB REQUISITION (OUTPATIENT)
Dept: LAB | Facility: CLINIC | Age: 84
End: 2024-10-13
Payer: COMMERCIAL

## 2024-10-13 DIAGNOSIS — R30.9 PAINFUL MICTURITION, UNSPECIFIED: ICD-10-CM

## 2024-10-13 DIAGNOSIS — B99.9 UNSPECIFIED INFECTIOUS DISEASE: ICD-10-CM

## 2024-10-13 LAB
ALBUMIN UR-MCNC: 30 MG/DL
APPEARANCE UR: ABNORMAL
BACTERIA #/AREA URNS HPF: ABNORMAL /HPF
BILIRUB UR QL STRIP: NEGATIVE
COLOR UR AUTO: ABNORMAL
GLUCOSE UR STRIP-MCNC: NEGATIVE MG/DL
HGB UR QL STRIP: ABNORMAL
KETONES UR STRIP-MCNC: NEGATIVE MG/DL
LEUKOCYTE ESTERASE UR QL STRIP: ABNORMAL
NITRATE UR QL: NEGATIVE
PH UR STRIP: 5.5 [PH] (ref 5–7)
RBC URINE: 2 /HPF
SP GR UR STRIP: 1.02 (ref 1–1.03)
SQUAMOUS EPITHELIAL: 2 /HPF
UROBILINOGEN UR STRIP-MCNC: NORMAL MG/DL
WBC CLUMPS #/AREA URNS HPF: PRESENT /HPF
WBC URINE: >182 /HPF

## 2024-10-13 PROCEDURE — 87186 SC STD MICRODIL/AGAR DIL: CPT | Mod: ORL | Performed by: FAMILY MEDICINE

## 2024-10-13 PROCEDURE — 81001 URINALYSIS AUTO W/SCOPE: CPT | Mod: ORL | Performed by: FAMILY MEDICINE

## 2024-10-14 ENCOUNTER — ANTICOAGULATION THERAPY VISIT (OUTPATIENT)
Dept: ANTICOAGULATION | Facility: CLINIC | Age: 84
End: 2024-10-14

## 2024-10-14 ENCOUNTER — LAB REQUISITION (OUTPATIENT)
Dept: LAB | Facility: CLINIC | Age: 84
End: 2024-10-14
Payer: COMMERCIAL

## 2024-10-14 ENCOUNTER — TRANSITIONAL CARE UNIT VISIT (OUTPATIENT)
Dept: GERIATRICS | Facility: CLINIC | Age: 84
End: 2024-10-14
Payer: COMMERCIAL

## 2024-10-14 VITALS
HEART RATE: 67 BPM | DIASTOLIC BLOOD PRESSURE: 57 MMHG | WEIGHT: 141.6 LBS | RESPIRATION RATE: 18 BRPM | SYSTOLIC BLOOD PRESSURE: 94 MMHG | HEIGHT: 63 IN | BODY MASS INDEX: 25.09 KG/M2 | OXYGEN SATURATION: 98 % | TEMPERATURE: 98.7 F

## 2024-10-14 DIAGNOSIS — N17.9 AKI (ACUTE KIDNEY INJURY) (H): ICD-10-CM

## 2024-10-14 DIAGNOSIS — I51.89 DIASTOLIC DYSFUNCTION: ICD-10-CM

## 2024-10-14 DIAGNOSIS — E86.1 HYPOTENSION DUE TO HYPOVOLEMIA: ICD-10-CM

## 2024-10-14 DIAGNOSIS — I63.9 CEREBELLAR STROKE (H): ICD-10-CM

## 2024-10-14 DIAGNOSIS — I63.9 CEREBELLAR STROKE (H): Primary | ICD-10-CM

## 2024-10-14 DIAGNOSIS — N30.00 ACUTE CYSTITIS WITHOUT HEMATURIA: ICD-10-CM

## 2024-10-14 DIAGNOSIS — E87.6 HYPOKALEMIA: ICD-10-CM

## 2024-10-14 DIAGNOSIS — Z95.2 S/P TAVR (TRANSCATHETER AORTIC VALVE REPLACEMENT): Primary | ICD-10-CM

## 2024-10-14 LAB
ANION GAP SERPL CALCULATED.3IONS-SCNC: 9 MMOL/L (ref 7–15)
BACTERIA UR CULT: ABNORMAL
BUN SERPL-MCNC: 12.7 MG/DL (ref 8–23)
CALCIUM SERPL-MCNC: 6.7 MG/DL (ref 8.8–10.4)
CHLORIDE SERPL-SCNC: 105 MMOL/L (ref 98–107)
CREAT SERPL-MCNC: 0.83 MG/DL (ref 0.51–0.95)
EGFRCR SERPLBLD CKD-EPI 2021: 69 ML/MIN/1.73M2
ERYTHROCYTE [DISTWIDTH] IN BLOOD BY AUTOMATED COUNT: 15.9 % (ref 10–15)
GLUCOSE SERPL-MCNC: 90 MG/DL (ref 70–99)
HCO3 SERPL-SCNC: 22 MMOL/L (ref 22–29)
HCT VFR BLD AUTO: 22.7 % (ref 35–47)
HGB BLD-MCNC: 7.6 G/DL (ref 11.7–15.7)
INR (EXTERNAL): 3.1 (ref 0.9–1.1)
MCH RBC QN AUTO: 29.9 PG (ref 26.5–33)
MCHC RBC AUTO-ENTMCNC: 33.5 G/DL (ref 31.5–36.5)
MCV RBC AUTO: 89 FL (ref 78–100)
PLATELET # BLD AUTO: 167 10E3/UL (ref 150–450)
POTASSIUM SERPL-SCNC: 2.3 MMOL/L (ref 3.4–5.3)
RBC # BLD AUTO: 2.54 10E6/UL (ref 3.8–5.2)
SODIUM SERPL-SCNC: 136 MMOL/L (ref 135–145)
WBC # BLD AUTO: 8.7 10E3/UL (ref 4–11)

## 2024-10-14 PROCEDURE — 80048 BASIC METABOLIC PNL TOTAL CA: CPT | Mod: ORL | Performed by: NURSE PRACTITIONER

## 2024-10-14 PROCEDURE — 85027 COMPLETE CBC AUTOMATED: CPT | Mod: ORL | Performed by: NURSE PRACTITIONER

## 2024-10-14 PROCEDURE — 36415 COLL VENOUS BLD VENIPUNCTURE: CPT | Mod: ORL | Performed by: NURSE PRACTITIONER

## 2024-10-14 PROCEDURE — P9604 ONE-WAY ALLOW PRORATED TRIP: HCPCS | Mod: ORL | Performed by: NURSE PRACTITIONER

## 2024-10-14 PROCEDURE — 99310 SBSQ NF CARE HIGH MDM 45: CPT | Performed by: NURSE PRACTITIONER

## 2024-10-14 NOTE — LETTER
10/14/2024      Kacie Hermosillo  [address] [facility] [address]        [facility]    Code Status:  DNR/DNI   Visit Type:   Chief Complaint   Patient presents with     TCU Follow Up     Facility:  [facility]) [[id number]]         HPI: Kacie Hermosillo is a 84 year old female who I am seeing today for admit to the TCU. Past medical history includes aortic valve replacement on warfarin anticoagulation, CKD 3, prior cerebellar stroke, hypertension, hyperlipidemia, and history of breast cancer metastatic to bone. Pt recently hospitalized with syncope related to orthostatic hypotension due to diarrhea from chemotherapy. Pt with ALTAGRACIA and electrolyte imbalances. PSVT. Pt with fall sustaining laceration to scalp and T12 fracture.       Transitional Care Course: Today pt sitting up in bed. Pt with low bp in the 80s SBP suspect from dehydration. She had increased edema, wheezing and 3+ LE edema last week and lisinopril/hydrochlorothiazide combo resumed at lower dose. This was held this am. Fluids encouraged overnight. Today K+ 2.3. She is asymptomatic. She is alert, talking and walked to bathroom prior to my visit. Bp on the soft side. She denies any SOB or CP. She continues with BLE edema 2+. No wheezing. UTI. She had burning with urination over weekend. UA/UC obtained and UC with gram positive bacili. She is a febrile. She did have an episode of diarrhea earlier this am and an episode yesterday.     Her brother is present on exam. I did review with pt and family, including her daughter via phone laboratory results and plan of care. Pt would like to avoid going back to hospital if possible.        Assessment/Plan:    Hypokalemia  -K+ today 2.3. Asymptomatic.   -Suspect from restarting hydrochlorothiazide. This was held this am.   -Potassium chloride 40 meq Q 4 hours X 3 today.   -Repeat BMP in am.     Diarrhea  -Obtain stool for C diff and Norovirus.     UTI  -WBC 12.5. Today 8.7.  -UC with  gram negative bacilli. Awaiting sensitives.   -Give 1 gm IM Rocephin with lidocaine today.     Syncope  PSVT  Hypotension   -due to orthostatic hypotension due to recent diarrhea from chemo.   -TTE shows LVEF 70%, normal RV, bioprosthetic AV are present with no significant regurgitation.  -Head CT negative.   -EEG negative.   -pt underwent fluid resuscitation. Hydrochlorothiazide on hold.   -continue metoprolol with hold parameters.   -Fluids encouraged today 240cc  with each med pass.     BLE VS ulcers   -today pt with 3+ bilateral LE edema with skin ulcers.   -Unable to tolerate hydrochlorothiazide.   -xeroform guaze to BLE with ABD and kerlix daily.   -Tubi  on am/off hs.   -Monitor Bp.     Forehead laceration  -CT head and neck is negative   -discontinue sutures to scalp wound  in 10 days.      severe aortic stenosis  History of bioprosthetic aortic valve replacement   -TTE shows LVEF 70%, normal RV, bioprosthetic AV are present with no significant regurgitation  -INR 3.1 today.   -Continue warfarin  -Coumadin clinic to manage INR.      ALTAGRACIA   -Give additional 240 ml with each med pass.   -Follow up BMP in am.   -lisinopril/hydrochlorothiazide on hold      T12 compression fracture, old   - CT thoracic spine shows Slight loss of height superior aspect of T4, probably chronic. Marked compression of T12, age indeterminate however there is suggestion of acute/subacute component. Associated retropulsion with mild canal narrowing.  - Neurosurgery consulted and recommended conservative cares  - continue Tylenol and tramadol for pain control     History of metastatic breast cancer  - S/P chemotherapy  - Question of seizure as per neurology note on 10/2/24   - Negative brain MRI on 10/3/2024 for acute intracranial pathology  - EEG negative  -Chemo on hold.   -Follow up with Oncology out pt.         Active Ambulatory Problems     Diagnosis Date Noted     Aortic valve disorder 10/18/2012     Chronic renal failure, stage  3 (moderate) (H) 02/19/2018     Diastolic dysfunction 02/17/2016     Essential hypertension 02/17/2016     Dysuria 06/06/2022     Gastroesophageal reflux disease 02/17/2016     Hyperlipidemia 02/17/2016     Malignant neoplasm of areola of left breast in female (H) 02/17/2016     Cerebellar stroke (H) 07/02/2020     S/P TAVR (transcatheter aortic valve replacement) 11/26/2021     Osteopenia 03/13/2017     Paroxysmal supraventricular tachycardia (H) 11/26/2021     Varicose vein of leg 02/17/2016     Venous insufficiency 02/17/2016     Mild carotid artery disease (H) 05/04/2023     Malignant neoplasm metastatic to bone (H) 06/27/2024     Hyponatremia 10/06/2024     Elevated INR 10/06/2024     T12 compression fracture, sequela 10/06/2024     Fall, initial encounter 10/06/2024     Laceration of forehead, initial encounter 10/06/2024     Syncope 10/07/2024     DNR (do not resuscitate) 10/07/2024     Hypocalcemia 10/08/2024     Hypophosphatemia 10/08/2024     LBBB (left bundle branch block) 10/08/2024     Decreased carotid pulse 03/13/2017     Low back pain 03/31/2019     Lymphocytic colitis 10/05/2021     Severe aortic stenosis 02/17/2016     Resolved Ambulatory Problems     Diagnosis Date Noted     Trochanteric bursitis of left hip 01/05/2024     Past Medical History:   Diagnosis Date     Breast cancer (H) 01/01/1999     Heart valve replaced      Hx antineoplastic chemotherapy 01/01/1999     Hx of radiation therapy 01/01/1999     Hypertension      No Known Allergies    All Meds and Allergies reviewed in the record at the facility and is the most up-to-date.    Current Outpatient Medications   Medication Sig Dispense Refill     acetaminophen (TYLENOL) 325 MG tablet Take 650 mg by mouth every 8 hours as needed for mild pain       aspirin (ASA) 81 MG EC tablet Take 81 mg by mouth every morning.       atorvastatin (LIPITOR) 40 MG tablet Take 1 tablet (40 mg) by mouth every morning 90 tablet 3     chlorhexidine (PERIDEX) 0.12  "% solution Take 15 mLs by mouth daily as needed.       estradiol (ESTRACE) 0.1 MG/GM vaginal cream Place 0.5 g vaginally twice a week. Can use daily for 2 weeks and then twice a week after that. 42 g 1     lidocaine-prilocaine (EMLA) 2.5-2.5 % external cream Apply topically as needed for other (Apply small amount to port site 30-60 minutes prior to port access to minimize discomfort) 30 g 2     lisinopril-hydrochlorothiazide (ZESTORETIC) 10-12.5 MG tablet Take 1 tablet by mouth daily.       methylcellulose (CITRUCEL) 500 MG TABS tablet Take 500 mg by mouth every morning.       metoprolol tartrate (LOPRESSOR) 25 MG tablet Take 1 tablet (25 mg) by mouth 2 times daily 180 tablet 3     ondansetron (ZOFRAN) 8 MG tablet Take 1 tablet (8 mg) by mouth every 8 hours as needed for nausea (vomiting) 30 tablet 2     prochlorperazine (COMPAZINE) 10 MG tablet Take 1 tablet (10 mg) by mouth every 6 hours as needed for nausea or vomiting 30 tablet 2     traMADol (ULTRAM) 50 MG tablet Take 1 tablet (50 mg) by mouth every 8 hours as needed for moderate to severe pain. 30 tablet 0     warfarin ANTICOAGULANT (JANTOVEN ANTICOAGULANT) 5 MG tablet 1/2 tab (2.5mg) on Mon, Wed, Fri and 1 tab (5mg) all other days 90 tablet 3     WARFARIN SODIUM PO Take by mouth See Admin Instructions. Dosing in coordination with INR results       No current facility-administered medications for this visit.     Facility-Administered Medications Ordered in Other Visits   Medication Dose Route Frequency Provider Last Rate Last Admin     heparin lock flush 100 unit/mL injection 5 mL  5 mL Intracatheter Q30 Days Neymar Gann MD   5 mL at 10/03/24 1031       REVIEW OF SYSTEMS:   10 point review of systems reviewed and pertinent positives in the HPI.     PHYSICAL EXAMINATION:  Physical Exam     Vital signs: BP 94/57   Pulse 67   Temp 98.7  F (37.1  C)   Resp 18   Ht 1.6 m (5' 3\")   Wt 64.2 kg (141 lb 9.6 oz)   SpO2 98%   BMI 25.08 kg/m  "   General: Awake, Alert, oriented x3,lying in bed,  appropriately, follows simple commands, conversant  HEENT:Pink conjunctiva,  moist oral mucosa  NECK: Supple  CVS:  S1  S2, without murmur or gallop.   LUNG: Clear to auscultation, No wheezes, rales or rhonci.  BACK: No kyphosis of the thoracic spine  ABDOMEN: Soft, nontender to palpation, with positive bowel sounds  EXTREMITIES: Moves all extremities with diffuse weakness, 2+ pedal edema, no calf tenderness  SKIN: wounds to LE covered.   NEUROLOGIC: Intact, pulses palpable  PSYCHIATRIC: Cognition intact      Labs:  All labs reviewed in the nursing home record and Epic   @  Lab Results   Component Value Date    WBC 9.9 10/09/2024     Lab Results   Component Value Date    RBC 2.85 10/09/2024     Lab Results   Component Value Date    HGB 8.4 10/09/2024     Lab Results   Component Value Date    HCT 25.2 10/09/2024     Lab Results   Component Value Date    MCV 88 10/09/2024     Lab Results   Component Value Date    MCH 29.5 10/09/2024     Lab Results   Component Value Date    MCHC 33.3 10/09/2024     Lab Results   Component Value Date    RDW 15.7 10/09/2024     Lab Results   Component Value Date     10/09/2024        @Last Comprehensive Metabolic Panel:  Sodium   Date Value Ref Range Status   10/14/2024 136 135 - 145 mmol/L Final     Potassium   Date Value Ref Range Status   10/14/2024 2.3 (LL) 3.4 - 5.3 mmol/L Final     Chloride   Date Value Ref Range Status   10/14/2024 105 98 - 107 mmol/L Final     Carbon Dioxide (CO2)   Date Value Ref Range Status   10/14/2024 22 22 - 29 mmol/L Final     Anion Gap   Date Value Ref Range Status   10/14/2024 9 7 - 15 mmol/L Final     Glucose   Date Value Ref Range Status   10/14/2024 90 70 - 99 mg/dL Final     GLUCOSE BY METER POCT   Date Value Ref Range Status   06/25/2024 87 70 - 99 mg/dL Final     Urea Nitrogen   Date Value Ref Range Status   10/14/2024 12.7 8.0 - 23.0 mg/dL Final     Creatinine   Date Value Ref Range  Status   10/14/2024 0.83 0.51 - 0.95 mg/dL Final     GFR Estimate   Date Value Ref Range Status   10/14/2024 69 >60 mL/min/1.73m2 Final     Calcium   Date Value Ref Range Status   10/14/2024 6.7 (L) 8.8 - 10.4 mg/dL Final     Comment:     Reference intervals for this test were updated on 7/16/2024 to reflect our healthy population more accurately. There may be differences in the flagging of prior results with similar values performed with this method. Those prior results can be interpreted in the context of the updated reference intervals.       > 45 minutes spent preparing for this visit, reviewing labs, meds, consults and imaging as well face to face time spent with pt and collaborating with nursing.     This note has been dictated using voice recognition software. Any grammatical or context distortions are unintentional and inherent to the software    Electronically signed by: Sissy Grant CNP       Sincerely,        Sissy Grant NP

## 2024-10-14 NOTE — PROGRESS NOTES
Incoming fax from [facility]    Date of result 10/14/24    INR result 3.1    Route result to: [facility] (TCU/LTC/[facility]NACIO)

## 2024-10-14 NOTE — PROGRESS NOTES
ANTICOAGULATION MANAGEMENT     Kacie Hermosillo 84 year old female is on warfarin with supratherapeutic INR result. (Goal INR 2.0-3.0)    Recent labs: (last 7 days)     10/14/24  0000   INR 3.1*       ASSESSMENT     Source(s): Chart Review, Home Care/Facility Nurse, and Template     Warfarin doses taken: Warfarin taken as instructed  Diet: No new diet changes identified  Medication/supplement changes: None noted  New illness, injury, or hospitalization: No  Signs or symptoms of bleeding or clotting: Yes: patient had diarrhea last week  Previous result: Subtherapeutic  Additional findings: None       PLAN     Recommended plan for temporary change(s) affecting INR     Dosing Instructions: Continue your current warfarin dose with next INR in 3 days       Summary  As of 10/14/2024      Full warfarin instructions:  5 mg every Mon, Wed, Fri; 2.5 mg all other days   Next INR check:  10/17/2024               Telephone call with [facility] nurse (medical care for seniors) who verbalizes understanding and agrees to plan and who agrees to plan and repeated back plan correctly    Orders given to  Homecare nurse/facility to recheck    Education provided: Please call back if any changes to your diet, medications or how you've been taking warfarin    Plan made per [facility] anticoagulation protocol    Shahla Newberry RN  10/14/2024  Anticoagulation Clinic  [email or web address] for routing messages: p [facility] (TCU/C/penitentiary)  [facility] patient phone line: [phone]        _______________________________________________________________________     Anticoagulation Episode Summary       Current INR goal:  2.0-3.0   TTR:  81.0% (1 y)   Target end date:  Indefinite   Send INR reminders to:  [facility] (TCU/LTC/IGNACIO)    Indications    S/P TAVR (transcatheter aortic valve replacement) [Z95.2]  Cerebellar stroke (H) [I63.9]             Comments:  10/9/24-Theo Fonseca patient    2.0-3.0 per Dr. Olguin 5/24/23 (4/10/23-Cardiac CT: Radiographic features of valve leaflet thickening w concurrent hypoattenuation and reduced leaflet motion all highly suggestive of valve   leaflet thrombosis)             Anticoagulation Care Providers       Provider Role Specialty Phone number    Irina Francisco PA-C Referring Family Medicine [phone]

## 2024-10-15 ENCOUNTER — LAB REQUISITION (OUTPATIENT)
Dept: LAB | Facility: CLINIC | Age: 84
End: 2024-10-15
Payer: COMMERCIAL

## 2024-10-15 ENCOUNTER — TRANSITIONAL CARE UNIT VISIT (OUTPATIENT)
Dept: GERIATRICS | Facility: CLINIC | Age: 84
End: 2024-10-15
Payer: COMMERCIAL

## 2024-10-15 VITALS
RESPIRATION RATE: 18 BRPM | OXYGEN SATURATION: 98 % | HEIGHT: 63 IN | SYSTOLIC BLOOD PRESSURE: 135 MMHG | WEIGHT: 137.8 LBS | BODY MASS INDEX: 24.41 KG/M2 | HEART RATE: 83 BPM | TEMPERATURE: 97.5 F | DIASTOLIC BLOOD PRESSURE: 73 MMHG

## 2024-10-15 DIAGNOSIS — I51.89 DIASTOLIC DYSFUNCTION: ICD-10-CM

## 2024-10-15 DIAGNOSIS — D64.9 ANEMIA, UNSPECIFIED: ICD-10-CM

## 2024-10-15 DIAGNOSIS — D50.8 OTHER IRON DEFICIENCY ANEMIA: ICD-10-CM

## 2024-10-15 DIAGNOSIS — R19.7 DIARRHEA, UNSPECIFIED: ICD-10-CM

## 2024-10-15 DIAGNOSIS — I35.9 AORTIC VALVE DISORDER: ICD-10-CM

## 2024-10-15 DIAGNOSIS — E87.6 HYPOKALEMIA: Primary | ICD-10-CM

## 2024-10-15 DIAGNOSIS — E86.1 HYPOTENSION DUE TO HYPOVOLEMIA: ICD-10-CM

## 2024-10-15 DIAGNOSIS — S22.080D COMPRESSION FRACTURE OF T12 VERTEBRA WITH ROUTINE HEALING, SUBSEQUENT ENCOUNTER: ICD-10-CM

## 2024-10-15 DIAGNOSIS — N30.00 ACUTE CYSTITIS WITHOUT HEMATURIA: ICD-10-CM

## 2024-10-15 DIAGNOSIS — I63.9 CEREBELLAR STROKE (H): ICD-10-CM

## 2024-10-15 DIAGNOSIS — Z95.2 S/P TAVR (TRANSCATHETER AORTIC VALVE REPLACEMENT): ICD-10-CM

## 2024-10-15 DIAGNOSIS — Z85.3 HX OF BREAST CANCER: ICD-10-CM

## 2024-10-15 DIAGNOSIS — N17.9 AKI (ACUTE KIDNEY INJURY) (H): ICD-10-CM

## 2024-10-15 LAB
ANION GAP SERPL CALCULATED.3IONS-SCNC: 7 MMOL/L (ref 7–15)
BUN SERPL-MCNC: 7.8 MG/DL (ref 8–23)
CALCIUM SERPL-MCNC: 7.4 MG/DL (ref 8.8–10.4)
CHLORIDE SERPL-SCNC: 110 MMOL/L (ref 98–107)
CREAT SERPL-MCNC: 0.75 MG/DL (ref 0.51–0.95)
EGFRCR SERPLBLD CKD-EPI 2021: 78 ML/MIN/1.73M2
ERYTHROCYTE [DISTWIDTH] IN BLOOD BY AUTOMATED COUNT: 16.6 % (ref 10–15)
GLUCOSE SERPL-MCNC: 97 MG/DL (ref 70–99)
HCO3 SERPL-SCNC: 22 MMOL/L (ref 22–29)
HCT VFR BLD AUTO: 22.3 % (ref 35–47)
HGB BLD-MCNC: 7.5 G/DL (ref 11.7–15.7)
MCH RBC QN AUTO: 30.4 PG (ref 26.5–33)
MCHC RBC AUTO-ENTMCNC: 33.6 G/DL (ref 31.5–36.5)
MCV RBC AUTO: 90 FL (ref 78–100)
PLATELET # BLD AUTO: 183 10E3/UL (ref 150–450)
POTASSIUM SERPL-SCNC: 3.9 MMOL/L (ref 3.4–5.3)
RBC # BLD AUTO: 2.47 10E6/UL (ref 3.8–5.2)
SODIUM SERPL-SCNC: 139 MMOL/L (ref 135–145)
WBC # BLD AUTO: 6.1 10E3/UL (ref 4–11)

## 2024-10-15 PROCEDURE — P9604 ONE-WAY ALLOW PRORATED TRIP: HCPCS | Mod: ORL | Performed by: NURSE PRACTITIONER

## 2024-10-15 PROCEDURE — 99310 SBSQ NF CARE HIGH MDM 45: CPT | Performed by: NURSE PRACTITIONER

## 2024-10-15 PROCEDURE — 36415 COLL VENOUS BLD VENIPUNCTURE: CPT | Mod: ORL | Performed by: NURSE PRACTITIONER

## 2024-10-15 PROCEDURE — 80048 BASIC METABOLIC PNL TOTAL CA: CPT | Mod: ORL | Performed by: NURSE PRACTITIONER

## 2024-10-15 PROCEDURE — 85027 COMPLETE CBC AUTOMATED: CPT | Mod: ORL | Performed by: NURSE PRACTITIONER

## 2024-10-15 RX ORDER — NITROFURANTOIN 25; 75 MG/1; MG/1
100 CAPSULE ORAL 2 TIMES DAILY
COMMUNITY
Start: 2024-10-15 | End: 2024-10-21

## 2024-10-15 NOTE — LETTER
10/15/2024      Kacie Hermosillo  [address] [facility] [address]        [facility]    Code Status:  DNR/DNI   Visit Type:   Chief Complaint   Patient presents with     TCU Follow Up     Facility:  [facility]) [[id number]]         HPI: Kacie Hermosillo is a 84 year old female who I am seeing today for follow-up on the TCU. Past medical history includes aortic valve replacement on warfarin anticoagulation, CKD 3, prior cerebellar stroke, hypertension, hyperlipidemia, and history of breast cancer metastatic to bone. Pt recently hospitalized with syncope related to orthostatic hypotension due to diarrhea from chemotherapy. Pt with ALTAGRACIA and electrolyte imbalances. PSVT. Pt with fall sustaining laceration to scalp and T12 fracture.       Transitional Care Course: Today pt sitting up in bed.  Patient seen yesterday and found to have hypokalemia.  Potassium replaced.  Today potassium 3.9.  She continues on potassium supplement.  Previous hypotension.  Her hydrochlorothiazide/lisinopril has been discontinued.  Blood pressure on the soft side but improved on today's visit.  Fluids encouraged overnight.  Edema to her lower extremities improved.  Guaiac x 1 negative.  No further diarrhea.  UTI.  Urine culture greater than 100,000 E. coli.  She received 1 g of IM Rocephin x 2.  Currently afebrile.  No leukocytosis.      Assessment/Plan:    Hypokalemia  -Yesterday potassium 2.3.  She did receive every 4 hour supplementation. Follow up K+ today 3.9.   -Continue 20 meq every day.   -No longer on HCTZ/lisinopril combo.  -Repeat BMP on Thursday.    Diarrhea  -Obtain stool for C diff and Norovirus.   -Patient denies any further diarrhea today.    UTI  -WBC 12.5 now 8.7.  -UC with greater than 100,000 E. coli.  -Give 1 gm IM Rocephin with lidocaine given x 2.  -Start Macrobid 100 mg p.o. twice daily x 7 days.    Syncope  PSVT  Hypotension   -due to orthostatic hypotension due to recent diarrhea from  chemo.   -TTE shows LVEF 70%, normal RV, bioprosthetic AV are present with no significant regurgitation.  -Head CT negative.   -EEG negative.   -pt underwent fluid resuscitation. Hydrochlorothiazide on hold.   -continue metoprolol with hold parameters.   -Fluids encouraged today 240cc  with each med pass.     BLE VS ulcers   -today pt with 3+ bilateral LE edema with skin ulcers.   -Unable to tolerate hydrochlorothiazide.   -xeroform guaze to BLE with ABD and kerlix daily.   -House wound MD to follow.  -Tubi  on am/off hs.   -Monitor Bp.     Forehead laceration  -CT head and neck is negative   -discontinue sutures to scalp wound  in 10 days.      severe aortic stenosis  History of bioprosthetic aortic valve replacement   -TTE shows LVEF 70%, normal RV, bioprosthetic AV are present with no significant regurgitation  -INR 3.1 today.   -Continue warfarin  -Coumadin clinic to manage INR.      ALTAGRACIA   -Give additional 240 ml with each med pass.   -Follow up BMP in am.   -lisinopril/hydrochlorothiazide on hold      T12 compression fracture, old   - CT thoracic spine shows Slight loss of height superior aspect of T4, probably chronic. Marked compression of T12, age indeterminate however there is suggestion of acute/subacute component. Associated retropulsion with mild canal narrowing.  - Neurosurgery consulted and recommended conservative cares  - continue Tylenol and tramadol for pain control     History of metastatic breast cancer  - S/P chemotherapy  - Question of seizure as per neurology note on 10/2/24   - Negative brain MRI on 10/3/2024 for acute intracranial pathology  - EEG negative  -Chemo on hold.   -Follow up with Oncology out pt.     Anemia  -hgb 7.5.   -Guaiac stool x 3.  First guaiac negative.  -Ferrous sulfate 325 mg p.o. daily.  -Repeat CBC on Thursday.          Active Ambulatory Problems     Diagnosis Date Noted     Aortic valve disorder 10/18/2012     Chronic renal failure, stage 3 (moderate) (H)  02/19/2018     Diastolic dysfunction 02/17/2016     Essential hypertension 02/17/2016     Dysuria 06/06/2022     Gastroesophageal reflux disease 02/17/2016     Hyperlipidemia 02/17/2016     Malignant neoplasm of areola of left breast in female (H) 02/17/2016     Cerebellar stroke (H) 07/02/2020     S/P TAVR (transcatheter aortic valve replacement) 11/26/2021     Osteopenia 03/13/2017     Paroxysmal supraventricular tachycardia (H) 11/26/2021     Varicose vein of leg 02/17/2016     Venous insufficiency 02/17/2016     Mild carotid artery disease (H) 05/04/2023     Malignant neoplasm metastatic to bone (H) 06/27/2024     Hyponatremia 10/06/2024     Elevated INR 10/06/2024     T12 compression fracture, sequela 10/06/2024     Fall, initial encounter 10/06/2024     Laceration of forehead, initial encounter 10/06/2024     Syncope 10/07/2024     DNR (do not resuscitate) 10/07/2024     Hypocalcemia 10/08/2024     Hypophosphatemia 10/08/2024     LBBB (left bundle branch block) 10/08/2024     Decreased carotid pulse 03/13/2017     Low back pain 03/31/2019     Lymphocytic colitis 10/05/2021     Severe aortic stenosis 02/17/2016     Resolved Ambulatory Problems     Diagnosis Date Noted     Trochanteric bursitis of left hip 01/05/2024     Past Medical History:   Diagnosis Date     Breast cancer (H) 01/01/1999     Heart valve replaced      Hx antineoplastic chemotherapy 01/01/1999     Hx of radiation therapy 01/01/1999     Hypertension      No Known Allergies    All Meds and Allergies reviewed in the record at the facility and is the most up-to-date.    Current Outpatient Medications   Medication Sig Dispense Refill     nitroFURantoin macrocrystal-monohydrate (MACROBID) 100 MG capsule Take 100 mg by mouth 2 times daily.       acetaminophen (TYLENOL) 325 MG tablet Take 650 mg by mouth every 8 hours as needed for mild pain       aspirin (ASA) 81 MG EC tablet Take 81 mg by mouth every morning.       atorvastatin (LIPITOR) 40 MG  "tablet Take 1 tablet (40 mg) by mouth every morning 90 tablet 3     chlorhexidine (PERIDEX) 0.12 % solution Take 15 mLs by mouth daily as needed.       estradiol (ESTRACE) 0.1 MG/GM vaginal cream Place 0.5 g vaginally twice a week. Can use daily for 2 weeks and then twice a week after that. 42 g 1     lidocaine-prilocaine (EMLA) 2.5-2.5 % external cream Apply topically as needed for other (Apply small amount to port site 30-60 minutes prior to port access to minimize discomfort) 30 g 2     methylcellulose (CITRUCEL) 500 MG TABS tablet Take 500 mg by mouth every morning.       metoprolol tartrate (LOPRESSOR) 25 MG tablet Take 1 tablet (25 mg) by mouth 2 times daily 180 tablet 3     ondansetron (ZOFRAN) 8 MG tablet Take 1 tablet (8 mg) by mouth every 8 hours as needed for nausea (vomiting) 30 tablet 2     prochlorperazine (COMPAZINE) 10 MG tablet Take 1 tablet (10 mg) by mouth every 6 hours as needed for nausea or vomiting 30 tablet 2     traMADol (ULTRAM) 50 MG tablet Take 1 tablet (50 mg) by mouth every 8 hours as needed for moderate to severe pain. 30 tablet 0     warfarin ANTICOAGULANT (JANTOVEN ANTICOAGULANT) 5 MG tablet 1/2 tab (2.5mg) on Mon, Wed, Fri and 1 tab (5mg) all other days 90 tablet 3     WARFARIN SODIUM PO Take by mouth See Admin Instructions. Dosing in coordination with INR results       No current facility-administered medications for this visit.     Facility-Administered Medications Ordered in Other Visits   Medication Dose Route Frequency Provider Last Rate Last Admin     heparin lock flush 100 unit/mL injection 5 mL  5 mL Intracatheter Q30 Days Neymar Gann MD   5 mL at 10/03/24 1031       REVIEW OF SYSTEMS:   10 point review of systems reviewed and pertinent positives in the HPI.     PHYSICAL EXAMINATION:  Physical Exam     Vital signs: /73   Pulse 83   Temp 97.5  F (36.4  C)   Resp 18   Ht 1.6 m (5' 3\")   Wt 62.5 kg (137 lb 12.8 oz)   SpO2 98%   BMI 24.41 kg/m  "   General: Awake, Alert, oriented x3,lying in bed,  appropriately, follows simple commands, conversant  HEENT:Pink conjunctiva,  moist oral mucosa  NECK: Supple  CVS:  S1  S2, without murmur or gallop.   LUNG: Clear to auscultation, No wheezes, rales or rhonci.  BACK: No kyphosis of the thoracic spine  ABDOMEN: Soft, nontender to palpation, with positive bowel sounds  EXTREMITIES: Moves all extremities with diffuse weakness, 1+ pedal edema, no calf tenderness  SKIN: wounds to LE covered.   NEUROLOGIC: Intact, pulses palpable  PSYCHIATRIC: Cognition intact      Labs:  All labs reviewed in the nursing home record and Epic   @  Lab Results   Component Value Date    WBC 9.9 10/09/2024     Lab Results   Component Value Date    RBC 2.85 10/09/2024     Lab Results   Component Value Date    HGB 8.4 10/09/2024     Lab Results   Component Value Date    HCT 25.2 10/09/2024     Lab Results   Component Value Date    MCV 88 10/09/2024     Lab Results   Component Value Date    MCH 29.5 10/09/2024     Lab Results   Component Value Date    MCHC 33.3 10/09/2024     Lab Results   Component Value Date    RDW 15.7 10/09/2024     Lab Results   Component Value Date     10/09/2024        @Last Comprehensive Metabolic Panel:  Sodium   Date Value Ref Range Status   10/15/2024 139 135 - 145 mmol/L Final     Potassium   Date Value Ref Range Status   10/15/2024 3.9 3.4 - 5.3 mmol/L Final     Chloride   Date Value Ref Range Status   10/15/2024 110 (H) 98 - 107 mmol/L Final     Carbon Dioxide (CO2)   Date Value Ref Range Status   10/15/2024 22 22 - 29 mmol/L Final     Anion Gap   Date Value Ref Range Status   10/15/2024 7 7 - 15 mmol/L Final     Glucose   Date Value Ref Range Status   10/15/2024 97 70 - 99 mg/dL Final     GLUCOSE BY METER POCT   Date Value Ref Range Status   06/25/2024 87 70 - 99 mg/dL Final     Urea Nitrogen   Date Value Ref Range Status   10/15/2024 7.8 (L) 8.0 - 23.0 mg/dL Final     Creatinine   Date Value Ref Range  Status   10/15/2024 0.75 0.51 - 0.95 mg/dL Final     GFR Estimate   Date Value Ref Range Status   10/15/2024 78 >60 mL/min/1.73m2 Final     Calcium   Date Value Ref Range Status   10/15/2024 7.4 (L) 8.8 - 10.4 mg/dL Final     Comment:     Reference intervals for this test were updated on 7/16/2024 to reflect our healthy population more accurately. There may be differences in the flagging of prior results with similar values performed with this method. Those prior results can be interpreted in the context of the updated reference intervals.     This note has been dictated using voice recognition software. Any grammatical or context distortions are unintentional and inherent to the software    Electronically signed by: Sissy Grant CNP       Sincerely,        Sissy Grant NP

## 2024-10-15 NOTE — PROGRESS NOTES
[facility] G[facility]IATRIC [facility]    Code Status:  DNR/DNI   Visit Type:   Chief Complaint   Patient presents with    TCU Follow Up     Facility:  [facility] ([facility]) [[id number]]         HPI: Kacie Hermosillo is a 84 year old female who I am seeing today for admit to the TCU. Past medical history includes aortic valve replacement on warfarin anticoagulation, CKD 3, prior cerebellar stroke, hypertension, hyperlipidemia, and history of breast cancer metastatic to bone. Pt recently hospitalized with syncope related to orthostatic hypotension due to diarrhea from chemotherapy. Pt with ALTAGRACIA and electrolyte imbalances. PSVT. Pt with fall sustaining laceration to scalp and T12 fracture.       Transitional Care Course: Today pt sitting up in bed. Pt with low bp in the 80s SBP suspect from dehydration. She had increased edema, wheezing and 3+ LE edema last week and lisinopril/hydrochlorothiazide combo resumed at lower dose. This was held this am. Fluids encouraged overnight. Today K+ 2.3. She is asymptomatic. She is alert, talking and walked to bathroom prior to my visit. Bp on the soft side. She denies any SOB or CP. She continues with BLE edema 2+. No wheezing. UTI. She had burning with urination over weekend. UA/UC obtained and UC with gram positive bacili. She is a febrile. She did have an episode of diarrhea earlier this am and an episode yesterday.     Her brother is present on exam. I did review with pt and family, including her daughter via phone laboratory results and plan of care. Pt would like to avoid going back to hospital if possible.        Assessment/Plan:    Hypokalemia  -K+ today 2.3. Asymptomatic.   -Suspect from restarting hydrochlorothiazide. This was held this am.   -Potassium chloride 40 meq Q 4 hours X 3 today.   -Repeat BMP in am.     Diarrhea  -Obtain stool for C diff and Norovirus.     UTI  -WBC 12.5. Today 8.7.  -UC with gram negative bacilli. Awaiting sensitives.   -Give 1 gm IM Rocephin with  lidocaine today.     Syncope  PSVT  Hypotension   -due to orthostatic hypotension due to recent diarrhea from chemo.   -TTE shows LVEF 70%, normal RV, bioprosthetic AV are present with no significant regurgitation.  -Head CT negative.   -EEG negative.   -pt underwent fluid resuscitation. Hydrochlorothiazide on hold.   -continue metoprolol with hold parameters.   -Fluids encouraged today 240cc  with each med pass.     BLE VS ulcers   -today pt with 3+ bilateral LE edema with skin ulcers.   -Unable to tolerate hydrochlorothiazide.   -xeroform guaze to BLE with ABD and kerlix daily.   -Tubi  on am/off hs.   -Monitor Bp.     Forehead laceration  -CT head and neck is negative   -discontinue sutures to scalp wound  in 10 days.      severe aortic stenosis  History of bioprosthetic aortic valve replacement   -TTE shows LVEF 70%, normal RV, bioprosthetic AV are present with no significant regurgitation  -INR 3.1 today.   -Continue warfarin  -Coumadin clinic to manage INR.      ALTAGRACIA   -Give additional 240 ml with each med pass.   -Follow up BMP in am.   -lisinopril/hydrochlorothiazide on hold      T12 compression fracture, old   - CT thoracic spine shows Slight loss of height superior aspect of T4, probably chronic. Marked compression of T12, age indeterminate however there is suggestion of acute/subacute component. Associated retropulsion with mild canal narrowing.  - Neurosurgery consulted and recommended conservative cares  - continue Tylenol and tramadol for pain control     History of metastatic breast cancer  - S/P chemotherapy  - Question of seizure as per neurology note on 10/2/24   - Negative brain MRI on 10/3/2024 for acute intracranial pathology  - EEG negative  -Chemo on hold.   -Follow up with Oncology out pt.         Active Ambulatory Problems     Diagnosis Date Noted    Aortic valve disorder 10/18/2012    Chronic renal failure, stage 3 (moderate) (H) 02/19/2018    Diastolic dysfunction 02/17/2016     Essential hypertension 02/17/2016    Dysuria 06/06/2022    Gastroesophageal reflux disease 02/17/2016    Hyperlipidemia 02/17/2016    Malignant neoplasm of areola of left breast in female (H) 02/17/2016    Cerebellar stroke (H) 07/02/2020    S/P TAVR (transcatheter aortic valve replacement) 11/26/2021    Osteopenia 03/13/2017    Paroxysmal supraventricular tachycardia (H) 11/26/2021    Varicose vein of leg 02/17/2016    Venous insufficiency 02/17/2016    Mild carotid artery disease (H) 05/04/2023    Malignant neoplasm metastatic to bone (H) 06/27/2024    Hyponatremia 10/06/2024    Elevated INR 10/06/2024    T12 compression fracture, sequela 10/06/2024    Fall, initial encounter 10/06/2024    Laceration of forehead, initial encounter 10/06/2024    Syncope 10/07/2024    DNR (do not resuscitate) 10/07/2024    Hypocalcemia 10/08/2024    Hypophosphatemia 10/08/2024    LBBB (left bundle branch block) 10/08/2024    Decreased carotid pulse 03/13/2017    Low back pain 03/31/2019    Lymphocytic colitis 10/05/2021    Severe aortic stenosis 02/17/2016     Resolved Ambulatory Problems     Diagnosis Date Noted    Trochanteric bursitis of left hip 01/05/2024     Past Medical History:   Diagnosis Date    Breast cancer (H) 01/01/1999    Heart valve replaced     Hx antineoplastic chemotherapy 01/01/1999    Hx of radiation therapy 01/01/1999    Hypertension      No Known Allergies    All Meds and Allergies reviewed in the record at the facility and is the most up-to-date.    Current Outpatient Medications   Medication Sig Dispense Refill    acetaminophen (TYLENOL) 325 MG tablet Take 650 mg by mouth every 8 hours as needed for mild pain      aspirin (ASA) 81 MG EC tablet Take 81 mg by mouth every morning.      atorvastatin (LIPITOR) 40 MG tablet Take 1 tablet (40 mg) by mouth every morning 90 tablet 3    chlorhexidine (PERIDEX) 0.12 % solution Take 15 mLs by mouth daily as needed.      estradiol (ESTRACE) 0.1 MG/GM vaginal cream Place  "0.5 g vaginally twice a week. Can use daily for 2 weeks and then twice a week after that. 42 g 1    lidocaine-prilocaine (EMLA) 2.5-2.5 % external cream Apply topically as needed for other (Apply small amount to port site 30-60 minutes prior to port access to minimize discomfort) 30 g 2    lisinopril-hydrochlorothiazide (ZESTORETIC) 10-12.5 MG tablet Take 1 tablet by mouth daily.      methylcellulose (CITRUCEL) 500 MG TABS tablet Take 500 mg by mouth every morning.      metoprolol tartrate (LOPRESSOR) 25 MG tablet Take 1 tablet (25 mg) by mouth 2 times daily 180 tablet 3    ondansetron (ZOFRAN) 8 MG tablet Take 1 tablet (8 mg) by mouth every 8 hours as needed for nausea (vomiting) 30 tablet 2    prochlorperazine (COMPAZINE) 10 MG tablet Take 1 tablet (10 mg) by mouth every 6 hours as needed for nausea or vomiting 30 tablet 2    traMADol (ULTRAM) 50 MG tablet Take 1 tablet (50 mg) by mouth every 8 hours as needed for moderate to severe pain. 30 tablet 0    warfarin ANTICOAGULANT (JANTOVEN ANTICOAGULANT) 5 MG tablet 1/2 tab (2.5mg) on Mon, Wed, Fri and 1 tab (5mg) all other days 90 tablet 3    WARFARIN SODIUM PO Take by mouth See Admin Instructions. Dosing in coordination with INR results       No current facility-administered medications for this visit.     Facility-Administered Medications Ordered in Other Visits   Medication Dose Route Frequency Provider Last Rate Last Admin    heparin lock flush 100 unit/mL injection 5 mL  5 mL Intracatheter Q30 Days Neymar Gann MD   5 mL at 10/03/24 1031       REVIEW OF SYSTEMS:   10 point review of systems reviewed and pertinent positives in the HPI.     PHYSICAL EXAMINATION:  Physical Exam     Vital signs: BP 94/57   Pulse 67   Temp 98.7  F (37.1  C)   Resp 18   Ht 1.6 m (5' 3\")   Wt 64.2 kg (141 lb 9.6 oz)   SpO2 98%   BMI 25.08 kg/m    General: Awake, Alert, oriented x3,lying in bed,  appropriately, follows simple commands, conversant  HEENT:Pink " conjunctiva,  moist oral mucosa  NECK: Supple  CVS:  S1  S2, without murmur or gallop.   LUNG: Clear to auscultation, No wheezes, rales or rhonci.  BACK: No kyphosis of the thoracic spine  ABDOMEN: Soft, nontender to palpation, with positive bowel sounds  EXTREMITIES: Moves all extremities with diffuse weakness, 2+ pedal edema, no calf tenderness  SKIN: wounds to LE covered.   NEUROLOGIC: Intact, pulses palpable  PSYCHIATRIC: Cognition intact      Labs:  All labs reviewed in the nursing home record and Epic   @  Lab Results   Component Value Date    WBC 9.9 10/09/2024     Lab Results   Component Value Date    RBC 2.85 10/09/2024     Lab Results   Component Value Date    HGB 8.4 10/09/2024     Lab Results   Component Value Date    HCT 25.2 10/09/2024     Lab Results   Component Value Date    MCV 88 10/09/2024     Lab Results   Component Value Date    MCH 29.5 10/09/2024     Lab Results   Component Value Date    MCHC 33.3 10/09/2024     Lab Results   Component Value Date    RDW 15.7 10/09/2024     Lab Results   Component Value Date     10/09/2024        @Last Comprehensive Metabolic Panel:  Sodium   Date Value Ref Range Status   10/14/2024 136 135 - 145 mmol/L Final     Potassium   Date Value Ref Range Status   10/14/2024 2.3 (LL) 3.4 - 5.3 mmol/L Final     Chloride   Date Value Ref Range Status   10/14/2024 105 98 - 107 mmol/L Final     Carbon Dioxide (CO2)   Date Value Ref Range Status   10/14/2024 22 22 - 29 mmol/L Final     Anion Gap   Date Value Ref Range Status   10/14/2024 9 7 - 15 mmol/L Final     Glucose   Date Value Ref Range Status   10/14/2024 90 70 - 99 mg/dL Final     GLUCOSE BY METER POCT   Date Value Ref Range Status   06/25/2024 87 70 - 99 mg/dL Final     Urea Nitrogen   Date Value Ref Range Status   10/14/2024 12.7 8.0 - 23.0 mg/dL Final     Creatinine   Date Value Ref Range Status   10/14/2024 0.83 0.51 - 0.95 mg/dL Final     GFR Estimate   Date Value Ref Range Status   10/14/2024 69 >60  mL/min/1.73m2 Final     Calcium   Date Value Ref Range Status   10/14/2024 6.7 (L) 8.8 - 10.4 mg/dL Final     Comment:     Reference intervals for this test were updated on 7/16/2024 to reflect our healthy population more accurately. There may be differences in the flagging of prior results with similar values performed with this method. Those prior results can be interpreted in the context of the updated reference intervals.       > 45 minutes spent preparing for this visit, reviewing labs, meds, as well as face to face time spent with pt and family reviewing above plan of care.     This note has been dictated using voice recognition software. Any grammatical or context distortions are unintentional and inherent to the software    Electronically signed by: Sissy Grant CNP

## 2024-10-16 NOTE — PROGRESS NOTES
[facility] GERIATRIC SERVICES    Code Status:  DNR/DNI   Visit Type:   Chief Complaint   Patient presents with    TCU Follow Up     Facility:  [facility] ([facility]) [[id number]]         HPI: Kacie Hermosillo is a 84 year old female who I am seeing today for follow-up on the TCU. Past medical history includes aortic valve replacement on warfarin anticoagulation, CKD 3, prior cerebellar stroke, hypertension, hyperlipidemia, and history of breast cancer metastatic to bone. Pt recently hospitalized with syncope related to orthostatic hypotension due to diarrhea from chemotherapy. Pt with ALTAGRACIA and electrolyte imbalances. PSVT. Pt with fall sustaining laceration to scalp and T12 fracture.       Transitional Care Course: Today pt sitting up in bed.  Patient seen yesterday and found to have hypokalemia.  Potassium replaced.  Today potassium 3.9.  She continues on potassium supplement.  Previous hypotension.  Her hydrochlorothiazide/lisinopril has been discontinued.  Blood pressure on the soft side but improved on today's visit.  Fluids encouraged overnight.  Edema to her lower extremities improved.  Guaiac x 1 negative.  No further diarrhea.  UTI.  Urine culture greater than 100,000 E. coli.  She received 1 g of IM Rocephin x 2.  Currently afebrile.  No leukocytosis.      Assessment/Plan:    Hypokalemia  -Yesterday potassium 2.3.  She did receive every 4 hour supplementation. Follow up K+ today 3.9.   -Continue 20 meq every day.   -No longer on HCTZ/lisinopril combo.  -Repeat BMP on Thursday.    Diarrhea  -Obtain stool for C diff and Norovirus.   -Patient denies any further diarrhea today.    UTI  -WBC 12.5 now 8.7.  -UC with greater than 100,000 E. coli.  -Give 1 gm IM Rocephin with lidocaine given x 2.  -Start Macrobid 100 mg p.o. twice daily x 7 days.    Syncope  PSVT  Hypotension   -due to orthostatic hypotension due to recent diarrhea from chemo.   -TTE shows LVEF 70%, normal RV, bioprosthetic AV are present with no  significant regurgitation.  -Head CT negative.   -EEG negative.   -pt underwent fluid resuscitation. Hydrochlorothiazide on hold.   -continue metoprolol with hold parameters.   -Fluids encouraged today 240cc  with each med pass.     BLE VS ulcers   -today pt with 3+ bilateral LE edema with skin ulcers.   -Unable to tolerate hydrochlorothiazide.   -xeroform guaze to BLE with ABD and kerlix daily.   -House wound MD to follow.  -Tubi  on am/off hs.   -Monitor Bp.     Forehead laceration  -CT head and neck is negative   -discontinue sutures to scalp wound  in 10 days.      severe aortic stenosis  History of bioprosthetic aortic valve replacement   -TTE shows LVEF 70%, normal RV, bioprosthetic AV are present with no significant regurgitation  -INR 3.1 today.   -Continue warfarin  -Coumadin clinic to manage INR.      ALTAGRACIA   -Give additional 240 ml with each med pass.   -Follow up BMP in am.   -lisinopril/hydrochlorothiazide on hold      T12 compression fracture, old   - CT thoracic spine shows Slight loss of height superior aspect of T4, probably chronic. Marked compression of T12, age indeterminate however there is suggestion of acute/subacute component. Associated retropulsion with mild canal narrowing.  - Neurosurgery consulted and recommended conservative cares  - continue Tylenol and tramadol for pain control     History of metastatic breast cancer  - S/P chemotherapy  - Question of seizure as per neurology note on 10/2/24   - Negative brain MRI on 10/3/2024 for acute intracranial pathology  - EEG negative  -Chemo on hold.   -Follow up with Oncology out pt.     Anemia  -hgb 7.5.   -Guaiac stool x 3.  First guaiac negative.  -Ferrous sulfate 325 mg p.o. daily.  -Repeat CBC on Thursday.          Active Ambulatory Problems     Diagnosis Date Noted    Aortic valve disorder 10/18/2012    Chronic renal failure, stage 3 (moderate) (H) 02/19/2018    Diastolic dysfunction 02/17/2016    Essential hypertension 02/17/2016     Dysuria 06/06/2022    Gastroesophageal reflux disease 02/17/2016    Hyperlipidemia 02/17/2016    Malignant neoplasm of areola of left breast in female (H) 02/17/2016    Cerebellar stroke (H) 07/02/2020    S/P TAVR (transcatheter aortic valve replacement) 11/26/2021    Osteopenia 03/13/2017    Paroxysmal supraventricular tachycardia (H) 11/26/2021    Varicose vein of leg 02/17/2016    Venous insufficiency 02/17/2016    Mild carotid artery disease (H) 05/04/2023    Malignant neoplasm metastatic to bone (H) 06/27/2024    Hyponatremia 10/06/2024    Elevated INR 10/06/2024    T12 compression fracture, sequela 10/06/2024    Fall, initial encounter 10/06/2024    Laceration of forehead, initial encounter 10/06/2024    Syncope 10/07/2024    DNR (do not resuscitate) 10/07/2024    Hypocalcemia 10/08/2024    Hypophosphatemia 10/08/2024    LBBB (left bundle branch block) 10/08/2024    Decreased carotid pulse 03/13/2017    Low back pain 03/31/2019    Lymphocytic colitis 10/05/2021    Severe aortic stenosis 02/17/2016     Resolved Ambulatory Problems     Diagnosis Date Noted    Trochanteric bursitis of left hip 01/05/2024     Past Medical History:   Diagnosis Date    Breast cancer (H) 01/01/1999    Heart valve replaced     Hx antineoplastic chemotherapy 01/01/1999    Hx of radiation therapy 01/01/1999    Hypertension      No Known Allergies    All Meds and Allergies reviewed in the record at the facility and is the most up-to-date.    Current Outpatient Medications   Medication Sig Dispense Refill    nitroFURantoin macrocrystal-monohydrate (MACROBID) 100 MG capsule Take 100 mg by mouth 2 times daily.      acetaminophen (TYLENOL) 325 MG tablet Take 650 mg by mouth every 8 hours as needed for mild pain      aspirin (ASA) 81 MG EC tablet Take 81 mg by mouth every morning.      atorvastatin (LIPITOR) 40 MG tablet Take 1 tablet (40 mg) by mouth every morning 90 tablet 3    chlorhexidine (PERIDEX) 0.12 % solution Take 15 mLs by mouth  "daily as needed.      estradiol (ESTRACE) 0.1 MG/GM vaginal cream Place 0.5 g vaginally twice a week. Can use daily for 2 weeks and then twice a week after that. 42 g 1    lidocaine-prilocaine (EMLA) 2.5-2.5 % external cream Apply topically as needed for other (Apply small amount to port site 30-60 minutes prior to port access to minimize discomfort) 30 g 2    methylcellulose (CITRUCEL) 500 MG TABS tablet Take 500 mg by mouth every morning.      metoprolol tartrate (LOPRESSOR) 25 MG tablet Take 1 tablet (25 mg) by mouth 2 times daily 180 tablet 3    ondansetron (ZOFRAN) 8 MG tablet Take 1 tablet (8 mg) by mouth every 8 hours as needed for nausea (vomiting) 30 tablet 2    prochlorperazine (COMPAZINE) 10 MG tablet Take 1 tablet (10 mg) by mouth every 6 hours as needed for nausea or vomiting 30 tablet 2    traMADol (ULTRAM) 50 MG tablet Take 1 tablet (50 mg) by mouth every 8 hours as needed for moderate to severe pain. 30 tablet 0    warfarin ANTICOAGULANT (JANTOVEN ANTICOAGULANT) 5 MG tablet 1/2 tab (2.5mg) on Mon, Wed, Fri and 1 tab (5mg) all other days 90 tablet 3    WARFARIN SODIUM PO Take by mouth See Admin Instructions. Dosing in coordination with INR results       No current facility-administered medications for this visit.     Facility-Administered Medications Ordered in Other Visits   Medication Dose Route Frequency Provider Last Rate Last Admin    heparin lock flush 100 unit/mL injection 5 mL  5 mL Intracatheter Q30 Days Neymar Gann MD   5 mL at 10/03/24 1031       REVIEW OF SYSTEMS:   10 point review of systems reviewed and pertinent positives in the HPI.     PHYSICAL EXAMINATION:  Physical Exam     Vital signs: /73   Pulse 83   Temp 97.5  F (36.4  C)   Resp 18   Ht 1.6 m (5' 3\")   Wt 62.5 kg (137 lb 12.8 oz)   SpO2 98%   BMI 24.41 kg/m    General: Awake, Alert, oriented x3,lying in bed,  appropriately, follows simple commands, conversant  HEENT:Pink conjunctiva,  moist oral " mucosa  NECK: Supple  CVS:  S1  S2, without murmur or gallop.   LUNG: Clear to auscultation, No wheezes, rales or rhonci.  BACK: No kyphosis of the thoracic spine  ABDOMEN: Soft, nontender to palpation, with positive bowel sounds  EXTREMITIES: Moves all extremities with diffuse weakness, 1+ pedal edema, no calf tenderness  SKIN: wounds to LE covered.   NEUROLOGIC: Intact, pulses palpable  PSYCHIATRIC: Cognition intact      Labs:  All labs reviewed in the nursing home record and [employer]   @  Lab Results   Component Value Date    WBC 9.9 10/09/2024     Lab Results   Component Value Date    RBC 2.85 10/09/2024     Lab Results   Component Value Date    HGB 8.4 10/09/2024     Lab Results   Component Value Date    HCT 25.2 10/09/2024     Lab Results   Component Value Date    MCV 88 10/09/2024     Lab Results   Component Value Date    MCH 29.5 10/09/2024     Lab Results   Component Value Date    MCHC 33.3 10/09/2024     Lab Results   Component Value Date    RDW 15.7 10/09/2024     Lab Results   Component Value Date     10/09/2024        @Last Comprehensive Metabolic Panel:  Sodium   Date Value Ref Range Status   10/15/2024 139 135 - 145 mmol/L Final     Potassium   Date Value Ref Range Status   10/15/2024 3.9 3.4 - 5.3 mmol/L Final     Chloride   Date Value Ref Range Status   10/15/2024 110 (H) 98 - 107 mmol/L Final     Carbon Dioxide (CO2)   Date Value Ref Range Status   10/15/2024 22 22 - 29 mmol/L Final     Anion Gap   Date Value Ref Range Status   10/15/2024 7 7 - 15 mmol/L Final     Glucose   Date Value Ref Range Status   10/15/2024 97 70 - 99 mg/dL Final     GLUCOSE BY METER POCT   Date Value Ref Range Status   06/25/2024 87 70 - 99 mg/dL Final     Urea Nitrogen   Date Value Ref Range Status   10/15/2024 7.8 (L) 8.0 - 23.0 mg/dL Final     Creatinine   Date Value Ref Range Status   10/15/2024 0.75 0.51 - 0.95 mg/dL Final     GFR Estimate   Date Value Ref Range Status   10/15/2024 78 >60 mL/min/1.73m2 Final      Calcium   Date Value Ref Range Status   10/15/2024 7.4 (L) 8.8 - 10.4 mg/dL Final     Comment:     Reference intervals for this test were updated on 7/16/2024 to reflect our healthy population more accurately. There may be differences in the flagging of prior results with similar values performed with this method. Those prior results can be interpreted in the context of the updated reference intervals.     This note has been dictated using voice recognition software. Any grammatical or context distortions are unintentional and inherent to the software    Electronically signed by: Sissy Grant, CNP

## 2024-10-17 ENCOUNTER — ANTICOAGULATION THERAPY VISIT (OUTPATIENT)
Dept: ANTICOAGULATION | Facility: CLINIC | Age: 84
End: 2024-10-17

## 2024-10-17 ENCOUNTER — TRANSITIONAL CARE UNIT VISIT (OUTPATIENT)
Dept: GERIATRICS | Facility: CLINIC | Age: 84
End: 2024-10-17
Payer: COMMERCIAL

## 2024-10-17 ENCOUNTER — LAB REQUISITION (OUTPATIENT)
Dept: LAB | Facility: CLINIC | Age: 84
End: 2024-10-17
Payer: COMMERCIAL

## 2024-10-17 VITALS
TEMPERATURE: 97 F | WEIGHT: 135 LBS | OXYGEN SATURATION: 96 % | BODY MASS INDEX: 23.92 KG/M2 | HEIGHT: 63 IN | RESPIRATION RATE: 18 BRPM | DIASTOLIC BLOOD PRESSURE: 80 MMHG | SYSTOLIC BLOOD PRESSURE: 122 MMHG | HEART RATE: 76 BPM

## 2024-10-17 DIAGNOSIS — E87.6 HYPOKALEMIA: Primary | ICD-10-CM

## 2024-10-17 DIAGNOSIS — N30.00 ACUTE CYSTITIS WITHOUT HEMATURIA: ICD-10-CM

## 2024-10-17 DIAGNOSIS — D50.8 OTHER IRON DEFICIENCY ANEMIA: ICD-10-CM

## 2024-10-17 DIAGNOSIS — E86.1 HYPOTENSION DUE TO HYPOVOLEMIA: ICD-10-CM

## 2024-10-17 DIAGNOSIS — Z85.3 HX OF BREAST CANCER: ICD-10-CM

## 2024-10-17 DIAGNOSIS — I63.9 CEREBELLAR STROKE (H): ICD-10-CM

## 2024-10-17 DIAGNOSIS — N17.9 AKI (ACUTE KIDNEY INJURY) (H): ICD-10-CM

## 2024-10-17 DIAGNOSIS — E87.6 HYPOKALEMIA: ICD-10-CM

## 2024-10-17 DIAGNOSIS — Z95.2 S/P TAVR (TRANSCATHETER AORTIC VALVE REPLACEMENT): ICD-10-CM

## 2024-10-17 DIAGNOSIS — S22.080D COMPRESSION FRACTURE OF T12 VERTEBRA WITH ROUTINE HEALING, SUBSEQUENT ENCOUNTER: ICD-10-CM

## 2024-10-17 DIAGNOSIS — Z95.2 S/P TAVR (TRANSCATHETER AORTIC VALVE REPLACEMENT): Primary | ICD-10-CM

## 2024-10-17 DIAGNOSIS — I35.9 AORTIC VALVE DISORDER: ICD-10-CM

## 2024-10-17 DIAGNOSIS — I51.89 DIASTOLIC DYSFUNCTION: ICD-10-CM

## 2024-10-17 LAB
ANION GAP SERPL CALCULATED.3IONS-SCNC: 10 MMOL/L (ref 7–15)
BUN SERPL-MCNC: 10.4 MG/DL (ref 8–23)
CALCIUM SERPL-MCNC: 7.7 MG/DL (ref 8.8–10.4)
CHLORIDE SERPL-SCNC: 110 MMOL/L (ref 98–107)
CREAT SERPL-MCNC: 0.72 MG/DL (ref 0.51–0.95)
EGFRCR SERPLBLD CKD-EPI 2021: 82 ML/MIN/1.73M2
ERYTHROCYTE [DISTWIDTH] IN BLOOD BY AUTOMATED COUNT: 17.8 % (ref 10–15)
GLUCOSE SERPL-MCNC: 94 MG/DL (ref 70–99)
HCO3 SERPL-SCNC: 22 MMOL/L (ref 22–29)
HCT VFR BLD AUTO: 22 % (ref 35–47)
HGB BLD-MCNC: 7.4 G/DL (ref 11.7–15.7)
INR (EXTERNAL): 2.6
MCH RBC QN AUTO: 30.8 PG (ref 26.5–33)
MCHC RBC AUTO-ENTMCNC: 33.6 G/DL (ref 31.5–36.5)
MCV RBC AUTO: 92 FL (ref 78–100)
PLATELET # BLD AUTO: 186 10E3/UL (ref 150–450)
POTASSIUM SERPL-SCNC: 3.3 MMOL/L (ref 3.4–5.3)
RBC # BLD AUTO: 2.4 10E6/UL (ref 3.8–5.2)
SODIUM SERPL-SCNC: 142 MMOL/L (ref 135–145)
WBC # BLD AUTO: 5.1 10E3/UL (ref 4–11)

## 2024-10-17 PROCEDURE — 36415 COLL VENOUS BLD VENIPUNCTURE: CPT | Mod: ORL | Performed by: NURSE PRACTITIONER

## 2024-10-17 PROCEDURE — 80048 BASIC METABOLIC PNL TOTAL CA: CPT | Mod: ORL | Performed by: NURSE PRACTITIONER

## 2024-10-17 PROCEDURE — P9604 ONE-WAY ALLOW PRORATED TRIP: HCPCS | Mod: ORL | Performed by: NURSE PRACTITIONER

## 2024-10-17 PROCEDURE — 99309 SBSQ NF CARE MODERATE MDM 30: CPT | Performed by: NURSE PRACTITIONER

## 2024-10-17 PROCEDURE — 85027 COMPLETE CBC AUTOMATED: CPT | Mod: ORL | Performed by: NURSE PRACTITIONER

## 2024-10-17 RX ORDER — POTASSIUM CHLORIDE 1500 MG/1
20 TABLET, EXTENDED RELEASE ORAL
COMMUNITY
End: 2024-10-27

## 2024-10-17 NOTE — LETTER
10/17/2024      Kacie Hermosillo  [address] [address]         HEALTH GERIATRIC SERVICES    Code Status:  DNR/DNI   Visit Type:   Chief Complaint   Patient presents with     TCU Follow Up     Facility:  [facility]) [[id number]]         HPI: Kacie Hermosillo is a 84 year old female who I am seeing today for follow-up on the TCU. Past medical history includes aortic valve replacement on warfarin anticoagulation, CKD 3, prior cerebellar stroke, hypertension, hyperlipidemia, and history of breast cancer metastatic to bone. Pt recently hospitalized with syncope related to orthostatic hypotension due to diarrhea from chemotherapy. Pt with ALTAGRACIA and electrolyte imbalances. PSVT. Pt with fall sustaining laceration to scalp and T12 fracture.       Transitional Care Course: Today pt sitting up in bed.  Recent hypokalemia.  Potassium has been replaced.  Today potassium slightly low at 3.3.  Will place on potassium daily.  Diarrhea had improved however she had diarrhea yesterday.  I did note that she is on Citrucel 2 tabs daily.  Patient hydrochlorothiazide/lisinopril has been discontinued.  Hypotension improved.  Patient denies any dizziness or headache.  Scabbed lesion to the temporal region post fall.  Anemia.  Guaiac x 3 negative.  Hemoglobin now 7.4. UTI.  Urine culture greater than 100,000 E. coli.  She received 1 g of IM Rocephin x 2.  Currently afebrile.  No leukocytosis.      Assessment/Plan:    Hypokalemia  -K+ 2.3> 3.9 > today 3.3.   -Continue 20 meq every day.   -No longer on HCTZ/lisinopril combo.  -Repeat BMP on Monday.   -Decrease Citrucel to every other day.  Diarrhea may be contributory to hypokalemia.    Diarrhea  -Most likely due to Citrucel 2 tabs being given daily.  Reduce to every other day.    UTI  -WBC 12.5 now 8.7.  -UC with greater than 100,000 E. coli.  -Received 1 gm IM Rocephin with lidocaine given x 2.  -Continue Macrobid 100 mg p.o. twice daily x 7  days.    Syncope  PSVT  Hypotension   -due to orthostatic hypotension due to recent diarrhea from chemo.   -TTE shows LVEF 70%, normal RV, bioprosthetic AV are present with no significant regurgitation.  -Head CT negative.   -EEG negative.   -pt underwent fluid resuscitation. Hydrochlorothiazide on hold.   -continue metoprolol with hold parameters.   -Fluids encouraged today 240cc  with each med pass.     BLE VS ulcers   -today pt with 3+ bilateral LE edema with skin ulcers.   -Unable to tolerate hydrochlorothiazide.   -xeroform guaze to BLE with ABD and kerlix daily.   -House wound MD to follow.  -Tubi  on am/off hs.   -Monitor Bp.     Forehead laceration  -CT head and neck is negative   -discontinue sutures to scalp wound  in 10 days.      severe aortic stenosis  History of bioprosthetic aortic valve replacement   -TTE shows LVEF 70%, normal RV, bioprosthetic AV are present with no significant regurgitation  -INR 3.1 today.   -Continue warfarin  -Coumadin clinic to manage INR.      ALTAGRACIA   -Give additional 240 ml with each med pass.   -Follow up BMP with normal creatinine.  -lisinopril/hydrochlorothiazide discontinued.     T12 compression fracture, old   - CT thoracic spine shows Slight loss of height superior aspect of T4, probably chronic. Marked compression of T12, age indeterminate however there is suggestion of acute/subacute component. Associated retropulsion with mild canal narrowing.  - Neurosurgery consulted and recommended conservative cares  - continue Tylenol and tramadol for pain control     History of metastatic breast cancer  - S/P chemotherapy  - Question of seizure as per neurology note on 10/2/24   - Negative brain MRI on 10/3/2024 for acute intracranial pathology  - EEG negative  -Chemo on hold.   -Follow up with Oncology out pt.     Anemia  -hgb now 7.4.  -Guaiac stool x 3.  First guaiac negative.  -Ferrous sulfate 325 mg p.o. daily.  -Repeat CBC on Monday.       Active Ambulatory Problems      Diagnosis Date Noted     Aortic valve disorder 10/18/2012     Chronic renal failure, stage 3 (moderate) (H) 02/19/2018     Diastolic dysfunction 02/17/2016     Essential hypertension 02/17/2016     Dysuria 06/06/2022     Gastroesophageal reflux disease 02/17/2016     Hyperlipidemia 02/17/2016     Malignant neoplasm of areola of left breast in female (H) 02/17/2016     Cerebellar stroke (H) 07/02/2020     S/P TAVR (transcatheter aortic valve replacement) 11/26/2021     Osteopenia 03/13/2017     Paroxysmal supraventricular tachycardia (H) 11/26/2021     Varicose vein of leg 02/17/2016     Venous insufficiency 02/17/2016     Mild carotid artery disease (H) 05/04/2023     Malignant neoplasm metastatic to bone (H) 06/27/2024     Hyponatremia 10/06/2024     Elevated INR 10/06/2024     T12 compression fracture, sequela 10/06/2024     Fall, initial encounter 10/06/2024     Laceration of forehead, initial encounter 10/06/2024     Syncope 10/07/2024     DNR (do not resuscitate) 10/07/2024     Hypocalcemia 10/08/2024     Hypophosphatemia 10/08/2024     LBBB (left bundle branch block) 10/08/2024     Decreased carotid pulse 03/13/2017     Low back pain 03/31/2019     Lymphocytic colitis 10/05/2021     Severe aortic stenosis 02/17/2016     Resolved Ambulatory Problems     Diagnosis Date Noted     Trochanteric bursitis of left hip 01/05/2024     Past Medical History:   Diagnosis Date     Breast cancer (H) 01/01/1999     Heart valve replaced      Hx antineoplastic chemotherapy 01/01/1999     Hx of radiation therapy 01/01/1999     Hypertension      No Known Allergies    All Meds and Allergies reviewed in the record at the facility and is the most up-to-date.    Current Outpatient Medications   Medication Sig Dispense Refill     potassium chloride ER (K-TAB) 20 MEQ CR tablet Take 20 mEq by mouth daily (with breakfast).       acetaminophen (TYLENOL) 325 MG tablet Take 650 mg by mouth every 8 hours as needed for mild pain        aspirin (ASA) 81 MG EC tablet Take 81 mg by mouth every morning.       atorvastatin (LIPITOR) 40 MG tablet Take 1 tablet (40 mg) by mouth every morning 90 tablet 3     chlorhexidine (PERIDEX) 0.12 % solution Take 15 mLs by mouth daily as needed.       estradiol (ESTRACE) 0.1 MG/GM vaginal cream Place 0.5 g vaginally twice a week. Can use daily for 2 weeks and then twice a week after that. 42 g 1     lidocaine-prilocaine (EMLA) 2.5-2.5 % external cream Apply topically as needed for other (Apply small amount to port site 30-60 minutes prior to port access to minimize discomfort) 30 g 2     methylcellulose (CITRUCEL) 500 MG TABS tablet Take 500 mg by mouth every other day.       metoprolol tartrate (LOPRESSOR) 25 MG tablet Take 1 tablet (25 mg) by mouth 2 times daily 180 tablet 3     nitroFURantoin macrocrystal-monohydrate (MACROBID) 100 MG capsule Take 100 mg by mouth 2 times daily.       ondansetron (ZOFRAN) 8 MG tablet Take 1 tablet (8 mg) by mouth every 8 hours as needed for nausea (vomiting) 30 tablet 2     traMADol (ULTRAM) 50 MG tablet Take 1 tablet (50 mg) by mouth every 8 hours as needed for moderate to severe pain. 30 tablet 0     warfarin ANTICOAGULANT (JANTOVEN ANTICOAGULANT) 5 MG tablet 1/2 tab (2.5mg) on Mon, Wed, Fri and 1 tab (5mg) all other days 90 tablet 3     WARFARIN SODIUM PO Take by mouth See Admin Instructions. Dosing in coordination with INR results       No current facility-administered medications for this visit.     Facility-Administered Medications Ordered in Other Visits   Medication Dose Route Frequency Provider Last Rate Last Admin     heparin lock flush 100 unit/mL injection 5 mL  5 mL Intracatheter Q30 Days Neymar Gann MD   5 mL at 10/03/24 1031       REVIEW OF SYSTEMS:   10 point review of systems reviewed and pertinent positives in the HPI.     PHYSICAL EXAMINATION:  Physical Exam     Vital signs: /80   Pulse 76   Temp 97  F (36.1  C)   Resp 18   Ht 1.6 m (5'  "3\")   Wt 61.2 kg (135 lb)   SpO2 96%   BMI 23.91 kg/m    General: Awake, Alert, oriented x3,lying in bed,  appropriately, follows simple commands, conversant  HEENT:Pink conjunctiva,  moist oral mucosa  NECK: Supple  CVS:  S1  S2, without murmur or gallop.   LUNG: Clear to auscultation, No wheezes, rales or rhonci.  BACK: No kyphosis of the thoracic spine  ABDOMEN: Soft, nontender to palpation, with positive bowel sounds  EXTREMITIES: Moves all extremities with diffuse weakness, 1+ pedal edema, no calf tenderness  SKIN: wounds to LE covered.   NEUROLOGIC: Intact, pulses palpable  PSYCHIATRIC: Cognition intact      Labs:  All labs reviewed in the nursing home record and [employer]   @  Lab Results   Component Value Date    WBC 9.9 10/09/2024     Lab Results   Component Value Date    RBC 2.85 10/09/2024     Lab Results   Component Value Date    HGB 8.4 10/09/2024     Lab Results   Component Value Date    HCT 25.2 10/09/2024     Lab Results   Component Value Date    MCV 88 10/09/2024     Lab Results   Component Value Date    MCH 29.5 10/09/2024     Lab Results   Component Value Date    MCHC 33.3 10/09/2024     Lab Results   Component Value Date    RDW 15.7 10/09/2024     Lab Results   Component Value Date     10/09/2024        @Last Comprehensive Metabolic Panel:  Sodium   Date Value Ref Range Status   10/17/2024 142 135 - 145 mmol/L Final     Potassium   Date Value Ref Range Status   10/17/2024 3.3 (L) 3.4 - 5.3 mmol/L Final     Chloride   Date Value Ref Range Status   10/17/2024 110 (H) 98 - 107 mmol/L Final     Carbon Dioxide (CO2)   Date Value Ref Range Status   10/17/2024 22 22 - 29 mmol/L Final     Anion Gap   Date Value Ref Range Status   10/17/2024 10 7 - 15 mmol/L Final     Glucose   Date Value Ref Range Status   10/17/2024 94 70 - 99 mg/dL Final     GLUCOSE BY METER POCT   Date Value Ref Range Status   06/25/2024 87 70 - 99 mg/dL Final     Urea Nitrogen   Date Value Ref Range Status   10/17/2024 10.4 8.0 " - 23.0 mg/dL Final     Creatinine   Date Value Ref Range Status   10/17/2024 0.72 0.51 - 0.95 mg/dL Final     GFR Estimate   Date Value Ref Range Status   10/17/2024 82 >60 mL/min/1.73m2 Final     Calcium   Date Value Ref Range Status   10/17/2024 7.7 (L) 8.8 - 10.4 mg/dL Final     Comment:     Reference intervals for this test were updated on 7/16/2024 to reflect our healthy population more accurately. There may be differences in the flagging of prior results with similar values performed with this method. Those prior results can be interpreted in the context of the updated reference intervals.     This note has been dictated using voice recognition software. Any grammatical or context distortions are unintentional and inherent to the software    Electronically signed by: Sissy Grant CNP         Sincerely,        Sissy Grant NP

## 2024-10-17 NOTE — PROGRESS NOTES
ANTICOAGULATION MANAGEMENT     Kacie Hermosillo 84 year old female is on warfarin with therapeutic INR result. (Goal INR 2.0-3.0)    Recent labs: (last 7 days)     10/17/24  0000   INR 2.6       ASSESSMENT     Source(s): Chart Review, Home Care/Facility Nurse, and Template     Warfarin doses taken: Warfarin taken as instructed  Diet: No new diet changes identified  Medication/supplement changes: None noted  New illness, injury, or hospitalization: No  Signs or symptoms of bleeding or clotting: No  Previous result: Supratherapeutic  Additional findings: None       PLAN     Recommended plan for no diet, medication or health factor changes affecting INR     Dosing Instructions: Continue your current warfarin dose with next INR in 4 days       Summary  As of 10/17/2024      Full warfarin instructions:  5 mg every Mon, Wed, Fri; 2.5 mg all other days   Next INR check:  10/21/2024               Telephone call with [facility] nurse (medical care for seniors) who agrees to plan and repeated back plan correctly    Orders given to  Homecare nurse/facility to recheck    Education provided: Please call back if any changes to your diet, medications or how you've been taking warfarin    Plan made per [facility] anticoagulation protocol    Desiree Zaidi RN  10/17/2024  Anticoagulation Clinic  [employer] for routing messages: p [facility] (TCU/LTC/assisted)  [facility] patient phone line: [phone]        _______________________________________________________________________     Anticoagulation Episode Summary       Current INR goal:  2.0-3.0   TTR:  81.4% (1 y)   Target end date:  Indefinite   Send INR reminders to:  [facility] (TCU/LTC/assisted)    Indications    S/P TAVR (transcatheter aortic valve replacement) [Z95.2]  Cerebellar stroke (H) [I63.9]             Comments:  10/9/24-[facility] TCU admit, Theo patient   2.0-3.0 per Dr. 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

## 2024-10-18 LAB — POTASSIUM SERPL-SCNC: 3.6 MMOL/L (ref 3.4–5.3)

## 2024-10-18 PROCEDURE — 84132 ASSAY OF SERUM POTASSIUM: CPT | Mod: ORL | Performed by: FAMILY MEDICINE

## 2024-10-18 PROCEDURE — P9603 ONE-WAY ALLOW PRORATED MILES: HCPCS | Mod: ORL | Performed by: FAMILY MEDICINE

## 2024-10-18 PROCEDURE — 36415 COLL VENOUS BLD VENIPUNCTURE: CPT | Mod: ORL | Performed by: FAMILY MEDICINE

## 2024-10-18 NOTE — PROGRESS NOTES
[facility] GERIATRIC SERVICES    Code Status:  DNR/DNI   Visit Type:   Chief Complaint   Patient presents with    TCU Follow Up     Facility:  [facility] ([facility]) [[id number]]         HPI: Kacie Hermosillo is a 84 year old female who I am seeing today for follow-up on the TCU. Past medical history includes aortic valve replacement on warfarin anticoagulation, CKD 3, prior cerebellar stroke, hypertension, hyperlipidemia, and history of breast cancer metastatic to bone. Pt recently hospitalized with syncope related to orthostatic hypotension due to diarrhea from chemotherapy. Pt with ALTAGRACAI and electrolyte imbalances. PSVT. Pt with fall sustaining laceration to scalp and T12 fracture.       Transitional Care Course: Today pt sitting up in bed.  Recent hypokalemia.  Potassium has been replaced.  Today potassium slightly low at 3.3.  Will place on potassium daily.  Diarrhea had improved however she had diarrhea yesterday.  I did note that she is on Citrucel 2 tabs daily.  Patient hydrochlorothiazide/lisinopril has been discontinued.  Hypotension improved.  Patient denies any dizziness or headache.  Scabbed lesion to the temporal region post fall.  Anemia.  Guaiac x 3 negative.  Hemoglobin now 7.4. UTI.  Urine culture greater than 100,000 E. coli.  She received 1 g of IM Rocephin x 2.  Currently afebrile.  No leukocytosis.      Assessment/Plan:    Hypokalemia  -K+ 2.3> 3.9 > today 3.3.   -Continue 20 meq every day.   -No longer on HCTZ/lisinopril combo.  -Repeat BMP on Monday.   -Decrease Citrucel to every other day.  Diarrhea may be contributory to hypokalemia.    Diarrhea  -Most likely due to Citrucel 2 tabs being given daily.  Reduce to every other day.    UTI  -WBC 12.5 now 8.7.  -UC with greater than 100,000 E. coli.  -Received 1 gm IM Rocephin with lidocaine given x 2.  -Continue Macrobid 100 mg p.o. twice daily x 7 days.    Syncope  PSVT  Hypotension   -due to orthostatic hypotension due to recent diarrhea from  chemo.   -TTE shows LVEF 70%, normal RV, bioprosthetic AV are present with no significant regurgitation.  -Head CT negative.   -EEG negative.   -pt underwent fluid resuscitation. Hydrochlorothiazide on hold.   -continue metoprolol with hold parameters.   -Fluids encouraged today 240cc  with each med pass.     BLE VS ulcers   -today pt with 3+ bilateral LE edema with skin ulcers.   -Unable to tolerate hydrochlorothiazide.   -xeroform guaze to BLE with ABD and kerlix daily.   -House wound MD to follow.  -Tubi  on am/off hs.   -Monitor Bp.     Forehead laceration  -CT head and neck is negative   -discontinue sutures to scalp wound  in 10 days.      severe aortic stenosis  History of bioprosthetic aortic valve replacement   -TTE shows LVEF 70%, normal RV, bioprosthetic AV are present with no significant regurgitation  -INR 3.1 today.   -Continue warfarin  -Coumadin clinic to manage INR.      ALTAGRACIA   -Give additional 240 ml with each med pass.   -Follow up BMP with normal creatinine.  -lisinopril/hydrochlorothiazide discontinued.     T12 compression fracture, old   - CT thoracic spine shows Slight loss of height superior aspect of T4, probably chronic. Marked compression of T12, age indeterminate however there is suggestion of acute/subacute component. Associated retropulsion with mild canal narrowing.  - Neurosurgery consulted and recommended conservative cares  - continue Tylenol and tramadol for pain control     History of metastatic breast cancer  - S/P chemotherapy  - Question of seizure as per neurology note on 10/2/24   - Negative brain MRI on 10/3/2024 for acute intracranial pathology  - EEG negative  -Chemo on hold.   -Follow up with Oncology out pt.     Anemia  -hgb now 7.4.  -Guaiac stool x 3.  First guaiac negative.  -Ferrous sulfate 325 mg p.o. daily.  -Repeat CBC on Monday.       Active Ambulatory Problems     Diagnosis Date Noted    Aortic valve disorder 10/18/2012    Chronic renal failure, stage 3  (moderate) (H) 02/19/2018    Diastolic dysfunction 02/17/2016    Essential hypertension 02/17/2016    Dysuria 06/06/2022    Gastroesophageal reflux disease 02/17/2016    Hyperlipidemia 02/17/2016    Malignant neoplasm of areola of left breast in female (H) 02/17/2016    Cerebellar stroke (H) 07/02/2020    S/P TAVR (transcatheter aortic valve replacement) 11/26/2021    Osteopenia 03/13/2017    Paroxysmal supraventricular tachycardia (H) 11/26/2021    Varicose vein of leg 02/17/2016    Venous insufficiency 02/17/2016    Mild carotid artery disease (H) 05/04/2023    Malignant neoplasm metastatic to bone (H) 06/27/2024    Hyponatremia 10/06/2024    Elevated INR 10/06/2024    T12 compression fracture, sequela 10/06/2024    Fall, initial encounter 10/06/2024    Laceration of forehead, initial encounter 10/06/2024    Syncope 10/07/2024    DNR (do not resuscitate) 10/07/2024    Hypocalcemia 10/08/2024    Hypophosphatemia 10/08/2024    LBBB (left bundle branch block) 10/08/2024    Decreased carotid pulse 03/13/2017    Low back pain 03/31/2019    Lymphocytic colitis 10/05/2021    Severe aortic stenosis 02/17/2016     Resolved Ambulatory Problems     Diagnosis Date Noted    Trochanteric bursitis of left hip 01/05/2024     Past Medical History:   Diagnosis Date    Breast cancer (H) 01/01/1999    Heart valve replaced     Hx antineoplastic chemotherapy 01/01/1999    Hx of radiation therapy 01/01/1999    Hypertension      No Known Allergies    All Meds and Allergies reviewed in the record at the facility and is the most up-to-date.    Current Outpatient Medications   Medication Sig Dispense Refill    potassium chloride ER (K-TAB) 20 MEQ CR tablet Take 20 mEq by mouth daily (with breakfast).      acetaminophen (TYLENOL) 325 MG tablet Take 650 mg by mouth every 8 hours as needed for mild pain      aspirin (ASA) 81 MG EC tablet Take 81 mg by mouth every morning.      atorvastatin (LIPITOR) 40 MG tablet Take 1 tablet (40 mg) by mouth  "every morning 90 tablet 3    chlorhexidine (PERIDEX) 0.12 % solution Take 15 mLs by mouth daily as needed.      estradiol (ESTRACE) 0.1 MG/GM vaginal cream Place 0.5 g vaginally twice a week. Can use daily for 2 weeks and then twice a week after that. 42 g 1    lidocaine-prilocaine (EMLA) 2.5-2.5 % external cream Apply topically as needed for other (Apply small amount to port site 30-60 minutes prior to port access to minimize discomfort) 30 g 2    methylcellulose (CITRUCEL) 500 MG TABS tablet Take 500 mg by mouth every other day.      metoprolol tartrate (LOPRESSOR) 25 MG tablet Take 1 tablet (25 mg) by mouth 2 times daily 180 tablet 3    nitroFURantoin macrocrystal-monohydrate (MACROBID) 100 MG capsule Take 100 mg by mouth 2 times daily.      ondansetron (ZOFRAN) 8 MG tablet Take 1 tablet (8 mg) by mouth every 8 hours as needed for nausea (vomiting) 30 tablet 2    traMADol (ULTRAM) 50 MG tablet Take 1 tablet (50 mg) by mouth every 8 hours as needed for moderate to severe pain. 30 tablet 0    warfarin ANTICOAGULANT (JANTOVEN ANTICOAGULANT) 5 MG tablet 1/2 tab (2.5mg) on Mon, Wed, Fri and 1 tab (5mg) all other days 90 tablet 3    WARFARIN SODIUM PO Take by mouth See Admin Instructions. Dosing in coordination with INR results       No current facility-administered medications for this visit.     Facility-Administered Medications Ordered in Other Visits   Medication Dose Route Frequency Provider Last Rate Last Admin    heparin lock flush 100 unit/mL injection 5 mL  5 mL Intracatheter Q30 Days Neymar Gann MD   5 mL at 10/03/24 1031       REVIEW OF SYSTEMS:   10 point review of systems reviewed and pertinent positives in the HPI.     PHYSICAL EXAMINATION:  Physical Exam     Vital signs: /80   Pulse 76   Temp 97  F (36.1  C)   Resp 18   Ht 1.6 m (5' 3\")   Wt 61.2 kg (135 lb)   SpO2 96%   BMI 23.91 kg/m    General: Awake, Alert, oriented x3,lying in bed,  appropriately, follows simple " commands, conversant  HEENT:Pink conjunctiva,  moist oral mucosa  NECK: Supple  CVS:  S1  S2, without murmur or gallop.   LUNG: Clear to auscultation, No wheezes, rales or rhonci.  BACK: No kyphosis of the thoracic spine  ABDOMEN: Soft, nontender to palpation, with positive bowel sounds  EXTREMITIES: Moves all extremities with diffuse weakness, 1+ pedal edema, no calf tenderness  SKIN: wounds to LE covered.   NEUROLOGIC: Intact, pulses palpable  PSYCHIATRIC: Cognition intact      Labs:  All labs reviewed in the nursing home record and Epic   @  Lab Results   Component Value Date    WBC 9.9 10/09/2024     Lab Results   Component Value Date    RBC 2.85 10/09/2024     Lab Results   Component Value Date    HGB 8.4 10/09/2024     Lab Results   Component Value Date    HCT 25.2 10/09/2024     Lab Results   Component Value Date    MCV 88 10/09/2024     Lab Results   Component Value Date    MCH 29.5 10/09/2024     Lab Results   Component Value Date    MCHC 33.3 10/09/2024     Lab Results   Component Value Date    RDW 15.7 10/09/2024     Lab Results   Component Value Date     10/09/2024        @Last Comprehensive Metabolic Panel:  Sodium   Date Value Ref Range Status   10/17/2024 142 135 - 145 mmol/L Final     Potassium   Date Value Ref Range Status   10/17/2024 3.3 (L) 3.4 - 5.3 mmol/L Final     Chloride   Date Value Ref Range Status   10/17/2024 110 (H) 98 - 107 mmol/L Final     Carbon Dioxide (CO2)   Date Value Ref Range Status   10/17/2024 22 22 - 29 mmol/L Final     Anion Gap   Date Value Ref Range Status   10/17/2024 10 7 - 15 mmol/L Final     Glucose   Date Value Ref Range Status   10/17/2024 94 70 - 99 mg/dL Final     GLUCOSE BY METER POCT   Date Value Ref Range Status   06/25/2024 87 70 - 99 mg/dL Final     Urea Nitrogen   Date Value Ref Range Status   10/17/2024 10.4 8.0 - 23.0 mg/dL Final     Creatinine   Date Value Ref Range Status   10/17/2024 0.72 0.51 - 0.95 mg/dL Final     GFR Estimate   Date Value Ref  Range Status   10/17/2024 82 >60 mL/min/1.73m2 Final     Calcium   Date Value Ref Range Status   10/17/2024 7.7 (L) 8.8 - 10.4 mg/dL Final     Comment:     Reference intervals for this test were updated on 7/16/2024 to reflect our healthy population more accurately. There may be differences in the flagging of prior results with similar values performed with this method. Those prior results can be interpreted in the context of the updated reference intervals.     This note has been dictated using voice recognition software. Any grammatical or context distortions are unintentional and inherent to the software    Electronically signed by: Sissy Grant, CNP

## 2024-10-21 ENCOUNTER — TRANSITIONAL CARE UNIT VISIT (OUTPATIENT)
Dept: GERIATRICS | Facility: CLINIC | Age: 84
End: 2024-10-21
Payer: COMMERCIAL

## 2024-10-21 ENCOUNTER — LAB REQUISITION (OUTPATIENT)
Dept: LAB | Facility: CLINIC | Age: 84
End: 2024-10-21
Payer: COMMERCIAL

## 2024-10-21 ENCOUNTER — ANTICOAGULATION THERAPY VISIT (OUTPATIENT)
Dept: ANTICOAGULATION | Facility: CLINIC | Age: 84
End: 2024-10-21

## 2024-10-21 VITALS
TEMPERATURE: 97.3 F | RESPIRATION RATE: 16 BRPM | WEIGHT: 141 LBS | BODY MASS INDEX: 24.98 KG/M2 | HEART RATE: 78 BPM | DIASTOLIC BLOOD PRESSURE: 80 MMHG | SYSTOLIC BLOOD PRESSURE: 138 MMHG | OXYGEN SATURATION: 98 %

## 2024-10-21 DIAGNOSIS — I51.89 DIASTOLIC DYSFUNCTION: Primary | ICD-10-CM

## 2024-10-21 DIAGNOSIS — Z95.2 S/P TAVR (TRANSCATHETER AORTIC VALVE REPLACEMENT): Primary | ICD-10-CM

## 2024-10-21 DIAGNOSIS — S22.080S T12 COMPRESSION FRACTURE, SEQUELA: ICD-10-CM

## 2024-10-21 DIAGNOSIS — D64.9 ANEMIA, UNSPECIFIED: ICD-10-CM

## 2024-10-21 DIAGNOSIS — M62.81 GENERALIZED MUSCLE WEAKNESS: ICD-10-CM

## 2024-10-21 DIAGNOSIS — I63.9 CEREBELLAR STROKE (H): ICD-10-CM

## 2024-10-21 DIAGNOSIS — N30.00 ACUTE CYSTITIS WITHOUT HEMATURIA: ICD-10-CM

## 2024-10-21 DIAGNOSIS — E87.6 HYPOKALEMIA: ICD-10-CM

## 2024-10-21 DIAGNOSIS — D50.8 OTHER IRON DEFICIENCY ANEMIA: ICD-10-CM

## 2024-10-21 LAB — INR (EXTERNAL): 2.9

## 2024-10-21 PROCEDURE — 99309 SBSQ NF CARE MODERATE MDM 30: CPT | Performed by: NURSE PRACTITIONER

## 2024-10-21 NOTE — PROGRESS NOTES
ANTICOAGULATION MANAGEMENT     Kacie Hermosillo 84 year old female is on warfarin with therapeutic INR result. (Goal INR 2.0-3.0)    Recent labs: (last 7 days)     10/21/24  0000   INR 2.9       ASSESSMENT     Source(s): Chart Review and Home Care/Facility Nurse     Warfarin doses taken: Warfarin taken as instructed  Diet: No new diet changes identified  Medication/supplement changes: None noted  New illness, injury, or hospitalization: No  Signs or symptoms of bleeding or clotting: No  Previous result: Therapeutic last visit; previously outside of goal range  Additional findings: None       PLAN     Recommended plan for no diet, medication or health factor changes affecting INR     Dosing Instructions: Continue your current warfarin dose with next INR in 3 days       Summary  As of 10/21/2024      Full warfarin instructions:  5 mg every Mon, Wed, Fri; 2.5 mg all other days   Next INR check:  10/24/2024               Telephone call with [facility] nurse (medical care for seniors) who verbalizes understanding and agrees to plan    Orders given to  Homecare nurse/facility to recheck    Education provided: Please call back if any changes to your diet, medications or how you've been taking warfarin    Plan made per [facility] anticoagulation protocol    Desiree Zaidi RN  10/21/2024  Anticoagulation Clinic  Gamemaster for routing messages: p [facility] (TCU/LTC/[facility])  [facility] patient phone line: [phone]        _______________________________________________________________________     Anticoagulation Episode Summary       Current INR goal:  2.0-3.0   TTR:  81.4% (1 y)   Target end date:  Indefinite   Send INR reminders to:  [facility] (TCU/LTC/nursing home)    Indications    S/P TAVR (transcatheter aortic valve replacement) [Z95.2]  Cerebellar stroke (H) [I63.9]             Comments:  10/9/24-[facility] TCU admit, Theo patient   2.0-3.0 per Dr. Olguin 5/24/23 (4/10/23-Cardiac  CT: Radiographic features of valve leaflet thickening w concurrent hypoattenuation and reduced leaflet motion all highly suggestive of valve   leaflet thrombosis)             Anticoagulation Care Providers       Provider Role Specialty Phone number    Irina Francisco PA-C Referring [facility] [phone]

## 2024-10-21 NOTE — PROGRESS NOTES
[facility] GERIATRICS  ACUTE/EPISODIC VISIT    [facility] Medical Record Number:  [id number]  Place of Service where encounter took place:  [facility] ([facility]) [[id number]]    Chief Complaint   Patient presents with    RECHECK       HPI:    Kacie Hermosillo is a 84 year old  (1940), who is being seen today for an episodic care visit.  HPI information obtained from: {FGS HPI:[id number]}.    Today's concern is:      ALLERGIES:  No Known Allergies     MEDICATIONS:  Post Discharge Medication Reconciliation Status: {ACO Med Rec (Provider):208203}. ***    Current Outpatient Medications   Medication Sig Dispense Refill    acetaminophen (TYLENOL) 325 MG tablet Take 650 mg by mouth every 8 hours as needed for mild pain      aspirin (ASA) 81 MG EC tablet Take 81 mg by mouth every morning.      atorvastatin (LIPITOR) 40 MG tablet Take 1 tablet (40 mg) by mouth every morning 90 tablet 3    chlorhexidine (PERIDEX) 0.12 % solution Take 15 mLs by mouth daily as needed.      estradiol (ESTRACE) 0.1 MG/GM vaginal cream Place 0.5 g vaginally twice a week. Can use daily for 2 weeks and then twice a week after that. 42 g 1    lidocaine-prilocaine (EMLA) 2.5-2.5 % external cream Apply topically as needed for other (Apply small amount to port site 30-60 minutes prior to port access to minimize discomfort) 30 g 2    methylcellulose (CITRUCEL) 500 MG TABS tablet Take 500 mg by mouth every other day.      metoprolol tartrate (LOPRESSOR) 25 MG tablet Take 1 tablet (25 mg) by mouth 2 times daily 180 tablet 3    nitroFURantoin macrocrystal-monohydrate (MACROBID) 100 MG capsule Take 100 mg by mouth 2 times daily.      ondansetron (ZOFRAN) 8 MG tablet Take 1 tablet (8 mg) by mouth every 8 hours as needed for nausea (vomiting) 30 tablet 2    potassium chloride ER (K-TAB) 20 MEQ CR tablet Take 20 mEq by mouth daily (with breakfast).      traMADol (ULTRAM) 50 MG tablet Take 1 tablet (50 mg) by mouth every 8 hours as needed for  moderate to severe pain. 30 tablet 0    warfarin ANTICOAGULANT (JANTOVEN ANTICOAGULANT) 5 MG tablet 1/2 tab (2.5mg) on Mon, Wed, Fri and 1 tab (5mg) all other days 90 tablet 3    WARFARIN SODIUM PO Take by mouth See Admin Instructions. Dosing in coordination with INR results       Medications reviewed:  Medications reconciled to facility chart and changes were made to reflect current medications as identified as above med list. Below are the changes that were made:   Medications stopped since last EPIC medication reconciliation:   There are no discontinued medications.    Medications started since last [facility] medication reconciliation:  No orders of the defined types were placed in this encounter.    ***    REVIEW OF SYSTEMS:  4 point ROS neg other than the symptoms noted above in the HPI.***  Unable to be obtained due to cognitive impairment or aphasia.   ROS    PHYSICAL EXAM:  /80   Pulse 78   Temp 97.3  F (36.3  C)   Resp 16   Wt 64 kg (141 lb)   SpO2 98%   BMI 24.98 kg/m    Physical Exam      ASSESSMENT / PLAN:  {FGS DX:432004}    Orders:  ***    Electronically signed by  Payton Fraser         treatment mentioned in her orders.  Right forehead has 5 stitches present.  Heart rate regular and strong.  Bilateral lower extremity edema.  Lotion to legs due to dry and flaky.  Lungs are clear.  No use of oxygen.   Abdomen is soft, flat, non-tender.    Continent of bowel and bladder.      Component      Latest Ref Rng 10/17/2024  5:38 AM 10/18/2024  6:25 AM   WBC      4.0 - 11.0 10e3/uL 5.1     RBC Count      3.80 - 5.20 10e6/uL 2.40 (L)     Hemoglobin      11.7 - 15.7 g/dL 7.4 (L)     Hematocrit      35.0 - 47.0 % 22.0 (L)     MCV      78 - 100 fL 92     MCH      26.5 - 33.0 pg 30.8     MCHC      31.5 - 36.5 g/dL 33.6     RDW      10.0 - 15.0 % 17.8 (H)     Platelet Count      150 - 450 10e3/uL 186     Sodium      135 - 145 mmol/L 142     Potassium      3.4 - 5.3 mmol/L 3.3 (L)  3.6    Chloride      98 - 107 mmol/L 110 (H)     Carbon Dioxide (CO2)      22 - 29 mmol/L 22     Anion Gap      7 - 15 mmol/L 10     Urea Nitrogen      8.0 - 23.0 mg/dL 10.4     Creatinine      0.51 - 0.95 mg/dL 0.72     GFR Estimate      >60 mL/min/1.73m2 82     Calcium      8.8 - 10.4 mg/dL 7.7 (L)     Glucose      70 - 99 mg/dL 94          ASSESSMENT / PLAN:  (I51.89) Diastolic dysfunction  (primary encounter diagnosis)  (E87.6) Hypokalemia  Comment: - was to have upcoming labs per note of regular NP but nothing ordered.  Nurse manager brought it up to the NP and so gave order to draw tomorrow.  Otherwise seems to be doing fine with no acute symptoms.  Her admission weight was recorded as 141 lbs and went down to 137 lbs.  Now back to 141 lbs.      (N30.00) Acute cystitis without hematuria  Comment: Kacie offers no pain with urination.  Explained that usually do not retest urine unless symptoms.  Encouraged her to let staff know if she has issues.    (D50.8) Other iron deficiency anemia  Comment: will repeat a CBC tomorrow.  Remains on FeSO4 325mg daily.  Seems to be tolerating.    (M62.81) Generalized muscle weakness  (S22.080S)  T12 compression fracture, sequela  Comment: continues to  attend therapies for strengthening.  Recovering from her injuries from her syncope.  From the charting, it appears that she will probably be leaving late this week.  Looking at respite in a [facility] as family has concerns of her going home independently.       Orders:  BMP and CBC tomorrow for anemia and hypokalemia    Electronically signed by  CMKAYLA Young CNP

## 2024-10-21 NOTE — LETTER
10/21/2024      Kacie Hermosillo  [address]  [address]  [facility] [address]        [facility] GERIATRICS  ACUTE/EPISODIC VISIT    [facility] Medical Record Number:  [id number]  Place of Service where encounter took place:  [facility] ([facility]) [[id number]]    Chief Complaint   Patient presents with     RECHECK       HPI:    Kacie Hermosillo is a 84 year old  (1940), who is being seen today for an episodic care visit.  HPI information obtained from: facility chart records, facility staff, patient report, and [facility] chart review.    Today's concern is:    Diagnoses         Codes Comments    Diastolic dysfunction    -  Primary I51.89     Hypokalemia     E87.6     Acute cystitis without hematuria     N30.00     Other iron deficiency anemia     D50.8     Generalized muscle weakness     M62.81     T12 compression fracture, sequela     S22.080S           Came to see Kacie today per request of regular rounding NP to check how she has been doing with her rehab process.      Came to [facility] after a hospital stay earlier this month after having a syncopal episode and suffered injuries.  Receiving therapies for muscle weakness and hx of T12 compression fracture.    Found Kacie in her room.  Introduced self and reviewed medications with her as well ask questions of her stay.    Has not had any falls or feeling of syncope while here.  Kacie tells this NP she is feeling strong.      Care conference yesterday revealed that she can ambulate 500 ft with a 4WW with cues to remember to put the brakes on.  Minimal supervision for transfers.  No help with toileting but recommended to have help with bathing.      Currently being treated for a UTI with Macrobid which will end tomorrow.  This ABX will end tomorrow.       ALLERGIES:  No Known Allergies     MEDICATIONS:  Post Discharge Medication Reconciliation Status: medication reconcilation previously completed during another office visit.      Current Outpatient Medications   Medication Sig Dispense Refill     acetaminophen (TYLENOL) 325 MG tablet Take 650 mg by mouth every 8 hours as needed for mild pain       aspirin (ASA) 81 MG EC tablet Take 81 mg by mouth every morning.       atorvastatin (LIPITOR) 40 MG tablet Take 1 tablet (40 mg) by mouth every morning 90 tablet 3     chlorhexidine (PERIDEX) 0.12 % solution Take 15 mLs by mouth daily as needed.       estradiol (ESTRACE) 0.1 MG/GM vaginal cream Place 0.5 g vaginally twice a week. Can use daily for 2 weeks and then twice a week after that. 42 g 1     lidocaine-prilocaine (EMLA) 2.5-2.5 % external cream Apply topically as needed for other (Apply small amount to port site 30-60 minutes prior to port access to minimize discomfort) 30 g 2     methylcellulose (CITRUCEL) 500 MG TABS tablet Take 500 mg by mouth every other day.       metoprolol tartrate (LOPRESSOR) 25 MG tablet Take 1 tablet (25 mg) by mouth 2 times daily 180 tablet 3     nitroFURantoin macrocrystal-monohydrate (MACROBID) 100 MG capsule Take 100 mg by mouth 2 times daily.       ondansetron (ZOFRAN) 8 MG tablet Take 1 tablet (8 mg) by mouth every 8 hours as needed for nausea (vomiting) 30 tablet 2     potassium chloride ER (K-TAB) 20 MEQ CR tablet Take 20 mEq by mouth daily (with breakfast).       traMADol (ULTRAM) 50 MG tablet Take 1 tablet (50 mg) by mouth every 8 hours as needed for moderate to severe pain. 30 tablet 0     warfarin ANTICOAGULANT (JANTOVEN ANTICOAGULANT) 5 MG tablet 1/2 tab (2.5mg) on Mon, Wed, Fri and 1 tab (5mg) all other days 90 tablet 3     WARFARIN SODIUM PO Take by mouth See Admin Instructions. Dosing in coordination with INR results           REVIEW OF SYSTEMS:  4 point ROS neg other than the symptoms noted above in the HPI.  Denied chest pain, no SOB.    PHYSICAL EXAM:  /80   Pulse 78   Temp 97.3  F (36.3  C)   Resp 16   Wt 64 kg (141 lb)   SpO2 98%   BMI 24.98 kg/m    Alert and oriented x3.   Neatly groomed.  Wears glasses and makes eye contact.  Skin is pink, dry and warm.  Has wounds on legs that have a treatment mentioned in her orders.  Right forehead has 5 stitches present.  Heart rate regular and strong.  Bilateral lower extremity edema.  Lotion to legs due to dry and flaky.  Lungs are clear.  No use of oxygen.   Abdomen is soft, flat, non-tender.    Continent of bowel and bladder.      Component      Latest Ref Rng 10/17/2024  5:38 AM 10/18/2024  6:25 AM   WBC      4.0 - 11.0 10e3/uL 5.1     RBC Count      3.80 - 5.20 10e6/uL 2.40 (L)     Hemoglobin      11.7 - 15.7 g/dL 7.4 (L)     Hematocrit      35.0 - 47.0 % 22.0 (L)     MCV      78 - 100 fL 92     MCH      26.5 - 33.0 pg 30.8     MCHC      31.5 - 36.5 g/dL 33.6     RDW      10.0 - 15.0 % 17.8 (H)     Platelet Count      150 - 450 10e3/uL 186     Sodium      135 - 145 mmol/L 142     Potassium      3.4 - 5.3 mmol/L 3.3 (L)  3.6    Chloride      98 - 107 mmol/L 110 (H)     Carbon Dioxide (CO2)      22 - 29 mmol/L 22     Anion Gap      7 - 15 mmol/L 10     Urea Nitrogen      8.0 - 23.0 mg/dL 10.4     Creatinine      0.51 - 0.95 mg/dL 0.72     GFR Estimate      >60 mL/min/1.73m2 82     Calcium      8.8 - 10.4 mg/dL 7.7 (L)     Glucose      70 - 99 mg/dL 94          ASSESSMENT / PLAN:  (I51.89) Diastolic dysfunction  (primary encounter diagnosis)  (E87.6) Hypokalemia  Comment: - was to have upcoming labs per note of regular NP but nothing ordered.  Nurse manager brought it up to the NP and so gave order to draw tomorrow.  Otherwise seems to be doing fine with no acute symptoms.  Her admission weight was recorded as 141 lbs and went down to 137 lbs.  Now back to 141 lbs.      (N30.00) Acute cystitis without hematuria  Comment: Kacie offers no pain with urination.  Explained that usually do not retest urine unless symptoms.  Encouraged her to let staff know if she has issues.    (D50.8) Other iron deficiency anemia  Comment: will repeat a CBC  tomorrow.  Remains on FeSO4 325mg daily.  Seems to be tolerating.    (M62.81) Generalized muscle weakness  (S22.080S) T12 compression fracture, sequela  Comment: continues to  attend therapies for strengthening.  Recovering from her injuries from her syncope.  From the charting, it appears that she will probably be leaving late this week.  Looking at respite in a [facility] as family has concerns of her going home independently.       Orders:  BMP and CBC tomorrow for anemia and hypokalemia    Electronically signed by  MCKAYLA Young CNP            Sincerely,        MCKAYLA Young CNP

## 2024-10-21 NOTE — LETTER
10/21/2024      Kacie Hermosillo  [address] [facility] [address]        No notes on file      Sincerely,        MCKAYLA Young CNP

## 2024-10-22 LAB
ANION GAP SERPL CALCULATED.3IONS-SCNC: 7 MMOL/L (ref 7–15)
ATRIAL RATE - MUSE: 76 BPM
BUN SERPL-MCNC: 10.2 MG/DL (ref 8–23)
CALCIUM SERPL-MCNC: 8 MG/DL (ref 8.8–10.4)
CHLORIDE SERPL-SCNC: 111 MMOL/L (ref 98–107)
CREAT SERPL-MCNC: 0.77 MG/DL (ref 0.51–0.95)
DIASTOLIC BLOOD PRESSURE - MUSE: 56 MMHG
EGFRCR SERPLBLD CKD-EPI 2021: 76 ML/MIN/1.73M2
ERYTHROCYTE [DISTWIDTH] IN BLOOD BY AUTOMATED COUNT: 19.3 % (ref 10–15)
GLUCOSE SERPL-MCNC: 86 MG/DL (ref 70–99)
HCO3 SERPL-SCNC: 24 MMOL/L (ref 22–29)
HCT VFR BLD AUTO: 25.7 % (ref 35–47)
HGB BLD-MCNC: 8.1 G/DL (ref 11.7–15.7)
INTERPRETATION ECG - MUSE: NORMAL
MCH RBC QN AUTO: 30.7 PG (ref 26.5–33)
MCHC RBC AUTO-ENTMCNC: 31.5 G/DL (ref 31.5–36.5)
MCV RBC AUTO: 97 FL (ref 78–100)
P AXIS - MUSE: 58 DEGREES
PLATELET # BLD AUTO: 225 10E3/UL (ref 150–450)
POTASSIUM SERPL-SCNC: 4.6 MMOL/L (ref 3.4–5.3)
PR INTERVAL - MUSE: 146 MS
QRS DURATION - MUSE: 124 MS
QT - MUSE: 428 MS
QTC - MUSE: 481 MS
R AXIS - MUSE: -41 DEGREES
RBC # BLD AUTO: 2.64 10E6/UL (ref 3.8–5.2)
SODIUM SERPL-SCNC: 142 MMOL/L (ref 135–145)
SYSTOLIC BLOOD PRESSURE - MUSE: 112 MMHG
T AXIS - MUSE: 121 DEGREES
VENTRICULAR RATE- MUSE: 76 BPM
WBC # BLD AUTO: 4.2 10E3/UL (ref 4–11)

## 2024-10-22 PROCEDURE — 80048 BASIC METABOLIC PNL TOTAL CA: CPT | Mod: ORL | Performed by: FAMILY MEDICINE

## 2024-10-22 PROCEDURE — 85027 COMPLETE CBC AUTOMATED: CPT | Mod: ORL | Performed by: FAMILY MEDICINE

## 2024-10-22 PROCEDURE — P9604 ONE-WAY ALLOW PRORATED TRIP: HCPCS | Mod: ORL | Performed by: FAMILY MEDICINE

## 2024-10-22 PROCEDURE — 36415 COLL VENOUS BLD VENIPUNCTURE: CPT | Mod: ORL | Performed by: FAMILY MEDICINE

## 2024-10-23 ENCOUNTER — LAB REQUISITION (OUTPATIENT)
Dept: LAB | Facility: CLINIC | Age: 84
End: 2024-10-23
Payer: COMMERCIAL

## 2024-10-23 ENCOUNTER — TRANSITIONAL CARE UNIT VISIT (OUTPATIENT)
Dept: GERIATRICS | Facility: CLINIC | Age: 84
End: 2024-10-23
Payer: COMMERCIAL

## 2024-10-23 VITALS
SYSTOLIC BLOOD PRESSURE: 138 MMHG | BODY MASS INDEX: 24.98 KG/M2 | TEMPERATURE: 97.1 F | HEART RATE: 74 BPM | DIASTOLIC BLOOD PRESSURE: 83 MMHG | HEIGHT: 63 IN | OXYGEN SATURATION: 97 % | WEIGHT: 141 LBS | RESPIRATION RATE: 18 BRPM

## 2024-10-23 DIAGNOSIS — Z85.3 HX OF BREAST CANCER: ICD-10-CM

## 2024-10-23 DIAGNOSIS — Z87.440 PERSONAL HISTORY OF URINARY TRACT INFECTION: ICD-10-CM

## 2024-10-23 DIAGNOSIS — Z95.2 S/P TAVR (TRANSCATHETER AORTIC VALVE REPLACEMENT): ICD-10-CM

## 2024-10-23 DIAGNOSIS — I63.9 CEREBELLAR STROKE (H): ICD-10-CM

## 2024-10-23 DIAGNOSIS — I51.89 DIASTOLIC DYSFUNCTION: ICD-10-CM

## 2024-10-23 DIAGNOSIS — D64.9 ANEMIA, UNSPECIFIED: ICD-10-CM

## 2024-10-23 DIAGNOSIS — N30.00 ACUTE CYSTITIS WITHOUT HEMATURIA: ICD-10-CM

## 2024-10-23 DIAGNOSIS — I35.9 AORTIC VALVE DISORDER: ICD-10-CM

## 2024-10-23 DIAGNOSIS — E86.1 HYPOTENSION DUE TO HYPOVOLEMIA: ICD-10-CM

## 2024-10-23 DIAGNOSIS — D50.8 OTHER IRON DEFICIENCY ANEMIA: ICD-10-CM

## 2024-10-23 DIAGNOSIS — N17.9 AKI (ACUTE KIDNEY INJURY) (H): ICD-10-CM

## 2024-10-23 DIAGNOSIS — S22.080D COMPRESSION FRACTURE OF T12 VERTEBRA WITH ROUTINE HEALING, SUBSEQUENT ENCOUNTER: ICD-10-CM

## 2024-10-23 DIAGNOSIS — E87.6 HYPOKALEMIA: Primary | ICD-10-CM

## 2024-10-23 PROCEDURE — 99316 NF DSCHRG MGMT 30 MIN+: CPT | Performed by: NURSE PRACTITIONER

## 2024-10-23 RX ORDER — LISINOPRIL 2.5 MG/1
2.5 TABLET ORAL DAILY
COMMUNITY
End: 2024-11-07

## 2024-10-23 NOTE — LETTER
10/23/2024      Kacie Hermosillo  [address] [facility] [address]        [facility]    Code Status:  DNR/DNI   Visit Type:   Chief Complaint   Patient presents with     TCU Follow Up     Facility:  [facility]) [[id number]]         HPI: Kacie Hermosillo is a 84 year old female who I am seeing today for follow-up on the TCU. Past medical history includes aortic valve replacement on warfarin anticoagulation, CKD 3, prior cerebellar stroke, hypertension, hyperlipidemia, and history of breast cancer metastatic to bone. Pt recently hospitalized with syncope related to orthostatic hypotension due to diarrhea from chemotherapy. Pt with ALTAGRACIA and electrolyte imbalances. PSVT. Pt with fall sustaining laceration to scalp and T12 fracture.       Transitional Care Course: Today pt sitting up in bed. Today pt reporting occasional wheezing. She denies any SOB or CP. Hydrochlorothiazide/lisinopril combo discontinued during hospitalization due to low potassium and low bp. Medication at lower dose attempted to be resumed at the TCU however pt did not tolerate this. K+ dropped to 2.9. Medication discontinued. Potassium supplemented. Pt continues with LE edema 2+. Weight stable. Diarrhea improved with changing Citrucel to every other day. Scabbed lesion to the temporal region post fall.  Anemia.  Guaiac x 3 negative.  Hemoglobin now 8.1. She was started on iron at the TCU.  UTI.  Urine culture greater than 100,000 E. coli.  Antibiotics complete. Back pain well controlled with tylenol and tramadol.       Assessment/Plan:    severe aortic stenosis  History of bioprosthetic aortic valve replacement   Wheezing  Fluid overload  -TTE shows LVEF 70%, normal RV, bioprosthetic AV are present with no significant regurgitation  -INR 2.9. Repeat INR in am.   -Continue warfarin  -Coumadin clinic to manage INR.   -Follow up out pt with Cardiology.     Hypokalemia  -K+ 2.3> 3.9 > 4.6.   -Continue 20 meq every day.   -No  longer on HCTZ/lisinopril combo.   -However will start lisinopril 2.5 mg daily.   -Repeat BMP in am.   -Decrease Citrucel to every other day.  Diarrhea may be contributory to hypokalemia.    Diarrhea  -Most likely due to Citrucel 2 tabs being given daily.  Reduce to every other day.    UTI  -WBC 12.5 now 8.7.  -UC with greater than 100,000 E. coli.  -Received 1 gm IM Rocephin with lidocaine given x 2.  -Continue Macrobid 100 mg p.o. twice daily x 7 days complete.     Syncope  PSVT  Hypotension   -due to orthostatic hypotension due to recent diarrhea from chemo.   -TTE shows LVEF 70%, normal RV, bioprosthetic AV are present with no significant regurgitation.  -Head CT negative.   -EEG negative.   -pt underwent fluid resuscitation. No longer on hydrochlorothiazide/lisinopril.   -continue metoprolol with hold parameters.     BLE VS ulcers   -xeroform guaze to BLE with ABD and kerlix daily.   -Baldwin wound MD to follow.  -Tubi  on am/off hs.   -Monitor Bp.     ALTAGRACIA   -No longer on diuretic.   -follow up BMP in am.      T12 compression fracture, old   - CT thoracic spine shows Slight loss of height superior aspect of T4, probably chronic. Marked compression of T12, age indeterminate however there is suggestion of acute/subacute component. Associated retropulsion with mild canal narrowing.  - Neurosurgery consulted and recommended conservative cares  - continue Tylenol and tramadol for pain control     History of metastatic breast cancer  - S/P chemotherapy  - Question of seizure as per neurology note on 10/2/24   - Negative brain MRI on 10/3/2024 for acute intracranial pathology  - EEG negative  -Chemo on hold.   -Follow up with Oncology out pt.     Anemia  -hgb now 8.1.  -Guaiac stool x 3 negative. No evidence of bleed.   -Ferrous sulfate 325 mg p.o. daily.  -Repeat CBCi am.     -ok to discharge home with current meds and treatments.   -Home PT, OT, HHA and RN for medication management.   -Follow up with PCP in 1-2  weeks.      Active Ambulatory Problems     Diagnosis Date Noted     Aortic valve disorder 10/18/2012     Chronic renal failure, stage 3 (moderate) (H) 02/19/2018     Diastolic dysfunction 02/17/2016     Essential hypertension 02/17/2016     Dysuria 06/06/2022     Gastroesophageal reflux disease 02/17/2016     Hyperlipidemia 02/17/2016     Malignant neoplasm of areola of left breast in female (H) 02/17/2016     Cerebellar stroke (H) 07/02/2020     S/P TAVR (transcatheter aortic valve replacement) 11/26/2021     Osteopenia 03/13/2017     Paroxysmal supraventricular tachycardia (H) 11/26/2021     Varicose vein of leg 02/17/2016     Venous insufficiency 02/17/2016     Mild carotid artery disease (H) 05/04/2023     Malignant neoplasm metastatic to bone (H) 06/27/2024     Hyponatremia 10/06/2024     Elevated INR 10/06/2024     T12 compression fracture, sequela 10/06/2024     Fall, initial encounter 10/06/2024     Laceration of forehead, initial encounter 10/06/2024     Syncope 10/07/2024     DNR (do not resuscitate) 10/07/2024     Hypocalcemia 10/08/2024     Hypophosphatemia 10/08/2024     LBBB (left bundle branch block) 10/08/2024     Decreased carotid pulse 03/13/2017     Low back pain 03/31/2019     Lymphocytic colitis 10/05/2021     Severe aortic stenosis 02/17/2016     Resolved Ambulatory Problems     Diagnosis Date Noted     Trochanteric bursitis of left hip 01/05/2024     Past Medical History:   Diagnosis Date     Breast cancer (H) 01/01/1999     Heart valve replaced      Hx antineoplastic chemotherapy 01/01/1999     Hx of radiation therapy 01/01/1999     Hypertension      No Known Allergies    All Meds and Allergies reviewed in the record at the facility and is the most up-to-date.    Current Outpatient Medications   Medication Sig Dispense Refill     lisinopril (ZESTRIL) 2.5 MG tablet Take 2.5 mg by mouth daily.       acetaminophen (TYLENOL) 325 MG tablet Take 650 mg by mouth every 8 hours as needed for mild pain        aspirin (ASA) 81 MG EC tablet Take 81 mg by mouth every morning.       atorvastatin (LIPITOR) 40 MG tablet Take 1 tablet (40 mg) by mouth every morning 90 tablet 3     chlorhexidine (PERIDEX) 0.12 % solution Take 15 mLs by mouth daily as needed.       estradiol (ESTRACE) 0.1 MG/GM vaginal cream Place 0.5 g vaginally twice a week. Can use daily for 2 weeks and then twice a week after that. 42 g 1     lidocaine-prilocaine (EMLA) 2.5-2.5 % external cream Apply topically as needed for other (Apply small amount to port site 30-60 minutes prior to port access to minimize discomfort) 30 g 2     methylcellulose (CITRUCEL) 500 MG TABS tablet Take 500 mg by mouth every other day.       metoprolol tartrate (LOPRESSOR) 25 MG tablet Take 1 tablet (25 mg) by mouth 2 times daily 180 tablet 3     ondansetron (ZOFRAN) 8 MG tablet Take 1 tablet (8 mg) by mouth every 8 hours as needed for nausea (vomiting) 30 tablet 2     potassium chloride ER (K-TAB) 20 MEQ CR tablet Take 20 mEq by mouth daily (with breakfast).       traMADol (ULTRAM) 50 MG tablet Take 1 tablet (50 mg) by mouth every 8 hours as needed for moderate to severe pain. 30 tablet 0     warfarin ANTICOAGULANT (JANTOVEN ANTICOAGULANT) 5 MG tablet 1/2 tab (2.5mg) on Mon, Wed, Fri and 1 tab (5mg) all other days 90 tablet 3     WARFARIN SODIUM PO Take by mouth See Admin Instructions. Dosing in coordination with INR results       No current facility-administered medications for this visit.     Facility-Administered Medications Ordered in Other Visits   Medication Dose Route Frequency Provider Last Rate Last Admin     heparin lock flush 100 unit/mL injection 5 mL  5 mL Intracatheter Q30 Days Neymar Gann MD   5 mL at 10/03/24 1031       REVIEW OF SYSTEMS:   10 point review of systems reviewed and pertinent positives in the HPI.     PHYSICAL EXAMINATION:  Physical Exam     Vital signs: /83   Pulse 74   Temp 97.1  F (36.2  C)   Resp 18   Ht 1.6 m (5'  "3\")   Wt 64 kg (141 lb)   SpO2 97%   BMI 24.98 kg/m    General: Awake, Alert, oriented x3,lying in bed, appropriately, follows simple commands, conversant  HEENT:Pink conjunctiva,  moist oral mucosa  NECK: Supple  CVS:  S1  S2, without murmur or gallop.   LUNG: Clear to auscultation, No wheezes, rales or rhonci.  BACK: No kyphosis of the thoracic spine  ABDOMEN: Soft, nontender to palpation, with positive bowel sounds  EXTREMITIES: Moves all extremities with diffuse weakness, 1+ pedal edema, no calf tenderness  SKIN: wounds to LE covered.   NEUROLOGIC: Intact, pulses palpable  PSYCHIATRIC: Cognition intact      Labs:  All labs reviewed in the nursing home record and [facility]   @  Lab Results   Component Value Date    WBC 9.9 10/09/2024     Lab Results   Component Value Date    RBC 2.85 10/09/2024     Lab Results   Component Value Date    HGB 8.4 10/09/2024     Lab Results   Component Value Date    HCT 25.2 10/09/2024     Lab Results   Component Value Date    MCV 88 10/09/2024     Lab Results   Component Value Date    MCH 29.5 10/09/2024     Lab Results   Component Value Date    MCHC 33.3 10/09/2024     Lab Results   Component Value Date    RDW 15.7 10/09/2024     Lab Results   Component Value Date     10/09/2024        @Last Comprehensive Metabolic Panel:  Sodium   Date Value Ref Range Status   10/22/2024 142 135 - 145 mmol/L Final     Potassium   Date Value Ref Range Status   10/22/2024 4.6 3.4 - 5.3 mmol/L Final     Chloride   Date Value Ref Range Status   10/22/2024 111 (H) 98 - 107 mmol/L Final     Carbon Dioxide (CO2)   Date Value Ref Range Status   10/22/2024 24 22 - 29 mmol/L Final     Anion Gap   Date Value Ref Range Status   10/22/2024 7 7 - 15 mmol/L Final     Glucose   Date Value Ref Range Status   10/22/2024 86 70 - 99 mg/dL Final     GLUCOSE BY METER POCT   Date Value Ref Range Status   06/25/2024 87 70 - 99 mg/dL Final     Urea Nitrogen   Date Value Ref Range Status   10/22/2024 10.2 8.0 - 23.0 " mg/dL Final     Creatinine   Date Value Ref Range Status   10/22/2024 0.77 0.51 - 0.95 mg/dL Final     GFR Estimate   Date Value Ref Range Status   10/22/2024 76 >60 mL/min/1.73m2 Final     Calcium   Date Value Ref Range Status   10/22/2024 8.0 (L) 8.8 - 10.4 mg/dL Final     Comment:     Reference intervals for this test were updated on 7/16/2024 to reflect our healthy population more accurately. There may be differences in the flagging of prior results with similar values performed with this method. Those prior results can be interpreted in the context of the updated reference intervals.     DISCHARGE PLAN/FACE TO FACE:  I certify that this patient is under my care and that I, or a nurse practitioner or physician's assistant working with me, had a face-to-face encounter that meets the physician face-to-face encounter requirements with this patient.       I certify that, based on my findings, the following services are medically necessary home health services.    My clinical findings support the need for the above skilled services.    This patient is homebound because: Recent hospitalization with syncope and hypotension.     The patient is, or has been, under my care and I have initiated the establishment of the plan of care. This patient will be followed by a physician who will periodically review the plan of care.    > 35 minutes spent reviewing discharge medications, home care services and follow ups as well as collaborating with nursing staff.     This note has been dictated using voice recognition software. Any grammatical or context distortions are unintentional and inherent to the software    Electronically signed by: Sissy Grant CNP         Sincerely,        Sissy Grant NP

## 2024-10-24 ENCOUNTER — PATIENT OUTREACH (OUTPATIENT)
Dept: CARE COORDINATION | Facility: CLINIC | Age: 84
End: 2024-10-24
Payer: COMMERCIAL

## 2024-10-24 ENCOUNTER — ANTICOAGULATION THERAPY VISIT (OUTPATIENT)
Dept: ANTICOAGULATION | Facility: CLINIC | Age: 84
End: 2024-10-24
Payer: COMMERCIAL

## 2024-10-24 DIAGNOSIS — Z95.2 S/P TAVR (TRANSCATHETER AORTIC VALVE REPLACEMENT): Primary | ICD-10-CM

## 2024-10-24 DIAGNOSIS — I63.9 CEREBELLAR STROKE (H): ICD-10-CM

## 2024-10-24 LAB
ANION GAP SERPL CALCULATED.3IONS-SCNC: 8 MMOL/L (ref 7–15)
BUN SERPL-MCNC: 10.2 MG/DL (ref 8–23)
CALCIUM SERPL-MCNC: 8.2 MG/DL (ref 8.8–10.4)
CHLORIDE SERPL-SCNC: 111 MMOL/L (ref 98–107)
CREAT SERPL-MCNC: 0.83 MG/DL (ref 0.51–0.95)
EGFRCR SERPLBLD CKD-EPI 2021: 69 ML/MIN/1.73M2
ERYTHROCYTE [DISTWIDTH] IN BLOOD BY AUTOMATED COUNT: 19.2 % (ref 10–15)
GLUCOSE SERPL-MCNC: 88 MG/DL (ref 70–99)
HCO3 SERPL-SCNC: 23 MMOL/L (ref 22–29)
HCT VFR BLD AUTO: 26.6 % (ref 35–47)
HGB BLD-MCNC: 8.2 G/DL (ref 11.7–15.7)
INR (EXTERNAL): 3 (ref 0.9–1.1)
MCH RBC QN AUTO: 30 PG (ref 26.5–33)
MCHC RBC AUTO-ENTMCNC: 30.8 G/DL (ref 31.5–36.5)
MCV RBC AUTO: 97 FL (ref 78–100)
PLATELET # BLD AUTO: 232 10E3/UL (ref 150–450)
POTASSIUM SERPL-SCNC: 4.4 MMOL/L (ref 3.4–5.3)
RBC # BLD AUTO: 2.73 10E6/UL (ref 3.8–5.2)
SODIUM SERPL-SCNC: 142 MMOL/L (ref 135–145)
WBC # BLD AUTO: 5.4 10E3/UL (ref 4–11)

## 2024-10-24 PROCEDURE — P9604 ONE-WAY ALLOW PRORATED TRIP: HCPCS | Mod: ORL | Performed by: NURSE PRACTITIONER

## 2024-10-24 PROCEDURE — 85027 COMPLETE CBC AUTOMATED: CPT | Mod: ORL | Performed by: NURSE PRACTITIONER

## 2024-10-24 PROCEDURE — 80048 BASIC METABOLIC PNL TOTAL CA: CPT | Mod: ORL | Performed by: NURSE PRACTITIONER

## 2024-10-24 PROCEDURE — 36415 COLL VENOUS BLD VENIPUNCTURE: CPT | Mod: ORL | Performed by: NURSE PRACTITIONER

## 2024-10-24 NOTE — PROGRESS NOTES
[facility]  TCU Discharge    Clinical Data: Per chart review, patient has discharged from TCU to home/community setting.    Assessment/Plan: Transitional Care Management (TCM) program initiated to prompt post-discharge outreach call by [facility] team member.     Mariola Thomas  Care Transitions Assistant  [facility]

## 2024-10-24 NOTE — PROGRESS NOTES
[facility] received faxed INR assessment for patient. Spoke with [employer] who managed today's INR & verified approval to send to abstraction. Ana Cristina Watters, SITAN, RN

## 2024-10-24 NOTE — PROGRESS NOTES
[facility] GERIATRIC SERVICES    Code Status:  DNR/DNI   Visit Type:   Chief Complaint   Patient presents with    TCU Follow Up     Facility:  [facility] ([facility]) [[id number]]         HPI: Kacie Hermosillo is a 84 year old female who I am seeing today for follow-up on the TCU. Past medical history includes aortic valve replacement on warfarin anticoagulation, CKD 3, prior cerebellar stroke, hypertension, hyperlipidemia, and history of breast cancer metastatic to bone. Pt recently hospitalized with syncope related to orthostatic hypotension due to diarrhea from chemotherapy. Pt with ALTAGRACIA and electrolyte imbalances. PSVT. Pt with fall sustaining laceration to scalp and T12 fracture.       Transitional Care Course: Today pt sitting up in bed. Today pt reporting occasional wheezing. She denies any SOB or CP. Hydrochlorothiazide/lisinopril combo discontinued during hospitalization due to low potassium and low bp. Medication at lower dose attempted to be resumed at the TCU however pt did not tolerate this. K+ dropped to 2.9. Medication discontinued. Potassium supplemented. Pt continues with LE edema 2+. Weight stable. Diarrhea improved with changing Citrucel to every other day. Scabbed lesion to the temporal region post fall.  Anemia.  Guaiac x 3 negative.  Hemoglobin now 8.1. She was started on iron at the TCU.  UTI.  Urine culture greater than 100,000 E. coli.  Antibiotics complete. Back pain well controlled with tylenol and tramadol.       Assessment/Plan:    severe aortic stenosis  History of bioprosthetic aortic valve replacement   Wheezing  Fluid overload  -TTE shows LVEF 70%, normal RV, bioprosthetic AV are present with no significant regurgitation  -INR 2.9. Repeat INR in am.   -Continue warfarin  -Coumadin clinic to manage INR.   -Follow up out pt with Cardiology.     Hypokalemia  -K+ 2.3> 3.9 > 4.6.   -Continue 20 meq every day.   -No longer on HCTZ/lisinopril combo.   -However will start lisinopril 2.5 mg  daily.   -Repeat BMP in am.   -Decrease Citrucel to every other day.  Diarrhea may be contributory to hypokalemia.    Diarrhea  -Most likely due to Citrucel 2 tabs being given daily.  Reduce to every other day.    UTI  -WBC 12.5 now 8.7.  -UC with greater than 100,000 E. coli.  -Received 1 gm IM Rocephin with lidocaine given x 2.  -Continue Macrobid 100 mg p.o. twice daily x 7 days complete.     Syncope  PSVT  Hypotension   -due to orthostatic hypotension due to recent diarrhea from chemo.   -TTE shows LVEF 70%, normal RV, bioprosthetic AV are present with no significant regurgitation.  -Head CT negative.   -EEG negative.   -pt underwent fluid resuscitation. No longer on hydrochlorothiazide/lisinopril.   -continue metoprolol with hold parameters.     BLE VS ulcers   -xeroform guaze to BLE with ABD and kerlix daily.   -House wound MD to follow.  -Tubi  on am/off hs.   -Monitor Bp.     ALTAGRACIA   -No longer on diuretic.   -follow up BMP in am.      T12 compression fracture, old   - CT thoracic spine shows Slight loss of height superior aspect of T4, probably chronic. Marked compression of T12, age indeterminate however there is suggestion of acute/subacute component. Associated retropulsion with mild canal narrowing.  - Neurosurgery consulted and recommended conservative cares  - continue Tylenol and tramadol for pain control     History of metastatic breast cancer  - S/P chemotherapy  - Question of seizure as per neurology note on 10/2/24   - Negative brain MRI on 10/3/2024 for acute intracranial pathology  - EEG negative  -Chemo on hold.   -Follow up with Oncology out pt.     Anemia  -hgb now 8.1.  -Guaiac stool x 3 negative. No evidence of bleed.   -Ferrous sulfate 325 mg p.o. daily.  -Repeat CBCi am.     -ok to discharge home with current meds and treatments.   -Home PT, OT, HHA and RN for medication management.   -Follow up with PCP in 1-2 weeks.      Active Ambulatory Problems     Diagnosis Date Noted    Aortic  valve disorder 10/18/2012    Chronic renal failure, stage 3 (moderate) (H) 02/19/2018    Diastolic dysfunction 02/17/2016    Essential hypertension 02/17/2016    Dysuria 06/06/2022    Gastroesophageal reflux disease 02/17/2016    Hyperlipidemia 02/17/2016    Malignant neoplasm of areola of left breast in female (H) 02/17/2016    Cerebellar stroke (H) 07/02/2020    S/P TAVR (transcatheter aortic valve replacement) 11/26/2021    Osteopenia 03/13/2017    Paroxysmal supraventricular tachycardia (H) 11/26/2021    Varicose vein of leg 02/17/2016    Venous insufficiency 02/17/2016    Mild carotid artery disease (H) 05/04/2023    Malignant neoplasm metastatic to bone (H) 06/27/2024    Hyponatremia 10/06/2024    Elevated INR 10/06/2024    T12 compression fracture, sequela 10/06/2024    Fall, initial encounter 10/06/2024    Laceration of forehead, initial encounter 10/06/2024    Syncope 10/07/2024    DNR (do not resuscitate) 10/07/2024    Hypocalcemia 10/08/2024    Hypophosphatemia 10/08/2024    LBBB (left bundle branch block) 10/08/2024    Decreased carotid pulse 03/13/2017    Low back pain 03/31/2019    Lymphocytic colitis 10/05/2021    Severe aortic stenosis 02/17/2016     Resolved Ambulatory Problems     Diagnosis Date Noted    Trochanteric bursitis of left hip 01/05/2024     Past Medical History:   Diagnosis Date    Breast cancer (H) 01/01/1999    Heart valve replaced     Hx antineoplastic chemotherapy 01/01/1999    Hx of radiation therapy 01/01/1999    Hypertension      No Known Allergies    All Meds and Allergies reviewed in the record at the facility and is the most up-to-date.    Current Outpatient Medications   Medication Sig Dispense Refill    lisinopril (ZESTRIL) 2.5 MG tablet Take 2.5 mg by mouth daily.      acetaminophen (TYLENOL) 325 MG tablet Take 650 mg by mouth every 8 hours as needed for mild pain      aspirin (ASA) 81 MG EC tablet Take 81 mg by mouth every morning.      atorvastatin (LIPITOR) 40 MG tablet  "Take 1 tablet (40 mg) by mouth every morning 90 tablet 3    chlorhexidine (PERIDEX) 0.12 % solution Take 15 mLs by mouth daily as needed.      estradiol (ESTRACE) 0.1 MG/GM vaginal cream Place 0.5 g vaginally twice a week. Can use daily for 2 weeks and then twice a week after that. 42 g 1    lidocaine-prilocaine (EMLA) 2.5-2.5 % external cream Apply topically as needed for other (Apply small amount to port site 30-60 minutes prior to port access to minimize discomfort) 30 g 2    methylcellulose (CITRUCEL) 500 MG TABS tablet Take 500 mg by mouth every other day.      metoprolol tartrate (LOPRESSOR) 25 MG tablet Take 1 tablet (25 mg) by mouth 2 times daily 180 tablet 3    ondansetron (ZOFRAN) 8 MG tablet Take 1 tablet (8 mg) by mouth every 8 hours as needed for nausea (vomiting) 30 tablet 2    potassium chloride ER (K-TAB) 20 MEQ CR tablet Take 20 mEq by mouth daily (with breakfast).      traMADol (ULTRAM) 50 MG tablet Take 1 tablet (50 mg) by mouth every 8 hours as needed for moderate to severe pain. 30 tablet 0    warfarin ANTICOAGULANT (JANTOVEN ANTICOAGULANT) 5 MG tablet 1/2 tab (2.5mg) on Mon, Wed, Fri and 1 tab (5mg) all other days 90 tablet 3    WARFARIN SODIUM PO Take by mouth See Admin Instructions. Dosing in coordination with INR results       No current facility-administered medications for this visit.     Facility-Administered Medications Ordered in Other Visits   Medication Dose Route Frequency Provider Last Rate Last Admin    heparin lock flush 100 unit/mL injection 5 mL  5 mL Intracatheter Q30 Days Neymar Gann MD   5 mL at 10/03/24 1031       REVIEW OF SYSTEMS:   10 point review of systems reviewed and pertinent positives in the HPI.     PHYSICAL EXAMINATION:  Physical Exam     Vital signs: /83   Pulse 74   Temp 97.1  F (36.2  C)   Resp 18   Ht 1.6 m (5' 3\")   Wt 64 kg (141 lb)   SpO2 97%   BMI 24.98 kg/m    General: Awake, Alert, oriented x3,lying in bed, appropriately, " follows simple commands, conversant  HEENT:Pink conjunctiva,  moist oral mucosa  NECK: Supple  CVS:  S1  S2, without murmur or gallop.   LUNG: Clear to auscultation, No wheezes, rales or rhonci.  BACK: No kyphosis of the thoracic spine  ABDOMEN: Soft, nontender to palpation, with positive bowel sounds  EXTREMITIES: Moves all extremities with diffuse weakness, 1+ pedal edema, no calf tenderness  SKIN: wounds to LE covered.   NEUROLOGIC: Intact, pulses palpable  PSYCHIATRIC: Cognition intact      Labs:  All labs reviewed in the nursing home record and Epic   @  Lab Results   Component Value Date    WBC 9.9 10/09/2024     Lab Results   Component Value Date    RBC 2.85 10/09/2024     Lab Results   Component Value Date    HGB 8.4 10/09/2024     Lab Results   Component Value Date    HCT 25.2 10/09/2024     Lab Results   Component Value Date    MCV 88 10/09/2024     Lab Results   Component Value Date    MCH 29.5 10/09/2024     Lab Results   Component Value Date    MCHC 33.3 10/09/2024     Lab Results   Component Value Date    RDW 15.7 10/09/2024     Lab Results   Component Value Date     10/09/2024        @Last Comprehensive Metabolic Panel:  Sodium   Date Value Ref Range Status   10/22/2024 142 135 - 145 mmol/L Final     Potassium   Date Value Ref Range Status   10/22/2024 4.6 3.4 - 5.3 mmol/L Final     Chloride   Date Value Ref Range Status   10/22/2024 111 (H) 98 - 107 mmol/L Final     Carbon Dioxide (CO2)   Date Value Ref Range Status   10/22/2024 24 22 - 29 mmol/L Final     Anion Gap   Date Value Ref Range Status   10/22/2024 7 7 - 15 mmol/L Final     Glucose   Date Value Ref Range Status   10/22/2024 86 70 - 99 mg/dL Final     GLUCOSE BY METER POCT   Date Value Ref Range Status   06/25/2024 87 70 - 99 mg/dL Final     Urea Nitrogen   Date Value Ref Range Status   10/22/2024 10.2 8.0 - 23.0 mg/dL Final     Creatinine   Date Value Ref Range Status   10/22/2024 0.77 0.51 - 0.95 mg/dL Final     GFR Estimate   Date  Value Ref Range Status   10/22/2024 76 >60 mL/min/1.73m2 Final     Calcium   Date Value Ref Range Status   10/22/2024 8.0 (L) 8.8 - 10.4 mg/dL Final     Comment:     Reference intervals for this test were updated on 7/16/2024 to reflect our healthy population more accurately. There may be differences in the flagging of prior results with similar values performed with this method. Those prior results can be interpreted in the context of the updated reference intervals.     DISCHARGE PLAN/FACE TO FACE:  I certify that this patient is under my care and that I, or a nurse practitioner or physician's assistant working with me, had a face-to-face encounter that meets the physician face-to-face encounter requirements with this patient.       I certify that, based on my findings, the following services are medically necessary home health services.    My clinical findings support the need for the above skilled services.    This patient is homebound because: Recent hospitalization with syncope and hypotension.     The patient is, or has been, under my care and I have initiated the establishment of the plan of care. This patient will be followed by a physician who will periodically review the plan of care.    > 35 minutes spent reviewing discharge medications, home care services and follow ups as well as collaborating with nursing staff.     This note has been dictated using voice recognition software. Any grammatical or context distortions are unintentional and inherent to the software    Electronically signed by: Sissy Grant CNP

## 2024-10-24 NOTE — PROGRESS NOTES
ANTICOAGULATION MANAGEMENT     Kacie Hermosillo 84 year old female is on warfarin with therapeutic INR result. (Goal INR 2.0-3.0)    Recent labs: (last 7 days)     10/24/24  1118   INR 3.0*       ASSESSMENT     Source(s): Chart Review and Home Care/Facility Nurse     Warfarin doses taken: Warfarin taken as instructed  Diet: No new diet changes identified  Medication/supplement changes:  lisinopril and potassium supplement added  New illness, injury, or hospitalization: Yes: Diarrhea multiple episodes per day, ongoing lsince 10/9/24 admission  Signs or symptoms of bleeding or clotting: No  Previous result: Therapeutic last 2(+) visits  Additional findings: None       PLAN     Recommended plan for temporary change(s) affecting INR     Dosing Instructions: Continue your current warfarin dose with next INR in 4 days       Summary  As of 10/24/2024      Full warfarin instructions:  5 mg every Mon, Wed, Fri; 2.5 mg all other days   Next INR check:  10/28/2024               Telephone call with [facility] nurse (medical care for seniors) who verbalizes understanding and agrees to plan    Orders given to  Homecare nurse/facility to recheck    Education provided: Please call back if any changes to your diet, medications or how you've been taking warfarin    Plan made per [facility] anticoagulation protocol    Zohra Henson RN  10/24/2024  Anticoagulation Clinic  [facility] for routing messages: p [facility] (U/C/[facility])  [facility] patient phone line: [phone]        _______________________________________________________________________     Anticoagulation Episode Summary       Current INR goal:  2.0-3.0   TTR:  81.5% (1 y)   Target end date:  Indefinite   Send INR reminders to:  [facility] (U/C/[facility])    Indications    S/P TAVR (transcatheter aortic valve replacement) [Z95.2]  Cerebellar stroke (H) [I63.9]             [facility]:  [facility]    2.0-3.0 per Dr. 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

## 2024-10-25 ENCOUNTER — TELEPHONE (OUTPATIENT)
Dept: FAMILY MEDICINE | Facility: CLINIC | Age: 84
End: 2024-10-25
Payer: COMMERCIAL

## 2024-10-25 NOTE — TELEPHONE ENCOUNTER
Reason for Call:  Appointment Request    Patient requesting this type of appt:  Wellness visit     Requested provider: Irina Francisco    Reason patient unable to be scheduled: Not within requested timeframe    When does patient want to be seen/preferred time: Next week Monday or Tuesday   Comments: due to her moving to assisted living on Wednesday next week     Could we send this information to you in [facility] or would you prefer to receive a phone call?:   Patient would prefer a phone call   Okay to leave a detailed message?: Yes at Other phone number: Daughter phone number [phone]    Call taken on 10/25/2024 at 1:42 PM by Fina Velásquez

## 2024-10-26 ENCOUNTER — MEDICAL CORRESPONDENCE (OUTPATIENT)
Dept: HEALTH INFORMATION MANAGEMENT | Facility: CLINIC | Age: 84
End: 2024-10-26
Payer: COMMERCIAL

## 2024-10-27 ENCOUNTER — APPOINTMENT (OUTPATIENT)
Dept: RADIOLOGY | Facility: HOSPITAL | Age: 84
DRG: 291 | End: 2024-10-27
Attending: EMERGENCY MEDICINE
Payer: COMMERCIAL

## 2024-10-27 ENCOUNTER — APPOINTMENT (OUTPATIENT)
Dept: ULTRASOUND IMAGING | Facility: HOSPITAL | Age: 84
DRG: 291 | End: 2024-10-27
Attending: STUDENT IN AN ORGANIZED HEALTH CARE EDUCATION/TRAINING PROGRAM
Payer: COMMERCIAL

## 2024-10-27 ENCOUNTER — HOSPITAL ENCOUNTER (INPATIENT)
Facility: HOSPITAL | Age: 84
LOS: 2 days | Discharge: HOME HEALTH | DRG: 291 | End: 2024-10-29
Attending: EMERGENCY MEDICINE | Admitting: STUDENT IN AN ORGANIZED HEALTH CARE EDUCATION/TRAINING PROGRAM
Payer: COMMERCIAL

## 2024-10-27 ENCOUNTER — APPOINTMENT (OUTPATIENT)
Dept: CT IMAGING | Facility: HOSPITAL | Age: 84
DRG: 291 | End: 2024-10-27
Attending: EMERGENCY MEDICINE
Payer: COMMERCIAL

## 2024-10-27 DIAGNOSIS — R53.83 OTHER FATIGUE: ICD-10-CM

## 2024-10-27 DIAGNOSIS — E87.70 HYPERVOLEMIA, UNSPECIFIED HYPERVOLEMIA TYPE: ICD-10-CM

## 2024-10-27 DIAGNOSIS — J90 BILATERAL PLEURAL EFFUSION: ICD-10-CM

## 2024-10-27 DIAGNOSIS — S81.801A OPEN WOUND OF LOWER LIMB, RIGHT, INITIAL ENCOUNTER: Primary | ICD-10-CM

## 2024-10-27 LAB
ANION GAP SERPL CALCULATED.3IONS-SCNC: 9 MMOL/L (ref 7–15)
BASOPHILS # BLD AUTO: 0.1 10E3/UL (ref 0–0.2)
BASOPHILS NFR BLD AUTO: 2 %
BUN SERPL-MCNC: 11.3 MG/DL (ref 8–23)
CALCIUM SERPL-MCNC: 7.8 MG/DL (ref 8.8–10.4)
CHLORIDE SERPL-SCNC: 107 MMOL/L (ref 98–107)
CREAT SERPL-MCNC: 1.07 MG/DL (ref 0.51–0.95)
D DIMER PPP FEU-MCNC: 1.13 UG/ML FEU (ref 0–0.5)
EGFRCR SERPLBLD CKD-EPI 2021: 51 ML/MIN/1.73M2
EOSINOPHIL # BLD AUTO: 0.2 10E3/UL (ref 0–0.7)
EOSINOPHIL NFR BLD AUTO: 4 %
ERYTHROCYTE [DISTWIDTH] IN BLOOD BY AUTOMATED COUNT: 18.3 % (ref 10–15)
EST. AVERAGE GLUCOSE BLD GHB EST-MCNC: 103 MG/DL
FLUAV RNA SPEC QL NAA+PROBE: NEGATIVE
FLUBV RNA RESP QL NAA+PROBE: NEGATIVE
GLUCOSE SERPL-MCNC: 124 MG/DL (ref 70–99)
HBA1C MFR BLD: 5.2 %
HCO3 SERPL-SCNC: 22 MMOL/L (ref 22–29)
HCT VFR BLD AUTO: 30 % (ref 35–47)
HGB BLD-MCNC: 9.5 G/DL (ref 11.7–15.7)
HOLD SPECIMEN: NORMAL
IMM GRANULOCYTES # BLD: 0 10E3/UL
IMM GRANULOCYTES NFR BLD: 0 %
INR PPP: 2.46 (ref 0.85–1.15)
LYMPHOCYTES # BLD AUTO: 0.7 10E3/UL (ref 0.8–5.3)
LYMPHOCYTES NFR BLD AUTO: 14 %
MAGNESIUM SERPL-MCNC: 2.1 MG/DL (ref 1.7–2.3)
MAGNESIUM SERPL-MCNC: 2.2 MG/DL (ref 1.7–2.3)
MCH RBC QN AUTO: 30.3 PG (ref 26.5–33)
MCHC RBC AUTO-ENTMCNC: 31.7 G/DL (ref 31.5–36.5)
MCV RBC AUTO: 96 FL (ref 78–100)
MONOCYTES # BLD AUTO: 0.9 10E3/UL (ref 0–1.3)
MONOCYTES NFR BLD AUTO: 16 %
NEUTROPHILS # BLD AUTO: 3.4 10E3/UL (ref 1.6–8.3)
NEUTROPHILS NFR BLD AUTO: 64 %
NRBC # BLD AUTO: 0 10E3/UL
NRBC BLD AUTO-RTO: 0 /100
NT-PROBNP SERPL-MCNC: 1682 PG/ML (ref 0–1800)
PHOSPHATE SERPL-MCNC: 2.9 MG/DL (ref 2.5–4.5)
PLATELET # BLD AUTO: 236 10E3/UL (ref 150–450)
POTASSIUM SERPL-SCNC: 4.6 MMOL/L (ref 3.4–5.3)
PROT SERPL-MCNC: 5.1 G/DL (ref 6.4–8.3)
RBC # BLD AUTO: 3.14 10E6/UL (ref 3.8–5.2)
RSV RNA SPEC NAA+PROBE: NEGATIVE
SARS-COV-2 RNA RESP QL NAA+PROBE: NEGATIVE
SODIUM SERPL-SCNC: 138 MMOL/L (ref 135–145)
TROPONIN T SERPL HS-MCNC: 26 NG/L
TROPONIN T SERPL HS-MCNC: 28 NG/L
WBC # BLD AUTO: 5.3 10E3/UL (ref 4–11)

## 2024-10-27 PROCEDURE — 99285 EMERGENCY DEPT VISIT HI MDM: CPT | Mod: 25

## 2024-10-27 PROCEDURE — 71045 X-RAY EXAM CHEST 1 VIEW: CPT

## 2024-10-27 PROCEDURE — 83735 ASSAY OF MAGNESIUM: CPT | Performed by: STUDENT IN AN ORGANIZED HEALTH CARE EDUCATION/TRAINING PROGRAM

## 2024-10-27 PROCEDURE — 83880 ASSAY OF NATRIURETIC PEPTIDE: CPT | Performed by: EMERGENCY MEDICINE

## 2024-10-27 PROCEDURE — 71275 CT ANGIOGRAPHY CHEST: CPT

## 2024-10-27 PROCEDURE — 84155 ASSAY OF PROTEIN SERUM: CPT | Performed by: STUDENT IN AN ORGANIZED HEALTH CARE EDUCATION/TRAINING PROGRAM

## 2024-10-27 PROCEDURE — 250N000011 HC RX IP 250 OP 636: Performed by: EMERGENCY MEDICINE

## 2024-10-27 PROCEDURE — 99223 1ST HOSP IP/OBS HIGH 75: CPT | Performed by: STUDENT IN AN ORGANIZED HEALTH CARE EDUCATION/TRAINING PROGRAM

## 2024-10-27 PROCEDURE — 83036 HEMOGLOBIN GLYCOSYLATED A1C: CPT | Performed by: STUDENT IN AN ORGANIZED HEALTH CARE EDUCATION/TRAINING PROGRAM

## 2024-10-27 PROCEDURE — 80048 BASIC METABOLIC PNL TOTAL CA: CPT | Performed by: EMERGENCY MEDICINE

## 2024-10-27 PROCEDURE — 85379 FIBRIN DEGRADATION QUANT: CPT | Performed by: EMERGENCY MEDICINE

## 2024-10-27 PROCEDURE — 93005 ELECTROCARDIOGRAM TRACING: CPT | Performed by: EMERGENCY MEDICINE

## 2024-10-27 PROCEDURE — 120N000004 HC R&B MS OVERFLOW

## 2024-10-27 PROCEDURE — 85025 COMPLETE CBC W/AUTO DIFF WBC: CPT | Performed by: EMERGENCY MEDICINE

## 2024-10-27 PROCEDURE — 93970 EXTREMITY STUDY: CPT

## 2024-10-27 PROCEDURE — 87637 SARSCOV2&INF A&B&RSV AMP PRB: CPT | Performed by: EMERGENCY MEDICINE

## 2024-10-27 PROCEDURE — 84484 ASSAY OF TROPONIN QUANT: CPT | Performed by: EMERGENCY MEDICINE

## 2024-10-27 PROCEDURE — 85610 PROTHROMBIN TIME: CPT | Performed by: EMERGENCY MEDICINE

## 2024-10-27 PROCEDURE — 96374 THER/PROPH/DIAG INJ IV PUSH: CPT | Mod: 59

## 2024-10-27 PROCEDURE — 83735 ASSAY OF MAGNESIUM: CPT | Performed by: EMERGENCY MEDICINE

## 2024-10-27 PROCEDURE — 84100 ASSAY OF PHOSPHORUS: CPT | Performed by: STUDENT IN AN ORGANIZED HEALTH CARE EDUCATION/TRAINING PROGRAM

## 2024-10-27 PROCEDURE — 36415 COLL VENOUS BLD VENIPUNCTURE: CPT | Performed by: EMERGENCY MEDICINE

## 2024-10-27 PROCEDURE — 250N000013 HC RX MED GY IP 250 OP 250 PS 637: Performed by: STUDENT IN AN ORGANIZED HEALTH CARE EDUCATION/TRAINING PROGRAM

## 2024-10-27 RX ORDER — MULTIVITAMIN
1 TABLET ORAL DAILY
COMMUNITY

## 2024-10-27 RX ORDER — FERROUS SULFATE 325(65) MG
325 TABLET ORAL
Status: DISCONTINUED | OUTPATIENT
Start: 2024-10-28 | End: 2024-10-29 | Stop reason: HOSPADM

## 2024-10-27 RX ORDER — FERROUS SULFATE 325(65) MG
325 TABLET ORAL
COMMUNITY

## 2024-10-27 RX ORDER — WARFARIN SODIUM 2.5 MG/1
2.5 TABLET ORAL
Status: DISCONTINUED | OUTPATIENT
Start: 2024-10-27 | End: 2024-10-28

## 2024-10-27 RX ORDER — ASPIRIN 81 MG/1
81 TABLET ORAL EVERY MORNING
Status: DISCONTINUED | OUTPATIENT
Start: 2024-10-28 | End: 2024-10-29 | Stop reason: HOSPADM

## 2024-10-27 RX ORDER — WARFARIN SODIUM 5 MG/1
TABLET ORAL SEE ADMIN INSTRUCTIONS
COMMUNITY

## 2024-10-27 RX ORDER — LISINOPRIL 2.5 MG/1
2.5 TABLET ORAL DAILY
Status: DISCONTINUED | OUTPATIENT
Start: 2024-10-28 | End: 2024-10-28

## 2024-10-27 RX ORDER — HYDRALAZINE HYDROCHLORIDE 10 MG/1
10 TABLET, FILM COATED ORAL EVERY 4 HOURS PRN
Status: DISCONTINUED | OUTPATIENT
Start: 2024-10-27 | End: 2024-10-29 | Stop reason: HOSPADM

## 2024-10-27 RX ORDER — FUROSEMIDE 10 MG/ML
40 INJECTION INTRAMUSCULAR; INTRAVENOUS DAILY
Status: DISCONTINUED | OUTPATIENT
Start: 2024-10-28 | End: 2024-10-28

## 2024-10-27 RX ORDER — METOPROLOL TARTRATE 25 MG/1
25 TABLET, FILM COATED ORAL 2 TIMES DAILY
Status: DISCONTINUED | OUTPATIENT
Start: 2024-10-27 | End: 2024-10-29 | Stop reason: HOSPADM

## 2024-10-27 RX ORDER — POTASSIUM CHLORIDE 750 MG/1
10 TABLET, EXTENDED RELEASE ORAL DAILY
COMMUNITY
Start: 2024-10-25 | End: 2024-11-07

## 2024-10-27 RX ORDER — ACETAMINOPHEN 325 MG/1
650 TABLET ORAL EVERY 8 HOURS PRN
Status: DISCONTINUED | OUTPATIENT
Start: 2024-10-27 | End: 2024-10-29 | Stop reason: HOSPADM

## 2024-10-27 RX ORDER — LIDOCAINE 40 MG/G
CREAM TOPICAL
Status: DISCONTINUED | OUTPATIENT
Start: 2024-10-27 | End: 2024-10-29 | Stop reason: HOSPADM

## 2024-10-27 RX ORDER — ONDANSETRON 4 MG/1
4 TABLET, ORALLY DISINTEGRATING ORAL EVERY 6 HOURS PRN
Status: DISCONTINUED | OUTPATIENT
Start: 2024-10-27 | End: 2024-10-29 | Stop reason: HOSPADM

## 2024-10-27 RX ORDER — ONDANSETRON 2 MG/ML
4 INJECTION INTRAMUSCULAR; INTRAVENOUS EVERY 6 HOURS PRN
Status: DISCONTINUED | OUTPATIENT
Start: 2024-10-27 | End: 2024-10-29 | Stop reason: HOSPADM

## 2024-10-27 RX ORDER — AMOXICILLIN 250 MG
1 CAPSULE ORAL 2 TIMES DAILY PRN
Status: DISCONTINUED | OUTPATIENT
Start: 2024-10-27 | End: 2024-10-29 | Stop reason: HOSPADM

## 2024-10-27 RX ORDER — ATORVASTATIN CALCIUM 40 MG/1
40 TABLET, FILM COATED ORAL EVERY MORNING
Status: DISCONTINUED | OUTPATIENT
Start: 2024-10-28 | End: 2024-10-29 | Stop reason: HOSPADM

## 2024-10-27 RX ORDER — AMOXICILLIN 250 MG
2 CAPSULE ORAL 2 TIMES DAILY PRN
Status: DISCONTINUED | OUTPATIENT
Start: 2024-10-27 | End: 2024-10-29 | Stop reason: HOSPADM

## 2024-10-27 RX ORDER — FUROSEMIDE 10 MG/ML
40 INJECTION INTRAMUSCULAR; INTRAVENOUS ONCE
Status: COMPLETED | OUTPATIENT
Start: 2024-10-27 | End: 2024-10-27

## 2024-10-27 RX ORDER — CALCIUM CARBONATE 500 MG/1
1000 TABLET, CHEWABLE ORAL 4 TIMES DAILY PRN
Status: DISCONTINUED | OUTPATIENT
Start: 2024-10-27 | End: 2024-10-29 | Stop reason: HOSPADM

## 2024-10-27 RX ORDER — HYDRALAZINE HYDROCHLORIDE 20 MG/ML
10 INJECTION INTRAMUSCULAR; INTRAVENOUS EVERY 4 HOURS PRN
Status: DISCONTINUED | OUTPATIENT
Start: 2024-10-27 | End: 2024-10-29 | Stop reason: HOSPADM

## 2024-10-27 RX ORDER — IOPAMIDOL 755 MG/ML
70 INJECTION, SOLUTION INTRAVASCULAR ONCE
Status: COMPLETED | OUTPATIENT
Start: 2024-10-27 | End: 2024-10-27

## 2024-10-27 RX ADMIN — METOPROLOL TARTRATE 25 MG: 25 TABLET, FILM COATED ORAL at 22:15

## 2024-10-27 RX ADMIN — IOPAMIDOL 70 ML: 755 INJECTION, SOLUTION INTRAVENOUS at 16:54

## 2024-10-27 RX ADMIN — FUROSEMIDE 40 MG: 10 INJECTION, SOLUTION INTRAVENOUS at 16:52

## 2024-10-27 ASSESSMENT — ACTIVITIES OF DAILY LIVING (ADL)
ADLS_ACUITY_SCORE: 0

## 2024-10-27 NOTE — ED PROVIDER NOTES
EMERGENCY DEPARTMENT ENCOUNTER      NAME: Kacie Hermosillo  AGE: 84 year old female  YOB: 1940  MRN: [id number]  EVALUATION DATE & TIME: 10/27/2024  2:02 PM    PCP: Irina Francisco    ED PROVIDER: Sylvia Cruz M.D.      Chief Complaint   Patient presents with    Shortness of Breath     FINAL IMPRESSION:  1. Bilateral pleural effusion    2. Hypervolemia, unspecified hypervolemia type    3. Other fatigue      ED COURSE & MEDICAL DECISION MAKING:    Pertinent Labs & Imaging studies reviewed. (See chart for details)  ED Course as of 10/27/24 2219   Sun Oct 27, 2024   1440 Patient is a very pleasant 84-year-old female who comes in today for evaluation of some wheezing that started on Thursday.  She was recently hospitalized and then had a TCU until Friday.  Daughter reports she is alert worse today and as she was eating lunch she was wheezing a lot.  She had been coughing all night and had very fitful sleeping.  They tried some cough medicine this morning without much did improvement.  She has been more weak and fatigued and could not get herself dressed today which is new for her.  She had had 3 rounds of chemotherapy and then had a lot of diarrhea and got dehydrated and had some acute kidney injury and electrolyte issues and so was hospitalized and got a lot of IV fluids during hospitalization and then sent back to to a TCU for couple of weeks and was still struggling with fluid in her legs and low potassiums.  They took her off her hydrochlorothiazide and decreased her lisinopril milligrams to 2.5 mg.  She denies any history of heart failure or COPD or asthma.  She does have a history of a valve replacement.  On my exam she has some swelling to her lower legs but her daughter says it significantly improved from where it was previously.  Lung sounds are actually clear for me.  She is in no distress and not coughing when I was in the room.  Will check some lab work and get a chest x-ray  and see if we can figure out what is going on.  Initially I was thinking this was fluid overload but she if she sounds pretty good so we will see what else we can figure out.  I discussed this with the patient and family and they are in agreement.   1616 Patient's D-dimer came back elevated at 1.13 so I ordered CTA imaging of her chest to further evaluate.  Initial troponin was 28 and we will get a repeat on that.  BNP was 1682 which is quite a bit higher than 2 weeks ago.  I will order a dose of Lasix and see if it helps since her blood pressure is okay.   1617 Patient is Lasix naïve so I gave her 40 mg instead of 60.   1751 CT scan is consistent with some pleural effusions and interstitial edema.  I think this is all fluid overload.  I given the patient a dose of IV Lasix here.  I will check back in and see how she is doing.   1807 I checked back in with the patient.  She really wants to go home with her daughter think she be able to get into see her primary care doctor.  They were already trying to get in this week and the soonest they could see her is in December.  I think she needs to have her labs watched very closely with her issues with electrolytes recently after getting some IV diuresis today.  If she can get repeat labs in couple days I do not think going home is a good idea.  We will get her admitted to the hospital.   1815 Spoke with Dr. Gondal with the hospitalist service.  He agrees with a cardiac telemetry inpatient admission.       Medical Decision Making    History:  Supplemental history from: Patient's daughter who helped report the timeline and events over the last 3 to 4 weeks.  External Record(s) reviewed: I reviewed the note from the geriatric NP at [facility] on 10/15/2024.  She is in a TCU after she had acute kidney injury and electrolyte imbalances in the hospital as well as syncope related to orthostatic hypotension.  She sustained a laceration to her scalp and T12 fracture  after a fall.  Patient's blood pressures have been on the soft side but they were continuing to encourage fluids.  Urine culture grew out E. coli and she was given some IM Rocephin.  The note on 10/23/2024 shows that the patient has had some issues with low potassium and has been on potassium supplementation.  She has bilateral lower extremity edema.  She her hemoglobin was 8.1 with guaiac negative stools and she was started on iron at the TCU.  Her antibiotics for the UTI have been completed.  There was some concern for fluid overload at that time.  Her last TTE showed an EF of 70%.    Work Up:  Emergent/Severe conditions considered and evaluated for: CHF, pneumonia, COVID, flu, RSV, ACS, pneumothorax, pulmonary embolism  I independently reviewed and interpreted EKG and chest x-ray which showed a left pleural effusion. See full radiology report for all details. Rhythm strip shows normal sinus rhythm at 79 bpm.  In additional to work up documented, I considered the following work up: None  Medications given that require intensive monitoring for toxicity: None    External consultation:  Discussion of management with another provider: Hospital    Complicating factors:  Care impacted by chronic illness: Aortic valve replacement on warfarin, chronic kidney disease stage III, prior cerebellar stroke, hypertension, hyperlipidemia, breast cancer    Disposition considerations: Admission      CT Pulmonary Angiogram:The patient had an abnormal d-dimer.    At the conclusion of the encounter I discussed  the results of all of the tests and the disposition with patient.   All questions were answered.  The patient acknowledged understanding and was involved in the decision making regarding the overall care plan.      MEDICATIONS GIVEN IN THE EMERGENCY:  Medications   furosemide (LASIX) injection 40 mg (has no administration in time range)   lidocaine 1 % 0.1-1 mL (has no administration in time range)   lidocaine (LMX4) cream (has  no administration in time range)   sodium chloride (PF) 0.9% PF flush 3 mL (3 mLs Intracatheter $Given 10/27/24 2215)   sodium chloride (PF) 0.9% PF flush 3 mL (has no administration in time range)   senna-docusate (SENOKOT-S/PERICOLACE) 8.6-50 MG per tablet 1 tablet (has no administration in time range)     Or   senna-docusate (SENOKOT-S/PERICOLACE) 8.6-50 MG per tablet 2 tablet (has no administration in time range)   calcium carbonate (TUMS) chewable tablet 1,000 mg (has no administration in time range)   hydrALAZINE (APRESOLINE) tablet 10 mg (has no administration in time range)     Or   hydrALAZINE (APRESOLINE) injection 10 mg (has no administration in time range)   ondansetron (ZOFRAN ODT) ODT tab 4 mg (has no administration in time range)     Or   ondansetron (ZOFRAN) injection 4 mg (has no administration in time range)   - MEDICATION INSTRUCTIONS - (has no administration in time range)   Continuing ACE inhibitor/ARB/ARNI from home medication list OR ACE inhibitor/ARB/ARNI order already placed during this visit (has no administration in time range)   Continuing beta blocker from home medication list OR beta blocker order already placed during this visit (has no administration in time range)   acetaminophen (TYLENOL) tablet 650 mg (has no administration in time range)   aspirin EC tablet 81 mg (has no administration in time range)   atorvastatin (LIPITOR) tablet 40 mg (has no administration in time range)   ferrous sulfate (FEROSUL) tablet 325 mg (has no administration in time range)   lisinopril (ZESTRIL) tablet 2.5 mg (has no administration in time range)   metoprolol tartrate (LOPRESSOR) tablet 25 mg (25 mg Oral $Given 10/27/24 2215)   Warfarin Dose Required Daily - Pharmacist Managed (has no administration in time range)   warfarin ANTICOAGULANT (COUMADIN) tablet 2.5 mg (has no administration in time range)   furosemide (LASIX) injection 40 mg (40 mg Intravenous $Given 10/27/24 1652)   iopamidol  (ISOVUE-370) solution 70 mL (70 mLs Intravenous $Given 10/27/24 0543)       =================================================================    HPI    Triage Note: Patient with shortness of breath and cough that began Thursday, which has been getting worse. Patient with audible wheezing in triage. Denies any hx of COPD. Patient with recent hospitalization and discharge to TCU, discharged from TCU 3 days ago.      Triage Assessment (Adult)       Row Name 10/27/24 1400          Triage Assessment    Airway WDL WDL        Respiratory WDL    Respiratory WDL X;rhythm/pattern;cough     Rhythm/Pattern, Respiratory shortness of breath     Cough Frequency infrequent     Cough Type dry        Skin Circulation/Temperature WDL    Skin Circulation/Temperature WDL WDL        Cardiac WDL    Cardiac WDL WDL        Peripheral/Neurovascular WDL    Peripheral Neurovascular WDL WDL        Cognitive/Neuro/Behavioral WDL    Cognitive/Neuro/Behavioral WDL WDL                   Use of : None      Kacie Hermosillo is a 84 year old female who presents for evaluation of increased weakness and fatigue as well as some wheezing this been going on for a few days in the setting of recent hospitalization with electrolyte abnormalities and IV fluid resuscitation.    PAST MEDICAL HISTORY:  Past Medical History:   Diagnosis Date    Breast cancer (H) 01/01/1999    Heart valve replaced     Hx antineoplastic chemotherapy 01/01/1999    Hx of radiation therapy 01/01/1999    Hypertension        PAST SURGICAL HISTORY:  Past Surgical History:   Procedure Laterality Date    BIOPSY BREAST      HYSTERECTOMY      IR CHEST PORT PLACEMENT > 5 YRS OF AGE  7/30/2024    LUMPECTOMY BREAST Left 1999    OOPHORECTOMY Bilateral        CURRENT MEDICATIONS:      Current Facility-Administered Medications:     - MEDICATION INSTRUCTIONS -, , Does not apply, DOES NOT GO TO MAR, Gondal, Saad J, MD    acetaminophen (TYLENOL) tablet 650 mg, 650 mg, Oral, Q8H PRN,  Gondal, Saad J, MD    [START ON 10/28/2024] aspirin EC tablet 81 mg, 81 mg, Oral, QAM, Gondal, Saad J, MD    [START ON 10/28/2024] atorvastatin (LIPITOR) tablet 40 mg, 40 mg, Oral, QAM, Gondal, Saad J, MD    calcium carbonate (TUMS) chewable tablet 1,000 mg, 1,000 mg, Oral, 4x Daily PRN, Gondal, Saad J, MD    Continuing ACE inhibitor/ARB/ARNI from home medication list OR ACE inhibitor/ARB/ARNI order already placed during this visit, , Does not apply, DOES NOT GO TO MAR, Gondal, Saad J, MD    Continuing beta blocker from home medication list OR beta blocker order already placed during this visit, , Does not apply, DOES NOT GO TO MAR, Gondal, Saad J, MD    [START ON 10/28/2024] ferrous sulfate (FEROSUL) tablet 325 mg, 325 mg, Oral, Daily with breakfast, Gondal, Saad J, MD    [START ON 10/28/2024] furosemide (LASIX) injection 40 mg, 40 mg, Intravenous, Daily, Gondal, Saad J, MD    hydrALAZINE (APRESOLINE) tablet 10 mg, 10 mg, Oral, Q4H PRN **OR** hydrALAZINE (APRESOLINE) injection 10 mg, 10 mg, Intravenous, Q4H PRN, Gondal, Saad J, MD    lidocaine (LMX4) cream, , Topical, Q1H PRN, Gondal, Saad J, MD    lidocaine 1 % 0.1-1 mL, 0.1-1 mL, Other, Q1H PRN, Gondal, Saad J, MD    [START ON 10/28/2024] lisinopril (ZESTRIL) tablet 2.5 mg, 2.5 mg, Oral, Daily, Gondal, Saad J, MD    metoprolol tartrate (LOPRESSOR) tablet 25 mg, 25 mg, Oral, BID, Gondal, Saad J, MD, 25 mg at 10/27/24 4491    ondansetron (ZOFRAN ODT) ODT tab 4 mg, 4 mg, Oral, Q6H PRN **OR** ondansetron (ZOFRAN) injection 4 mg, 4 mg, Intravenous, Q6H PRN, Gondal, Saad J, MD    senna-docusate (SENOKOT-S/PERICOLACE) 8.6-50 MG per tablet 1 tablet, 1 tablet, Oral, BID PRN **OR** senna-docusate (SENOKOT-S/PERICOLACE) 8.6-50 MG per tablet 2 tablet, 2 tablet, Oral, BID PRN, Gondal, Saad J, MD    sodium chloride (PF) 0.9% PF flush 3 mL, 3 mL, Intracatheter, Q8H, Gondal, Saad J, MD, 3 mL at 10/27/24 2215    sodium chloride (PF) 0.9% PF flush 3 mL, 3 mL, Intracatheter, q1 min  prn, Gondal, Saad J, MD    warfarin ANTICOAGULANT (COUMADIN) tablet 2.5 mg, 2.5 mg, Oral, ONCE at 18:00, Gondal, Saad J, MD    Warfarin Dose Required Daily - Pharmacist Managed, 1 each, Oral, See Admin Instructions, Gondal, Saad J, MD    Current Outpatient Medications:     acetaminophen (TYLENOL) 325 MG tablet, Take 650 mg by mouth every 8 hours as needed for mild pain, Disp: , Rfl:     aspirin (ASA) 81 MG EC tablet, Take 81 mg by mouth every morning., Disp: , Rfl:     atorvastatin (LIPITOR) 40 MG tablet, Take 1 tablet (40 mg) by mouth every morning, Disp: 90 tablet, Rfl: 3    estradiol (ESTRACE) 0.1 MG/GM vaginal cream, Place 0.5 g vaginally twice a week. Can use daily for 2 weeks and then twice a week after that., Disp: 42 g, Rfl: 1    ferrous sulfate (FEROSUL) 325 (65 Fe) MG tablet, Take 325 mg by mouth daily (with breakfast)., Disp: , Rfl:     lisinopril (ZESTRIL) 2.5 MG tablet, Take 2.5 mg by mouth daily., Disp: , Rfl:     methylcellulose (CITRUCEL) 500 MG TABS tablet, Take 500 mg by mouth every other day., Disp: , Rfl:     metoprolol tartrate (LOPRESSOR) 25 MG tablet, Take 1 tablet (25 mg) by mouth 2 times daily, Disp: 180 tablet, Rfl: 3    multivitamin w/minerals (MULTI-VITAMIN) tablet, Take 1 tablet by mouth daily., Disp: , Rfl:     potassium chloride ER (K-TAB/KLOR-CON) 10 MEQ CR tablet, Take 10 mEq by mouth daily., Disp: , Rfl:     warfarin ANTICOAGULANT (COUMADIN) 5 MG tablet, Take by mouth See Admin Instructions. 5mg every mon,wed,fri 2.5mg all other days, Disp: , Rfl:     chlorhexidine (PERIDEX) 0.12 % solution, Take 15 mLs by mouth daily as needed., Disp: , Rfl:     lidocaine-prilocaine (EMLA) 2.5-2.5 % external cream, Apply topically as needed for other (Apply small amount to port site 30-60 minutes prior to port access to minimize discomfort), Disp: 30 g, Rfl: 2    Facility-Administered Medications Ordered in Other Encounters:     heparin lock flush 100 unit/mL injection 5 mL, 5 mL, Intracatheter,  Q30 Days, Neymar Gann MD, 5 mL at 10/03/24 1031    ALLERGIES:  No Known Allergies    FAMILY HISTORY:  Family History   Problem Relation Age of Onset    Breast Cancer Maternal Aunt     Hereditary Breast and Ovarian Cancer Syndrome No family hx of        SOCIAL HISTORY:   Social History     Socioeconomic History    Marital status:    Tobacco Use    Smoking status: Never    Smokeless tobacco: Never   Vaping Use    Vaping status: Never Used   Substance and Sexual Activity    Alcohol use: Yes     Comment: Rarely     Social Drivers of Health     Financial Resource Strain: Low Risk  (10/8/2024)    Financial Resource Strain     Within the past 12 months, have you or your family members you live with been unable to get utilities (heat, electricity) when it was really needed?: No   Food Insecurity: Low Risk  (10/8/2024)    Food Insecurity     Within the past 12 months, did you worry that your food would run out before you got money to buy more?: No     Within the past 12 months, did the food you bought just not last and you didn t have money to get more?: No   Transportation Needs: Low Risk  (10/8/2024)    Transportation Needs     Within the past 12 months, has lack of transportation kept you from medical appointments, getting your medicines, non-medical meetings or appointments, work, or from getting things that you need?: No   Physical Activity: Unknown (5/24/2024)    Exercise Vital Sign     Days of Exercise per Week: 0 days   Stress: No Stress Concern Present (5/24/2024)    [employer] - Occupational Stress Questionnaire     Feeling of Stress : Only a little   Social Connections: Unknown (5/24/2024)    Social Connection and Isolation Panel [NHANES]     Frequency of Social Gatherings with Friends and Family: Twice a week   Interpersonal Safety: High Risk (10/7/2024)    Interpersonal Safety     Do you feel physically and emotionally safe where you currently live?: No      "Within the past 12 months, have you been hit, slapped, kicked or otherwise physically hurt by someone?: No     Within the past 12 months, have you been humiliated or emotionally abused in other ways by your partner or ex-partner?: No   Housing Stability: Low Risk  (10/8/2024)    Housing Stability     Do you have housing? : Yes     Are you worried about losing your housing?: No       PHYSICAL EXAM    VITAL SIGNS: BP (!) 148/68   Pulse 85   Temp 97.9  F (36.6  C) (Oral)   Resp 28   Ht 1.6 m (5' 3\")   Wt 65.3 kg (144 lb)   SpO2 94%   BMI 25.51 kg/m     GENERAL: Awake, alert, answering questions appropriately, no acute distress  SPEECH:  Easy to understand speech, Normal volume and vu  PULMONARY: No respiratory distress, Lungs clear to auscultation bilaterally  CARDIOVASCULAR: Regular rate and rhythm, Distal pulses present and normal.  ABDOMINAL: Soft, Nondistended, Nontender, No rebound or guarding, No palpable masses  EXTREMITIES: Mild bilateral lower extremity edema  PSYCH: Normal mood and affect     LAB:  All pertinent labs reviewed and interpreted.  Results for orders placed or performed during the hospital encounter of 10/27/24   XR Chest Port 1 View    Impression    IMPRESSION: Small left pleural effusion with left basilar consolidation, likely compressive atelectasis, although pneumonia is also possible. Clear right lung and pleural space. No pneumothorax. Normal cardiomediastinal silhouette with TAVR. Right chest   port catheter tip in the lower SVC.   CT Chest Pulmonary Embolism w Contrast    Impression    IMPRESSION:  1.  No PE.  2.  Moderate size right and left pleural effusions. There is interstitial edema and atelectasis. Underlying atypical infection cannot be excluded.  3.  Multiple sclerotic bone lesions are again seen.     Extra Blue Top Tube   Result Value Ref Range    Hold Specimen JIC    Extra Green Top (Lithium Heparin) Tube   Result Value Ref Range    Hold Specimen JIC    Extra Purple " Top Tube   Result Value Ref Range    Hold Specimen [facility]    Basic metabolic panel   Result Value Ref Range    Sodium 138 135 - 145 mmol/L    Potassium 4.6 3.4 - 5.3 mmol/L    Chloride 107 98 - 107 mmol/L    Carbon Dioxide (CO2) 22 22 - 29 mmol/L    Anion Gap 9 7 - 15 mmol/L    Urea Nitrogen 11.3 8.0 - 23.0 mg/dL    Creatinine 1.07 (H) 0.51 - 0.95 mg/dL    GFR Estimate 51 (L) >60 mL/min/1.73m2    Calcium 7.8 (L) 8.8 - 10.4 mg/dL    Glucose 124 (H) 70 - 99 mg/dL   Result Value Ref Range    Troponin T, High Sensitivity 28 (H) <=14 ng/L   Result Value Ref Range    Magnesium 2.1 1.7 - 2.3 mg/dL   Nt probnp inpatient (BNP)   Result Value Ref Range    N terminal Pro BNP Inpatient 1,682 0 - 1,800 pg/mL   Result Value Ref Range    INR 2.46 (H) 0.85 - 1.15   CBC with platelets and differential   Result Value Ref Range    WBC Count 5.3 4.0 - 11.0 10e3/uL    RBC Count 3.14 (L) 3.80 - 5.20 10e6/uL    Hemoglobin 9.5 (L) 11.7 - 15.7 g/dL    Hematocrit 30.0 (L) 35.0 - 47.0 %    MCV 96 78 - 100 fL    MCH 30.3 26.5 - 33.0 pg    MCHC 31.7 31.5 - 36.5 g/dL    RDW 18.3 (H) 10.0 - 15.0 %    Platelet Count 236 150 - 450 10e3/uL    % Neutrophils 64 %    % Lymphocytes 14 %    % Monocytes 16 %    % Eosinophils 4 %    % Basophils 2 %    % Immature Granulocytes 0 %    NRBCs per 100 WBC 0 <1 /100    Absolute Neutrophils 3.4 1.6 - 8.3 10e3/uL    Absolute Lymphocytes 0.7 (L) 0.8 - 5.3 10e3/uL    Absolute Monocytes 0.9 0.0 - 1.3 10e3/uL    Absolute Eosinophils 0.2 0.0 - 0.7 10e3/uL    Absolute Basophils 0.1 0.0 - 0.2 10e3/uL    Absolute Immature Granulocytes 0.0 <=0.4 10e3/uL    Absolute NRBCs 0.0 10e3/uL   Symptomatic Influenza A/B, RSV, & SARS-CoV2 PCR (COVID-19) Nasopharyngeal    Specimen: Nasopharyngeal; Swab   Result Value Ref Range    Influenza A PCR Negative Negative    Influenza B PCR Negative Negative    RSV PCR Negative Negative    SARS CoV2 PCR Negative Negative   D dimer quantitative   Result Value Ref Range    D-Dimer Quantitative 1.13  (H) 0.00 - 0.50 ug/mL FEU   Result Value Ref Range    Troponin T, High Sensitivity 26 (H) <=14 ng/L   Result Value Ref Range    Magnesium 2.2 1.7 - 2.3 mg/dL   Result Value Ref Range    Phosphorus 2.9 2.5 - 4.5 mg/dL   Hemoglobin A1c   Result Value Ref Range    Estimated Average Glucose 103 <117 mg/dL    Hemoglobin A1C 5.2 <5.7 %   Result Value Ref Range    Protein Total 5.1 (L) 6.4 - 8.3 g/dL       RADIOLOGY:  CT Chest Pulmonary Embolism w Contrast   Final Result   IMPRESSION:   1.  No PE.   2.  Moderate size right and left pleural effusions. There is interstitial edema and atelectasis. Underlying atypical infection cannot be excluded.   3.  Multiple sclerotic bone lesions are again seen.         XR Chest Port 1 View   Final Result   IMPRESSION: Small left pleural effusion with left basilar consolidation, likely compressive atelectasis, although pneumonia is also possible. Clear right lung and pleural space. No pneumothorax. Normal cardiomediastinal silhouette with TAVR. Right chest    port catheter tip in the lower SVC.      Echocardiogram Complete    (Results Pending)   US Lower Extremity Venous Duplex Bilateral    (Results Pending)         EKG:    Date and time: October 27, 2024 at 1429  Rate: 73 bpm  Rhythm: Sinus rhythm  AL interval: 168 ms  QRS interval: 108 ms  QT/QTc: 414/457 ms  ST changes or T wave changes: No acute ST or T wave abnormalities  Change from prior ECG: Left bundle branch block from previous EKG is no longer present.  I have independently reviewed and interpreted this EKG.       Sylvia Cruz M.D.  Emergency Medicine  [facility] EMERGENCY DEPARTMENT  [facility] [facility] [address]  [phone]  Dept: [phone]       Sylvia Cruz MD  10/27/24 9368

## 2024-10-27 NOTE — ED TRIAGE NOTES
Patient with shortness of breath and cough that began Thursday, which has been getting worse. Patient with audible wheezing in triage. Denies any hx of COPD. Patient with recent hospitalization and discharge to TCU, discharged from TCU 3 days ago.      Triage Assessment (Adult)       Row Name 10/27/24 1400          Triage Assessment    Airway WDL WDL        Respiratory WDL    Respiratory WDL X;rhythm/pattern;cough     Rhythm/Pattern, Respiratory shortness of breath     Cough Frequency infrequent     Cough Type dry        Skin Circulation/Temperature WDL    Skin Circulation/Temperature WDL WDL        Cardiac WDL    Cardiac WDL WDL        Peripheral/Neurovascular WDL    Peripheral Neurovascular WDL WDL        Cognitive/Neuro/Behavioral WDL    Cognitive/Neuro/Behavioral WDL WDL

## 2024-10-28 ENCOUNTER — APPOINTMENT (OUTPATIENT)
Dept: OCCUPATIONAL THERAPY | Facility: HOSPITAL | Age: 84
DRG: 291 | End: 2024-10-28
Attending: STUDENT IN AN ORGANIZED HEALTH CARE EDUCATION/TRAINING PROGRAM
Payer: COMMERCIAL

## 2024-10-28 ENCOUNTER — APPOINTMENT (OUTPATIENT)
Dept: CARDIOLOGY | Facility: HOSPITAL | Age: 84
DRG: 291 | End: 2024-10-28
Attending: STUDENT IN AN ORGANIZED HEALTH CARE EDUCATION/TRAINING PROGRAM
Payer: COMMERCIAL

## 2024-10-28 ENCOUNTER — PATIENT OUTREACH (OUTPATIENT)
Dept: CARE COORDINATION | Facility: CLINIC | Age: 84
End: 2024-10-28
Payer: COMMERCIAL

## 2024-10-28 ENCOUNTER — TELEPHONE (OUTPATIENT)
Dept: CARDIOLOGY | Facility: CLINIC | Age: 84
End: 2024-10-28
Payer: COMMERCIAL

## 2024-10-28 ENCOUNTER — APPOINTMENT (OUTPATIENT)
Dept: ULTRASOUND IMAGING | Facility: HOSPITAL | Age: 84
DRG: 291 | End: 2024-10-28
Attending: STUDENT IN AN ORGANIZED HEALTH CARE EDUCATION/TRAINING PROGRAM
Payer: COMMERCIAL

## 2024-10-28 DIAGNOSIS — I50.9 ACUTE DECOMPENSATED HEART FAILURE (H): Primary | ICD-10-CM

## 2024-10-28 LAB
% LINING CELLS, BODY FLUID: 2 %
ALBUMIN SERPL BCG-MCNC: 3 G/DL (ref 3.5–5.2)
ALP SERPL-CCNC: 88 U/L (ref 40–150)
ALT SERPL W P-5'-P-CCNC: 25 U/L (ref 0–50)
ANION GAP SERPL CALCULATED.3IONS-SCNC: 10 MMOL/L (ref 7–15)
APPEARANCE FLD: CLEAR
AST SERPL W P-5'-P-CCNC: 21 U/L (ref 0–45)
ATRIAL RATE - MUSE: 73 BPM
BILIRUB SERPL-MCNC: 0.2 MG/DL
BUN SERPL-MCNC: 10.4 MG/DL (ref 8–23)
CALCIUM SERPL-MCNC: 7.6 MG/DL (ref 8.8–10.4)
CELL COUNT BODY FLUID SOURCE: NORMAL
CHLORIDE SERPL-SCNC: 109 MMOL/L (ref 98–107)
COLOR FLD: YELLOW
CREAT SERPL-MCNC: 0.83 MG/DL (ref 0.51–0.95)
DIASTOLIC BLOOD PRESSURE - MUSE: 62 MMHG
EGFRCR SERPLBLD CKD-EPI 2021: 69 ML/MIN/1.73M2
EOSINOPHIL NFR FLD MANUAL: 1 %
ERYTHROCYTE [DISTWIDTH] IN BLOOD BY AUTOMATED COUNT: 17.7 % (ref 10–15)
GLUCOSE BLDC GLUCOMTR-MCNC: 105 MG/DL (ref 70–99)
GLUCOSE BLDC GLUCOMTR-MCNC: 153 MG/DL (ref 70–99)
GLUCOSE BLDC GLUCOMTR-MCNC: 91 MG/DL (ref 70–99)
GLUCOSE BODY FLUID SOURCE: NORMAL
GLUCOSE FLD-MCNC: 103 MG/DL
GLUCOSE SERPL-MCNC: 94 MG/DL (ref 70–99)
HCO3 SERPL-SCNC: 22 MMOL/L (ref 22–29)
HCT VFR BLD AUTO: 27.6 % (ref 35–47)
HGB BLD-MCNC: 8.9 G/DL (ref 11.7–15.7)
HOLD SPECIMEN: NORMAL
INR PPP: 2.05 (ref 0.85–1.15)
INR PPP: 2.31 (ref 0.85–1.15)
INTERPRETATION ECG - MUSE: NORMAL
LD BODY BODY FLUID SOURCE: NORMAL
LDH FLD L TO P-CCNC: 122 U/L
LDH SERPL L TO P-CCNC: 322 U/L (ref 0–250)
LVEF ECHO: NORMAL
LYMPHOCYTES NFR FLD MANUAL: 52 %
MAGNESIUM SERPL-MCNC: 2.2 MG/DL (ref 1.7–2.3)
MCH RBC QN AUTO: 30.5 PG (ref 26.5–33)
MCHC RBC AUTO-ENTMCNC: 32.2 G/DL (ref 31.5–36.5)
MCV RBC AUTO: 95 FL (ref 78–100)
MONOS+MACROS NFR FLD MANUAL: 40 %
NEUTS BAND NFR FLD MANUAL: 5 %
P AXIS - MUSE: 32 DEGREES
PHOSPHATE SERPL-MCNC: 3.1 MG/DL (ref 2.5–4.5)
PLATELET # BLD AUTO: 224 10E3/UL (ref 150–450)
POTASSIUM SERPL-SCNC: 3.6 MMOL/L (ref 3.4–5.3)
PR INTERVAL - MUSE: 168 MS
PROT FLD-MCNC: 1.4 G/DL
PROT SERPL-MCNC: 5 G/DL (ref 6.4–8.3)
PROTEIN BODY FLUID SOURCE: NORMAL
QRS DURATION - MUSE: 108 MS
QT - MUSE: 414 MS
QTC - MUSE: 456 MS
R AXIS - MUSE: 53 DEGREES
RBC # BLD AUTO: 2.92 10E6/UL (ref 3.8–5.2)
SODIUM SERPL-SCNC: 141 MMOL/L (ref 135–145)
SYSTOLIC BLOOD PRESSURE - MUSE: 133 MMHG
T AXIS - MUSE: 76 DEGREES
VENTRICULAR RATE- MUSE: 73 BPM
WBC # BLD AUTO: 4 10E3/UL (ref 4–11)
WBC # FLD AUTO: 37 /UL

## 2024-10-28 PROCEDURE — 272N000710 US THORACENTESIS

## 2024-10-28 PROCEDURE — 99233 SBSQ HOSP IP/OBS HIGH 50: CPT | Performed by: INTERNAL MEDICINE

## 2024-10-28 PROCEDURE — G0463 HOSPITAL OUTPT CLINIC VISIT: HCPCS | Mod: 25

## 2024-10-28 PROCEDURE — 250N000011 HC RX IP 250 OP 636: Performed by: INTERNAL MEDICINE

## 2024-10-28 PROCEDURE — 83615 LACTATE (LD) (LDH) ENZYME: CPT | Performed by: STUDENT IN AN ORGANIZED HEALTH CARE EDUCATION/TRAINING PROGRAM

## 2024-10-28 PROCEDURE — 93321 DOPPLER ECHO F-UP/LMTD STD: CPT

## 2024-10-28 PROCEDURE — 93325 DOPPLER ECHO COLOR FLOW MAPG: CPT | Mod: 26 | Performed by: INTERNAL MEDICINE

## 2024-10-28 PROCEDURE — 82945 GLUCOSE OTHER FLUID: CPT | Performed by: STUDENT IN AN ORGANIZED HEALTH CARE EDUCATION/TRAINING PROGRAM

## 2024-10-28 PROCEDURE — 255N000002 HC RX 255 OP 636: Performed by: STUDENT IN AN ORGANIZED HEALTH CARE EDUCATION/TRAINING PROGRAM

## 2024-10-28 PROCEDURE — 82947 ASSAY GLUCOSE BLOOD QUANT: CPT | Performed by: STUDENT IN AN ORGANIZED HEALTH CARE EDUCATION/TRAINING PROGRAM

## 2024-10-28 PROCEDURE — 83735 ASSAY OF MAGNESIUM: CPT | Performed by: STUDENT IN AN ORGANIZED HEALTH CARE EDUCATION/TRAINING PROGRAM

## 2024-10-28 PROCEDURE — 87075 CULTR BACTERIA EXCEPT BLOOD: CPT | Performed by: STUDENT IN AN ORGANIZED HEALTH CARE EDUCATION/TRAINING PROGRAM

## 2024-10-28 PROCEDURE — 84157 ASSAY OF PROTEIN OTHER: CPT | Performed by: STUDENT IN AN ORGANIZED HEALTH CARE EDUCATION/TRAINING PROGRAM

## 2024-10-28 PROCEDURE — 93308 TTE F-UP OR LMTD: CPT | Mod: 26 | Performed by: INTERNAL MEDICINE

## 2024-10-28 PROCEDURE — 36415 COLL VENOUS BLD VENIPUNCTURE: CPT | Performed by: STUDENT IN AN ORGANIZED HEALTH CARE EDUCATION/TRAINING PROGRAM

## 2024-10-28 PROCEDURE — 99223 1ST HOSP IP/OBS HIGH 75: CPT | Performed by: INTERNAL MEDICINE

## 2024-10-28 PROCEDURE — 85018 HEMOGLOBIN: CPT | Performed by: STUDENT IN AN ORGANIZED HEALTH CARE EDUCATION/TRAINING PROGRAM

## 2024-10-28 PROCEDURE — 84155 ASSAY OF PROTEIN SERUM: CPT | Performed by: STUDENT IN AN ORGANIZED HEALTH CARE EDUCATION/TRAINING PROGRAM

## 2024-10-28 PROCEDURE — 87205 SMEAR GRAM STAIN: CPT | Performed by: STUDENT IN AN ORGANIZED HEALTH CARE EDUCATION/TRAINING PROGRAM

## 2024-10-28 PROCEDURE — 88112 CYTOPATH CELL ENHANCE TECH: CPT | Mod: TC | Performed by: STUDENT IN AN ORGANIZED HEALTH CARE EDUCATION/TRAINING PROGRAM

## 2024-10-28 PROCEDURE — 250N000013 HC RX MED GY IP 250 OP 250 PS 637: Performed by: INTERNAL MEDICINE

## 2024-10-28 PROCEDURE — 120N000004 HC R&B MS OVERFLOW

## 2024-10-28 PROCEDURE — 84100 ASSAY OF PHOSPHORUS: CPT | Performed by: STUDENT IN AN ORGANIZED HEALTH CARE EDUCATION/TRAINING PROGRAM

## 2024-10-28 PROCEDURE — 93325 DOPPLER ECHO COLOR FLOW MAPG: CPT

## 2024-10-28 PROCEDURE — 85610 PROTHROMBIN TIME: CPT | Performed by: STUDENT IN AN ORGANIZED HEALTH CARE EDUCATION/TRAINING PROGRAM

## 2024-10-28 PROCEDURE — 97165 OT EVAL LOW COMPLEX 30 MIN: CPT | Mod: GO

## 2024-10-28 PROCEDURE — P9045 ALBUMIN (HUMAN), 5%, 250 ML: HCPCS | Performed by: INTERNAL MEDICINE

## 2024-10-28 PROCEDURE — 93321 DOPPLER ECHO F-UP/LMTD STD: CPT | Mod: 26 | Performed by: INTERNAL MEDICINE

## 2024-10-28 PROCEDURE — 82962 GLUCOSE BLOOD TEST: CPT

## 2024-10-28 PROCEDURE — 97110 THERAPEUTIC EXERCISES: CPT | Mod: GO

## 2024-10-28 PROCEDURE — 250N000013 HC RX MED GY IP 250 OP 250 PS 637: Performed by: STUDENT IN AN ORGANIZED HEALTH CARE EDUCATION/TRAINING PROGRAM

## 2024-10-28 PROCEDURE — 89051 BODY FLUID CELL COUNT: CPT | Performed by: STUDENT IN AN ORGANIZED HEALTH CARE EDUCATION/TRAINING PROGRAM

## 2024-10-28 RX ORDER — LISINOPRIL 2.5 MG/1
2.5 TABLET ORAL DAILY
Status: DISCONTINUED | OUTPATIENT
Start: 2024-10-28 | End: 2024-10-29 | Stop reason: HOSPADM

## 2024-10-28 RX ORDER — FUROSEMIDE 10 MG/ML
40 INJECTION INTRAMUSCULAR; INTRAVENOUS EVERY 12 HOURS
Status: DISCONTINUED | OUTPATIENT
Start: 2024-10-28 | End: 2024-10-28

## 2024-10-28 RX ORDER — FUROSEMIDE 10 MG/ML
20 INJECTION INTRAMUSCULAR; INTRAVENOUS EVERY 12 HOURS
Status: DISCONTINUED | OUTPATIENT
Start: 2024-10-28 | End: 2024-10-29

## 2024-10-28 RX ORDER — WARFARIN SODIUM 5 MG/1
5 TABLET ORAL
Status: COMPLETED | OUTPATIENT
Start: 2024-10-28 | End: 2024-10-28

## 2024-10-28 RX ADMIN — METOPROLOL TARTRATE 25 MG: 25 TABLET, FILM COATED ORAL at 08:59

## 2024-10-28 RX ADMIN — METOPROLOL TARTRATE 25 MG: 25 TABLET, FILM COATED ORAL at 20:48

## 2024-10-28 RX ADMIN — FUROSEMIDE 20 MG: 10 INJECTION, SOLUTION INTRAMUSCULAR; INTRAVENOUS at 20:49

## 2024-10-28 RX ADMIN — PERFLUTREN 1.5 ML: 6.52 INJECTION, SUSPENSION INTRAVENOUS at 14:00

## 2024-10-28 RX ADMIN — ATORVASTATIN CALCIUM 40 MG: 40 TABLET, FILM COATED ORAL at 08:59

## 2024-10-28 RX ADMIN — Medication 325 MG: at 08:59

## 2024-10-28 RX ADMIN — ALBUMIN HUMAN 12.5 G: 0.05 INJECTION, SOLUTION INTRAVENOUS at 15:12

## 2024-10-28 RX ADMIN — ASPIRIN 81 MG: 81 TABLET, COATED ORAL at 09:00

## 2024-10-28 RX ADMIN — WARFARIN SODIUM 5 MG: 5 TABLET ORAL at 19:07

## 2024-10-28 RX ADMIN — FUROSEMIDE 40 MG: 10 INJECTION, SOLUTION INTRAVENOUS at 09:01

## 2024-10-28 ASSESSMENT — ACTIVITIES OF DAILY LIVING (ADL)
ADLS_ACUITY_SCORE: 0
DEPENDENT_IADLS:: CLEANING;COOKING;LAUNDRY;SHOPPING;MEDICATION MANAGEMENT;TRANSPORTATION
ADLS_ACUITY_SCORE: 0
ADLS_ACUITY_SCORE: 0

## 2024-10-28 NOTE — PLAN OF CARE
Goal Outcome Evaluation:      Plan of Care Reviewed With: patient, child          Outcome Evaluation: Discharge plans to be determined pending medical progression and OT recommendations.

## 2024-10-28 NOTE — MEDICATION SCRIBE - ADMISSION MEDICATION HISTORY
Medication Scribe Admission Medication History    Admission medication history is complete. The information provided in this note is only as accurate as the sources available at the time of the update.    Information Source(s): Family member and CareEverywhere/SureScripts via in-person    Pertinent Information:     Changes made to PTA medication list:  Added: None  Deleted: None  Changed: None    Allergies reviewed with patient and updates made in EHR: yes    Medication History Completed By: KEYSHAWN RIVERA 10/27/2024 9:19 PM    PTA Med List   Medication Sig Last Dose/Taking    acetaminophen (TYLENOL) 325 MG tablet Take 650 mg by mouth every 8 hours as needed for mild pain 10/27/2024 Morning    aspirin (ASA) 81 MG EC tablet Take 81 mg by mouth every morning. 10/27/2024 Morning    atorvastatin (LIPITOR) 40 MG tablet Take 1 tablet (40 mg) by mouth every morning 10/27/2024 Morning    estradiol (ESTRACE) 0.1 MG/GM vaginal cream Place 0.5 g vaginally twice a week. Can use daily for 2 weeks and then twice a week after that. Past Week    ferrous sulfate (FEROSUL) 325 (65 Fe) MG tablet Take 325 mg by mouth daily (with breakfast). 10/27/2024 Morning    lisinopril (ZESTRIL) 2.5 MG tablet Take 2.5 mg by mouth daily. 10/27/2024 Morning    methylcellulose (CITRUCEL) 500 MG TABS tablet Take 500 mg by mouth every other day. 10/26/2024 Morning    metoprolol tartrate (LOPRESSOR) 25 MG tablet Take 1 tablet (25 mg) by mouth 2 times daily 10/27/2024 Morning    multivitamin w/minerals (MULTI-VITAMIN) tablet Take 1 tablet by mouth daily. 10/27/2024 Morning    potassium chloride ER (K-TAB/KLOR-CON) 10 MEQ CR tablet Take 10 mEq by mouth daily. 10/27/2024 Morning    warfarin ANTICOAGULANT (COUMADIN) 5 MG tablet Take by mouth See Admin Instructions. 5mg every mon,wed,fri  2.5mg all other days 10/26/2024 Bedtime

## 2024-10-28 NOTE — PHARMACY-ANTICOAGULATION SERVICE
Clinical Pharmacy - Warfarin Dosing Consult     Pharmacy has been consulted to manage this patient s warfarin therapy.  Indication: Bioprosthetic Heart Valve (consult was placed for mechanical  valve but patient has bioprosthetic valve)  Therapy Goal: INR 2-3  Provider/Team: BRANDON  OP Anticoag Clinic: [facility]  Warfarin Prior to Admission: Yes  Warfarin PTA Regimen: 5 mg Sun, Tues, Thurs, 2.5 mg ROW  Significant drug interactions: no  Recent documented change in oral intake/nutrition: Unknown  Dose Comments: INR therapeutic,give home dose 2.5 mg    INR   Date Value Ref Range Status   10/27/2024 2.46 (H) 0.85 - 1.15 Final     INR (External)   Date Value Ref Range Status   10/24/2024 3.0 (A) 0.9 - 1.1 Final       Recommend warfarin 2.5 mg today.  Pharmacy will monitor Kacie VARELA [place] daily and order warfarin doses to achieve specified goal.      Please contact pharmacy as soon as possible if the warfarin needs to be held for a procedure or if the warfarin goals change.

## 2024-10-28 NOTE — PROGRESS NOTES
[facility]    Medicine Progress Note - Hospitalist Service    Date of Admission:  10/27/2024    Assessment & Plan   Acute CHF exacerbation  Bilateral pleural effusion  --Improving with IV diuresis  -- Status post left thoracentesis with removal of 600 cc.  Initial evaluation shows transudate  -His blood pressure is trending low.  Will order 12.5 g of albumin to allow adequate diuresis  -- Patient's daughter requested discharge tomorrow as patient is moving into a new assisted living facility  -- Echo is pending  -- Decrease IV Lasix to 20 mg 3 times daily  -- Hold lisinopril    ALTAGRACIA on CKD 3  --Creatinine improved to 0.83 from 1.07 admission  Thought to be due to cardiorenal    History of breast cancer on chemotherapy  Breast cancer metastasis to bone  Continue with outpatient follow-up  CT chest showed multiple sclerotic bone lesions are again seen.      History of aortic valve replacement  Continue with warfarin  Pharmacy to dose warfarin  Echo is pending     History of hyperlipidemia  Continue with aspirin and atorvastatin             Diet: Combination Diet 2 gm NA Diet; No Caffeine Diet (and additional linked orders)  Fluid restriction 2000 ML FLUID (and additional linked orders)  Fluid restriction 1800 ML FLUID    DVT Prophylaxis: Warfarin  Aragon Catheter: Not present  Lines: PRESENT             Cardiac Monitoring: ACTIVE order. Indication: Acute decompensated heart failure (48 hours)  Code Status: Full Code      Clinically Significant Risk Factors Present on Admission          # Hyperchloremia: Highest Cl = 109 mmol/L in last 2 days, will monitor as appropriate          # Hypoalbuminemia: Lowest albumin = 3 g/dL at 10/28/2024  6:29 AM, will monitor as appropriate  # Drug Induced Coagulation Defect: home medication list includes an anticoagulant medication  # Drug Induced Platelet Defect: home medication list includes an antiplatelet medication   # Hypertension: Noted on problem list   "  # Anemia: based on hgb <11       # Overweight: Estimated body mass index is 25.51 kg/m  as calculated from the following:    Height as of this encounter: 1.6 m (5' 3\").    Weight as of this encounter: 65.3 kg (144 lb).       # Financial/Environmental Concerns: none         Disposition Plan     Medically Ready for Discharge: Anticipated Tomorrow             Yosi Comer MD  Hospitalist Service  [facility]  Securely message with [employer] (more info)  Text page via [employer] Paging/Directory   ______________________________________________________________________    Interval History   Patient new to me.  Chart reviewed.  Daughter at bedside.  Patient was taken off hydrochlorothiazide few weeks ago because of fall attributed to dehydration.  Presented with fluid overload and is improving after adequate diuresis.  Blood pressure is now low at 89.  Other arm is 98.  Her albumin to allow adequate diuresis.      Physical Exam   Vital Signs: Temp: 98.4  F (36.9  C) Temp src: Oral BP: (!) 89/53 (Map 67. Dr. Comer notified.) Pulse: 75   Resp: 17 SpO2: 95 % O2 Device: None (Room air)    Weight: 144 lbs 0 oz    Lateral leg swelling  Dullness to percussion on right hemithorax  Decreased breath sounds in the right hemithorax  Left side is unremarkable    Medical Decision Making       35 MINUTES SPENT BY ME on the date of service doing chart review, history, exam, documentation & further activities per the note.  MANAGEMENT DISCUSSED with the following over the past 24 hours: Patient and daughter       Data     I have personally reviewed the following data over the past 24 hrs:    4.0  \   8.9 (L)   / 224     141 109 (H) 10.4 /  91   3.6 22 0.83 \     ALT: 25 AST: 21 AP: 88 TBILI: 0.2   ALB: 3.0 (L) TOT PROTEIN: 5.0 (L) LIPASE: N/A     Trop: 26 (H) BNP: N/A     TSH: N/A T4: N/A A1C: N/A     INR:  2.05 (H) PTT:  N/A   D-dimer:  N/A Fibrinogen:  N/A     Ferritin:  N/A % Retic:  N/A LDH:  322 (H)       Imaging " results reviewed over the past 24 hrs:   Recent Results (from the past 24 hours)   XR Chest Port 1 View    Narrative    EXAM: XR CHEST PORT 1 VIEW  LOCATION: [facility]  DATE: 10/27/2024    INDICATION: short of breath  COMPARISON: 10/6/2024      Impression    IMPRESSION: Small left pleural effusion with left basilar consolidation, likely compressive atelectasis, although pneumonia is also possible. Clear right lung and pleural space. No pneumothorax. Normal cardiomediastinal silhouette with TAVR. Right chest   port catheter tip in the lower SVC.   CT Chest Pulmonary Embolism w Contrast    Narrative    EXAM: CT CHEST PULMONARY EMBOLISM W CONTRAST  LOCATION: [facility]  DATE: 10/27/2024    INDICATION: short of breath, + ddimer. Breast cancer.  COMPARISON: 6/25/2024  TECHNIQUE: CT chest pulmonary angiogram during arterial phase injection of IV contrast. Multiplanar reformats and MIP reconstructions were performed. Dose reduction techniques were used.   CONTRAST: isovue 370  70ml    FINDINGS:  ANGIOGRAM CHEST: Pulmonary arteries are normal caliber and negative for pulmonary emboli. Thoracic aorta is negative for dissection.    LUNGS AND PLEURA: Moderate size right and left pleural effusions. There is interstitial edema and atelectasis.    MEDIASTINUM/AXILLAE: There is a right chest wall Port-A-Cath. A TAPVR stent is present. There is mild pericardial thickening or fluid. Left axillary surgical clips are present.    CORONARY ARTERY CALCIFICATION: Mild.    UPPER ABDOMEN: Cholecystectomy.    MUSCULOSKELETAL: Multiple sclerotic bone lesions are again seen. T12 vertebral body height loss has not changed.      Impression    IMPRESSION:  1.  No PE.  2.  Moderate size right and left pleural effusions. There is interstitial edema and atelectasis. Underlying atypical infection cannot be excluded.  3.  Multiple sclerotic bone lesions are again seen.     US Lower Extremity Venous  Duplex Bilateral    Narrative    EXAM: US LOWER EXTREMITY VENOUS DUPLEX BILATERAL  LOCATION: [facility]  DATE: 10/27/2024    INDICATION: Bilateral lower extremity pitting edema.  COMPARISON: None available.  TECHNIQUE: Venous Duplex ultrasound of bilateral lower extremities with and without compression, augmentation and duplex. Color flow and spectral Doppler with waveform analysis performed.    FINDINGS: Exam includes the common femoral, femoral, popliteal veins as well as segmentally visualized deep calf veins and greater saphenous vein.     RIGHT: No deep vein thrombosis. No superficial thrombophlebitis. No popliteal cyst.    LEFT: No deep vein thrombosis. No superficial thrombophlebitis. No popliteal cyst.      Impression    IMPRESSION:  1.  No deep venous thrombosis in the bilateral lower extremities.   US Thoracentesis    Narrative    EXAM:   1. LEFT THORACENTESIS  2. ULTRASOUND GUIDANCE  LOCATION: [facility]  DATE: 10/28/2024    INDICATION: Pleural effusion.    PROCEDURE: Informed consent obtained. Time out performed. The chest was prepped and draped in sterile fashion. 6 mL of 1 % lidocaine was infused into the local soft tissues. Under direct ultrasound guidance, a 5 Omani catheter system was placed into the   pleural effusion.     0.6 liters of yellow-brown fluid were removed and sent to lab, if requested.    Patient tolerated procedure well.    Ultrasound imaging was obtained and placed in the patient's permanent medical record.      Impression    IMPRESSION:  Status post left ultrasound-guided thoracentesis.    Reference CPT Code: 22638

## 2024-10-28 NOTE — PLAN OF CARE
Vitally stable, TELE: NSR, denies pain, ambulated to bathroom with assist, denies LOZANO, expiratory wheezes cleared after IS used, no cough.     Goal Outcome Evaluation:  Problem: Gas Exchange Impaired  Goal: Optimal Gas Exchange  Outcome: Progressing  Intervention: Optimize Oxygenation and Ventilation  Recent Flowsheet Documentation  Taken 10/27/2024 2340 by Flor Alvarado, RN  Head of Bed (HOB) Positioning: HOB at 20 degrees

## 2024-10-28 NOTE — CONSULTS
[facility]  WOC Nurse Inpatient Assessment     Consulted for: R shin (family requested WOC see R elbow)    Patient History (according to provider note(s):    Assessment:  Kacie Hermosillo is a 84 year old female With a past medical history documented below presented to the ER for evaluation of increased weakness and fatigue as well as wheezing and worsening shortness of breath ongoing for the past few days in the setting of recent hospitalization for electrolyte abnormalities at which point she received fluids and electrolytes and then discharged to TCU, Patient was also recently taken off of her hydrochlorothiazide and lisinopril dose was decreased, BNP elevated at 1682, troponin trended and were flat and downtrending but elevated, CT chest pulmonary embolism study showed No PE. Moderate size right and left pleural effusions. There is interstitial edema and atelectasis. Underlying atypical infection cannot be excluded. Multiple sclerotic bone lesions are again seen. Patient received a dose of Lasix 40 in the ED and is going to be admitted for acute exacerbation of CHF and bilateral pleural effusions.     Problem List and Plan:  Acute CHF exacerbation  Bilateral pleural effusion  Elevated troponin secondary to above  Elevated D-dimer secondary to above  Patient presented to the ER for evaluation of increased weakness and fatigue as well as wheezing and worsening shortness of breath ongoing for the past few days in the setting of recent hospitalization for electrolyte abnormalities at which point she received fluids and electrolytes and then discharged to TCU  Patient was also recently taken off of her hydrochlorothiazide and lisinopril dose was decreased  BNP elevated at 1682, troponin trended and were flat and downtrending but elevated  D-dimer was elevated at 1.13  CT chest pulmonary embolism study showed No PE. Moderate size right and left pleural effusions. There is interstitial edema  and atelectasis.   Patient received a dose of Lasix 40 in the ED   Troponin trended and were elevated but flat and down trending, no current complaint of chest pain, repeat EKG for symptoms  Follow-up venous duplex of the bilateral lower extremities  CTA negative for PE  Viral panel negative for COVID, flu, respiratory syncytial virus  Cardiology consulted  Echocardiogram ordered  Thoracentesis ordered with fluid analysis   Continue Lasix.     Mild ALTAGRACIA most likely cardiorenal  Baseline creatinine below 1  Current creatinine 1.07  Continue with diuresis  Monitor renal function closely    Assessment:    Wound location: R shin    Last photo: 10/28/24  Wound due to: Trauma  Wound history/plan of care: Injury from corner of car door; several weeks old per pt/family but not healing well due to BLE edema  Wound base: 100% Dermal buds      Palpation of the wound bed: normal      Drainage: small     Description of drainage: serous     Measurements (length x width x depth, in cm): 3 cm x 1 cm     Tunneling: N/A     Undermining: N/A  Periwound skin: Edematous and Macerated      Color: normal and consistent with surrounding tissue      Temperature: normal   Odor: none  Pain: denies , none  Pain interventions prior to dressing change: patient tolerated well and slow and gentle cares   Treatment goal: Heal , Drainage control, Maintain (prevention of deterioration), and Protection  STATUS: initial assessment  Supplies ordered: supplies stored on unit    R elbow with well healing skin tear - continue with Mepilex.    0.5 cm x 0.5 cm with partial skin flap remaining    Treatment Plan:   R Beth and R elbow - every 5 days  Cleanse with saline; gently dry.  Cover with Mepilex dressing.    Orders: Written    RECOMMEND PRIMARY TEAM ORDER: Lymphedema consult  Education provided: plan of care  Discussed plan of care with: Patient and Family  WOC nurse follow-up plan: weekly  Notify WOC if wound(s) deteriorate.  Nursing to notify the  Provider(s) and re-consult the [facility] Nurse if new skin concern.    DATA:     Current support surface: Standard  Standard gel mattress (Isoflex)  Containment of urine/stool: Continent of bladder and Continent of bowel  BMI: Body mass index is 25.51 kg/m .   Active diet order: Orders Placed This Encounter      Combination Diet 2 gm NA Diet; No Caffeine Diet     Output: I/O last 3 completed shifts:  In: 600 [P.O.:600]  Out: 1200 [Urine:1200]     Labs:   Recent Labs   Lab 10/28/24  0629 10/28/24  0025 10/27/24  1426   ALBUMIN 3.0*  --   --    HGB 8.9*  --  9.5*   INR 2.05*   < > 2.46*   WBC 4.0  --  5.3   A1C  --   --  5.2    < > = values in this interval not displayed.     Pressure injury risk assessment:   Sensory Perception: 4-->no impairment  Moisture: 3-->occasionally moist  Activity: 3-->walks occasionally  Mobility: 3-->slightly limited  Nutrition: 3-->adequate  Friction and Shear: 2-->potential problem  Amador Score: 18    Cindy Tomlinson, MSN RN CWOCN  Pager no longer is use, please contact through [email or web address] group: [facility] [employer] [facility]

## 2024-10-28 NOTE — CONSULTS
"Care Management Initial Consult    General Information  Assessment completed with: Patient,  daughter Barbie  Type of CM/SW Visit: Initial Assessment    Primary Care Provider verified and updated as needed: Yes   Readmission within the last 30 days: current reason for admission unrelated to previous admission   Return Category: New Diagnosis  Reason for Consult: discharge planning  Advance Care Planning: Advance Care Planning Reviewed: no concerns identified          Communication Assessment  Patient's communication style: spoken language (English or Bilingual)             Cognitive  Cognitive/Neuro/Behavioral: WDL                      Living Environment:   People in home: alone, other (see comments) (\"daughter has been staying with her since discharge from U.\")     Current living Arrangements: town home (one level)      Able to return to prior arrangements: other (see comments) (\"planning to move to [facility] this week\")       Family/Social Support:  Care provided by: self, child(toni)  Provides care for: no one, unable/limited ability to care for self  Marital Status:   Support system: Children          Description of Support System: Supportive, Involved    Support Assessment: Patient communicates needs well met    Current Resources:   Patient receiving home care services: Yes  Skilled Home Care Services: Skilled Nursing, Physical Therapy, Occupational Therapy     Community Resources: Home Care  Equipment currently used at home: walker, rolling  Supplies currently used at home: None    Employment/Financial:  Employment Status: retired        Financial Concerns: none   Referral to Financial Worker: No       Does the patient's insurance plan have a 3 day qualifying hospital stay waiver?  Yes     Which insurance plan 3 day waiver is available? Alternative insurance waiver    Will the waiver be used for post-acute placement? Undetermined at this time    Lifestyle & Psychosocial Needs:  Social Drivers of Health "     Food Insecurity: Low Risk  (10/8/2024)    Food Insecurity     Within the past 12 months, did you worry that your food would run out before you got money to buy more?: No     Within the past 12 months, did the food you bought just not last and you didn t have money to get more?: No   Depression: Not at risk (5/24/2024)    PHQ-2     PHQ-2 Score: 0   Housing Stability: Low Risk  (10/8/2024)    Housing Stability     Do you have housing? : Yes     Are you worried about losing your housing?: No   Tobacco Use: Low Risk  (10/6/2024)    Patient History     Smoking Tobacco Use: Never     Smokeless Tobacco Use: Never     Passive Exposure: Not on file   Financial Resource Strain: Low Risk  (10/8/2024)    Financial Resource Strain     Within the past 12 months, have you or your family members you live with been unable to get utilities (heat, electricity) when it was really needed?: No   Alcohol Use: Not on file   Transportation Needs: Low Risk  (10/8/2024)    Transportation Needs     Within the past 12 months, has lack of transportation kept you from medical appointments, getting your medicines, non-medical meetings or appointments, work, or from getting things that you need?: No   Physical Activity: Unknown (5/24/2024)    Exercise Vital Sign     Days of Exercise per Week: 0 days     Minutes of Exercise per Session: Not on file   Interpersonal Safety: High Risk (10/7/2024)    Interpersonal Safety     Do you feel physically and emotionally safe where you currently live?: No     Within the past 12 months, have you been hit, slapped, kicked or otherwise physically hurt by someone?: No     Within the past 12 months, have you been humiliated or emotionally abused in other ways by your partner or ex-partner?: No   Stress: No Stress Concern Present (5/24/2024)    [employer] [place] [employer] - Occupational Stress Questionnaire     Feeling of Stress : Only a little   Social Connections: Unknown (5/24/2024)    Social  Connection and Isolation Panel [NHANES]     Frequency of Communication with Friends and Family: Not on file     Frequency of Social Gatherings with Friends and Family: Twice a week     Attends Rastafarian Services: Not on file     Active Member of Clubs or Organizations: Not on file     Attends Club or Organization Meetings: Not on file     Marital Status: Not on file   Health Literacy: Not on file       Functional Status:  Prior to admission patient needed assistance:   Dependent ADLs:: Ambulation-walker, Independent, Bathing  Dependent IADLs:: Cleaning, Cooking, Laundry, Shopping, Medication Management, Transportation       Mental Health Status:  Mental Health Status: No Current Concerns       Chemical Dependency Status:  Chemical Dependency Status: No Current Concerns             Values/Beliefs:  Spiritual, Cultural Beliefs, Rastafarian Practices, Values that affect care: no               Discussed  Partnership in Safe Discharge Planning  document with patient/family: No    Additional Information:  CM consult received due to elevated risk score.  Met with patient and daughter Barbie to introduce CM role and perform initial assessment.  Kacie normally lives alone in a one level town house.  She was hospitalized 10/7-10/9 and discharged to [facility] TCU.  She was discharged from TCU on 10/23, and daughter Barbie has been staying with her since then.  [facility] recently opened patient for RN, PT, and OT services.  Family is also working with [facility] assisted living facility to get patient moved in to their facility this week if possible.  Per Barbie, they have an appointment for nurse assessment with [facility] tomorrow at 11am which is last step for her to schedule move in.  Barbie plans to speak with them about rescheduling assessment or possibly doing it while patient is in the hospital if able.  Daughter will transport at discharge.   CM updated [facility] on  admission.     Next Steps: CM to follow for medical progression of care, discharge recommendations, and final discharge plan.       Syd Gomez CRNI

## 2024-10-28 NOTE — H&P
[facility]    History and Physical - Hospitalist Service       Date of Admission:  10/27/2024    Assessment & Plan    Assessment:  Kacie Hermosillo is a 84 year old female With a past medical history documented below presented to the ER for evaluation of increased weakness and fatigue as well as wheezing and worsening shortness of breath ongoing for the past few days in the setting of recent hospitalization for electrolyte abnormalities at which point she received fluids and electrolytes and then discharged to TCU, Patient was also recently taken off of her hydrochlorothiazide and lisinopril dose was decreased, BNP elevated at 1682, troponin trended and were flat and downtrending but elevated, CT chest pulmonary embolism study showed No PE. Moderate size right and left pleural effusions. There is interstitial edema and atelectasis. Underlying atypical infection cannot be excluded. Multiple sclerotic bone lesions are again seen. Patient received a dose of Lasix 40 in the ED and is going to be admitted for acute exacerbation of CHF and bilateral pleural effusions.    Problem List and Plan:  Acute CHF exacerbation  Bilateral pleural effusion  Elevated troponin secondary to above  Elevated D-dimer secondary to above  Patient presented to the ER for evaluation of increased weakness and fatigue as well as wheezing and worsening shortness of breath ongoing for the past few days in the setting of recent hospitalization for electrolyte abnormalities at which point she received fluids and electrolytes and then discharged to TCU  Patient was also recently taken off of her hydrochlorothiazide and lisinopril dose was decreased  BNP elevated at 1682, troponin trended and were flat and downtrending but elevated  D-dimer was elevated at 1.13  CT chest pulmonary embolism study showed No PE. Moderate size right and left pleural effusions. There is interstitial edema and atelectasis.   Patient received a dose  of Lasix 40 in the ED   Troponin trended and were elevated but flat and down trending, no current complaint of chest pain, repeat EKG for symptoms  Follow-up venous duplex of the bilateral lower extremities  CTA negative for PE  Viral panel negative for COVID, flu, respiratory syncytial virus  Cardiology consulted  Echocardiogram ordered  Thoracentesis ordered with fluid analysis   Continue Lasix.    Mild ALTAGRACIA most likely cardiorenal  Baseline creatinine below 1  Current creatinine 1.07  Continue with diuresis  Monitor renal function closely    Mild hyperglycemia most likely reactive  Follow-up hemoglobin A1c  Monitor blood sugar level intermittently    Normocytic anemia  Monitor CBC, consider iron panel    History of hypertension with high blood pressure  Monitor vital signs as per protocol  IV hydralazine as needed for elevated blood pressure  Continue with lisinopril and metoprolol    History of breast cancer on chemotherapy  Breast cancer metastasis to bone  Continue with outpatient follow-up  CT chest showed multiple sclerotic bone lesions are again seen.     History of aortic valve replacement  Continue with warfarin  Pharmacy to dose warfarin    History of hyperlipidemia  Continue with aspirin and atorvastatin    DVT PPx  Intermittent pneumatic compression  Continue with warfarin    CODE STATUS  Full code as per discussion with the patient, as per patient she does want to be resuscitated but only if she can recover and she does not want to continue on a ventilator in a vegetative state.        Diet: Combination Diet 2 gm NA Diet; No Caffeine Diet (and additional linked orders)  Fluid restriction 2000 ML FLUID (and additional linked orders)    DVT Prophylaxis: Pneumatic Compression Devices  Aragon Catheter: Not present  Lines: PRESENT             Cardiac Monitoring: ACTIVE order. Indication: Acute decompensated heart failure (48 hours)  Code Status: Full Code  Mental status: Patient is alert awake and oriented x  "3, patient is a good Historian and most of the history was obtained from the patient, some history was obtained from chart review and the rest of the history was obtained from discussion with the ER physician  Clinically Significant Risk Factors Present on Admission                # Drug Induced Coagulation Defect: home medication list includes an anticoagulant medication  # Drug Induced Platelet Defect: home medication list includes an antiplatelet medication   # Hypertension: Noted on problem list    # Anemia: based on hgb <11       # Overweight: Estimated body mass index is 25.51 kg/m  as calculated from the following:    Height as of this encounter: 1.6 m (5' 3\").    Weight as of this encounter: 65.3 kg (144 lb).         # Financial/Environmental Concerns:           Disposition Plan     Medically Ready for Discharge: Anticipated in 2-4 Days     Saad J. Gondal, MD  Hospitalist Service  [facility]  Securely message with [employer] (more info)  Text page via [employer] Paging/Directory     ______________________________________________________________________    Chief Complaint   Worsening shortness of breath, lower extremity edema    History is obtained from the patient    History of Present Illness   Kacie Hermosillo is a 84 year old female With a past medical history documented below presented to the ER for evaluation of increased weakness and fatigue as well as wheezing and worsening shortness of breath ongoing for the past few days in the setting of recent hospitalization for electrolyte abnormalities at which point she received fluids and electrolytes and then discharged to TCU, Patient was also recently taken off of her hydrochlorothiazide and lisinopril dose was decreased, In the ER vitals were significant for elevated blood pressure which has since improved, labs significant for mildly elevated creatinine at 1.07 and a baseline of below 1, patient also has mild hyperglycemia, BNP elevated " at 1682, troponin trended and were flat and downtrending but elevated, patient also had normocytic anemia, patient is on warfarin and INR appears to be therapeutic, D-dimer was elevated at 1.13, viral panel negative for COVID, flu, respiratory syncytial virus, CT chest pulmonary embolism study showed No PE. Moderate size right and left pleural effusions. There is interstitial edema and atelectasis. Underlying atypical infection cannot be excluded. Multiple sclerotic bone lesions are again seen. Patient received a dose of Lasix 40 in the ED and is going to be admitted for acute exacerbation of CHF and bilateral pleural effusions.      Past Medical History    Past Medical History:   Diagnosis Date    Breast cancer (H) 01/01/1999    Heart valve replaced     Hx antineoplastic chemotherapy 01/01/1999    Hx of radiation therapy 01/01/1999    Hypertension        Past Surgical History   Past Surgical History:   Procedure Laterality Date    BIOPSY BREAST      HYSTERECTOMY      IR CHEST PORT PLACEMENT > 5 YRS OF AGE  7/30/2024    LUMPECTOMY BREAST Left 1999    OOPHORECTOMY Bilateral        Prior to Admission Medications   Prior to Admission Medications   Prescriptions Last Dose Informant Patient Reported? Taking?   WARFARIN SODIUM PO   Yes No   Sig: Take by mouth See Admin Instructions. Dosing in coordination with INR results   acetaminophen (TYLENOL) 325 MG tablet   Yes No   Sig: Take 650 mg by mouth every 8 hours as needed for mild pain   aspirin (ASA) 81 MG EC tablet   Yes No   Sig: Take 81 mg by mouth every morning.   atorvastatin (LIPITOR) 40 MG tablet   No No   Sig: Take 1 tablet (40 mg) by mouth every morning   chlorhexidine (PERIDEX) 0.12 % solution   Yes No   Sig: Take 15 mLs by mouth daily as needed.   estradiol (ESTRACE) 0.1 MG/GM vaginal cream   No No   Sig: Place 0.5 g vaginally twice a week. Can use daily for 2 weeks and then twice a week after that.   lidocaine-prilocaine (EMLA) 2.5-2.5 % external cream   No No    Sig: Apply topically as needed for other (Apply small amount to port site 30-60 minutes prior to port access to minimize discomfort)   lisinopril (ZESTRIL) 2.5 MG tablet   Yes No   Sig: Take 2.5 mg by mouth daily.   methylcellulose (CITRUCEL) 500 MG TABS tablet   Yes No   Sig: Take 500 mg by mouth every other day.   metoprolol tartrate (LOPRESSOR) 25 MG tablet   No No   Sig: Take 1 tablet (25 mg) by mouth 2 times daily   ondansetron (ZOFRAN) 8 MG tablet   No No   Sig: Take 1 tablet (8 mg) by mouth every 8 hours as needed for nausea (vomiting)   potassium chloride ER (K-TAB) 20 MEQ CR tablet   Yes No   Sig: Take 20 mEq by mouth daily (with breakfast).   traMADol (ULTRAM) 50 MG tablet   No No   Sig: Take 1 tablet (50 mg) by mouth every 8 hours as needed for moderate to severe pain.   warfarin ANTICOAGULANT (JANTOVEN ANTICOAGULANT) 5 MG tablet   No No   Si/2 tab (2.5mg) on Mon, Wed, Fri and 1 tab (5mg) all other days      Facility-Administered Medications: None        Review of Systems    The 10 point Review of Systems is negative other than noted in the HPI or here. Worsening shortness of breath, lower extremity edema    Physical Exam   Vital Signs: Temp: 97.9  F (36.6  C) Temp src: Oral BP: 125/70 Pulse: 77   Resp: 18 SpO2: 94 %      Weight: 144 lbs 0 oz    GENERAL: Patient was seen and examined at bedside with no acute distress  HENT:  Head is normocephalic, atraumatic.   EYES:  Eyes have anicteric sclerae without conjunctival injection   LUNGS: Bilateral air entry, decreased breath sounds at the bilateral bases  CARDIOVASCULAR:  Regular rate and rhythm with no murmurs, gallops or rubs.  ABDOMEN:  Normal bowel sounds. Soft; nontender   EXT: Bilateral lower extremity 2+ pitting edema up to the knees  SKIN:  No acute rashes.,  NEUROLOGIC:  Grossly nonfocal.      Medical Decision Making       80 MINUTES SPENT BY ME on the date of service doing chart review, history, exam, documentation & further activities per  the note.      Data     I have personally reviewed the following data over the past 24 hrs:    5.3  \   9.5 (L)   / 236     138 107 11.3 /  124 (H)   4.6 22 1.07 (H) \     ALT: N/A AST: N/A AP: N/A TBILI: N/A   ALB: N/A TOT PROTEIN: 5.1 (L) LIPASE: N/A     Trop: 26 (H) BNP: 1,682     TSH: N/A T4: N/A A1C: 5.2     INR:  2.46 (H) PTT:  N/A   D-dimer:  1.13 (H) Fibrinogen:  N/A       Imaging results reviewed over the past 24 hrs:   Recent Results (from the past 24 hours)   XR Chest Port 1 View    Narrative    EXAM: XR CHEST PORT 1 VIEW  LOCATION: [facility]  DATE: 10/27/2024    INDICATION: short of breath  COMPARISON: 10/6/2024      Impression    IMPRESSION: Small left pleural effusion with left basilar consolidation, likely compressive atelectasis, although pneumonia is also possible. Clear right lung and pleural space. No pneumothorax. Normal cardiomediastinal silhouette with TAVR. Right chest   port catheter tip in the lower SVC.   CT Chest Pulmonary Embolism w Contrast    Narrative    EXAM: CT CHEST PULMONARY EMBOLISM W CONTRAST  LOCATION: [facility]  DATE: 10/27/2024    INDICATION: short of breath, + ddimer. Breast cancer.  COMPARISON: 6/25/2024  TECHNIQUE: CT chest pulmonary angiogram during arterial phase injection of IV contrast. Multiplanar reformats and MIP reconstructions were performed. Dose reduction techniques were used.   CONTRAST: isovue 370  70ml    FINDINGS:  ANGIOGRAM CHEST: Pulmonary arteries are normal caliber and negative for pulmonary emboli. Thoracic aorta is negative for dissection.    LUNGS AND PLEURA: Moderate size right and left pleural effusions. There is interstitial edema and atelectasis.    MEDIASTINUM/AXILLAE: There is a right chest wall Port-A-Cath. A TAPVR stent is present. There is mild pericardial thickening or fluid. Left axillary surgical clips are present.    CORONARY ARTERY CALCIFICATION: Mild.    UPPER ABDOMEN:  Cholecystectomy.    MUSCULOSKELETAL: Multiple sclerotic bone lesions are again seen. T12 vertebral body height loss has not changed.      Impression    IMPRESSION:  1.  No PE.  2.  Moderate size right and left pleural effusions. There is interstitial edema and atelectasis. Underlying atypical infection cannot be excluded.  3.  Multiple sclerotic bone lesions are again seen.

## 2024-10-28 NOTE — DISCHARGE INSTRUCTIONS
R Beth and R elbow - every 5 days  Cleanse with saline; gently dry.  Cover with Mepilex dressing.  If wanting to change daily due to bath, ok to use a large bandage with a non-stick pad or a Telfa pad and secure in place with Coban.

## 2024-10-28 NOTE — CONSULTS
"HEART CARE NOTE        Thank you, Dr. Comer, for asking the [facility] team to see Kacie Hermosillo to evaluate ADHF.    Assessment/Recommendations     1. HFpEF c/b severe ADHF  Assessment / Plan  Hypervolemic on physical exam; continue IV diuresis; continue to monitor UOP and renal function  Patient is high risk for adverse cardiac events 2/2 advanced age, frailty,   GDMT as detailed below; mainstay of treatment for HFpEF includes diuretics and adequate BP control (class I) and SGLT2-I (class 2a); additional medical therapy (ARNI, MRA, ARB) demonstrated less robust evidence for indication but may be considered per guideline recommendations (2b); no indication for BBlockers     Current Pharmacotherapy AHA Guideline-Directed Medical Therapy   Lisinopril 2.5 mg twice daily ARNI/ARB   Spironolactone - not started  MRA   SGLT2 inhibitor: not started SGLT2-I    Furosemide IV Loop diuretic      2. ALTAGRACIA  Assessment / Plan  Resolving; diuresis as above; continue to monitor UOP and renal function closely    3. HTN  Assessment / Plan  Titrate oral afterload reduction as tolerated    4. Valvular heart disease  Assessment / Plan  S/p AVR; warfarin per pharmacy management    5. HLP  Assessment / Plan  Currently on atorvastatin    Clinically Significant Risk Factors Present on Admission          # Hyperchloremia: Highest Cl = 109 mmol/L in last 2 days, will monitor as appropriate          # Hypoalbuminemia: Lowest albumin = 3 g/dL at 10/28/2024  6:29 AM, will monitor as appropriate  # Drug Induced Coagulation Defect: home medication list includes an anticoagulant medication  # Drug Induced Platelet Defect: home medication list includes an antiplatelet medication   # Hypertension: Noted on problem list    # Anemia: based on hgb <11       # Overweight: Estimated body mass index is 25.51 kg/m  as calculated from the following:    Height as of this encounter: 1.6 m (5' 3\").    Weight as of this encounter: 65.3 kg " (144 lb).       # Financial/Environmental Concerns:          Cardiomyopathy  Heart Valve Replacement  Diastolic acute    Fluid overload, unspecified, Other fluid overload, and Other disorders of electrolyte and fluid balance, not elsewhere classified    Acute kidney failure, unspecified      85 minutes spent reviewing prior records (including documentation, laboratory studies, cardiac testing/imaging), history and physical exam, planning, and subsequent documentation.      History of Present Illness/Subjective    Ms. Kacie Hermosillo is a 84 year old female with a PMHx significant for (per Epic notation) medical history documented above presented to the ER for evaluation of increased weakness and fatigue as well as wheezing and worsening shortness of breath ongoing for the past few days in the setting of recent hospitalization for electrolyte abnormalities     Today,  Mrs. Hermosillo is, unfortunately, a poor historian; denies acute cardiac events or complaints; is unsure of the reason for her current admission; Management plan as detailed above    ECG: Personally reviewed. normal sinus rhythm, nonspecific ST and T waves changes.    ECHO (personnaly Reviewed on 10/28/24): repeat study pending  1. Left ventricular chamber size and wall thickness are normal. Systolic  function is hyperdynamic. The visually estimated left ventricular ejection  fraction is greater than 70%.  2. Right ventricular chamber size and systolic function are normal.  3. A well-seated 23 mm Wray Cecilia 3 bioprosthetic aortic valve is present.  Peak forward velocity is elevated at 3.5 m/s and mean outflow gradient  elevated at 25 mmHg. Dimensionless of index of 0.30 and acceleration time of  80 ms are both normal which may suggest that the increased peak velocity and  mean outflow gradient are due to a high flow state. No significant prosthetic  regurgitation is seen.  4. Compared to the prior study dated 8/16/2024, there has been an increase  "in  heart rate and left ventricular ejection fraction with a corresponding  increase in prosthetic aortic valve outflow gradient.    Lab results: personally reviewed October 28, 2024; notable for resolving ALTAGRACIA    Medical history and pertinent documents reviewed in Care Everywhere please where applicable see details above          Physical Examination Review of Systems   BP (!) 144/64 (BP Location: Right arm, Patient Position: Semi-Pedersen's, Cuff Size: Adult Regular)   Pulse 78   Temp 99.1  F (37.3  C) (Oral)   Resp 24   Ht 1.6 m (5' 3\")   Wt 65.3 kg (144 lb)   SpO2 95%   BMI 25.51 kg/m    Body mass index is 25.51 kg/m .  Wt Readings from Last 3 Encounters:   10/27/24 65.3 kg (144 lb)   10/23/24 64 kg (141 lb)   10/21/24 64 kg (141 lb)     General Appearance:   no distress, normal body habitus   ENT/Mouth: membranes moist, no oral lesions or bleeding gums.      EYES:  no scleral icterus, normal conjunctivae   Neck: no carotid bruits or thyromegaly   Chest/Lungs:   lungs are clear to auscultation, no rales or wheezing, equal chest wall expansion    Cardiovascular:   Regular. Normal first and second heart sounds with no murmurs, rubs, or gallops; the carotid, radial and posterior tibial pulses are intact, + JVD and and mild LE edema bilaterally    Abdomen:  no organomegaly, masses, bruits, or tenderness; bowel sounds are present   Extremities: no cyanosis or clubbing   Skin: no xanthelasma, warm.    Neurologic: NAD     Psychiatric: alert and calm     A complete 10 systems ROS was reviewed  And is negative except what is listed in the HPI.          Medical History  Surgical History Family History Social History   Past Medical History:   Diagnosis Date    Breast cancer (H) 01/01/1999    Heart valve replaced     Hx antineoplastic chemotherapy 01/01/1999    Hx of radiation therapy 01/01/1999    Hypertension     Past Surgical History:   Procedure Laterality Date    BIOPSY BREAST      HYSTERECTOMY      IR CHEST PORT " PLACEMENT > 5 YRS OF AGE  7/30/2024    LUMPECTOMY BREAST Left 1999    OOPHORECTOMY Bilateral     no family history of premature coronary artery disease Social History     Socioeconomic History    Marital status:      Spouse name: Not on file    Number of children: Not on file    Years of education: Not on file    Highest education level: Not on file   Occupational History    Not on file   Tobacco Use    Smoking status: Never    Smokeless tobacco: Never   Vaping Use    Vaping status: Never Used   Substance and Sexual Activity    Alcohol use: Yes     Comment: Rarely    Drug use: Not on file    Sexual activity: Not on file   Other Topics Concern    Not on file   Social History Narrative    Not on file     Social Drivers of Health     Financial Resource Strain: Low Risk  (10/8/2024)    Financial Resource Strain     Within the past 12 months, have you or your family members you live with been unable to get utilities (heat, electricity) when it was really needed?: No   Food Insecurity: Low Risk  (10/8/2024)    Food Insecurity     Within the past 12 months, did you worry that your food would run out before you got money to buy more?: No     Within the past 12 months, did the food you bought just not last and you didn t have money to get more?: No   Transportation Needs: Low Risk  (10/8/2024)    Transportation Needs     Within the past 12 months, has lack of transportation kept you from medical appointments, getting your medicines, non-medical meetings or appointments, work, or from getting things that you need?: No   Physical Activity: Unknown (5/24/2024)    Exercise Vital Sign     Days of Exercise per Week: 0 days     Minutes of Exercise per Session: Not on file   Stress: No Stress Concern Present (5/24/2024)    [employer] - Occupational Stress Questionnaire     Feeling of Stress : Only a little   Social Connections: Unknown (5/24/2024)    Social Connection and Isolation Panel [NHANES]  "    Frequency of Communication with Friends and Family: Not on file     Frequency of Social Gatherings with Friends and Family: Twice a week     Attends Zoroastrianism Services: Not on file     Active Member of Clubs or Organizations: Not on file     Attends Club or Organization Meetings: Not on file     Marital Status: Not on file   Interpersonal Safety: High Risk (10/7/2024)    Interpersonal Safety     Do you feel physically and emotionally safe where you currently live?: No     Within the past 12 months, have you been hit, slapped, kicked or otherwise physically hurt by someone?: No     Within the past 12 months, have you been humiliated or emotionally abused in other ways by your partner or ex-partner?: No   Housing Stability: Low Risk  (10/8/2024)    Housing Stability     Do you have housing? : Yes     Are you worried about losing your housing?: No           Lab Results    Chemistry/lipid CBC Cardiac Enzymes/BNP/TSH/INR   Lab Results   Component Value Date    CHOL 126 05/24/2024    HDL 62 05/24/2024    TRIG 64 05/24/2024    BUN 10.4 10/28/2024     10/28/2024    CO2 22 10/28/2024    Lab Results   Component Value Date    WBC 4.0 10/28/2024    HGB 8.9 (L) 10/28/2024    HCT 27.6 (L) 10/28/2024    MCV 95 10/28/2024     10/28/2024    Lab Results   Component Value Date    TSH 2.39 10/08/2024    INR 2.05 (H) 10/28/2024     No results found for: \"CKTOTAL\", \"CKMB\", \"TROPONINI\"       Weight:    Wt Readings from Last 3 Encounters:   10/27/24 65.3 kg (144 lb)   10/23/24 64 kg (141 lb)   10/21/24 64 kg (141 lb)       Allergies  No Known Allergies      Surgical History  Past Surgical History:   Procedure Laterality Date    BIOPSY BREAST      HYSTERECTOMY      IR CHEST PORT PLACEMENT > 5 YRS OF AGE  7/30/2024    LUMPECTOMY BREAST Left 1999    OOPHORECTOMY Bilateral        Social History  Tobacco:   History   Smoking Status    Never   Smokeless Tobacco    Never    Alcohol:   Social History    Substance and Sexual " Activity      Alcohol use: Yes        Comment: Rarely   Illicit Drugs:   History   Drug Use Not on file       Family History  Family History   Problem Relation Age of Onset    Breast Cancer Maternal Aunt     Hereditary Breast and Ovarian Cancer Syndrome No family hx of           Jose Nagy MD on 10/28/2024      cc: Irina Francisco,

## 2024-10-28 NOTE — PROGRESS NOTES
[facility]: [facility]    Background: Transitional Care Management program identified per system criteria and reviewed by [facility] team for possible outreach.    Assessment: Upon chart review, [facility] Team member will not proceed with patient outreach related to this episode of Transitional Care Management program due to reason below:    Patient has presented to Emergency Department, been readmitted to hospital, or transferred to another hospital.     Plan: Transitional Care Management episode addressed appropriately per reason noted above.      Samantha Jimenez MA  [facility] [place], [facility]    *Connected Care Resource Team does NOT follow patient ongoing. Referrals are identified based on internal discharge reports and the outreach is to ensure patient has an understanding of their discharge instructions.

## 2024-10-28 NOTE — PLAN OF CARE
Problem: Adult Inpatient Plan of Care  Goal: Optimal Comfort and Wellbeing  Outcome: Progressing     Problem: Gas Exchange Impaired  Goal: Optimal Gas Exchange  Outcome: Progressing  Intervention: Optimize Oxygenation and Ventilation  Recent Flowsheet Documentation  Taken 10/28/2024 1255 by Sonia Villegas RN  Head of Bed (HOB) Positioning: HOB at 20-30 degrees  Taken 10/28/2024 1245 by Sonia Villegas RN  Head of Bed (HOB) Positioning: HOB at 20-30 degrees  Taken 10/28/2024 0845 by Sonia Villegas RN  Head of Bed (HOB) Positioning: HOB at 20-30 degrees     Problem: Fall Injury Risk  Goal: Absence of Fall and Fall-Related Injury  Outcome: Progressing  Intervention: Promote Injury-Free Environment  Recent Flowsheet Documentation  Taken 10/28/2024 1255 by Sonia Villegas RN  Safety Promotion/Fall Prevention:   activity supervised   clutter free environment maintained   nonskid shoes/slippers when out of bed   room near nurse's station  Taken 10/28/2024 0845 by Sonia Villegas RN  Safety Promotion/Fall Prevention:   activity supervised   clutter free environment maintained   nonskid shoes/slippers when out of bed   room near nurse's station   Goal Outcome Evaluation:  Patient had thoracentesis this morning and had 1 Liter removed. Patient says she's breathing better since. Patient is also diuresing well after IV lasix. Patient's purewick failed for strict I and O. She had an extra extra large incontinence. Echo complete. Blood pressure low this afternoon. Lisinopril held. Dr. Comer aware and ordering Albumin.

## 2024-10-29 ENCOUNTER — APPOINTMENT (OUTPATIENT)
Dept: OCCUPATIONAL THERAPY | Facility: HOSPITAL | Age: 84
DRG: 291 | End: 2024-10-29
Attending: STUDENT IN AN ORGANIZED HEALTH CARE EDUCATION/TRAINING PROGRAM
Payer: COMMERCIAL

## 2024-10-29 ENCOUNTER — TELEPHONE (OUTPATIENT)
Dept: ANTICOAGULATION | Facility: CLINIC | Age: 84
End: 2024-10-29
Payer: COMMERCIAL

## 2024-10-29 ENCOUNTER — APPOINTMENT (OUTPATIENT)
Dept: ULTRASOUND IMAGING | Facility: HOSPITAL | Age: 84
DRG: 291 | End: 2024-10-29
Attending: STUDENT IN AN ORGANIZED HEALTH CARE EDUCATION/TRAINING PROGRAM
Payer: COMMERCIAL

## 2024-10-29 ENCOUNTER — ANTICOAGULATION THERAPY VISIT (OUTPATIENT)
Dept: ANTICOAGULATION | Facility: CLINIC | Age: 84
End: 2024-10-29
Payer: COMMERCIAL

## 2024-10-29 VITALS
DIASTOLIC BLOOD PRESSURE: 58 MMHG | RESPIRATION RATE: 18 BRPM | BODY MASS INDEX: 23.28 KG/M2 | OXYGEN SATURATION: 96 % | TEMPERATURE: 97.9 F | HEART RATE: 66 BPM | WEIGHT: 131.39 LBS | SYSTOLIC BLOOD PRESSURE: 123 MMHG | HEIGHT: 63 IN

## 2024-10-29 DIAGNOSIS — Z95.2 S/P TAVR (TRANSCATHETER AORTIC VALVE REPLACEMENT): Primary | ICD-10-CM

## 2024-10-29 DIAGNOSIS — I63.9 CEREBELLAR STROKE (H): ICD-10-CM

## 2024-10-29 LAB
ANION GAP SERPL CALCULATED.3IONS-SCNC: 10 MMOL/L (ref 7–15)
BUN SERPL-MCNC: 10.3 MG/DL (ref 8–23)
CALCIUM SERPL-MCNC: 7.4 MG/DL (ref 8.8–10.4)
CHLORIDE SERPL-SCNC: 107 MMOL/L (ref 98–107)
CREAT SERPL-MCNC: 0.83 MG/DL (ref 0.51–0.95)
EGFRCR SERPLBLD CKD-EPI 2021: 69 ML/MIN/1.73M2
ERYTHROCYTE [DISTWIDTH] IN BLOOD BY AUTOMATED COUNT: 17.7 % (ref 10–15)
GLUCOSE BLDC GLUCOMTR-MCNC: 96 MG/DL (ref 70–99)
GLUCOSE SERPL-MCNC: 89 MG/DL (ref 70–99)
HCO3 SERPL-SCNC: 24 MMOL/L (ref 22–29)
HCT VFR BLD AUTO: 28.6 % (ref 35–47)
HGB BLD-MCNC: 9.1 G/DL (ref 11.7–15.7)
INR PPP: 1.56 (ref 0.85–1.15)
MCH RBC QN AUTO: 30.1 PG (ref 26.5–33)
MCHC RBC AUTO-ENTMCNC: 31.8 G/DL (ref 31.5–36.5)
MCV RBC AUTO: 95 FL (ref 78–100)
PATH REPORT.COMMENTS IMP SPEC: NORMAL
PATH REPORT.FINAL DX SPEC: NORMAL
PATH REPORT.GROSS SPEC: NORMAL
PATH REPORT.MICROSCOPIC SPEC OTHER STN: NORMAL
PLATELET # BLD AUTO: 214 10E3/UL (ref 150–450)
POTASSIUM SERPL-SCNC: 3.2 MMOL/L (ref 3.4–5.3)
RBC # BLD AUTO: 3.02 10E6/UL (ref 3.8–5.2)
SODIUM SERPL-SCNC: 141 MMOL/L (ref 135–145)
WBC # BLD AUTO: 3.7 10E3/UL (ref 4–11)

## 2024-10-29 PROCEDURE — 85018 HEMOGLOBIN: CPT | Performed by: INTERNAL MEDICINE

## 2024-10-29 PROCEDURE — 80048 BASIC METABOLIC PNL TOTAL CA: CPT | Performed by: INTERNAL MEDICINE

## 2024-10-29 PROCEDURE — 250N000013 HC RX MED GY IP 250 OP 250 PS 637: Performed by: INTERNAL MEDICINE

## 2024-10-29 PROCEDURE — 99239 HOSP IP/OBS DSCHRG MGMT >30: CPT | Performed by: INTERNAL MEDICINE

## 2024-10-29 PROCEDURE — 85610 PROTHROMBIN TIME: CPT | Performed by: STUDENT IN AN ORGANIZED HEALTH CARE EDUCATION/TRAINING PROGRAM

## 2024-10-29 PROCEDURE — 88305 TISSUE EXAM BY PATHOLOGIST: CPT | Mod: 26 | Performed by: PATHOLOGY

## 2024-10-29 PROCEDURE — 97110 THERAPEUTIC EXERCISES: CPT | Mod: GO

## 2024-10-29 PROCEDURE — 76604 US EXAM CHEST: CPT

## 2024-10-29 PROCEDURE — 250N000013 HC RX MED GY IP 250 OP 250 PS 637: Performed by: STUDENT IN AN ORGANIZED HEALTH CARE EDUCATION/TRAINING PROGRAM

## 2024-10-29 PROCEDURE — 99233 SBSQ HOSP IP/OBS HIGH 50: CPT | Performed by: INTERNAL MEDICINE

## 2024-10-29 PROCEDURE — 97535 SELF CARE MNGMENT TRAINING: CPT | Mod: GO

## 2024-10-29 PROCEDURE — 88112 CYTOPATH CELL ENHANCE TECH: CPT | Mod: 26 | Performed by: PATHOLOGY

## 2024-10-29 PROCEDURE — 36415 COLL VENOUS BLD VENIPUNCTURE: CPT | Performed by: STUDENT IN AN ORGANIZED HEALTH CARE EDUCATION/TRAINING PROGRAM

## 2024-10-29 PROCEDURE — 250N000011 HC RX IP 250 OP 636: Performed by: INTERNAL MEDICINE

## 2024-10-29 RX ORDER — FUROSEMIDE 20 MG/1
20 TABLET ORAL DAILY
Qty: 30 TABLET | Refills: 1 | Status: SHIPPED | OUTPATIENT
Start: 2024-10-29

## 2024-10-29 RX ORDER — FUROSEMIDE 20 MG/1
20 TABLET ORAL DAILY
Status: DISCONTINUED | OUTPATIENT
Start: 2024-10-29 | End: 2024-10-29 | Stop reason: HOSPADM

## 2024-10-29 RX ADMIN — ASPIRIN 81 MG: 81 TABLET, COATED ORAL at 08:53

## 2024-10-29 RX ADMIN — FUROSEMIDE 20 MG: 10 INJECTION, SOLUTION INTRAMUSCULAR; INTRAVENOUS at 08:53

## 2024-10-29 RX ADMIN — METOPROLOL TARTRATE 25 MG: 25 TABLET, FILM COATED ORAL at 08:53

## 2024-10-29 RX ADMIN — ATORVASTATIN CALCIUM 40 MG: 40 TABLET, FILM COATED ORAL at 08:53

## 2024-10-29 ASSESSMENT — ACTIVITIES OF DAILY LIVING (ADL)
ADLS_ACUITY_SCORE: 0

## 2024-10-29 NOTE — PLAN OF CARE
Assumed care for pt from [id number].  Pt is alert and oriented x4, VSS. BP improved with albumin infusion. Pt denies pain. Daughter at bedside. Ate dinner well. Continues to be RA with sats >92%. LOZANO but recovers fairly quickly. BLE edema, tubigrips in place. Mepilex in place for right shin and right elbow. Pt ambulated around unit with daughter, stable with walker. Report given to Natalie HERNANDEZ, pt transported to room 322.    Problem: Adult Inpatient Plan of Care  Goal: Absence of Hospital-Acquired Illness or Injury  Outcome: Progressing  Intervention: Identify and Manage Fall Risk  Recent Flowsheet Documentation  Taken 10/28/2024 1600 by Abel Lay RN  Safety Promotion/Fall Prevention:   activity supervised   assistive device/personal items within reach   clutter free environment maintained   nonskid shoes/slippers when out of bed   room near nurse's station   safety round/check completed  Intervention: Prevent and Manage VTE (Venous Thromboembolism) Risk  Recent Flowsheet Documentation  Taken 10/28/2024 1600 by Abel Lay RN  VTE Prevention/Management: SCDs on (sequential compression devices)  Intervention: Prevent Infection  Recent Flowsheet Documentation  Taken 10/28/2024 1600 by Abel Lay RN  Infection Prevention:   hand hygiene promoted   rest/sleep promoted   single patient room provided  Goal: Optimal Comfort and Wellbeing  Outcome: Progressing  Intervention: Provide Person-Centered Care  Recent Flowsheet Documentation  Taken 10/28/2024 1600 by Abel Lay RN  Trust Relationship/Rapport:   care explained   questions answered   thoughts/feelings acknowledged     Problem: Gas Exchange Impaired  Goal: Optimal Gas Exchange  Outcome: Progressing     Problem: Fall Injury Risk  Goal: Absence of Fall and Fall-Related Injury  Outcome: Progressing  Intervention: Identify and Manage Contributors  Recent Flowsheet Documentation  Taken 10/28/2024 1600 by Abel Lay RN  Medication  Review/Management: medications reviewed  Intervention: Promote Injury-Free Environment  Recent Flowsheet Documentation  Taken 10/28/2024 1600 by Abel Lay RN  Safety Promotion/Fall Prevention:   activity supervised   assistive device/personal items within reach   clutter free environment maintained   nonskid shoes/slippers when out of bed   room near nurse's station   safety round/check completed     Problem: Delirium  Goal: Optimal Coping  Outcome: Progressing  Intervention: Optimize Psychosocial Adjustment to Delirium  Recent Flowsheet Documentation  Taken 10/28/2024 1600 by Abel Lay RN  Family/Support System Care:   presence promoted   involvement promoted  Goal: Improved Behavioral Control  Outcome: Progressing  Intervention: Minimize Safety Risk  Recent Flowsheet Documentation  Taken 10/28/2024 1600 by Abel Lay RN  Enhanced Safety Measures:   family to remain at bedside   pain management   review medications for side effects with activity   room near unit station  Trust Relationship/Rapport:   care explained   questions answered   thoughts/feelings acknowledged     Problem: Skin Injury Risk Increased  Goal: Skin Health and Integrity  Outcome: Progressing  Intervention: Plan: Nurse Driven Intervention: Moisture Management  Recent Flowsheet Documentation  Taken 10/28/2024 1600 by Abel Lay RN  Moisture Interventions: Urinary collection device  Intervention: Plan: Nurse Driven Intervention: Friction and Shear  Recent Flowsheet Documentation  Taken 10/28/2024 1600 by Abel Lay RN  Friction/Shear Interventions: HOB 30 degrees or less     Problem: Delirium  Goal: Improved Attention and Thought Clarity  Intervention: Maximize Cognitive Function  Recent Flowsheet Documentation  Taken 10/28/2024 1600 by Abel Lay RN  Reorientation Measures: clock in view   Goal Outcome Evaluation:

## 2024-10-29 NOTE — PLAN OF CARE
Goal Outcome Evaluation:      Plan of Care Reviewed With: patient, child               Patient is discharging home today. Paperwork reviewed with patient and daughter.

## 2024-10-29 NOTE — TELEPHONE ENCOUNTER
Daughter Sulma called (C2C on file to communicate) states they need to get patient in to see Irina BOWLING today or tomorrow as patient is moving into [facility] in [facility] and needs admission paperwork completed.     Informed daughter provider is out of the office today and tomorrow, she requested to see another provider in her absence so we do not delay her move into [facility].     Appointment scheduled with Lore Hinojosa CNP 10/30/24 arrival time of 1110 AM. Requested they hand carry all necessary paperwork to have this completed during visit and she verbalized good understanding.     Update sent to Lore Hinojosa CNP.    Julie Behrendt RN

## 2024-10-29 NOTE — TELEPHONE ENCOUNTER
ANTICOAGULATION  MANAGEMENT: Discharge Review    Kacie Hermosillo chart reviewed for anticoagulation continuity of care    Hospital Admission on 10/27-10/29 for Acute CHF exacerbation, bilateral pleural effusion, ALTAGRACIA on CKD 3.    Discharge disposition:  per AVS [facility] has [facility], however called [facility] and they advised home care was declined as patient is now in an inpatient facility - per [employer] message sent from daughter patient is moving into [facility]  [phone]    Results:    Recent labs: (last 7 days)     10/24/24  1118 10/27/24  1426 10/28/24  0025 10/28/24  0629 10/29/24  0430   INR 3.0* 2.46* 2.31* 2.05* 1.56*     Anticoagulation inpatient management:     anticoagulation calendar updated with inpatient dosing    Anticoagulation discharge instructions:     Warfarin dosing: home regimen continued   Bridging: No   INR goal change: No      Medication changes affecting anticoagulation: Yes: Lasix started INR could decrease due to fluid shifts     Additional factors affecting anticoagulation: Yes: fluid status      PLAN     Recommend to adjust dose to 5mg tonight (with sub INR) - unable to get a hold of patients daughter via phone - MyChart sent     Spoke with Maxine at [facility] and confirmed patient arrival of .tomorrow and will fax orders for INR to be rechecked on Thursday 10/31    Anticoagulation Calendar updated    Janay Smith RN  10/29/2024  Anticoagulation Clinic  [facility] for routing messages: [facility] (TCU/LTC/IGNACIO)  [facility] patient phone line: [phone]

## 2024-10-29 NOTE — PROGRESS NOTES
ANTICOAGULATION MANAGEMENT     Kacie Hermosillo 84 year old female is on warfarin with subtherapeutic INR result. (Goal INR 2.0-3.0)    Recent labs: (last 7 days)     10/29/24  0430   INR 1.56*       ASSESSMENT     Source(s): Chart Review  Previous INR was Therapeutic last 2(+) visits  Medication, diet, health changes since last INR discharged from hospital today, started lasix on discharge - moving into [facility] tomorrow. (See ADT from today)         PLAN     Recommended plan for temporary change(s) and ongoing change(s) affecting INR     Dosing Instructions: booster dose then continue your current warfarin dose with next INR in 2 days       Summary  As of 10/29/2024      Full warfarin instructions:  10/29: 5 mg; Otherwise 5 mg every Mon, Wed, Fri; 2.5 mg all other days   Next INR check:  10/31/2024               Sent [employer] message with dosing and follow up instructions  Faxed dosing and follow up instructions to [facility] [phone]    Orders given to  Homecare nurse/facility to recheck    Education provided: Please call back if any changes to your diet, medications or how you've been taking warfarin  Contact [phone] with any changes, questions or concerns.     Plan made per [facility] anticoagulation protocol    Janay Smith RN  10/29/2024  Anticoagulation Clinic  [facility] for routing messages: [facility]ton HARDY  [facility] patient phone line: [phone]        _______________________________________________________________________     Anticoagulation Episode Summary       Current INR goal:  2.0-3.0   TTR:  81.4% (1 y)   Target end date:  Indefinite   Send INR reminders to:  VIRGILIO HARDY    Indications    S/P TAVR (transcatheter aortic valve replacement) [Z95.2]  Cerebellar stroke (H) [I63.9]             Comments:  10/9/24-[facility] admit, [facility] patient   2.0-3.0 per Dr. Olguin 5/24/23 (4/10/23-Cardiac CT: Radiographic features of valve leaflet thickening w concurrent hypoattenuation and reduced leaflet  motion all highly suggestive of valve   leaflet thrombosis)             Anticoagulation Care Providers       Provider Role Specialty Phone number    Irina Francisco PA-C Referring Family Medicine [phone]

## 2024-10-29 NOTE — DISCHARGE SUMMARY
[facility]  Hospitalist Discharge Summary      Date of Admission:  10/27/2024  Date of Discharge:  10/29/2024  Discharging Provider: Yosi Comer MD  Discharge Service: Hospitalist Service    Discharge Diagnoses   Acute diastolic heart failure exacerbation      Clinically Significant Risk Factors          Follow-ups Needed After Discharge   Follow-up Appointments       Hospital Follow-up with Existing Primary Care Provider (PCP)          Schedule Primary Care visit within: 7 Days   Recommended labs and Imaging (to be ordered by Primary Care Provider): INR, BMP               Unresulted Labs Ordered in the Past 30 Days of this Admission       Date and Time Order Name Status Description    10/28/2024  3:00 AM Anaerobic bacterial culture In process     10/28/2024  3:00 AM Pleural fluid Aerobic Bacterial Culture Routine With Gram Stain Preliminary     10/27/2024  7:15 PM Cytology, non-gynecologic In process         These results will be followed up by not applicable    Discharge Disposition   Discharged to home  Condition at discharge: Stable    Hospital Course   Acute CHF exacerbation  Bilateral pleural effusion  --Improving with IV diuresis  -- Status post left thoracentesis with removal of 600 cc.    -- Right thoracentesis attempted on 10/29 but was unsuccessful due to lack of enough fluid.  -- Patient counseled on eating low-salt diet. apparently eats salty potato chips and popcorn  -- Daughter insisting on discharge today  Changed to p.o. Lasix at discharge.  Check BMP and INR in 1 week    ALTAGRACIA on CKD 3  --Creatinine improved to 0.83 from 1.07 admission  Thought to be due to cardiorenal    History of breast cancer on chemotherapy  Breast cancer metastasis to bone  Continue with outpatient follow-up  CT chest showed multiple sclerotic bone lesions are again seen.      History of aortic valve replacement  Continue with warfarin  Check INR in 1 week since INR is subtherapeutic.  INR   Date  Value Ref Range Status   10/29/2024 1.56 (H) 0.85 - 1.15 Final     INR (External)   Date Value Ref Range Status   10/24/2024 3.0 (A) 0.9 - 1.1 Final   Echo shows diastolic dysfunction but bioprosthetic valve is working well     History of hyperlipidemia  Continue with aspirin and atorvastatin       Consultations This Hospital Stay   CORE CLINIC EVALUATION IP CONSULT  OCCUPATIONAL THERAPY ADULT IP CONSULT  CARDIOLOGY IP CONSULT  PHARMACY TO DOSE WARFARIN  WOUND OSTOMY CONTINENCE NURSE  IP CONSULT  CARE MANAGEMENT / SOCIAL WORK IP CONSULT    Code Status   Full Code    Time Spent on this Encounter   I, Yosi Comer MD, personally saw the patient today and spent greater than 30 minutes discharging this patient.       Yosi Comer MD  [facility] HEART [address] [address]  Phone: [phone]  Fax: [phone]  ______________________________________________________________________    Physical Exam   Vital Signs: Temp: 97.9  F (36.6  C) Temp src: Oral BP: 123/58 Pulse: 66   Resp: 18 SpO2: 96 % O2 Device: None (Room air)    Weight: 131 lbs 6.31 oz  Lungs are clear to auscultate  Bilateral leg swelling nonpitting edema       Primary Care Physician   Irina Francisco    Discharge Orders      Primary Care - Care Coordination Referral      Follow-Up with Cardiology ALICIA Heart Failure Discharge      Reason for your hospital stay    Shortness of breath and leg swelling     Activity    Your activity upon discharge: activity as tolerated     Diet    Follow this diet upon discharge: Current Diet:Orders Placed This Encounter      Fluid restriction 1800 ML FLUID      Combination Diet 2 gm NA Diet     Hospital Follow-up with Existing Primary Care Provider (PCP)            Significant Results and Procedures   Results for orders placed or performed during the hospital encounter of 10/27/24   XR Chest Port 1 View    Narrative    EXAM: XR CHEST PORT 1 VIEW  LOCATION: [facility]  DATE: 10/27/2024    INDICATION: short of breath  COMPARISON: 10/6/2024      Impression    IMPRESSION: Small left pleural effusion with left basilar consolidation, likely compressive atelectasis, although pneumonia is also possible. Clear right lung and pleural space. No pneumothorax. Normal cardiomediastinal silhouette with TAVR. Right chest   port catheter tip in the lower SVC.   CT Chest Pulmonary Embolism w Contrast    Narrative    EXAM: CT CHEST PULMONARY EMBOLISM W CONTRAST  LOCATION: [facility]  DATE: 10/27/2024    INDICATION: short of breath, + ddimer. Breast cancer.  COMPARISON: 6/25/2024  TECHNIQUE: CT chest pulmonary angiogram during arterial phase injection of IV contrast. Multiplanar reformats and MIP reconstructions were performed. Dose reduction techniques were used.   CONTRAST: isovue 370  70ml    FINDINGS:  ANGIOGRAM CHEST: Pulmonary arteries are normal caliber and negative for pulmonary emboli. Thoracic aorta is negative for dissection.    LUNGS AND PLEURA: Moderate size right and left pleural effusions. There is interstitial edema and atelectasis.    MEDIASTINUM/AXILLAE: There is a right chest wall Port-A-Cath. A TAPVR stent is present. There is mild pericardial thickening or fluid. Left axillary surgical clips are present.    CORONARY ARTERY CALCIFICATION: Mild.    UPPER ABDOMEN: Cholecystectomy.    MUSCULOSKELETAL: Multiple sclerotic bone lesions are again seen. T12 vertebral body height loss has not changed.      Impression    IMPRESSION:  1.  No PE.  2.  Moderate size right and left pleural effusions. There is interstitial edema and atelectasis. Underlying atypical infection cannot be excluded.  3.  Multiple sclerotic bone lesions are again seen.     US Lower Extremity Venous Duplex Bilateral    Narrative    EXAM: US LOWER EXTREMITY VENOUS DUPLEX BILATERAL  LOCATION: [facility]  DATE: 10/27/2024    INDICATION:  Bilateral lower extremity pitting edema.  COMPARISON: None available.  TECHNIQUE: Venous Duplex ultrasound of bilateral lower extremities with and without compression, augmentation and duplex. Color flow and spectral Doppler with waveform analysis performed.    FINDINGS: Exam includes the common femoral, femoral, popliteal veins as well as segmentally visualized deep calf veins and greater saphenous vein.     RIGHT: No deep vein thrombosis. No superficial thrombophlebitis. No popliteal cyst.    LEFT: No deep vein thrombosis. No superficial thrombophlebitis. No popliteal cyst.      Impression    IMPRESSION:  1.  No deep venous thrombosis in the bilateral lower extremities.   US Thoracentesis    Narrative    EXAM:   1. LEFT THORACENTESIS  2. ULTRASOUND GUIDANCE  LOCATION: [facility]  DATE: 10/28/2024    INDICATION: Pleural effusion.    PROCEDURE: Informed consent obtained. Time out performed. The chest was prepped and draped in sterile fashion. 6 mL of 1 % lidocaine was infused into the local soft tissues. Under direct ultrasound guidance, a 5 Mohawk catheter system was placed into the   pleural effusion.     0.6 liters of yellow-brown fluid were removed and sent to lab, if requested.    Patient tolerated procedure well.    Ultrasound imaging was obtained and placed in the patient's permanent medical record.      Impression    IMPRESSION:  Status post left ultrasound-guided thoracentesis.    Reference CPT Code: 95362   Echo Limited     Value    LVEF  >70%    Narrative    [id number]  [id number]  [id number]  [id number]^GONDAL^VIOLET^[place] [address]     Name: URIAH OLIVEROS  MRN: [id number]  : 1940  Study Date: 10/28/2024 01:41 PM  Age: 84 yrs  Gender: Female  Patient [facility]  Reason For Study: Heart Failure  Ordering Physician: GONDAL, SAAD J  Performed By: JERI     BSA: 1.7 m2  Height: 63 in  Weight: 144 lb  HR: 85  BP: 148/68  mmHg  ______________________________________________________________________________  Procedure  Complete Portable Echo Adult. Definity (NDC #26619-296) given intravenously.  Compared to the prior study dated 10/7/2024 the EF remains normal. Diasotlic  function and valves not fully evaluated on today's images, but the normal AV  AT suggests the increased gradient is not due to prosthetic stenosis.  ______________________________________________________________________________  Interpretation Summary     The left ventricle is normal in size.  The visual ejection fraction is >70%.  No regional wall motion abnormalities noted.  There is a 23 mm Wray Cecilia 3 bioprosthetic prosthesis in the aortic  position with a mean gradient of 27mmHg, AT 80 ms. No significant valvular or  paravalvular regurgitation. Implant date 2021.  RVSP 22mmHg + RAP  Compared to the prior study dated 10/7/2024 the EF remains normal. Diasotlic  function and valves not fully evaluated on today's images, but the normal AV  AT suggests the increased gradient is not due to prosthetic stenosis  ______________________________________________________________________________  Left Ventricle  The left ventricle is normal in size. The visual ejection fraction is >70%. No  regional wall motion abnormalities noted.     Right Ventricle  Normal right ventricle size and systolic function.     Atria  The left atrium is not well visualized. Right atrium not well visualized.     Mitral Valve  The mitral valve leaflets are mildly thickened. There is mild mitral annular  calcification. There is trace mitral regurgitation.     Tricuspid Valve  The tricuspid valve is not well visualized. There is mild (1+) tricuspid  regurgitation. RVSP 22mmHg + RAP. There is no tricuspid stenosis.     Aortic Valve  There is a 23 mm Wray Cecilia 3 bioprosthetic prosthesis in the aortic  position with a mean gradient of 27mmHg, AT 80 ms. No significant valvular or  paravalvular  regurgitation. Implant date .     Pulmonic Valve  The pulmonic valve is not well visualized.     Vessels  The aorta root cannot be assessed. The thoracic aorta cannot be assessed.  Inferior vena cava not well visualized for estimation of right atrial  pressure.     Pericardium  There is no pericardial effusion.     Rhythm  Sinus rhythm was noted.  ______________________________________________________________________________  MMode/2D Measurements & Calculations     EF Biplane: 76.4 %  TAPSE: 2.0 cm     Time Measurements  MM HR: 78.0 BPM     Doppler Measurements & Calculations  MV E max danuta: 78.8 cm/sec  MV A max danuta: 114.8 cm/sec  MV E/A: 0.69  MV dec slope: 508.9 cm/sec2  MV dec time: 0.15 sec  Ao V2 max: 330.6 cm/sec  Ao max P.0 mmHg  Ao V2 mean: 251.0 cm/sec  Ao mean P.4 mmHg  Ao V2 VTI: 76.9 cm  Ao acc time: 0.07 sec  TR max danuta: 236.5 cm/sec  TR max P.4 mmHg     ______________________________________________________________________________  Report approved by: Becky Sheehan 10/28/2024 04:30 PM             Discharge Medications   Current Discharge Medication List        START taking these medications    Details   furosemide (LASIX) 20 MG tablet Take 1 tablet (20 mg) by mouth daily.  Qty: 30 tablet, Refills: 1    Associated Diagnoses: Hypervolemia, unspecified hypervolemia type           CONTINUE these medications which have NOT CHANGED    Details   acetaminophen (TYLENOL) 325 MG tablet Take 650 mg by mouth every 8 hours as needed for mild pain      aspirin (ASA) 81 MG EC tablet Take 81 mg by mouth every morning.      atorvastatin (LIPITOR) 40 MG tablet Take 1 tablet (40 mg) by mouth every morning  Qty: 90 tablet, Refills: 3    Associated Diagnoses: Hyperlipidemia, unspecified hyperlipidemia type      estradiol (ESTRACE) 0.1 MG/GM vaginal cream Place 0.5 g vaginally twice a week. Can use daily for 2 weeks and then twice a week after that.  Qty: 42 g, Refills: 1    Associated Diagnoses: Acute  cystitis with hematuria      ferrous sulfate (FEROSUL) 325 (65 Fe) MG tablet Take 325 mg by mouth daily (with breakfast).      lisinopril (ZESTRIL) 2.5 MG tablet Take 2.5 mg by mouth daily.      methylcellulose (CITRUCEL) 500 MG TABS tablet Take 500 mg by mouth every other day.      metoprolol tartrate (LOPRESSOR) 25 MG tablet Take 1 tablet (25 mg) by mouth 2 times daily  Qty: 180 tablet, Refills: 3    Associated Diagnoses: Essential hypertension; SVT (supraventricular tachycardia) (H)      multivitamin w/minerals (MULTI-VITAMIN) tablet Take 1 tablet by mouth daily.      potassium chloride ER (K-TAB/KLOR-CON) 10 MEQ CR tablet Take 10 mEq by mouth daily.      warfarin ANTICOAGULANT (COUMADIN) 5 MG tablet Take by mouth See Admin Instructions. 5mg every mon,wed,fri  2.5mg all other days      chlorhexidine (PERIDEX) 0.12 % solution Take 15 mLs by mouth daily as needed.      lidocaine-prilocaine (EMLA) 2.5-2.5 % external cream Apply topically as needed for other (Apply small amount to port site 30-60 minutes prior to port access to minimize discomfort)  Qty: 30 g, Refills: 2    Associated Diagnoses: Malignant neoplasm metastatic to bone (H)           Allergies   No Known Allergies

## 2024-10-29 NOTE — PROGRESS NOTES
Care Management Discharge Note    Discharge Date: 10/29/2024       Discharge Disposition: Home, Home Care - [facility]    Discharge Services: Home Care - [facility]    Discharge DME: None    Discharge Transportation: family or friend will provide    Private pay costs discussed: Not applicable    Does the patient's insurance plan have a 3 day qualifying hospital stay waiver?  Yes     Which insurance plan 3 day waiver is available? Alternative insurance waiver    Will the waiver be used for post-acute placement? No    PAS Confirmation Code: NA  Patient/family educated on Medicare website which has current facility and service quality ratings: NA    Education Provided on the Discharge Plan: Yes per team  Persons Notified of Discharge Plans: Patient  Patient/Family in Agreement with the Plan: yes    Handoff Referral Completed: No, handoff not indicated or clinically appropriate    Additional Information:  Patient discharge to home with Home Care - [facility]. Family will transport.    Orders sent to Home Care - [facility] Kristopher Matt RN

## 2024-10-29 NOTE — PROGRESS NOTES
HEART CARE NOTE          Assessment/Recommendations     1. HFpEF c/b severe ADHF  Assessment / Plan  Hypervolemic on physical exam; diuresing well on current regimen - no changes at this time; continue to monitor UOP and renal function  Patient is high risk for adverse cardiac events 2/2 advanced age, frailty,   GDMT as detailed below; mainstay of treatment for HFpEF includes diuretics and adequate BP control (class I) and SGLT2-I (class 2a); additional medical therapy (ARNI, MRA, ARB) demonstrated less robust evidence for indication but may be considered per guideline recommendations (2b); no indication for BBlockers      Current Pharmacotherapy AHA Guideline-Directed Medical Therapy   Lisinopril 2.5 mg twice daily ARNI/ARB   Spironolactone - not started  MRA   SGLT2 inhibitor: not started SGLT2-I    Furosemide IV Loop diuretic       2. ALTAGRACIA  Assessment / Plan  Resolving; diuresis as above; continue to monitor UOP and renal function closely     3. HTN  Assessment / Plan  Titrate oral afterload reduction as tolerated     4. Valvular heart disease  Assessment / Plan  S/p AVR; warfarin per pharmacy management     5. HLP  Assessment / Plan  Currently on atorvastatin       History of Present Illness/Subjective    Ms. Kacie Hermosillo is a 84 year old female with a PMHx significant for (per Epic notation) medical history documented above presented to the ER for evaluation of increased weakness and fatigue as well as wheezing and worsening shortness of breath ongoing for the past few days in the setting of recent hospitalization for electrolyte abnormalities      Today,  Mrs. Hermosillo denies acute cardiac events or complaints; Management plan as detailed above     ECG: Personally reviewed. normal sinus rhythm, nonspecific ST and T waves changes.     ECHO (personally reviewed 10/29/24):   The left ventricle is normal in size.  The visual ejection fraction is >70%.  No regional wall motion abnormalities noted.  There is a  "23 mm Wray Cecilia 3 bioprosthetic prosthesis in the aortic  position with a mean gradient of 27mmHg, AT 80 ms. No significant valvular or  paravalvular regurgitation. Implant date 2021.  RVSP 22mmHg + RAP  Compared to the prior study dated 10/7/2024 the EF remains normal. Diasotlic  function and valves not fully evaluated on today's images, but the normal AV  AT suggests the increased gradient is not due to prosthetic stenosis    Telemetry: personally reviewed October 29, 2024; notable for sinus rhythm     Lab results: personally reviewed October 29, 2024; notable for renal function wnl    Medical history and pertinent documents reviewed in Care Everywhere please where applicable see details above        Physical Examination Review of Systems   /62 (BP Location: Right arm)   Pulse 65   Temp 98.2  F (36.8  C) (Oral)   Resp 18   Ht 1.6 m (5' 3\")   Wt 59.6 kg (131 lb 6.3 oz)   SpO2 95%   BMI 23.28 kg/m    Body mass index is 23.28 kg/m .  Wt Readings from Last 3 Encounters:   10/29/24 59.6 kg (131 lb 6.3 oz)   10/23/24 64 kg (141 lb)   10/21/24 64 kg (141 lb)     General Appearance:   no distress, normal body habitus   ENT/Mouth: membranes moist, no oral lesions or bleeding gums.      EYES:  no scleral icterus, normal conjunctivae   Neck: no carotid bruits or thyromegaly   Chest/Lungs:   lungs are clear to auscultation, no rales or wheezing, equal chest wall expansion    Cardiovascular:   Regular. Normal first and second heart sounds with no murmurs, rubs, or gallops; the carotid, radial and posterior tibial pulses are intact, + JVD and LE edema bilaterally    Abdomen:  no organomegaly, masses, bruits, or tenderness; bowel sounds are present   Extremities: no cyanosis or clubbing   Skin: no xanthelasma, warm.    Neurologic: NAD     Psychiatric: NAD    A complete 10 systems ROS was reviewed  And is negative except what is listed in the HPI.          Medical History  Surgical History Family History Social " History   Past Medical History:   Diagnosis Date    Breast cancer (H) 01/01/1999    Heart valve replaced     Hx antineoplastic chemotherapy 01/01/1999    Hx of radiation therapy 01/01/1999    Hypertension     Past Surgical History:   Procedure Laterality Date    BIOPSY BREAST      HYSTERECTOMY      IR CHEST PORT PLACEMENT > 5 YRS OF AGE  7/30/2024    LUMPECTOMY BREAST Left 1999    OOPHORECTOMY Bilateral     no family history of premature coronary artery disease Social History     Socioeconomic History    Marital status:      Spouse name: Not on file    Number of children: Not on file    Years of education: Not on file    Highest education level: Not on file   Occupational History    Not on file   Tobacco Use    Smoking status: Never    Smokeless tobacco: Never   Vaping Use    Vaping status: Never Used   Substance and Sexual Activity    Alcohol use: Yes     Comment: Rarely    Drug use: Not on file    Sexual activity: Not on file   Other Topics Concern    Not on file   Social History Narrative    Not on file     Social Drivers of Health     Financial Resource Strain: Low Risk  (10/29/2024)    Financial Resource Strain     Within the past 12 months, have you or your family members you live with been unable to get utilities (heat, electricity) when it was really needed?: No   Food Insecurity: Low Risk  (10/29/2024)    Food Insecurity     Within the past 12 months, did you worry that your food would run out before you got money to buy more?: No     Within the past 12 months, did the food you bought just not last and you didn t have money to get more?: No   Transportation Needs: Low Risk  (10/29/2024)    Transportation Needs     Within the past 12 months, has lack of transportation kept you from medical appointments, getting your medicines, non-medical meetings or appointments, work, or from getting things that you need?: No   Physical Activity: Unknown (5/24/2024)    Exercise Vital Sign     Days of Exercise per  Week: 0 days     Minutes of Exercise per Session: Not on file   Stress: No Stress Concern Present (5/24/2024)    [employer] [place] [employer] - Occupational Stress Questionnaire     Feeling of Stress : Only a little   Social Connections: Unknown (5/24/2024)    Social Connection and Isolation Panel [NHANES]     Frequency of Communication with Friends and Family: Not on file     Frequency of Social Gatherings with Friends and Family: Twice a week     Attends Congregation Services: Not on file     Active Member of Clubs or Organizations: Not on file     Attends Club or Organization Meetings: Not on file     Marital Status: Not on file   Interpersonal Safety: Low Risk  (10/29/2024)    Interpersonal Safety     Do you feel physically and emotionally safe where you currently live?: Yes     Within the past 12 months, have you been hit, slapped, kicked or otherwise physically hurt by someone?: No     Within the past 12 months, have you been humiliated or emotionally abused in other ways by your partner or ex-partner?: No   Recent Concern: Interpersonal Safety - High Risk (10/7/2024)    Interpersonal Safety     Do you feel physically and emotionally safe where you currently live?: No     Within the past 12 months, have you been hit, slapped, kicked or otherwise physically hurt by someone?: No     Within the past 12 months, have you been humiliated or emotionally abused in other ways by your partner or ex-partner?: No   Housing Stability: Low Risk  (10/29/2024)    Housing Stability     Do you have housing? : Yes     Are you worried about losing your housing?: No           Lab Results    Chemistry/lipid CBC Cardiac Enzymes/BNP/TSH/INR   Lab Results   Component Value Date    CHOL 126 05/24/2024    HDL 62 05/24/2024    TRIG 64 05/24/2024    BUN 10.3 10/29/2024     10/29/2024    CO2 24 10/29/2024    Lab Results   Component Value Date    WBC 3.7 (L) 10/29/2024    HGB 9.1 (L) 10/29/2024    HCT 28.6 (L) 10/29/2024     "MCV 95 10/29/2024     10/29/2024    Lab Results   Component Value Date    TSH 2.39 10/08/2024    INR 1.56 (H) 10/29/2024     No results found for: \"CKTOTAL\", \"CKMB\", \"TROPONINI\"       Weight:    Wt Readings from Last 3 Encounters:   10/29/24 59.6 kg (131 lb 6.3 oz)   10/23/24 64 kg (141 lb)   10/21/24 64 kg (141 lb)       Allergies  No Known Allergies      Surgical History  Past Surgical History:   Procedure Laterality Date    BIOPSY BREAST      HYSTERECTOMY      IR CHEST PORT PLACEMENT > 5 YRS OF AGE  7/30/2024    LUMPECTOMY BREAST Left 1999    OOPHORECTOMY Bilateral        Social History  Tobacco:   History   Smoking Status    Never   Smokeless Tobacco    Never    Alcohol:   Social History    Substance and Sexual Activity      Alcohol use: Yes        Comment: Rarely   Illicit Drugs:   History   Drug Use Not on file       Family History  Family History   Problem Relation Age of Onset    Breast Cancer Maternal Aunt     Hereditary Breast and Ovarian Cancer Syndrome No family hx of           Jose Nagy MD on 10/29/2024      cc: Irina Francisco    "

## 2024-10-29 NOTE — PLAN OF CARE
"Goal Outcome Evaluation:      Plan of Care Reviewed With: patient    Overall Patient Progress: improvingOverall Patient Progress: improving           Problem: Adult Inpatient Plan of Care  Goal: Plan of Care Review  Description: The Plan of Care Review/Shift note should be completed every shift.  The Outcome Evaluation is a brief statement about your assessment that the patient is improving, declining, or no change.  This information will be displayed automatically on your shift  note.  Outcome: Progressing  Flowsheets (Taken 10/29/2024 0459)  Plan of Care Reviewed With: patient  Overall Patient Progress: improving  Goal: Patient-Specific Goal (Individualized)  Description: You can add care plan individualizations to a care plan. Examples of Individualization might be:  \"Parent requests to be called daily at 9am for status\", \"I have a hard time hearing out of my right ear\", or \"Do not touch me to wake me up as it startles  me\".  Outcome: Progressing  Goal: Absence of Hospital-Acquired Illness or Injury  Outcome: Progressing  Intervention: Identify and Manage Fall Risk  Recent Flowsheet Documentation  Taken 10/29/2024 0400 by Vivian Glod, RN  Safety Promotion/Fall Prevention:   activity supervised   assistive device/personal items within reach   clutter free environment maintained   increase visualization of patient   lighting adjusted   nonskid shoes/slippers when out of bed   room door open   room near nurse's station   safety round/check completed  Taken 10/29/2024 0000 by Vivian Gold, RN  Safety Promotion/Fall Prevention:   activity supervised   assistive device/personal items within reach   clutter free environment maintained   increase visualization of patient   lighting adjusted   nonskid shoes/slippers when out of bed   room door open   room near nurse's station   safety round/check completed  Intervention: Prevent Skin Injury  Recent Flowsheet Documentation  Taken 10/28/2024 2349 by Vivian Gold, " RN  Body Position: position changed independently  Intervention: Prevent Infection  Recent Flowsheet Documentation  Taken 10/29/2024 0400 by Vivian Gold RN  Infection Prevention:   hand hygiene promoted   rest/sleep promoted  Taken 10/29/2024 0000 by Vivian Gold RN  Infection Prevention:   hand hygiene promoted   rest/sleep promoted  Goal: Optimal Comfort and Wellbeing  Outcome: Progressing  Intervention: Provide Person-Centered Care  Recent Flowsheet Documentation  Taken 10/29/2024 0400 by Vivian Gold RN  Trust Relationship/Rapport:   care explained   choices provided   questions answered   questions encouraged   reassurance provided   thoughts/feelings acknowledged  Taken 10/29/2024 0000 by Vivian Gold RN  Trust Relationship/Rapport:   care explained   choices provided   questions answered   questions encouraged   reassurance provided   thoughts/feelings acknowledged  Goal: Readiness for Transition of Care  Outcome: Progressing     Problem: Gas Exchange Impaired  Goal: Optimal Gas Exchange  Outcome: Progressing  Intervention: Optimize Oxygenation and Ventilation  Recent Flowsheet Documentation  Taken 10/28/2024 2349 by Vivian Gold RN  Head of Bed (HOB) Positioning: HOB at 20-30 degrees     Problem: Fall Injury Risk  Goal: Absence of Fall and Fall-Related Injury  Outcome: Progressing  Intervention: Promote Injury-Free Environment  Recent Flowsheet Documentation  Taken 10/29/2024 0400 by Vivian Gold RN  Safety Promotion/Fall Prevention:   activity supervised   assistive device/personal items within reach   clutter free environment maintained   increase visualization of patient   lighting adjusted   nonskid shoes/slippers when out of bed   room door open   room near nurse's station   safety round/check completed  Taken 10/29/2024 0000 by Vivian Gold RN  Safety Promotion/Fall Prevention:   activity supervised   assistive device/personal items within reach   clutter free environment  maintained   increase visualization of patient   lighting adjusted   nonskid shoes/slippers when out of bed   room door open   room near nurse's station   safety round/check completed     Problem: Delirium  Goal: Optimal Coping  Outcome: Progressing  Intervention: Optimize Psychosocial Adjustment to Delirium  Recent Flowsheet Documentation  Taken 10/29/2024 0400 by Vivian Gold RN  Family/Support System Care:   presence promoted   involvement promoted  Taken 10/29/2024 0000 by Vivian Gold RN  Family/Support System Care:   presence promoted   involvement promoted  Goal: Improved Behavioral Control  Outcome: Progressing  Intervention: Minimize Safety Risk  Recent Flowsheet Documentation  Taken 10/29/2024 0400 by Vivian Gold RN  Enhanced Safety Measures: review medications for side effects with activity  Trust Relationship/Rapport:   care explained   choices provided   questions answered   questions encouraged   reassurance provided   thoughts/feelings acknowledged  Taken 10/29/2024 0000 by Vivian Gold RN  Enhanced Safety Measures: review medications for side effects with activity  Trust Relationship/Rapport:   care explained   choices provided   questions answered   questions encouraged   reassurance provided   thoughts/feelings acknowledged  Goal: Improved Attention and Thought Clarity  Outcome: Progressing  Intervention: Maximize Cognitive Function  Recent Flowsheet Documentation  Taken 10/29/2024 0400 by Vivian Gold RN  Sensory Stimulation Regulation:   lighting decreased   quiet environment promoted  Reorientation Measures:   calendar in view   clock in view   glasses use encouraged   reorientation provided  Taken 10/29/2024 0000 by Vivian Gold RN  Sensory Stimulation Regulation:   lighting decreased   quiet environment promoted  Reorientation Measures:   calendar in view   clock in view   glasses use encouraged   reorientation provided  Goal: Improved Sleep  Outcome: Progressing      Problem: Skin Injury Risk Increased  Goal: Skin Health and Integrity  Outcome: Progressing  Intervention: Plan: Nurse Driven Intervention: Moisture Management  Recent Flowsheet Documentation  Taken 10/29/2024 0400 by Vivian Gold RN  Moisture Interventions: Urinary collection device  Taken 10/29/2024 0100 by Vivian Gold RN  Bathing/Skin Care: (not done port not accessed)   wipes, CHG   other (see comments)  Taken 10/29/2024 0000 by Vivian Gold RN  Moisture Interventions: Urinary collection device  Intervention: Plan: Nurse Driven Intervention: Friction and Shear  Recent Flowsheet Documentation  Taken 10/29/2024 0400 by Vivian Gold RN  Friction/Shear Interventions: HOB 30 degrees or less  Taken 10/29/2024 0000 by Vivian Gold RN  Friction/Shear Interventions: HOB 30 degrees or less  Intervention: Optimize Skin Protection  Recent Flowsheet Documentation  Taken 10/28/2024 2349 by Vivian Gold RN  Activity Management: activity adjusted per tolerance  Head of Bed (HOB) Positioning: HOB at 20-30 degrees     Vitals:    10/28/24 1600 10/28/24 1939 10/28/24 2349 10/29/24 0427   BP: 118/57 124/61 122/58 137/62   BP Location:  Right arm Right arm Right arm   Patient Position:  Semi-Pedersen's     Cuff Size:       Pulse: 80 76 68 65   Resp:  18 16 18   Temp:  99  F (37.2  C) 98.3  F (36.8  C) 98.2  F (36.8  C)   TempSrc:  Oral Oral Oral   SpO2:  95% 93% 95%   Weight:    59.6 kg (131 lb 6.3 oz)   Height:        Had left thoracentesis done. Need to have the right done today. A-1 with walker. Breathing is clear and diminished. No shortness of breath per patient. Has some wounds on right shin and right elbow. Room air. IV lasix BID. Good output. Purewick. Port a cath not accessed. Falls patient.  Hopes to discharge today.  Vivian Gold, DAVID

## 2024-10-30 ENCOUNTER — OFFICE VISIT (OUTPATIENT)
Dept: FAMILY MEDICINE | Facility: CLINIC | Age: 84
End: 2024-10-30
Payer: COMMERCIAL

## 2024-10-30 ENCOUNTER — PATIENT OUTREACH (OUTPATIENT)
Dept: CARE COORDINATION | Facility: CLINIC | Age: 84
End: 2024-10-30

## 2024-10-30 VITALS
SYSTOLIC BLOOD PRESSURE: 128 MMHG | OXYGEN SATURATION: 98 % | DIASTOLIC BLOOD PRESSURE: 82 MMHG | WEIGHT: 133 LBS | HEART RATE: 69 BPM | RESPIRATION RATE: 16 BRPM | HEIGHT: 63 IN | BODY MASS INDEX: 23.57 KG/M2 | TEMPERATURE: 96.7 F

## 2024-10-30 DIAGNOSIS — Z09 HOSPITAL DISCHARGE FOLLOW-UP: ICD-10-CM

## 2024-10-30 DIAGNOSIS — N39.0 CHRONIC UTI: ICD-10-CM

## 2024-10-30 DIAGNOSIS — Z09 HOSPITAL DISCHARGE FOLLOW-UP: Primary | ICD-10-CM

## 2024-10-30 DIAGNOSIS — D64.9 ANEMIA, UNSPECIFIED TYPE: ICD-10-CM

## 2024-10-30 DIAGNOSIS — Z95.2 S/P TAVR (TRANSCATHETER AORTIC VALVE REPLACEMENT): ICD-10-CM

## 2024-10-30 DIAGNOSIS — I51.89 DIASTOLIC DYSFUNCTION: ICD-10-CM

## 2024-10-30 DIAGNOSIS — S22.080S T12 COMPRESSION FRACTURE, SEQUELA: ICD-10-CM

## 2024-10-30 PROCEDURE — 99495 TRANSJ CARE MGMT MOD F2F 14D: CPT | Performed by: NURSE PRACTITIONER

## 2024-10-30 RX ORDER — FUROSEMIDE 20 MG/1
20 TABLET ORAL DAILY
Qty: 30 TABLET | Refills: 0 | Status: SHIPPED | OUTPATIENT
Start: 2024-10-30

## 2024-10-30 RX ORDER — FERROUS SULFATE 325(65) MG
325 TABLET ORAL
Qty: 90 TABLET | Refills: 3 | Status: SHIPPED | OUTPATIENT
Start: 2024-10-30

## 2024-10-30 RX ORDER — CALCIUM CARBONATE 500(1250)
1 TABLET ORAL 2 TIMES DAILY
Qty: 90 TABLET | Refills: 2 | Status: SHIPPED | OUTPATIENT
Start: 2024-10-30

## 2024-10-30 ASSESSMENT — PAIN SCALES - GENERAL: PAINLEVEL_OUTOF10: NO PAIN (0)

## 2024-10-30 NOTE — PROGRESS NOTES
Clinic Care Coordination Contact  Care Coordination Clinician Chart Review    Situation: Patient chart reviewed by care coordinator.    Background: Clinic Care Coordination Referral received from inpatient care team for transition handoff communication following hospital admission.    Assessment: Upon chart review, patient is not a candidate for Primary Care Clinic Care Coordination enrollment due to reason stated below:  Patient has a follow up appointment with her Primary Clinic today.  Will not outreach to avoid duplication of services.    Plan/Recommendations: Clinic Care Coordination Referral/order cancelled. RN/SW CC will perform no further monitoring/outreaches at this time and will remain available as needed. If new needs arise, a new Care Coordination Referral may be placed.    TCM Episode changed to Enrolled

## 2024-10-30 NOTE — PROGRESS NOTES
Assessment & Plan   Problem List Items Addressed This Visit       Diastolic dysfunction     10/30/2024  Discharged from hospital yesterday after increased SOB/wheezing and fatigue   Prior to this was recently taken off of her hydrochlorothiazide and lisinopril dose was decreased,   At the hospital on arrival BNP elevated at 1682, troponin trended and were flat and downtrending but elevated, CT chest pulmonary embolism study showed No PE. Moderate size right and left pleural effusions.   Patient left pleural effusion drainagd 600, attempted right but not enough fluid   Had diureses with Lasix and discharged   Also history of breast cancer on chemotherapy  Continuing with outpatient follow-up  Hospital chest CT showed multiple sclerotic bone lesions are again seen. Repeat looks like is scheduled tomorrow   Cardiology appt 11/5    Plan:   Lasix 20 mg daily refilled   Continue with outpatient appointments  Labs ordered for 1 week (INR, BMP)            Relevant Medications    furosemide (LASIX) 20 MG tablet    Other Relevant Orders    Basic metabolic panel  (Ca, Cl, CO2, Creat, Gluc, K, Na, BUN)    S/P TAVR (transcatheter aortic valve replacement)    Relevant Orders    INR    T12 compression fracture, sequela    Relevant Medications    calcium carbonate (OS-MIGUEL) 500 MG tablet     Other Visit Diagnoses       Hospital discharge follow-up    -  Primary    Anemia, unspecified type        Relevant Medications    ferrous sulfate (FEROSUL) 325 (65 Fe) MG tablet    Chronic UTI        Relevant Medications    D-Mannose 500 MG CAPS                MED REC REQUIRED  Post Medication Reconciliation Status:     MEDICATIONS:  Continue current medications without change  Follow up with scheduled appointments (cardiology, oncology, CT)  PCP appointment 12/16    Travis Hester is a 84 year old, presenting for the following health issues:  ER F/U        10/30/2024    11:21 AM   Additional Questions   Roomed by      History of  Present Illness       Hypertension: She presents for follow up of hypertension.  She does not check blood pressure  regularly outside of the clinic. Outpatient blood pressures have not been over 140/90. She does not follow a low salt diet.     Vascular Disease:  She presents for follow up of vascular disease.     She never takes nitroglycerin. She takes daily aspirin.    She eats 0-1 servings of fruits and vegetables daily.She consumes 1 sweetened beverage(s) daily.She exercises with enough effort to increase her heart rate 9 or less minutes per day.  She exercises with enough effort to increase her heart rate 3 or less days per week.   She is taking medications regularly.     Visit was short as we were running behind and movers were coming to help move the patient into a TCU  Discharged yesterday from the hospital, stable   No shortness of breath or worsening symptoms   Feels like she is on the right track   Ambulating with walker   Needing Rx for medications prescribed from the hospital for the IGNACIO (iron, calcium, lasix, and D-Mannose) otherwise no needs at this time     Hospital Follow-up Visit:    Hospital/Nursing Home/ Rehab Facility:[facility]  Date of Admission: 10/27/24  Date of Discharge: 10/29/24  Reason(s) for Admission: Acute diastolic heart failure exacerbation   Was the patient in the ICU or did the patient experience delirium during hospitalization?  No  Do you have any other stressors you would like to discuss with your provider? No    Problems taking medications regularly:  None  Medication changes since discharge: None  Problems adhering to non-medication therapy:  None    Summary of hospitalization:  [facility] discharge summary reviewed  Diagnostic Tests/Treatments reviewed.  Follow up needed: Labs one week after discharge (INR, BMP)-ordered today for future, repeat CT in place for tomorrow, cardiology follow up 11/5  Other Healthcare Providers Involved in Patient s Care:          "Specialist appointment - cardiology and oncology and Imaging  Update since discharge: improved.       Plan of care communicated with patient and family         Review of Systems  Constitutional, HEENT, cardiovascular, pulmonary, gi and gu systems are negative, except as otherwise noted.      Objective    /82   Pulse 69   Temp (!) 96.7  F (35.9  C) (Tympanic)   Resp 16   Ht 1.6 m (5' 3\")   Wt 60.3 kg (133 lb)   SpO2 98%   BMI 23.56 kg/m    Body mass index is 23.56 kg/m .  Physical Exam  Constitutional:       Appearance: Normal appearance.   Cardiovascular:      Rate and Rhythm: Normal rate and regular rhythm.      Pulses: Normal pulses.      Heart sounds: Normal heart sounds.   Pulmonary:      Effort: Pulmonary effort is normal.      Breath sounds: Normal breath sounds.   Skin:     Findings: Bruising present.      Comments: Bruising to hands, scar to right forehead    Neurological:      Mental Status: She is alert and oriented to person, place, and time.   Psychiatric:         Mood and Affect: Mood normal.         Behavior: Behavior normal.         Thought Content: Thought content normal.         Judgment: Judgment normal.            MCKAYLA Vasquez student     I was present with the nurse practitioner student who participated in the service and in the documentation of the note. I have verified the history and personally preformed the physical exam and medical decision making. I agree with the assessment and plan of care as documented in the note.    Signed Electronically by: MCKAYLA Castillo CNP    "

## 2024-10-30 NOTE — ASSESSMENT & PLAN NOTE
10/30/2024  Discharged from hospital yesterday after increased SOB/wheezing and fatigue   Prior to this was recently taken off of her hydrochlorothiazide and lisinopril dose was decreased,   At the hospital on arrival BNP elevated at 1682, troponin trended and were flat and downtrending but elevated, CT chest pulmonary embolism study showed No PE. Moderate size right and left pleural effusions.   Patient left pleural effusion drainagd 600, attempted right but not enough fluid   Had diureses with Lasix and discharged   Also history of breast cancer on chemotherapy  Continuing with outpatient follow-up  Hospital chest CT showed multiple sclerotic bone lesions are again seen. Repeat looks like is scheduled tomorrow   Cardiology appt 11/5    Plan:   Lasix 20 mg daily refilled   Continue with outpatient appointments  Labs ordered for 1 week (INR, BMP)

## 2024-10-31 ENCOUNTER — HOSPITAL ENCOUNTER (OUTPATIENT)
Dept: PET IMAGING | Facility: HOSPITAL | Age: 84
Discharge: HOME | End: 2024-10-31
Attending: INTERNAL MEDICINE
Payer: COMMERCIAL

## 2024-10-31 DIAGNOSIS — C79.51 MALIGNANT NEOPLASM METASTATIC TO BONE (H): ICD-10-CM

## 2024-10-31 DIAGNOSIS — N39.0 URINARY TRACT INFECTION WITHOUT HEMATURIA, SITE UNSPECIFIED: ICD-10-CM

## 2024-10-31 LAB — GLUCOSE BLDC GLUCOMTR-MCNC: 92 MG/DL (ref 70–99)

## 2024-10-31 PROCEDURE — A9552 F18 FDG: HCPCS | Performed by: INTERNAL MEDICINE

## 2024-10-31 PROCEDURE — 250N000011 HC RX IP 250 OP 636: Performed by: INTERNAL MEDICINE

## 2024-10-31 PROCEDURE — 82962 GLUCOSE BLOOD TEST: CPT

## 2024-10-31 PROCEDURE — 74177 CT ABD & PELVIS W/CONTRAST: CPT

## 2024-10-31 PROCEDURE — 78816 PET IMAGE W/CT FULL BODY: CPT | Mod: PS

## 2024-10-31 PROCEDURE — 343N000001 HC RX 343 MED OP 636: Performed by: INTERNAL MEDICINE

## 2024-10-31 RX ORDER — IOPAMIDOL 755 MG/ML
65 INJECTION, SOLUTION INTRAVASCULAR ONCE
Status: COMPLETED | OUTPATIENT
Start: 2024-10-31 | End: 2024-10-31

## 2024-10-31 RX ORDER — FLUDEOXYGLUCOSE F 18 200 MCI/ML
7-15 INJECTION, SOLUTION INTRAVENOUS ONCE
Status: COMPLETED | OUTPATIENT
Start: 2024-10-31 | End: 2024-10-31

## 2024-10-31 RX ADMIN — FLUDEOXYGLUCOSE F 18 10.17 MILLICURIE: 200 INJECTION, SOLUTION INTRAVENOUS at 08:06

## 2024-10-31 RX ADMIN — IOPAMIDOL 65 ML: 755 INJECTION, SOLUTION INTRAVENOUS at 09:08

## 2024-11-01 ENCOUNTER — TELEPHONE (OUTPATIENT)
Dept: FAMILY MEDICINE | Facility: CLINIC | Age: 84
End: 2024-11-01
Payer: COMMERCIAL

## 2024-11-01 ENCOUNTER — PATIENT OUTREACH (OUTPATIENT)
Dept: ONCOLOGY | Facility: HOSPITAL | Age: 84
End: 2024-11-01
Payer: COMMERCIAL

## 2024-11-01 NOTE — PROGRESS NOTES
[facility]: Cancer Care                                                                                          11/1/2024 Sulma, daughter of Kacie called to relay update on her mother. Kacie has had a couple recent hospitalizations and while in patient she had an ECHO done. Sulma is wondering if she still needs to keep her apt for the Echo on 11/5/24.  Per Epic, ECHO Complete was performed on 10/6/24 and Echo Limited done on 10/27/24/ ECHO  Complete is ordered and scheduled on 11/5. Will conform with Dr. Gann that we can cancel the ECHO on 11/5 and that there is not a reason to repeat with recent cardiac issue. Will inquire when Dr. Gann returns to clinic and will call  her with disposition.  Sulma shared that Kacie moved into [facility] on 10/29/24. Her room will be able to accommodate all of the stages of her life and she will not need to move again when her condition declines.The transition has gone smoothly and she is enjoying seeing some friends and acquaintances from [employer] at her living center. She is eating all of her meals there. This has helped relieve the family of extra duties as they were checking on Kacie frequently and assisting as they were concerned for her safety. Kacie had fallen at home and after this situation she realized that she needed to live in a safer setting. They are very thankful that the room opened up at the opportune time for her to move in.  Will follow up on 11/4 as documented above.      Signature:  Mihaela Koo RN

## 2024-11-01 NOTE — TELEPHONE ENCOUNTER
MTM referral from: Transitions of Care (recent hospital discharge, TCU discharge, or ED visit)    MTM referral outreach attempt #2 on November 1, 2024 at 10:43 AM      Outcome: Spoke with patient and she is not interested at this time    Use [employer] part d for the carrier/Plan on the flowsheet          See Gee  [facility]   [phone]

## 2024-11-02 LAB
BACTERIA PLR CULT: NO GROWTH
GRAM STAIN RESULT: NORMAL
GRAM STAIN RESULT: NORMAL

## 2024-11-04 LAB — BACTERIA PLR CULT: NORMAL

## 2024-11-04 NOTE — PROGRESS NOTES
11/4/24 Called Sulma to follow up regarding our phone visit on 11/1/24. Confirmed that Kacie's Echo that is scheduled on 11/5/24 can be cancelled as she had ECHO complete done while IP on 10/6 and had Echo limited done on 10/26. Sulma was informed of new check in time for apt,  10/15 for labs apt followed by provider visit. Of note, Kacie and Sulma did not want infusion apt on on 11/5 as they want to talk about  imaging and treatment decisions./Mihaela Koo RN

## 2024-11-05 ENCOUNTER — LAB (OUTPATIENT)
Dept: INFUSION THERAPY | Facility: HOSPITAL | Age: 84
End: 2024-11-05
Attending: INTERNAL MEDICINE
Payer: COMMERCIAL

## 2024-11-05 ENCOUNTER — LAB REQUISITION (OUTPATIENT)
Dept: LAB | Facility: CLINIC | Age: 84
End: 2024-11-05
Payer: COMMERCIAL

## 2024-11-05 ENCOUNTER — PATIENT OUTREACH (OUTPATIENT)
Dept: ONCOLOGY | Facility: HOSPITAL | Age: 84
End: 2024-11-05

## 2024-11-05 ENCOUNTER — ONCOLOGY VISIT (OUTPATIENT)
Dept: ONCOLOGY | Facility: HOSPITAL | Age: 84
End: 2024-11-05
Attending: INTERNAL MEDICINE
Payer: COMMERCIAL

## 2024-11-05 ENCOUNTER — ANTICOAGULATION THERAPY VISIT (OUTPATIENT)
Dept: ANTICOAGULATION | Facility: CLINIC | Age: 84
End: 2024-11-05

## 2024-11-05 VITALS
HEIGHT: 63 IN | BODY MASS INDEX: 23.92 KG/M2 | SYSTOLIC BLOOD PRESSURE: 140 MMHG | WEIGHT: 135 LBS | OXYGEN SATURATION: 97 % | RESPIRATION RATE: 18 BRPM | HEART RATE: 72 BPM | DIASTOLIC BLOOD PRESSURE: 83 MMHG

## 2024-11-05 DIAGNOSIS — C79.51 MALIGNANT NEOPLASM METASTATIC TO BONE (H): Primary | ICD-10-CM

## 2024-11-05 DIAGNOSIS — C50.012 MALIGNANT NEOPLASM OF AREOLA OF LEFT BREAST IN FEMALE, UNSPECIFIED ESTROGEN RECEPTOR STATUS (H): ICD-10-CM

## 2024-11-05 DIAGNOSIS — I51.89 DIASTOLIC DYSFUNCTION: ICD-10-CM

## 2024-11-05 DIAGNOSIS — Z95.2 PRESENCE OF PROSTHETIC HEART VALVE: ICD-10-CM

## 2024-11-05 DIAGNOSIS — Z95.2 S/P TAVR (TRANSCATHETER AORTIC VALVE REPLACEMENT): Primary | ICD-10-CM

## 2024-11-05 DIAGNOSIS — I63.9 CEREBELLAR STROKE (H): ICD-10-CM

## 2024-11-05 DIAGNOSIS — Z95.2 S/P TAVR (TRANSCATHETER AORTIC VALVE REPLACEMENT): ICD-10-CM

## 2024-11-05 DIAGNOSIS — R19.7 DIARRHEA, UNSPECIFIED TYPE: ICD-10-CM

## 2024-11-05 LAB
ANION GAP SERPL CALCULATED.3IONS-SCNC: 10 MMOL/L (ref 7–15)
BUN SERPL-MCNC: 12.8 MG/DL (ref 8–23)
CALCIUM SERPL-MCNC: 8.3 MG/DL (ref 8.8–10.4)
CHLORIDE SERPL-SCNC: 108 MMOL/L (ref 98–107)
CREAT SERPL-MCNC: 0.81 MG/DL (ref 0.51–0.95)
EGFRCR SERPLBLD CKD-EPI 2021: 71 ML/MIN/1.73M2
GLUCOSE SERPL-MCNC: 94 MG/DL (ref 70–99)
HCO3 SERPL-SCNC: 24 MMOL/L (ref 22–29)
HOLD SPECIMEN: NORMAL
HOLD SPECIMEN: NORMAL
INR PPP: 1.31 (ref 0.85–1.15)
POTASSIUM SERPL-SCNC: 4.2 MMOL/L (ref 3.4–5.3)
SODIUM SERPL-SCNC: 142 MMOL/L (ref 135–145)

## 2024-11-05 PROCEDURE — 82947 ASSAY GLUCOSE BLOOD QUANT: CPT

## 2024-11-05 PROCEDURE — 36591 DRAW BLOOD OFF VENOUS DEVICE: CPT

## 2024-11-05 PROCEDURE — 99215 OFFICE O/P EST HI 40 MIN: CPT | Performed by: INTERNAL MEDICINE

## 2024-11-05 PROCEDURE — 250N000011 HC RX IP 250 OP 636: Performed by: INTERNAL MEDICINE

## 2024-11-05 PROCEDURE — G0463 HOSPITAL OUTPT CLINIC VISIT: HCPCS | Performed by: INTERNAL MEDICINE

## 2024-11-05 PROCEDURE — 80048 BASIC METABOLIC PNL TOTAL CA: CPT

## 2024-11-05 PROCEDURE — G2211 COMPLEX E/M VISIT ADD ON: HCPCS | Performed by: INTERNAL MEDICINE

## 2024-11-05 PROCEDURE — 85610 PROTHROMBIN TIME: CPT

## 2024-11-05 RX ORDER — HEPARIN SODIUM (PORCINE) LOCK FLUSH IV SOLN 100 UNIT/ML 100 UNIT/ML
5 SOLUTION INTRAVENOUS
OUTPATIENT
Start: 2024-11-05

## 2024-11-05 RX ORDER — HEPARIN SODIUM (PORCINE) LOCK FLUSH IV SOLN 100 UNIT/ML 100 UNIT/ML
5 SOLUTION INTRAVENOUS
Status: DISCONTINUED | OUTPATIENT
Start: 2024-11-05 | End: 2024-11-05 | Stop reason: HOSPADM

## 2024-11-05 RX ORDER — LOPERAMIDE HYDROCHLORIDE 2 MG/1
2 TABLET ORAL 4 TIMES DAILY PRN
Qty: 60 TABLET | Refills: 1 | Status: SHIPPED | OUTPATIENT
Start: 2024-11-05

## 2024-11-05 RX ADMIN — HEPARIN 5 ML: 100 SYRINGE at 10:41

## 2024-11-05 ASSESSMENT — PAIN SCALES - GENERAL: PAINLEVEL_OUTOF10: NO PAIN (0)

## 2024-11-05 NOTE — PROGRESS NOTES
ANTICOAGULATION MANAGEMENT     Kacie Hermosillo 84 year old female is on warfarin with subtherapeutic INR result. (Goal INR 2.0-3.0)    Recent labs: (last 7 days)     11/05/24  1041   INR 1.31*       ASSESSMENT     Source(s): Chart Review, Patient/Caregiver Call, and Home Care/Facility Nurse     Warfarin doses taken: Warfarin taken as instructed  Diet: No new diet changes identified  Medication/supplement changes: New: D-mannose,  Calcium Carbonate ( [place]), Ferrous sulfate, Enhurto infusion decreased as patient was having diarrhea post infusion, no interaction expected with warfarin  New illness, injury, or hospitalization: No  Signs or symptoms of bleeding or clotting: No  Previous result: Subtherapeutic  Additional findings: Recently moved to [facility], eats meals in community dining room       PLAN     Recommended plan for temporary change(s) affecting INR     Dosing Instructions: booster dose then Increase your warfarin dose (20% change) with next INR in 1 week       Summary  As of 11/5/2024      Full warfarin instructions:  11/5: 5 mg; Otherwise 2.5 mg every Tue, Sat; 5 mg all other days   Next INR check:  11/12/2024               Telephone call with [facility] nurse who agrees to plan and repeated back plan correctly    Orders given to  Homecare nurse/facility to recheck    AVS faxed to [facility]    Education provided: Please call back if any changes to your diet, medications or how you've been taking warfarin  Goal range and lab monitoring: goal range and significance of current result    Plan made per [facility] anticoagulation protocol    Sola Cruz RN  11/5/2024  Anticoagulation Clinic  AskforTask for routing messages: clifton HARDY  [facility] patient phone line: [phone]        _______________________________________________________________________     Anticoagulation Episode Summary       Current INR goal:  2.0-3.0   TTR:  81.0% (11.8 mo)   Target end date:  Indefinite   Send INR  reminders to:  VIRGILIO HARDY    Indications    S/P TAVR (transcatheter aortic valve replacement) [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

## 2024-11-05 NOTE — PROGRESS NOTES
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    Weight as of this encounter: 61.2 kg (135 lb). Body surface area is 1.65 meters squared.  No Pain (0) Comment: Data Unavailable   No LMP recorded. Patient is postmenopausal.  Allergies reviewed: Yes  Medications reviewed: Yes    Medications: Medication refills not needed today.  Pharmacy name entered into Maiyet:    [facility] [place]  [place], [place] - [address]  [facility] [place], [place] - [address] [id number] - [place] [address] [facility] [address] [facility][id number] - [place], [place] [address] [id number] - [place], [place] - [address] [place] [address] [facility]UG STORE #[id number] - [place], [place] - [address]  AT [address] [place] [address] & [facility]    Frailty Screening:   Is the patient here for a new oncology consult visit in cancer care? 2. No      Clinical concerns:   No concerns per patient.         Lucila Braga MA            "

## 2024-11-05 NOTE — PROGRESS NOTES
[facility]: Cancer Care                                                                                          Sulma, daughter of Kacie, called  to relay that they need a refill of Dexamethasone to take post chemo later this week. She requests that the prescription be sent to [facility] in [place].      She wondered about the INR result  from the lab that was drawn earlier today and inquired about who is managing. We catherine the lab today while Kacie was at clinic and [facility] INR clinic manages result. From their note, they have spoken with Maxine from the resident facility today to review results and plan.    Will forward the script for dexamethasone to Dr. Gann to sign.    Signature:  Mihaela Koo RN

## 2024-11-05 NOTE — PROGRESS NOTES
"[facility]: Cancer Care                                                                                          Met with Kacie and her daughter, Sulma, when she was at clinic to review PET imaging that was completed to re-evaluate  her disease. She has had a good response to treatment with no Pet evidence of active disease. She will continue Enhertu at a reduced dose and will  need to be scheduled for infusion later this week or early next week. Then she will need lab, provider visit and infusion in 3 weeks. They did not have any paperwork for provider to complete today. They have been supplying their own imodium by purchasing OTC. They wonder about paperwork to communicate to the [facility] as nursing is administering her medication. Will ask Dr. Gann to add a script for imodium and sent to [facility] in [place], The pharmacy that is used by [facility].   Kacie is feeling adjusted to her new space and has friends that are coming to her apt each day to get her for lunch and activities. She is happy to have the companionship and to be able to eat meals with others. She is on a full meal plan and is happy that she no longer needs to do her own meal prep.   She was scheduled for infusion on 11/7 and will return on 12/5 for lab/ provider visit and infusion.  Called Sulma to relay that script for imodium was sent to [facility],  Reviewed directions on prescription for how to take: \"2 tablets with 1st loose stools, 1 after every subsequent loose stool \"  She wondered about lomotil. Shared that if imodium is not controlling the diarrhea, she should then call our office and prescription for lomotil can be reviewed. Hopefully with the dose reduction with her treatment her side effects will be more manageable. She stated understanding and appreciation.    Signature:  Mihaela Koo RN  "

## 2024-11-05 NOTE — PROGRESS NOTES
[facility] Hematology and Oncology Progress Note    Patient: Kacie Hermosillo  MRN: [id number]  Date of Service: Nov 5, 2024             ECOG Performance    2 - Ambulatory and independent in all ADLs; cannot work; up > 50% of the time          ______________________________________________________________________________  Oncologic history  Metastatic breast cancer with bone mets only ER negative CT negative HER2/amarilis 2+ and negative by FISH.  HER2/amarilis low    Remote diagnosis of breast cancer in 1995 treated with surgery chemotherapy radiation and 5 years of hormonal therapy.  Pathology report not available.    Treatment  Patient started on fam-trastuzumab on 8/19/2024  Complete response by PET criteria in  Nov 2024  Dose of fam-trastuzumab decreased by 25% in November 2024 due to severe diarrhea    History of Present Illness    Ms. Kacie Hermosillo is here in follow-up.  She did have a PET scan done to reevaluate her disease.  She also got severe diarrhea and was admitted to the hospital for dehydration then she had another admission for close diastolic heart failure.  Her echo did not show any decrease in ejection fraction and it was at 70%.  Patient is here today accompanied by her daughter.  She is feeling back to normal.  She is now in a nursing home.  She has been having normal stools.  When she did have the diarrhea a few days after last dose she was unable to take Imodium and the diarrhea got really worse.      Review of systems  A comprehensive 12 point review of system was done that was negative except what is mentioned in the history of present illness    Past History    Past Medical History:   Diagnosis Date    Breast cancer (H) 01/01/1999    Heart valve replaced     Hx antineoplastic chemotherapy 01/01/1999    Hx of radiation therapy 01/01/1999    Hypertension        Past Surgical History:   Procedure Laterality Date    BIOPSY BREAST      HYSTERECTOMY      IR CHEST PORT PLACEMENT > 5 YRS OF AGE   "7/30/2024    LUMPECTOMY BREAST Left 1999    OOPHORECTOMY Bilateral        Physical Exam    BP (!) 140/83 (BP Location: Left arm, Patient Position: Sitting, Cuff Size: Adult Regular)   Pulse 72   Resp 18   Ht 1.6 m (5' 3\")   Wt 61.2 kg (135 lb)   SpO2 97%   BMI 23.91 kg/m      General: alert, awake, not in acute distress  HEENT: Head: Normal, normocephalic, atraumatic.  Eye: Normal external eye, conjunctiva, lids cornea, MRAI.  Nose: Normal external nose, mucus membranes and septum.  Pharynx: Normal buccal mucosa. Normal pharynx.  Neck / Thyroid: Supple, no masses, nodes, nodules or enlargement.  Lymphatics: No abnormally enlarged lymph nodes.  Chest: Normal chest wall and respirations. Clear to auscultation.  No crackles or rhonchi's  Heart: S1 S2 RRR, no murmur.   Abdomen: abdomen is soft without significant tenderness, masses, organomegaly or guarding  Extremities: normal strength, tone, and muscle mass  Skin: normal. no rash or abnormalities  CNS: non focal.    Lab Results    Recent Results (from the past 240 hours)   Extra Blue Top Tube    Collection Time: 10/27/24  2:26 PM   Result Value Ref Range    Hold Specimen JI    Extra Green Top (Lithium Heparin) Tube    Collection Time: 10/27/24  2:26 PM   Result Value Ref Range    Hold Specimen JIC    Extra Purple Top Tube    Collection Time: 10/27/24  2:26 PM   Result Value Ref Range    Hold Specimen JIC    Basic metabolic panel    Collection Time: 10/27/24  2:26 PM   Result Value Ref Range    Sodium 138 135 - 145 mmol/L    Potassium 4.6 3.4 - 5.3 mmol/L    Chloride 107 98 - 107 mmol/L    Carbon Dioxide (CO2) 22 22 - 29 mmol/L    Anion Gap 9 7 - 15 mmol/L    Urea Nitrogen 11.3 8.0 - 23.0 mg/dL    Creatinine 1.07 (H) 0.51 - 0.95 mg/dL    GFR Estimate 51 (L) >60 mL/min/1.73m2    Calcium 7.8 (L) 8.8 - 10.4 mg/dL    Glucose 124 (H) 70 - 99 mg/dL   Troponin T, High Sensitivity    Collection Time: 10/27/24  2:26 PM   Result Value Ref Range    Troponin T, High " Sensitivity 28 (H) <=14 ng/L   Magnesium    Collection Time: 10/27/24  2:26 PM   Result Value Ref Range    Magnesium 2.1 1.7 - 2.3 mg/dL   Nt probnp inpatient (BNP)    Collection Time: 10/27/24  2:26 PM   Result Value Ref Range    N terminal Pro BNP Inpatient 1,682 0 - 1,800 pg/mL   INR    Collection Time: 10/27/24  2:26 PM   Result Value Ref Range    INR 2.46 (H) 0.85 - 1.15   CBC with platelets and differential    Collection Time: 10/27/24  2:26 PM   Result Value Ref Range    WBC Count 5.3 4.0 - 11.0 10e3/uL    RBC Count 3.14 (L) 3.80 - 5.20 10e6/uL    Hemoglobin 9.5 (L) 11.7 - 15.7 g/dL    Hematocrit 30.0 (L) 35.0 - 47.0 %    MCV 96 78 - 100 fL    MCH 30.3 26.5 - 33.0 pg    MCHC 31.7 31.5 - 36.5 g/dL    RDW 18.3 (H) 10.0 - 15.0 %    Platelet Count 236 150 - 450 10e3/uL    % Neutrophils 64 %    % Lymphocytes 14 %    % Monocytes 16 %    % Eosinophils 4 %    % Basophils 2 %    % Immature Granulocytes 0 %    NRBCs per 100 WBC 0 <1 /100    Absolute Neutrophils 3.4 1.6 - 8.3 10e3/uL    Absolute Lymphocytes 0.7 (L) 0.8 - 5.3 10e3/uL    Absolute Monocytes 0.9 0.0 - 1.3 10e3/uL    Absolute Eosinophils 0.2 0.0 - 0.7 10e3/uL    Absolute Basophils 0.1 0.0 - 0.2 10e3/uL    Absolute Immature Granulocytes 0.0 <=0.4 10e3/uL    Absolute NRBCs 0.0 10e3/uL   D dimer quantitative    Collection Time: 10/27/24  2:26 PM   Result Value Ref Range    D-Dimer Quantitative 1.13 (H) 0.00 - 0.50 ug/mL FEU   Hemoglobin A1c    Collection Time: 10/27/24  2:26 PM   Result Value Ref Range    Estimated Average Glucose 103 <117 mg/dL    Hemoglobin A1C 5.2 <5.7 %   ECG 12-LEAD WITH MUSE (LHE)    Collection Time: 10/27/24  2:29 PM   Result Value Ref Range    Systolic Blood Pressure 133 mmHg    Diastolic Blood Pressure 62 mmHg    Ventricular Rate 73 BPM    Atrial Rate 73 BPM    HI Interval 168 ms    QRS Duration 108 ms     ms    QTc 456 ms    P Axis 32 degrees    R AXIS 53 degrees    T Axis 76 degrees    Interpretation ECG       Sinus rhythm  Low  voltage QRS  Cannot rule out Anterior infarct , age undetermined  Abnormal ECG  When compared with ECG of 06-Oct-2024 17:50,  Premature atrial complexes are no longer Present  Left bundle branch block is no longer Present  Minimal criteria for Anterior infarct are now Present  Confirmed by SEE ED PROVIDER NOTE FOR, ECG INTERPRETATION ([id number]),  SAHARA MESA ([id number]) on 10/28/2024 2:34:50 AM     Symptomatic Influenza A/B, RSV, & SARS-CoV2 PCR (COVID-19) Nasopharyngeal    Collection Time: 10/27/24  2:55 PM    Specimen: Nasopharyngeal; Swab   Result Value Ref Range    Influenza A PCR Negative Negative    Influenza B PCR Negative Negative    RSV PCR Negative Negative    SARS CoV2 PCR Negative Negative   Troponin T, High Sensitivity    Collection Time: 10/27/24  4:37 PM   Result Value Ref Range    Troponin T, High Sensitivity 26 (H) <=14 ng/L   Magnesium    Collection Time: 10/27/24  4:37 PM   Result Value Ref Range    Magnesium 2.2 1.7 - 2.3 mg/dL   Phosphorus    Collection Time: 10/27/24  4:37 PM   Result Value Ref Range    Phosphorus 2.9 2.5 - 4.5 mg/dL   Protein total    Collection Time: 10/27/24  4:37 PM   Result Value Ref Range    Protein Total 5.1 (L) 6.4 - 8.3 g/dL   INR    Collection Time: 10/28/24 12:25 AM   Result Value Ref Range    INR 2.31 (H) 0.85 - 1.15   Extra Green Top (Lithium Heparin) Tube    Collection Time: 10/28/24 12:25 AM   Result Value Ref Range    Hold Specimen [facility]    Comprehensive metabolic panel    Collection Time: 10/28/24  6:29 AM   Result Value Ref Range    Sodium 141 135 - 145 mmol/L    Potassium 3.6 3.4 - 5.3 mmol/L    Carbon Dioxide (CO2) 22 22 - 29 mmol/L    Anion Gap 10 7 - 15 mmol/L    Urea Nitrogen 10.4 8.0 - 23.0 mg/dL    Creatinine 0.83 0.51 - 0.95 mg/dL    GFR Estimate 69 >60 mL/min/1.73m2    Calcium 7.6 (L) 8.8 - 10.4 mg/dL    Chloride 109 (H) 98 - 107 mmol/L    Glucose 94 70 - 99 mg/dL    Alkaline Phosphatase 88 40 - 150 U/L    AST 21 0 - 45 U/L    ALT 25 0 - 50 U/L     Protein Total 5.0 (L) 6.4 - 8.3 g/dL    Albumin 3.0 (L) 3.5 - 5.2 g/dL    Bilirubin Total 0.2 <=1.2 mg/dL   CBC with platelets    Collection Time: 10/28/24  6:29 AM   Result Value Ref Range    WBC Count 4.0 4.0 - 11.0 10e3/uL    RBC Count 2.92 (L) 3.80 - 5.20 10e6/uL    Hemoglobin 8.9 (L) 11.7 - 15.7 g/dL    Hematocrit 27.6 (L) 35.0 - 47.0 %    MCV 95 78 - 100 fL    MCH 30.5 26.5 - 33.0 pg    MCHC 32.2 31.5 - 36.5 g/dL    RDW 17.7 (H) 10.0 - 15.0 %    Platelet Count 224 150 - 450 10e3/uL   Magnesium    Collection Time: 10/28/24  6:29 AM   Result Value Ref Range    Magnesium 2.2 1.7 - 2.3 mg/dL   Phosphorus    Collection Time: 10/28/24  6:29 AM   Result Value Ref Range    Phosphorus 3.1 2.5 - 4.5 mg/dL   INR    Collection Time: 10/28/24  6:29 AM   Result Value Ref Range    INR 2.05 (H) 0.85 - 1.15   Glucose by meter    Collection Time: 10/28/24  8:17 AM   Result Value Ref Range    GLUCOSE BY METER POCT 91 70 - 99 mg/dL   Cytology, non-gynecologic    Collection Time: 10/28/24  8:38 AM   Result Value Ref Range    Final Diagnosis       Specimen A     Interpretation:      Negative for malignancy    LEFT PLEURAL FLUID:        -  NEGATIVE FOR ATYPICAL OR MALIGNANT CELLS        -  SMALL NUMBERS OF CHRONIC INFLAMMATORY CELLS        -  NEGATIVE FOR ACUTE PURULENT INFLAMMATION     Other Findings:      Chronic inflammation present.     Adequacy:     Satisfactory for evaluation          Gross Description       A(A). Pleural Cavity, Left, :A. Pleural Cavity, Left, , Pleural Fluid:  Received 100 ml of clear, yellow fluid, processed as 1 Pap stained Autocyte,  1 Finch stained cytospin and one hematoxylin and eosin stained cell block.                Microscopic Description       Material examined consists of cell block sections stained with H&E, 1 monolayer preparation stained with Pap stain, and 1 cytospin preparation stained with Finch stain.  These demonstrate very small numbers of lymphocytes and macrophages, with rare  mesothelial cells.  Small numbers of intact red blood cells are also present in the cytospin preparation.  No atypical or malignant features are identified.      Performing Labs       The technical component of this testing was completed at [facility] and [facility].     Stain controls for all stains resulted within this report have been reviewed and show appropriate reactivity.      Pleural fluid Aerobic Bacterial Culture Routine With Gram Stain    Collection Time: 10/28/24  8:38 AM    Specimen: Pleural Cavity, Left; Pleural fluid   Result Value Ref Range    Culture No Growth     Gram Stain Result No organisms seen     Gram Stain Result 3+ WBC seen    Glucose fluid    Collection Time: 10/28/24  8:38 AM   Result Value Ref Range    Glucose Fluid Source Pleural Cavity, Left     Glucose fluid 103 mg/dL   Lactate dehydrogenase fluid    Collection Time: 10/28/24  8:38 AM   Result Value Ref Range    LD Fluid Source Pleural Cavity, Left     Lactate dehydrogenase fluid 122 U/L   Protein fluid    Collection Time: 10/28/24  8:38 AM   Result Value Ref Range    Protein Fluid Source Pleural Cavity, Left     Protein Total Fluid 1.4 g/dL   Anaerobic bacterial culture    Collection Time: 10/28/24  8:38 AM    Specimen: Pleural Cavity, Left; Pleural fluid   Result Value Ref Range    Culture No anaerobic organisms isolated    Cell Count Body Fluid    Collection Time: 10/28/24  8:38 AM   Result Value Ref Range    Color Yellow Colorless, Yellow    Clarity Clear Clear    Cell Count Fluid Source Pleural Cavity, Left     Total Nucleated Cells 37 /uL   Differential Body Fluid    Collection Time: 10/28/24  8:38 AM   Result Value Ref Range    % Neutrophils 5 %    % Lymphocytes 52 %    % Monocyte/Macrophages 40 %    % Eosinophils 1 %    % Lining Cells 2 %   Lactate Dehydrogenase    Collection Time: 10/28/24  9:27 AM   Result Value Ref Range    Lactate Dehydrogenase 322 (H) 0 - 250 U/L    Echo Limited    Collection Time: 10/28/24  2:15 PM   Result Value Ref Range    LVEF  >70%    Glucose by meter    Collection Time: 10/28/24  5:00 PM   Result Value Ref Range    GLUCOSE BY METER POCT 153 (H) 70 - 99 mg/dL   Glucose by meter    Collection Time: 10/28/24 10:41 PM   Result Value Ref Range    GLUCOSE BY METER POCT 105 (H) 70 - 99 mg/dL   INR    Collection Time: 10/29/24  4:30 AM   Result Value Ref Range    INR 1.56 (H) 0.85 - 1.15   Basic metabolic panel    Collection Time: 10/29/24  4:30 AM   Result Value Ref Range    Sodium 141 135 - 145 mmol/L    Potassium 3.2 (L) 3.4 - 5.3 mmol/L    Chloride 107 98 - 107 mmol/L    Carbon Dioxide (CO2) 24 22 - 29 mmol/L    Anion Gap 10 7 - 15 mmol/L    Urea Nitrogen 10.3 8.0 - 23.0 mg/dL    Creatinine 0.83 0.51 - 0.95 mg/dL    GFR Estimate 69 >60 mL/min/1.73m2    Calcium 7.4 (L) 8.8 - 10.4 mg/dL    Glucose 89 70 - 99 mg/dL   CBC with platelets    Collection Time: 10/29/24  4:30 AM   Result Value Ref Range    WBC Count 3.7 (L) 4.0 - 11.0 10e3/uL    RBC Count 3.02 (L) 3.80 - 5.20 10e6/uL    Hemoglobin 9.1 (L) 11.7 - 15.7 g/dL    Hematocrit 28.6 (L) 35.0 - 47.0 %    MCV 95 78 - 100 fL    MCH 30.1 26.5 - 33.0 pg    MCHC 31.8 31.5 - 36.5 g/dL    RDW 17.7 (H) 10.0 - 15.0 %    Platelet Count 214 150 - 450 10e3/uL   Glucose by meter    Collection Time: 10/29/24  7:46 AM   Result Value Ref Range    GLUCOSE BY METER POCT 96 70 - 99 mg/dL   Glucose by meter    Collection Time: 10/31/24  7:58 AM   Result Value Ref Range    GLUCOSE BY METER POCT 92 70 - 99 mg/dL   INR    Collection Time: 11/05/24 10:41 AM   Result Value Ref Range    INR 1.31 (H) 0.85 - 1.15   Basic metabolic panel  (Ca, Cl, CO2, Creat, Gluc, K, Na, BUN)    Collection Time: 11/05/24 10:41 AM   Result Value Ref Range    Sodium 142 135 - 145 mmol/L    Potassium 4.2 3.4 - 5.3 mmol/L    Chloride 108 (H) 98 - 107 mmol/L    Carbon Dioxide (CO2) 24 22 - 29 mmol/L    Anion Gap 10 7 - 15 mmol/L    Urea Nitrogen 12.8 8.0 -  23.0 mg/dL    Creatinine 0.81 0.51 - 0.95 mg/dL    GFR Estimate 71 >60 mL/min/1.73m2    Calcium 8.3 (L) 8.8 - 10.4 mg/dL    Glucose 94 70 - 99 mg/dL         Imaging    CT Chest/Abdomen/Pelvis w Contrast    Result Date: 10/31/2024  EXAM: PET ONCOLOGY WHOLE BODY, CT CHEST/ABDOMEN/PELVIS W CONTRAST LOCATION: [facility] DATE: 10/31/2024 INDICATION: Subsequent treatment planning and restaging for malignant neoplasm of overlapping sites of left female breast. Status post lumpectomy and radiation therapy, currently receiving systemic therapy. Monitor treatment response. COMPARISON: FDG PET/CT dated 6/25/2024 CONTRAST: 65 mL isovue 370 TECHNIQUE: Serum glucose level 92 mg/dL. One hour post intravenous administration of 10.2 mCi F-18 FDG, PET imaging was performed from the skull vertex to feet, utilizing attenuation correction with concurrent axial CT and PET/CT image fusion. Separate diagnostic CT of the chest, abdomen, and pelvis was performed. Dose reduction techniques were used. PET/CT FINDINGS: Shoulder and hip synovitis. Diffuse esophagitis. Rim-enhancing peripherally FDG avid fluid collections within the right greater than left lower extremity musculature below the knee measuring 2.4 x 2.1 x 9.5 cm on the right (max SUV 3.9) and FDG avid ulcerative area in the anterior right lower leg below-the-knee (Max SUV 3.4), which are nonspecific but raises suspicion for inflammatory/infectious processes. The remaining FDG uptake is physiologic from the skull vertex to feet. CT FINDINGS: Mild senescent intercranial changes. Postoperative change of the bilateral lenses. Right chest port with tip terminating near the superior cavoatrial junction. Small bilateral pleural effusions and adjacent atelectatic change. Patchy airspace opacities throughout both lungs typical of an inflammatory/infectious pulmonary process. Postsurgical change in the left breast and left axillary region. Mild coronary artery  calcium. TAVR. Cholecystectomy. Sigmoid diverticulosis. Hysterectomy. Pelvic phleboliths. Innumerable non-FDG avid sclerotic osseous lesions likely representing treated/quiescent disease. Chronic severe T12 compression deformity and associated segmental kyphosis. Multilevel degenerative changes of the spine.     IMPRESSION: 1. No convincing metabolic evidence of active neoplasm. 2. Ulcerative area in the anterior right lower leg below-the-knee with peripherally FDG avid rim-enhancing fluid collections involving the right greater than left lower extremity musculature. While nonspecific these findings are suspicious for an inflammatory/infectious process. The sterility of these collections cannot be determined on this examination.    PET Oncology Whole Body    Result Date: 10/31/2024  EXAM: PET ONCOLOGY WHOLE BODY, CT CHEST/ABDOMEN/PELVIS W CONTRAST LOCATION: [facility] DATE: 10/31/2024 INDICATION: Subsequent treatment planning and restaging for malignant neoplasm of overlapping sites of left female breast. Status post lumpectomy and radiation therapy, currently receiving systemic therapy. Monitor treatment response. COMPARISON: FDG PET/CT dated 6/25/2024 CONTRAST: 65 mL isovue 370 TECHNIQUE: Serum glucose level 92 mg/dL. One hour post intravenous administration of 10.2 mCi F-18 FDG, PET imaging was performed from the skull vertex to feet, utilizing attenuation correction with concurrent axial CT and PET/CT image fusion. Separate diagnostic CT of the chest, abdomen, and pelvis was performed. Dose reduction techniques were used. PET/CT FINDINGS: Shoulder and hip synovitis. Diffuse esophagitis. Rim-enhancing peripherally FDG avid fluid collections within the right greater than left lower extremity musculature below the knee measuring 2.4 x 2.1 x 9.5 cm on the right (max SUV 3.9) and FDG avid ulcerative area in the anterior right lower leg below-the-knee (Max SUV 3.4), which are nonspecific but  raises suspicion for inflammatory/infectious processes. The remaining FDG uptake is physiologic from the skull vertex to feet. CT FINDINGS: Mild senescent intercranial changes. Postoperative change of the bilateral lenses. Right chest port with tip terminating near the superior cavoatrial junction. Small bilateral pleural effusions and adjacent atelectatic change. Patchy airspace opacities throughout both lungs typical of an inflammatory/infectious pulmonary process. Postsurgical change in the left breast and left axillary region. Mild coronary artery calcium. TAVR. Cholecystectomy. Sigmoid diverticulosis. Hysterectomy. Pelvic phleboliths. Innumerable non-FDG avid sclerotic osseous lesions likely representing treated/quiescent disease. Chronic severe T12 compression deformity and associated segmental kyphosis. Multilevel degenerative changes of the spine.     IMPRESSION: 1. No convincing metabolic evidence of active neoplasm. 2. Ulcerative area in the anterior right lower leg below-the-knee with peripherally FDG avid rim-enhancing fluid collections involving the right greater than left lower extremity musculature. While nonspecific these findings are suspicious for an inflammatory/infectious process. The sterility of these collections cannot be determined on this examination.    US Chest Pleural Effusion Imaging    Result Date: 10/29/2024  EXAM: US CHEST/PLEURAL EFFUSION IMAGING LOCATION: [facility] DATE: 10/29/2024 INDICATION: right pleural effusion COMPARISON: CTA chest 10/27/2024 TECHNIQUE: Routine. FINDINGS: Targeted ultrasound of the right posterior chest/pleural space demonstrates only a trace pleural effusion, decreased compared to CTA chest 10/27/2024.     IMPRESSION: Previously seen moderate-sized right pleural effusion has decreased compared to CTA chest 10/27/2024, now only trace. Because of this, thoracentesis was deferred.    Echo Limited    Result Date: 10/28/2024  [id number]  [id number]^GONDAL^[place] [address]  Name: URIAH OLIVEROS MRN: [id number] : 1940 Study Date: 10/28/2024 01:41 PM Age: 84 yrs Gender: Female Patient Location: [facility] Reason For Study: Heart Failure Ordering Physician: GONDAL, SAAD J Performed By: JERI  BSA: 1.7 m2 Height: 63 in Weight: 144 lb HR: 85 BP: 148/68 mmHg ______________________________________________________________________________ Procedure Complete Portable Echo Adult. Definity (NDC #20186-699) given intravenously. Compared to the prior study dated 10/7/2024 the EF remains normal. Diasotlic function and valves not fully evaluated on today's images, but the normal AV AT suggests the increased gradient is not due to prosthetic stenosis. ______________________________________________________________________________ Interpretation Summary  The left ventricle is normal in size. The visual ejection fraction is >70%. No regional wall motion abnormalities noted. There is a 23 mm Wray Cecilia 3 bioprosthetic prosthesis in the aortic position with a mean gradient of 27mmHg, AT 80 ms. No significant valvular or paravalvular regurgitation. Implant date . RVSP 22mmHg + RAP Compared to the prior study dated 10/7/2024 the EF remains normal. Diasotlic function and valves not fully evaluated on today's images, but the normal AV AT suggests the increased gradient is not due to prosthetic stenosis ______________________________________________________________________________ Left Ventricle The left ventricle is normal in size. The visual ejection fraction is >70%. No regional wall motion abnormalities noted.  Right Ventricle Normal right ventricle size and systolic function.  Atria The left atrium is not well visualized. Right atrium not well visualized.  Mitral Valve The mitral valve leaflets are mildly thickened. There is mild mitral annular calcification. There is trace mitral regurgitation.   Tricuspid Valve The tricuspid valve is not well visualized. There is mild (1+) tricuspid regurgitation. RVSP 22mmHg + RAP. There is no tricuspid stenosis.  Aortic Valve There is a 23 mm Wray Cecilia 3 bioprosthetic prosthesis in the aortic position with a mean gradient of 27mmHg, AT 80 ms. No significant valvular or paravalvular regurgitation. Implant date .  Pulmonic Valve The pulmonic valve is not well visualized.  Vessels The aorta root cannot be assessed. The thoracic aorta cannot be assessed. Inferior vena cava not well visualized for estimation of right atrial pressure.  Pericardium There is no pericardial effusion.  Rhythm Sinus rhythm was noted. ______________________________________________________________________________ MMode/2D Measurements & Calculations  EF Biplane: 76.4 % TAPSE: 2.0 cm  Time Measurements MM HR: 78.0 BPM  Doppler Measurements & Calculations MV E max danuta: 78.8 cm/sec MV A max danuta: 114.8 cm/sec MV E/A: 0.69 MV dec slope: 508.9 cm/sec2 MV dec time: 0.15 sec Ao V2 max: 330.6 cm/sec Ao max P.0 mmHg Ao V2 mean: 251.0 cm/sec Ao mean P.4 mmHg Ao V2 VTI: 76.9 cm Ao acc time: 0.07 sec TR max danuta: 236.5 cm/sec TR max P.4 mmHg  ______________________________________________________________________________ Report approved by: Becky Sheehan 10/28/2024 04:30 PM       US Thoracentesis    Result Date: 10/28/2024  EXAM: 1. LEFT THORACENTESIS 2. ULTRASOUND GUIDANCE LOCATION: [facility] DATE: 10/28/2024 INDICATION: Pleural effusion. PROCEDURE: Informed consent obtained. Time out performed. The chest was prepped and draped in sterile fashion. 6 mL of 1 % lidocaine was infused into the local soft tissues. Under direct ultrasound guidance, a 5 Kinyarwanda catheter system was placed into the  pleural effusion. 0.6 liters of yellow-brown fluid were removed and sent to lab, if requested. Patient tolerated procedure well. Ultrasound imaging was obtained and placed in  the patient's permanent medical record.     IMPRESSION: Status post left ultrasound-guided thoracentesis. Reference CPT Code: 79458    US Lower Extremity Venous Duplex Bilateral    Result Date: 10/28/2024  EXAM: US LOWER EXTREMITY VENOUS DUPLEX BILATERAL LOCATION: [facility] DATE: 10/27/2024 INDICATION: Bilateral lower extremity pitting edema. COMPARISON: None available. TECHNIQUE: Venous Duplex ultrasound of bilateral lower extremities with and without compression, augmentation and duplex. Color flow and spectral Doppler with waveform analysis performed. FINDINGS: Exam includes the common femoral, femoral, popliteal veins as well as segmentally visualized deep calf veins and greater saphenous vein. RIGHT: No deep vein thrombosis. No superficial thrombophlebitis. No popliteal cyst. LEFT: No deep vein thrombosis. No superficial thrombophlebitis. No popliteal cyst.     IMPRESSION: 1.  No deep venous thrombosis in the bilateral lower extremities.    CT Chest Pulmonary Embolism w Contrast    Result Date: 10/27/2024  EXAM: CT CHEST PULMONARY EMBOLISM W CONTRAST LOCATION: [facility] DATE: 10/27/2024 INDICATION: short of breath, + ddimer. Breast cancer. COMPARISON: 6/25/2024 TECHNIQUE: CT chest pulmonary angiogram during arterial phase injection of IV contrast. Multiplanar reformats and MIP reconstructions were performed. Dose reduction techniques were used. CONTRAST: [id number]  70ml FINDINGS: ANGIOGRAM CHEST: Pulmonary arteries are normal caliber and negative for pulmonary emboli. Thoracic aorta is negative for dissection. LUNGS AND PLEURA: Moderate size right and left pleural effusions. There is interstitial edema and atelectasis. MEDIASTINUM/AXILLAE: There is a right chest wall Port-A-Cath. A TAPVR stent is present. There is mild pericardial thickening or fluid. Left axillary surgical clips are present. CORONARY ARTERY CALCIFICATION: Mild. UPPER ABDOMEN:  Cholecystectomy. MUSCULOSKELETAL: Multiple sclerotic bone lesions are again seen. T12 vertebral body height loss has not changed.     IMPRESSION: 1.  No PE. 2.  Moderate size right and left pleural effusions. There is interstitial edema and atelectasis. Underlying atypical infection cannot be excluded. 3.  Multiple sclerotic bone lesions are again seen.     XR Chest Port 1 View    Result Date: 10/27/2024  EXAM: XR CHEST PORT 1 VIEW LOCATION: [facility] DATE: 10/27/2024 INDICATION: short of breath COMPARISON: 10/6/2024     IMPRESSION: Small left pleural effusion with left basilar consolidation, likely compressive atelectasis, although pneumonia is also possible. Clear right lung and pleural space. No pneumothorax. Normal cardiomediastinal silhouette with TAVR. Right chest port catheter tip in the lower SVC.    EEG Awake or Drowsy Routine    Result Date: 10/8/2024  [facility] [place]  Name: Kacie Hermosillo;  : 1940;  MRN: [id number] PCP: Irina Francisco Date of study: Oct 7, 2024 History: Kacie Hermosillo is a 84 year old female admitted to [facility] with a syncopal spell. This EEG is performed to evaluate for seizure. Findings: This 22 channel digital EEG is recorded using the 10-20 International system.  The recording shows a posterior dominant background rhythm of 7 Hz which is a little variable but is symmetric and attenuates normally to eye opening.  Photic stimulation produces no change in the record.  Later in the recording there is waning of the background rhythms indicating drowsiness followed by some bilateral theta slowing and then occasional bilateral delta activity indicating light sleep.  There are no areas of focal or lateralized slowing.  No epileptiform findings are present. Impression: This is a mildly abnormal EEG for the awake and stage 1 sleep states; the mild background slowing can be seen with underlying cognitive impairment.   There are no epileptiform findings on today's study.  Lamont Milian MD     Echocardiogram Complete    Result Date: 10/7/2024  [id number]^CALIN^[place] [address]  Name: URIAH OLIVEROS MRN: [id number] : 1940 Study Date: 10/07/2024 01:15 PM Age: 84 yrs Gender: Female Patient Location: [facility] Reason For Study: Syncope Ordering Physician: PRIMO LAYTON Performed By: JERI  BSA: 1.6 m2 Height: 63 in Weight: 130 lb ______________________________________________________________________________ Procedure Complete Portable Echo Adult. Technically difficult study. Compared to the prior study dated 2024, there are changes as noted. ______________________________________________________________________________ Interpretation Summary  1. Left ventricular chamber size and wall thickness are normal. Systolic function is hyperdynamic. The visually estimated left ventricular ejection fraction is greater than 70%. 2. Right ventricular chamber size and systolic function are normal. 3. A well-seated 23 mm Wray Cecilia 3 bioprosthetic aortic valve is present. Peak forward velocity is elevated at 3.5 m/s and mean outflow gradient elevated at 25 mmHg. Dimensionless of index of 0.30 and acceleration time of 80 ms are both normal which may suggest that the increased peak velocity and mean outflow gradient are due to a high flow state. No significant prosthetic regurgitation is seen. 4. Compared to the prior study dated 2024, there has been an increase in heart rate and left ventricular ejection fraction with a corresponding increase in prosthetic aortic valve outflow gradient. ______________________________________________________________________________ I      WMSI = 0.00     % Normal = 0  X - Cannot   0 -                      (2) - Mildly 2 -          Segments  Size Interpret    Hyperkinetic 1 - Normal  Hypokinetic  Hypokinetic  1-2      small                                                    7 -          3-5    moderate 3 - Akinetic 4 -          5 -         6 - Akinetic Dyskinetic   6-14    large              Dyskinetic   Aneurysmal  w/scar       w/scar       15-16   diffuse  Left Ventricle The left ventricle is normal in size. There is normal left ventricular wall thickness. Hyperdynamic left ventricular function. The visual ejection fraction is >70%. Left ventricular diastolic function is indeterminate. No regional wall motion abnormalities noted.  Right Ventricle The right ventricle is normal size. The right ventricular systolic function is normal.  Atria Normal left atrial size. Right atrial size is normal.  Mitral Valve The mitral valve leaflets are mildly thickened. There is mild mitral annular calcification. There is trace mitral regurgitation. There is no mitral valve stenosis.  Tricuspid Valve Tricuspid valve leaflets appear normal. There is mild (1+) tricuspid regurgitation. Right ventricle systolic pressure estimate normal. The right ventricular systolic pressure is approximated at 30.0 mmHg plus the right atrial pressure. There is no tricuspid stenosis.  Aortic Valve A well-seated 23 mm Wray Cecilia 3 bioprosthetic aortic valve is present. Peak forward velocity is elevated at 3.5 m/s and mean outflow gradient elevated at 25 mmHg. Dimensionless of index of 0.30 and acceleration time of 80 ms are both normal which may suggest that the increased peak velocity and mean gradient are due to a high flow state. No aortic regurgitation is present.  Pulmonic Valve The pulmonic valve is not well visualized. There is no pulmonic valvular regurgitation. There is no pulmonic valvular stenosis.  Vessels The aorta root cannot be assessed. The thoracic aorta cannot be assessed. IVC diameter <2.1 cm collapsing >50% with sniff suggests a normal RA pressure of 3 mmHg.  Pericardium There is no pericardial effusion.  Rhythm Sinus rhythm was noted.  ______________________________________________________________________________ MMode/2D Measurements & Calculations IVSd: 0.90 cm  LVIDd: 4.0 cm LVIDs: 1.5 cm LVPWd: 0.90 cm FS: 62.5 % LV mass(C)d: 109.7 grams LV mass(C)dI: 68.1 grams/m2 LA dimension: 3.8 cm LVOT diam: 1.8 cm LVOT area: 2.5 cm2 EF Biplane: 61.1 % LA Volume Indexed (AL/bp): 15.7 ml/m2 RV Base: 3.8 cm RWT: 0.45 TAPSE: 2.3 cm  Time Measurements MM HR: 91.0 BPM  Doppler Measurements & Calculations MV E max frantz: 94.9 cm/sec MV A max frantz: 125.0 cm/sec MV E/A: 0.76 MV max P.7 mmHg MV mean P.0 mmHg MV V2 VTI: 32.6 cm MVA(VTI): 1.6 cm2 MV dec slope: 430.0 cm/sec2 MV dec time: 0.22 sec Ao V2 max: 350.3 cm/sec Ao max P.0 mmHg Ao V2 mean: 243.0 cm/sec Ao mean P.0 mmHg Ao V2 VTI: 67.4 cm CARYL(I,D): 0.78 cm2 CARYL(V,D): 0.77 cm2 Ao acc time: 0.08 sec LV V1 max P.5 mmHg LV V1 max: 106.0 cm/sec LV V1 VTI: 20.7 cm SV(LVOT): 52.7 ml SI(LVOT): 32.7 ml/m2 PA acc time: 0.06 sec TR max frantz: 274.0 cm/sec TR max P.0 mmHg AV Frantz Ratio (DI): 0.30 CARYL Index (cm2/m2): 0.49  RV S Frantz: 19.3 cm/sec  ______________________________________________________________________________ Report approved by: Jatin Chen 10/07/2024 03:08 PM       CT Abdomen Pelvis w Contrast    Result Date: 10/6/2024  EXAM: CT ABDOMEN PELVIS W CONTRAST LOCATION: [facility] DATE: 10/6/2024 INDICATION: fall, elevated INR. Breast cancer. COMPARISON: 2024 PET scan. TECHNIQUE: CT scan of the abdomen and pelvis was performed following injection of IV contrast. Multiplanar reformats were obtained. Dose reduction techniques were used. CONTRAST: 65ml [id number] FINDINGS: LOWER CHEST: Normal. HEPATOBILIARY: Gallbladder removed. Prominent bile ducts similar to previous consistent with reservoir effect. PANCREAS: Normal. SPLEEN: Normal. ADRENAL GLANDS: Normal. KIDNEYS/BLADDER: Normal. BOWEL: Scattered diverticula but nothing for acute diverticulitis. No hematomas  or hemorrhage. LYMPH NODES: No lymphadenopathy. VASCULATURE: Normal. PELVIC ORGANS: Postop change. MUSCULOSKELETAL: Diffuse tiny sclerotic skeletal metastases throughout the visualized skeleton similar to previous. Stable T12 advanced compression fracture.     IMPRESSION: 1.  Nothing for bleeds in the abdomen or pelvis. 2.  Diffuse skeletal metastases similar to previous.    Chest XR,  PA & LAT    Result Date: 10/6/2024  EXAM: XR CHEST 2 VIEWS LOCATION: [facility] DATE: 10/6/2024 INDICATION: syncope, elevated WBC COMPARISON: None.     IMPRESSION: Heart size and pulmonary vessels normal. Previous TAVR. Calcified granuloma left base with lungs otherwise clear. No pleural effusion evident. Right-sided Port-A-Cath. Surgical clips left axilla. Compression of low thoracic vertebral body.     Head CT w/o contrast    Result Date: 10/6/2024  EXAM: CT HEAD W/O CONTRAST, CT CERVICAL SPINE W/O CONTRAST, CT THORACIC SPINE W/O CONTRAST, CT LUMBAR SPINE RECONSTRUCTED LOCATION: [facility] DATE: 10/6/2024 INDICATION: Head, neck, and back injury COMPARISON: None. TECHNIQUE: 1) Routine CT Head without IV contrast. Multiplanar reformats. Dose reduction techniques were used. 2) Routine CT Cervical Spine without IV contrast. Multiplanar reformats. Dose reduction techniques were used. 3) Routine CT Thoracic Spine without IV contrast. Multiplanar reformats. Dose reduction techniques were used. 4) Routine CT Lumbar Spine without IV contrast. Multiplanar reformats. Dose reduction techniques were used. FINDINGS: HEAD CT: INTRACRANIAL CONTENTS: No intracranial hemorrhage, extraaxial collection, or mass effect.  Small chronic infarction with mild chronic infarction left parietal occipital region. Small chronic infarctions cerebellar hemispheres bilaterally. Moderate presumed chronic small vessel ischemic changes. Moderate generalized volume loss. No hydrocephalus. VISUALIZED  ORBITS/SINUSES/MASTOIDS: No intraorbital abnormality. No paranasal sinus mucosal disease. No middle ear or mastoid effusion. BONES/SOFT TISSUES: No acute abnormality. CERVICAL SPINE CT: VERTEBRA: Normal vertebral body heights. Mild reversal of cervical lordosis. Slight anterior subluxation C2 on C3 and C7 on T1. No definite fracture. CANAL/FORAMINA: Moderate degenerative changes with mild canal narrowing C4-5, moderate C5-6. Multilevel prominent neural foraminal narrowing. PARASPINAL: No extraspinal abnormality. Visualized lung fields are clear. THORACIC SPINE CT: VERTEBRA: Slight loss of height superior aspect of T4, probably chronic. Marked compression of T12, age indeterminate however there is suggestion of acute/subacute component. Associated retropulsion with mild canal narrowing.  No definite other fractures. CANAL/FORAMINA: Multilevel mild neural foraminal narrowing. PARASPINAL: No extraspinal abnormality. LUMBAR SPINE CT: VERTEBRA: Normal vertebral body heights and alignment. No fracture or posttraumatic subluxation. CANAL/FORAMINA: Mild degenerative changes with mild canal narrowing at L3-4 and L4-5. Multilevel prominent neural foraminal narrowing. Slight posterior subluxation L2 on L3. PARASPINAL: No extraspinal abnormality. Innumerable sclerotic lesions throughout visualized bones; most consistent with metastases.     IMPRESSION: HEAD CT: 1.  No acute intracranial process. 2.  Innumerable sclerotic lesions throughout visualized bones of head, chest, spine, and pelvis; most consistent with metastases. CERVICAL SPINE CT: 1.  No definite fracture. 2.  Degenerative changes, as described. THORACIC SPINE CT: 1.  Slight loss of height superior aspect of T4, probably chronic. 2.  Marked compression of T12, age indeterminate however there is suggestion of acute/subacute component. Associated retropulsion with mild canal narrowing. 3.  Degenerative changes, as described. LUMBAR SPINE CT: 1.  No definite fracture.  2.  Degenerative changes, as described.    CT Cervical Spine w/o Contrast    Result Date: 10/6/2024  EXAM: CT HEAD W/O CONTRAST, CT CERVICAL SPINE W/O CONTRAST, CT THORACIC SPINE W/O CONTRAST, CT LUMBAR SPINE RECONSTRUCTED LOCATION: [facility] DATE: 10/6/2024 INDICATION: Head, neck, and back injury COMPARISON: None. TECHNIQUE: 1) Routine CT Head without IV contrast. Multiplanar reformats. Dose reduction techniques were used. 2) Routine CT Cervical Spine without IV contrast. Multiplanar reformats. Dose reduction techniques were used. 3) Routine CT Thoracic Spine without IV contrast. Multiplanar reformats. Dose reduction techniques were used. 4) Routine CT Lumbar Spine without IV contrast. Multiplanar reformats. Dose reduction techniques were used. FINDINGS: HEAD CT: INTRACRANIAL CONTENTS: No intracranial hemorrhage, extraaxial collection, or mass effect.  Small chronic infarction with mild chronic infarction left parietal occipital region. Small chronic infarctions cerebellar hemispheres bilaterally. Moderate presumed chronic small vessel ischemic changes. Moderate generalized volume loss. No hydrocephalus. VISUALIZED ORBITS/SINUSES/MASTOIDS: No intraorbital abnormality. No paranasal sinus mucosal disease. No middle ear or mastoid effusion. BONES/SOFT TISSUES: No acute abnormality. CERVICAL SPINE CT: VERTEBRA: Normal vertebral body heights. Mild reversal of cervical lordosis. Slight anterior subluxation C2 on C3 and C7 on T1. No definite fracture. CANAL/FORAMINA: Moderate degenerative changes with mild canal narrowing C4-5, moderate C5-6. Multilevel prominent neural foraminal narrowing. PARASPINAL: No extraspinal abnormality. Visualized lung fields are clear. THORACIC SPINE CT: VERTEBRA: Slight loss of height superior aspect of T4, probably chronic. Marked compression of T12, age indeterminate however there is suggestion of acute/subacute component. Associated retropulsion with mild canal  narrowing.  No definite other fractures. CANAL/FORAMINA: Multilevel mild neural foraminal narrowing. PARASPINAL: No extraspinal abnormality. LUMBAR SPINE CT: VERTEBRA: Normal vertebral body heights and alignment. No fracture or posttraumatic subluxation. CANAL/FORAMINA: Mild degenerative changes with mild canal narrowing at L3-4 and L4-5. Multilevel prominent neural foraminal narrowing. Slight posterior subluxation L2 on L3. PARASPINAL: No extraspinal abnormality. Innumerable sclerotic lesions throughout visualized bones; most consistent with metastases.     IMPRESSION: HEAD CT: 1.  No acute intracranial process. 2.  Innumerable sclerotic lesions throughout visualized bones of head, chest, spine, and pelvis; most consistent with metastases. CERVICAL SPINE CT: 1.  No definite fracture. 2.  Degenerative changes, as described. THORACIC SPINE CT: 1.  Slight loss of height superior aspect of T4, probably chronic. 2.  Marked compression of T12, age indeterminate however there is suggestion of acute/subacute component. Associated retropulsion with mild canal narrowing. 3.  Degenerative changes, as described. LUMBAR SPINE CT: 1.  No definite fracture. 2.  Degenerative changes, as described.    CT Thoracic Spine w/o Contrast    Result Date: 10/6/2024  EXAM: CT HEAD W/O CONTRAST, CT CERVICAL SPINE W/O CONTRAST, CT THORACIC SPINE W/O CONTRAST, CT LUMBAR SPINE RECONSTRUCTED LOCATION: [facility] DATE: 10/6/2024 INDICATION: Head, neck, and back injury COMPARISON: None. TECHNIQUE: 1) Routine CT Head without IV contrast. Multiplanar reformats. Dose reduction techniques were used. 2) Routine CT Cervical Spine without IV contrast. Multiplanar reformats. Dose reduction techniques were used. 3) Routine CT Thoracic Spine without IV contrast. Multiplanar reformats. Dose reduction techniques were used. 4) Routine CT Lumbar Spine without IV contrast. Multiplanar reformats. Dose reduction techniques were used.  FINDINGS: HEAD CT: INTRACRANIAL CONTENTS: No intracranial hemorrhage, extraaxial collection, or mass effect.  Small chronic infarction with mild chronic infarction left parietal occipital region. Small chronic infarctions cerebellar hemispheres bilaterally. Moderate presumed chronic small vessel ischemic changes. Moderate generalized volume loss. No hydrocephalus. VISUALIZED ORBITS/SINUSES/MASTOIDS: No intraorbital abnormality. No paranasal sinus mucosal disease. No middle ear or mastoid effusion. BONES/SOFT TISSUES: No acute abnormality. CERVICAL SPINE CT: VERTEBRA: Normal vertebral body heights. Mild reversal of cervical lordosis. Slight anterior subluxation C2 on C3 and C7 on T1. No definite fracture. CANAL/FORAMINA: Moderate degenerative changes with mild canal narrowing C4-5, moderate C5-6. Multilevel prominent neural foraminal narrowing. PARASPINAL: No extraspinal abnormality. Visualized lung fields are clear. THORACIC SPINE CT: VERTEBRA: Slight loss of height superior aspect of T4, probably chronic. Marked compression of T12, age indeterminate however there is suggestion of acute/subacute component. Associated retropulsion with mild canal narrowing.  No definite other fractures. CANAL/FORAMINA: Multilevel mild neural foraminal narrowing. PARASPINAL: No extraspinal abnormality. LUMBAR SPINE CT: VERTEBRA: Normal vertebral body heights and alignment. No fracture or posttraumatic subluxation. CANAL/FORAMINA: Mild degenerative changes with mild canal narrowing at L3-4 and L4-5. Multilevel prominent neural foraminal narrowing. Slight posterior subluxation L2 on L3. PARASPINAL: No extraspinal abnormality. Innumerable sclerotic lesions throughout visualized bones; most consistent with metastases.     IMPRESSION: HEAD CT: 1.  No acute intracranial process. 2.  Innumerable sclerotic lesions throughout visualized bones of head, chest, spine, and pelvis; most consistent with metastases. CERVICAL SPINE CT: 1.  No definite  fracture. 2.  Degenerative changes, as described. THORACIC SPINE CT: 1.  Slight loss of height superior aspect of T4, probably chronic. 2.  Marked compression of T12, age indeterminate however there is suggestion of acute/subacute component. Associated retropulsion with mild canal narrowing. 3.  Degenerative changes, as described. LUMBAR SPINE CT: 1.  No definite fracture. 2.  Degenerative changes, as described.    CT Lumbar Spine Reconstructed    Result Date: 10/6/2024  EXAM: CT HEAD W/O CONTRAST, CT CERVICAL SPINE W/O CONTRAST, CT THORACIC SPINE W/O CONTRAST, CT LUMBAR SPINE RECONSTRUCTED LOCATION: [facility] DATE: 10/6/2024 INDICATION: Head, neck, and back injury COMPARISON: None. TECHNIQUE: 1) Routine CT Head without IV contrast. Multiplanar reformats. Dose reduction techniques were used. 2) Routine CT Cervical Spine without IV contrast. Multiplanar reformats. Dose reduction techniques were used. 3) Routine CT Thoracic Spine without IV contrast. Multiplanar reformats. Dose reduction techniques were used. 4) Routine CT Lumbar Spine without IV contrast. Multiplanar reformats. Dose reduction techniques were used. FINDINGS: HEAD CT: INTRACRANIAL CONTENTS: No intracranial hemorrhage, extraaxial collection, or mass effect.  Small chronic infarction with mild chronic infarction left parietal occipital region. Small chronic infarctions cerebellar hemispheres bilaterally. Moderate presumed chronic small vessel ischemic changes. Moderate generalized volume loss. No hydrocephalus. VISUALIZED ORBITS/SINUSES/MASTOIDS: No intraorbital abnormality. No paranasal sinus mucosal disease. No middle ear or mastoid effusion. BONES/SOFT TISSUES: No acute abnormality. CERVICAL SPINE CT: VERTEBRA: Normal vertebral body heights. Mild reversal of cervical lordosis. Slight anterior subluxation C2 on C3 and C7 on T1. No definite fracture. CANAL/FORAMINA: Moderate degenerative changes with mild canal narrowing C4-5,  moderate C5-6. Multilevel prominent neural foraminal narrowing. PARASPINAL: No extraspinal abnormality. Visualized lung fields are clear. THORACIC SPINE CT: VERTEBRA: Slight loss of height superior aspect of T4, probably chronic. Marked compression of T12, age indeterminate however there is suggestion of acute/subacute component. Associated retropulsion with mild canal narrowing.  No definite other fractures. CANAL/FORAMINA: Multilevel mild neural foraminal narrowing. PARASPINAL: No extraspinal abnormality. LUMBAR SPINE CT: VERTEBRA: Normal vertebral body heights and alignment. No fracture or posttraumatic subluxation. CANAL/FORAMINA: Mild degenerative changes with mild canal narrowing at L3-4 and L4-5. Multilevel prominent neural foraminal narrowing. Slight posterior subluxation L2 on L3. PARASPINAL: No extraspinal abnormality. Innumerable sclerotic lesions throughout visualized bones; most consistent with metastases.     IMPRESSION: HEAD CT: 1.  No acute intracranial process. 2.  Innumerable sclerotic lesions throughout visualized bones of head, chest, spine, and pelvis; most consistent with metastases. CERVICAL SPINE CT: 1.  No definite fracture. 2.  Degenerative changes, as described. THORACIC SPINE CT: 1.  Slight loss of height superior aspect of T4, probably chronic. 2.  Marked compression of T12, age indeterminate however there is suggestion of acute/subacute component. Associated retropulsion with mild canal narrowing. 3.  Degenerative changes, as described. LUMBAR SPINE CT: 1.  No definite fracture. 2.  Degenerative changes, as described.    Elbow XR, G/E 3 views, right    Result Date: 10/6/2024  EXAM: XR ELBOW RIGHT G/E 3 VIEWS LOCATION: [facility] DATE: 10/6/2024 INDICATION: Fall, elbow pain. COMPARISON: None.     IMPRESSION: No acute displaced fracture identified. Joint spaces are maintained. There is no elbow joint effusion. Bones are demineralized.         Assessment and  Plan    Metastatic breast cancer with bone mets HER2/amarilis low  Patient is here in follow-up.  She had a PET scan done.  I reviewed the PET scan images personally.  The results were discussed with the patient and her daughter.  She seems to have had a complete radiologic response with no PET evidence of active cancer.  This was discussed with the patient and her daughter.  They were happy to hear that.  She however has been having significant GI toxicity from the fam-trastuzumab.  Her options were discussed with her including stopping all treatment versus trying a lower dose and being more vigilant about the use of antidiarrheals as in the past whenever she has used antidiarrheals they have worked well.  After asking questions they are willing to go ahead with treatment with a reduced dose.  Fam-trastuzumab decreased by 25% and she will be given her next dose in the next few days.  I will see her in 3 weeks after that for her subsequent dose.    Diarrhea   patient continues to have trouble with diarrhea after treatment however she gets into trouble mostly when she is unable to take her Imodium in a timely manner.  We are planning to decrease the dose of her chemotherapy which will decrease the severity of the diarrhea.  I have also told the patient's daughter to talk to the nursing home staff to get the patient started on Imodium as soon as she has her first loose stool.  We will also plan to add Lomotil if the Imodium is not controlling the diarrhea.  Communication with the nursing home staff and timely administration of Imodium will be important      Bone metastases  Patient to continue taking Zometa periodically    Cardiac monitoring  Patient does need cardiac monitoring.  She did have an echo in the hospital with an ejection fraction of 70% next echo will be done in 3 months or after 4 doses        Signed by: Hadley Gann MD        CC: Irina Francisco PA-C

## 2024-11-05 NOTE — LETTER
"11/5/2024      Kacie Hermosillo  [address]  [address]  [facility] [address]      Dear Colleague,    Thank you for referring your patient, Kacie Hermosillo, to the [facility]. Please see a copy of my visit note below.    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    Weight as of this encounter: 61.2 kg (135 lb). Body surface area is 1.65 meters squared.  No Pain (0) Comment: Data Unavailable   No LMP recorded. Patient is postmenopausal.  Allergies reviewed: Yes  Medications reviewed: Yes    Medications: Medication refills not needed today.  Pharmacy name entered into Anate[employer]is:    [facility] [place], [place] - [address]  [facility] [place], [place] - [address] [facility][id number] - [place] - [address] [facility] #[id number] - [place], [place] - [address] AT [facility][id number] - [place], [place] - [address] [place] [address] DRUG STORE #[id number] - [place], [place] - [address]  [address]  AT [address] [place] [address] & [facility]    Frailty Screening:   Is the patient here for a new oncology consult visit in cancer care? 2. No      Clinical concerns:   No concerns per patient.         Lucila Braga MA              [facility] Progress Note    Patient: Kacie VARELA" Bay  MRN: [id number]  Date of Service: Nov 5, 2024             ECOG Performance    2 - Ambulatory and independent in all ADLs; cannot work; up > 50% of the time          ______________________________________________________________________________  Oncologic history  Metastatic breast cancer with bone mets only ER negative TX negative HER2/amarilis 2+ and negative by FISH.  HER2/amarilis low    Remote diagnosis of breast cancer in 1995 treated with surgery chemotherapy radiation and 5 years of hormonal therapy.  Pathology report not available.    Treatment  Patient started on fam-trastuzumab on 8/19/2024  Complete response by PET criteria in  Nov 2024  Dose of fam-trastuzumab decreased by 25% in November 2024 due to severe diarrhea    History of Present Illness    Ms. Kacie Hermosillo is here in follow-up.  She did have a PET scan done to reevaluate her disease.  She also got severe diarrhea and was admitted to the hospital for dehydration then she had another admission for close diastolic heart failure.  Her echo did not show any decrease in ejection fraction and it was at 70%.  Patient is here today accompanied by her daughter.  She is feeling back to normal.  She is now in a nursing home.  She has been having normal stools.  When she did have the diarrhea a few days after last dose she was unable to take Imodium and the diarrhea got really worse.      Review of systems  A comprehensive 12 point review of system was done that was negative except what is mentioned in the history of present illness    Past History    Past Medical History:   Diagnosis Date     Breast cancer (H) 01/01/1999     Heart valve replaced      Hx antineoplastic chemotherapy 01/01/1999     Hx of radiation therapy 01/01/1999     Hypertension        Past Surgical History:   Procedure Laterality Date     BIOPSY BREAST       HYSTERECTOMY       IR CHEST PORT PLACEMENT > 5 YRS OF AGE  7/30/2024     LUMPECTOMY BREAST Left 1999     OOPHORECTOMY Bilateral   "      Physical Exam    BP (!) 140/83 (BP Location: Left arm, Patient Position: Sitting, Cuff Size: Adult Regular)   Pulse 72   Resp 18   Ht 1.6 m (5' 3\")   Wt 61.2 kg (135 lb)   SpO2 97%   BMI 23.91 kg/m      General: alert, awake, not in acute distress  HEENT: Head: Normal, normocephalic, atraumatic.  Eye: Normal external eye, conjunctiva, lids cornea, MARI.  Nose: Normal external nose, mucus membranes and septum.  Pharynx: Normal buccal mucosa. Normal pharynx.  Neck / Thyroid: Supple, no masses, nodes, nodules or enlargement.  Lymphatics: No abnormally enlarged lymph nodes.  Chest: Normal chest wall and respirations. Clear to auscultation.  No crackles or rhonchi's  Heart: S1 S2 RRR, no murmur.   Abdomen: abdomen is soft without significant tenderness, masses, organomegaly or guarding  Extremities: normal strength, tone, and muscle mass  Skin: normal. no rash or abnormalities  CNS: non focal.    Lab Results    Recent Results (from the past 240 hours)   Extra Blue Top Tube    Collection Time: 10/27/24  2:26 PM   Result Value Ref Range    Hold Specimen JIC    Extra Green Top (Lithium Heparin) Tube    Collection Time: 10/27/24  2:26 PM   Result Value Ref Range    Hold Specimen JIC    Extra Purple Top Tube    Collection Time: 10/27/24  2:26 PM   Result Value Ref Range    Hold Specimen JIC    Basic metabolic panel    Collection Time: 10/27/24  2:26 PM   Result Value Ref Range    Sodium 138 135 - 145 mmol/L    Potassium 4.6 3.4 - 5.3 mmol/L    Chloride 107 98 - 107 mmol/L    Carbon Dioxide (CO2) 22 22 - 29 mmol/L    Anion Gap 9 7 - 15 mmol/L    Urea Nitrogen 11.3 8.0 - 23.0 mg/dL    Creatinine 1.07 (H) 0.51 - 0.95 mg/dL    GFR Estimate 51 (L) >60 mL/min/1.73m2    Calcium 7.8 (L) 8.8 - 10.4 mg/dL    Glucose 124 (H) 70 - 99 mg/dL   Troponin T, High Sensitivity    Collection Time: 10/27/24  2:26 PM   Result Value Ref Range    Troponin T, High Sensitivity 28 (H) <=14 ng/L   Magnesium    Collection Time: 10/27/24  2:26 " PM   Result Value Ref Range    Magnesium 2.1 1.7 - 2.3 mg/dL   Nt probnp inpatient (BNP)    Collection Time: 10/27/24  2:26 PM   Result Value Ref Range    N terminal Pro BNP Inpatient 1,682 0 - 1,800 pg/mL   INR    Collection Time: 10/27/24  2:26 PM   Result Value Ref Range    INR 2.46 (H) 0.85 - 1.15   CBC with platelets and differential    Collection Time: 10/27/24  2:26 PM   Result Value Ref Range    WBC Count 5.3 4.0 - 11.0 10e3/uL    RBC Count 3.14 (L) 3.80 - 5.20 10e6/uL    Hemoglobin 9.5 (L) 11.7 - 15.7 g/dL    Hematocrit 30.0 (L) 35.0 - 47.0 %    MCV 96 78 - 100 fL    MCH 30.3 26.5 - 33.0 pg    MCHC 31.7 31.5 - 36.5 g/dL    RDW 18.3 (H) 10.0 - 15.0 %    Platelet Count 236 150 - 450 10e3/uL    % Neutrophils 64 %    % Lymphocytes 14 %    % Monocytes 16 %    % Eosinophils 4 %    % Basophils 2 %    % Immature Granulocytes 0 %    NRBCs per 100 WBC 0 <1 /100    Absolute Neutrophils 3.4 1.6 - 8.3 10e3/uL    Absolute Lymphocytes 0.7 (L) 0.8 - 5.3 10e3/uL    Absolute Monocytes 0.9 0.0 - 1.3 10e3/uL    Absolute Eosinophils 0.2 0.0 - 0.7 10e3/uL    Absolute Basophils 0.1 0.0 - 0.2 10e3/uL    Absolute Immature Granulocytes 0.0 <=0.4 10e3/uL    Absolute NRBCs 0.0 10e3/uL   D dimer quantitative    Collection Time: 10/27/24  2:26 PM   Result Value Ref Range    D-Dimer Quantitative 1.13 (H) 0.00 - 0.50 ug/mL FEU   Hemoglobin A1c    Collection Time: 10/27/24  2:26 PM   Result Value Ref Range    Estimated Average Glucose 103 <117 mg/dL    Hemoglobin A1C 5.2 <5.7 %   ECG 12-LEAD WITH MUSE (LHE)    Collection Time: 10/27/24  2:29 PM   Result Value Ref Range    Systolic Blood Pressure 133 mmHg    Diastolic Blood Pressure 62 mmHg    Ventricular Rate 73 BPM    Atrial Rate 73 BPM    ND Interval 168 ms    QRS Duration 108 ms     ms    QTc 456 ms    P Axis 32 degrees    R AXIS 53 degrees    T Axis 76 degrees    Interpretation ECG       Sinus rhythm  Low voltage QRS  Cannot rule out Anterior infarct , age undetermined  Abnormal  ECG  When compared with ECG of 06-Oct-2024 17:50,  Premature atrial complexes are no longer Present  Left bundle branch block is no longer Present  Minimal criteria for Anterior infarct are now Present  Confirmed by SEE ED PROVIDER NOTE FOR, ECG INTERPRETATION ([id number]),  SAHARA MESA ([id number]) on 10/28/2024 2:34:50 AM     Symptomatic Influenza A/B, RSV, & SARS-CoV2 PCR (COVID-19) Nasopharyngeal    Collection Time: 10/27/24  2:55 PM    Specimen: Nasopharyngeal; Swab   Result Value Ref Range    Influenza A PCR Negative Negative    Influenza B PCR Negative Negative    RSV PCR Negative Negative    SARS CoV2 PCR Negative Negative   Troponin T, High Sensitivity    Collection Time: 10/27/24  4:37 PM   Result Value Ref Range    Troponin T, High Sensitivity 26 (H) <=14 ng/L   Magnesium    Collection Time: 10/27/24  4:37 PM   Result Value Ref Range    Magnesium 2.2 1.7 - 2.3 mg/dL   Phosphorus    Collection Time: 10/27/24  4:37 PM   Result Value Ref Range    Phosphorus 2.9 2.5 - 4.5 mg/dL   Protein total    Collection Time: 10/27/24  4:37 PM   Result Value Ref Range    Protein Total 5.1 (L) 6.4 - 8.3 g/dL   INR    Collection Time: 10/28/24 12:25 AM   Result Value Ref Range    INR 2.31 (H) 0.85 - 1.15   Extra Green Top (Lithium Heparin) Tube    Collection Time: 10/28/24 12:25 AM   Result Value Ref Range    Hold Specimen [facility]    Comprehensive metabolic panel    Collection Time: 10/28/24  6:29 AM   Result Value Ref Range    Sodium 141 135 - 145 mmol/L    Potassium 3.6 3.4 - 5.3 mmol/L    Carbon Dioxide (CO2) 22 22 - 29 mmol/L    Anion Gap 10 7 - 15 mmol/L    Urea Nitrogen 10.4 8.0 - 23.0 mg/dL    Creatinine 0.83 0.51 - 0.95 mg/dL    GFR Estimate 69 >60 mL/min/1.73m2    Calcium 7.6 (L) 8.8 - 10.4 mg/dL    Chloride 109 (H) 98 - 107 mmol/L    Glucose 94 70 - 99 mg/dL    Alkaline Phosphatase 88 40 - 150 U/L    AST 21 0 - 45 U/L    ALT 25 0 - 50 U/L    Protein Total 5.0 (L) 6.4 - 8.3 g/dL    Albumin 3.0 (L) 3.5 - 5.2 g/dL     Bilirubin Total 0.2 <=1.2 mg/dL   CBC with platelets    Collection Time: 10/28/24  6:29 AM   Result Value Ref Range    WBC Count 4.0 4.0 - 11.0 10e3/uL    RBC Count 2.92 (L) 3.80 - 5.20 10e6/uL    Hemoglobin 8.9 (L) 11.7 - 15.7 g/dL    Hematocrit 27.6 (L) 35.0 - 47.0 %    MCV 95 78 - 100 fL    MCH 30.5 26.5 - 33.0 pg    MCHC 32.2 31.5 - 36.5 g/dL    RDW 17.7 (H) 10.0 - 15.0 %    Platelet Count 224 150 - 450 10e3/uL   Magnesium    Collection Time: 10/28/24  6:29 AM   Result Value Ref Range    Magnesium 2.2 1.7 - 2.3 mg/dL   Phosphorus    Collection Time: 10/28/24  6:29 AM   Result Value Ref Range    Phosphorus 3.1 2.5 - 4.5 mg/dL   INR    Collection Time: 10/28/24  6:29 AM   Result Value Ref Range    INR 2.05 (H) 0.85 - 1.15   Glucose by meter    Collection Time: 10/28/24  8:17 AM   Result Value Ref Range    GLUCOSE BY METER POCT 91 70 - 99 mg/dL   Cytology, non-gynecologic    Collection Time: 10/28/24  8:38 AM   Result Value Ref Range    Final Diagnosis       Specimen A     Interpretation:      Negative for malignancy    LEFT PLEURAL FLUID:        -  NEGATIVE FOR ATYPICAL OR MALIGNANT CELLS        -  SMALL NUMBERS OF CHRONIC INFLAMMATORY CELLS        -  NEGATIVE FOR ACUTE PURULENT INFLAMMATION     Other Findings:      Chronic inflammation present.     Adequacy:     Satisfactory for evaluation          Gross Description       A(A). Pleural Cavity, Left, :A. Pleural Cavity, Left, , Pleural Fluid:  Received 100 ml of clear, yellow fluid, processed as 1 Pap stained Autocyte,  1 Finch stained cytospin and one hematoxylin and eosin stained cell block.                Microscopic Description       Material examined consists of cell block sections stained with H&E, 1 monolayer preparation stained with Pap stain, and 1 cytospin preparation stained with Finch stain.  These demonstrate very small numbers of lymphocytes and macrophages, with rare mesothelial cells.  Small numbers of intact red blood cells are also present in  the cytospin preparation.  No atypical or malignant features are identified.      Performing Labs       The technical component of this testing was completed at [facility] and [facility].     Stain controls for all stains resulted within this report have been reviewed and show appropriate reactivity.      Pleural fluid Aerobic Bacterial Culture Routine With Gram Stain    Collection Time: 10/28/24  8:38 AM    Specimen: Pleural Cavity, Left; Pleural fluid   Result Value Ref Range    Culture No Growth     Gram Stain Result No organisms seen     Gram Stain Result 3+ WBC seen    Glucose fluid    Collection Time: 10/28/24  8:38 AM   Result Value Ref Range    Glucose Fluid Source Pleural Cavity, Left     Glucose fluid 103 mg/dL   Lactate dehydrogenase fluid    Collection Time: 10/28/24  8:38 AM   Result Value Ref Range    LD Fluid Source Pleural Cavity, Left     Lactate dehydrogenase fluid 122 U/L   Protein fluid    Collection Time: 10/28/24  8:38 AM   Result Value Ref Range    Protein Fluid Source Pleural Cavity, Left     Protein Total Fluid 1.4 g/dL   Anaerobic bacterial culture    Collection Time: 10/28/24  8:38 AM    Specimen: Pleural Cavity, Left; Pleural fluid   Result Value Ref Range    Culture No anaerobic organisms isolated    Cell Count Body Fluid    Collection Time: 10/28/24  8:38 AM   Result Value Ref Range    Color Yellow Colorless, Yellow    Clarity Clear Clear    Cell Count Fluid Source Pleural Cavity, Left     Total Nucleated Cells 37 /uL   Differential Body Fluid    Collection Time: 10/28/24  8:38 AM   Result Value Ref Range    % Neutrophils 5 %    % Lymphocytes 52 %    % Monocyte/Macrophages 40 %    % Eosinophils 1 %    % Lining Cells 2 %   Lactate Dehydrogenase    Collection Time: 10/28/24  9:27 AM   Result Value Ref Range    Lactate Dehydrogenase 322 (H) 0 - 250 U/L   Echo Limited    Collection Time: 10/28/24  2:15 PM   Result Value Ref Range     LVEF  >70%    Glucose by meter    Collection Time: 10/28/24  5:00 PM   Result Value Ref Range    GLUCOSE BY METER POCT 153 (H) 70 - 99 mg/dL   Glucose by meter    Collection Time: 10/28/24 10:41 PM   Result Value Ref Range    GLUCOSE BY METER POCT 105 (H) 70 - 99 mg/dL   INR    Collection Time: 10/29/24  4:30 AM   Result Value Ref Range    INR 1.56 (H) 0.85 - 1.15   Basic metabolic panel    Collection Time: 10/29/24  4:30 AM   Result Value Ref Range    Sodium 141 135 - 145 mmol/L    Potassium 3.2 (L) 3.4 - 5.3 mmol/L    Chloride 107 98 - 107 mmol/L    Carbon Dioxide (CO2) 24 22 - 29 mmol/L    Anion Gap 10 7 - 15 mmol/L    Urea Nitrogen 10.3 8.0 - 23.0 mg/dL    Creatinine 0.83 0.51 - 0.95 mg/dL    GFR Estimate 69 >60 mL/min/1.73m2    Calcium 7.4 (L) 8.8 - 10.4 mg/dL    Glucose 89 70 - 99 mg/dL   CBC with platelets    Collection Time: 10/29/24  4:30 AM   Result Value Ref Range    WBC Count 3.7 (L) 4.0 - 11.0 10e3/uL    RBC Count 3.02 (L) 3.80 - 5.20 10e6/uL    Hemoglobin 9.1 (L) 11.7 - 15.7 g/dL    Hematocrit 28.6 (L) 35.0 - 47.0 %    MCV 95 78 - 100 fL    MCH 30.1 26.5 - 33.0 pg    MCHC 31.8 31.5 - 36.5 g/dL    RDW 17.7 (H) 10.0 - 15.0 %    Platelet Count 214 150 - 450 10e3/uL   Glucose by meter    Collection Time: 10/29/24  7:46 AM   Result Value Ref Range    GLUCOSE BY METER POCT 96 70 - 99 mg/dL   Glucose by meter    Collection Time: 10/31/24  7:58 AM   Result Value Ref Range    GLUCOSE BY METER POCT 92 70 - 99 mg/dL   INR    Collection Time: 11/05/24 10:41 AM   Result Value Ref Range    INR 1.31 (H) 0.85 - 1.15   Basic metabolic panel  (Ca, Cl, CO2, Creat, Gluc, K, Na, BUN)    Collection Time: 11/05/24 10:41 AM   Result Value Ref Range    Sodium 142 135 - 145 mmol/L    Potassium 4.2 3.4 - 5.3 mmol/L    Chloride 108 (H) 98 - 107 mmol/L    Carbon Dioxide (CO2) 24 22 - 29 mmol/L    Anion Gap 10 7 - 15 mmol/L    Urea Nitrogen 12.8 8.0 - 23.0 mg/dL    Creatinine 0.81 0.51 - 0.95 mg/dL    GFR Estimate 71 >60  mL/min/1.73m2    Calcium 8.3 (L) 8.8 - 10.4 mg/dL    Glucose 94 70 - 99 mg/dL         Imaging    CT Chest/Abdomen/Pelvis w Contrast    Result Date: 10/31/2024  EXAM: PET ONCOLOGY WHOLE BODY, CT CHEST/ABDOMEN/PELVIS W CONTRAST LOCATION: [facility] DATE: 10/31/2024 INDICATION: Subsequent treatment planning and restaging for malignant neoplasm of overlapping sites of left female breast. Status post lumpectomy and radiation therapy, currently receiving systemic therapy. Monitor treatment response. COMPARISON: FDG PET/CT dated 6/25/2024 CONTRAST: 65 mL isovue 370 TECHNIQUE: Serum glucose level 92 mg/dL. One hour post intravenous administration of 10.2 mCi F-18 FDG, PET imaging was performed from the skull vertex to feet, utilizing attenuation correction with concurrent axial CT and PET/CT image fusion. Separate diagnostic CT of the chest, abdomen, and pelvis was performed. Dose reduction techniques were used. PET/CT FINDINGS: Shoulder and hip synovitis. Diffuse esophagitis. Rim-enhancing peripherally FDG avid fluid collections within the right greater than left lower extremity musculature below the knee measuring 2.4 x 2.1 x 9.5 cm on the right (max SUV 3.9) and FDG avid ulcerative area in the anterior right lower leg below-the-knee (Max SUV 3.4), which are nonspecific but raises suspicion for inflammatory/infectious processes. The remaining FDG uptake is physiologic from the skull vertex to feet. CT FINDINGS: Mild senescent intercranial changes. Postoperative change of the bilateral lenses. Right chest port with tip terminating near the superior cavoatrial junction. Small bilateral pleural effusions and adjacent atelectatic change. Patchy airspace opacities throughout both lungs typical of an inflammatory/infectious pulmonary process. Postsurgical change in the left breast and left axillary region. Mild coronary artery calcium. TAVR. Cholecystectomy. Sigmoid diverticulosis. Hysterectomy. Pelvic  phleboliths. Innumerable non-FDG avid sclerotic osseous lesions likely representing treated/quiescent disease. Chronic severe T12 compression deformity and associated segmental kyphosis. Multilevel degenerative changes of the spine.     IMPRESSION: 1. No convincing metabolic evidence of active neoplasm. 2. Ulcerative area in the anterior right lower leg below-the-knee with peripherally FDG avid rim-enhancing fluid collections involving the right greater than left lower extremity musculature. While nonspecific these findings are suspicious for an inflammatory/infectious process. The sterility of these collections cannot be determined on this examination.    PET Oncology Whole Body    Result Date: 10/31/2024  EXAM: PET ONCOLOGY WHOLE BODY, CT CHEST/ABDOMEN/PELVIS W CONTRAST LOCATION: [facility] DATE: 10/31/2024 INDICATION: Subsequent treatment planning and restaging for malignant neoplasm of overlapping sites of left female breast. Status post lumpectomy and radiation therapy, currently receiving systemic therapy. Monitor treatment response. COMPARISON: FDG PET/CT dated 6/25/2024 CONTRAST: 65 mL isovue 370 TECHNIQUE: Serum glucose level 92 mg/dL. One hour post intravenous administration of 10.2 mCi F-18 FDG, PET imaging was performed from the skull vertex to feet, utilizing attenuation correction with concurrent axial CT and PET/CT image fusion. Separate diagnostic CT of the chest, abdomen, and pelvis was performed. Dose reduction techniques were used. PET/CT FINDINGS: Shoulder and hip synovitis. Diffuse esophagitis. Rim-enhancing peripherally FDG avid fluid collections within the right greater than left lower extremity musculature below the knee measuring 2.4 x 2.1 x 9.5 cm on the right (max SUV 3.9) and FDG avid ulcerative area in the anterior right lower leg below-the-knee (Max SUV 3.4), which are nonspecific but raises suspicion for inflammatory/infectious processes. The remaining FDG  uptake is physiologic from the skull vertex to feet. CT FINDINGS: Mild senescent intercranial changes. Postoperative change of the bilateral lenses. Right chest port with tip terminating near the superior cavoatrial junction. Small bilateral pleural effusions and adjacent atelectatic change. Patchy airspace opacities throughout both lungs typical of an inflammatory/infectious pulmonary process. Postsurgical change in the left breast and left axillary region. Mild coronary artery calcium. TAVR. Cholecystectomy. Sigmoid diverticulosis. Hysterectomy. Pelvic phleboliths. Innumerable non-FDG avid sclerotic osseous lesions likely representing treated/quiescent disease. Chronic severe T12 compression deformity and associated segmental kyphosis. Multilevel degenerative changes of the spine.     IMPRESSION: 1. No convincing metabolic evidence of active neoplasm. 2. Ulcerative area in the anterior right lower leg below-the-knee with peripherally FDG avid rim-enhancing fluid collections involving the right greater than left lower extremity musculature. While nonspecific these findings are suspicious for an inflammatory/infectious process. The sterility of these collections cannot be determined on this examination.    US Chest Pleural Effusion Imaging    Result Date: 10/29/2024  EXAM: US CHEST/PLEURAL EFFUSION IMAGING LOCATION: [facility] DATE: 10/29/2024 INDICATION: right pleural effusion COMPARISON: CTA chest 10/27/2024 TECHNIQUE: Routine. FINDINGS: Targeted ultrasound of the right posterior chest/pleural space demonstrates only a trace pleural effusion, decreased compared to CTA chest 10/27/2024.     IMPRESSION: Previously seen moderate-sized right pleural effusion has decreased compared to CTA chest 10/27/2024, now only trace. Because of this, thoracentesis was deferred.    Echo Limited    Result Date: 10/28/2024  [id number]^GONDAL^VIOLET^J  [facility] [address] [place] [address]  Name: URIAH OLIVEROS MRN: [id number] : 1940 Study Date: 10/28/2024 01:41 PM Age: 84 yrs Gender: Female Patient Location: [facility] Reason For Study: Heart Failure Ordering Physician: GONDAL, SAAD J Performed By: JERI  BSA: 1.7 m2 Height: 63 in Weight: 144 lb HR: 85 BP: 148/68 mmHg ______________________________________________________________________________ Procedure Complete Portable Echo Adult. Definity (NDC #16488-934) given intravenously. Compared to the prior study dated 10/7/2024 the EF remains normal. Diasotlic function and valves not fully evaluated on today's images, but the normal AV AT suggests the increased gradient is not due to prosthetic stenosis. ______________________________________________________________________________ Interpretation Summary  The left ventricle is normal in size. The visual ejection fraction is >70%. No regional wall motion abnormalities noted. There is a 23 mm Wray Cecilia 3 bioprosthetic prosthesis in the aortic position with a mean gradient of 27mmHg, AT 80 ms. No significant valvular or paravalvular regurgitation. Implant date . RVSP 22mmHg + RAP Compared to the prior study dated 10/7/2024 the EF remains normal. Diasotlic function and valves not fully evaluated on today's images, but the normal AV AT suggests the increased gradient is not due to prosthetic stenosis ______________________________________________________________________________ Left Ventricle The left ventricle is normal in size. The visual ejection fraction is >70%. No regional wall motion abnormalities noted.  Right Ventricle Normal right ventricle size and systolic function.  Atria The left atrium is not well visualized. Right atrium not well visualized.  Mitral Valve The mitral valve leaflets are mildly thickened. There is mild mitral annular calcification. There is trace mitral regurgitation.  Tricuspid Valve The tricuspid valve is not well visualized. There is  mild (1+) tricuspid regurgitation. RVSP 22mmHg + RAP. There is no tricuspid stenosis.  Aortic Valve There is a 23 mm Wray Cecilia 3 bioprosthetic prosthesis in the aortic position with a mean gradient of 27mmHg, AT 80 ms. No significant valvular or paravalvular regurgitation. Implant date .  Pulmonic Valve The pulmonic valve is not well visualized.  Vessels The aorta root cannot be assessed. The thoracic aorta cannot be assessed. Inferior vena cava not well visualized for estimation of right atrial pressure.  Pericardium There is no pericardial effusion.  Rhythm Sinus rhythm was noted. ______________________________________________________________________________ MMode/2D Measurements & Calculations  EF Biplane: 76.4 % TAPSE: 2.0 cm  Time Measurements MM HR: 78.0 BPM  Doppler Measurements & Calculations MV E max danuta: 78.8 cm/sec MV A max danuta: 114.8 cm/sec MV E/A: 0.69 MV dec slope: 508.9 cm/sec2 MV dec time: 0.15 sec Ao V2 max: 330.6 cm/sec Ao max P.0 mmHg Ao V2 mean: 251.0 cm/sec Ao mean P.4 mmHg Ao V2 VTI: 76.9 cm Ao acc time: 0.07 sec TR max danuta: 236.5 cm/sec TR max P.4 mmHg  ______________________________________________________________________________ Report approved by: Becky Sheehan 10/28/2024 04:30 PM       US Thoracentesis    Result Date: 10/28/2024  EXAM: 1. LEFT THORACENTESIS 2. ULTRASOUND GUIDANCE LOCATION: [facility] DATE: 10/28/2024 INDICATION: Pleural effusion. PROCEDURE: Informed consent obtained. Time out performed. The chest was prepped and draped in sterile fashion. 6 mL of 1 % lidocaine was infused into the local soft tissues. Under direct ultrasound guidance, a 5 Kinyarwanda catheter system was placed into the  pleural effusion. 0.6 liters of yellow-brown fluid were removed and sent to lab, if requested. Patient tolerated procedure well. Ultrasound imaging was obtained and placed in the patient's permanent medical record.     IMPRESSION: Status post  left ultrasound-guided thoracentesis. Reference CPT Code: 14493    US Lower Extremity Venous Duplex Bilateral    Result Date: 10/28/2024  EXAM: US LOWER EXTREMITY VENOUS DUPLEX BILATERAL LOCATION: [facility] DATE: 10/27/2024 INDICATION: Bilateral lower extremity pitting edema. COMPARISON: None available. TECHNIQUE: Venous Duplex ultrasound of bilateral lower extremities with and without compression, augmentation and duplex. Color flow and spectral Doppler with waveform analysis performed. FINDINGS: Exam includes the common femoral, femoral, popliteal veins as well as segmentally visualized deep calf veins and greater saphenous vein. RIGHT: No deep vein thrombosis. No superficial thrombophlebitis. No popliteal cyst. LEFT: No deep vein thrombosis. No superficial thrombophlebitis. No popliteal cyst.     IMPRESSION: 1.  No deep venous thrombosis in the bilateral lower extremities.    CT Chest Pulmonary Embolism w Contrast    Result Date: 10/27/2024  EXAM: CT CHEST PULMONARY EMBOLISM W CONTRAST LOCATION: [facility] DATE: 10/27/2024 INDICATION: short of breath, + ddimer. Breast cancer. COMPARISON: 6/25/2024 TECHNIQUE: CT chest pulmonary angiogram during arterial phase injection of IV contrast. Multiplanar reformats and MIP reconstructions were performed. Dose reduction techniques were used. CONTRAST: [id number]  70ml FINDINGS: ANGIOGRAM CHEST: Pulmonary arteries are normal caliber and negative for pulmonary emboli. Thoracic aorta is negative for dissection. LUNGS AND PLEURA: Moderate size right and left pleural effusions. There is interstitial edema and atelectasis. MEDIASTINUM/AXILLAE: There is a right chest wall Port-A-Cath. A TAPVR stent is present. There is mild pericardial thickening or fluid. Left axillary surgical clips are present. CORONARY ARTERY CALCIFICATION: Mild. UPPER ABDOMEN: Cholecystectomy. MUSCULOSKELETAL: Multiple sclerotic bone lesions are again seen.  T12 vertebral body height loss has not changed.     IMPRESSION: 1.  No PE. 2.  Moderate size right and left pleural effusions. There is interstitial edema and atelectasis. Underlying atypical infection cannot be excluded. 3.  Multiple sclerotic bone lesions are again seen.     XR Chest Port 1 View    Result Date: 10/27/2024  EXAM: XR CHEST PORT 1 VIEW LOCATION: [facility] DATE: 10/27/2024 INDICATION: short of breath COMPARISON: 10/6/2024     IMPRESSION: Small left pleural effusion with left basilar consolidation, likely compressive atelectasis, although pneumonia is also possible. Clear right lung and pleural space. No pneumothorax. Normal cardiomediastinal silhouette with TAVR. Right chest port catheter tip in the lower SVC.    EEG Awake or Drowsy Routine    Result Date: 10/8/2024  [facility] [place]  Name: Kacie Hermosillo;  : 1940;  MRN: [id number] PCP: Irina Francisco Date of study: Oct 7, 2024 History: Kacie Hermosillo is a 84 year old female admitted to [facility] with a syncopal spell. This EEG is performed to evaluate for seizure. Findings: This 22 channel digital EEG is recorded using the 10-20 International system.  The recording shows a posterior dominant background rhythm of 7 Hz which is a little variable but is symmetric and attenuates normally to eye opening.  Photic stimulation produces no change in the record.  Later in the recording there is waning of the background rhythms indicating drowsiness followed by some bilateral theta slowing and then occasional bilateral delta activity indicating light sleep.  There are no areas of focal or lateralized slowing.  No epileptiform findings are present. Impression: This is a mildly abnormal EEG for the awake and stage 1 sleep states; the mild background slowing can be seen with underlying cognitive impairment.  There are no epileptiform findings on today's study.  Lamont Milian MD      Echocardiogram Complete    Result Date: 10/7/2024  [id number]^CALIN^[place] [address]  Name: URIAH OLIVEROS MRN: [id number] : 1940 Study Date: 10/07/2024 01:15 PM Age: 84 yrs Gender: Female Patient Location: [facility] Reason For Study: Syncope Ordering Physician: PRIMO LAYTON Performed By: JERI  BSA: 1.6 m2 Height: 63 in Weight: 130 lb ______________________________________________________________________________ Procedure Complete Portable Echo Adult. Technically difficult study. Compared to the prior study dated 2024, there are changes as noted. ______________________________________________________________________________ Interpretation Summary  1. Left ventricular chamber size and wall thickness are normal. Systolic function is hyperdynamic. The visually estimated left ventricular ejection fraction is greater than 70%. 2. Right ventricular chamber size and systolic function are normal. 3. A well-seated 23 mm Wray Cecilia 3 bioprosthetic aortic valve is present. Peak forward velocity is elevated at 3.5 m/s and mean outflow gradient elevated at 25 mmHg. Dimensionless of index of 0.30 and acceleration time of 80 ms are both normal which may suggest that the increased peak velocity and mean outflow gradient are due to a high flow state. No significant prosthetic regurgitation is seen. 4. Compared to the prior study dated 2024, there has been an increase in heart rate and left ventricular ejection fraction with a corresponding increase in prosthetic aortic valve outflow gradient. ______________________________________________________________________________ I      WMSI = 0.00     % Normal = 0  X - Cannot   0 -                      (2) - Mildly 2 -          Segments  Size Interpret    Hyperkinetic 1 - Normal  Hypokinetic  Hypokinetic  1-2     small                                                    7 -          3-5     moderate 3 - Akinetic 4 -          5 -         6 - Akinetic Dyskinetic   6-14    large              Dyskinetic   Aneurysmal  w/scar       w/scar       15-16   diffuse  Left Ventricle The left ventricle is normal in size. There is normal left ventricular wall thickness. Hyperdynamic left ventricular function. The visual ejection fraction is >70%. Left ventricular diastolic function is indeterminate. No regional wall motion abnormalities noted.  Right Ventricle The right ventricle is normal size. The right ventricular systolic function is normal.  Atria Normal left atrial size. Right atrial size is normal.  Mitral Valve The mitral valve leaflets are mildly thickened. There is mild mitral annular calcification. There is trace mitral regurgitation. There is no mitral valve stenosis.  Tricuspid Valve Tricuspid valve leaflets appear normal. There is mild (1+) tricuspid regurgitation. Right ventricle systolic pressure estimate normal. The right ventricular systolic pressure is approximated at 30.0 mmHg plus the right atrial pressure. There is no tricuspid stenosis.  Aortic Valve A well-seated 23 mm Wray Cecilia 3 bioprosthetic aortic valve is present. Peak forward velocity is elevated at 3.5 m/s and mean outflow gradient elevated at 25 mmHg. Dimensionless of index of 0.30 and acceleration time of 80 ms are both normal which may suggest that the increased peak velocity and mean gradient are due to a high flow state. No aortic regurgitation is present.  Pulmonic Valve The pulmonic valve is not well visualized. There is no pulmonic valvular regurgitation. There is no pulmonic valvular stenosis.  Vessels The aorta root cannot be assessed. The thoracic aorta cannot be assessed. IVC diameter <2.1 cm collapsing >50% with sniff suggests a normal RA pressure of 3 mmHg.  Pericardium There is no pericardial effusion.  Rhythm Sinus rhythm was noted. ______________________________________________________________________________  MMode/2D Measurements & Calculations IVSd: 0.90 cm  LVIDd: 4.0 cm LVIDs: 1.5 cm LVPWd: 0.90 cm FS: 62.5 % LV mass(C)d: 109.7 grams LV mass(C)dI: 68.1 grams/m2 LA dimension: 3.8 cm LVOT diam: 1.8 cm LVOT area: 2.5 cm2 EF Biplane: 61.1 % LA Volume Indexed (AL/bp): 15.7 ml/m2 RV Base: 3.8 cm RWT: 0.45 TAPSE: 2.3 cm  Time Measurements MM HR: 91.0 BPM  Doppler Measurements & Calculations MV E max frantz: 94.9 cm/sec MV A max frantz: 125.0 cm/sec MV E/A: 0.76 MV max P.7 mmHg MV mean P.0 mmHg MV V2 VTI: 32.6 cm MVA(VTI): 1.6 cm2 MV dec slope: 430.0 cm/sec2 MV dec time: 0.22 sec Ao V2 max: 350.3 cm/sec Ao max P.0 mmHg Ao V2 mean: 243.0 cm/sec Ao mean P.0 mmHg Ao V2 VTI: 67.4 cm CARYL(I,D): 0.78 cm2 CARYL(V,D): 0.77 cm2 Ao acc time: 0.08 sec LV V1 max P.5 mmHg LV V1 max: 106.0 cm/sec LV V1 VTI: 20.7 cm SV(LVOT): 52.7 ml SI(LVOT): 32.7 ml/m2 PA acc time: 0.06 sec TR max frantz: 274.0 cm/sec TR max P.0 mmHg AV Frantz Ratio (DI): 0.30 CARYL Index (cm2/m2): 0.49  RV S Frantz: 19.3 cm/sec  ______________________________________________________________________________ Report approved by: Jatin Chen 10/07/2024 03:08 PM       CT Abdomen Pelvis w Contrast    Result Date: 10/6/2024  EXAM: CT ABDOMEN PELVIS W CONTRAST LOCATION: [facility] DATE: 10/6/2024 INDICATION: fall, elevated INR. Breast cancer. COMPARISON: 2024 PET scan. TECHNIQUE: CT scan of the abdomen and pelvis was performed following injection of IV contrast. Multiplanar reformats were obtained. Dose reduction techniques were used. CONTRAST: 65ml [id number] FINDINGS: LOWER CHEST: Normal. HEPATOBILIARY: Gallbladder removed. Prominent bile ducts similar to previous consistent with reservoir effect. PANCREAS: Normal. SPLEEN: Normal. ADRENAL GLANDS: Normal. KIDNEYS/BLADDER: Normal. BOWEL: Scattered diverticula but nothing for acute diverticulitis. No hematomas or hemorrhage. LYMPH NODES: No lymphadenopathy. VASCULATURE: Normal. PELVIC  ORGANS: Postop change. MUSCULOSKELETAL: Diffuse tiny sclerotic skeletal metastases throughout the visualized skeleton similar to previous. Stable T12 advanced compression fracture.     IMPRESSION: 1.  Nothing for bleeds in the abdomen or pelvis. 2.  Diffuse skeletal metastases similar to previous.    Chest XR,  PA & LAT    Result Date: 10/6/2024  EXAM: XR CHEST 2 VIEWS LOCATION: [facility] DATE: 10/6/2024 INDICATION: syncope, elevated WBC COMPARISON: None.     IMPRESSION: Heart size and pulmonary vessels normal. Previous TAVR. Calcified granuloma left base with lungs otherwise clear. No pleural effusion evident. Right-sided Port-A-Cath. Surgical clips left axilla. Compression of low thoracic vertebral body.     Head CT w/o contrast    Result Date: 10/6/2024  EXAM: CT HEAD W/O CONTRAST, CT CERVICAL SPINE W/O CONTRAST, CT THORACIC SPINE W/O CONTRAST, CT LUMBAR SPINE RECONSTRUCTED LOCATION: [facility] DATE: 10/6/2024 INDICATION: Head, neck, and back injury COMPARISON: None. TECHNIQUE: 1) Routine CT Head without IV contrast. Multiplanar reformats. Dose reduction techniques were used. 2) Routine CT Cervical Spine without IV contrast. Multiplanar reformats. Dose reduction techniques were used. 3) Routine CT Thoracic Spine without IV contrast. Multiplanar reformats. Dose reduction techniques were used. 4) Routine CT Lumbar Spine without IV contrast. Multiplanar reformats. Dose reduction techniques were used. FINDINGS: HEAD CT: INTRACRANIAL CONTENTS: No intracranial hemorrhage, extraaxial collection, or mass effect.  Small chronic infarction with mild chronic infarction left parietal occipital region. Small chronic infarctions cerebellar hemispheres bilaterally. Moderate presumed chronic small vessel ischemic changes. Moderate generalized volume loss. No hydrocephalus. VISUALIZED ORBITS/SINUSES/MASTOIDS: No intraorbital abnormality. No paranasal sinus mucosal disease. No  middle ear or mastoid effusion. BONES/SOFT TISSUES: No acute abnormality. CERVICAL SPINE CT: VERTEBRA: Normal vertebral body heights. Mild reversal of cervical lordosis. Slight anterior subluxation C2 on C3 and C7 on T1. No definite fracture. CANAL/FORAMINA: Moderate degenerative changes with mild canal narrowing C4-5, moderate C5-6. Multilevel prominent neural foraminal narrowing. PARASPINAL: No extraspinal abnormality. Visualized lung fields are clear. THORACIC SPINE CT: VERTEBRA: Slight loss of height superior aspect of T4, probably chronic. Marked compression of T12, age indeterminate however there is suggestion of acute/subacute component. Associated retropulsion with mild canal narrowing.  No definite other fractures. CANAL/FORAMINA: Multilevel mild neural foraminal narrowing. PARASPINAL: No extraspinal abnormality. LUMBAR SPINE CT: VERTEBRA: Normal vertebral body heights and alignment. No fracture or posttraumatic subluxation. CANAL/FORAMINA: Mild degenerative changes with mild canal narrowing at L3-4 and L4-5. Multilevel prominent neural foraminal narrowing. Slight posterior subluxation L2 on L3. PARASPINAL: No extraspinal abnormality. Innumerable sclerotic lesions throughout visualized bones; most consistent with metastases.     IMPRESSION: HEAD CT: 1.  No acute intracranial process. 2.  Innumerable sclerotic lesions throughout visualized bones of head, chest, spine, and pelvis; most consistent with metastases. CERVICAL SPINE CT: 1.  No definite fracture. 2.  Degenerative changes, as described. THORACIC SPINE CT: 1.  Slight loss of height superior aspect of T4, probably chronic. 2.  Marked compression of T12, age indeterminate however there is suggestion of acute/subacute component. Associated retropulsion with mild canal narrowing. 3.  Degenerative changes, as described. LUMBAR SPINE CT: 1.  No definite fracture. 2.  Degenerative changes, as described.    CT Cervical Spine w/o Contrast    Result Date:  10/6/2024  EXAM: CT HEAD W/O CONTRAST, CT CERVICAL SPINE W/O CONTRAST, CT THORACIC SPINE W/O CONTRAST, CT LUMBAR SPINE RECONSTRUCTED LOCATION: [facility] DATE: 10/6/2024 INDICATION: Head, neck, and back injury COMPARISON: None. TECHNIQUE: 1) Routine CT Head without IV contrast. Multiplanar reformats. Dose reduction techniques were used. 2) Routine CT Cervical Spine without IV contrast. Multiplanar reformats. Dose reduction techniques were used. 3) Routine CT Thoracic Spine without IV contrast. Multiplanar reformats. Dose reduction techniques were used. 4) Routine CT Lumbar Spine without IV contrast. Multiplanar reformats. Dose reduction techniques were used. FINDINGS: HEAD CT: INTRACRANIAL CONTENTS: No intracranial hemorrhage, extraaxial collection, or mass effect.  Small chronic infarction with mild chronic infarction left parietal occipital region. Small chronic infarctions cerebellar hemispheres bilaterally. Moderate presumed chronic small vessel ischemic changes. Moderate generalized volume loss. No hydrocephalus. VISUALIZED ORBITS/SINUSES/MASTOIDS: No intraorbital abnormality. No paranasal sinus mucosal disease. No middle ear or mastoid effusion. BONES/SOFT TISSUES: No acute abnormality. CERVICAL SPINE CT: VERTEBRA: Normal vertebral body heights. Mild reversal of cervical lordosis. Slight anterior subluxation C2 on C3 and C7 on T1. No definite fracture. CANAL/FORAMINA: Moderate degenerative changes with mild canal narrowing C4-5, moderate C5-6. Multilevel prominent neural foraminal narrowing. PARASPINAL: No extraspinal abnormality. Visualized lung fields are clear. THORACIC SPINE CT: VERTEBRA: Slight loss of height superior aspect of T4, probably chronic. Marked compression of T12, age indeterminate however there is suggestion of acute/subacute component. Associated retropulsion with mild canal narrowing.  No definite other fractures. CANAL/FORAMINA: Multilevel mild neural foraminal  narrowing. PARASPINAL: No extraspinal abnormality. LUMBAR SPINE CT: VERTEBRA: Normal vertebral body heights and alignment. No fracture or posttraumatic subluxation. CANAL/FORAMINA: Mild degenerative changes with mild canal narrowing at L3-4 and L4-5. Multilevel prominent neural foraminal narrowing. Slight posterior subluxation L2 on L3. PARASPINAL: No extraspinal abnormality. Innumerable sclerotic lesions throughout visualized bones; most consistent with metastases.     IMPRESSION: HEAD CT: 1.  No acute intracranial process. 2.  Innumerable sclerotic lesions throughout visualized bones of head, chest, spine, and pelvis; most consistent with metastases. CERVICAL SPINE CT: 1.  No definite fracture. 2.  Degenerative changes, as described. THORACIC SPINE CT: 1.  Slight loss of height superior aspect of T4, probably chronic. 2.  Marked compression of T12, age indeterminate however there is suggestion of acute/subacute component. Associated retropulsion with mild canal narrowing. 3.  Degenerative changes, as described. LUMBAR SPINE CT: 1.  No definite fracture. 2.  Degenerative changes, as described.    CT Thoracic Spine w/o Contrast    Result Date: 10/6/2024  EXAM: CT HEAD W/O CONTRAST, CT CERVICAL SPINE W/O CONTRAST, CT THORACIC SPINE W/O CONTRAST, CT LUMBAR SPINE RECONSTRUCTED LOCATION: [facility] DATE: 10/6/2024 INDICATION: Head, neck, and back injury COMPARISON: None. TECHNIQUE: 1) Routine CT Head without IV contrast. Multiplanar reformats. Dose reduction techniques were used. 2) Routine CT Cervical Spine without IV contrast. Multiplanar reformats. Dose reduction techniques were used. 3) Routine CT Thoracic Spine without IV contrast. Multiplanar reformats. Dose reduction techniques were used. 4) Routine CT Lumbar Spine without IV contrast. Multiplanar reformats. Dose reduction techniques were used. FINDINGS: HEAD CT: INTRACRANIAL CONTENTS: No intracranial hemorrhage, extraaxial collection, or  mass effect.  Small chronic infarction with mild chronic infarction left parietal occipital region. Small chronic infarctions cerebellar hemispheres bilaterally. Moderate presumed chronic small vessel ischemic changes. Moderate generalized volume loss. No hydrocephalus. VISUALIZED ORBITS/SINUSES/MASTOIDS: No intraorbital abnormality. No paranasal sinus mucosal disease. No middle ear or mastoid effusion. BONES/SOFT TISSUES: No acute abnormality. CERVICAL SPINE CT: VERTEBRA: Normal vertebral body heights. Mild reversal of cervical lordosis. Slight anterior subluxation C2 on C3 and C7 on T1. No definite fracture. CANAL/FORAMINA: Moderate degenerative changes with mild canal narrowing C4-5, moderate C5-6. Multilevel prominent neural foraminal narrowing. PARASPINAL: No extraspinal abnormality. Visualized lung fields are clear. THORACIC SPINE CT: VERTEBRA: Slight loss of height superior aspect of T4, probably chronic. Marked compression of T12, age indeterminate however there is suggestion of acute/subacute component. Associated retropulsion with mild canal narrowing.  No definite other fractures. CANAL/FORAMINA: Multilevel mild neural foraminal narrowing. PARASPINAL: No extraspinal abnormality. LUMBAR SPINE CT: VERTEBRA: Normal vertebral body heights and alignment. No fracture or posttraumatic subluxation. CANAL/FORAMINA: Mild degenerative changes with mild canal narrowing at L3-4 and L4-5. Multilevel prominent neural foraminal narrowing. Slight posterior subluxation L2 on L3. PARASPINAL: No extraspinal abnormality. Innumerable sclerotic lesions throughout visualized bones; most consistent with metastases.     IMPRESSION: HEAD CT: 1.  No acute intracranial process. 2.  Innumerable sclerotic lesions throughout visualized bones of head, chest, spine, and pelvis; most consistent with metastases. CERVICAL SPINE CT: 1.  No definite fracture. 2.  Degenerative changes, as described. THORACIC SPINE CT: 1.  Slight loss of height  superior aspect of T4, probably chronic. 2.  Marked compression of T12, age indeterminate however there is suggestion of acute/subacute component. Associated retropulsion with mild canal narrowing. 3.  Degenerative changes, as described. LUMBAR SPINE CT: 1.  No definite fracture. 2.  Degenerative changes, as described.    CT Lumbar Spine Reconstructed    Result Date: 10/6/2024  EXAM: CT HEAD W/O CONTRAST, CT CERVICAL SPINE W/O CONTRAST, CT THORACIC SPINE W/O CONTRAST, CT LUMBAR SPINE RECONSTRUCTED LOCATION: [facility] DATE: 10/6/2024 INDICATION: Head, neck, and back injury COMPARISON: None. TECHNIQUE: 1) Routine CT Head without IV contrast. Multiplanar reformats. Dose reduction techniques were used. 2) Routine CT Cervical Spine without IV contrast. Multiplanar reformats. Dose reduction techniques were used. 3) Routine CT Thoracic Spine without IV contrast. Multiplanar reformats. Dose reduction techniques were used. 4) Routine CT Lumbar Spine without IV contrast. Multiplanar reformats. Dose reduction techniques were used. FINDINGS: HEAD CT: INTRACRANIAL CONTENTS: No intracranial hemorrhage, extraaxial collection, or mass effect.  Small chronic infarction with mild chronic infarction left parietal occipital region. Small chronic infarctions cerebellar hemispheres bilaterally. Moderate presumed chronic small vessel ischemic changes. Moderate generalized volume loss. No hydrocephalus. VISUALIZED ORBITS/SINUSES/MASTOIDS: No intraorbital abnormality. No paranasal sinus mucosal disease. No middle ear or mastoid effusion. BONES/SOFT TISSUES: No acute abnormality. CERVICAL SPINE CT: VERTEBRA: Normal vertebral body heights. Mild reversal of cervical lordosis. Slight anterior subluxation C2 on C3 and C7 on T1. No definite fracture. CANAL/FORAMINA: Moderate degenerative changes with mild canal narrowing C4-5, moderate C5-6. Multilevel prominent neural foraminal narrowing. PARASPINAL: No extraspinal  abnormality. Visualized lung fields are clear. THORACIC SPINE CT: VERTEBRA: Slight loss of height superior aspect of T4, probably chronic. Marked compression of T12, age indeterminate however there is suggestion of acute/subacute component. Associated retropulsion with mild canal narrowing.  No definite other fractures. CANAL/FORAMINA: Multilevel mild neural foraminal narrowing. PARASPINAL: No extraspinal abnormality. LUMBAR SPINE CT: VERTEBRA: Normal vertebral body heights and alignment. No fracture or posttraumatic subluxation. CANAL/FORAMINA: Mild degenerative changes with mild canal narrowing at L3-4 and L4-5. Multilevel prominent neural foraminal narrowing. Slight posterior subluxation L2 on L3. PARASPINAL: No extraspinal abnormality. Innumerable sclerotic lesions throughout visualized bones; most consistent with metastases.     IMPRESSION: HEAD CT: 1.  No acute intracranial process. 2.  Innumerable sclerotic lesions throughout visualized bones of head, chest, spine, and pelvis; most consistent with metastases. CERVICAL SPINE CT: 1.  No definite fracture. 2.  Degenerative changes, as described. THORACIC SPINE CT: 1.  Slight loss of height superior aspect of T4, probably chronic. 2.  Marked compression of T12, age indeterminate however there is suggestion of acute/subacute component. Associated retropulsion with mild canal narrowing. 3.  Degenerative changes, as described. LUMBAR SPINE CT: 1.  No definite fracture. 2.  Degenerative changes, as described.    Elbow XR, G/E 3 views, right    Result Date: 10/6/2024  EXAM: XR ELBOW RIGHT G/E 3 VIEWS LOCATION: [facility] DATE: 10/6/2024 INDICATION: Fall, elbow pain. COMPARISON: None.     IMPRESSION: No acute displaced fracture identified. Joint spaces are maintained. There is no elbow joint effusion. Bones are demineralized.         Assessment and Plan    Metastatic breast cancer with bone mets HER2/amarilis low  Patient is here in follow-up.  She  had a PET scan done.  I reviewed the PET scan images personally.  The results were discussed with the patient and her daughter.  She seems to have had a complete radiologic response with no PET evidence of active cancer.  This was discussed with the patient and her daughter.  They were happy to hear that.  She however has been having significant GI toxicity from the fam-trastuzumab.  Her options were discussed with her including stopping all treatment versus trying a lower dose and being more vigilant about the use of antidiarrheals as in the past whenever she has used antidiarrheals they have worked well.  After asking questions they are willing to go ahead with treatment with a reduced dose.  Fam-trastuzumab decreased by 25% and she will be given her next dose in the next few days.  I will see her in 3 weeks after that for her subsequent dose.    Diarrhea   patient continues to have trouble with diarrhea after treatment however she gets into trouble mostly when she is unable to take her Imodium in a timely manner.  We are planning to decrease the dose of her chemotherapy which will decrease the severity of the diarrhea.  I have also told the patient's daughter to talk to the nursing home staff to get the patient started on Imodium as soon as she has her first loose stool.  We will also plan to add Lomotil if the Imodium is not controlling the diarrhea.  Communication with the nursing home staff and timely administration of Imodium will be important      Bone metastases  Patient to continue taking Zometa periodically    Cardiac monitoring  Patient does need cardiac monitoring.  She did have an echo in the hospital with an ejection fraction of 70% next echo will be done in 3 months or after 4 doses        Signed by: Hadley Gann MD        CC: Irina Francisco PA-C       Again, thank you for allowing me to participate in the care of your patient.        Sincerely,        Hadley Gann MD

## 2024-11-06 ENCOUNTER — LAB REQUISITION (OUTPATIENT)
Dept: LAB | Facility: CLINIC | Age: 84
End: 2024-11-06
Payer: COMMERCIAL

## 2024-11-06 DIAGNOSIS — I42.9 CARDIOMYOPATHY, UNSPECIFIED (H): ICD-10-CM

## 2024-11-06 DIAGNOSIS — D63.1 ANEMIA IN CHRONIC KIDNEY DISEASE (CODE): ICD-10-CM

## 2024-11-06 DIAGNOSIS — N18.31 CHRONIC KIDNEY DISEASE, STAGE 3A (H): ICD-10-CM

## 2024-11-06 DIAGNOSIS — S22.080D WEDGE COMPRESSION FRACTURE OF T11-T12 VERTEBRA, SUBSEQUENT ENCOUNTER FOR FRACTURE WITH ROUTINE HEALING: ICD-10-CM

## 2024-11-06 RX ORDER — DEXAMETHASONE 4 MG/1
8 TABLET ORAL DAILY
Qty: 6 TABLET | Refills: 2 | Status: SHIPPED | OUTPATIENT
Start: 2024-11-06

## 2024-11-07 ENCOUNTER — TELEPHONE (OUTPATIENT)
Dept: FAMILY MEDICINE | Facility: CLINIC | Age: 84
End: 2024-11-07

## 2024-11-07 ENCOUNTER — INFUSION THERAPY VISIT (OUTPATIENT)
Dept: INFUSION THERAPY | Facility: HOSPITAL | Age: 84
End: 2024-11-07
Attending: INTERNAL MEDICINE
Payer: COMMERCIAL

## 2024-11-07 VITALS
TEMPERATURE: 97.7 F | SYSTOLIC BLOOD PRESSURE: 113 MMHG | RESPIRATION RATE: 16 BRPM | DIASTOLIC BLOOD PRESSURE: 65 MMHG | HEART RATE: 67 BPM | OXYGEN SATURATION: 98 %

## 2024-11-07 DIAGNOSIS — E78.5 HYPERLIPIDEMIA, UNSPECIFIED HYPERLIPIDEMIA TYPE: ICD-10-CM

## 2024-11-07 DIAGNOSIS — R19.7 DIARRHEA, UNSPECIFIED TYPE: Primary | ICD-10-CM

## 2024-11-07 DIAGNOSIS — I47.10 SVT (SUPRAVENTRICULAR TACHYCARDIA) (H): ICD-10-CM

## 2024-11-07 DIAGNOSIS — C79.51 MALIGNANT NEOPLASM METASTATIC TO BONE (H): Primary | ICD-10-CM

## 2024-11-07 DIAGNOSIS — I51.89 DIASTOLIC DYSFUNCTION: Primary | ICD-10-CM

## 2024-11-07 DIAGNOSIS — I10 ESSENTIAL HYPERTENSION: ICD-10-CM

## 2024-11-07 LAB
ALBUMIN SERPL BCG-MCNC: 3.5 G/DL (ref 3.5–5.2)
ALP SERPL-CCNC: 69 U/L (ref 40–150)
ALT SERPL W P-5'-P-CCNC: 20 U/L (ref 0–50)
ANION GAP SERPL CALCULATED.3IONS-SCNC: 7 MMOL/L (ref 7–15)
AST SERPL W P-5'-P-CCNC: 21 U/L (ref 0–45)
BASOPHILS # BLD AUTO: 0.1 10E3/UL (ref 0–0.2)
BASOPHILS NFR BLD AUTO: 1 %
BILIRUB SERPL-MCNC: 0.4 MG/DL
BUN SERPL-MCNC: 14.2 MG/DL (ref 8–23)
CALCIUM SERPL-MCNC: 8.6 MG/DL (ref 8.8–10.4)
CHLORIDE SERPL-SCNC: 112 MMOL/L (ref 98–107)
CREAT SERPL-MCNC: 0.76 MG/DL (ref 0.51–0.95)
EGFRCR SERPLBLD CKD-EPI 2021: 77 ML/MIN/1.73M2
EOSINOPHIL # BLD AUTO: 0.3 10E3/UL (ref 0–0.7)
EOSINOPHIL NFR BLD AUTO: 4 %
ERYTHROCYTE [DISTWIDTH] IN BLOOD BY AUTOMATED COUNT: 16.5 % (ref 10–15)
GLUCOSE SERPL-MCNC: 85 MG/DL (ref 70–99)
HCO3 SERPL-SCNC: 24 MMOL/L (ref 22–29)
HCT VFR BLD AUTO: 33.9 % (ref 35–47)
HGB BLD-MCNC: 10.5 G/DL (ref 11.7–15.7)
IMM GRANULOCYTES # BLD: 0 10E3/UL
IMM GRANULOCYTES NFR BLD: 1 %
LYMPHOCYTES # BLD AUTO: 1.1 10E3/UL (ref 0.8–5.3)
LYMPHOCYTES NFR BLD AUTO: 15 %
MCH RBC QN AUTO: 29.7 PG (ref 26.5–33)
MCHC RBC AUTO-ENTMCNC: 31 G/DL (ref 31.5–36.5)
MCV RBC AUTO: 96 FL (ref 78–100)
MONOCYTES # BLD AUTO: 0.8 10E3/UL (ref 0–1.3)
MONOCYTES NFR BLD AUTO: 11 %
NEUTROPHILS # BLD AUTO: 5 10E3/UL (ref 1.6–8.3)
NEUTROPHILS NFR BLD AUTO: 69 %
NRBC # BLD AUTO: 0 10E3/UL
NRBC BLD AUTO-RTO: 0 /100
PLATELET # BLD AUTO: 288 10E3/UL (ref 150–450)
POTASSIUM SERPL-SCNC: 3.9 MMOL/L (ref 3.4–5.3)
PROT SERPL-MCNC: 5.7 G/DL (ref 6.4–8.3)
RBC # BLD AUTO: 3.54 10E6/UL (ref 3.8–5.2)
SODIUM SERPL-SCNC: 143 MMOL/L (ref 135–145)
WBC # BLD AUTO: 7.2 10E3/UL (ref 4–11)

## 2024-11-07 PROCEDURE — 85004 AUTOMATED DIFF WBC COUNT: CPT | Performed by: INTERNAL MEDICINE

## 2024-11-07 PROCEDURE — 250N000011 HC RX IP 250 OP 636: Mod: JZ | Performed by: INTERNAL MEDICINE

## 2024-11-07 PROCEDURE — 96367 TX/PROPH/DG ADDL SEQ IV INF: CPT

## 2024-11-07 PROCEDURE — 36591 DRAW BLOOD OFF VENOUS DEVICE: CPT | Performed by: INTERNAL MEDICINE

## 2024-11-07 PROCEDURE — 80053 COMPREHEN METABOLIC PANEL: CPT | Performed by: INTERNAL MEDICINE

## 2024-11-07 PROCEDURE — 96413 CHEMO IV INFUSION 1 HR: CPT

## 2024-11-07 PROCEDURE — 258N000003 HC RX IP 258 OP 636: Performed by: INTERNAL MEDICINE

## 2024-11-07 PROCEDURE — 96375 TX/PRO/DX INJ NEW DRUG ADDON: CPT

## 2024-11-07 RX ORDER — HEPARIN SODIUM (PORCINE) LOCK FLUSH IV SOLN 100 UNIT/ML 100 UNIT/ML
5 SOLUTION INTRAVENOUS
Status: DISCONTINUED | OUTPATIENT
Start: 2024-11-07 | End: 2024-11-07 | Stop reason: HOSPADM

## 2024-11-07 RX ORDER — PALONOSETRON 0.05 MG/ML
0.25 INJECTION, SOLUTION INTRAVENOUS ONCE
Status: COMPLETED | OUTPATIENT
Start: 2024-11-07 | End: 2024-11-07

## 2024-11-07 RX ORDER — METOPROLOL TARTRATE 25 MG/1
25 TABLET, FILM COATED ORAL 2 TIMES DAILY
Qty: 180 TABLET | Refills: 0 | Status: SHIPPED | OUTPATIENT
Start: 2024-11-07

## 2024-11-07 RX ORDER — ALBUTEROL SULFATE 90 UG/1
1-2 INHALANT RESPIRATORY (INHALATION)
Status: CANCELLED | OUTPATIENT
Start: 2024-11-07

## 2024-11-07 RX ORDER — HEPARIN SODIUM,PORCINE 10 UNIT/ML
5-20 VIAL (ML) INTRAVENOUS DAILY PRN
OUTPATIENT
Start: 2024-12-03

## 2024-11-07 RX ORDER — LISINOPRIL 2.5 MG/1
2.5 TABLET ORAL DAILY
Qty: 30 TABLET | Refills: 0 | Status: SHIPPED | OUTPATIENT
Start: 2024-11-07

## 2024-11-07 RX ORDER — ALBUTEROL SULFATE 0.83 MG/ML
2.5 SOLUTION RESPIRATORY (INHALATION)
OUTPATIENT
Start: 2024-12-03

## 2024-11-07 RX ORDER — HEPARIN SODIUM,PORCINE 10 UNIT/ML
5-20 VIAL (ML) INTRAVENOUS DAILY PRN
Status: CANCELLED | OUTPATIENT
Start: 2024-11-07

## 2024-11-07 RX ORDER — METHYLPREDNISOLONE SODIUM SUCCINATE 125 MG/2ML
125 INJECTION INTRAMUSCULAR; INTRAVENOUS
Start: 2024-12-03

## 2024-11-07 RX ORDER — HEPARIN SODIUM (PORCINE) LOCK FLUSH IV SOLN 100 UNIT/ML 100 UNIT/ML
5 SOLUTION INTRAVENOUS
OUTPATIENT
Start: 2024-12-03

## 2024-11-07 RX ORDER — PALONOSETRON 0.05 MG/ML
0.25 INJECTION, SOLUTION INTRAVENOUS ONCE
Status: CANCELLED | OUTPATIENT
Start: 2024-11-07

## 2024-11-07 RX ORDER — ALBUTEROL SULFATE 90 UG/1
1-2 INHALANT RESPIRATORY (INHALATION)
Start: 2024-12-03

## 2024-11-07 RX ORDER — MEPERIDINE HYDROCHLORIDE 50 MG/ML
25 INJECTION INTRAMUSCULAR; INTRAVENOUS; SUBCUTANEOUS EVERY 30 MIN PRN
Status: CANCELLED | OUTPATIENT
Start: 2024-11-07

## 2024-11-07 RX ORDER — MEPERIDINE HYDROCHLORIDE 25 MG/ML
25 INJECTION INTRAMUSCULAR; INTRAVENOUS; SUBCUTANEOUS EVERY 30 MIN PRN
OUTPATIENT
Start: 2024-12-03

## 2024-11-07 RX ORDER — LORAZEPAM 2 MG/ML
0.5 INJECTION INTRAMUSCULAR EVERY 4 HOURS PRN
Status: CANCELLED | OUTPATIENT
Start: 2024-11-07

## 2024-11-07 RX ORDER — DIPHENHYDRAMINE HYDROCHLORIDE 50 MG/ML
50 INJECTION INTRAMUSCULAR; INTRAVENOUS
Status: CANCELLED | OUTPATIENT
Start: 2024-11-07

## 2024-11-07 RX ORDER — METHYLPREDNISOLONE SODIUM SUCCINATE 125 MG/2ML
125 INJECTION INTRAMUSCULAR; INTRAVENOUS
Status: CANCELLED | OUTPATIENT
Start: 2024-11-07

## 2024-11-07 RX ORDER — ATORVASTATIN CALCIUM 40 MG/1
40 TABLET, FILM COATED ORAL DAILY
Qty: 90 TABLET | Refills: 1 | Status: SHIPPED | OUTPATIENT
Start: 2024-11-07

## 2024-11-07 RX ORDER — EPINEPHRINE 1 MG/ML
0.3 INJECTION, SOLUTION INTRAMUSCULAR; SUBCUTANEOUS EVERY 5 MIN PRN
Status: CANCELLED | OUTPATIENT
Start: 2024-11-07

## 2024-11-07 RX ORDER — ALBUTEROL SULFATE 0.83 MG/ML
2.5 SOLUTION RESPIRATORY (INHALATION)
Status: CANCELLED | OUTPATIENT
Start: 2024-11-07

## 2024-11-07 RX ORDER — HEPARIN SODIUM (PORCINE) LOCK FLUSH IV SOLN 100 UNIT/ML 100 UNIT/ML
5 SOLUTION INTRAVENOUS
Status: CANCELLED | OUTPATIENT
Start: 2024-11-07

## 2024-11-07 RX ORDER — POTASSIUM CHLORIDE 750 MG/1
10 TABLET, EXTENDED RELEASE ORAL DAILY
Qty: 30 TABLET | Refills: 0 | Status: SHIPPED | OUTPATIENT
Start: 2024-11-07

## 2024-11-07 RX ORDER — EPINEPHRINE 1 MG/ML
0.3 INJECTION, SOLUTION INTRAMUSCULAR; SUBCUTANEOUS EVERY 5 MIN PRN
OUTPATIENT
Start: 2024-12-03

## 2024-11-07 RX ORDER — DIPHENHYDRAMINE HYDROCHLORIDE 50 MG/ML
50 INJECTION INTRAMUSCULAR; INTRAVENOUS
Start: 2024-12-03

## 2024-11-07 RX ADMIN — DEXAMETHASONE SODIUM PHOSPHATE: 10 INJECTION, SOLUTION INTRAMUSCULAR; INTRAVENOUS at 11:27

## 2024-11-07 RX ADMIN — PALONOSETRON HYDROCHLORIDE 0.25 MG: 0.25 INJECTION INTRAVENOUS at 11:25

## 2024-11-07 RX ADMIN — ZOLEDRONIC ACID 3.3 MG: 4 INJECTION, SOLUTION, CONCENTRATE INTRAVENOUS at 12:22

## 2024-11-07 RX ADMIN — DEXTROSE MONOHYDRATE 250 ML: 50 INJECTION, SOLUTION INTRAVENOUS at 11:25

## 2024-11-07 RX ADMIN — Medication 5 ML: at 12:42

## 2024-11-07 RX ADMIN — FAM-TRASTUZUMAB DERUXTECAN-NXKI 250 MG: 100 INJECTION, POWDER, LYOPHILIZED, FOR SOLUTION INTRAVENOUS at 11:51

## 2024-11-07 NOTE — TELEPHONE ENCOUNTER
Gunjan, clinical supervisor with [facility], calls to notify PCP that they're discharging this pt today. Pt's daughter says that pt currently resides at [facility] in [place], and they they'd like to use a home health agency affiliated with the facility. Routing to PCP as FYI, no return call needed.    Lizbeth Sandoval RN  [facility]

## 2024-11-07 NOTE — PROGRESS NOTES
Infusion Nursing Note:  Kacie Hermosillo presents today for cycle 8 day 1 enhertu and zometa.    Patient seen by provider today: No   present during visit today: Not Applicable.    Note: Kacie arrived ambulatory and in stable condition. Port accessed using sterile technique, good blood return noted, and labs collected. She was premedicated and treatment administered per orders. Will return on 12/5 for next appointment.      Intravenous Access:  Labs drawn without difficulty.  Implanted Port.    Treatment Conditions:  Lab Results   Component Value Date    HGB 10.5 (L) 11/07/2024    WBC 7.2 11/07/2024    ANEUTAUTO 5.0 11/07/2024     11/07/2024        Lab Results   Component Value Date     11/07/2024    POTASSIUM 3.9 11/07/2024    MAG 2.2 10/28/2024    CR 0.76 11/07/2024    MIGUEL 8.6 (L) 11/07/2024    BILITOTAL 0.4 11/07/2024    ALBUMIN 3.5 11/07/2024    ALT 20 11/07/2024    AST 21 11/07/2024       Results reviewed, labs MET treatment parameters, ok to proceed with treatment.      Post Infusion Assessment:  Patient tolerated infusion without incident.   Blood return noted pre and post infusion.  Site patent and intact, free from redness, edema, or discomfort.  No evidence of extravasations.   Access discontinued per protocol.      Discharge Plan:   Patient and/or family verbalized understanding of discharge instructions and all questions answered.  AVS to patient via [employer].  Patient will return 12/5 for next appointment.   Patient discharged in stable condition accompanied by: daughter.  Departure Mode: Ambulatory.      Bharati Youngblood RN

## 2024-11-08 ENCOUNTER — TELEPHONE (OUTPATIENT)
Dept: FAMILY MEDICINE | Facility: CLINIC | Age: 84
End: 2024-11-08
Payer: COMMERCIAL

## 2024-11-08 RX ORDER — FUROSEMIDE 20 MG/1
20 TABLET ORAL DAILY
Qty: 90 TABLET | Refills: 1 | Status: SHIPPED | OUTPATIENT
Start: 2024-11-08

## 2024-11-08 NOTE — TELEPHONE ENCOUNTER
Home Care is calling regarding an established patient with [facility].     Requesting orders from: Irina Francisco  Provider is following patient: Yes  Is this a 60-day recertification request?  No    Orders Requested    Skilled Nursing  Request for initial evaluation and treatment (one time)     Physical Therapy  Request for initial certification (first set of orders)   Frequency:  1x/wk for 5 wks      Verbal orders given for Skilled Nursing Eval and Treat 1 time.  Information was gathered for Physical therapy..  Provider review needed.  RN will contact Home Care with information after provider review.  Confirmed ok to leave a detailed message with call back.  Contact information confirmed and updated as needed.    Gayle Perez RN

## 2024-11-08 NOTE — TELEPHONE ENCOUNTER
Call placed to Loly with Home Care  Relayed Ana's message and okay for requested verbal orders    Loly verbalized understanding  No further questions/concerns    Herb Spencer, Clinic RN  [facility]

## 2024-11-11 ENCOUNTER — LAB REQUISITION (OUTPATIENT)
Dept: LAB | Facility: CLINIC | Age: 84
End: 2024-11-11
Payer: COMMERCIAL

## 2024-11-11 ENCOUNTER — TELEPHONE (OUTPATIENT)
Dept: FAMILY MEDICINE | Facility: CLINIC | Age: 84
End: 2024-11-11
Payer: COMMERCIAL

## 2024-11-11 DIAGNOSIS — Z95.2 PRESENCE OF PROSTHETIC HEART VALVE: ICD-10-CM

## 2024-11-11 NOTE — TELEPHONE ENCOUNTER
Call placed to Herb with Home Care  No answer; detailed voicemail left with provider's message and requested verbal orders   Clinic RN call back [phone] provided if questions/concerns    Herb Spencer, Clinic RN  [facility]

## 2024-11-11 NOTE — TELEPHONE ENCOUNTER
Routing to PCP for review and orders      Home Care is calling regarding an established patient with [facility].     Requesting orders from: Irina Francisco  Provider is following patient: Yes  Is this a 60-day recertification request?  No    Orders Requested    Skilled Nursing  Request for initial certification (first set of orders)   Frequency: x1 a week for 1 week then x2 a week for 4 weeks    Wound Care Supplies  xeroform and cover with foam ordered dressing    Information was gathered and will be sent to provider for review.  RN will contact Home Care with information after provider review.  Confirmed ok to leave a detailed message with call back.  Contact information confirmed and updated as needed.    Dolores Worley RN

## 2024-11-14 ENCOUNTER — TELEPHONE (OUTPATIENT)
Dept: FAMILY MEDICINE | Facility: CLINIC | Age: 84
End: 2024-11-14

## 2024-11-14 ENCOUNTER — ANTICOAGULATION THERAPY VISIT (OUTPATIENT)
Dept: ANTICOAGULATION | Facility: CLINIC | Age: 84
End: 2024-11-14

## 2024-11-14 DIAGNOSIS — I63.9 CEREBELLAR STROKE (H): ICD-10-CM

## 2024-11-14 DIAGNOSIS — Z95.2 S/P TAVR (TRANSCATHETER AORTIC VALVE REPLACEMENT): Primary | ICD-10-CM

## 2024-11-14 LAB
ANION GAP SERPL CALCULATED.3IONS-SCNC: 11 MMOL/L (ref 7–15)
BUN SERPL-MCNC: 26.1 MG/DL (ref 8–23)
CALCIUM SERPL-MCNC: 8.7 MG/DL (ref 8.8–10.4)
CHLORIDE SERPL-SCNC: 105 MMOL/L (ref 98–107)
CREAT SERPL-MCNC: 1.01 MG/DL (ref 0.51–0.95)
EGFRCR SERPLBLD CKD-EPI 2021: 55 ML/MIN/1.73M2
ERYTHROCYTE [DISTWIDTH] IN BLOOD BY AUTOMATED COUNT: 15.9 % (ref 10–15)
GLUCOSE SERPL-MCNC: 109 MG/DL (ref 70–99)
HCO3 SERPL-SCNC: 23 MMOL/L (ref 22–29)
HCT VFR BLD AUTO: 32.7 % (ref 35–47)
HGB BLD-MCNC: 10.4 G/DL (ref 11.7–15.7)
INR PPP: 2 (ref 0.85–1.15)
MCH RBC QN AUTO: 30.1 PG (ref 26.5–33)
MCHC RBC AUTO-ENTMCNC: 31.8 G/DL (ref 31.5–36.5)
MCV RBC AUTO: 95 FL (ref 78–100)
PLATELET # BLD AUTO: 312 10E3/UL (ref 150–450)
POTASSIUM SERPL-SCNC: 4.5 MMOL/L (ref 3.4–5.3)
RBC # BLD AUTO: 3.45 10E6/UL (ref 3.8–5.2)
SODIUM SERPL-SCNC: 139 MMOL/L (ref 135–145)
TSH SERPL DL<=0.005 MIU/L-ACNC: 3.54 UIU/ML (ref 0.3–4.2)
VIT B12 SERPL-MCNC: 756 PG/ML (ref 232–1245)
WBC # BLD AUTO: 15.2 10E3/UL (ref 4–11)

## 2024-11-14 PROCEDURE — 36415 COLL VENOUS BLD VENIPUNCTURE: CPT | Mod: ORL | Performed by: PHYSICIAN ASSISTANT

## 2024-11-14 PROCEDURE — 85027 COMPLETE CBC AUTOMATED: CPT | Mod: ORL | Performed by: PHYSICIAN ASSISTANT

## 2024-11-14 PROCEDURE — 84443 ASSAY THYROID STIM HORMONE: CPT | Mod: ORL | Performed by: PHYSICIAN ASSISTANT

## 2024-11-14 PROCEDURE — P9603 ONE-WAY ALLOW PRORATED MILES: HCPCS | Mod: ORL | Performed by: PHYSICIAN ASSISTANT

## 2024-11-14 PROCEDURE — 82607 VITAMIN B-12: CPT | Mod: ORL | Performed by: PHYSICIAN ASSISTANT

## 2024-11-14 PROCEDURE — 85610 PROTHROMBIN TIME: CPT | Mod: ORL | Performed by: PHYSICIAN ASSISTANT

## 2024-11-14 PROCEDURE — 80048 BASIC METABOLIC PNL TOTAL CA: CPT | Mod: ORL | Performed by: PHYSICIAN ASSISTANT

## 2024-11-14 NOTE — CONFIDENTIAL NOTE
My concern is increasing citrucel to daily if she is not drinking enough water is that it could cause her to feel more distended/bloated.     I would have her start a stool softener such as colace if she is not already taking one.     If she is getting really backed up despite the stool softener then I would have her start a capful or 1/2 capful of miralax daily until she starts to pass some stools    Irina

## 2024-11-14 NOTE — PROGRESS NOTES
ANTICOAGULATION MANAGEMENT     Kacie Hermosillo 84 year old female is on warfarin with therapeutic INR result. (Goal INR 2.0-3.0)    Recent labs: (last 7 days)     11/14/24  0900   INR 2.00*       ASSESSMENT     Source(s): Chart Review  Previous INR was Subtherapeutic  Medication, diet, health changes since last INR chart reviewed; none identified         PLAN     Recommended plan for no diet, medication or health factor changes affecting INR     Dosing Instructions: Continue your current warfarin dose with next INR in 1 week       Summary  As of 11/14/2024      Full warfarin instructions:  2.5 mg every Tue, Sat; 5 mg all other days   Next INR check:  11/21/2024               Detailed voice message left for [facility] facility nurse with dosing instructions and follow up date.     Orders given to  Homecare nurse/facility to recheck    Education provided: Please call back if any changes to your diet, medications or how you've been taking warfarin  Symptom monitoring: monitoring for bleeding signs and symptoms, monitoring for clotting signs and symptoms, and monitoring for stroke signs and symptoms    Plan made per [facility] anticoagulation protocol    Cherise Garsia RN  11/14/2024  Anticoagulation Clinic  [facility] for routing messages: clifton HARDY  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

## 2024-11-14 NOTE — CONFIDENTIAL NOTE
Called patient but got daughter Sulma instead (consent on file) and relayed the message, Sulma asks if this can be sent in my chart was well, Sulma will let patient know, my chart sent as well.      DAVID Quesada

## 2024-11-14 NOTE — TELEPHONE ENCOUNTER
Irina Francisco:    Received a call from Barbie the physical therapist [facility] (call back is [phone]). Barbie is with patient now and reporting patient has had constipation for a week and taking Citracel every other day for this, Barbie is working on encouraging fluids and activity to help. Also reporting patient's blood pressure is high today at 150/95. Writer also spoke with patient directly, patient reports she has history of intermittent constipation concerns, she is not drinking more than 3 cups water per day and diet is not high in fiber type foods, she reports very mild lower abdominal pain intermittently, not dizzy or lightheaded, she is not having any chest pain or shortness of breath or fever, and no diarrhea. Taking her blood pressure medications without missed doses. Patient is told to increase water and fluids, encourage fiber type foods. Can you recommend any additional options OTC, or would you advise to increase Citracel to daily? Monitor blood pressure?     Please advise.

## 2024-11-20 ENCOUNTER — LAB REQUISITION (OUTPATIENT)
Dept: LAB | Facility: CLINIC | Age: 84
End: 2024-11-20
Payer: COMMERCIAL

## 2024-11-20 DIAGNOSIS — Z95.2 PRESENCE OF PROSTHETIC HEART VALVE: ICD-10-CM

## 2024-11-21 ENCOUNTER — ANTICOAGULATION THERAPY VISIT (OUTPATIENT)
Dept: ANTICOAGULATION | Facility: CLINIC | Age: 84
End: 2024-11-21

## 2024-11-21 DIAGNOSIS — I63.9 CEREBELLAR STROKE (H): ICD-10-CM

## 2024-11-21 DIAGNOSIS — Z95.2 S/P TAVR (TRANSCATHETER AORTIC VALVE REPLACEMENT): Primary | ICD-10-CM

## 2024-11-21 LAB — INR PPP: 2.84 (ref 0.85–1.15)

## 2024-11-21 PROCEDURE — 36415 COLL VENOUS BLD VENIPUNCTURE: CPT | Mod: ORL | Performed by: PHYSICIAN ASSISTANT

## 2024-11-21 PROCEDURE — P9604 ONE-WAY ALLOW PRORATED TRIP: HCPCS | Mod: ORL | Performed by: PHYSICIAN ASSISTANT

## 2024-11-21 PROCEDURE — 85610 PROTHROMBIN TIME: CPT | Mod: ORL | Performed by: PHYSICIAN ASSISTANT

## 2024-11-21 NOTE — PROGRESS NOTES
ANTICOAGULATION MANAGEMENT     Kacie Hermosillo 84 year old female is on warfarin with therapeutic INR result. (Goal INR 2.0-3.0)    Recent labs: (last 7 days)     11/21/24  0743   INR 2.84*       ASSESSMENT     Source(s): Chart Review  Previous INR was Therapeutic last visit; previously outside of goal range  Medication, diet, health changes since last INR chart reviewed; none identified         PLAN     Recommended plan for no diet, medication or health factor changes affecting INR     Dosing Instructions: Continue your current warfarin dose with next INR in 2 weeks       Summary  As of 11/21/2024      Full warfarin instructions:  2.5 mg every Tue, Sat; 5 mg all other days   Next INR check:  12/5/2024               Detailed voice message left for [facility] nurse with dosing instructions and follow up date.     Patient using outside facility for labs    Education provided: Contact [phone] with any changes, questions or concerns.     Plan made per [facility] anticoagulation protocol    Linnette Nieto RN  11/21/2024  Anticoagulation Clinic  [facility] for routing messages: clifton HARDY  [facility] patient phone line: [phone]        _______________________________________________________________________     Anticoagulation Episode Summary       Current INR goal:  2.0-3.0   TTR:  78.5% (11.8 mo)   Target end date:  Indefinite   Send INR reminders to:  VIRGILIO HARDY    Indications    S/P TAVR (transcatheter aortic valve replacement) [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

## 2024-11-25 RX ORDER — METHYLCELLULOSE 500 MG/1
TABLET ORAL
Qty: 15 TABLET | Refills: 0 | Status: SHIPPED | OUTPATIENT
Start: 2024-11-25

## 2024-11-26 ENCOUNTER — OFFICE VISIT (OUTPATIENT)
Dept: CARDIOLOGY | Facility: CLINIC | Age: 84
End: 2024-11-26
Payer: COMMERCIAL

## 2024-11-26 ENCOUNTER — ALLIED HEALTH/NURSE VISIT (OUTPATIENT)
Dept: CARDIOLOGY | Facility: CLINIC | Age: 84
End: 2024-11-26

## 2024-11-26 VITALS
RESPIRATION RATE: 16 BRPM | HEIGHT: 63 IN | BODY MASS INDEX: 24.06 KG/M2 | SYSTOLIC BLOOD PRESSURE: 112 MMHG | HEART RATE: 78 BPM | DIASTOLIC BLOOD PRESSURE: 62 MMHG | WEIGHT: 135.8 LBS

## 2024-11-26 DIAGNOSIS — I51.89 DIASTOLIC DYSFUNCTION: Primary | ICD-10-CM

## 2024-11-26 DIAGNOSIS — I50.9 ACUTE DECOMPENSATED HEART FAILURE (H): Primary | ICD-10-CM

## 2024-11-26 LAB
ANION GAP SERPL CALCULATED.3IONS-SCNC: 11 MMOL/L (ref 7–15)
BUN SERPL-MCNC: 19.6 MG/DL (ref 8–23)
CALCIUM SERPL-MCNC: 8.8 MG/DL (ref 8.8–10.4)
CHLORIDE SERPL-SCNC: 109 MMOL/L (ref 98–107)
CREAT SERPL-MCNC: 0.92 MG/DL (ref 0.51–0.95)
EGFRCR SERPLBLD CKD-EPI 2021: 61 ML/MIN/1.73M2
GLUCOSE SERPL-MCNC: 108 MG/DL (ref 70–99)
HCO3 SERPL-SCNC: 24 MMOL/L (ref 22–29)
NT-PROBNP SERPL-MCNC: 1367 PG/ML (ref 0–1800)
POTASSIUM SERPL-SCNC: 5.1 MMOL/L (ref 3.4–5.3)
SODIUM SERPL-SCNC: 144 MMOL/L (ref 135–145)

## 2024-11-26 PROCEDURE — 80048 BASIC METABOLIC PNL TOTAL CA: CPT | Performed by: INTERNAL MEDICINE

## 2024-11-26 PROCEDURE — 83880 ASSAY OF NATRIURETIC PEPTIDE: CPT | Performed by: INTERNAL MEDICINE

## 2024-11-26 PROCEDURE — 36415 COLL VENOUS BLD VENIPUNCTURE: CPT | Performed by: INTERNAL MEDICINE

## 2024-11-26 PROCEDURE — 99207 PR NO CHARGE NURSE ONLY: CPT

## 2024-11-26 NOTE — PROGRESS NOTES
[facility]: Heart Failure Care Coordination   Heart Failure Education    Situation/Background:      RN CC provided heart failure education to patient and her daughter in law Alec during clinic visit.    Assessment:      Living situation: assisted living    Barriers to Heart Failure follow-up:  none identified at this time    Medication management: facility-dispensed. Patient lives at [facility] in [place].     Pt has a scale and reports weighing herself daily. Encouraged pt to write weight down in the weight log for close monitoring and provider review. Pt verbalized understanding. Pt eats her meals at the facility, but does do some snacking in her apt. We reviewed limiting sodium as able, by not adding salt to her food and choosing low sodium snacks. Pt's dtr in law reports that she does not drink enough fluids. We reviewed fluid goal intake of 50-60oz daily, to help support hydration, but prevent fluid retention.     Intervention/Plan:      CM/HF education topics reviewed:  Low sodium: 2000 mg or less daily, meal choices and label reading   Fluid Restriction: 50-60oz daily   Daily weight monitoring and logging   Overview of heart failure appointments and testing   Symptoms of HF to be reported to Core Team      Education materials provided:  Low sodium food and drink handout  Low sodium food product examples  Already prepared low salt meal delivery services handout  HF stoplight tool  Cardiac medication handout     HF resources reviewed:  [employer] for pt to consider      Patient to follow up as scheduled.  RN CC reviewed and reinforced fluid/dietary sodium restrictions; patient stated understanding.  Instructed patient to call RN line with new or worsening heart failure symptoms and/or rapid weight gain.     Patient expressed understanding of above education/instructions and denied further questions at this time. Pt scheduled to see Dr. Nagy back on 2/4/25.    Aayush Key,  TATO HERNANDEZ

## 2024-11-26 NOTE — PROGRESS NOTES
HEART CARE NOTE          Assessment/Recommendations     1. HFpEF c/b severe ADHF  Assessment / Plan  Hypervolemic on physical exam; diuresing well on current regimen - no changes at this time; continue to monitor UOP and renal function  Patient is high risk for adverse cardiac events 2/2 advanced age, frailty,   GDMT as detailed below; mainstay of treatment for HFpEF includes diuretics and adequate BP control (class I) and SGLT2-I (class 2a); additional medical therapy (ARNI, MRA, ARB) demonstrated less robust evidence for indication but may be considered per guideline recommendations (2b); no indication for BBlockers      Current Pharmacotherapy AHA Guideline-Directed Medical Therapy   Lisinopril 2.5 mg daily ARNI/ARB   Spironolactone - not started  MRA   SGLT2 inhibitor: not started SGLT2-I    Furosemide 20 daily Loop diuretic       2. CKD  Assessment / Plan  ***     3. HTN  Assessment / Plan  Adequate control - no changes to regimen at this time      4. Valvular heart disease  Assessment / Plan  S/p AVR; warfarin per pharmacy management     5. HLP  Assessment / Plan  Currently on atorvastatin    Plan of care discussed on November 26, 2024 with patient at bedside, and primary team overseeing patient's care; *** family updated     *** minutes spent reviewing prior records (including documentation, laboratory studies, cardiac testing/imaging), history and physical exam, planning, and subsequent documentation.    Patient remains critically ill in the ICU requiring mechanical support via IABP for severe multi-vessel CAD while awaiting OHS/CABG. *** minutes spent on critical care.    The longitudinal plan of care for *** was addressed during this visit. Due to the added complexity in care, I will continue to support [unfilled] in the subsequent management of this condition(s) and with the ongoing continuity of care of this condition(s).      History of Present Illness/Subjective    Ms. Kacie Hermosillo is a 84  "year old female with a PMHx significant for (per Epic notation) medical history documented above presented to the ER for evaluation of increased weakness and fatigue as well as wheezing and worsening shortness of breath ongoing for the past few days in the setting of recent hospitalization for electrolyte abnormalities      Today,  Mrs. Hermosillo denies acute cardiac events or complaints; Management plan as detailed above     ECG: Personally reviewed. normal sinus rhythm, nonspecific ST and T waves changes.     ECHO (personally reviewed 10/29/24):   The left ventricle is normal in size.  The visual ejection fraction is >70%.  No regional wall motion abnormalities noted.  There is a 23 mm Wray Cecilia 3 bioprosthetic prosthesis in the aortic  position with a mean gradient of 27mmHg, AT 80 ms. No significant valvular or  paravalvular regurgitation. Implant date 2021.  RVSP 22mmHg + RAP  Compared to the prior study dated 10/7/2024 the EF remains normal. Diasotlic  function and valves not fully evaluated on today's images, but the normal AV  AT suggests the increased gradient is not due to prosthetic stenosis    Telemetry: personally reviewed November 26, 2024; notable for ***     Lab results: personally reviewed November 26, 2024; notable for ***    Medical history and pertinent documents reviewed in Care Everywhere please where applicable see details above        Physical Examination Review of Systems   /62 (BP Location: Right arm, Patient Position: Sitting, Cuff Size: Adult Regular)   Pulse 78   Resp 16   Ht 1.6 m (5' 3\")   Wt 61.6 kg (135 lb 12.8 oz)   BMI 24.06 kg/m    Body mass index is 24.06 kg/m .  Wt Readings from Last 3 Encounters:   11/26/24 61.6 kg (135 lb 12.8 oz)   11/05/24 61.2 kg (135 lb)   10/30/24 60.3 kg (133 lb)     General Appearance:   no distress, normal body habitus   ENT/Mouth: membranes moist, no oral lesions or bleeding gums.      EYES:  no scleral icterus, normal conjunctivae "   Neck: no carotid bruits or thyromegaly   Chest/Lungs:   lungs are clear to auscultation, no rales or wheezing, *** sternal scar, equal chest wall expansion    Cardiovascular:   ***Regular. Normal first and second heart sounds with ***no murmurs, rubs, or gallops; the carotid, radial and posterior tibial pulses are intact, Jugular venous pressure ***, *** edema bilaterally    Abdomen:  no organomegaly, masses, bruits, or tenderness; bowel sounds are present   Extremities: no cyanosis or clubbing   Skin: no xanthelasma, warm.    Neurologic: normal gait, normal  bilateral, no tremors     Psychiatric: alert and oriented x3, calm     A complete 10 systems ROS was reviewed  And is negative except what is listed in the HPI.          Medical History  Surgical History Family History Social History   Past Medical History:   Diagnosis Date    Breast cancer (H) 01/01/1999    Heart valve replaced     Hx antineoplastic chemotherapy 01/01/1999    Hx of radiation therapy 01/01/1999    Hypertension     Past Surgical History:   Procedure Laterality Date    BIOPSY BREAST      HYSTERECTOMY      IR CHEST PORT PLACEMENT > 5 YRS OF AGE  7/30/2024    LUMPECTOMY BREAST Left 1999    OOPHORECTOMY Bilateral     no family history of premature coronary artery disease Social History     Socioeconomic History    Marital status:      Spouse name: Not on file    Number of children: Not on file    Years of education: Not on file    Highest education level: Not on file   Occupational History    Not on file   Tobacco Use    Smoking status: Never    Smokeless tobacco: Never   Vaping Use    Vaping status: Never Used   Substance and Sexual Activity    Alcohol use: Yes     Comment: Rarely    Drug use: Not on file    Sexual activity: Not on file   Other Topics Concern    Not on file   Social History Narrative    Not on file     Social Drivers of Health     Financial Resource Strain: Low Risk  (10/29/2024)    Financial Resource Strain     Within  the past 12 months, have you or your family members you live with been unable to get utilities (heat, electricity) when it was really needed?: No   Food Insecurity: Low Risk  (10/29/2024)    Food Insecurity     Within the past 12 months, did you worry that your food would run out before you got money to buy more?: No     Within the past 12 months, did the food you bought just not last and you didn t have money to get more?: No   Transportation Needs: Low Risk  (10/29/2024)    Transportation Needs     Within the past 12 months, has lack of transportation kept you from medical appointments, getting your medicines, non-medical meetings or appointments, work, or from getting things that you need?: No   Physical Activity: Unknown (5/24/2024)    Exercise Vital Sign     Days of Exercise per Week: 0 days     Minutes of Exercise per Session: Not on file   Stress: No Stress Concern Present (5/24/2024)    [employer] - Occupational Stress Questionnaire     Feeling of Stress : Only a little   Social Connections: Unknown (5/24/2024)    Social Connection and Isolation Panel [NHANES]     Frequency of Communication with Friends and Family: Not on file     Frequency of Social Gatherings with Friends and Family: Twice a week     Attends [employer] Services: Not on file     Active Member of Clubs or Organizations: Not on file     Attends Club or Organization Meetings: Not on file     Marital Status: Not on file   Interpersonal Safety: Low Risk  (10/29/2024)    Interpersonal Safety     Do you feel physically and emotionally safe where you currently live?: Yes     Within the past 12 months, have you been hit, slapped, kicked or otherwise physically hurt by someone?: No     Within the past 12 months, have you been humiliated or emotionally abused in other ways by your partner or ex-partner?: No   Recent Concern: Interpersonal Safety - High Risk (10/7/2024)    Interpersonal Safety     Do you feel physically and  "emotionally safe where you currently live?: No     Within the past 12 months, have you been hit, slapped, kicked or otherwise physically hurt by someone?: No     Within the past 12 months, have you been humiliated or emotionally abused in other ways by your partner or ex-partner?: No   Housing Stability: Low Risk  (10/29/2024)    Housing Stability     Do you have housing? : Yes     Are you worried about losing your housing?: No           Lab Results    Chemistry/lipid CBC Cardiac Enzymes/BNP/TSH/INR   Lab Results   Component Value Date    CHOL 126 05/24/2024    HDL 62 05/24/2024    TRIG 64 05/24/2024    BUN 26.1 (H) 11/14/2024     11/14/2024    CO2 23 11/14/2024    Lab Results   Component Value Date    WBC 15.2 (H) 11/14/2024    HGB 10.4 (L) 11/14/2024    HCT 32.7 (L) 11/14/2024    MCV 95 11/14/2024     11/14/2024    Lab Results   Component Value Date    TSH 3.54 11/14/2024    INR 2.84 (H) 11/21/2024     No results found for: \"CKTOTAL\", \"CKMB\", \"TROPONINI\"       Weight:    Wt Readings from Last 3 Encounters:   11/05/24 61.2 kg (135 lb)   10/30/24 60.3 kg (133 lb)   10/29/24 59.6 kg (131 lb 6.3 oz)       Allergies  No Known Allergies      Surgical History  Past Surgical History:   Procedure Laterality Date    BIOPSY BREAST      HYSTERECTOMY      IR CHEST PORT PLACEMENT > 5 YRS OF AGE  7/30/2024    LUMPECTOMY BREAST Left 1999    OOPHORECTOMY Bilateral        Social History  Tobacco:   History   Smoking Status    Never   Smokeless Tobacco    Never    Alcohol:   Social History    Substance and Sexual Activity      Alcohol use: Yes        Comment: Rarely   Illicit Drugs:   History   Drug Use Not on file       Family History  Family History   Problem Relation Age of Onset    Breast Cancer Maternal Aunt     Hereditary Breast and Ovarian Cancer Syndrome No family hx of           Jose Nagy MD on 11/26/2024      cc: Irina Francisco      "

## 2024-12-02 DIAGNOSIS — R19.7 DIARRHEA, UNSPECIFIED TYPE: ICD-10-CM

## 2024-12-02 RX ORDER — METHYLCELLULOSE 500 MG/1
TABLET ORAL
Qty: 15 TABLET | Refills: 0 | Status: SHIPPED | OUTPATIENT
Start: 2024-12-02

## 2024-12-02 NOTE — TELEPHONE ENCOUNTER
Last Written Prescription Date:  11/25/24  Last Fill Quantity: 15,  # refills: 0   Last office visit provider:  11/5/24 with Dr. Gann, follow up in clinic 12/5/24    Requested Prescriptions   Pending Prescriptions Disp Refills    CITRUCEL 500 MG TABS tablet [Pharmacy Med Name: CITRUCEL 500 MG CAPLET] 15 tablet 0     Sig: TAKE 1 TAB BY MOUTH EVERY OTHER DAY FOR CONSTIPATION       There is no refill protocol information for this order          Tonya Peacock RN 12/02/24 11:25 AM

## 2024-12-03 ENCOUNTER — LAB REQUISITION (OUTPATIENT)
Dept: LAB | Facility: CLINIC | Age: 84
End: 2024-12-03
Payer: COMMERCIAL

## 2024-12-03 DIAGNOSIS — Z95.2 PRESENCE OF PROSTHETIC HEART VALVE: ICD-10-CM

## 2024-12-05 ENCOUNTER — ANTICOAGULATION THERAPY VISIT (OUTPATIENT)
Dept: ANTICOAGULATION | Facility: CLINIC | Age: 84
End: 2024-12-05

## 2024-12-05 ENCOUNTER — ONCOLOGY VISIT (OUTPATIENT)
Dept: ONCOLOGY | Facility: HOSPITAL | Age: 84
End: 2024-12-05
Attending: INTERNAL MEDICINE
Payer: COMMERCIAL

## 2024-12-05 ENCOUNTER — INFUSION THERAPY VISIT (OUTPATIENT)
Dept: INFUSION THERAPY | Facility: HOSPITAL | Age: 84
End: 2024-12-05
Attending: INTERNAL MEDICINE
Payer: COMMERCIAL

## 2024-12-05 VITALS
TEMPERATURE: 98.8 F | SYSTOLIC BLOOD PRESSURE: 151 MMHG | RESPIRATION RATE: 16 BRPM | HEART RATE: 72 BPM | DIASTOLIC BLOOD PRESSURE: 65 MMHG | OXYGEN SATURATION: 96 %

## 2024-12-05 VITALS — HEIGHT: 63 IN | BODY MASS INDEX: 24.06 KG/M2

## 2024-12-05 DIAGNOSIS — C79.51 MALIGNANT NEOPLASM METASTATIC TO BONE (H): Primary | ICD-10-CM

## 2024-12-05 DIAGNOSIS — R19.7 DIARRHEA, UNSPECIFIED TYPE: Primary | ICD-10-CM

## 2024-12-05 DIAGNOSIS — I63.9 CEREBELLAR STROKE (H): ICD-10-CM

## 2024-12-05 DIAGNOSIS — Z95.2 S/P TAVR (TRANSCATHETER AORTIC VALVE REPLACEMENT): ICD-10-CM

## 2024-12-05 DIAGNOSIS — Z95.2 S/P TAVR (TRANSCATHETER AORTIC VALVE REPLACEMENT): Primary | ICD-10-CM

## 2024-12-05 DIAGNOSIS — C79.51 MALIGNANT NEOPLASM METASTATIC TO BONE (H): ICD-10-CM

## 2024-12-05 LAB
ALBUMIN SERPL BCG-MCNC: 3.5 G/DL (ref 3.5–5.2)
ALP SERPL-CCNC: 66 U/L (ref 40–150)
ALT SERPL W P-5'-P-CCNC: 18 U/L (ref 0–50)
ANION GAP SERPL CALCULATED.3IONS-SCNC: 9 MMOL/L (ref 7–15)
AST SERPL W P-5'-P-CCNC: 26 U/L (ref 0–45)
BASOPHILS # BLD AUTO: 0 10E3/UL (ref 0–0.2)
BASOPHILS NFR BLD AUTO: 1 %
BILIRUB SERPL-MCNC: 0.2 MG/DL
BUN SERPL-MCNC: 19.9 MG/DL (ref 8–23)
CALCIUM SERPL-MCNC: 9 MG/DL (ref 8.8–10.4)
CHLORIDE SERPL-SCNC: 111 MMOL/L (ref 98–107)
CREAT SERPL-MCNC: 0.93 MG/DL (ref 0.51–0.95)
EGFRCR SERPLBLD CKD-EPI 2021: 60 ML/MIN/1.73M2
EOSINOPHIL # BLD AUTO: 0.2 10E3/UL (ref 0–0.7)
EOSINOPHIL NFR BLD AUTO: 3 %
ERYTHROCYTE [DISTWIDTH] IN BLOOD BY AUTOMATED COUNT: 16.5 % (ref 10–15)
GLUCOSE SERPL-MCNC: 95 MG/DL (ref 70–99)
HCO3 SERPL-SCNC: 22 MMOL/L (ref 22–29)
HCT VFR BLD AUTO: 34 % (ref 35–47)
HGB BLD-MCNC: 11.3 G/DL (ref 11.7–15.7)
IMM GRANULOCYTES # BLD: 0 10E3/UL
IMM GRANULOCYTES NFR BLD: 1 %
INR PPP: 2.06 (ref 0.85–1.15)
LYMPHOCYTES # BLD AUTO: 1 10E3/UL (ref 0.8–5.3)
LYMPHOCYTES NFR BLD AUTO: 14 %
MCH RBC QN AUTO: 30.8 PG (ref 26.5–33)
MCHC RBC AUTO-ENTMCNC: 33.2 G/DL (ref 31.5–36.5)
MCV RBC AUTO: 93 FL (ref 78–100)
MONOCYTES # BLD AUTO: 0.7 10E3/UL (ref 0–1.3)
MONOCYTES NFR BLD AUTO: 10 %
NEUTROPHILS # BLD AUTO: 5.3 10E3/UL (ref 1.6–8.3)
NEUTROPHILS NFR BLD AUTO: 73 %
NRBC # BLD AUTO: 0 10E3/UL
NRBC BLD AUTO-RTO: 0 /100
PLATELET # BLD AUTO: 206 10E3/UL (ref 150–450)
POTASSIUM SERPL-SCNC: 4.1 MMOL/L (ref 3.4–5.3)
PROT SERPL-MCNC: 5.8 G/DL (ref 6.4–8.3)
RBC # BLD AUTO: 3.67 10E6/UL (ref 3.8–5.2)
SODIUM SERPL-SCNC: 142 MMOL/L (ref 135–145)
WBC # BLD AUTO: 7.3 10E3/UL (ref 4–11)

## 2024-12-05 PROCEDURE — 82040 ASSAY OF SERUM ALBUMIN: CPT | Performed by: INTERNAL MEDICINE

## 2024-12-05 PROCEDURE — G0463 HOSPITAL OUTPT CLINIC VISIT: HCPCS | Performed by: INTERNAL MEDICINE

## 2024-12-05 PROCEDURE — 250N000011 HC RX IP 250 OP 636: Performed by: INTERNAL MEDICINE

## 2024-12-05 PROCEDURE — 85610 PROTHROMBIN TIME: CPT

## 2024-12-05 PROCEDURE — 84155 ASSAY OF PROTEIN SERUM: CPT | Performed by: INTERNAL MEDICINE

## 2024-12-05 PROCEDURE — 36591 DRAW BLOOD OFF VENOUS DEVICE: CPT

## 2024-12-05 PROCEDURE — 258N000003 HC RX IP 258 OP 636: Performed by: INTERNAL MEDICINE

## 2024-12-05 PROCEDURE — 85025 COMPLETE CBC W/AUTO DIFF WBC: CPT | Performed by: INTERNAL MEDICINE

## 2024-12-05 RX ORDER — ALBUTEROL SULFATE 90 UG/1
1-2 INHALANT RESPIRATORY (INHALATION)
Status: DISCONTINUED | OUTPATIENT
Start: 2024-12-05 | End: 2024-12-05 | Stop reason: HOSPADM

## 2024-12-05 RX ORDER — HEPARIN SODIUM (PORCINE) LOCK FLUSH IV SOLN 100 UNIT/ML 100 UNIT/ML
5 SOLUTION INTRAVENOUS
Status: CANCELLED | OUTPATIENT
Start: 2024-12-05

## 2024-12-05 RX ORDER — METHYLPREDNISOLONE SODIUM SUCCINATE 40 MG/ML
40 INJECTION INTRAMUSCULAR; INTRAVENOUS
Status: CANCELLED
Start: 2024-12-05

## 2024-12-05 RX ORDER — DIPHENHYDRAMINE HYDROCHLORIDE 50 MG/ML
25 INJECTION INTRAMUSCULAR; INTRAVENOUS
Status: CANCELLED
Start: 2024-12-05

## 2024-12-05 RX ORDER — LORAZEPAM 2 MG/ML
0.5 INJECTION INTRAMUSCULAR EVERY 4 HOURS PRN
Status: CANCELLED | OUTPATIENT
Start: 2024-12-05

## 2024-12-05 RX ORDER — ALBUTEROL SULFATE 90 UG/1
1-2 INHALANT RESPIRATORY (INHALATION)
Status: CANCELLED
Start: 2024-12-05

## 2024-12-05 RX ORDER — DIPHENHYDRAMINE HYDROCHLORIDE 50 MG/ML
25 INJECTION INTRAMUSCULAR; INTRAVENOUS
Status: DISCONTINUED | OUTPATIENT
Start: 2024-12-05 | End: 2024-12-05 | Stop reason: HOSPADM

## 2024-12-05 RX ORDER — MEPERIDINE HYDROCHLORIDE 50 MG/ML
25 INJECTION INTRAMUSCULAR; INTRAVENOUS; SUBCUTANEOUS
Status: DISCONTINUED | OUTPATIENT
Start: 2024-12-05 | End: 2024-12-05 | Stop reason: HOSPADM

## 2024-12-05 RX ORDER — HEPARIN SODIUM (PORCINE) LOCK FLUSH IV SOLN 100 UNIT/ML 100 UNIT/ML
5 SOLUTION INTRAVENOUS
Status: DISCONTINUED | OUTPATIENT
Start: 2024-12-05 | End: 2024-12-05 | Stop reason: HOSPADM

## 2024-12-05 RX ORDER — ALBUTEROL SULFATE 0.83 MG/ML
2.5 SOLUTION RESPIRATORY (INHALATION)
Status: DISCONTINUED | OUTPATIENT
Start: 2024-12-05 | End: 2024-12-05 | Stop reason: HOSPADM

## 2024-12-05 RX ORDER — DIPHENHYDRAMINE HYDROCHLORIDE 50 MG/ML
50 INJECTION INTRAMUSCULAR; INTRAVENOUS
Status: DISCONTINUED | OUTPATIENT
Start: 2024-12-05 | End: 2024-12-05 | Stop reason: HOSPADM

## 2024-12-05 RX ORDER — ALBUTEROL SULFATE 0.83 MG/ML
2.5 SOLUTION RESPIRATORY (INHALATION)
Status: CANCELLED | OUTPATIENT
Start: 2024-12-05

## 2024-12-05 RX ORDER — EPINEPHRINE 1 MG/ML
0.3 INJECTION, SOLUTION INTRAMUSCULAR; SUBCUTANEOUS EVERY 5 MIN PRN
Status: DISCONTINUED | OUTPATIENT
Start: 2024-12-05 | End: 2024-12-05 | Stop reason: HOSPADM

## 2024-12-05 RX ORDER — METHYLPREDNISOLONE SODIUM SUCCINATE 40 MG/ML
40 INJECTION INTRAMUSCULAR; INTRAVENOUS
Status: DISCONTINUED | OUTPATIENT
Start: 2024-12-05 | End: 2024-12-05 | Stop reason: HOSPADM

## 2024-12-05 RX ORDER — DIPHENHYDRAMINE HYDROCHLORIDE 50 MG/ML
50 INJECTION INTRAMUSCULAR; INTRAVENOUS
Status: CANCELLED
Start: 2024-12-05

## 2024-12-05 RX ORDER — EPINEPHRINE 1 MG/ML
0.3 INJECTION, SOLUTION INTRAMUSCULAR; SUBCUTANEOUS EVERY 5 MIN PRN
Status: CANCELLED | OUTPATIENT
Start: 2024-12-05

## 2024-12-05 RX ORDER — PALONOSETRON 0.05 MG/ML
0.25 INJECTION, SOLUTION INTRAVENOUS ONCE
Status: COMPLETED | OUTPATIENT
Start: 2024-12-05 | End: 2024-12-05

## 2024-12-05 RX ORDER — HEPARIN SODIUM,PORCINE 10 UNIT/ML
5-20 VIAL (ML) INTRAVENOUS DAILY PRN
Status: CANCELLED | OUTPATIENT
Start: 2024-12-05

## 2024-12-05 RX ORDER — MEPERIDINE HYDROCHLORIDE 50 MG/ML
25 INJECTION INTRAMUSCULAR; INTRAVENOUS; SUBCUTANEOUS
Status: CANCELLED | OUTPATIENT
Start: 2024-12-05

## 2024-12-05 RX ORDER — PALONOSETRON 0.05 MG/ML
0.25 INJECTION, SOLUTION INTRAVENOUS ONCE
Status: CANCELLED | OUTPATIENT
Start: 2024-12-05

## 2024-12-05 RX ADMIN — DEXAMETHASONE SODIUM PHOSPHATE: 10 INJECTION, SOLUTION INTRAMUSCULAR; INTRAVENOUS at 11:01

## 2024-12-05 RX ADMIN — HEPARIN 5 ML: 100 SYRINGE at 12:09

## 2024-12-05 RX ADMIN — DEXTROSE MONOHYDRATE 250 ML: 50 INJECTION, SOLUTION INTRAVENOUS at 10:34

## 2024-12-05 RX ADMIN — PALONOSETRON 0.25 MG: 0.25 INJECTION, SOLUTION INTRAVENOUS at 10:35

## 2024-12-05 RX ADMIN — DEXTROSE MONOHYDRATE 250 MG: 50 INJECTION, SOLUTION INTRAVENOUS at 11:34

## 2024-12-05 NOTE — PROGRESS NOTES
"[facility] Hematology and Oncology Progress Note    Patient: Kacie Hermosillo  MRN: [id number]  Date of Service: Dec 5, 2024             ECOG Performance    2 - Ambulatory and independent in all ADLs; cannot work; up > 50% of the time          ______________________________________________________________________________  Oncologic history  Metastatic breast cancer with bone mets only ER negative NE negative HER2/amarilis 2+ and negative by FISH.  HER2/amarilis low    Remote diagnosis of breast cancer in 1995 treated with surgery chemotherapy radiation and 5 years of hormonal therapy.  Pathology report not available.    Treatment  Patient started on fam-trastuzumab on 8/19/2024  Complete response by PET criteria in  Nov 2024  Dose of fam-trastuzumab decreased by 25% in November 2024 due to severe diarrhea    History of Present Illness    Ms. Kacie Hermosillo is here in follow-up.  She is accompanied by her daughter.  She reports that she did really well with the reduced dose of fam-trastuzumab.  She had absolutely no diarrhea and she is happy about that.  She is also shifted to assisted living and is doing really well over there.  She did had an echo which showed well-preserved cardiac function.  She denies any other concerns.    Review of systems  A comprehensive 12 point review of system was done that was negative except what is mentioned in the history of present illness    Past History    Past Medical History:   Diagnosis Date    Breast cancer (H) 01/01/1999    Heart valve replaced     Hx antineoplastic chemotherapy 01/01/1999    Hx of radiation therapy 01/01/1999    Hypertension        Past Surgical History:   Procedure Laterality Date    BIOPSY BREAST      HYSTERECTOMY      IR CHEST PORT PLACEMENT > 5 YRS OF AGE  7/30/2024    LUMPECTOMY BREAST Left 1999    OOPHORECTOMY Bilateral        Physical Exam    Ht 1.6 m (5' 3\")   BMI 24.06 kg/m      General: alert, awake, not in acute distress  HEENT: Head: Normal, " normocephalic, atraumatic.  Eye: Normal external eye, conjunctiva, lids cornea, MARI.  Nose: Normal external nose, mucus membranes and septum.  Pharynx: Normal buccal mucosa. Normal pharynx.  Neck / Thyroid: Supple, no masses, nodes, nodules or enlargement.  Lymphatics: No abnormally enlarged lymph nodes.  Chest: Normal chest wall and respirations. Clear to auscultation.  No crackles or rhonchi's  Heart: S1 S2 RRR, no murmur.   Abdomen: abdomen is soft without significant tenderness, masses, organomegaly or guarding  Extremities: normal strength, tone, and muscle mass  Skin: normal. no rash or abnormalities  CNS: non focal.    Lab Results    Recent Results (from the past 240 hours)   N terminal pro BNP outpatient    Collection Time: 11/26/24  2:44 PM   Result Value Ref Range    N Terminal Pro BNP Outpatient 1,367 0 - 1,800 pg/mL   Basic metabolic panel  (Ca, Cl, CO2, Creat, Gluc, K, Na, BUN)    Collection Time: 11/26/24  2:44 PM   Result Value Ref Range    Sodium 144 135 - 145 mmol/L    Potassium 5.1 3.4 - 5.3 mmol/L    Chloride 109 (H) 98 - 107 mmol/L    Carbon Dioxide (CO2) 24 22 - 29 mmol/L    Anion Gap 11 7 - 15 mmol/L    Urea Nitrogen 19.6 8.0 - 23.0 mg/dL    Creatinine 0.92 0.51 - 0.95 mg/dL    GFR Estimate 61 >60 mL/min/1.73m2    Calcium 8.8 8.8 - 10.4 mg/dL    Glucose 108 (H) 70 - 99 mg/dL   Comprehensive metabolic panel    Collection Time: 12/05/24  9:14 AM   Result Value Ref Range    Sodium 142 135 - 145 mmol/L    Potassium 4.1 3.4 - 5.3 mmol/L    Carbon Dioxide (CO2) 22 22 - 29 mmol/L    Anion Gap 9 7 - 15 mmol/L    Urea Nitrogen 19.9 8.0 - 23.0 mg/dL    Creatinine 0.93 0.51 - 0.95 mg/dL    GFR Estimate 60 (L) >60 mL/min/1.73m2    Calcium 9.0 8.8 - 10.4 mg/dL    Chloride 111 (H) 98 - 107 mmol/L    Glucose 95 70 - 99 mg/dL    Alkaline Phosphatase 66 40 - 150 U/L    AST 26 0 - 45 U/L    ALT 18 0 - 50 U/L    Protein Total 5.8 (L) 6.4 - 8.3 g/dL    Albumin 3.5 3.5 - 5.2 g/dL    Bilirubin Total 0.2 <=1.2 mg/dL    INR    Collection Time: 12/05/24  9:14 AM   Result Value Ref Range    INR 2.06 (H) 0.85 - 1.15   CBC with platelets and differential    Collection Time: 12/05/24  9:14 AM   Result Value Ref Range    WBC Count 7.3 4.0 - 11.0 10e3/uL    RBC Count 3.67 (L) 3.80 - 5.20 10e6/uL    Hemoglobin 11.3 (L) 11.7 - 15.7 g/dL    Hematocrit 34.0 (L) 35.0 - 47.0 %    MCV 93 78 - 100 fL    MCH 30.8 26.5 - 33.0 pg    MCHC 33.2 31.5 - 36.5 g/dL    RDW 16.5 (H) 10.0 - 15.0 %    Platelet Count 206 150 - 450 10e3/uL    % Neutrophils 73 %    % Lymphocytes 14 %    % Monocytes 10 %    % Eosinophils 3 %    % Basophils 1 %    % Immature Granulocytes 1 %    NRBCs per 100 WBC 0 <1 /100    Absolute Neutrophils 5.3 1.6 - 8.3 10e3/uL    Absolute Lymphocytes 1.0 0.8 - 5.3 10e3/uL    Absolute Monocytes 0.7 0.0 - 1.3 10e3/uL    Absolute Eosinophils 0.2 0.0 - 0.7 10e3/uL    Absolute Basophils 0.0 0.0 - 0.2 10e3/uL    Absolute Immature Granulocytes 0.0 <=0.4 10e3/uL    Absolute NRBCs 0.0 10e3/uL         Imaging    No results found.        Assessment and Plan    Metastatic breast cancer with bone mets HER2/amarilis low  Patient is here in follow-up.  Her dose of fam-trastuzumab was decreased by 25% last time due to diarrhea.  She did really well with that and had no diarrhea.  Considering that we will continue her on fam-trastuzumab at the same dose.  She will get a dose today and her next visit will be in 4 weeks as she does not want to come in the week of [place].  That would be 2 January and I will be out of the office.  As the will be seeing a nurse practitioners they prefer to go to [facility] as that is closer to where she and her daughter lives.  Her subsequent visit after that should be in 3 weeks with me back at [facility].      Diarrhea   Patient has not had subsequent diarrhea in the past but had no diarrhea after the last dose which was reduced to 25% we will continue to monitor her for symptoms    Bone metastases  Patient to continue taking  Zometa periodically    Cardiac monitoring  Patient does need cardiac monitoring.  Her last echo was on 10/27/2024 I will plan to do the next echo in about 3 months from that.      Signed by: Hadley Gann MD        CC: Irina Francisco PA-C

## 2024-12-05 NOTE — PROGRESS NOTES
Infusion Nursing Note:  Kacie Hermosillo presents today for Cycle 5, Day 1 of her chemotherapy plan.    Patient seen by provider today: Yes: Dr. Gann   present during visit today: Not Applicable.    Note: Kacie arrived ambulatory with walker accompanied by her daughter for treatment following her office visit with Dr. Gann. Plan of care reviewed and they have no additional questions. Per Dr. Gann, Kacie's chemo will be dose reduced for all future cycles. He informed pharmacy.  Aloxi, emend/dexamethasone were administered 30 minutes prior to treatment. Enhertu was infused over 30 minutes per plan.    Intravenous Access:  Implanted Port accessed and labs drawn by DAVID Peters.    Treatment Conditions:  Lab Results   Component Value Date    HGB 11.3 (L) 12/05/2024    WBC 7.3 12/05/2024    ANEUTAUTO 5.3 12/05/2024     12/05/2024        Lab Results   Component Value Date     12/05/2024    POTASSIUM 4.1 12/05/2024    MAG 2.2 10/28/2024    CR 0.93 12/05/2024    MIGUEL 9.0 12/05/2024    BILITOTAL 0.2 12/05/2024    ALBUMIN 3.5 12/05/2024    ALT 18 12/05/2024    AST 26 12/05/2024       Results reviewed, labs MET treatment parameters, ok to proceed with treatment.    Post Infusion Assessment:  Patient tolerated infusion without incident.  Blood return noted pre and post infusion.  Site patent and intact, free from redness, edema or discomfort.  Access discontinued per protocol.     Discharge Plan:   Patient will return Jan 2nd at [facility] for next appointment.   Patient discharged in stable condition accompanied by: daughter.  Departure Mode: Ambulatory with walker.      Zaira Rhoades RN

## 2024-12-05 NOTE — PROGRESS NOTES
ANTICOAGULATION MANAGEMENT     Kacie Hermosillo 84 year old female is on warfarin with therapeutic INR result. (Goal INR 2.0-3.0)    Recent labs: (last 7 days)     12/05/24  0914   INR 2.06*       ASSESSMENT     Source(s): Chart Review and Home Care/Facility Nurse     Warfarin doses taken: Warfarin taken as instructed  Diet: No new diet changes identified  Medication/supplement changes: None noted  New illness, injury, or hospitalization: No  Signs or symptoms of bleeding or clotting: No  Previous result: Therapeutic last 2(+) visits  Additional findings:  Patient see today by Oncology: Her dose of fam-trastuzumab was decreased by 25% last time due to diarrhea.  No further diarrhea with reduced dosing. Oncology to continue fam-trastuzumab at the same dose.  Dose today and then in 4 weeks (after the Holiday's).         PLAN     Recommended plan for no diet, medication or health factor changes affecting INR     Dosing Instructions: Continue your current warfarin dose with next INR in 2 weeks       Summary  As of 12/5/2024      Full warfarin instructions:  2.5 mg every Tue, Sat; 5 mg all other days   Next INR check:  12/19/2024               Detailed voice message left for [facility] facility nurse with dosing instructions and follow up date.   Faxed dosing and follow up instructions to [facility]    Orders given to  Homecare nurse/facility to recheck    Education provided: Please call back if any changes to your diet, medications or how you've been taking warfarin    Plan made per [facility] anticoagulation protocol    Marta Allen, RN  12/5/2024  Anticoagulation Clinic  [facility] for routing messages: clifton HARDY  [facility] patient phone line: [phone]        _______________________________________________________________________     Anticoagulation Episode Summary       Current INR goal:  2.0-3.0   TTR:  78.5% (11.8 mo)   Target end date:  Indefinite   Send INR reminders to:  VIRGILIO HARDY    Indications    S/P TAVR  (transcatheter aortic valve replacement) [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

## 2024-12-05 NOTE — LETTER
"12/5/2024      Kacie Hermosillo  [address]  [address]  [facility] [address]      Dear Colleague,    Thank you for referring your patient, Kacie Hermosillo, to the [facility] [place]. Please see a copy of my visit note below.    Oncology Rooming Note    December 5, 2024 9:35 AM   Kacie Hermosillo is a 84 year old female who presents for:    Chief Complaint   Patient presents with     Oncology Clinic Visit     Malignant neoplasm metastatic to bone         Initial Vitals: Ht 1.6 m (5' 3\")   BMI 24.06 kg/m   Estimated body mass index is 24.06 kg/m  as calculated from the following:    Height as of this encounter: 1.6 m (5' 3\").    Weight as of 11/26/24: 61.6 kg (135 lb 12.8 oz). Body surface area is 1.65 meters squared.  Data Unavailable Comment: Data Unavailable   No LMP recorded. Patient is postmenopausal.  Allergies reviewed: Yes  Medications reviewed: Yes    Medications: Medication refills not needed today.  Pharmacy name entered into [employer]: [facility] ONLY #[id number] - [place], [place] - [address]    Frailty Screening:   Is the patient here for a new oncology consult visit in cancer care? 2. No      Clinical concerns:   Pt reported - minor heartburn off/on x1 month.   Pt was advised to take \"turkey tail mushroom supplement\" to aid with cancer care. Pt is wondering if this is safe to take.         Lucila Braga MA              [facility] Hematology and Oncology Progress Note    Patient: Kacie Hermosillo  MRN: [id number]  Date of Service: Dec 5, 2024             ECOG Performance    2 - Ambulatory and independent in all ADLs; cannot work; up > 50% of the time          ______________________________________________________________________________  Oncologic history  Metastatic breast cancer with bone mets only ER negative IN negative HER2/amarilis 2+ and negative by FISH.  HER2/amarilis low    Remote diagnosis of breast cancer in 1995 treated with surgery chemotherapy radiation and " "5 years of hormonal therapy.  Pathology report not available.    Treatment  Patient started on fam-trastuzumab on 8/19/2024  Complete response by PET criteria in  Nov 2024  Dose of fam-trastuzumab decreased by 25% in November 2024 due to severe diarrhea    History of Present Illness    Ms. Kacie Hermosillo is here in follow-up.  She is accompanied by her daughter.  She reports that she did really well with the reduced dose of fam-trastuzumab.  She had absolutely no diarrhea and she is happy about that.  She is also shifted to assisted living and is doing really well over there.  She did had an echo which showed well-preserved cardiac function.  She denies any other concerns.    Review of systems  A comprehensive 12 point review of system was done that was negative except what is mentioned in the history of present illness    Past History    Past Medical History:   Diagnosis Date     Breast cancer (H) 01/01/1999     Heart valve replaced      Hx antineoplastic chemotherapy 01/01/1999     Hx of radiation therapy 01/01/1999     Hypertension        Past Surgical History:   Procedure Laterality Date     BIOPSY BREAST       HYSTERECTOMY       IR CHEST PORT PLACEMENT > 5 YRS OF AGE  7/30/2024     LUMPECTOMY BREAST Left 1999     OOPHORECTOMY Bilateral        Physical Exam    Ht 1.6 m (5' 3\")   BMI 24.06 kg/m      General: alert, awake, not in acute distress  HEENT: Head: Normal, normocephalic, atraumatic.  Eye: Normal external eye, conjunctiva, lids cornea, MARI.  Nose: Normal external nose, mucus membranes and septum.  Pharynx: Normal buccal mucosa. Normal pharynx.  Neck / Thyroid: Supple, no masses, nodes, nodules or enlargement.  Lymphatics: No abnormally enlarged lymph nodes.  Chest: Normal chest wall and respirations. Clear to auscultation.  No crackles or rhonchi's  Heart: S1 S2 RRR, no murmur.   Abdomen: abdomen is soft without significant tenderness, masses, organomegaly or guarding  Extremities: normal strength, " tone, and muscle mass  Skin: normal. no rash or abnormalities  CNS: non focal.    Lab Results    Recent Results (from the past 240 hours)   N terminal pro BNP outpatient    Collection Time: 11/26/24  2:44 PM   Result Value Ref Range    N Terminal Pro BNP Outpatient 1,367 0 - 1,800 pg/mL   Basic metabolic panel  (Ca, Cl, CO2, Creat, Gluc, K, Na, BUN)    Collection Time: 11/26/24  2:44 PM   Result Value Ref Range    Sodium 144 135 - 145 mmol/L    Potassium 5.1 3.4 - 5.3 mmol/L    Chloride 109 (H) 98 - 107 mmol/L    Carbon Dioxide (CO2) 24 22 - 29 mmol/L    Anion Gap 11 7 - 15 mmol/L    Urea Nitrogen 19.6 8.0 - 23.0 mg/dL    Creatinine 0.92 0.51 - 0.95 mg/dL    GFR Estimate 61 >60 mL/min/1.73m2    Calcium 8.8 8.8 - 10.4 mg/dL    Glucose 108 (H) 70 - 99 mg/dL   Comprehensive metabolic panel    Collection Time: 12/05/24  9:14 AM   Result Value Ref Range    Sodium 142 135 - 145 mmol/L    Potassium 4.1 3.4 - 5.3 mmol/L    Carbon Dioxide (CO2) 22 22 - 29 mmol/L    Anion Gap 9 7 - 15 mmol/L    Urea Nitrogen 19.9 8.0 - 23.0 mg/dL    Creatinine 0.93 0.51 - 0.95 mg/dL    GFR Estimate 60 (L) >60 mL/min/1.73m2    Calcium 9.0 8.8 - 10.4 mg/dL    Chloride 111 (H) 98 - 107 mmol/L    Glucose 95 70 - 99 mg/dL    Alkaline Phosphatase 66 40 - 150 U/L    AST 26 0 - 45 U/L    ALT 18 0 - 50 U/L    Protein Total 5.8 (L) 6.4 - 8.3 g/dL    Albumin 3.5 3.5 - 5.2 g/dL    Bilirubin Total 0.2 <=1.2 mg/dL   INR    Collection Time: 12/05/24  9:14 AM   Result Value Ref Range    INR 2.06 (H) 0.85 - 1.15   CBC with platelets and differential    Collection Time: 12/05/24  9:14 AM   Result Value Ref Range    WBC Count 7.3 4.0 - 11.0 10e3/uL    RBC Count 3.67 (L) 3.80 - 5.20 10e6/uL    Hemoglobin 11.3 (L) 11.7 - 15.7 g/dL    Hematocrit 34.0 (L) 35.0 - 47.0 %    MCV 93 78 - 100 fL    MCH 30.8 26.5 - 33.0 pg    MCHC 33.2 31.5 - 36.5 g/dL    RDW 16.5 (H) 10.0 - 15.0 %    Platelet Count 206 150 - 450 10e3/uL    % Neutrophils 73 %    % Lymphocytes 14 %    %  Monocytes 10 %    % Eosinophils 3 %    % Basophils 1 %    % Immature Granulocytes 1 %    NRBCs per 100 WBC 0 <1 /100    Absolute Neutrophils 5.3 1.6 - 8.3 10e3/uL    Absolute Lymphocytes 1.0 0.8 - 5.3 10e3/uL    Absolute Monocytes 0.7 0.0 - 1.3 10e3/uL    Absolute Eosinophils 0.2 0.0 - 0.7 10e3/uL    Absolute Basophils 0.0 0.0 - 0.2 10e3/uL    Absolute Immature Granulocytes 0.0 <=0.4 10e3/uL    Absolute NRBCs 0.0 10e3/uL         Imaging    No results found.        Assessment and Plan    Metastatic breast cancer with bone mets HER2/amarilis low  Patient is here in follow-up.  Her dose of fam-trastuzumab was decreased by 25% last time due to diarrhea.  She did really well with that and had no diarrhea.  Considering that we will continue her on fam-trastuzumab at the same dose.  She will get a dose today and her next visit will be in 4 weeks as she does not want to come in the week of [place].  That would be 2 January and I will be out of the office.  As the will be seeing a nurse practitioners they prefer to go to [facility] as that is closer to where she and her daughter lives.  Her subsequent visit after that should be in 3 weeks with me back at [facility].      Diarrhea   Patient has not had subsequent diarrhea in the past but had no diarrhea after the last dose which was reduced to 25% we will continue to monitor her for symptoms    Bone metastases  Patient to continue taking Zometa periodically    Cardiac monitoring  Patient does need cardiac monitoring.  Her last echo was on 10/27/2024 I will plan to do the next echo in about 3 months from that.      Signed by: Hadley Gann MD        CC: Irina Francisco PA-C       Again, thank you for allowing me to participate in the care of your patient.        Sincerely,        Hadley Gann MD

## 2024-12-05 NOTE — PROGRESS NOTES
"Oncology Rooming Note    December 5, 2024 9:35 AM   Kacie Hermosillo is a 84 year old female who presents for:    Chief Complaint   Patient presents with    Oncology Clinic Visit     Malignant neoplasm metastatic to bone         Initial Vitals: Ht 1.6 m (5' 3\")   BMI 24.06 kg/m   Estimated body mass index is 24.06 kg/m  as calculated from the following:    Height as of this encounter: 1.6 m (5' 3\").    Weight as of 11/26/24: 61.6 kg (135 lb 12.8 oz). Body surface area is 1.65 meters squared.  Data Unavailable Comment: Data Unavailable   No LMP recorded. Patient is postmenopausal.  Allergies reviewed: Yes  Medications reviewed: Yes    Medications: Medication refills not needed today.  Pharmacy name entered into [employer]: [id number] - [facility] [address]    Frailty Screening:   Is the patient here for a new oncology consult visit in cancer care? 2. No      Clinical concerns:   Pt reported - minor heartburn off/on x1 month.   Pt was advised to take \"turkey tail mushroom supplement\" to aid with cancer care. Pt is wondering if this is safe to take.         Lucila Braga MA            "

## 2024-12-12 ENCOUNTER — ANTICOAGULATION THERAPY VISIT (OUTPATIENT)
Dept: ANTICOAGULATION | Facility: CLINIC | Age: 84
End: 2024-12-12

## 2024-12-12 DIAGNOSIS — Z95.2 S/P TAVR (TRANSCATHETER AORTIC VALVE REPLACEMENT): Primary | ICD-10-CM

## 2024-12-12 DIAGNOSIS — I63.9 CEREBELLAR STROKE (H): ICD-10-CM

## 2024-12-12 LAB — INR PPP: 2.66 (ref 0.85–1.15)

## 2024-12-12 PROCEDURE — 36415 COLL VENOUS BLD VENIPUNCTURE: CPT | Mod: ORL | Performed by: PHYSICIAN ASSISTANT

## 2024-12-12 PROCEDURE — P9603 ONE-WAY ALLOW PRORATED MILES: HCPCS | Mod: ORL | Performed by: PHYSICIAN ASSISTANT

## 2024-12-12 PROCEDURE — 85610 PROTHROMBIN TIME: CPT | Mod: ORL | Performed by: PHYSICIAN ASSISTANT

## 2024-12-12 NOTE — PROGRESS NOTES
ANTICOAGULATION MANAGEMENT     Kacie Hermosillo 84 year old female is on warfarin with therapeutic INR result. (Goal INR 2.0-3.0)    Recent labs: (last 7 days)     12/12/24  1213   INR 2.66*       ASSESSMENT     Source(s): Chart Review  Previous INR was Therapeutic last 2(+) visits  Medication, diet, health changes since last INR chart reviewed; none identified         PLAN     Recommended plan for no diet, medication or health factor changes affecting INR     Dosing Instructions: Continue your current warfarin dose with next INR in 1 week       Summary  As of 12/12/2024      Full warfarin instructions:  2.5 mg every Tue, Sat; 5 mg all other days   Next INR check:  12/19/2024               Detailed voice message left for [facility] facility nurse with dosing instructions and follow up date.     Orders given to  Homecare nurse/facility to recheck    Education provided: Please call back if any changes to your diet, medications or how you've been taking warfarin  Symptom monitoring: monitoring for bleeding signs and symptoms and monitoring for clotting signs and symptoms    Plan made per [facility] anticoagulation protocol    Cherise Garsia RN  12/12/2024  Anticoagulation Clinic  [facility] for routing messages: clifton HARDY  [facility] patient phone line: [phone]        _______________________________________________________________________     Anticoagulation Episode Summary       Current INR goal:  2.0-3.0   TTR:  78.5% (11.8 mo)   Target end date:  Indefinite   Send INR reminders to:  VIRGILIO HARDY    Indications    S/P TAVR (transcatheter aortic valve replacement) [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

## 2024-12-26 ENCOUNTER — TELEPHONE (OUTPATIENT)
Dept: ANTICOAGULATION | Facility: CLINIC | Age: 84
End: 2024-12-26
Payer: COMMERCIAL

## 2024-12-26 DIAGNOSIS — Z95.2 S/P TAVR (TRANSCATHETER AORTIC VALVE REPLACEMENT): Primary | ICD-10-CM

## 2024-12-26 DIAGNOSIS — I63.9 CEREBELLAR STROKE (H): ICD-10-CM

## 2024-12-26 NOTE — TELEPHONE ENCOUNTER
ANTICOAGULATION     Kacie Hermosillo is overdue for an INR check.     Left message for [facility] staff the patient is over due for her INR and requested a call back to see when it could be checked next as it appears their lab days are Thursdays     Shahla Newberry RN  12/26/2024  Anticoagulation Clinic  [facility] for routing messages: clifton HARDY  [facility] patient phone line: [phone]

## 2025-01-02 ENCOUNTER — MYC MEDICAL ADVICE (OUTPATIENT)
Dept: ANTICOAGULATION | Facility: CLINIC | Age: 85
End: 2025-01-02

## 2025-01-02 ENCOUNTER — PATIENT OUTREACH (OUTPATIENT)
Dept: ONCOLOGY | Facility: HOSPITAL | Age: 85
End: 2025-01-02

## 2025-01-02 ENCOUNTER — ONCOLOGY VISIT (OUTPATIENT)
Dept: ONCOLOGY | Facility: HOSPITAL | Age: 85
End: 2025-01-02
Attending: INTERNAL MEDICINE
Payer: COMMERCIAL

## 2025-01-02 ENCOUNTER — ANTICOAGULATION THERAPY VISIT (OUTPATIENT)
Dept: ANTICOAGULATION | Facility: CLINIC | Age: 85
End: 2025-01-02

## 2025-01-02 ENCOUNTER — INFUSION THERAPY VISIT (OUTPATIENT)
Dept: INFUSION THERAPY | Facility: HOSPITAL | Age: 85
End: 2025-01-02
Attending: INTERNAL MEDICINE
Payer: COMMERCIAL

## 2025-01-02 VITALS
OXYGEN SATURATION: 97 % | HEART RATE: 82 BPM | RESPIRATION RATE: 16 BRPM | SYSTOLIC BLOOD PRESSURE: 112 MMHG | WEIGHT: 138 LBS | TEMPERATURE: 98.2 F | DIASTOLIC BLOOD PRESSURE: 59 MMHG | BODY MASS INDEX: 24.45 KG/M2

## 2025-01-02 DIAGNOSIS — I63.9 CEREBELLAR STROKE (H): ICD-10-CM

## 2025-01-02 DIAGNOSIS — Z51.81 ENCOUNTER FOR MONITORING CARDIOTOXIC DRUG THERAPY: Primary | ICD-10-CM

## 2025-01-02 DIAGNOSIS — R19.7 DIARRHEA, UNSPECIFIED TYPE: ICD-10-CM

## 2025-01-02 DIAGNOSIS — C79.51 MALIGNANT NEOPLASM METASTATIC TO BONE (H): Primary | ICD-10-CM

## 2025-01-02 DIAGNOSIS — Z95.2 S/P TAVR (TRANSCATHETER AORTIC VALVE REPLACEMENT): Primary | ICD-10-CM

## 2025-01-02 DIAGNOSIS — C79.51 MALIGNANT NEOPLASM METASTATIC TO BONE (H): ICD-10-CM

## 2025-01-02 DIAGNOSIS — Z79.899 ENCOUNTER FOR MONITORING CARDIOTOXIC DRUG THERAPY: Primary | ICD-10-CM

## 2025-01-02 DIAGNOSIS — Z95.2 S/P TAVR (TRANSCATHETER AORTIC VALVE REPLACEMENT): ICD-10-CM

## 2025-01-02 LAB
ALBUMIN SERPL BCG-MCNC: 3.6 G/DL (ref 3.5–5.2)
ALP SERPL-CCNC: 63 U/L (ref 40–150)
ALT SERPL W P-5'-P-CCNC: 17 U/L (ref 0–50)
ANION GAP SERPL CALCULATED.3IONS-SCNC: 9 MMOL/L (ref 7–15)
AST SERPL W P-5'-P-CCNC: 22 U/L (ref 0–45)
BASOPHILS # BLD AUTO: 0.1 10E3/UL (ref 0–0.2)
BASOPHILS NFR BLD AUTO: 1 %
BILIRUB SERPL-MCNC: 0.3 MG/DL
BUN SERPL-MCNC: 15.7 MG/DL (ref 8–23)
CALCIUM SERPL-MCNC: 8.7 MG/DL (ref 8.8–10.4)
CHLORIDE SERPL-SCNC: 109 MMOL/L (ref 98–107)
CREAT SERPL-MCNC: 0.93 MG/DL (ref 0.51–0.95)
EGFRCR SERPLBLD CKD-EPI 2021: 60 ML/MIN/1.73M2
EOSINOPHIL # BLD AUTO: 0.3 10E3/UL (ref 0–0.7)
EOSINOPHIL NFR BLD AUTO: 6 %
ERYTHROCYTE [DISTWIDTH] IN BLOOD BY AUTOMATED COUNT: 15.9 % (ref 10–15)
GLUCOSE SERPL-MCNC: 113 MG/DL (ref 70–99)
HCO3 SERPL-SCNC: 24 MMOL/L (ref 22–29)
HCT VFR BLD AUTO: 35.6 % (ref 35–47)
HGB BLD-MCNC: 11.6 G/DL (ref 11.7–15.7)
IMM GRANULOCYTES # BLD: 0 10E3/UL
IMM GRANULOCYTES NFR BLD: 0 %
INR PPP: 1.01 (ref 0.85–1.15)
LDH SERPL L TO P-CCNC: 280 U/L (ref 0–250)
LYMPHOCYTES # BLD AUTO: 0.9 10E3/UL (ref 0.8–5.3)
LYMPHOCYTES NFR BLD AUTO: 16 %
MCH RBC QN AUTO: 30 PG (ref 26.5–33)
MCHC RBC AUTO-ENTMCNC: 32.6 G/DL (ref 31.5–36.5)
MCV RBC AUTO: 92 FL (ref 78–100)
MONOCYTES # BLD AUTO: 0.7 10E3/UL (ref 0–1.3)
MONOCYTES NFR BLD AUTO: 12 %
NEUTROPHILS # BLD AUTO: 3.6 10E3/UL (ref 1.6–8.3)
NEUTROPHILS NFR BLD AUTO: 64 %
NRBC # BLD AUTO: 0 10E3/UL
NRBC BLD AUTO-RTO: 0 /100
PLATELET # BLD AUTO: 197 10E3/UL (ref 150–450)
POTASSIUM SERPL-SCNC: 3.7 MMOL/L (ref 3.4–5.3)
PROT SERPL-MCNC: 5.8 G/DL (ref 6.4–8.3)
RBC # BLD AUTO: 3.87 10E6/UL (ref 3.8–5.2)
SODIUM SERPL-SCNC: 142 MMOL/L (ref 135–145)
WBC # BLD AUTO: 5.6 10E3/UL (ref 4–11)

## 2025-01-02 PROCEDURE — 250N000011 HC RX IP 250 OP 636: Performed by: NURSE PRACTITIONER

## 2025-01-02 PROCEDURE — 258N000003 HC RX IP 258 OP 636: Performed by: INTERNAL MEDICINE

## 2025-01-02 PROCEDURE — 258N000003 HC RX IP 258 OP 636: Performed by: NURSE PRACTITIONER

## 2025-01-02 PROCEDURE — 36591 DRAW BLOOD OFF VENOUS DEVICE: CPT

## 2025-01-02 PROCEDURE — 85004 AUTOMATED DIFF WBC COUNT: CPT | Performed by: INTERNAL MEDICINE

## 2025-01-02 PROCEDURE — 85610 PROTHROMBIN TIME: CPT

## 2025-01-02 PROCEDURE — G0463 HOSPITAL OUTPT CLINIC VISIT: HCPCS | Performed by: NURSE PRACTITIONER

## 2025-01-02 PROCEDURE — 83615 LACTATE (LD) (LDH) ENZYME: CPT

## 2025-01-02 PROCEDURE — 80053 COMPREHEN METABOLIC PANEL: CPT | Performed by: INTERNAL MEDICINE

## 2025-01-02 PROCEDURE — 250N000011 HC RX IP 250 OP 636: Performed by: INTERNAL MEDICINE

## 2025-01-02 PROCEDURE — 85048 AUTOMATED LEUKOCYTE COUNT: CPT | Performed by: INTERNAL MEDICINE

## 2025-01-02 RX ORDER — PALONOSETRON 0.05 MG/ML
0.25 INJECTION, SOLUTION INTRAVENOUS ONCE
Status: CANCELLED | OUTPATIENT
Start: 2025-01-02

## 2025-01-02 RX ORDER — DIPHENHYDRAMINE HYDROCHLORIDE 50 MG/ML
25 INJECTION INTRAMUSCULAR; INTRAVENOUS
Status: CANCELLED
Start: 2025-01-02

## 2025-01-02 RX ORDER — ALBUTEROL SULFATE 0.83 MG/ML
2.5 SOLUTION RESPIRATORY (INHALATION)
OUTPATIENT
Start: 2025-01-30

## 2025-01-02 RX ORDER — HEPARIN SODIUM,PORCINE 10 UNIT/ML
5-20 VIAL (ML) INTRAVENOUS DAILY PRN
Status: CANCELLED | OUTPATIENT
Start: 2025-01-02

## 2025-01-02 RX ORDER — HEPARIN SODIUM (PORCINE) LOCK FLUSH IV SOLN 100 UNIT/ML 100 UNIT/ML
5 SOLUTION INTRAVENOUS
Status: DISCONTINUED | OUTPATIENT
Start: 2025-01-02 | End: 2025-01-02 | Stop reason: HOSPADM

## 2025-01-02 RX ORDER — ALBUTEROL SULFATE 90 UG/1
1-2 INHALANT RESPIRATORY (INHALATION)
Start: 2025-01-30

## 2025-01-02 RX ORDER — ALBUTEROL SULFATE 0.83 MG/ML
2.5 SOLUTION RESPIRATORY (INHALATION)
Status: CANCELLED | OUTPATIENT
Start: 2025-01-02

## 2025-01-02 RX ORDER — DIPHENHYDRAMINE HYDROCHLORIDE 50 MG/ML
50 INJECTION INTRAMUSCULAR; INTRAVENOUS
Status: CANCELLED
Start: 2025-01-02

## 2025-01-02 RX ORDER — DEXAMETHASONE 4 MG/1
8 TABLET ORAL DAILY
Qty: 6 TABLET | Refills: 2 | Status: SHIPPED | OUTPATIENT
Start: 2025-01-02

## 2025-01-02 RX ORDER — HEPARIN SODIUM,PORCINE 10 UNIT/ML
5-20 VIAL (ML) INTRAVENOUS DAILY PRN
OUTPATIENT
Start: 2025-01-30

## 2025-01-02 RX ORDER — HEPARIN SODIUM (PORCINE) LOCK FLUSH IV SOLN 100 UNIT/ML 100 UNIT/ML
5 SOLUTION INTRAVENOUS
Status: CANCELLED | OUTPATIENT
Start: 2025-01-02

## 2025-01-02 RX ORDER — ALBUTEROL SULFATE 90 UG/1
1-2 INHALANT RESPIRATORY (INHALATION)
Status: CANCELLED
Start: 2025-01-02

## 2025-01-02 RX ORDER — MEPERIDINE HYDROCHLORIDE 25 MG/ML
25 INJECTION INTRAMUSCULAR; INTRAVENOUS; SUBCUTANEOUS EVERY 30 MIN PRN
OUTPATIENT
Start: 2025-01-30

## 2025-01-02 RX ORDER — DIPHENHYDRAMINE HYDROCHLORIDE 50 MG/ML
50 INJECTION INTRAMUSCULAR; INTRAVENOUS
Start: 2025-01-30

## 2025-01-02 RX ORDER — LORAZEPAM 2 MG/ML
0.5 INJECTION INTRAMUSCULAR EVERY 4 HOURS PRN
Status: CANCELLED | OUTPATIENT
Start: 2025-01-02

## 2025-01-02 RX ORDER — PALONOSETRON 0.05 MG/ML
0.25 INJECTION, SOLUTION INTRAVENOUS ONCE
Status: COMPLETED | OUTPATIENT
Start: 2025-01-02 | End: 2025-01-02

## 2025-01-02 RX ORDER — EPINEPHRINE 1 MG/ML
0.3 INJECTION, SOLUTION INTRAMUSCULAR; SUBCUTANEOUS EVERY 5 MIN PRN
OUTPATIENT
Start: 2025-01-30

## 2025-01-02 RX ORDER — METHYLPREDNISOLONE SODIUM SUCCINATE 125 MG/2ML
125 INJECTION INTRAMUSCULAR; INTRAVENOUS
Start: 2025-01-30

## 2025-01-02 RX ORDER — EPINEPHRINE 1 MG/ML
0.3 INJECTION, SOLUTION INTRAMUSCULAR; SUBCUTANEOUS EVERY 5 MIN PRN
Status: CANCELLED | OUTPATIENT
Start: 2025-01-02

## 2025-01-02 RX ORDER — METHYLPREDNISOLONE SODIUM SUCCINATE 40 MG/ML
40 INJECTION INTRAMUSCULAR; INTRAVENOUS
Status: CANCELLED
Start: 2025-01-02

## 2025-01-02 RX ORDER — MEPERIDINE HYDROCHLORIDE 25 MG/ML
25 INJECTION INTRAMUSCULAR; INTRAVENOUS; SUBCUTANEOUS
Status: CANCELLED | OUTPATIENT
Start: 2025-01-02

## 2025-01-02 RX ORDER — HEPARIN SODIUM (PORCINE) LOCK FLUSH IV SOLN 100 UNIT/ML 100 UNIT/ML
5 SOLUTION INTRAVENOUS
OUTPATIENT
Start: 2025-01-30

## 2025-01-02 RX ORDER — WARFARIN SODIUM 5 MG/1
TABLET ORAL
Qty: 15 TABLET | Refills: 0 | Status: SHIPPED | OUTPATIENT
Start: 2025-01-02

## 2025-01-02 RX ADMIN — ZOLEDRONIC ACID 3.3 MG: 4 INJECTION, SOLUTION, CONCENTRATE INTRAVENOUS at 11:37

## 2025-01-02 RX ADMIN — HEPARIN SODIUM (PORCINE) LOCK FLUSH IV SOLN 100 UNIT/ML 5 ML: 100 SOLUTION at 11:56

## 2025-01-02 RX ADMIN — DEXTROSE MONOHYDRATE 250 ML: 50 INJECTION, SOLUTION INTRAVENOUS at 10:40

## 2025-01-02 RX ADMIN — FAM-TRASTUZUMAB DERUXTECAN-NXKI 250 MG: 100 INJECTION, POWDER, LYOPHILIZED, FOR SOLUTION INTRAVENOUS at 11:07

## 2025-01-02 RX ADMIN — PALONOSETRON 0.25 MG: 0.05 INJECTION, SOLUTION INTRAVENOUS at 10:41

## 2025-01-02 RX ADMIN — DEXAMETHASONE SODIUM PHOSPHATE: 10 INJECTION, SOLUTION INTRAMUSCULAR; INTRAVENOUS at 10:41

## 2025-01-02 ASSESSMENT — PAIN SCALES - GENERAL: PAINLEVEL_OUTOF10: NO PAIN (0)

## 2025-01-02 NOTE — PROGRESS NOTES
"Oncology Rooming Note    January 2, 2025 10:04 AM   Kacie Hermosillo is a 84 year old female who presents for:    Chief Complaint   Patient presents with    Oncology Clinic Visit     Return visit with PVDR and lab. Malignant neoplasm metastatic to bone.     Initial Vitals: /59 (BP Location: Right arm, Patient Position: Sitting, Cuff Size: Adult Regular)   Pulse 82   Temp 98.2  F (36.8  C) (Oral)   Resp 16   Wt 62.6 kg (138 lb)   SpO2 97%   BMI 24.45 kg/m   Estimated body mass index is 24.45 kg/m  as calculated from the following:    Height as of 12/5/24: 1.6 m (5' 3\").    Weight as of this encounter: 62.6 kg (138 lb). Body surface area is 1.67 meters squared.  No Pain (0) Comment: Data Unavailable   No LMP recorded. Patient is postmenopausal.  Allergies reviewed: Yes  Medications reviewed: Yes    Medications: Medication refills not needed today.  Pharmacy name entered into [employer]: [id number] - [facility] [address] [facility]    Frailty Screening:   Is the patient here for a new oncology consult visit in cancer care? 2. No      Clinical concerns: none       Laura Freeman CMA              "

## 2025-01-02 NOTE — PROGRESS NOTES
Situation: Patient chart reviewed by care coordinator.    Background: Patient was seen in clinic today for follow up, diagnosis metastatic breast cancer    Assessment: D1C7 Enhertu was given and she also received zometa for her bone mets    Plan/Recommendations: Follow up in 3 weeks for follow up with MD and consideration of  C8 Enhertu.  Will monitor.    Mihaela Koo RN

## 2025-01-02 NOTE — PROGRESS NOTES
Infusion Nursing Note:  Kacie Hermosillo presents today for cycle 6 day 1.    Patient seen by provider today: Yes: Apple TRINIDAD   present during visit today: Not Applicable.    Note: PT here ambulatory with walker for assistance. Treatment reviewed with pt. Lab results approved txt. Txt administered as ordered and zometa also administered today. Recommended to pt to start calcium once a day due to serum calcium 8.7. Txt completed and port flushed/deaccessed with 2x2 to site. PT dc'd steady gait with walker and family member.      Intravenous Access:  Implanted Port.    Treatment Conditions:  Results reviewed, labs MET treatment parameters, ok to proceed with treatment.      Post Infusion Assessment:  Patient tolerated infusion without incident.       Discharge Plan:   Follow up reviewed.      Shahla Middleton RN

## 2025-01-02 NOTE — PROGRESS NOTES
[facility] Hematology and Oncology Progress Note    Patient: Kacie Hermosillo  MRN: [id number]  Date of Service: 01/02/2025        Reason for Visit    Chief Complaint   Patient presents with    Oncology Clinic Visit     Return visit with PVDR and lab. Malignant neoplasm metastatic to bone.       Assessment and Plan     Cancer Staging   No matching staging information was found for the patient.    1.  Metastatic breast cancer, heart ME negative and HER2/amarilis 2+(negative on FISH): Patient was not a great candidate for chemotherapy so she has been started on Enhertu.  She started that in August.  She had a PET scan done in October that showed near complete response.  Continue her current regimen.  She is at a 25% reduction.  She will get her dose today and then will see Dr. Gann at the [facility] in 3 weeks.  I did tell her he will likely do another scan in February.    2.  Risk for cardiotoxicity: Patient had her last echo done at the end of October and that was normal.  We will continue to do those roughly every 3 to 4 months while on treatment.  We will get that set up for her sometime in February.    3.  Bone mets: Patient is been getting Zometa roughly every 6 weeks.  She is due for that today.  Her calcium level is slightly low but per pharmacy okay to go ahead and give the Zometa.  She is getting 3.5 mg.  Continue to encourage her to take her calcium twice a day.      ECOG Performance    1 - Can't do physically strenuous work, but fully ambyulatory and can do light sedentary work    Distress Screening (within last 30 days)    1. How concerned are you about your ability to eat? : 0  2. How concerned are you about unintended weight loss or your current weight? : 0  3. How concerned are you about feeling depressed or very sad? : 0  4. How concerned are you about feeling anxious or very scared? : 0  5. Do you struggle with the loss of meaning and yahir in your life? : Not at all  6. How concerned are you  about work and home life issues that may be affected by your cancer? : 0  7. How concerned are you about knowing what resources are available to help you? : 0  8. Do you currently have what you would describe as Congregational or spiritual struggles?: Not at all       Pain  Pain Score: No Pain (0)    Problem List    Patient Active Problem List   Diagnosis    Aortic valve disorder    Chronic renal failure, stage 3 (moderate) (H)    Diastolic dysfunction    Essential hypertension    Dysuria    Gastroesophageal reflux disease    Hyperlipidemia    Malignant neoplasm of areola of left breast in female (H)    Cerebellar stroke (H)    S/P TAVR (transcatheter aortic valve replacement)    Osteopenia    Paroxysmal supraventricular tachycardia (H)    Varicose vein of leg    Venous insufficiency    Mild carotid artery disease (H)    Malignant neoplasm metastatic to bone (H)    Hyponatremia    Elevated INR    T12 compression fracture, sequela    Fall, initial encounter    Laceration of forehead, initial encounter    Syncope    DNR (do not resuscitate)    Hypocalcemia    Hypophosphatemia    LBBB (left bundle branch block)    Decreased carotid pulse    Low back pain    Lymphocytic colitis    Severe aortic stenosis    Bilateral pleural effusion    Hypervolemia, unspecified hypervolemia type        ______________________________________________________________________________    History of Present Illness    Oncologist: Dr. Gann    Diagnosis: Breast cancer, Stage IV with bone met. Pt presented with back pain in May 2024.   -5/28/24: MRI lumbar spine-widespread osseous mets without associated pathologic fracture.  Also issues like disc herniation and foraminal stenosis.  This was compared to an MRI in 2022.  -6/25/24: PET-Several scattered osseous metastases, predominantly involving the lower spine and bony pelvis   -7/11/24: Biopsy-left iliac crest.  Consistent with metastatic carcinoma from the breast.  ER negative, IL negative,  HER2/amarilis 2+.  Negative on FISH  ~Tumor markers were normal at time of diagnosis  -She does has a history of breast cancer in 1995 which was treated with surgery and radiation followed by chemotherapy as per the patient she also took a pill for 5 years     Supportive cares:  -Zometa    Treatment:   -8/19/24: Enhertu    Interim History:   Is here today to continue on treatment.  She states that overall she is doing pretty well with the treatment.  She says she was reduced on the dose and that has helped.  She says she does have some abdominal cramps and diarrhea for a couple of days but the dexamethasone actually seems to help that.  She has some mild chronic fatigue.  She states that her pain is much better controlled.  She does take Tylenol intermittently but does not take it every day and when she does take it it seems to help.  She denies any infectious complaints.  She recently moved into an assisted living in the fall and she states that she is happy she did that and it is going well.        Review of Systems    Pertinent items are noted in HPI.    Past History    Past Medical History:   Diagnosis Date    Breast cancer (H) 01/01/1999    Heart valve replaced     Hx antineoplastic chemotherapy 01/01/1999    Hx of radiation therapy 01/01/1999    Hypertension      PHYSICAL EXAM:  /59 (BP Location: Right arm, Patient Position: Sitting, Cuff Size: Adult Regular)   Pulse 82   Temp 98.2  F (36.8  C) (Oral)   Resp 16   Wt 62.6 kg (138 lb)   SpO2 97%   BMI 24.45 kg/m    GENERAL: no acute distress. Cooperative in conversation. Here with daughter. Using walker.   RESP: lungs are clear bilaterally per auscultation. Regular respiratory rate. No wheezes or rhonchi.  CV: Regular, rate and rhythm. Systolic murmur.   ABD: soft, nontender.  MUSCULOSKELETAL: Mild bilateral lower extremity swelling.   NEURO: non focal. Alert and oriented x3.   PSYCH: within normal limits. No depression or anxiety.  SKIN: warm dry intact      Lab Results    Recent Results (from the past week)   Comprehensive metabolic panel   Result Value Ref Range    Sodium 142 135 - 145 mmol/L    Potassium 3.7 3.4 - 5.3 mmol/L    Carbon Dioxide (CO2) 24 22 - 29 mmol/L    Anion Gap 9 7 - 15 mmol/L    Urea Nitrogen 15.7 8.0 - 23.0 mg/dL    Creatinine 0.93 0.51 - 0.95 mg/dL    GFR Estimate 60 (L) >60 mL/min/1.73m2    Calcium 8.7 (L) 8.8 - 10.4 mg/dL    Chloride 109 (H) 98 - 107 mmol/L    Glucose 113 (H) 70 - 99 mg/dL    Alkaline Phosphatase 63 40 - 150 U/L    AST 22 0 - 45 U/L    ALT 17 0 - 50 U/L    Protein Total 5.8 (L) 6.4 - 8.3 g/dL    Albumin 3.6 3.5 - 5.2 g/dL    Bilirubin Total 0.3 <=1.2 mg/dL   INR   Result Value Ref Range    INR 1.01 0.85 - 1.15   Lactate Dehydrogenase   Result Value Ref Range    Lactate Dehydrogenase 280 (H) 0 - 250 U/L   CBC with platelets and differential   Result Value Ref Range    WBC Count 5.6 4.0 - 11.0 10e3/uL    RBC Count 3.87 3.80 - 5.20 10e6/uL    Hemoglobin 11.6 (L) 11.7 - 15.7 g/dL    Hematocrit 35.6 35.0 - 47.0 %    MCV 92 78 - 100 fL    MCH 30.0 26.5 - 33.0 pg    MCHC 32.6 31.5 - 36.5 g/dL    RDW 15.9 (H) 10.0 - 15.0 %    Platelet Count 197 150 - 450 10e3/uL    % Neutrophils 64 %    % Lymphocytes 16 %    % Monocytes 12 %    % Eosinophils 6 %    % Basophils 1 %    % Immature Granulocytes 0 %    NRBCs per 100 WBC 0 <1 /100    Absolute Neutrophils 3.6 1.6 - 8.3 10e3/uL    Absolute Lymphocytes 0.9 0.8 - 5.3 10e3/uL    Absolute Monocytes 0.7 0.0 - 1.3 10e3/uL    Absolute Eosinophils 0.3 0.0 - 0.7 10e3/uL    Absolute Basophils 0.1 0.0 - 0.2 10e3/uL    Absolute Immature Granulocytes 0.0 <=0.4 10e3/uL    Absolute NRBCs 0.0 10e3/uL       Imaging    No results found.    The longitudinal plan of care for the diagnosis(es)/condition(s) as documented were addressed during this visit. Due to the added complexity in care, I will continue to support Kacie in the subsequent management and with ongoing continuity of care.    Total time spent  with patient in face to face time, chart review and documentation was 35 minutes.        Signed by: MCKAYLA Buchanan CNP

## 2025-01-02 NOTE — LETTER
1/2/2025      Kacie Hermosillo  [address]  [address]  [facility] [address]      Dear Colleague,    Thank you for referring your patient, Kacie Hermosillo, to the [facility] [place]. Please see a copy of my visit note below.    [facility] Hematology and Oncology Progress Note    Patient: Kacie Hermosillo  MRN: [id number]  Date of Service: 01/02/2025        Reason for Visit    Chief Complaint   Patient presents with     Oncology Clinic Visit     Return visit with PVDR and lab. Malignant neoplasm metastatic to bone.       Assessment and Plan     Cancer Staging   No matching staging information was found for the patient.    1.  Metastatic breast cancer, heart MS negative and HER2/amarilis 2+(negative on FISH): Patient was not a great candidate for chemotherapy so she has been started on Enhertu.  She started that in August.  She had a PET scan done in October that showed near complete response.  Continue her current regimen.  She is at a 25% reduction.  She will get her dose today and then will see Dr. Gann at the [facility] in 3 weeks.  I did tell her he will likely do another scan in February.    2.  Risk for cardiotoxicity: Patient had her last echo done at the end of October and that was normal.  We will continue to do those roughly every 3 to 4 months while on treatment.  We will get that set up for her sometime in February.    3.  Bone mets: Patient is been getting Zometa roughly every 6 weeks.  She is due for that today.  Her calcium level is slightly low but per pharmacy okay to go ahead and give the Zometa.  She is getting 3.5 mg.  Continue to encourage her to take her calcium twice a day.      ECOG Performance    1 - Can't do physically strenuous work, but fully ambyulatory and can do light sedentary work    Distress Screening (within last 30 days)    1. How concerned are you about your ability to eat? : 0  2. How concerned are you about unintended weight loss or your  current weight? : 0  3. How concerned are you about feeling depressed or very sad? : 0  4. How concerned are you about feeling anxious or very scared? : 0  5. Do you struggle with the loss of meaning and yahir in your life? : Not at all  6. How concerned are you about work and home life issues that may be affected by your cancer? : 0  7. How concerned are you about knowing what resources are available to help you? : 0  8. Do you currently have what you would describe as Voodoo or spiritual struggles?: Not at all       Pain  Pain Score: No Pain (0)    Problem List    Patient Active Problem List   Diagnosis     Aortic valve disorder     Chronic renal failure, stage 3 (moderate) (H)     Diastolic dysfunction     Essential hypertension     Dysuria     Gastroesophageal reflux disease     Hyperlipidemia     Malignant neoplasm of areola of left breast in female (H)     Cerebellar stroke (H)     S/P TAVR (transcatheter aortic valve replacement)     Osteopenia     Paroxysmal supraventricular tachycardia (H)     Varicose vein of leg     Venous insufficiency     Mild carotid artery disease (H)     Malignant neoplasm metastatic to bone (H)     Hyponatremia     Elevated INR     T12 compression fracture, sequela     Fall, initial encounter     Laceration of forehead, initial encounter     Syncope     DNR (do not resuscitate)     Hypocalcemia     Hypophosphatemia     LBBB (left bundle branch block)     Decreased carotid pulse     Low back pain     Lymphocytic colitis     Severe aortic stenosis     Bilateral pleural effusion     Hypervolemia, unspecified hypervolemia type        ______________________________________________________________________________    History of Present Illness    Oncologist: Dr. Gann    Diagnosis: Breast cancer, Stage IV with bone met. Pt presented with back pain in May 2024.   -5/28/24: MRI lumbar spine-widespread osseous mets without associated pathologic fracture.  Also issues like disc herniation and  foraminal stenosis.  This was compared to an MRI in 2022.  -6/25/24: PET-Several scattered osseous metastases, predominantly involving the lower spine and bony pelvis   -7/11/24: Biopsy-left iliac crest.  Consistent with metastatic carcinoma from the breast.  ER negative, WA negative, HER2/amarilis 2+.  Negative on FISH  ~Tumor markers were normal at time of diagnosis  -She does has a history of breast cancer in 1995 which was treated with surgery and radiation followed by chemotherapy as per the patient she also took a pill for 5 years     Supportive cares:  -Zometa    Treatment:   -8/19/24: Enhertu    Interim History:   Is here today to continue on treatment.  She states that overall she is doing pretty well with the treatment.  She says she was reduced on the dose and that has helped.  She says she does have some abdominal cramps and diarrhea for a couple of days but the dexamethasone actually seems to help that.  She has some mild chronic fatigue.  She states that her pain is much better controlled.  She does take Tylenol intermittently but does not take it every day and when she does take it it seems to help.  She denies any infectious complaints.  She recently moved into an assisted living in the fall and she states that she is happy she did that and it is going well.        Review of Systems    Pertinent items are noted in HPI.    Past History    Past Medical History:   Diagnosis Date     Breast cancer (H) 01/01/1999     Heart valve replaced      Hx antineoplastic chemotherapy 01/01/1999     Hx of radiation therapy 01/01/1999     Hypertension      PHYSICAL EXAM:  /59 (BP Location: Right arm, Patient Position: Sitting, Cuff Size: Adult Regular)   Pulse 82   Temp 98.2  F (36.8  C) (Oral)   Resp 16   Wt 62.6 kg (138 lb)   SpO2 97%   BMI 24.45 kg/m    GENERAL: no acute distress. Cooperative in conversation. Here with daughter. Using walker.   RESP: lungs are clear bilaterally per auscultation. Regular  respiratory rate. No wheezes or rhonchi.  CV: Regular, rate and rhythm. Systolic murmur.   ABD: soft, nontender.  MUSCULOSKELETAL: Mild bilateral lower extremity swelling.   NEURO: non focal. Alert and oriented x3.   PSYCH: within normal limits. No depression or anxiety.  SKIN: warm dry intact     Lab Results    Recent Results (from the past week)   Comprehensive metabolic panel   Result Value Ref Range    Sodium 142 135 - 145 mmol/L    Potassium 3.7 3.4 - 5.3 mmol/L    Carbon Dioxide (CO2) 24 22 - 29 mmol/L    Anion Gap 9 7 - 15 mmol/L    Urea Nitrogen 15.7 8.0 - 23.0 mg/dL    Creatinine 0.93 0.51 - 0.95 mg/dL    GFR Estimate 60 (L) >60 mL/min/1.73m2    Calcium 8.7 (L) 8.8 - 10.4 mg/dL    Chloride 109 (H) 98 - 107 mmol/L    Glucose 113 (H) 70 - 99 mg/dL    Alkaline Phosphatase 63 40 - 150 U/L    AST 22 0 - 45 U/L    ALT 17 0 - 50 U/L    Protein Total 5.8 (L) 6.4 - 8.3 g/dL    Albumin 3.6 3.5 - 5.2 g/dL    Bilirubin Total 0.3 <=1.2 mg/dL   INR   Result Value Ref Range    INR 1.01 0.85 - 1.15   Lactate Dehydrogenase   Result Value Ref Range    Lactate Dehydrogenase 280 (H) 0 - 250 U/L   CBC with platelets and differential   Result Value Ref Range    WBC Count 5.6 4.0 - 11.0 10e3/uL    RBC Count 3.87 3.80 - 5.20 10e6/uL    Hemoglobin 11.6 (L) 11.7 - 15.7 g/dL    Hematocrit 35.6 35.0 - 47.0 %    MCV 92 78 - 100 fL    MCH 30.0 26.5 - 33.0 pg    MCHC 32.6 31.5 - 36.5 g/dL    RDW 15.9 (H) 10.0 - 15.0 %    Platelet Count 197 150 - 450 10e3/uL    % Neutrophils 64 %    % Lymphocytes 16 %    % Monocytes 12 %    % Eosinophils 6 %    % Basophils 1 %    % Immature Granulocytes 0 %    NRBCs per 100 WBC 0 <1 /100    Absolute Neutrophils 3.6 1.6 - 8.3 10e3/uL    Absolute Lymphocytes 0.9 0.8 - 5.3 10e3/uL    Absolute Monocytes 0.7 0.0 - 1.3 10e3/uL    Absolute Eosinophils 0.3 0.0 - 0.7 10e3/uL    Absolute Basophils 0.1 0.0 - 0.2 10e3/uL    Absolute Immature Granulocytes 0.0 <=0.4 10e3/uL    Absolute NRBCs 0.0 10e3/uL  "      Imaging    No results found.    The longitudinal plan of care for the diagnosis(es)/condition(s) as documented were addressed during this visit. Due to the added complexity in care, I will continue to support Kacie in the subsequent management and with ongoing continuity of care.    Total time spent with patient in face to face time, chart review and documentation was 35 minutes.        Signed by: MCKAYLA Buchanan CNP    Oncology Rooming Note    January 2, 2025 10:04 AM   Kacie Hermosillo is a 84 year old female who presents for:    Chief Complaint   Patient presents with     Oncology Clinic Visit     Return visit with PVDR and lab. Malignant neoplasm metastatic to bone.     Initial Vitals: /59 (BP Location: Right arm, Patient Position: Sitting, Cuff Size: Adult Regular)   Pulse 82   Temp 98.2  F (36.8  C) (Oral)   Resp 16   Wt 62.6 kg (138 lb)   SpO2 97%   BMI 24.45 kg/m   Estimated body mass index is 24.45 kg/m  as calculated from the following:    Height as of 12/5/24: 1.6 m (5' 3\").    Weight as of this encounter: 62.6 kg (138 lb). Body surface area is 1.67 meters squared.  No Pain (0) Comment: Data Unavailable   No LMP recorded. Patient is postmenopausal.  Allergies reviewed: Yes  Medications reviewed: Yes    Medications: Medication refills not needed today.  Pharmacy name entered into [employer]: [id number] [address]    Frailty Screening:   Is the patient here for a new oncology consult visit in cancer care? 2. No      Clinical concerns: none       Laura Freeman CMA                Again, thank you for allowing me to participate in the care of your patient.        Sincerely,        MCKAYLA Buchanan CNP    Electronically signed"

## 2025-01-02 NOTE — PROGRESS NOTES
ANTICOAGULATION MANAGEMENT     Kacie Hermosillo 84 year old female is on warfarin with subtherapeutic INR result. (Goal INR 2.0-3.0)    Recent labs: (last 7 days)     01/02/25  0949   INR 1.01       ASSESSMENT     Source(s): Chart Review and Home Care/Facility Nurse     Warfarin doses taken: Missed dose(s) may be affecting INR-patient has not had any warfarin since 12/23/24.  Diet: No new diet changes identified-patient thinks she eats pretty well.  Patient eats 3 meals per day and does not drink any type of protein drinks/supplements  Medication/supplement changes: None noted  New illness, injury, or hospitalization: No  Signs or symptoms of bleeding or clotting: No  Previous result: Therapeutic last 2(+) visits  Additional findings:  The medication nurse Annabel stated she has been out for the last two weeks with Covid and today is her first day back.  Per Annabel, according to the patient's chart she has not had any warfarin since 12/23/24  Rx sent to [facility]       PLAN     Recommended plan for temporary change(s) affecting INR     Dosing Instructions:  Take 10 mg today 1/2/25 and take 7.5 mg tomorrow 1/3/25  with next INR in 1 week       Summary  As of 1/2/2025      Full warfarin instructions:  1/2: 10 mg; 1/3: 7.5 mg; Otherwise 2.5 mg every Tue, Sat; 5 mg all other days   Next INR check:  1/9/2025               Telephone call with [facility] nurse who verbalizes understanding and agrees to plan  Faxed dosing and follow up instructions to [facility]    Orders given to  Homecare nurse/facility to recheck    Education provided: Taking warfarin: Importance of taking warfarin as instructed  Symptom monitoring: monitoring for clotting signs and symptoms, monitoring for stroke signs and symptoms, and when to seek medical attention/emergency care    Plan made with [facility] Pharmacist Marielle Newberry, DAVID  1/2/2025  Anticoagulation Clinic  [employer] for routing messages: clifton HARDY  [facility]  patient phone line: [phone]        _______________________________________________________________________     Anticoagulation Episode Summary       Current INR goal:  2.0-3.0   TTR:  76.6% (11.8 mo)   Target end date:  Indefinite   Send INR reminders to:  VIRGILIO HARDY    Indications    S/P TAVR (transcatheter aortic valve replacement) [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

## 2025-01-07 ENCOUNTER — TELEPHONE (OUTPATIENT)
Dept: FAMILY MEDICINE | Facility: CLINIC | Age: 85
End: 2025-01-07
Payer: COMMERCIAL

## 2025-01-07 NOTE — TELEPHONE ENCOUNTER
Patient Returning Call    Reason for call:  Patricia Director of Nursing at the [facility] is returning call to [facility] about Kacie INR     Please call back [phone]        Could we send this information to you in [facility] or would you prefer to receive a phone call?:   Patient would prefer a phone call   Okay to leave a detailed message?: Yes at Other phone number:  Patricia [phone]

## 2025-01-07 NOTE — TELEPHONE ENCOUNTER
Per chart review need to clarify if [facility] provider will be managing INR or if [facility] will be.    Jodie Jennings RN    [facility] Anticoagulation Clinic

## 2025-01-08 ENCOUNTER — LAB REQUISITION (OUTPATIENT)
Dept: LAB | Facility: CLINIC | Age: 85
End: 2025-01-08
Payer: COMMERCIAL

## 2025-01-08 DIAGNOSIS — I63.9 CEREBRAL INFARCTION, UNSPECIFIED (H): ICD-10-CM

## 2025-01-08 DIAGNOSIS — Z95.2 PRESENCE OF PROSTHETIC HEART VALVE: ICD-10-CM

## 2025-01-09 ENCOUNTER — ANTICOAGULATION THERAPY VISIT (OUTPATIENT)
Dept: ANTICOAGULATION | Facility: CLINIC | Age: 85
End: 2025-01-09

## 2025-01-09 DIAGNOSIS — Z95.2 S/P TAVR (TRANSCATHETER AORTIC VALVE REPLACEMENT): Primary | ICD-10-CM

## 2025-01-09 DIAGNOSIS — I63.9 CEREBELLAR STROKE (H): ICD-10-CM

## 2025-01-09 LAB — INR PPP: 1.63 (ref 0.85–1.15)

## 2025-01-09 PROCEDURE — 36415 COLL VENOUS BLD VENIPUNCTURE: CPT | Mod: ORL | Performed by: PHYSICIAN ASSISTANT

## 2025-01-09 PROCEDURE — P9604 ONE-WAY ALLOW PRORATED TRIP: HCPCS | Mod: ORL | Performed by: PHYSICIAN ASSISTANT

## 2025-01-09 PROCEDURE — 85610 PROTHROMBIN TIME: CPT | Mod: ORL | Performed by: PHYSICIAN ASSISTANT

## 2025-01-09 NOTE — PROGRESS NOTES
Patricia returned call from [facility] and confirmed Dr. Zapata with [facility] is managing the patient's INR's and medications going forward.  Patricia stated she received the patient's INR results already and got orders from Dr. Zapata.    Patricia asked if she should fax orders to [facility].  The orders have been getting faxed to the lab and the patient is having her labs drawn at her chemo sessions.  Advised Patricia since [facility] is not managing the patient's INR results or dosing there is no need to fax the lab orders to us and to continue to fax them to the lab.    Requested Patricia contact the patient's daughter her mothers INR will be managed by her new provider.    Consulted with the [facility] and will close the [facility] episode and send a my chart message to the patient's daughter to close the loop.    SITA LindseyN, RN  Anticoagulation Clinic

## 2025-01-09 NOTE — PROGRESS NOTES
ANTICOAGULATION  MANAGEMENT    Kacie Hermosillo is being discharged from the [facility] ([facility]).    Reason for discharge: care has been transferred to [facility] Dr. Zapata    Anticoagulation episode resolved, [facility] referral closed, and Standing order discontinued    If patient needs warfarin management in the future, please send a new referral    Shahla Newberry RN

## 2025-01-13 ENCOUNTER — TELEPHONE (OUTPATIENT)
Dept: ONCOLOGY | Facility: HOSPITAL | Age: 85
End: 2025-01-13
Payer: COMMERCIAL

## 2025-01-13 NOTE — TELEPHONE ENCOUNTER
Patient's daughter Barbie calls looking for resources for wigs. Patient is starting to lose her hair.     Per Mihaela Koo RN care coordinator, Angeline Kohler at [facility] or [facility] in [place] people like and are close to patient. There is also hair loss resources in the chemo folder that was given. Barbie verbalizes understanding and denies further questions at this time.    Tonya Peacock RN

## 2025-01-15 ENCOUNTER — LAB REQUISITION (OUTPATIENT)
Dept: LAB | Facility: CLINIC | Age: 85
End: 2025-01-15
Payer: COMMERCIAL

## 2025-01-15 DIAGNOSIS — Z95.2 PRESENCE OF PROSTHETIC HEART VALVE: ICD-10-CM

## 2025-01-16 LAB — INR PPP: 1.94 (ref 0.85–1.15)

## 2025-01-16 PROCEDURE — P9604 ONE-WAY ALLOW PRORATED TRIP: HCPCS | Mod: ORL | Performed by: PHYSICIAN ASSISTANT

## 2025-01-16 PROCEDURE — 36415 COLL VENOUS BLD VENIPUNCTURE: CPT | Mod: ORL | Performed by: PHYSICIAN ASSISTANT

## 2025-01-16 PROCEDURE — 85610 PROTHROMBIN TIME: CPT | Mod: ORL | Performed by: PHYSICIAN ASSISTANT

## 2025-01-20 ENCOUNTER — LAB REQUISITION (OUTPATIENT)
Dept: LAB | Facility: CLINIC | Age: 85
End: 2025-01-20
Payer: COMMERCIAL

## 2025-01-20 DIAGNOSIS — Z95.2 PRESENCE OF PROSTHETIC HEART VALVE: ICD-10-CM

## 2025-01-23 LAB — INR PPP: 1.9 (ref 0.85–1.15)

## 2025-01-23 PROCEDURE — 85610 PROTHROMBIN TIME: CPT | Mod: ORL | Performed by: PHYSICIAN ASSISTANT

## 2025-01-23 PROCEDURE — P9603 ONE-WAY ALLOW PRORATED MILES: HCPCS | Mod: ORL | Performed by: PHYSICIAN ASSISTANT

## 2025-01-23 PROCEDURE — 36415 COLL VENOUS BLD VENIPUNCTURE: CPT | Mod: ORL | Performed by: PHYSICIAN ASSISTANT

## 2025-01-27 ENCOUNTER — ONCOLOGY VISIT (OUTPATIENT)
Dept: ONCOLOGY | Facility: HOSPITAL | Age: 85
End: 2025-01-27
Attending: INTERNAL MEDICINE
Payer: COMMERCIAL

## 2025-01-27 ENCOUNTER — INFUSION THERAPY VISIT (OUTPATIENT)
Dept: INFUSION THERAPY | Facility: HOSPITAL | Age: 85
End: 2025-01-27
Attending: INTERNAL MEDICINE
Payer: COMMERCIAL

## 2025-01-27 ENCOUNTER — PATIENT OUTREACH (OUTPATIENT)
Dept: ONCOLOGY | Facility: HOSPITAL | Age: 85
End: 2025-01-27

## 2025-01-27 VITALS
RESPIRATION RATE: 16 BRPM | HEIGHT: 63 IN | DIASTOLIC BLOOD PRESSURE: 63 MMHG | BODY MASS INDEX: 24.63 KG/M2 | OXYGEN SATURATION: 96 % | SYSTOLIC BLOOD PRESSURE: 144 MMHG | WEIGHT: 139 LBS | HEART RATE: 68 BPM

## 2025-01-27 DIAGNOSIS — C50.012 MALIGNANT NEOPLASM OF AREOLA OF LEFT BREAST IN FEMALE, UNSPECIFIED ESTROGEN RECEPTOR STATUS (H): Primary | ICD-10-CM

## 2025-01-27 DIAGNOSIS — C50.012 MALIGNANT NEOPLASM OF AREOLA OF LEFT BREAST IN FEMALE, UNSPECIFIED ESTROGEN RECEPTOR STATUS (H): ICD-10-CM

## 2025-01-27 DIAGNOSIS — C79.51 MALIGNANT NEOPLASM METASTATIC TO BONE (H): Primary | ICD-10-CM

## 2025-01-27 DIAGNOSIS — L63.9 ALOPECIA AREATA: ICD-10-CM

## 2025-01-27 DIAGNOSIS — I50.30 DIASTOLIC HEART FAILURE, UNSPECIFIED HF CHRONICITY (H): ICD-10-CM

## 2025-01-27 LAB
ALBUMIN SERPL BCG-MCNC: 3.5 G/DL (ref 3.5–5.2)
ALP SERPL-CCNC: 71 U/L (ref 40–150)
ALT SERPL W P-5'-P-CCNC: 18 U/L (ref 0–50)
ANION GAP SERPL CALCULATED.3IONS-SCNC: 9 MMOL/L (ref 7–15)
AST SERPL W P-5'-P-CCNC: 30 U/L (ref 0–45)
BASOPHILS # BLD AUTO: 0.1 10E3/UL (ref 0–0.2)
BASOPHILS NFR BLD AUTO: 1 %
BILIRUB SERPL-MCNC: 0.2 MG/DL
BUN SERPL-MCNC: 16.7 MG/DL (ref 8–23)
CALCIUM SERPL-MCNC: 9 MG/DL (ref 8.8–10.4)
CHLORIDE SERPL-SCNC: 108 MMOL/L (ref 98–107)
CREAT SERPL-MCNC: 0.98 MG/DL (ref 0.51–0.95)
EGFRCR SERPLBLD CKD-EPI 2021: 57 ML/MIN/1.73M2
EOSINOPHIL # BLD AUTO: 0.3 10E3/UL (ref 0–0.7)
EOSINOPHIL NFR BLD AUTO: 6 %
ERYTHROCYTE [DISTWIDTH] IN BLOOD BY AUTOMATED COUNT: 15.9 % (ref 10–15)
GLUCOSE SERPL-MCNC: 93 MG/DL (ref 70–99)
HCO3 SERPL-SCNC: 24 MMOL/L (ref 22–29)
HCT VFR BLD AUTO: 35.4 % (ref 35–47)
HGB BLD-MCNC: 11.6 G/DL (ref 11.7–15.7)
IMM GRANULOCYTES # BLD: 0 10E3/UL
IMM GRANULOCYTES NFR BLD: 0 %
LYMPHOCYTES # BLD AUTO: 1.2 10E3/UL (ref 0.8–5.3)
LYMPHOCYTES NFR BLD AUTO: 20 %
MCH RBC QN AUTO: 29.5 PG (ref 26.5–33)
MCHC RBC AUTO-ENTMCNC: 32.8 G/DL (ref 31.5–36.5)
MCV RBC AUTO: 90 FL (ref 78–100)
MONOCYTES # BLD AUTO: 0.7 10E3/UL (ref 0–1.3)
MONOCYTES NFR BLD AUTO: 12 %
NEUTROPHILS # BLD AUTO: 3.5 10E3/UL (ref 1.6–8.3)
NEUTROPHILS NFR BLD AUTO: 60 %
NRBC # BLD AUTO: 0 10E3/UL
NRBC BLD AUTO-RTO: 0 /100
PLATELET # BLD AUTO: 205 10E3/UL (ref 150–450)
POTASSIUM SERPL-SCNC: 4 MMOL/L (ref 3.4–5.3)
PROT SERPL-MCNC: 5.8 G/DL (ref 6.4–8.3)
RBC # BLD AUTO: 3.93 10E6/UL (ref 3.8–5.2)
SODIUM SERPL-SCNC: 141 MMOL/L (ref 135–145)
WBC # BLD AUTO: 5.7 10E3/UL (ref 4–11)

## 2025-01-27 PROCEDURE — 96375 TX/PRO/DX INJ NEW DRUG ADDON: CPT

## 2025-01-27 PROCEDURE — 36591 DRAW BLOOD OFF VENOUS DEVICE: CPT | Performed by: NURSE PRACTITIONER

## 2025-01-27 PROCEDURE — 96413 CHEMO IV INFUSION 1 HR: CPT

## 2025-01-27 PROCEDURE — 85004 AUTOMATED DIFF WBC COUNT: CPT | Performed by: NURSE PRACTITIONER

## 2025-01-27 PROCEDURE — 85041 AUTOMATED RBC COUNT: CPT | Performed by: NURSE PRACTITIONER

## 2025-01-27 PROCEDURE — 99215 OFFICE O/P EST HI 40 MIN: CPT | Performed by: INTERNAL MEDICINE

## 2025-01-27 PROCEDURE — 96367 TX/PROPH/DG ADDL SEQ IV INF: CPT

## 2025-01-27 PROCEDURE — 85014 HEMATOCRIT: CPT | Performed by: NURSE PRACTITIONER

## 2025-01-27 PROCEDURE — 82040 ASSAY OF SERUM ALBUMIN: CPT | Performed by: NURSE PRACTITIONER

## 2025-01-27 PROCEDURE — 258N000003 HC RX IP 258 OP 636: Performed by: INTERNAL MEDICINE

## 2025-01-27 PROCEDURE — 250N000011 HC RX IP 250 OP 636: Mod: JW | Performed by: INTERNAL MEDICINE

## 2025-01-27 PROCEDURE — G0463 HOSPITAL OUTPT CLINIC VISIT: HCPCS | Performed by: INTERNAL MEDICINE

## 2025-01-27 RX ORDER — DIPHENHYDRAMINE HYDROCHLORIDE 50 MG/ML
50 INJECTION INTRAMUSCULAR; INTRAVENOUS
Status: CANCELLED
Start: 2025-01-27

## 2025-01-27 RX ORDER — HEPARIN SODIUM (PORCINE) LOCK FLUSH IV SOLN 100 UNIT/ML 100 UNIT/ML
5 SOLUTION INTRAVENOUS
OUTPATIENT
Start: 2025-02-15

## 2025-01-27 RX ORDER — EPINEPHRINE 1 MG/ML
0.3 INJECTION, SOLUTION INTRAMUSCULAR; SUBCUTANEOUS EVERY 5 MIN PRN
Status: CANCELLED | OUTPATIENT
Start: 2025-01-27

## 2025-01-27 RX ORDER — MEPERIDINE HYDROCHLORIDE 25 MG/ML
25 INJECTION INTRAMUSCULAR; INTRAVENOUS; SUBCUTANEOUS
Status: CANCELLED | OUTPATIENT
Start: 2025-01-27

## 2025-01-27 RX ORDER — HEPARIN SODIUM (PORCINE) LOCK FLUSH IV SOLN 100 UNIT/ML 100 UNIT/ML
5 SOLUTION INTRAVENOUS
Status: CANCELLED | OUTPATIENT
Start: 2025-01-27

## 2025-01-27 RX ORDER — DIPHENHYDRAMINE HYDROCHLORIDE 50 MG/ML
25 INJECTION INTRAMUSCULAR; INTRAVENOUS
Start: 2025-02-15

## 2025-01-27 RX ORDER — ALBUTEROL SULFATE 0.83 MG/ML
2.5 SOLUTION RESPIRATORY (INHALATION)
Status: CANCELLED | OUTPATIENT
Start: 2025-01-27

## 2025-01-27 RX ORDER — ALBUTEROL SULFATE 90 UG/1
1-2 INHALANT RESPIRATORY (INHALATION)
Status: CANCELLED
Start: 2025-01-27

## 2025-01-27 RX ORDER — ALBUTEROL SULFATE 90 UG/1
1-2 INHALANT RESPIRATORY (INHALATION)
Status: DISCONTINUED | OUTPATIENT
Start: 2025-01-27 | End: 2025-01-27 | Stop reason: HOSPADM

## 2025-01-27 RX ORDER — ALBUTEROL SULFATE 90 UG/1
1-2 INHALANT RESPIRATORY (INHALATION)
Start: 2025-02-15

## 2025-01-27 RX ORDER — METHYLPREDNISOLONE SODIUM SUCCINATE 40 MG/ML
40 INJECTION INTRAMUSCULAR; INTRAVENOUS
Start: 2025-02-15

## 2025-01-27 RX ORDER — LORAZEPAM 2 MG/ML
0.5 INJECTION INTRAMUSCULAR EVERY 4 HOURS PRN
OUTPATIENT
Start: 2025-02-15

## 2025-01-27 RX ORDER — PALONOSETRON 0.05 MG/ML
0.25 INJECTION, SOLUTION INTRAVENOUS ONCE
OUTPATIENT
Start: 2025-02-15

## 2025-01-27 RX ORDER — DIPHENHYDRAMINE HYDROCHLORIDE 50 MG/ML
25 INJECTION INTRAMUSCULAR; INTRAVENOUS
Status: DISCONTINUED | OUTPATIENT
Start: 2025-01-27 | End: 2025-01-27 | Stop reason: HOSPADM

## 2025-01-27 RX ORDER — ALBUTEROL SULFATE 0.83 MG/ML
2.5 SOLUTION RESPIRATORY (INHALATION)
Status: DISCONTINUED | OUTPATIENT
Start: 2025-01-27 | End: 2025-01-27 | Stop reason: HOSPADM

## 2025-01-27 RX ORDER — PALONOSETRON 0.05 MG/ML
0.25 INJECTION, SOLUTION INTRAVENOUS ONCE
Status: CANCELLED | OUTPATIENT
Start: 2025-01-27

## 2025-01-27 RX ORDER — EPINEPHRINE 1 MG/ML
0.3 INJECTION, SOLUTION INTRAMUSCULAR; SUBCUTANEOUS EVERY 5 MIN PRN
Status: DISCONTINUED | OUTPATIENT
Start: 2025-01-27 | End: 2025-01-27 | Stop reason: HOSPADM

## 2025-01-27 RX ORDER — HEPARIN SODIUM,PORCINE 10 UNIT/ML
5-20 VIAL (ML) INTRAVENOUS DAILY PRN
Status: CANCELLED | OUTPATIENT
Start: 2025-01-27

## 2025-01-27 RX ORDER — LORAZEPAM 2 MG/ML
0.5 INJECTION INTRAMUSCULAR EVERY 4 HOURS PRN
Status: CANCELLED | OUTPATIENT
Start: 2025-01-27

## 2025-01-27 RX ORDER — EPINEPHRINE 1 MG/ML
0.3 INJECTION, SOLUTION INTRAMUSCULAR; SUBCUTANEOUS EVERY 5 MIN PRN
OUTPATIENT
Start: 2025-02-15

## 2025-01-27 RX ORDER — MEPERIDINE HYDROCHLORIDE 25 MG/ML
25 INJECTION INTRAMUSCULAR; INTRAVENOUS; SUBCUTANEOUS
OUTPATIENT
Start: 2025-02-15

## 2025-01-27 RX ORDER — METHYLPREDNISOLONE SODIUM SUCCINATE 40 MG/ML
40 INJECTION INTRAMUSCULAR; INTRAVENOUS
Status: CANCELLED
Start: 2025-01-27

## 2025-01-27 RX ORDER — DIPHENHYDRAMINE HYDROCHLORIDE 50 MG/ML
50 INJECTION INTRAMUSCULAR; INTRAVENOUS
Status: DISCONTINUED | OUTPATIENT
Start: 2025-01-27 | End: 2025-01-27 | Stop reason: HOSPADM

## 2025-01-27 RX ORDER — PALONOSETRON 0.05 MG/ML
0.25 INJECTION, SOLUTION INTRAVENOUS ONCE
Status: COMPLETED | OUTPATIENT
Start: 2025-01-27 | End: 2025-01-27

## 2025-01-27 RX ORDER — ALBUTEROL SULFATE 0.83 MG/ML
2.5 SOLUTION RESPIRATORY (INHALATION)
OUTPATIENT
Start: 2025-02-15

## 2025-01-27 RX ORDER — DIPHENHYDRAMINE HYDROCHLORIDE 50 MG/ML
50 INJECTION INTRAMUSCULAR; INTRAVENOUS
Start: 2025-02-15

## 2025-01-27 RX ORDER — MEPERIDINE HYDROCHLORIDE 25 MG/ML
25 INJECTION INTRAMUSCULAR; INTRAVENOUS; SUBCUTANEOUS
Status: DISCONTINUED | OUTPATIENT
Start: 2025-01-27 | End: 2025-01-27 | Stop reason: HOSPADM

## 2025-01-27 RX ORDER — HEPARIN SODIUM,PORCINE 10 UNIT/ML
5-20 VIAL (ML) INTRAVENOUS DAILY PRN
OUTPATIENT
Start: 2025-02-15

## 2025-01-27 RX ORDER — METHYLPREDNISOLONE SODIUM SUCCINATE 40 MG/ML
40 INJECTION INTRAMUSCULAR; INTRAVENOUS
Status: DISCONTINUED | OUTPATIENT
Start: 2025-01-27 | End: 2025-01-27 | Stop reason: HOSPADM

## 2025-01-27 RX ORDER — HEPARIN SODIUM (PORCINE) LOCK FLUSH IV SOLN 100 UNIT/ML 100 UNIT/ML
5 SOLUTION INTRAVENOUS
Status: DISCONTINUED | OUTPATIENT
Start: 2025-01-27 | End: 2025-01-27 | Stop reason: HOSPADM

## 2025-01-27 RX ORDER — DIPHENHYDRAMINE HYDROCHLORIDE 50 MG/ML
25 INJECTION INTRAMUSCULAR; INTRAVENOUS
Status: CANCELLED
Start: 2025-01-27

## 2025-01-27 RX ADMIN — DEXAMETHASONE SODIUM PHOSPHATE: 10 INJECTION, SOLUTION INTRAMUSCULAR; INTRAVENOUS at 10:36

## 2025-01-27 RX ADMIN — HEPARIN 5 ML: 100 SYRINGE at 11:44

## 2025-01-27 RX ADMIN — DEXTROSE MONOHYDRATE 250 ML: 50 INJECTION, SOLUTION INTRAVENOUS at 10:34

## 2025-01-27 RX ADMIN — PALONOSETRON 0.25 MG: 0.25 INJECTION, SOLUTION INTRAVENOUS at 10:34

## 2025-01-27 RX ADMIN — FAM-TRASTUZUMAB DERUXTECAN-NXKI 250 MG: 100 INJECTION, POWDER, LYOPHILIZED, FOR SOLUTION INTRAVENOUS at 11:06

## 2025-01-27 ASSESSMENT — PAIN SCALES - GENERAL: PAINLEVEL_OUTOF10: NO PAIN (0)

## 2025-01-27 NOTE — PROGRESS NOTES
"[facility]: Cancer Care                                                                                          -Met with Kacie, and her daughter, Barbie, when  they were at clinic today for follow up and treatment. They expressed confusion over who is managing INRs as they received call from INR clinic on 1/2 and were told that [facility] was not planning to follow Kacie as PCP was not within [place].  Kacie shared that she is enjoying the social aspect of living at  [facility] and has been able to meet with friends for activities and walks down the halls and is regaining her strength. She started to have hair loss and will go to [employer] today for a wig fitting. Order for cranial prosthesis was provided as Kacie has had significant thinning.    Spoke with Patricia, Head nurse at the [facility], to follow up regarding INR management.  ([phone], [phone], Nurse Line [phone],)Shared that per chart notes, appears that on 1/2, [place] [facility] discussed not continuing to manage INR any longer since as Kacie is at the [facility] seeing a PCP that is outside of [place]. Patricia and [facility]  INR clinic did talk on 1/9/25, see encounter in the chart, and labs are now managed by  TAWNYA Jason, PCP, at [facility]. She shared that Kacie's labs are drawn each Thursday at \"[facility] and the lab results are faxed to the [facility]. Patricia then manages them through the INR portal with [facility] and TAWNYA Torres  reviews/makes recommendations for any changes and communicates back to the [facility]. INR is being fully managed by the PCP contracted with the [facility] and nursing staff. Update shared with Kacie and her daughter, Barbie, and they were happy to hear that INR is being reviewed and managed by [facility].    They also inquired about the dex that Kacie is taking after her Enhertu infusion. Barbie has been calling/sending messages to [facility] " regarding the refill of dex that needs to be requested a week prior to the infusion to make sure the medication arrives on time to the facility. She does not always hear back from them about this. Writer will call Patricia to check on the process and help streamline by sending orders about next infusion, when dex is needed,etc. Will discuss with Patricia and then update will be given to Kacie and Sulma upon hearing back. Per Dr. Gann, he does not want to increase the quantity of dex as he does not want to take a chance on Kacie getting extra dex with each cycle.    Signature:  Mihaela oKo RN

## 2025-01-27 NOTE — PROGRESS NOTES
"Oncology Rooming Note    January 27, 2025 9:29 AM   Kacie Hermosillo is a 84 year old female who presents for:    Chief Complaint   Patient presents with    Oncology Clinic Visit     Follow up -   Malignant neoplasm metastatic to bone     Initial Vitals: Ht 1.6 m (5' 3\")   BMI 24.45 kg/m   Estimated body mass index is 24.45 kg/m  as calculated from the following:    Height as of this encounter: 1.6 m (5' 3\").    Weight as of 1/2/25: 62.6 kg (138 lb). Body surface area is 1.67 meters squared.  Data Unavailable Comment: Data Unavailable   No LMP recorded. Patient is postmenopausal.  Allergies reviewed: Yes  Medications reviewed: Yes    Medications: Medication refills not needed today.  Pharmacy name entered into [employer]: [id number] [address]    Frailty Screening:   Is the patient here for a new oncology consult visit in cancer care? 2. No      Clinical concerns:   Follow up labs.   Need handicap parking form to be filled.         Lucila Braga MA            "

## 2025-01-27 NOTE — PROGRESS NOTES
[facility] Hematology and Oncology Progress Note    Patient: Kacie Hermosillo  MRN: [id number]  Date of Service: Jan 27, 2025             ECOG Performance    2 - Ambulatory and independent in all ADLs; cannot work; up > 50% of the time          ______________________________________________________________________________  Oncologic history  Metastatic breast cancer with bone mets only ER negative NC negative HER2/amarilis 2+ and negative by FISH.  HER2/amarilis low    Remote diagnosis of breast cancer in 1995 treated with surgery chemotherapy radiation and 5 years of hormonal therapy.  Pathology report not available.    Treatment  Patient started on fam-trastuzumab on 8/19/2024  Complete response by PET criteria in  Nov 2024  Dose of fam-trastuzumab decreased by 25% in November 2024 due to severe diarrhea    History of Present Illness    Ms. Kacie Hermosillo is here in follow-up.  She continues to receive fam-trastuzumab without any issues.  Her diarrhea is much better since we decreased the dose.  Her appetite is great and her energy level is fine.  She is very active with current assisted living place.  She has occasional discomfort in her left leg for which she takes Tylenol.  She otherwise has no concerns.    Review of systems  A comprehensive 12 point review of system was done that was negative except what is mentioned in the history of present illness    Past History    Past Medical History:   Diagnosis Date    Breast cancer (H) 01/01/1999    Heart valve replaced     Hx antineoplastic chemotherapy 01/01/1999    Hx of radiation therapy 01/01/1999    Hypertension        Past Surgical History:   Procedure Laterality Date    BIOPSY BREAST      HYSTERECTOMY      IR CHEST PORT PLACEMENT > 5 YRS OF AGE  7/30/2024    LUMPECTOMY BREAST Left 1999    OOPHORECTOMY Bilateral        Physical Exam    BP (!) 144/63 (BP Location: Right arm, Patient Position: Sitting, Cuff Size: Adult Regular)   Pulse 68   Resp 16   Ht 1.6 m  "(5' 3\")   Wt 63 kg (139 lb)   SpO2 96%   BMI 24.62 kg/m      General: alert, awake, not in acute distress  HEENT: Head: Normal, normocephalic, atraumatic.  Eye: Normal external eye, conjunctiva, lids cornea, MARI.  Nose: Normal external nose, mucus membranes and septum.  Pharynx: Normal buccal mucosa. Normal pharynx.  Neck / Thyroid: Supple, no masses, nodes, nodules or enlargement.  Lymphatics: No abnormally enlarged lymph nodes.  Chest: Normal chest wall and respirations. Clear to auscultation.  No crackles or rhonchi's  Heart: S1 S2 RRR, no murmur.   Abdomen: abdomen is soft without significant tenderness, masses, organomegaly or guarding  Extremities: normal strength, tone, and muscle mass  Skin: normal. no rash or abnormalities  CNS: non focal.    Lab Results    Recent Results (from the past 240 hours)   INR    Collection Time: 01/23/25 11:55 AM   Result Value Ref Range    INR 1.90 (H) 0.85 - 1.15   Comprehensive metabolic panel    Collection Time: 01/27/25  9:08 AM   Result Value Ref Range    Sodium 141 135 - 145 mmol/L    Potassium 4.0 3.4 - 5.3 mmol/L    Carbon Dioxide (CO2) 24 22 - 29 mmol/L    Anion Gap 9 7 - 15 mmol/L    Urea Nitrogen 16.7 8.0 - 23.0 mg/dL    Creatinine 0.98 (H) 0.51 - 0.95 mg/dL    GFR Estimate 57 (L) >60 mL/min/1.73m2    Calcium 9.0 8.8 - 10.4 mg/dL    Chloride 108 (H) 98 - 107 mmol/L    Glucose 93 70 - 99 mg/dL    Alkaline Phosphatase 71 40 - 150 U/L    AST 30 0 - 45 U/L    ALT 18 0 - 50 U/L    Protein Total 5.8 (L) 6.4 - 8.3 g/dL    Albumin 3.5 3.5 - 5.2 g/dL    Bilirubin Total 0.2 <=1.2 mg/dL   CBC with platelets and differential    Collection Time: 01/27/25  9:08 AM   Result Value Ref Range    WBC Count 5.7 4.0 - 11.0 10e3/uL    RBC Count 3.93 3.80 - 5.20 10e6/uL    Hemoglobin 11.6 (L) 11.7 - 15.7 g/dL    Hematocrit 35.4 35.0 - 47.0 %    MCV 90 78 - 100 fL    MCH 29.5 26.5 - 33.0 pg    MCHC 32.8 31.5 - 36.5 g/dL    RDW 15.9 (H) 10.0 - 15.0 %    Platelet Count 205 150 - 450 " 10e3/uL    % Neutrophils 60 %    % Lymphocytes 20 %    % Monocytes 12 %    % Eosinophils 6 %    % Basophils 1 %    % Immature Granulocytes 0 %    NRBCs per 100 WBC 0 <1 /100    Absolute Neutrophils 3.5 1.6 - 8.3 10e3/uL    Absolute Lymphocytes 1.2 0.8 - 5.3 10e3/uL    Absolute Monocytes 0.7 0.0 - 1.3 10e3/uL    Absolute Eosinophils 0.3 0.0 - 0.7 10e3/uL    Absolute Basophils 0.1 0.0 - 0.2 10e3/uL    Absolute Immature Granulocytes 0.0 <=0.4 10e3/uL    Absolute NRBCs 0.0 10e3/uL         Imaging    No results found.        Assessment and Plan    Metastatic breast cancer with bone mets HER2/amarilis low  Patient is here in follow-up.  She continues on fam-trastuzumab.  Will continue with the dose today.  She will come back in 3 weeks.  At that point in time to do a PET scan and further intervention will depend on the results of the PET scan.      Patient is concerned about her hair loss.  Explained to the patient that it is most likely from the medication      Diarrhea   Patient's diarrhea has been much better since we decreased the dose by 25% we will continue her on the current dose that she will continue to take occasional Imodium as needed patient is also concerned about her hair loss and I did    Bone metastases  Patient to continue taking Zometa periodically    Cardiac monitoring  Patient does need cardiac monitoring.  Her last echo was on 10/27/2024 I will plan to do the next echo prior to her next visit with me.    Anticoagulation  Patient has been on anticoagulation with warfarin.  Recently there has been some confusion in management of her warfarin.  After discussing various options I think it in the patient's best interest to be registered with our Coumadin clinic so we can manage her INR as she is seeing her very frequently and is having blood work at our facility almost every 3 weeks.  Patient's daughter would like to go ahead with that plan.    Signed by: Hadley Gann MD        CC: Irina Francisco PA-C

## 2025-01-27 NOTE — PROGRESS NOTES
Infusion Nursing Note:  Kacie Hermosillo presents today for Day 1 Cycle 7 Enhertu.    Patient seen by provider today: Yes: Dr. Gann   present during visit today: Not Applicable.    Note: Kacie was premedicated with Aloxi and Emend/Dexamethasone. Confirmed she has dexamethasone to take at home the next 3 days. She tolerated Enhertu infusion well.      Intravenous Access:  Labs drawn without difficulty.  Implanted Port.    Treatment Conditions:  Lab Results   Component Value Date    HGB 11.6 (L) 01/27/2025    WBC 5.7 01/27/2025    ANEUTAUTO 3.5 01/27/2025     01/27/2025        Results reviewed, labs MET treatment parameters, ok to proceed with treatment.      Post Infusion Assessment:  Patient tolerated infusion without incident.  Blood return noted pre and post infusion.  Site patent and intact, free from redness, edema or discomfort.  No evidence of extravasations.  Access discontinued per protocol.       Discharge Plan:   Patient and/or family verbalized understanding of discharge instructions and all questions answered.  AVS to patient via [employer].  Patient will return 2/20 for next appointment.   Patient discharged in stable condition accompanied by: daughter.  Departure Mode: Ambulatory.      Lilli Hanley RN

## 2025-01-27 NOTE — LETTER
"1/27/2025      Kacie Hermosillo  [address]  [address]  [facility] [address]      Dear Colleague,    Thank you for referring your patient, Kacie Hermosillo, to the [facility] [place]. Please see a copy of my visit note below.    Oncology Rooming Note    January 27, 2025 9:29 AM   Kacie Hermosillo is a 84 year old female who presents for:    Chief Complaint   Patient presents with     Oncology Clinic Visit     Follow up -   Malignant neoplasm metastatic to bone     Initial Vitals: Ht 1.6 m (5' 3\")   BMI 24.45 kg/m   Estimated body mass index is 24.45 kg/m  as calculated from the following:    Height as of this encounter: 1.6 m (5' 3\").    Weight as of 1/2/25: 62.6 kg (138 lb). Body surface area is 1.67 meters squared.  Data Unavailable Comment: Data Unavailable   No LMP recorded. Patient is postmenopausal.  Allergies reviewed: Yes  Medications reviewed: Yes    Medications: Medication refills not needed today.  Pharmacy name entered into [facility]: [id number] - [place], [place] - [address]    Frailty Screening:   Is the patient here for a new oncology consult visit in cancer care? 2. No      Clinical concerns:   Follow up labs.   Need handicap parking form to be filled.         Lucila Braga MA              [facility] Hematology and Oncology Progress Note    Patient: Kacie Hermosillo  MRN: [id number]  Date of Service: Jan 27, 2025             ECOG Performance    2 - Ambulatory and independent in all ADLs; cannot work; up > 50% of the time          ______________________________________________________________________________  Oncologic history  Metastatic breast cancer with bone mets only ER negative [place] negative HER2/amarilis 2+ and negative by FISH.  HER2/amarilis low    Remote diagnosis of breast cancer in 1995 treated with surgery chemotherapy radiation and 5 years of hormonal therapy.  Pathology report not available.    Treatment  Patient started on fam-trastuzumab " "on 8/19/2024  Complete response by PET criteria in  Nov 2024  Dose of fam-trastuzumab decreased by 25% in November 2024 due to severe diarrhea    History of Present Illness    Ms. Kacie Hermosillo is here in follow-up.  She continues to receive fam-trastuzumab without any issues.  Her diarrhea is much better since we decreased the dose.  Her appetite is great and her energy level is fine.  She is very active with current assisted living place.  She has occasional discomfort in her left leg for which she takes Tylenol.  She otherwise has no concerns.    Review of systems  A comprehensive 12 point review of system was done that was negative except what is mentioned in the history of present illness    Past History    Past Medical History:   Diagnosis Date     Breast cancer (H) 01/01/1999     Heart valve replaced      Hx antineoplastic chemotherapy 01/01/1999     Hx of radiation therapy 01/01/1999     Hypertension        Past Surgical History:   Procedure Laterality Date     BIOPSY BREAST       HYSTERECTOMY       IR CHEST PORT PLACEMENT > 5 YRS OF AGE  7/30/2024     LUMPECTOMY BREAST Left 1999     OOPHORECTOMY Bilateral        Physical Exam    BP (!) 144/63 (BP Location: Right arm, Patient Position: Sitting, Cuff Size: Adult Regular)   Pulse 68   Resp 16   Ht 1.6 m (5' 3\")   Wt 63 kg (139 lb)   SpO2 96%   BMI 24.62 kg/m      General: alert, awake, not in acute distress  HEENT: Head: Normal, normocephalic, atraumatic.  Eye: Normal external eye, conjunctiva, lids cornea, MARI.  Nose: Normal external nose, mucus membranes and septum.  Pharynx: Normal buccal mucosa. Normal pharynx.  Neck / Thyroid: Supple, no masses, nodes, nodules or enlargement.  Lymphatics: No abnormally enlarged lymph nodes.  Chest: Normal chest wall and respirations. Clear to auscultation.  No crackles or rhonchi's  Heart: S1 S2 RRR, no murmur.   Abdomen: abdomen is soft without significant tenderness, masses, organomegaly or " guarding  Extremities: normal strength, tone, and muscle mass  Skin: normal. no rash or abnormalities  CNS: non focal.    Lab Results    Recent Results (from the past 240 hours)   INR    Collection Time: 01/23/25 11:55 AM   Result Value Ref Range    INR 1.90 (H) 0.85 - 1.15   Comprehensive metabolic panel    Collection Time: 01/27/25  9:08 AM   Result Value Ref Range    Sodium 141 135 - 145 mmol/L    Potassium 4.0 3.4 - 5.3 mmol/L    Carbon Dioxide (CO2) 24 22 - 29 mmol/L    Anion Gap 9 7 - 15 mmol/L    Urea Nitrogen 16.7 8.0 - 23.0 mg/dL    Creatinine 0.98 (H) 0.51 - 0.95 mg/dL    GFR Estimate 57 (L) >60 mL/min/1.73m2    Calcium 9.0 8.8 - 10.4 mg/dL    Chloride 108 (H) 98 - 107 mmol/L    Glucose 93 70 - 99 mg/dL    Alkaline Phosphatase 71 40 - 150 U/L    AST 30 0 - 45 U/L    ALT 18 0 - 50 U/L    Protein Total 5.8 (L) 6.4 - 8.3 g/dL    Albumin 3.5 3.5 - 5.2 g/dL    Bilirubin Total 0.2 <=1.2 mg/dL   CBC with platelets and differential    Collection Time: 01/27/25  9:08 AM   Result Value Ref Range    WBC Count 5.7 4.0 - 11.0 10e3/uL    RBC Count 3.93 3.80 - 5.20 10e6/uL    Hemoglobin 11.6 (L) 11.7 - 15.7 g/dL    Hematocrit 35.4 35.0 - 47.0 %    MCV 90 78 - 100 fL    MCH 29.5 26.5 - 33.0 pg    MCHC 32.8 31.5 - 36.5 g/dL    RDW 15.9 (H) 10.0 - 15.0 %    Platelet Count 205 150 - 450 10e3/uL    % Neutrophils 60 %    % Lymphocytes 20 %    % Monocytes 12 %    % Eosinophils 6 %    % Basophils 1 %    % Immature Granulocytes 0 %    NRBCs per 100 WBC 0 <1 /100    Absolute Neutrophils 3.5 1.6 - 8.3 10e3/uL    Absolute Lymphocytes 1.2 0.8 - 5.3 10e3/uL    Absolute Monocytes 0.7 0.0 - 1.3 10e3/uL    Absolute Eosinophils 0.3 0.0 - 0.7 10e3/uL    Absolute Basophils 0.1 0.0 - 0.2 10e3/uL    Absolute Immature Granulocytes 0.0 <=0.4 10e3/uL    Absolute NRBCs 0.0 10e3/uL         Imaging    No results found.        Assessment and Plan    Metastatic breast cancer with bone mets HER2/amarilis low  Patient is here in follow-up.  She continues on  fam-trastuzumab.  Will continue with the dose today.  She will come back in 3 weeks.  At that point in time to do a PET scan and further intervention will depend on the results of the PET scan.      Patient is concerned about her hair loss.  Explained to the patient that it is most likely from the medication      Diarrhea   Patient's diarrhea has been much better since we decreased the dose by 25% we will continue her on the current dose that she will continue to take occasional Imodium as needed patient is also concerned about her hair loss and I did    Bone metastases  Patient to continue taking Zometa periodically    Cardiac monitoring  Patient does need cardiac monitoring.  Her last echo was on 10/27/2024 I will plan to do the next echo prior to her next visit with me.    Anticoagulation  Patient has been on anticoagulation with warfarin.  Recently there has been some confusion in management of her warfarin.  After discussing various options I think it in the patient's best interest to be registered with our Coumadin clinic so we can manage her INR as she is seeing her very frequently and is having blood work at our facility almost every 3 weeks.  Patient's daughter would like to go ahead with that plan.    Signed by: Hadley Gann MD        CC: Irina Francisco PA-C       Again, thank you for allowing me to participate in the care of your patient.        Sincerely,        Hadley Gann MD    Electronically signed

## 2025-01-29 ENCOUNTER — PATIENT OUTREACH (OUTPATIENT)
Dept: ONCOLOGY | Facility: HOSPITAL | Age: 85
End: 2025-01-29
Payer: COMMERCIAL

## 2025-01-29 DIAGNOSIS — I47.10 SVT (SUPRAVENTRICULAR TACHYCARDIA): ICD-10-CM

## 2025-01-29 DIAGNOSIS — I10 ESSENTIAL HYPERTENSION: ICD-10-CM

## 2025-01-29 NOTE — TELEPHONE ENCOUNTER
Requested Prescriptions   Pending Prescriptions Disp Refills    metoprolol tartrate (LOPRESSOR) 25 MG tablet [Pharmacy Med Name: METOPROLOL TARTRATE 25MG TAB] 180 tablet 0     Sig: TAKE 1 TAB BY MOUTH TWICE ADAY       Beta-Blockers Protocol Failed - 1/29/2025  2:35 PM        Failed - Most recent blood pressure under 140/90 in past 12 months     BP Readings from Last 3 Encounters:   01/27/25 (!) 144/63   01/02/25 112/59   12/05/24 (!) 151/65       No data recorded            Passed - Patient is age 6 or older        Passed - Medication is active on med list        Passed - Medication indicated for associated diagnosis     Medication is associated with one or more of the following diagnoses:     Hypertension (HTN)   Atrial fibrillation/flutter   Angina   ASCVD   Migraine   Heart Failure   Tremor   Anxiety   Ocular hypertension   Glaucoma   PTSD   Obstructive hypertrophic cardiomyopathy   History of myocarditis   Palpitations   POTS (postural orthostatic tachycardia syndrome)   SVT (supraventricular tachycardia)   Hyperthyroidism   Tachycardia   Acute non-st segment elevation myocardial infarction   Subsequent non-st segment elevation myocardial infarction   Ischemic myocardial dysfunction          Passed - Recent (12 mo) or future (90 days) visit within the authorizing provider's specialty     The patient must have completed an in-person or virtual visit within the past 12 months or has a future visit scheduled within the next 90 days with the authorizing provider s specialty.  Urgent care and e-visits do not qualify as an office visit for this protocol.

## 2025-01-29 NOTE — PROGRESS NOTES
[facility]: Cancer Care                                                                                          Called Patricia, head nurse at the  [facility], [phone], to follow up on how to best communicate regarding future infusions so that Kacie can get her dexamethasone as ordered. Letter with future infusion date can be faxed to her at [phone] and then she will have the dexamethasone refilled and ready to be administered after her infusions.  Letter with infusion date and dexamethasone instructions was faxed to nursing staff at [facility], [phone],  receipt confirmed by fax right on 1/29/25 at 3103.    Called Sulma, daughter of Kacie to relay the above update. Sulma does not need to continue to try to contact nursing staff about upcoming treatment.  There is one refill of dex on file and new refill will be needed for subsequent infusion,reminder set to send and to also fax new letter with next infusion date.  They did go wig shopping and Kacie is still considering which wig she would like to purchase.  They have writer's contact information for future reference.        Signature:  Mihaela Koo RN

## 2025-01-30 RX ORDER — METOPROLOL TARTRATE 25 MG/1
25 TABLET, FILM COATED ORAL 2 TIMES DAILY
Qty: 180 TABLET | Refills: 3 | Status: SHIPPED | OUTPATIENT
Start: 2025-01-30

## 2025-02-17 ENCOUNTER — HOSPITAL ENCOUNTER (OUTPATIENT)
Dept: PET IMAGING | Facility: HOSPITAL | Age: 85
Discharge: HOME | End: 2025-02-17
Attending: INTERNAL MEDICINE | Admitting: INTERNAL MEDICINE
Payer: COMMERCIAL

## 2025-02-17 ENCOUNTER — LAB REQUISITION (OUTPATIENT)
Dept: LAB | Facility: CLINIC | Age: 85
End: 2025-02-17
Payer: COMMERCIAL

## 2025-02-17 DIAGNOSIS — C79.51 MALIGNANT NEOPLASM METASTATIC TO BONE (H): ICD-10-CM

## 2025-02-17 DIAGNOSIS — Z95.2 PRESENCE OF PROSTHETIC HEART VALVE: ICD-10-CM

## 2025-02-17 DIAGNOSIS — C50.012 MALIGNANT NEOPLASM OF AREOLA OF LEFT BREAST IN FEMALE, UNSPECIFIED ESTROGEN RECEPTOR STATUS (H): ICD-10-CM

## 2025-02-17 LAB — GLUCOSE BLDC GLUCOMTR-MCNC: 89 MG/DL (ref 70–99)

## 2025-02-17 PROCEDURE — 78816 PET IMAGE W/CT FULL BODY: CPT | Mod: PS

## 2025-02-17 PROCEDURE — 82962 GLUCOSE BLOOD TEST: CPT

## 2025-02-17 PROCEDURE — 343N000001 HC RX 343 MED OP 636: Performed by: INTERNAL MEDICINE

## 2025-02-17 PROCEDURE — A9552 F18 FDG: HCPCS | Performed by: INTERNAL MEDICINE

## 2025-02-17 RX ORDER — FLUDEOXYGLUCOSE F 18 200 MCI/ML
9-18 INJECTION, SOLUTION INTRAVENOUS ONCE
Status: COMPLETED | OUTPATIENT
Start: 2025-02-17 | End: 2025-02-17

## 2025-02-17 RX ADMIN — FLUDEOXYGLUCOSE F 18 9.93 MILLICURIE: 200 INJECTION, SOLUTION INTRAVENOUS at 07:12

## 2025-02-18 ENCOUNTER — PATIENT OUTREACH (OUTPATIENT)
Dept: ONCOLOGY | Facility: HOSPITAL | Age: 85
End: 2025-02-18
Payer: COMMERCIAL

## 2025-02-18 NOTE — PROGRESS NOTES
[facility]: Cancer Care                                                                                          Called Az on mobile and home phone that writer was calling to follow up  with good news. Contact information provided and return call invited.  -Spoke with Sulma, daughter of Kacie, consent to communicate on file dated 7/18/24. Relay that Dr. Gann reviewed the PET scan and shared that there is no evidence of cancer on PET. Dr Gann will discuss PET in detail on visit on 2/20/25. She should keep apts as scheduled. Sulma expressed appreciation for the call and she will relay to her mother, Kacie.        Signature:  Mihaela Koo RN

## 2025-02-20 ENCOUNTER — HOSPITAL ENCOUNTER (OUTPATIENT)
Dept: CARDIOLOGY | Facility: HOSPITAL | Age: 85
End: 2025-02-20
Attending: INTERNAL MEDICINE
Payer: COMMERCIAL

## 2025-02-20 ENCOUNTER — ONCOLOGY VISIT (OUTPATIENT)
Dept: ONCOLOGY | Facility: HOSPITAL | Age: 85
End: 2025-02-20
Attending: INTERNAL MEDICINE
Payer: COMMERCIAL

## 2025-02-20 ENCOUNTER — INFUSION THERAPY VISIT (OUTPATIENT)
Dept: INFUSION THERAPY | Facility: HOSPITAL | Age: 85
End: 2025-02-20
Attending: INTERNAL MEDICINE
Payer: COMMERCIAL

## 2025-02-20 VITALS
OXYGEN SATURATION: 97 % | TEMPERATURE: 98.3 F | SYSTOLIC BLOOD PRESSURE: 124 MMHG | RESPIRATION RATE: 16 BRPM | HEIGHT: 63 IN | HEART RATE: 75 BPM | DIASTOLIC BLOOD PRESSURE: 78 MMHG | BODY MASS INDEX: 24.62 KG/M2

## 2025-02-20 DIAGNOSIS — C50.012 MALIGNANT NEOPLASM OF AREOLA OF LEFT BREAST IN FEMALE, UNSPECIFIED ESTROGEN RECEPTOR STATUS (H): ICD-10-CM

## 2025-02-20 DIAGNOSIS — C79.51 MALIGNANT NEOPLASM METASTATIC TO BONE (H): Primary | ICD-10-CM

## 2025-02-20 DIAGNOSIS — I50.30 DIASTOLIC HEART FAILURE, UNSPECIFIED HF CHRONICITY (H): ICD-10-CM

## 2025-02-20 DIAGNOSIS — Z17.1 MALIGNANT NEOPLASM OF BOTH BREASTS IN FEMALE, ESTROGEN RECEPTOR NEGATIVE, UNSPECIFIED SITE OF BREAST (H): ICD-10-CM

## 2025-02-20 DIAGNOSIS — C50.911 MALIGNANT NEOPLASM OF BOTH BREASTS IN FEMALE, ESTROGEN RECEPTOR NEGATIVE, UNSPECIFIED SITE OF BREAST (H): ICD-10-CM

## 2025-02-20 DIAGNOSIS — C50.912 MALIGNANT NEOPLASM OF BOTH BREASTS IN FEMALE, ESTROGEN RECEPTOR NEGATIVE, UNSPECIFIED SITE OF BREAST (H): ICD-10-CM

## 2025-02-20 LAB
ALBUMIN SERPL BCG-MCNC: 3.6 G/DL (ref 3.5–5.2)
ALP SERPL-CCNC: 76 U/L (ref 40–150)
ALT SERPL W P-5'-P-CCNC: 22 U/L (ref 0–50)
ANION GAP SERPL CALCULATED.3IONS-SCNC: 10 MMOL/L (ref 7–15)
AST SERPL W P-5'-P-CCNC: 35 U/L (ref 0–45)
BASOPHILS # BLD AUTO: 0.1 10E3/UL (ref 0–0.2)
BASOPHILS NFR BLD AUTO: 1 %
BILIRUB SERPL-MCNC: 0.4 MG/DL
BUN SERPL-MCNC: 13.8 MG/DL (ref 8–23)
CALCIUM SERPL-MCNC: 9.3 MG/DL (ref 8.8–10.4)
CHLORIDE SERPL-SCNC: 108 MMOL/L (ref 98–107)
CREAT SERPL-MCNC: 0.86 MG/DL (ref 0.51–0.95)
EGFRCR SERPLBLD CKD-EPI 2021: 66 ML/MIN/1.73M2
EOSINOPHIL # BLD AUTO: 0.4 10E3/UL (ref 0–0.7)
EOSINOPHIL NFR BLD AUTO: 6 %
ERYTHROCYTE [DISTWIDTH] IN BLOOD BY AUTOMATED COUNT: 17 % (ref 10–15)
GLUCOSE SERPL-MCNC: 165 MG/DL (ref 70–99)
HCO3 SERPL-SCNC: 22 MMOL/L (ref 22–29)
HCT VFR BLD AUTO: 36.5 % (ref 35–47)
HGB BLD-MCNC: 12 G/DL (ref 11.7–15.7)
IMM GRANULOCYTES # BLD: 0 10E3/UL
IMM GRANULOCYTES NFR BLD: 1 %
INR PPP: 2.2 (ref 0.85–1.15)
LDH SERPL L TO P-CCNC: 340 U/L (ref 0–250)
LVEF ECHO: NORMAL
LYMPHOCYTES # BLD AUTO: 1 10E3/UL (ref 0.8–5.3)
LYMPHOCYTES NFR BLD AUTO: 15 %
MCH RBC QN AUTO: 29.1 PG (ref 26.5–33)
MCHC RBC AUTO-ENTMCNC: 32.9 G/DL (ref 31.5–36.5)
MCV RBC AUTO: 88 FL (ref 78–100)
MONOCYTES # BLD AUTO: 0.8 10E3/UL (ref 0–1.3)
MONOCYTES NFR BLD AUTO: 12 %
NEUTROPHILS # BLD AUTO: 4.1 10E3/UL (ref 1.6–8.3)
NEUTROPHILS NFR BLD AUTO: 65 %
NRBC # BLD AUTO: 0 10E3/UL
NRBC BLD AUTO-RTO: 0 /100
PLATELET # BLD AUTO: 234 10E3/UL (ref 150–450)
POTASSIUM SERPL-SCNC: 3.9 MMOL/L (ref 3.4–5.3)
PROT SERPL-MCNC: 5.9 G/DL (ref 6.4–8.3)
RBC # BLD AUTO: 4.13 10E6/UL (ref 3.8–5.2)
SODIUM SERPL-SCNC: 140 MMOL/L (ref 135–145)
WBC # BLD AUTO: 6.3 10E3/UL (ref 4–11)

## 2025-02-20 PROCEDURE — 80053 COMPREHEN METABOLIC PANEL: CPT | Performed by: INTERNAL MEDICINE

## 2025-02-20 PROCEDURE — 85610 PROTHROMBIN TIME: CPT | Mod: ORL | Performed by: PHYSICIAN ASSISTANT

## 2025-02-20 PROCEDURE — 36415 COLL VENOUS BLD VENIPUNCTURE: CPT | Mod: ORL | Performed by: PHYSICIAN ASSISTANT

## 2025-02-20 PROCEDURE — 85025 COMPLETE CBC W/AUTO DIFF WBC: CPT | Performed by: INTERNAL MEDICINE

## 2025-02-20 PROCEDURE — P9604 ONE-WAY ALLOW PRORATED TRIP: HCPCS | Mod: ORL | Performed by: PHYSICIAN ASSISTANT

## 2025-02-20 PROCEDURE — G0463 HOSPITAL OUTPT CLINIC VISIT: HCPCS | Performed by: INTERNAL MEDICINE

## 2025-02-20 PROCEDURE — 250N000011 HC RX IP 250 OP 636: Performed by: INTERNAL MEDICINE

## 2025-02-20 PROCEDURE — 255N000002 HC RX 255 OP 636: Performed by: INTERNAL MEDICINE

## 2025-02-20 PROCEDURE — 36591 DRAW BLOOD OFF VENOUS DEVICE: CPT | Performed by: INTERNAL MEDICINE

## 2025-02-20 PROCEDURE — C8929 TTE W OR WO FOL WCON,DOPPLER: HCPCS

## 2025-02-20 PROCEDURE — 83615 LACTATE (LD) (LDH) ENZYME: CPT

## 2025-02-20 RX ORDER — HEPARIN SODIUM (PORCINE) LOCK FLUSH IV SOLN 100 UNIT/ML 100 UNIT/ML
5 SOLUTION INTRAVENOUS
OUTPATIENT
Start: 2025-02-20

## 2025-02-20 RX ORDER — HEPARIN SODIUM (PORCINE) LOCK FLUSH IV SOLN 100 UNIT/ML 100 UNIT/ML
5 SOLUTION INTRAVENOUS
Status: ACTIVE | OUTPATIENT
Start: 2025-02-20

## 2025-02-20 RX ADMIN — HEPARIN 5 ML: 100 SYRINGE at 13:28

## 2025-02-20 RX ADMIN — PERFLUTREN 2 ML: 6.52 INJECTION, SUSPENSION INTRAVENOUS at 11:38

## 2025-02-20 ASSESSMENT — PAIN SCALES - GENERAL: PAINLEVEL_OUTOF10: NO PAIN (0)

## 2025-02-20 NOTE — LETTER
"2/20/2025      Kacie Hermosillo  [address]  [address]  [facility] [address]      Dear Colleague,    Thank you for referring your patient, Kacie Hermosillo, to the [facility] [place] [facility]. Please see a copy of my visit note below.    Oncology Rooming Note    February 20, 2025 1:01 PM   Kacie Hermosillo is a 84 year old female who presents for:    Chief Complaint   Patient presents with     Oncology Clinic Visit     Malignant neoplasm metastatic to bone     Initial Vitals: /78 (Patient Position: Sitting)   Pulse 75   Temp 98.3  F (36.8  C) (Oral)   Resp 16   Ht 1.6 m (5' 3\")   SpO2 97%   BMI 24.62 kg/m   Estimated body mass index is 24.62 kg/m  as calculated from the following:    Height as of this encounter: 1.6 m (5' 3\").    Weight as of 1/27/25: 63 kg (139 lb). Body surface area is 1.67 meters squared.  No Pain (0) Comment: Data Unavailable   No LMP recorded. Patient is postmenopausal.  Allergies reviewed: Yes  Medications reviewed: Yes    Medications: Medication refills not needed today.  Pharmacy name entered into [employer]: [id number] - [facility] [address]    Frailty Screening:   Is the patient here for a new oncology consult visit in cancer care? 2. No    PHQ9:  Did this patient require a PHQ9?: No      Clinical concerns: Mouth Sores.      Salma Lua LPN              [facility] Progress Note    Patient: Kacie Hermosillo  MRN: [id number]  Date of Service: Feb 20, 2025             ECOG Performance    2 - Ambulatory and independent in all ADLs; cannot work; up > 50% of the time          ______________________________________________________________________________  Oncologic history  Metastatic breast cancer with bone mets only ER negative [place] negative HER2/amarilis 2+ and negative by FISH.  HER2/amarilis low    Remote diagnosis of breast cancer in 1995 treated with surgery chemotherapy radiation and 5 years of hormonal " "therapy.  Pathology report not available.    Treatment  Patient started on fam-trastuzumab on 8/19/2024  Complete response by PET criteria in  Nov 2024  Dose of fam-trastuzumab decreased by 25% in November 2024 due to severe diarrhea  Fam-trastuzumab held in February 2025 due to development of cardiomyopathy    History of Present Illness    Ms. Kacie Hermosillo is here in follow-up.  She feels great.  She has no new concerns.  She had a PET scan done and is here to discuss the results.  She denies any shortness of breath or dyspnea on exertion.  Her only concern is diarrhea on her worst day she has 3-4 loose stools a day.    Review of systems  A comprehensive 12 point review of system was done that was negative except what is mentioned in the history of present illness    Past History    Past Medical History:   Diagnosis Date     Breast cancer (H) 01/01/1999     Heart valve replaced      Hx antineoplastic chemotherapy 01/01/1999     Hx of radiation therapy 01/01/1999     Hypertension        Past Surgical History:   Procedure Laterality Date     BIOPSY BREAST       HYSTERECTOMY       IR CHEST PORT PLACEMENT > 5 YRS OF AGE  7/30/2024     LUMPECTOMY BREAST Left 1999     OOPHORECTOMY Bilateral        Physical Exam    /78 (Patient Position: Sitting)   Pulse 75   Temp 98.3  F (36.8  C) (Oral)   Resp 16   Ht 1.6 m (5' 3\")   SpO2 97%   BMI 24.62 kg/m      General: alert, awake, not in acute distress  HEENT: Head: Normal, normocephalic, atraumatic.  Eye: Normal external eye, conjunctiva, lids cornea, MARI.  Nose: Normal external nose, mucus membranes and septum.  Pharynx: Normal buccal mucosa. Normal pharynx.  Neck / Thyroid: Supple, no masses, nodes, nodules or enlargement.  Lymphatics: No abnormally enlarged lymph nodes.  Chest: Normal chest wall and respirations. Clear to auscultation.  No crackles or rhonchi's  Heart: S1 S2 RRR, no murmur.   Abdomen: abdomen is soft without significant tenderness, masses, " organomegaly or guarding  Extremities: normal strength, tone, and muscle mass  Skin: normal. no rash or abnormalities  CNS: non focal.    Lab Results    Recent Results (from the past 240 hours)   Glucose by meter    Collection Time: 02/17/25  7:08 AM   Result Value Ref Range    GLUCOSE BY METER POCT 89 70 - 99 mg/dL   INR    Collection Time: 02/20/25  8:52 AM   Result Value Ref Range    INR 2.20 (H) 0.85 - 1.15   Echocardiogram Complete    Collection Time: 02/20/25 11:20 AM   Result Value Ref Range    LVEF  30-35% (moderately reduced)    Comprehensive metabolic panel    Collection Time: 02/20/25 12:35 PM   Result Value Ref Range    Sodium 140 135 - 145 mmol/L    Potassium 3.9 3.4 - 5.3 mmol/L    Carbon Dioxide (CO2) 22 22 - 29 mmol/L    Anion Gap 10 7 - 15 mmol/L    Urea Nitrogen 13.8 8.0 - 23.0 mg/dL    Creatinine 0.86 0.51 - 0.95 mg/dL    GFR Estimate 66 >60 mL/min/1.73m2    Calcium 9.3 8.8 - 10.4 mg/dL    Chloride 108 (H) 98 - 107 mmol/L    Glucose 165 (H) 70 - 99 mg/dL    Alkaline Phosphatase 76 40 - 150 U/L    AST 35 0 - 45 U/L    ALT 22 0 - 50 U/L    Protein Total 5.9 (L) 6.4 - 8.3 g/dL    Albumin 3.6 3.5 - 5.2 g/dL    Bilirubin Total 0.4 <=1.2 mg/dL   Lactate Dehydrogenase    Collection Time: 02/20/25 12:35 PM   Result Value Ref Range    Lactate Dehydrogenase 340 (H) 0 - 250 U/L   CBC with platelets and differential    Collection Time: 02/20/25 12:35 PM   Result Value Ref Range    WBC Count 6.3 4.0 - 11.0 10e3/uL    RBC Count 4.13 3.80 - 5.20 10e6/uL    Hemoglobin 12.0 11.7 - 15.7 g/dL    Hematocrit 36.5 35.0 - 47.0 %    MCV 88 78 - 100 fL    MCH 29.1 26.5 - 33.0 pg    MCHC 32.9 31.5 - 36.5 g/dL    RDW 17.0 (H) 10.0 - 15.0 %    Platelet Count 234 150 - 450 10e3/uL    % Neutrophils 65 %    % Lymphocytes 15 %    % Monocytes 12 %    % Eosinophils 6 %    % Basophils 1 %    % Immature Granulocytes 1 %    NRBCs per 100 WBC 0 <1 /100    Absolute Neutrophils 4.1 1.6 - 8.3 10e3/uL    Absolute Lymphocytes 1.0 0.8 - 5.3  10e3/uL    Absolute Monocytes 0.8 0.0 - 1.3 10e3/uL    Absolute Eosinophils 0.4 0.0 - 0.7 10e3/uL    Absolute Basophils 0.1 0.0 - 0.2 10e3/uL    Absolute Immature Granulocytes 0.0 <=0.4 10e3/uL    Absolute NRBCs 0.0 10e3/uL         Imaging    Echocardiogram Complete    Result Date: 2025  [id number]^DAVID^CARLOS^[place] [address]  Name: URIAH OLIVEROS MRN: [id number] : 1940 Study Date: 2025 10:36 AM Age: 84 yrs Gender: Female Patient Location: [facility] Reason For Study: Diastolic heart failure, unspecified HF chronicity (H) Ordering Physician: CARLOS HERNANDEZ Referring Physician: CARLOS HERNANDEZ Performed By: KS  BSA: 1.7 m2 Height: 63 in Weight: 139 lb HR: 72 BP: 142/75 mmHg ______________________________________________________________________________ Procedure Echocardiogram with two-dimensional, color and spectral Doppler. Definity (NDC #85923-078) given intravenously. Technically difficult study. Compared to the prior study dated 10/28/24, there are changes as noted. ______________________________________________________________________________ Interpretation Summary  1. Left ventricular function is decreased. The ejection fraction is 30-35% (moderately reduced). - There is global hypokinesis that is worse in the posterior, inferior, and inferoseptal walls 2. Normal right ventricle size and systolic function. 3. The left atrium is mildly dilated. 4. There is a documented 23 mm Wray RYAN 3 bioprosthetic valve present in aortic valve position. The peak and mean forward gradients are 24 and 14 mmHg, respectively. No regurgitation identified. 5. Compared to the prior study dated 10/28/24, there are changes as noted. Notably, there has been a decline in LV systolic function. ______________________________________________________________________________ Left Ventricle The left ventricle is  normal in size. Left ventricular function is decreased. The ejection fraction is 30-35% (moderately reduced). There is normal left ventricular wall thickness. Left ventricular diastolic function is abnormal. There is global hypokinesis that is worse in the posterior, inferior, and inferoseptal walls.  Right Ventricle Normal right ventricle size and systolic function.  Atria The left atrium is mildly dilated. Right atrial size is normal. There is no color Doppler evidence of an atrial shunt.  Mitral Valve Mitral valve leaflets appear normal. There is mild (1+) mitral regurgitation.  Tricuspid Valve Tricuspid valve leaflets appear normal. There is mild to moderate (1-2+) tricuspid regurgitation. Right ventricle systolic pressure estimate normal. The right ventricular systolic pressure is approximated at 24.9 mmHg plus the right atrial pressure.  Aortic Valve There is a RYAN 3 bioprosthetic valve present in aortic valve position. There is no paravalvular regurgitation present. The prosthetic valve gradients are normal .  Pulmonic Valve The pulmonic valve is not well seen, but is grossly normal. This degree of valvular regurgitation is within normal limits. There is trace pulmonic valvular regurgitation.  Vessels Normal size ascending aorta. IVC diameter <2.1 cm collapsing >50% with sniff suggests a normal RA pressure of 3 mmHg.  Pericardium There is no pericardial effusion.  ______________________________________________________________________________ MMode/2D Measurements & Calculations IVSd: 1.0 cm LVIDd: 4.6 cm LVIDs: 3.7 cm LVPWd: 0.92 cm FS: 20.9 % LV mass(C)d: 155.0 grams LV mass(C)dI: 93.6 grams/m2  asc Aorta Diam: 3.7 cm LVOT diam: 2.1 cm LVOT area: 3.3 cm2 Asc Ao diam index BSA (cm/m2): 2.2 Asc Ao diam index Ht(cm/m): 2.3 EF Biplane: 33.0 % LA Volume (BP): 65.4 ml LA Volume Index (BP): 39.4 ml/m2  LA Volume Indexed (AL/bp): 40.4 ml/m2 RV Base: 3.7 cm RWT: 0.40 TAPSE: 1.7 cm  Time Measurements MM HR: 73.0  BPM  Doppler Measurements & Calculations MV E max frantz: 85.9 cm/sec MV A max frantz: 102.4 cm/sec MV E/A: 0.84 MV max P.9 mmHg MV mean PG: 3.0 mmHg MV V2 VTI: 37.7 cm MVA(VTI): 1.8 cm2 MV dec slope: 419.1 cm/sec2 MV dec time: 0.21 sec  Ao V2 max: 243.8 cm/sec Ao max P.0 mmHg Ao V2 mean: 182.7 cm/sec Ao mean P.2 mmHg Ao V2 VTI: 53.3 cm CARYL(I,D): 1.3 cm2 CARYL(V,D): 1.3 cm2 LV V1 max PG: 3.5 mmHg LV V1 max: 93.6 cm/sec LV V1 VTI: 20.7 cm SV(LVOT): 68.9 ml SI(LVOT): 41.6 ml/m2 PA V2 max: 67.0 cm/sec PA max P.8 mmHg PA acc time: 0.10 sec TR max frantz: 249.3 cm/sec TR max P.9 mmHg AV Frantz Ratio (DI): 0.38 CARYL Index (cm2/m2): 0.78 E/E': 16.5 E/E' av.1 Lateral E/e': 16.4 Medial E/e': 17.8 Peak E' Frantz: 5.2 cm/sec RV S Frantz: 10.7 cm/sec  ______________________________________________________________________________ Report approved by: Abelardo Kowalski MD on 2025 12:17 PM       PET Oncology Whole Body    Result Date: 2025  EXAM: PET ONCOLOGY WHOLE BODY LOCATION: [facility] DATE: 2025 INDICATION: Subsequent treatment planning and restaging for malignant neoplasm of overlapping sites of left female breast. Status post lumpectomy and radiation therapy, currently receiving systemic therapy. Monitor treatment response. COMPARISON: FDG PET/CT dated 10/31/2024 CONTRAST: None TECHNIQUE: Serum glucose level 89 mg/dL. One hour post intravenous administration of 9.93 mCi F18 FDG, PET imaging was performed from the skull vertex to feet, utilizing attenuation correction with concurrent axial CT and PET/CT image fusion. Dose reduction techniques were used. PET/CT FINDINGS: Reflux esophagitis. Shoulder and hip synovitis. The remaining FDG uptake is physiologic from the skull vertex to feet. CT FINDINGS: Mild senescent intercranial changes. Postoperative change of the bilateral lenses. Right chest port with tip terminating near the superior cavoatrial junction. Small left pleural  effusion. Postsurgical change in the left breast and left axillary region. Mild coronary artery calcium. TAVR. Cholecystectomy. Sigmoid diverticulosis. Hysterectomy. Pelvic phleboliths. Innumerable non-FDG avid sclerotic osseous lesions likely representing treated/quiescent disease. Chronic severe T12 compression deformity and associated segmental kyphosis. Multilevel degenerative changes of the spine.     IMPRESSION: No convincing metabolic evidence of active neoplasm         Assessment and Plan    Metastatic breast cancer with bone mets HER2/amarilis low  Patient is here in follow-up.  Patient had a PET scan done that was reviewed by me the PET scan shows no evidence of cancer so she is in a complete radiologic remission with the current treatment this was discussed with the patient and her daughter there are number of questions which were answered to their satisfaction.     Unfortunately her echocardiogram showed that she had developed cardiomyopathy her ejection fraction is down to 30 to 35%.  It was about 70% previously.  Considering that I will stop fam-trastuzumab.  Patient needs to be evaluated by cardiology.  She already has an appointment with Dr. Nagy on 5 March.  She was told to keep that appointment and I will see her in about a month.  HER2 directed therapy will not be started until her ejection fraction improved to more than 50%.    Bone metastases  Will hold this for now as her treatment is being held.    Cardiac monitoring  Patient did have an echo done today.  There is decrease in ejection fraction.  Her treatment is being held and she will be evaluated by cardiology    Anticoagulation  Patient has been on anticoagulation with warfarin.  Continue as planned this is being managed by his other physicians.    Signed by: Hadley Gann MD        CC: Irina Francisco PA-C       Again, thank you for allowing me to participate in the care of your patient.        Sincerely,        Hadley Gann,  MD    Electronically signed

## 2025-02-20 NOTE — PROGRESS NOTES
"Oncology Rooming Note    February 20, 2025 1:01 PM   Kacie Hermosillo is a 84 year old female who presents for:    Chief Complaint   Patient presents with    Oncology Clinic Visit     Malignant neoplasm metastatic to bone     Initial Vitals: /78 (Patient Position: Sitting)   Pulse 75   Temp 98.3  F (36.8  C) (Oral)   Resp 16   Ht 1.6 m (5' 3\")   SpO2 97%   BMI 24.62 kg/m   Estimated body mass index is 24.62 kg/m  as calculated from the following:    Height as of this encounter: 1.6 m (5' 3\").    Weight as of 1/27/25: 63 kg (139 lb). Body surface area is 1.67 meters squared.  No Pain (0) Comment: Data Unavailable   No LMP recorded. Patient is postmenopausal.  Allergies reviewed: Yes  Medications reviewed: Yes    Medications: Medication refills not needed today.  Pharmacy name entered into [employer]: [facility] [place] [id number] - [facility] [address] [facility]    Frailty Screening:   Is the patient here for a new oncology consult visit in cancer care? 2. No    PHQ9:  Did this patient require a PHQ9?: No      Clinical concerns: Mouth Sores.      Salma Lua LPN            "

## 2025-02-20 NOTE — PROGRESS NOTES
"[facility] Hematology and Oncology Progress Note    Patient: Kacie Hermosillo  MRN: [id number]  Date of Service: Feb 20, 2025             ECOG Performance    2 - Ambulatory and independent in all ADLs; cannot work; up > 50% of the time          ______________________________________________________________________________  Oncologic history  Metastatic breast cancer with bone mets only ER negative NV negative HER2/amarilis 2+ and negative by FISH.  HER2/amarilis low    Remote diagnosis of breast cancer in 1995 treated with surgery chemotherapy radiation and 5 years of hormonal therapy.  Pathology report not available.    Treatment  Patient started on fam-trastuzumab on 8/19/2024  Complete response by PET criteria in  Nov 2024  Dose of fam-trastuzumab decreased by 25% in November 2024 due to severe diarrhea  Fam-trastuzumab held in February 2025 due to development of cardiomyopathy    History of Present Illness    Ms. Kacie Hermosillo is here in follow-up.  She feels great.  She has no new concerns.  She had a PET scan done and is here to discuss the results.  She denies any shortness of breath or dyspnea on exertion.  Her only concern is diarrhea on her worst day she has 3-4 loose stools a day.    Review of systems  A comprehensive 12 point review of system was done that was negative except what is mentioned in the history of present illness    Past History    Past Medical History:   Diagnosis Date    Breast cancer (H) 01/01/1999    Heart valve replaced     Hx antineoplastic chemotherapy 01/01/1999    Hx of radiation therapy 01/01/1999    Hypertension        Past Surgical History:   Procedure Laterality Date    BIOPSY BREAST      HYSTERECTOMY      IR CHEST PORT PLACEMENT > 5 YRS OF AGE  7/30/2024    LUMPECTOMY BREAST Left 1999    OOPHORECTOMY Bilateral        Physical Exam    /78 (Patient Position: Sitting)   Pulse 75   Temp 98.3  F (36.8  C) (Oral)   Resp 16   Ht 1.6 m (5' 3\")   SpO2 97%   BMI 24.62 " kg/m      General: alert, awake, not in acute distress  HEENT: Head: Normal, normocephalic, atraumatic.  Eye: Normal external eye, conjunctiva, lids cornea, MARI.  Nose: Normal external nose, mucus membranes and septum.  Pharynx: Normal buccal mucosa. Normal pharynx.  Neck / Thyroid: Supple, no masses, nodes, nodules or enlargement.  Lymphatics: No abnormally enlarged lymph nodes.  Chest: Normal chest wall and respirations. Clear to auscultation.  No crackles or rhonchi's  Heart: S1 S2 RRR, no murmur.   Abdomen: abdomen is soft without significant tenderness, masses, organomegaly or guarding  Extremities: normal strength, tone, and muscle mass  Skin: normal. no rash or abnormalities  CNS: non focal.    Lab Results    Recent Results (from the past 240 hours)   Glucose by meter    Collection Time: 02/17/25  7:08 AM   Result Value Ref Range    GLUCOSE BY METER POCT 89 70 - 99 mg/dL   INR    Collection Time: 02/20/25  8:52 AM   Result Value Ref Range    INR 2.20 (H) 0.85 - 1.15   Echocardiogram Complete    Collection Time: 02/20/25 11:20 AM   Result Value Ref Range    LVEF  30-35% (moderately reduced)    Comprehensive metabolic panel    Collection Time: 02/20/25 12:35 PM   Result Value Ref Range    Sodium 140 135 - 145 mmol/L    Potassium 3.9 3.4 - 5.3 mmol/L    Carbon Dioxide (CO2) 22 22 - 29 mmol/L    Anion Gap 10 7 - 15 mmol/L    Urea Nitrogen 13.8 8.0 - 23.0 mg/dL    Creatinine 0.86 0.51 - 0.95 mg/dL    GFR Estimate 66 >60 mL/min/1.73m2    Calcium 9.3 8.8 - 10.4 mg/dL    Chloride 108 (H) 98 - 107 mmol/L    Glucose 165 (H) 70 - 99 mg/dL    Alkaline Phosphatase 76 40 - 150 U/L    AST 35 0 - 45 U/L    ALT 22 0 - 50 U/L    Protein Total 5.9 (L) 6.4 - 8.3 g/dL    Albumin 3.6 3.5 - 5.2 g/dL    Bilirubin Total 0.4 <=1.2 mg/dL   Lactate Dehydrogenase    Collection Time: 02/20/25 12:35 PM   Result Value Ref Range    Lactate Dehydrogenase 340 (H) 0 - 250 U/L   CBC with platelets and differential    Collection Time: 02/20/25  12:35 PM   Result Value Ref Range    WBC Count 6.3 4.0 - 11.0 10e3/uL    RBC Count 4.13 3.80 - 5.20 10e6/uL    Hemoglobin 12.0 11.7 - 15.7 g/dL    Hematocrit 36.5 35.0 - 47.0 %    MCV 88 78 - 100 fL    MCH 29.1 26.5 - 33.0 pg    MCHC 32.9 31.5 - 36.5 g/dL    RDW 17.0 (H) 10.0 - 15.0 %    Platelet Count 234 150 - 450 10e3/uL    % Neutrophils 65 %    % Lymphocytes 15 %    % Monocytes 12 %    % Eosinophils 6 %    % Basophils 1 %    % Immature Granulocytes 1 %    NRBCs per 100 WBC 0 <1 /100    Absolute Neutrophils 4.1 1.6 - 8.3 10e3/uL    Absolute Lymphocytes 1.0 0.8 - 5.3 10e3/uL    Absolute Monocytes 0.8 0.0 - 1.3 10e3/uL    Absolute Eosinophils 0.4 0.0 - 0.7 10e3/uL    Absolute Basophils 0.1 0.0 - 0.2 10e3/uL    Absolute Immature Granulocytes 0.0 <=0.4 10e3/uL    Absolute NRBCs 0.0 10e3/uL         Imaging    Echocardiogram Complete    Result Date: 2025  [id number]^DAVID^CARLOS^[place] [address]  Name: URIAH OLIVEROS MRN: [id number] : 1940 Study Date: 2025 10:36 AM Age: 84 yrs Gender: Female Patient Location: [facility] Reason For Study: Diastolic heart failure, unspecified HF chronicity (H) Ordering Physician: CARLOS HERNANDEZ Referring Physician: CARLOS HERNANDEZ Performed By: KS  BSA: 1.7 m2 Height: 63 in Weight: 139 lb HR: 72 BP: 142/75 mmHg ______________________________________________________________________________ Procedure Echocardiogram with two-dimensional, color and spectral Doppler. Definity (NDC #25249-919) given intravenously. Technically difficult study. Compared to the prior study dated 10/28/24, there are changes as noted. ______________________________________________________________________________ Interpretation Summary  1. Left ventricular function is decreased. The ejection fraction is 30-35% (moderately reduced). - There is global hypokinesis that is worse in the posterior,  inferior, and inferoseptal walls 2. Normal right ventricle size and systolic function. 3. The left atrium is mildly dilated. 4. There is a documented 23 mm Wray RYAN 3 bioprosthetic valve present in aortic valve position. The peak and mean forward gradients are 24 and 14 mmHg, respectively. No regurgitation identified. 5. Compared to the prior study dated 10/28/24, there are changes as noted. Notably, there has been a decline in LV systolic function. ______________________________________________________________________________ Left Ventricle The left ventricle is normal in size. Left ventricular function is decreased. The ejection fraction is 30-35% (moderately reduced). There is normal left ventricular wall thickness. Left ventricular diastolic function is abnormal. There is global hypokinesis that is worse in the posterior, inferior, and inferoseptal walls.  Right Ventricle Normal right ventricle size and systolic function.  Atria The left atrium is mildly dilated. Right atrial size is normal. There is no color Doppler evidence of an atrial shunt.  Mitral Valve Mitral valve leaflets appear normal. There is mild (1+) mitral regurgitation.  Tricuspid Valve Tricuspid valve leaflets appear normal. There is mild to moderate (1-2+) tricuspid regurgitation. Right ventricle systolic pressure estimate normal. The right ventricular systolic pressure is approximated at 24.9 mmHg plus the right atrial pressure.  Aortic Valve There is a RYAN 3 bioprosthetic valve present in aortic valve position. There is no paravalvular regurgitation present. The prosthetic valve gradients are normal .  Pulmonic Valve The pulmonic valve is not well seen, but is grossly normal. This degree of valvular regurgitation is within normal limits. There is trace pulmonic valvular regurgitation.  Vessels Normal size ascending aorta. IVC diameter <2.1 cm collapsing >50% with sniff suggests a normal RA pressure of 3 mmHg.  Pericardium There is no  pericardial effusion.  ______________________________________________________________________________ MMode/2D Measurements & Calculations IVSd: 1.0 cm LVIDd: 4.6 cm LVIDs: 3.7 cm LVPWd: 0.92 cm FS: 20.9 % LV mass(C)d: 155.0 grams LV mass(C)dI: 93.6 grams/m2  asc Aorta Diam: 3.7 cm LVOT diam: 2.1 cm LVOT area: 3.3 cm2 Asc Ao diam index BSA (cm/m2): 2.2 Asc Ao diam index Ht(cm/m): 2.3 EF Biplane: 33.0 % LA Volume (BP): 65.4 ml LA Volume Index (BP): 39.4 ml/m2  LA Volume Indexed (AL/bp): 40.4 ml/m2 RV Base: 3.7 cm RWT: 0.40 TAPSE: 1.7 cm  Time Measurements MM HR: 73.0 BPM  Doppler Measurements & Calculations MV E max frantz: 85.9 cm/sec MV A max frantz: 102.4 cm/sec MV E/A: 0.84 MV max P.9 mmHg MV mean PG: 3.0 mmHg MV V2 VTI: 37.7 cm MVA(VTI): 1.8 cm2 MV dec slope: 419.1 cm/sec2 MV dec time: 0.21 sec  Ao V2 max: 243.8 cm/sec Ao max P.0 mmHg Ao V2 mean: 182.7 cm/sec Ao mean P.2 mmHg Ao V2 VTI: 53.3 cm CARYL(I,D): 1.3 cm2 CARYL(V,D): 1.3 cm2 LV V1 max PG: 3.5 mmHg LV V1 max: 93.6 cm/sec LV V1 VTI: 20.7 cm SV(LVOT): 68.9 ml SI(LVOT): 41.6 ml/m2 PA V2 max: 67.0 cm/sec PA max P.8 mmHg PA acc time: 0.10 sec TR max frantz: 249.3 cm/sec TR max P.9 mmHg AV Frantz Ratio (DI): 0.38 CARYL Index (cm2/m2): 0.78 E/E': 16.5 E/E' av.1 Lateral E/e': 16.4 Medial E/e': 17.8 Peak E' Frantz: 5.2 cm/sec RV S Frantz: 10.7 cm/sec  ______________________________________________________________________________ Report approved by: Abelardo Kowalski MD on 2025 12:17 PM       PET Oncology Whole Body    Result Date: 2025  EXAM: PET ONCOLOGY WHOLE BODY LOCATION: [facility] DATE: 2025 INDICATION: Subsequent treatment planning and restaging for malignant neoplasm of overlapping sites of left female breast. Status post lumpectomy and radiation therapy, currently receiving systemic therapy. Monitor treatment response. COMPARISON: FDG PET/CT dated 10/31/2024 CONTRAST: None TECHNIQUE: Serum glucose level 89  mg/dL. One hour post intravenous administration of 9.93 mCi F18 FDG, PET imaging was performed from the skull vertex to feet, utilizing attenuation correction with concurrent axial CT and PET/CT image fusion. Dose reduction techniques were used. PET/CT FINDINGS: Reflux esophagitis. Shoulder and hip synovitis. The remaining FDG uptake is physiologic from the skull vertex to feet. CT FINDINGS: Mild senescent intercranial changes. Postoperative change of the bilateral lenses. Right chest port with tip terminating near the superior cavoatrial junction. Small left pleural effusion. Postsurgical change in the left breast and left axillary region. Mild coronary artery calcium. TAVR. Cholecystectomy. Sigmoid diverticulosis. Hysterectomy. Pelvic phleboliths. Innumerable non-FDG avid sclerotic osseous lesions likely representing treated/quiescent disease. Chronic severe T12 compression deformity and associated segmental kyphosis. Multilevel degenerative changes of the spine.     IMPRESSION: No convincing metabolic evidence of active neoplasm         Assessment and Plan    Metastatic breast cancer with bone mets HER2/amarilis low  Patient is here in follow-up.  Patient had a PET scan done that was reviewed by me the PET scan shows no evidence of cancer so she is in a complete radiologic remission with the current treatment this was discussed with the patient and her daughter there are number of questions which were answered to their satisfaction.     Unfortunately her echocardiogram showed that she had developed cardiomyopathy her ejection fraction is down to 30 to 35%.  It was about 70% previously.  Considering that I will stop fam-trastuzumab.  Patient needs to be evaluated by cardiology.  She already has an appointment with Dr. Nagy on 5 March.  She was told to keep that appointment and I will see her in about a month.  HER2 directed therapy will not be started until her ejection fraction improved to more than 50%.    Bone  metastases  Will hold this for now as her treatment is being held.    Cardiac monitoring  Patient did have an echo done today.  There is decrease in ejection fraction.  Her treatment is being held and she will be evaluated by cardiology    Anticoagulation  Patient has been on anticoagulation with warfarin.  Continue as planned this is being managed by his other physicians.    Signed by: Hadley Gann MD        CC: Irina Francisco PA-C

## 2025-02-20 NOTE — PROGRESS NOTES
Per Dr. Gann, patient will not receive treatment today. Port flushed, heparin-locked and de-accessed. Patient amb out of clinic.

## 2025-03-06 ENCOUNTER — TELEPHONE (OUTPATIENT)
Dept: CARDIOLOGY | Facility: CLINIC | Age: 85
End: 2025-03-06

## 2025-03-06 ENCOUNTER — OFFICE VISIT (OUTPATIENT)
Dept: CARDIOLOGY | Facility: CLINIC | Age: 85
End: 2025-03-06
Payer: COMMERCIAL

## 2025-03-06 ENCOUNTER — TELEPHONE (OUTPATIENT)
Dept: ONCOLOGY | Facility: HOSPITAL | Age: 85
End: 2025-03-06

## 2025-03-06 VITALS
BODY MASS INDEX: 24.8 KG/M2 | DIASTOLIC BLOOD PRESSURE: 60 MMHG | RESPIRATION RATE: 12 BRPM | WEIGHT: 140 LBS | HEART RATE: 68 BPM | SYSTOLIC BLOOD PRESSURE: 116 MMHG

## 2025-03-06 DIAGNOSIS — I50.21 ACUTE SYSTOLIC HEART FAILURE (H): Primary | ICD-10-CM

## 2025-03-06 LAB — NT-PROBNP SERPL-MCNC: 1475 PG/ML (ref 0–1800)

## 2025-03-06 RX ORDER — DAPAGLIFLOZIN 10 MG/1
10 TABLET, FILM COATED ORAL DAILY
Qty: 90 TABLET | Refills: 1 | Status: SHIPPED | OUTPATIENT
Start: 2025-03-06

## 2025-03-06 RX ORDER — METOPROLOL SUCCINATE 25 MG/1
25 TABLET, EXTENDED RELEASE ORAL DAILY
Qty: 90 TABLET | Refills: 3 | Status: SHIPPED | OUTPATIENT
Start: 2025-03-06

## 2025-03-06 RX ORDER — SPIRONOLACTONE 25 MG/1
12.5 TABLET ORAL DAILY
Qty: 45 TABLET | Refills: 3 | Status: SHIPPED | OUTPATIENT
Start: 2025-03-06

## 2025-03-06 NOTE — TELEPHONE ENCOUNTER
Return call placed to Sulma, message left for her to return call to triage nurse. Dr. Gann does not need anything specifically from cardiology. Patient was already following with cardiologist and had appointment set up.  Treatment is being held due to decrease in ejection fraction. Dr. Gann will see patient in clinic on 3/17/25.    Tonya Peacock RN

## 2025-03-06 NOTE — LETTER
3/6/2025    Irina Francsico PA-C  [id number] [facility] [id number]    RE: Kacie Hermosillo       Dear Colleague,     I had the pleasure of seeing Kacie Hermosillo in the [facility].    HEART CARE NOTE          Assessment/Recommendations   1. Severe HFrEF  Assessment / Plan  New decline in the setting of chemo/rad (tx held by oncology for now); Near euvolemia; denies HF symptoms of orthopnea, PND, fluid retention or edema   Patient is high risk for adverse cardiac events 2/2 advanced age, frailty, and decline in systolic function  GDMT as detailed below; follow-up echo in 2 months  Will place referral to cardiac-oncology    Current Pharmacotherapy AHA Guideline-Directed Medical Therapy   Lisinopril 2.5 mg daily Lisinopril 20 mg twice daily   Metoprolol succinate 25 mg daily Carvedilol 25 mg twice daily   Spironolactone 12.5 mg Spironolactone 25 mg once daily   Hydralazine NA Hydralazine 100 mg three times daily   Isosorbide dinitrate NA Isosorbide dinitrate 40 mg three times daily   SGLT2 inhibitor: Dapagliflozin 10 mg daily Dapagliflozin or Empagliflozin 10 mg daily     2. CKD  Assessment / Plan  Renal function stable/wnl     3. HTN  Assessment / Plan  Adequate control - no changes to regimen at this time      4. Valvular heart disease  Assessment / Plan  S/p AVR; warfarin per pharmacy management     5. HLP  Assessment / Plan  Currently on atorvastatin    60 minutes spent reviewing prior records (including documentation, laboratory studies, cardiac testing/imaging), history and physical exam, planning, and subsequent documentation.    The longitudinal plan of care for HFrEF was addressed during this visit. Due to the added complexity in care, I will continue to support Ms. Kacie Hermosillo in the subsequent management of this condition(s) and with the ongoing continuity of care of this condition(s).      History of Present Illness/Subjective    Ms. Kacie Hermosillo is a 84 year old  female with a PMHx significant for (per Epic notation) medical history documented above presented to the ER for evaluation of increased weakness and fatigue as well as wheezing and worsening shortness of breath ongoing for the past few days in the setting of recent hospitalization for electrolyte abnormalities      Today,  Mrs. Hermosillo denies acute cardiac events or complaints; Management plan as detailed above     ECG: Personally reviewed. normal sinus rhythm, nonspecific ST and T waves changes.     ECHO (personally reviewed 3/6/25):   1. Left ventricular function is decreased. The ejection fraction is 30-35%  (moderately reduced).  - There is global hypokinesis that is worse in the posterior, inferior, and  inferoseptal walls  2. Normal right ventricle size and systolic function.  3. The left atrium is mildly dilated.  4. There is a documented 23 mm Wray RYAN 3 bioprosthetic valve present in  aortic valve position. The peak and mean forward gradients are 24 and 14 mmHg,  respectively. No regurgitation identified.  5. Compared to the prior study dated 10/28/24, there are changes as noted.  Notably, there has been a decline in LV systolic function.    Lab results: personally reviewed March 6, 2025; notable for renal function wnl; NTproBNP sent    Medical history and pertinent documents reviewed in Care Everywhere please where applicable see details above        Physical Examination Review of Systems   /60 (BP Location: Right arm, Patient Position: Sitting, Cuff Size: Adult Regular)   Pulse 68   Resp 12   Wt 63.5 kg (140 lb)   BMI 24.80 kg/m    Body mass index is 24.8 kg/m .  Wt Readings from Last 3 Encounters:   03/06/25 63.5 kg (140 lb)   01/27/25 63 kg (139 lb)   01/02/25 62.6 kg (138 lb)     General Appearance:   no distress, normal body habitus   ENT/Mouth: membranes moist, no oral lesions or bleeding gums.      EYES:  no scleral icterus, normal conjunctivae   Neck: no carotid bruits or  thyromegaly   Chest/Lungs:   lungs are clear to auscultation, no rales or wheezing, equal chest wall expansion    Cardiovascular:   Regular. Normal first and second heart sounds with +LORENZO; no rubs, or gallops; the carotid, radial and posterior tibial pulses are intact, no JVD and trace LE edema bilaterally    Abdomen:  no organomegaly, masses, bruits, or tenderness; bowel sounds are present   Extremities: no cyanosis or clubbing   Skin: no xanthelasma, warm.    Neurologic: NAD     Psychiatric: alert and oriented x3, calm     A complete 10 systems ROS was reviewed  And is negative except what is listed in the HPI.          Medical History  Surgical History Family History Social History   Past Medical History:   Diagnosis Date     Breast cancer (H) 01/01/1999     Heart valve replaced      Hx antineoplastic chemotherapy 01/01/1999     Hx of radiation therapy 01/01/1999     Hypertension     Past Surgical History:   Procedure Laterality Date     BIOPSY BREAST       HYSTERECTOMY       IR CHEST PORT PLACEMENT > 5 YRS OF AGE  7/30/2024     LUMPECTOMY BREAST Left 1999     OOPHORECTOMY Bilateral     no family history of premature coronary artery disease Social History     Socioeconomic History     Marital status:      Spouse name: Not on file     Number of children: Not on file     Years of education: Not on file     Highest education level: Not on file   Occupational History     Not on file   Tobacco Use     Smoking status: Never     Smokeless tobacco: Never   Vaping Use     Vaping status: Never Used   Substance and Sexual Activity     Alcohol use: Yes     Comment: Rarely     Drug use: Not on file     Sexual activity: Not on file   Other Topics Concern     Not on file   Social History Narrative     Not on file     Social Drivers of Health     Financial Resource Strain: Low Risk  (10/29/2024)    Financial Resource Strain      Within the past 12 months, have you or your family members you live with been unable to get  utilities (heat, electricity) when it was really needed?: No   Food Insecurity: Low Risk  (10/29/2024)    Food Insecurity      Within the past 12 months, did you worry that your food would run out before you got money to buy more?: No      Within the past 12 months, did the food you bought just not last and you didn t have money to get more?: No   Transportation Needs: Low Risk  (10/29/2024)    Transportation Needs      Within the past 12 months, has lack of transportation kept you from medical appointments, getting your medicines, non-medical meetings or appointments, work, or from getting things that you need?: No   Physical Activity: Unknown (5/24/2024)    Exercise Vital Sign      Days of Exercise per Week: 0 days      Minutes of Exercise per Session: Not on file   Stress: No Stress Concern Present (5/24/2024)    [place] citizen [place] [employer] - Occupational Stress Questionnaire      Feeling of Stress : Only a little   Social Connections: Unknown (5/24/2024)    Social Connection and Isolation Panel [NHANES]      Frequency of Communication with Friends and Family: Not on file      Frequency of Social Gatherings with Friends and Family: Twice a week      Attends Jainism Services: Not on file      Active Member of Clubs or Organizations: Not on file      Attends Club or Organization Meetings: Not on file      Marital Status: Not on file   Interpersonal Safety: Low Risk  (10/29/2024)    Interpersonal Safety      Do you feel physically and emotionally safe where you currently live?: Yes      Within the past 12 months, have you been hit, slapped, kicked or otherwise physically hurt by someone?: No      Within the past 12 months, have you been humiliated or emotionally abused in other ways by your partner or ex-partner?: No   Recent Concern: Interpersonal Safety - High Risk (10/7/2024)    Interpersonal Safety      Do you feel physically and emotionally safe where you currently live?: No      Within the past  "12 months, have you been hit, slapped, kicked or otherwise physically hurt by someone?: No      Within the past 12 months, have you been humiliated or emotionally abused in other ways by your partner or ex-partner?: No   Housing Stability: Low Risk  (10/29/2024)    Housing Stability      Do you have housing? : Yes      Are you worried about losing your housing?: No           Lab Results    Chemistry/lipid CBC Cardiac Enzymes/BNP/TSH/INR   Lab Results   Component Value Date    CHOL 126 05/24/2024    HDL 62 05/24/2024    TRIG 64 05/24/2024    BUN 13.8 02/20/2025     02/20/2025    CO2 22 02/20/2025    Lab Results   Component Value Date    WBC 6.3 02/20/2025    HGB 12.0 02/20/2025    HCT 36.5 02/20/2025    MCV 88 02/20/2025     02/20/2025    Lab Results   Component Value Date    TSH 3.54 11/14/2024    INR 2.20 (H) 02/20/2025     No results found for: \"CKTOTAL\", \"CKMB\", \"TROPONINI\"       Weight:    Wt Readings from Last 3 Encounters:   03/06/25 63.5 kg (140 lb)   01/27/25 63 kg (139 lb)   01/02/25 62.6 kg (138 lb)       Allergies  No Known Allergies      Surgical History  Past Surgical History:   Procedure Laterality Date     BIOPSY BREAST       HYSTERECTOMY       IR CHEST PORT PLACEMENT > 5 YRS OF AGE  7/30/2024     LUMPECTOMY BREAST Left 1999     OOPHORECTOMY Bilateral        Social History  Tobacco:   History   Smoking Status     Never   Smokeless Tobacco     Never    Alcohol:   Social History    Substance and Sexual Activity      Alcohol use: Yes        Comment: Rarely   Illicit Drugs:   History   Drug Use Not on file       Family History  Family History   Problem Relation Age of Onset     Breast Cancer Maternal Aunt      Hereditary Breast and Ovarian Cancer Syndrome No family hx of           Jose Nagy MD on 3/6/2025      cc: Irina Francisco      Thank you for allowing me to participate in the care of your patient.      Sincerely,     Jose Nagy MD     [facility]" [facility]  cc:   Jose Nagy MD  [address]  [place] [address]

## 2025-03-06 NOTE — TELEPHONE ENCOUNTER
Sulma returned call. She is given message below. She is also notified that Mihaela will follow up with Dr. Gann on Monday regarding cardio onc referral and med changes. Sulma verbalizes understanding and denies further questions at this time.    Tonya Peacock RN

## 2025-03-06 NOTE — TELEPHONE ENCOUNTER
Patient's daughter Sulma calls and leaves a message on triage voicemail. Patient is currently at her follow up with cardiology, Dr. Nagy. Sulma reports that Dr. Nagy is not sure what Dr. Gann needs from her, if anything, wondering if they need to speak.    Tonya Peacock RN

## 2025-03-06 NOTE — PROGRESS NOTES
HEART CARE NOTE          Assessment/Recommendations   1. Severe HFrEF  Assessment / Plan  New decline in the setting of chemo/rad (tx held by oncology for now); Near euvolemia; denies HF symptoms of orthopnea, PND, fluid retention or edema   Patient is high risk for adverse cardiac events 2/2 advanced age, frailty, and decline in systolic function  GDMT as detailed below; follow-up echo in 2 months  Will place referral to cardiac-oncology    Current Pharmacotherapy AHA Guideline-Directed Medical Therapy   Lisinopril 2.5 mg daily Lisinopril 20 mg twice daily   Metoprolol succinate 25 mg daily Carvedilol 25 mg twice daily   Spironolactone 12.5 mg Spironolactone 25 mg once daily   Hydralazine NA Hydralazine 100 mg three times daily   Isosorbide dinitrate NA Isosorbide dinitrate 40 mg three times daily   SGLT2 inhibitor: Dapagliflozin 10 mg daily Dapagliflozin or Empagliflozin 10 mg daily     2. CKD  Assessment / Plan  Renal function stable/wnl     3. HTN  Assessment / Plan  Adequate control - no changes to regimen at this time      4. Valvular heart disease  Assessment / Plan  S/p AVR; warfarin per pharmacy management     5. HLP  Assessment / Plan  Currently on atorvastatin    60 minutes spent reviewing prior records (including documentation, laboratory studies, cardiac testing/imaging), history and physical exam, planning, and subsequent documentation.    The longitudinal plan of care for HFrEF was addressed during this visit. Due to the added complexity in care, I will continue to support Ms. Kacie Hermosillo in the subsequent management of this condition(s) and with the ongoing continuity of care of this condition(s).      History of Present Illness/Subjective    Ms. Kacie Hermosillo is a 84 year old female with a PMHx significant for (per Epic notation) medical history documented above presented to the ER for evaluation of increased weakness and fatigue as well as wheezing and worsening shortness of breath  ongoing for the past few days in the setting of recent hospitalization for electrolyte abnormalities      Today,  Mrs. Hermosillo denies acute cardiac events or complaints; Management plan as detailed above     ECG: Personally reviewed. normal sinus rhythm, nonspecific ST and T waves changes.     ECHO (personally reviewed 3/6/25):   1. Left ventricular function is decreased. The ejection fraction is 30-35%  (moderately reduced).  - There is global hypokinesis that is worse in the posterior, inferior, and  inferoseptal walls  2. Normal right ventricle size and systolic function.  3. The left atrium is mildly dilated.  4. There is a documented 23 mm Wray RYAN 3 bioprosthetic valve present in  aortic valve position. The peak and mean forward gradients are 24 and 14 mmHg,  respectively. No regurgitation identified.  5. Compared to the prior study dated 10/28/24, there are changes as noted.  Notably, there has been a decline in LV systolic function.    Lab results: personally reviewed March 6, 2025; notable for renal function wnl; NTproBNP sent    Medical history and pertinent documents reviewed in Care Everywhere please where applicable see details above        Physical Examination Review of Systems   /60 (BP Location: Right arm, Patient Position: Sitting, Cuff Size: Adult Regular)   Pulse 68   Resp 12   Wt 63.5 kg (140 lb)   BMI 24.80 kg/m    Body mass index is 24.8 kg/m .  Wt Readings from Last 3 Encounters:   03/06/25 63.5 kg (140 lb)   01/27/25 63 kg (139 lb)   01/02/25 62.6 kg (138 lb)     General Appearance:   no distress, normal body habitus   ENT/Mouth: membranes moist, no oral lesions or bleeding gums.      EYES:  no scleral icterus, normal conjunctivae   Neck: no carotid bruits or thyromegaly   Chest/Lungs:   lungs are clear to auscultation, no rales or wheezing, equal chest wall expansion    Cardiovascular:   Regular. Normal first and second heart sounds with +LORENZO; no rubs, or gallops; the  carotid, radial and posterior tibial pulses are intact, no JVD and trace LE edema bilaterally    Abdomen:  no organomegaly, masses, bruits, or tenderness; bowel sounds are present   Extremities: no cyanosis or clubbing   Skin: no xanthelasma, warm.    Neurologic: NAD     Psychiatric: alert and oriented x3, calm     A complete 10 systems ROS was reviewed  And is negative except what is listed in the HPI.          Medical History  Surgical History Family History Social History   Past Medical History:   Diagnosis Date    Breast cancer (H) 01/01/1999    Heart valve replaced     Hx antineoplastic chemotherapy 01/01/1999    Hx of radiation therapy 01/01/1999    Hypertension     Past Surgical History:   Procedure Laterality Date    BIOPSY BREAST      HYSTERECTOMY      IR CHEST PORT PLACEMENT > 5 YRS OF AGE  7/30/2024    LUMPECTOMY BREAST Left 1999    OOPHORECTOMY Bilateral     no family history of premature coronary artery disease Social History     Socioeconomic History    Marital status:      Spouse name: Not on file    Number of children: Not on file    Years of education: Not on file    Highest education level: Not on file   Occupational History    Not on file   Tobacco Use    Smoking status: Never    Smokeless tobacco: Never   Vaping Use    Vaping status: Never Used   Substance and Sexual Activity    Alcohol use: Yes     Comment: Rarely    Drug use: Not on file    Sexual activity: Not on file   Other Topics Concern    Not on file   Social History Narrative    Not on file     Social Drivers of Health     Financial Resource Strain: Low Risk  (10/29/2024)    Financial Resource Strain     Within the past 12 months, have you or your family members you live with been unable to get utilities (heat, electricity) when it was really needed?: No   Food Insecurity: Low Risk  (10/29/2024)    Food Insecurity     Within the past 12 months, did you worry that your food would run out before you got money to buy more?: No      Within the past 12 months, did the food you bought just not last and you didn t have money to get more?: No   Transportation Needs: Low Risk  (10/29/2024)    Transportation Needs     Within the past 12 months, has lack of transportation kept you from medical appointments, getting your medicines, non-medical meetings or appointments, work, or from getting things that you need?: No   Physical Activity: Unknown (5/24/2024)    Exercise Vital Sign     Days of Exercise per Week: 0 days     Minutes of Exercise per Session: Not on file   Stress: No Stress Concern Present (5/24/2024)    [employer] [place] [employer] - Occupational Stress Questionnaire     Feeling of Stress : Only a little   Social Connections: Unknown (5/24/2024)    Social Connection and Isolation Panel [NHANES]     Frequency of Communication with Friends and Family: Not on file     Frequency of Social Gatherings with Friends and Family: Twice a week     Attends Mandaeism Services: Not on file     Active Member of Clubs or Organizations: Not on file     Attends Club or Organization Meetings: Not on file     Marital Status: Not on file   Interpersonal Safety: Low Risk  (10/29/2024)    Interpersonal Safety     Do you feel physically and emotionally safe where you currently live?: Yes     Within the past 12 months, have you been hit, slapped, kicked or otherwise physically hurt by someone?: No     Within the past 12 months, have you been humiliated or emotionally abused in other ways by your partner or ex-partner?: No   Recent Concern: Interpersonal Safety - High Risk (10/7/2024)    Interpersonal Safety     Do you feel physically and emotionally safe where you currently live?: No     Within the past 12 months, have you been hit, slapped, kicked or otherwise physically hurt by someone?: No     Within the past 12 months, have you been humiliated or emotionally abused in other ways by your partner or ex-partner?: No   Housing Stability: Low Risk   "(10/29/2024)    Housing Stability     Do you have housing? : Yes     Are you worried about losing your housing?: No           Lab Results    Chemistry/lipid CBC Cardiac Enzymes/BNP/TSH/INR   Lab Results   Component Value Date    CHOL 126 05/24/2024    HDL 62 05/24/2024    TRIG 64 05/24/2024    BUN 13.8 02/20/2025     02/20/2025    CO2 22 02/20/2025    Lab Results   Component Value Date    WBC 6.3 02/20/2025    HGB 12.0 02/20/2025    HCT 36.5 02/20/2025    MCV 88 02/20/2025     02/20/2025    Lab Results   Component Value Date    TSH 3.54 11/14/2024    INR 2.20 (H) 02/20/2025     No results found for: \"CKTOTAL\", \"CKMB\", \"TROPONINI\"       Weight:    Wt Readings from Last 3 Encounters:   03/06/25 63.5 kg (140 lb)   01/27/25 63 kg (139 lb)   01/02/25 62.6 kg (138 lb)       Allergies  No Known Allergies      Surgical History  Past Surgical History:   Procedure Laterality Date    BIOPSY BREAST      HYSTERECTOMY      IR CHEST PORT PLACEMENT > 5 YRS OF AGE  7/30/2024    LUMPECTOMY BREAST Left 1999    OOPHORECTOMY Bilateral        Social History  Tobacco:   History   Smoking Status    Never   Smokeless Tobacco    Never    Alcohol:   Social History    Substance and Sexual Activity      Alcohol use: Yes        Comment: Rarely   Illicit Drugs:   History   Drug Use Not on file       Family History  Family History   Problem Relation Age of Onset    Breast Cancer Maternal Aunt     Hereditary Breast and Ovarian Cancer Syndrome No family hx of           Jose Nagy MD on 3/6/2025      cc: Irina Francisco    "

## 2025-03-06 NOTE — PATIENT INSTRUCTIONS
Thank you for allowing the [facility] to be a part of your care. Please pay close attention to the following medications as they have been changed during this visit.    1.) Please STOP taking metoprolol Tartrate    2.) Please START taking metoprolol SUCCINATE 25 mg one time daily     3.) Please STOP taking potassium supplement    4.) Please START taking spironolactone 12.5 mg daily     5.) Please START taking dapagliflozin (Jardiance) 10 mg daily

## 2025-03-07 ENCOUNTER — TELEPHONE (OUTPATIENT)
Dept: CARDIOLOGY | Facility: CLINIC | Age: 85
End: 2025-03-07
Payer: COMMERCIAL

## 2025-03-07 DIAGNOSIS — I50.21 ACUTE SYSTOLIC HEART FAILURE (H): ICD-10-CM

## 2025-03-07 RX ORDER — DAPAGLIFLOZIN 10 MG/1
10 TABLET, FILM COATED ORAL DAILY
Qty: 90 TABLET | Refills: 1 | Status: SHIPPED | OUTPATIENT
Start: 2025-03-07

## 2025-03-07 RX ORDER — METOPROLOL SUCCINATE 25 MG/1
25 TABLET, EXTENDED RELEASE ORAL DAILY
Qty: 90 TABLET | Refills: 3 | Status: SHIPPED | OUTPATIENT
Start: 2025-03-07

## 2025-03-07 RX ORDER — SPIRONOLACTONE 25 MG/1
12.5 TABLET ORAL DAILY
Qty: 45 TABLET | Refills: 3 | Status: SHIPPED | OUTPATIENT
Start: 2025-03-07

## 2025-03-07 NOTE — TELEPHONE ENCOUNTER
[facility] Call Center    Phone Message    May a detailed message be left on voicemail: yes     Reason for Call: Other: Gunjan with patient's Assisted Living called requesting if signed orders for medication changes can be faxed to them at [phone]. Gunjan stated they have the after visit summary that shows the changes, but needing actual signed orders. Gunjan also mentioned that patient has switched pharmacy and any refills should be sent to the new one. If any questions, please call [phone]     Pharmacy:    [facility] [address]      Action Taken: Other: Cardiology    Travel Screening: Not Applicable       Thank you!  Specialty Access Center

## 2025-03-07 NOTE — TELEPHONE ENCOUNTER
Noted. Called Gunjan back, no answer. LM to call back to confirm if a electronic signature will suffice and confirm that they are the ones managing pt's medications.     Called cristian Ozuna for detail, no answer. LM to call back to confirm that [facility] are managing pt's medications & [facility] is the correct Pharmacy. Will change meds to this pharmacy now given time of day on Friday & do not want the changes to have to wait until next week.    TATO Noe RN

## 2025-03-07 NOTE — TELEPHONE ENCOUNTER
Spoke w/ nurse Gunjan at [facility] and reviewed medication changes. She reports that the medications being sent to [facility] will suffice for being able to start the medications, but they also need discontinue orders for the metoprolol tartrate and potassium chloride in order to be able to implement the changes. Printed these two medication discontinue orders off of pt's med list (which show that Dr. Nagy was the one who stopped it) & faxed these to [phone].     Dtr Sulma also called back & reported that it was very recently that [facility] aquired [facility], so she thought the pharmacy was still [facility], she was not aware it had changed. Updated her that per Gunjan, this is the update. She will call Gunjan to confirm this for going forward and for other providers.     TATO Neo RN

## 2025-03-07 NOTE — TELEPHONE ENCOUNTER
Spoke w/ Gunjan, she confirmed they received the fax and have what they need at this time. Medication changes per Dr. Nagy visit on 3/6/25 should be implemented by 3/8/25.    TATO Noe RN

## 2025-03-10 ENCOUNTER — PATIENT OUTREACH (OUTPATIENT)
Dept: ONCOLOGY | Facility: HOSPITAL | Age: 85
End: 2025-03-10
Payer: COMMERCIAL

## 2025-03-10 ENCOUNTER — TELEPHONE (OUTPATIENT)
Dept: CARDIOLOGY | Facility: CLINIC | Age: 85
End: 2025-03-10
Payer: COMMERCIAL

## 2025-03-10 NOTE — TELEPHONE ENCOUNTER
MTM referral from: [facility] visit (referral by provider)    MTM referral outreach attempt #2 on March 10, 2025 at 12:20 PM      Outcome: Patient not reachable after several attempts, routed to Pharmacist Team/Provider as an FYI    Use hbc for the carrier/Plan on the flowsheet      [email or web address] Message Sent    Starla Jordan  MTM

## 2025-03-10 NOTE — PROGRESS NOTES
[facility]: Cancer Care                                                                                        3/10/25 Returned phone call to Sulma, daughter of Kacie, regarding her questions on plan of care. LM and requested a return phone call.    Sulma called and relayed summary of the follow up with the cardiologist. Her understanding is that consult with cario-onc would be in 2 months after the Echo in May. Drugs were changes at her recent visit. Cardiologist also shared that she is willing to talk with Ana Gann, if he would like. Writer sees referral placed with expected date of 3/13. Sulma was given the number to call and schedule the consult. Sulma understands that due to the EF at this time, [facility] is on hold. Plan discussed with Dr. Gann is  to cancel the Apt on 3.17  and rescheduled after cardio-oncology  apt. If there is a delay with this apt, will re-discuss with Dr. Gann. Kacie is also wondering if there may be any other chemo drugs that she is eligible for that would not be as hard on her heart. She is feeling overall well and wants to contiu=nue to fight her disease, if this is an option.  Sulma will call writer tmr with an update on cardio onc cosnult  Wig referral will be mailed out to Kacie as  they misplaced previous copy.  3/11/25 ADDENDUM  Spoke with Sulma and she relayed the update that the Cardio-oncology referral was scheduled on 5/19/25 but Kacie is on a wait list at this time. Will relay update to Dr. Gann and call her back with recommendations.  -Called Sulma and LM to return call to writer.  Of note, when Sulma calls back share that Kacie can keep her 3/17 lab/ provider visit with Dr. Gann and he can speak with them more fully. He will not move forward with treatment at this time that worsens her heart function. Apt will be for lab and provider visit only, no treatment will be scheduled.  -Sulma called back and relayed that she will talk with her mom and siblings about the 3/17  apt. They may elect to wait for next follow up until after the next Echo and cariology follow up prior to returning to oncology clinic for future follow up as no treatment will be restarted until Kacie's heart gets stronger. She feels that her mom is accepting of this but she will discuss further and then call out office back with disposition of decision.  -Spoke with Sulma and they want to cancel the 3/17 follow up and will reschedule after the cardiology oncology apt. Consult advanced to 4/14 and they  had recommended an Echo. Sulma had questioned this as other cardiologist wanted a 2 month follow up echo to be able to assess change.  will  go back to the medical team and will let them know the recommendations after they double check. Will call Sulma in a couple weeks about rescheduling once the apts are all set for cardiology.     Signature:  Mihaela Koo RN

## 2025-03-11 ENCOUNTER — TELEPHONE (OUTPATIENT)
Dept: CARDIOLOGY | Facility: CLINIC | Age: 85
End: 2025-03-11
Payer: COMMERCIAL

## 2025-03-11 DIAGNOSIS — I50.21 ACUTE SYSTOLIC HEART FAILURE (H): Primary | ICD-10-CM

## 2025-03-11 NOTE — TELEPHONE ENCOUNTER
Dr. Nagy signed orders and this writer faxed them back to [facility]. Called Gunjan, no answer. LM to call back to confirm receipt of orders and confirm any further needs.    TATO Noe RN

## 2025-03-11 NOTE — TELEPHONE ENCOUNTER
Patient Contacted for the patient to call back and schedule the following:    Appointment type: NEW CARDIO-ONC  Provider: REBEKAH  Return date: 4/14/2025  Specialty phone number: [phone] OPT 1  Additional appointment(s) needed: N/A  Additonal Notes: PATIENT SAID [facility] HAD ECHO RECENTLY AND THEIR PROVIDER (DR. MCALLISTER) SAID IT WOULD NOT SHOW ANY NEW OR UPDATED RESULTS OF THE MEDS FOR TWO MONTHS (MAY WHEN SCHEDULED). PATIENT WONDERING IF SHE SHOULD TAKE 4/14/ OR KEEP 5/19. SCHEDULED FOR BOTH APPTS (PER PATIENT) FOR NOW BUT PLEASE ADVISE AND WE CAN CORRECT.

## 2025-03-11 NOTE — TELEPHONE ENCOUNTER
"Spoke w/ Gunjan. She confirmed the fax was received and they have everything they need, pt's med changes were implemented this weekend. Clarified that in Dr. Nagy's recommendations from OV 3/6 one recommendation was, \"5.) Please START taking dapagliflozin (Jardiance) 10 mg daily\", but this is Farxiga (not Jardiance). Corrected this on the faxed order and confirmed w/ Gunjan that Farxiga was intended & ordered by Dr. Nagy. No further questions/ needs at this time.     TATO Noe RN  "

## 2025-03-11 NOTE — TELEPHONE ENCOUNTER
RECORDS RECEIVED FROM:    DATE RECEIVED:    NOTES STATUS DETAILS   OFFICE NOTE from referring provider  Internal    OFFICE NOTE from other cardiologists  Internal Dr. Nagy 3-6-25   RECORDS from hospital/ED Internal 10-27-24   MEDICATION LIST Internal    GENERAL CARDIO RECORDS   (ALL APPOINTMENT TYPES)     LABS (CBC,BMP,CMP, TSH) Internal    EKG (STRIPS & REPORTS) Internal 10-27-24   MONITORS (STRIPS & REPORTS) N/A    ECHOS (IMAGES AND REPORTS) Internal 2-20-25   STRESS TESTS (IMAGES AND REPORTS) N/A    cMRI (IMAGES AND REPORTS) N/A    Cardiac cath (IMAGES AND REPORTS) N/A    CT/CTA (IMAGES AND REPORTS) In process 10-31-24 CT Chest     Action 3/11/25 [facility]  Fax #[phone]   Action Taken Requested:  Echo  CT Cardiac morphology 7-7-23 4-5-23

## 2025-03-11 NOTE — TELEPHONE ENCOUNTER
Health Call Center    Phone Message    May a detailed message be left on voicemail: yes     Reason for Call: Other: Sulma called to schedule her mother for a new cardio-oncology appt per the request of Dr. Nagy in [place]. Kacie is scheduled to see Dr. Francis on 5/19 and would like to be added to the wait list or see if a sooner appt could be made due to her mother's chemo being held until she is seen at the [facility]. Please review and reach out to Sulma if needed. Thank you!     Action Taken: Other: Cardiology    Travel Screening: Not Applicable    Thank you!  Specialty Access Center       Date of Service:

## 2025-03-15 ENCOUNTER — HEALTH MAINTENANCE LETTER (OUTPATIENT)
Age: 85
End: 2025-03-15

## 2025-03-18 ENCOUNTER — LAB REQUISITION (OUTPATIENT)
Dept: LAB | Facility: CLINIC | Age: 85
End: 2025-03-18
Payer: COMMERCIAL

## 2025-03-18 DIAGNOSIS — Z95.2 PRESENCE OF PROSTHETIC HEART VALVE: ICD-10-CM

## 2025-03-20 LAB — INR PPP: 1.97 (ref 0.85–1.15)

## 2025-03-20 PROCEDURE — P9604 ONE-WAY ALLOW PRORATED TRIP: HCPCS | Mod: ORL | Performed by: PHYSICIAN ASSISTANT

## 2025-03-20 PROCEDURE — 85610 PROTHROMBIN TIME: CPT | Mod: ORL | Performed by: PHYSICIAN ASSISTANT

## 2025-03-20 PROCEDURE — 36415 COLL VENOUS BLD VENIPUNCTURE: CPT | Mod: ORL | Performed by: PHYSICIAN ASSISTANT

## 2025-03-20 NOTE — TELEPHONE ENCOUNTER
[facility] Call Center    Phone Message    May a detailed message be left on voicemail: yes     Reason for Call: Other: Pt's daughter is requesting an update on appointments dates.  She stated she needs to make plans for the 4 hour travel time for her to get to the appointment.     Action Taken: Other: cardio    Travel Screening: Not Applicable    Thank you!  Specialty Access Center       Date of Service:

## 2025-03-24 NOTE — TELEPHONE ENCOUNTER
Called Sulma and let her know we would like an MRI prior to her mom's appt to assess her function. Appt made for Friday 04/11 at 1:45pm with a check in at 1:15pm.

## 2025-03-26 ENCOUNTER — TELEPHONE (OUTPATIENT)
Dept: ONCOLOGY | Facility: HOSPITAL | Age: 85
End: 2025-03-26
Payer: COMMERCIAL

## 2025-03-26 NOTE — TELEPHONE ENCOUNTER
Patient's daughter, Sulma called to leave a message for JORDEN Chairez.  She is calling to let Mihaela know that they do want to the wi referral form mailed to them.  Per Mihaela, this has been completed and sent to be in the mail.  I called to back with this information.  She was happy to hear this and has no other questions at this time.    Fabiana Li RN on 3/26/2025 at 4:03 PM

## 2025-04-06 ENCOUNTER — HEALTH MAINTENANCE LETTER (OUTPATIENT)
Age: 85
End: 2025-04-06

## 2025-04-11 ENCOUNTER — HOSPITAL ENCOUNTER (OUTPATIENT)
Dept: MRI IMAGING | Facility: CLINIC | Age: 85
Discharge: HOME | End: 2025-04-11
Attending: INTERNAL MEDICINE | Admitting: INTERNAL MEDICINE
Payer: COMMERCIAL

## 2025-04-11 DIAGNOSIS — I50.21 ACUTE SYSTOLIC HEART FAILURE (H): ICD-10-CM

## 2025-04-11 PROCEDURE — 75561 CARDIAC MRI FOR MORPH W/DYE: CPT

## 2025-04-11 PROCEDURE — 255N000002 HC RX 255 OP 636: Performed by: INTERNAL MEDICINE

## 2025-04-11 PROCEDURE — A9585 GADOBUTROL INJECTION: HCPCS | Performed by: INTERNAL MEDICINE

## 2025-04-11 PROCEDURE — 75561 CARDIAC MRI FOR MORPH W/DYE: CPT | Mod: 26 | Performed by: INTERNAL MEDICINE

## 2025-04-11 RX ORDER — GADOBUTROL 604.72 MG/ML
8 INJECTION INTRAVENOUS ONCE
Status: COMPLETED | OUTPATIENT
Start: 2025-04-11 | End: 2025-04-11

## 2025-04-11 RX ADMIN — GADOBUTROL 8 ML: 604.72 INJECTION INTRAVENOUS at 14:11

## 2025-04-14 ENCOUNTER — LAB REQUISITION (OUTPATIENT)
Dept: LAB | Facility: CLINIC | Age: 85
End: 2025-04-14
Payer: COMMERCIAL

## 2025-04-14 DIAGNOSIS — Z95.2 PRESENCE OF PROSTHETIC HEART VALVE: ICD-10-CM

## 2025-04-15 ENCOUNTER — PATIENT OUTREACH (OUTPATIENT)
Dept: ONCOLOGY | Facility: HOSPITAL | Age: 85
End: 2025-04-15
Payer: COMMERCIAL

## 2025-04-15 NOTE — PROGRESS NOTES
[facility]: Cancer Care                                                                                          Late Entry,  Spoke with Sulma, daughter of Kacie, on 4/14/25. They had seen the cardiac oncology clinic on 4/14 and they had notified our office that they feel that Kacie can resume chemotherapy if necessary and then can continue on the cardiac surveillance as per protocol for trastuzumab.  Sulma would like to reschedule Kacie for her treatment. She shared that she will be in town on 4/21 and would prefer that date if there is availability. There is no infusion apts open on the day and scheduling and discussed with infusion charge and there are no work in spots. Other dates next week were given as options and Sulma will talk with her siblings and call back to schedule Kacie's apts. She spoke with Bianca,  who will await her return call. Writer will monitor and send a letter to assisted living and prescription for dexamethasone once the infusion is scheduled.    Signature:  Mihaela Koo RN

## 2025-04-17 ENCOUNTER — PATIENT OUTREACH (OUTPATIENT)
Dept: ONCOLOGY | Facility: HOSPITAL | Age: 85
End: 2025-04-17
Payer: COMMERCIAL

## 2025-04-17 LAB — INR PPP: 2.45 (ref 0.85–1.15)

## 2025-04-17 PROCEDURE — 85610 PROTHROMBIN TIME: CPT | Mod: ORL | Performed by: PHYSICIAN ASSISTANT

## 2025-04-17 PROCEDURE — P9604 ONE-WAY ALLOW PRORATED TRIP: HCPCS | Mod: ORL | Performed by: PHYSICIAN ASSISTANT

## 2025-04-17 PROCEDURE — 36415 COLL VENOUS BLD VENIPUNCTURE: CPT | Mod: ORL | Performed by: PHYSICIAN ASSISTANT

## 2025-04-17 NOTE — PROGRESS NOTES
[facility]: Cancer Care                                                                                          Spoke with Sulma, daughter of Kacie, on 4/14/25. They had seen the cardiac oncology clinic on 4/14 and they had notified our office that they feel that Kacie can resume chemotherapy if necessary and then can continue on the cardiac surveillance as per protocol for trastuzumab.  Sulma would like to reschedule Kacie for her treatment. She shared that she will be in town on 4/21 and would prefer that date if there is availability. There was not availability on 4.21 and infusion charge was consulted.  Called Sulma on 4/17 to follow up as Kacie is still not scheduled for her follow up and infusion. She shared that she has just had surgery and not feeling up to driving. She has spoken with her siblings and they work and no one has yet been able to commit to bringing Kacie. She will discuss with them further over the weekend when she sees them and will then call on Monday, 4/21 to arrange future apts. Writer has added her to reminder list and message with update was sent to the scheduling pool    Signature:  Mihaela Koo RN

## 2025-04-21 ENCOUNTER — PATIENT OUTREACH (OUTPATIENT)
Dept: ONCOLOGY | Facility: HOSPITAL | Age: 85
End: 2025-04-21

## 2025-04-21 ENCOUNTER — INFUSION THERAPY VISIT (OUTPATIENT)
Dept: INFUSION THERAPY | Facility: HOSPITAL | Age: 85
End: 2025-04-21
Attending: INTERNAL MEDICINE
Payer: COMMERCIAL

## 2025-04-21 ENCOUNTER — ONCOLOGY VISIT (OUTPATIENT)
Dept: ONCOLOGY | Facility: HOSPITAL | Age: 85
End: 2025-04-21
Attending: INTERNAL MEDICINE
Payer: COMMERCIAL

## 2025-04-21 VITALS
WEIGHT: 139.3 LBS | HEIGHT: 63 IN | BODY MASS INDEX: 24.68 KG/M2 | RESPIRATION RATE: 18 BRPM | TEMPERATURE: 98.3 F | DIASTOLIC BLOOD PRESSURE: 70 MMHG | OXYGEN SATURATION: 98 % | SYSTOLIC BLOOD PRESSURE: 125 MMHG | HEART RATE: 74 BPM

## 2025-04-21 DIAGNOSIS — C79.51 MALIGNANT NEOPLASM METASTATIC TO BONE (H): Primary | ICD-10-CM

## 2025-04-21 DIAGNOSIS — I50.32 CHRONIC HEART FAILURE WITH PRESERVED EJECTION FRACTION (H): ICD-10-CM

## 2025-04-21 LAB
ALBUMIN SERPL BCG-MCNC: 3.7 G/DL (ref 3.5–5.2)
ALP SERPL-CCNC: 64 U/L (ref 40–150)
ALT SERPL W P-5'-P-CCNC: 15 U/L (ref 0–50)
ANION GAP SERPL CALCULATED.3IONS-SCNC: 10 MMOL/L (ref 7–15)
AST SERPL W P-5'-P-CCNC: 25 U/L (ref 0–45)
BASOPHILS # BLD AUTO: 0.1 10E3/UL (ref 0–0.2)
BASOPHILS NFR BLD AUTO: 1 %
BILIRUB SERPL-MCNC: 0.4 MG/DL
BUN SERPL-MCNC: 34.5 MG/DL (ref 8–23)
CALCIUM SERPL-MCNC: 9 MG/DL (ref 8.8–10.4)
CHLORIDE SERPL-SCNC: 107 MMOL/L (ref 98–107)
CREAT SERPL-MCNC: 1.32 MG/DL (ref 0.51–0.95)
EGFRCR SERPLBLD CKD-EPI 2021: 39 ML/MIN/1.73M2
EOSINOPHIL # BLD AUTO: 0.2 10E3/UL (ref 0–0.7)
EOSINOPHIL NFR BLD AUTO: 2 %
ERYTHROCYTE [DISTWIDTH] IN BLOOD BY AUTOMATED COUNT: 15.2 % (ref 10–15)
GLUCOSE SERPL-MCNC: 96 MG/DL (ref 70–99)
HCO3 SERPL-SCNC: 21 MMOL/L (ref 22–29)
HCT VFR BLD AUTO: 38.2 % (ref 35–47)
HGB BLD-MCNC: 12.9 G/DL (ref 11.7–15.7)
IMM GRANULOCYTES # BLD: 0 10E3/UL
IMM GRANULOCYTES NFR BLD: 0 %
LYMPHOCYTES # BLD AUTO: 1 10E3/UL (ref 0.8–5.3)
LYMPHOCYTES NFR BLD AUTO: 14 %
MCH RBC QN AUTO: 29.3 PG (ref 26.5–33)
MCHC RBC AUTO-ENTMCNC: 33.8 G/DL (ref 31.5–36.5)
MCV RBC AUTO: 87 FL (ref 78–100)
MONOCYTES # BLD AUTO: 0.7 10E3/UL (ref 0–1.3)
MONOCYTES NFR BLD AUTO: 10 %
NEUTROPHILS # BLD AUTO: 5.4 10E3/UL (ref 1.6–8.3)
NEUTROPHILS NFR BLD AUTO: 74 %
NRBC # BLD AUTO: 0 10E3/UL
NRBC BLD AUTO-RTO: 0 /100
PLATELET # BLD AUTO: 208 10E3/UL (ref 150–450)
POTASSIUM SERPL-SCNC: 4.5 MMOL/L (ref 3.4–5.3)
PROT SERPL-MCNC: 6 G/DL (ref 6.4–8.3)
RBC # BLD AUTO: 4.41 10E6/UL (ref 3.8–5.2)
SODIUM SERPL-SCNC: 138 MMOL/L (ref 135–145)
WBC # BLD AUTO: 7.3 10E3/UL (ref 4–11)

## 2025-04-21 PROCEDURE — 99214 OFFICE O/P EST MOD 30 MIN: CPT | Performed by: INTERNAL MEDICINE

## 2025-04-21 PROCEDURE — 82310 ASSAY OF CALCIUM: CPT | Performed by: INTERNAL MEDICINE

## 2025-04-21 PROCEDURE — 82947 ASSAY GLUCOSE BLOOD QUANT: CPT | Performed by: INTERNAL MEDICINE

## 2025-04-21 PROCEDURE — 258N000003 HC RX IP 258 OP 636: Performed by: INTERNAL MEDICINE

## 2025-04-21 PROCEDURE — 250N000011 HC RX IP 250 OP 636: Performed by: INTERNAL MEDICINE

## 2025-04-21 PROCEDURE — G0463 HOSPITAL OUTPT CLINIC VISIT: HCPCS | Performed by: INTERNAL MEDICINE

## 2025-04-21 PROCEDURE — 96367 TX/PROPH/DG ADDL SEQ IV INF: CPT

## 2025-04-21 PROCEDURE — 85025 COMPLETE CBC W/AUTO DIFF WBC: CPT | Performed by: INTERNAL MEDICINE

## 2025-04-21 PROCEDURE — 36591 DRAW BLOOD OFF VENOUS DEVICE: CPT | Performed by: INTERNAL MEDICINE

## 2025-04-21 PROCEDURE — 96375 TX/PRO/DX INJ NEW DRUG ADDON: CPT

## 2025-04-21 PROCEDURE — 96413 CHEMO IV INFUSION 1 HR: CPT

## 2025-04-21 RX ORDER — HEPARIN SODIUM,PORCINE 10 UNIT/ML
5-20 VIAL (ML) INTRAVENOUS DAILY PRN
OUTPATIENT
Start: 2025-05-08

## 2025-04-21 RX ORDER — ALBUTEROL SULFATE 0.83 MG/ML
2.5 SOLUTION RESPIRATORY (INHALATION)
OUTPATIENT
Start: 2025-05-08

## 2025-04-21 RX ORDER — DIPHENHYDRAMINE HYDROCHLORIDE 50 MG/ML
50 INJECTION, SOLUTION INTRAMUSCULAR; INTRAVENOUS
Status: CANCELLED
Start: 2025-04-21

## 2025-04-21 RX ORDER — HEPARIN SODIUM (PORCINE) LOCK FLUSH IV SOLN 100 UNIT/ML 100 UNIT/ML
5 SOLUTION INTRAVENOUS
Status: DISCONTINUED | OUTPATIENT
Start: 2025-04-21 | End: 2025-04-21 | Stop reason: HOSPADM

## 2025-04-21 RX ORDER — HEPARIN SODIUM (PORCINE) LOCK FLUSH IV SOLN 100 UNIT/ML 100 UNIT/ML
5 SOLUTION INTRAVENOUS
Status: CANCELLED | OUTPATIENT
Start: 2025-04-21

## 2025-04-21 RX ORDER — HEPARIN SODIUM (PORCINE) LOCK FLUSH IV SOLN 100 UNIT/ML 100 UNIT/ML
5 SOLUTION INTRAVENOUS
OUTPATIENT
Start: 2025-05-08

## 2025-04-21 RX ORDER — MEPERIDINE HYDROCHLORIDE 25 MG/ML
25 INJECTION INTRAMUSCULAR; INTRAVENOUS; SUBCUTANEOUS
Status: CANCELLED | OUTPATIENT
Start: 2025-04-21

## 2025-04-21 RX ORDER — PALONOSETRON 0.05 MG/ML
0.25 INJECTION, SOLUTION INTRAVENOUS ONCE
Status: COMPLETED | OUTPATIENT
Start: 2025-04-21 | End: 2025-04-21

## 2025-04-21 RX ORDER — DIPHENHYDRAMINE HYDROCHLORIDE 50 MG/ML
25 INJECTION, SOLUTION INTRAMUSCULAR; INTRAVENOUS
Status: CANCELLED
Start: 2025-04-21

## 2025-04-21 RX ORDER — DEXAMETHASONE 4 MG/1
8 TABLET ORAL DAILY
Qty: 6 TABLET | Refills: 5 | Status: SHIPPED | OUTPATIENT
Start: 2025-04-21

## 2025-04-21 RX ORDER — DIPHENHYDRAMINE HYDROCHLORIDE 50 MG/ML
50 INJECTION, SOLUTION INTRAMUSCULAR; INTRAVENOUS
Start: 2025-05-08

## 2025-04-21 RX ORDER — MEPERIDINE HYDROCHLORIDE 25 MG/ML
25 INJECTION INTRAMUSCULAR; INTRAVENOUS; SUBCUTANEOUS EVERY 30 MIN PRN
OUTPATIENT
Start: 2025-05-08

## 2025-04-21 RX ORDER — HEPARIN SODIUM,PORCINE 10 UNIT/ML
5-20 VIAL (ML) INTRAVENOUS DAILY PRN
Status: DISCONTINUED | OUTPATIENT
Start: 2025-04-21 | End: 2025-04-21 | Stop reason: HOSPADM

## 2025-04-21 RX ORDER — METHYLPREDNISOLONE SODIUM SUCCINATE 40 MG/ML
40 INJECTION INTRAMUSCULAR; INTRAVENOUS
Status: CANCELLED
Start: 2025-04-21

## 2025-04-21 RX ORDER — HEPARIN SODIUM,PORCINE 10 UNIT/ML
5-20 VIAL (ML) INTRAVENOUS DAILY PRN
Status: CANCELLED | OUTPATIENT
Start: 2025-04-21

## 2025-04-21 RX ORDER — METHYLPREDNISOLONE SODIUM SUCCINATE 125 MG/2ML
125 INJECTION INTRAMUSCULAR; INTRAVENOUS
Start: 2025-05-08

## 2025-04-21 RX ORDER — ALBUTEROL SULFATE 90 UG/1
1-2 INHALANT RESPIRATORY (INHALATION)
Start: 2025-05-08

## 2025-04-21 RX ORDER — ALBUTEROL SULFATE 0.83 MG/ML
2.5 SOLUTION RESPIRATORY (INHALATION)
Status: CANCELLED | OUTPATIENT
Start: 2025-04-21

## 2025-04-21 RX ORDER — PALONOSETRON 0.05 MG/ML
0.25 INJECTION, SOLUTION INTRAVENOUS ONCE
Status: CANCELLED | OUTPATIENT
Start: 2025-04-21

## 2025-04-21 RX ORDER — EPINEPHRINE 1 MG/ML
0.3 INJECTION, SOLUTION INTRAMUSCULAR; SUBCUTANEOUS EVERY 5 MIN PRN
Status: CANCELLED | OUTPATIENT
Start: 2025-04-21

## 2025-04-21 RX ORDER — ALBUTEROL SULFATE 90 UG/1
1-2 INHALANT RESPIRATORY (INHALATION)
Status: CANCELLED
Start: 2025-04-21

## 2025-04-21 RX ORDER — LORAZEPAM 2 MG/ML
0.5 INJECTION INTRAMUSCULAR EVERY 4 HOURS PRN
Status: CANCELLED | OUTPATIENT
Start: 2025-04-21

## 2025-04-21 RX ORDER — EPINEPHRINE 1 MG/ML
0.3 INJECTION, SOLUTION INTRAMUSCULAR; SUBCUTANEOUS EVERY 5 MIN PRN
OUTPATIENT
Start: 2025-05-08

## 2025-04-21 RX ADMIN — DEXTROSE MONOHYDRATE 250 ML: 50 INJECTION, SOLUTION INTRAVENOUS at 11:24

## 2025-04-21 RX ADMIN — FAM-TRASTUZUMAB DERUXTECAN-NXKI 242 MG: 100 INJECTION, POWDER, LYOPHILIZED, FOR SOLUTION INTRAVENOUS at 11:29

## 2025-04-21 RX ADMIN — ZOLEDRONIC ACID 3 MG: 4 INJECTION, SOLUTION, CONCENTRATE INTRAVENOUS at 10:42

## 2025-04-21 RX ADMIN — HEPARIN 5 ML: 100 SYRINGE at 12:17

## 2025-04-21 RX ADMIN — PALONOSETRON 0.25 MG: 0.05 INJECTION, SOLUTION INTRAVENOUS at 10:31

## 2025-04-21 RX ADMIN — SODIUM CHLORIDE 250 ML: 9 INJECTION, SOLUTION INTRAVENOUS at 10:13

## 2025-04-21 RX ADMIN — DEXAMETHASONE SODIUM PHOSPHATE: 100 INJECTION INTRAMUSCULAR; INTRAVENOUS at 11:00

## 2025-04-21 ASSESSMENT — PAIN SCALES - GENERAL: PAINLEVEL_OUTOF10: NO PAIN (0)

## 2025-04-21 NOTE — PROGRESS NOTES
[facility] Hematology and Oncology Progress Note    Patient: Kacie Hermosillo  MRN: [id number]  Date of Service: Apr 21, 2025             ECOG Performance    2 - Ambulatory and independent in all ADLs; cannot work; up > 50% of the time          ______________________________________________________________________________  Oncologic history  Metastatic breast cancer with bone mets only ER negative NM negative HER2/amarilis 2+ and negative by FISH.  HER2/amarilis low    Remote diagnosis of breast cancer in 1995 treated with surgery chemotherapy radiation and 5 years of hormonal therapy.  Pathology report not available.    Treatment  Patient started on fam-trastuzumab on 8/19/2024  Complete response by PET criteria in  Nov 2024  Dose of fam-trastuzumab decreased by 25% in November 2024 due to severe diarrhea  Fam-trastuzumab held in February 2025 due to development of cardiomyopathy  Fam-trastuzumab restarted on 4/21/2025 after patient cleared by cardiology    History of Present Illness    Ms. Kacie Hermosillo is here in follow-up.  She has had an evaluation by cardiology done.  Her MR of the heart showed an preserved cardiac function and cardiology thinks that she is okay to restart fam-trastuzumab and her abnormality seen on echo was most likely due to the bundle branch block.  Patient feels fine and has no shortness of breath dyspnea on exertion or any sign of heart failure.    Review of systems  A comprehensive 12 point review of system was done that was negative except what is mentioned in the history of present illness    Past History    Past Medical History:   Diagnosis Date    Breast cancer (H) 01/01/1999    Heart valve replaced     Hx antineoplastic chemotherapy 01/01/1999    Hx of radiation therapy 01/01/1999    Hypertension        Past Surgical History:   Procedure Laterality Date    BIOPSY BREAST      HYSTERECTOMY      IR CHEST PORT PLACEMENT > 5 YRS OF AGE  7/30/2024    LUMPECTOMY BREAST Left 1999     "OOPHORECTOMY Bilateral        Physical Exam    /70 (BP Location: Left arm, Patient Position: Sitting, Cuff Size: Adult Regular)   Pulse 74   Temp 98.3  F (36.8  C) (Oral)   Resp 18   Ht 1.6 m (5' 2.99\")   Wt 63.2 kg (139 lb 4.8 oz)   SpO2 98%   BMI 24.68 kg/m      General: alert, awake, not in acute distress  HEENT: Head: Normal, normocephalic, atraumatic.  Eye: Normal external eye, conjunctiva, lids cornea, MARI.  Nose: Normal external nose, mucus membranes and septum.  Pharynx: Normal buccal mucosa. Normal pharynx.  Neck / Thyroid: Supple, no masses, nodes, nodules or enlargement.  Lymphatics: No abnormally enlarged lymph nodes.  Chest: Normal chest wall and respirations. Clear to auscultation.  No crackles or rhonchi's  Heart: S1 S2 RRR, no murmur.   Abdomen: abdomen is soft without significant tenderness, masses, organomegaly or guarding  Extremities: normal strength, tone, and muscle mass  Skin: normal. no rash or abnormalities  CNS: non focal.    Lab Results    Recent Results (from the past 240 hours)   INR    Collection Time: 04/17/25  9:58 AM   Result Value Ref Range    INR 2.45 (H) 0.85 - 1.15   CBC with platelets and differential    Collection Time: 04/21/25  8:58 AM   Result Value Ref Range    WBC Count 7.3 4.0 - 11.0 10e3/uL    RBC Count 4.41 3.80 - 5.20 10e6/uL    Hemoglobin 12.9 11.7 - 15.7 g/dL    Hematocrit 38.2 35.0 - 47.0 %    MCV 87 78 - 100 fL    MCH 29.3 26.5 - 33.0 pg    MCHC 33.8 31.5 - 36.5 g/dL    RDW 15.2 (H) 10.0 - 15.0 %    Platelet Count 208 150 - 450 10e3/uL    % Neutrophils 74 %    % Lymphocytes 14 %    % Monocytes 10 %    % Eosinophils 2 %    % Basophils 1 %    % Immature Granulocytes 0 %    NRBCs per 100 WBC 0 <1 /100    Absolute Neutrophils 5.4 1.6 - 8.3 10e3/uL    Absolute Lymphocytes 1.0 0.8 - 5.3 10e3/uL    Absolute Monocytes 0.7 0.0 - 1.3 10e3/uL    Absolute Eosinophils 0.2 0.0 - 0.7 10e3/uL    Absolute Basophils 0.1 0.0 - 0.2 10e3/uL    Absolute Immature " Granulocytes 0.0 <=0.4 10e3/uL    Absolute NRBCs 0.0 10e3/uL         Imaging    MRI Cardiac w/contrast    Result Date: 2025                                                   [facility]                                                  CMR Report   MRN:                 [id number]                                Name:        URIAH OLIVEROS                                :                1940-Mar-28                                Scan Date:                                        Electronically signed by Isaias Marshall  09:42:13 SUMMARY  ========================================================================================================== Clinical history: 85-year-old woman with recurrent breast cancer treated with trastuzumab, with new LV dysfunction. Comparison CMR: None 1. The LV is normal in cavity size and wall thickness. The global systolic function is mildly reduced. The LVEF is 48%. There are is septal dyssynchrony related to left bundle branch block. 2. The RV is normal in cavity size. The global systolic function is normal. The RVEF is 75%. 3. Both atria are normal in size. 4. There is a well seated mechanical valve in the aortic position. Valve function could not be assessed because of metal artifacts. There is no other significant valvular disease. 5. Late gadolinium enhancement imaging shows no MI, fibrosis, or infiltrative disease. 6. There is a trivial pericardial effusion. 7. There is no intracardiac thrombus. CONCLUSIONS: Non ischemic cardiomyopathy with LVEF of 48% with no LGE. 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 CORE EXAM  ========================================================================================================== MEASUREMENTS  ----------------------------------------------------------------------------------------     VOLUMETRIC  ANALYSIS      ---------------------------------------------- .--------------------------------------------------------.                  LV     Reference  RV    Reference  +-----+-----------+-------+-----------+------+-----------+  EDV  ml         107.0             83.1                   ml/m^2      64.4             50.0              ESV  ml          55.9             20.8                   ml/m^2      33.6             12.5              CO   L/min       3.53             4.30                   L/min/m^2   2.12             2.59              SV   ml          51.1             62.3                   ml/m^2      30.8             37.5              EF   %           47.8             75.0             '-----+-----------+-------+-----------+------+-----------'         CARDIAC OUTPUT HR:  69 bpm     LV DIMENSIONS      ----------------------------------------------         WALL THICKNESS - ANTEROSEPTAL:  1.0 cm         WALL THICKNESS - INFEROLATERAL:  0.7 cm         LV MINH:  4.7 cm         LV ESD:  3.6 cm     LA DIMENSIONS (LV SYSTOLE)      ----------------------------------------------         DIAMETER:  3.7 cm     AORTIC ROOT DIMENSIONS      ----------------------------------------------         SINUS OF VALSALVA:  2.9 cm         AORTIC ROOT SIZE:  Normal ANATOMY  ----------------------------------------------------------------------------------------     LEFT VENTRICLE      ----------------------------------------------         WALL THICKNESS:  Normal         CAVITY SIZE:  Normal     RIGHT VENTRICLE      ----------------------------------------------         WALL THICKNESS:  Normal         CONTRACTILITY:  Normal         CAVITY SIZE:  Normal     LEFT ATRIUM      ----------------------------------------------         CAVITY SIZE:  Normal         MASS/THROMBUS:  None     RIGHT ATRIUM      ----------------------------------------------         CAVITY SIZE:   Normal         MASS/THROMBUS:  None     PERICARDIUM      ----------------------------------------------         Normal         EFFUSION:  Trivial VALVES  ----------------------------------------------------------------------------------------     AORTIC VALVE      ----------------------------------------------         AORTIC VALVE LEAFLETS:  MECH PROSTHETIC     MITRAL VALVE      ----------------------------------------------         MITRAL VALVE LEAFLETS:  Normal Leaflets     TRICUSPID VALVE      ----------------------------------------------         TRICUSPID VALVE LEAFLETS:  Normal Leaflets     PULMONIC VALVE      ----------------------------------------------         PULMONIC VALVE LEAFLETS:  Normal Leaflets 17 SEGMENT  ---------------------------------------------------------------------------------------- .-------------------------------------------------------------------------------------------------.  Segments            Wall Motion    Hyperenhancement  Stress Perfusion  Interpretation        +--------------------+---------------+------------------+------------------+----------------------+  Base Anterior       Normal/Hyper   None                                Normal                 Base Anteroseptal   Mild/Mod Hypo  None                                Abnormal Wall Motion   Base Inferoseptal   Mild/Mod Hypo  None                                Abnormal Wall Motion   Base Inferior       Normal/Hyper   None                                Normal                 Base Inferolateral  Normal/Hyper   None                                Normal                 Base Anterolateral  Normal/Hyper   None                                Normal                 Mid Anterior        Normal/Hyper   None                                Normal                 Mid Anteroseptal    Mild/Mod Hypo  None                                Abnormal Wall Motion   Mid Inferoseptal     Mild/Mod Hypo  None                                Abnormal Wall Motion   Mid Inferior        Normal/Hyper   None                                Normal                 Mid Inferolateral   Normal/Hyper   None                                Normal                 Mid Anterolateral   Normal/Hyper   None                                Normal                 Apical Anterior     Normal/Hyper   None                                Normal                 Apical Septal       Mild/Mod Hypo  None                                Abnormal Wall Motion   Apical Inferior     Normal/Hyper   None                                Normal                 Apical Lateral      Normal/Hyper   None                                Normal                 [place]                Normal/Hyper   None                                Normal                +--------------------+---------------+------------------+------------------+----------------------+  RV Segments         Wall Motion    Hyperenhancement                    Interpretation        +--------------------+---------------+------------------+------------------+----------------------+  RV Basal Anterior   Normal/Hyper   None                                Normal                 RV Basal Inferior   Normal/Hyper   None                                Normal                 RV Mid              Normal/Hyper   None                                Normal                 RV Apical           Normal/Hyper   None                                Normal                '--------------------+---------------+------------------+------------------+----------------------'     FINDINGS      ----------------------------------------------         LV SCAR SIZE (17 SEGMENT):  0 % SCAN INFO  ========================================================================================================== GENERAL   ----------------------------------------------------------------------------------------     CONTRAST AGENT      ----------------------------------------------         TYPE:  Gadavist         GD CONCENTRATION:  0.5 M         VOLUME ADMINISTERED:  10 ml         DOSAGE:  0.08 mmol/kg     SEDATION      ----------------------------------------------         SEDATION USED?:  No     VITALS      ----------------------------------------------         HEIGHT:  63.0 in         HEIGHT:  160.0 cm         WEIGHT:  140.0 lbs         WEIGHT:  63.5 kgs         BSA:  1.66 m^2     PULSE SEQUENCES      ----------------------------------------------         SSFP cine, 2D LGE segmented, 2D LGE single-shot, T1-weighted imaging, T2-weighted imaging,         Pre-contrast T1 mapping, Post-contrast T1 mapping, T2 mapping     SETUP      ----------------------------------------------         SCAN TYPE:  Clinical         PATIENT TYPE:  Outpatient         INCOMPLETE SCAN:  No         REASON(S) FOR SCAN:  nonischemic CM etiology         REFERRING PHYSICIAN:  MIGUEL WELCH         ATTENDING PHYSICIAN:  MIGUEL WELCH BILLING  ----------------------------------------------------------------------------------------     ICD10 Codes         I50.21 Report generated by [employer], a product of [employer]         Assessment and Plan    Metastatic breast cancer with bone mets HER2/amarilis low  Patient is here in follow-up.  She has been cleared by cardiology to restart fam-trastuzumab.  She had an cardiac MRI done that showed a preserved cardiac function and LV function.  The abnormality seen previously was thought to be due to bundle branch block and as per cardiology fam-trastuzumab can be restarted.  We will give her a dose today I will plan to do an echo prior to the next dose and if everything looks good we will start doing echoes every 3 months again    Bone metastases  Patient Zometa was on hold.  Will plan to restart.  Patient will get  a dose today    Cardiac monitoring  Patient will have an echo prior to her next visit.    Anticoagulation  Patient has been on anticoagulation with warfarin.  Continue as planned this is being managed by his other physicians.    Signed by: Hadley Gann MD        CC: Irina Francisco PA-C

## 2025-04-21 NOTE — LETTER
4/21/2025      Kacie Hermosillo  [address]  [address]  [facility] [address]      Dear Colleague,    Thank you for referring your patient, Kacie Hermosillo, to the [facility]. Please see a copy of my visit note below.    [facility] Progress Note    Patient: Kacie Hermosillo  MRN: [id number]  Date of Service: Apr 21, 2025             ECOG Performance    2 - Ambulatory and independent in all ADLs; cannot work; up > 50% of the time          ______________________________________________________________________________  Oncologic history  Metastatic breast cancer with bone mets only ER negative KS negative HER2/amarilis 2+ and negative by FISH.  HER2/amarilis low    Remote diagnosis of breast cancer in 1995 treated with surgery chemotherapy radiation and 5 years of hormonal therapy.  Pathology report not available.    Treatment  Patient started on fam-trastuzumab on 8/19/2024  Complete response by PET criteria in  Nov 2024  Dose of fam-trastuzumab decreased by 25% in November 2024 due to severe diarrhea  Fam-trastuzumab held in February 2025 due to development of cardiomyopathy  Fam-trastuzumab restarted on 4/21/2025 after patient cleared by cardiology    History of Present Illness    Ms. Kacie Hermosillo is here in follow-up.  She has had an evaluation by cardiology done.  Her MR of the heart showed an preserved cardiac function and cardiology thinks that she is okay to restart fam-trastuzumab and her abnormality seen on echo was most likely due to the bundle branch block.  Patient feels fine and has no shortness of breath dyspnea on exertion or any sign of heart failure.    Review of systems  A comprehensive 12 point review of system was done that was negative except what is mentioned in the history of present illness    Past History    Past Medical History:   Diagnosis Date     Breast cancer (H) 01/01/1999     Heart valve replaced      Hx antineoplastic  "chemotherapy 01/01/1999     Hx of radiation therapy 01/01/1999     Hypertension        Past Surgical History:   Procedure Laterality Date     BIOPSY BREAST       HYSTERECTOMY       IR CHEST PORT PLACEMENT > 5 YRS OF AGE  7/30/2024     LUMPECTOMY BREAST Left 1999     OOPHORECTOMY Bilateral        Physical Exam    /70 (BP Location: Left arm, Patient Position: Sitting, Cuff Size: Adult Regular)   Pulse 74   Temp 98.3  F (36.8  C) (Oral)   Resp 18   Ht 1.6 m (5' 2.99\")   Wt 63.2 kg (139 lb 4.8 oz)   SpO2 98%   BMI 24.68 kg/m      General: alert, awake, not in acute distress  HEENT: Head: Normal, normocephalic, atraumatic.  Eye: Normal external eye, conjunctiva, lids cornea, MARI.  Nose: Normal external nose, mucus membranes and septum.  Pharynx: Normal buccal mucosa. Normal pharynx.  Neck / Thyroid: Supple, no masses, nodes, nodules or enlargement.  Lymphatics: No abnormally enlarged lymph nodes.  Chest: Normal chest wall and respirations. Clear to auscultation.  No crackles or rhonchi's  Heart: S1 S2 RRR, no murmur.   Abdomen: abdomen is soft without significant tenderness, masses, organomegaly or guarding  Extremities: normal strength, tone, and muscle mass  Skin: normal. no rash or abnormalities  CNS: non focal.    Lab Results    Recent Results (from the past 240 hours)   INR    Collection Time: 04/17/25  9:58 AM   Result Value Ref Range    INR 2.45 (H) 0.85 - 1.15   CBC with platelets and differential    Collection Time: 04/21/25  8:58 AM   Result Value Ref Range    WBC Count 7.3 4.0 - 11.0 10e3/uL    RBC Count 4.41 3.80 - 5.20 10e6/uL    Hemoglobin 12.9 11.7 - 15.7 g/dL    Hematocrit 38.2 35.0 - 47.0 %    MCV 87 78 - 100 fL    MCH 29.3 26.5 - 33.0 pg    MCHC 33.8 31.5 - 36.5 g/dL    RDW 15.2 (H) 10.0 - 15.0 %    Platelet Count 208 150 - 450 10e3/uL    % Neutrophils 74 %    % Lymphocytes 14 %    % Monocytes 10 %    % Eosinophils 2 %    % Basophils 1 %    % Immature Granulocytes 0 %    NRBCs per 100 WBC " 0 <1 /100    Absolute Neutrophils 5.4 1.6 - 8.3 10e3/uL    Absolute Lymphocytes 1.0 0.8 - 5.3 10e3/uL    Absolute Monocytes 0.7 0.0 - 1.3 10e3/uL    Absolute Eosinophils 0.2 0.0 - 0.7 10e3/uL    Absolute Basophils 0.1 0.0 - 0.2 10e3/uL    Absolute Immature Granulocytes 0.0 <=0.4 10e3/uL    Absolute NRBCs 0.0 10e3/uL         Imaging    MRI Cardiac w/contrast    Result Date: 2025                                                   [facility]                                                  CMR Report   MRN:                 [id number]                                Name:        URIAH OLIVEROS                                :                1940-Mar-28                                Scan Date:                                        Electronically signed by Isaias Marshall  09:42:13 SUMMARY  ========================================================================================================== Clinical history: 85-year-old woman with recurrent breast cancer treated with trastuzumab, with new LV dysfunction. Comparison CMR: None 1. The LV is normal in cavity size and wall thickness. The global systolic function is mildly reduced. The LVEF is 48%. There are is septal dyssynchrony related to left bundle branch block. 2. The RV is normal in cavity size. The global systolic function is normal. The RVEF is 75%. 3. Both atria are normal in size. 4. There is a well seated mechanical valve in the aortic position. Valve function could not be assessed because of metal artifacts. There is no other significant valvular disease. 5. Late gadolinium enhancement imaging shows no MI, fibrosis, or infiltrative disease. 6. There is a trivial pericardial effusion. 7. There is no intracardiac thrombus. CONCLUSIONS: Non ischemic cardiomyopathy with LVEF of 48% with no LGE. 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 CORE EXAM  ========================================================================================================== MEASUREMENTS  ----------------------------------------------------------------------------------------     VOLUMETRIC ANALYSIS      ---------------------------------------------- .--------------------------------------------------------.                  LV     Reference  RV    Reference  +-----+-----------+-------+-----------+------+-----------+  EDV  ml         107.0             83.1                   ml/m^2      64.4             50.0              ESV  ml          55.9             20.8                   ml/m^2      33.6             12.5              CO   L/min       3.53             4.30                   L/min/m^2   2.12             2.59              SV   ml          51.1             62.3                   ml/m^2      30.8             37.5              EF   %           47.8             75.0             '-----+-----------+-------+-----------+------+-----------'         CARDIAC OUTPUT HR:  69 bpm     LV DIMENSIONS      ----------------------------------------------         WALL THICKNESS - ANTEROSEPTAL:  1.0 cm         WALL THICKNESS - INFEROLATERAL:  0.7 cm         LV MINH:  4.7 cm         LV ESD:  3.6 cm     LA DIMENSIONS (LV SYSTOLE)      ----------------------------------------------         DIAMETER:  3.7 cm     AORTIC ROOT DIMENSIONS      ----------------------------------------------         SINUS OF VALSALVA:  2.9 cm         AORTIC ROOT SIZE:  Normal ANATOMY  ----------------------------------------------------------------------------------------     LEFT VENTRICLE      ----------------------------------------------         WALL THICKNESS:  Normal         CAVITY SIZE:  Normal     RIGHT VENTRICLE      ----------------------------------------------         WALL THICKNESS:  Normal          CONTRACTILITY:  Normal         CAVITY SIZE:  Normal     LEFT ATRIUM      ----------------------------------------------         CAVITY SIZE:  Normal         MASS/THROMBUS:  None     RIGHT ATRIUM      ----------------------------------------------         CAVITY SIZE:  Normal         MASS/THROMBUS:  None     PERICARDIUM      ----------------------------------------------         Normal         EFFUSION:  Trivial VALVES  ----------------------------------------------------------------------------------------     AORTIC VALVE      ----------------------------------------------         AORTIC VALVE LEAFLETS:  MECH PROSTHETIC     MITRAL VALVE      ----------------------------------------------         MITRAL VALVE LEAFLETS:  Normal Leaflets     TRICUSPID VALVE      ----------------------------------------------         TRICUSPID VALVE LEAFLETS:  Normal Leaflets     PULMONIC VALVE      ----------------------------------------------         PULMONIC VALVE LEAFLETS:  Normal Leaflets 17 SEGMENT  ---------------------------------------------------------------------------------------- .-------------------------------------------------------------------------------------------------.  Segments            Wall Motion    Hyperenhancement  Stress Perfusion  Interpretation        +--------------------+---------------+------------------+------------------+----------------------+  Base Anterior       Normal/Hyper   None                                Normal                 Base Anteroseptal   Mild/Mod Hypo  None                                Abnormal Wall Motion   Base Inferoseptal   Mild/Mod Hypo  None                                Abnormal Wall Motion   Base Inferior       Normal/Hyper   None                                Normal                 Base Inferolateral  Normal/Hyper   None                                Normal                 Base Anterolateral  Normal/Hyper   None                                 Normal                 Mid Anterior        Normal/Hyper   None                                Normal                 Mid Anteroseptal    Mild/Mod Hypo  None                                Abnormal Wall Motion   Mid Inferoseptal    Mild/Mod Hypo  None                                Abnormal Wall Motion   Mid Inferior        Normal/Hyper   None                                Normal                 Mid Inferolateral   Normal/Hyper   None                                Normal                 Mid Anterolateral   Normal/Hyper   None                                Normal                 Apical Anterior     Normal/Hyper   None                                Normal                 Apical Septal       Mild/Mod Hypo  None                                Abnormal Wall Motion   Apical Inferior     Normal/Hyper   None                                Normal                 Apical Lateral      Normal/Hyper   None                                Normal                 Evans                Normal/Hyper   None                                Normal                +--------------------+---------------+------------------+------------------+----------------------+  RV Segments         Wall Motion    Hyperenhancement                    Interpretation        +--------------------+---------------+------------------+------------------+----------------------+  RV Basal Anterior   Normal/Hyper   None                                Normal                 RV Basal Inferior   Normal/Hyper   None                                Normal                 RV Mid              Normal/Hyper   None                                Normal                 RV Apical           Normal/Hyper   None                                Normal                '--------------------+---------------+------------------+------------------+----------------------'      FINDINGS      ----------------------------------------------         LV SCAR SIZE (17 SEGMENT):  0 % SCAN INFO  ========================================================================================================== GENERAL  ----------------------------------------------------------------------------------------     CONTRAST AGENT      ----------------------------------------------         TYPE:  Gadavist         GD CONCENTRATION:  0.5 M         VOLUME ADMINISTERED:  10 ml         DOSAGE:  0.08 mmol/kg     SEDATION      ----------------------------------------------         SEDATION USED?:  No     VITALS      ----------------------------------------------         HEIGHT:  63.0 in         HEIGHT:  160.0 cm         WEIGHT:  140.0 lbs         WEIGHT:  63.5 kgs         BSA:  1.66 m^2     PULSE SEQUENCES      ----------------------------------------------         SSFP cine, 2D LGE segmented, 2D LGE single-shot, T1-weighted imaging, T2-weighted imaging,         Pre-contrast T1 mapping, Post-contrast T1 mapping, T2 mapping     SETUP      ----------------------------------------------         SCAN TYPE:  Clinical         PATIENT TYPE:  Outpatient         INCOMPLETE SCAN:  No         REASON(S) FOR SCAN:  nonischemic CM etiology         REFERRING PHYSICIAN:  MIGUEL WELCH         ATTENDING PHYSICIAN:  MIGUEL WELCH BILLING  ----------------------------------------------------------------------------------------     ICD10 Codes         I50.21 Report generated by [employer], a product of [employer]         Assessment and Plan    Metastatic breast cancer with bone mets HER2/amarilis low  Patient is here in follow-up.  She has been cleared by cardiology to restart fam-trastuzumab.  She had an cardiac MRI done that showed a preserved cardiac function and LV function.  The abnormality seen previously was thought to be due to bundle branch block and as per cardiology fam-trastuzumab can be restarted.  We will give her  "a dose today I will plan to do an echo prior to the next dose and if everything looks good we will start doing echoes every 3 months again    Bone metastases  Patient Zometa was on hold.  Will plan to restart.  Patient will get a dose today    Cardiac monitoring  Patient will have an echo prior to her next visit.    Anticoagulation  Patient has been on anticoagulation with warfarin.  Continue as planned this is being managed by his other physicians.    Signed by: Hadley Gann MD        CC: Irina Francisco PA-C     Oncology Rooming Note    April 21, 2025 9:16 AM   Kacie Hermosillo is a 85 year old female who presents for:    Chief Complaint   Patient presents with     Oncology Clinic Visit     Malignant neoplasm of areola of left breast in female  Malignant neoplasm metastatic to bone      Initial Vitals: /70 (BP Location: Left arm, Patient Position: Sitting, Cuff Size: Adult Regular)   Pulse 74   Temp 98.3  F (36.8  C) (Oral)   Resp 18   Ht 1.6 m (5' 2.99\")   Wt 63.2 kg (139 lb 4.8 oz)   SpO2 98%   BMI 24.68 kg/m   Estimated body mass index is 24.68 kg/m  as calculated from the following:    Height as of this encounter: 1.6 m (5' 2.99\").    Weight as of this encounter: 63.2 kg (139 lb 4.8 oz). Body surface area is 1.68 meters squared.  No Pain (0) Comment: Data Unavailable   No LMP recorded. Patient is postmenopausal.  Allergies reviewed: Yes  Medications reviewed: Yes    Medications: MEDICATION REFILLS NEEDED TODAY. Provider was notified.  Pharmacy name entered into EPIC:    [id number] - [place], [place] - [address] [place] [address]  [facility] - [place], [place] - [address] [facility]    Frailty Screening:   Is the patient here for a new oncology consult visit in cancer care? 2. No    PHQ9:  Did this patient require a PHQ9?: No      Clinical concerns: Review labs/ infusion / MD f/u      Lamar Andrew MA                Again, thank you for allowing me to participate in " the care of your patient.        Sincerely,        Hadley Gann MD    Electronically signed

## 2025-04-21 NOTE — PROGRESS NOTES
"Infusion Nursing Note:  Kacie Hermosillo presents today for D1C8 enhertu, premeds, zometa.    Patient seen by provider today: Yes    Note: Pt states she is doing \"well \" with treatments.    Premeds Given: aloxi, dexamethasone IV, and emend    Intravenous Access:  Implanted Port.    Treatment Conditions:  Results reviewed, labs MET treatment parameters, ok to proceed with treatment.    Post Infusion Assessment:  Patient tolerated infusions without incident.     Discharge Plan:   Patient discharged in stable condition accompanied by: daughter.    Stella LAZO RN      "

## 2025-04-21 NOTE — PROGRESS NOTES
[facility]: Cancer Care Follow-Up Note                                    Discussion with Patient:                                                    Met with Kacie and her daughter, Sulma, when she came to clinic for follow up and to restart Enhertu. Sulma spoke with the nursing staff at [facility] and had them order the dex to be available for administration post treatment as directed. Shared that writer will send a letter to nursing staff with update from today and with future treatment date. Noted that Kacie will need a refill of dexamethasone prior to her next treatment and that will be sent to her pharmacy today in preparation of  her next treatment Kacie will need an Echo prior to her next chemo infusion and this has been scheduled.  Letter was drafted and faxed to [facility] Nursing line  [phone]. Fax confirmed by fax right on 4/21 at 9098.  Refill for Dexamethasone was sent to [facility]        Dates of Treatment:                                                      Infusion given in last 28 days       Administered MAR Action Medication Dose Rate Visit    04/21/2025 11:29 New Bag FAM-trastuzumab deruxtecan-nxki (ENHERTU) 242 mg in D5W 122.1 mL infusion 242 mg 244.2 mL/hr Infusion Therapy Visit on 04/21/2025 in [facility]            Assessment:                                                      Fam-trastuzumab restarted on 4/21/2025 after patient cleared by cardiology       Intervention/Education provided during outreach:                                                       See above    Confirmed patient has clinic and triage numbers    Signature:  Mihaela Koo RN

## 2025-04-24 ENCOUNTER — PATIENT OUTREACH (OUTPATIENT)
Dept: CARE COORDINATION | Facility: CLINIC | Age: 85
End: 2025-04-24
Payer: COMMERCIAL

## 2025-04-29 ENCOUNTER — LAB REQUISITION (OUTPATIENT)
Dept: LAB | Facility: CLINIC | Age: 85
End: 2025-04-29
Payer: COMMERCIAL

## 2025-04-29 DIAGNOSIS — Z95.2 PRESENCE OF PROSTHETIC HEART VALVE: ICD-10-CM

## 2025-04-30 ENCOUNTER — TELEPHONE (OUTPATIENT)
Dept: PHARMACY | Facility: OTHER | Age: 85
End: 2025-04-30
Payer: COMMERCIAL

## 2025-04-30 NOTE — TELEPHONE ENCOUNTER
[facility] Recruitment: [facility] insurance     Referral outreach attempt #1 on April 30, 2025      Outcome: left voicemail- Call back number [phone] and MyChart message sent    Lynda Metzger [facility]  -[facility]  [phone]

## 2025-05-01 LAB
INR PPP: 1.92 (ref 0.85–1.15)
PROTHROMBIN TIME: 21.7 SECONDS (ref 11.8–14.8)

## 2025-05-01 PROCEDURE — 36415 COLL VENOUS BLD VENIPUNCTURE: CPT | Mod: ORL | Performed by: PHYSICIAN ASSISTANT

## 2025-05-01 PROCEDURE — 85610 PROTHROMBIN TIME: CPT | Mod: ORL | Performed by: PHYSICIAN ASSISTANT

## 2025-05-01 PROCEDURE — P9604 ONE-WAY ALLOW PRORATED TRIP: HCPCS | Mod: ORL | Performed by: PHYSICIAN ASSISTANT

## 2025-05-06 ENCOUNTER — LAB REQUISITION (OUTPATIENT)
Dept: LAB | Facility: CLINIC | Age: 85
End: 2025-05-06
Payer: COMMERCIAL

## 2025-05-06 DIAGNOSIS — I42.9 CARDIOMYOPATHY, UNSPECIFIED (H): ICD-10-CM

## 2025-05-08 ENCOUNTER — HOSPITAL ENCOUNTER (OUTPATIENT)
Dept: CARDIOLOGY | Facility: HOSPITAL | Age: 85
Discharge: HOME | End: 2025-05-08
Attending: INTERNAL MEDICINE
Payer: COMMERCIAL

## 2025-05-08 ENCOUNTER — PATIENT OUTREACH (OUTPATIENT)
Dept: CARE COORDINATION | Facility: CLINIC | Age: 85
End: 2025-05-08

## 2025-05-08 DIAGNOSIS — I50.21 ACUTE SYSTOLIC HEART FAILURE (H): ICD-10-CM

## 2025-05-08 LAB
LVEF ECHO: NORMAL
POTASSIUM SERPL-SCNC: 4.5 MMOL/L (ref 3.4–5.3)

## 2025-05-08 PROCEDURE — 84132 ASSAY OF SERUM POTASSIUM: CPT | Mod: ORL | Performed by: PHYSICIAN ASSISTANT

## 2025-05-08 PROCEDURE — 255N000002 HC RX 255 OP 636: Performed by: INTERNAL MEDICINE

## 2025-05-08 PROCEDURE — P9604 ONE-WAY ALLOW PRORATED TRIP: HCPCS | Mod: ORL | Performed by: PHYSICIAN ASSISTANT

## 2025-05-08 PROCEDURE — 36415 COLL VENOUS BLD VENIPUNCTURE: CPT | Mod: ORL | Performed by: PHYSICIAN ASSISTANT

## 2025-05-08 RX ADMIN — PERFLUTREN 2 ML: 6.52 INJECTION, SUSPENSION INTRAVENOUS at 11:08

## 2025-05-12 ENCOUNTER — ONCOLOGY VISIT (OUTPATIENT)
Dept: ONCOLOGY | Facility: HOSPITAL | Age: 85
End: 2025-05-12
Attending: INTERNAL MEDICINE
Payer: COMMERCIAL

## 2025-05-12 ENCOUNTER — TELEPHONE (OUTPATIENT)
Dept: CARDIOLOGY | Facility: CLINIC | Age: 85
End: 2025-05-12

## 2025-05-12 ENCOUNTER — PATIENT OUTREACH (OUTPATIENT)
Dept: ONCOLOGY | Facility: HOSPITAL | Age: 85
End: 2025-05-12

## 2025-05-12 ENCOUNTER — RESULTS FOLLOW-UP (OUTPATIENT)
Dept: CARDIOLOGY | Facility: CLINIC | Age: 85
End: 2025-05-12

## 2025-05-12 ENCOUNTER — OFFICE VISIT (OUTPATIENT)
Dept: CARDIOLOGY | Facility: CLINIC | Age: 85
End: 2025-05-12
Payer: COMMERCIAL

## 2025-05-12 ENCOUNTER — INFUSION THERAPY VISIT (OUTPATIENT)
Dept: INFUSION THERAPY | Facility: HOSPITAL | Age: 85
End: 2025-05-12
Attending: INTERNAL MEDICINE
Payer: COMMERCIAL

## 2025-05-12 ENCOUNTER — TELEPHONE (OUTPATIENT)
Dept: ONCOLOGY | Facility: HOSPITAL | Age: 85
End: 2025-05-12

## 2025-05-12 VITALS
DIASTOLIC BLOOD PRESSURE: 78 MMHG | SYSTOLIC BLOOD PRESSURE: 118 MMHG | HEIGHT: 63 IN | BODY MASS INDEX: 24.57 KG/M2 | HEART RATE: 74 BPM | WEIGHT: 138.7 LBS | TEMPERATURE: 97.8 F | RESPIRATION RATE: 18 BRPM | OXYGEN SATURATION: 97 %

## 2025-05-12 VITALS
RESPIRATION RATE: 16 BRPM | HEIGHT: 63 IN | HEART RATE: 72 BPM | SYSTOLIC BLOOD PRESSURE: 98 MMHG | BODY MASS INDEX: 24.8 KG/M2 | DIASTOLIC BLOOD PRESSURE: 64 MMHG | WEIGHT: 140 LBS

## 2025-05-12 DIAGNOSIS — I63.9 CEREBELLAR STROKE (H): ICD-10-CM

## 2025-05-12 DIAGNOSIS — I50.21 ACUTE SYSTOLIC HEART FAILURE (H): Primary | ICD-10-CM

## 2025-05-12 DIAGNOSIS — C79.51 MALIGNANT NEOPLASM METASTATIC TO BONE (H): ICD-10-CM

## 2025-05-12 DIAGNOSIS — C79.51 MALIGNANT NEOPLASM METASTATIC TO BONE (H): Primary | ICD-10-CM

## 2025-05-12 DIAGNOSIS — Z95.2 S/P TAVR (TRANSCATHETER AORTIC VALVE REPLACEMENT): ICD-10-CM

## 2025-05-12 DIAGNOSIS — I50.32 CHRONIC HEART FAILURE WITH PRESERVED EJECTION FRACTION (H): ICD-10-CM

## 2025-05-12 DIAGNOSIS — I42.9 SECONDARY CARDIOMYOPATHY (H): ICD-10-CM

## 2025-05-12 LAB
ALBUMIN SERPL BCG-MCNC: 3.7 G/DL (ref 3.5–5.2)
ALP SERPL-CCNC: 65 U/L (ref 40–150)
ALT SERPL W P-5'-P-CCNC: 17 U/L (ref 0–50)
ANION GAP SERPL CALCULATED.3IONS-SCNC: 12 MMOL/L (ref 7–15)
AST SERPL W P-5'-P-CCNC: 25 U/L (ref 0–45)
BASOPHILS # BLD AUTO: 0 10E3/UL (ref 0–0.2)
BASOPHILS NFR BLD AUTO: 1 %
BILIRUB SERPL-MCNC: 0.3 MG/DL
BUN SERPL-MCNC: 24.7 MG/DL (ref 8–23)
CALCIUM SERPL-MCNC: 9 MG/DL (ref 8.8–10.4)
CHLORIDE SERPL-SCNC: 109 MMOL/L (ref 98–107)
CREAT SERPL-MCNC: 1.22 MG/DL (ref 0.51–0.95)
EGFRCR SERPLBLD CKD-EPI 2021: 43 ML/MIN/1.73M2
EOSINOPHIL # BLD AUTO: 0.2 10E3/UL (ref 0–0.7)
EOSINOPHIL NFR BLD AUTO: 2 %
ERYTHROCYTE [DISTWIDTH] IN BLOOD BY AUTOMATED COUNT: 15.4 % (ref 10–15)
GLUCOSE SERPL-MCNC: 93 MG/DL (ref 70–99)
HCO3 SERPL-SCNC: 19 MMOL/L (ref 22–29)
HCT VFR BLD AUTO: 36.5 % (ref 35–47)
HGB BLD-MCNC: 12.3 G/DL (ref 11.7–15.7)
IMM GRANULOCYTES # BLD: 0 10E3/UL
IMM GRANULOCYTES NFR BLD: 1 %
LDH SERPL L TO P-CCNC: 272 U/L (ref 0–250)
LYMPHOCYTES # BLD AUTO: 0.9 10E3/UL (ref 0.8–5.3)
LYMPHOCYTES NFR BLD AUTO: 13 %
MCH RBC QN AUTO: 29.4 PG (ref 26.5–33)
MCHC RBC AUTO-ENTMCNC: 33.7 G/DL (ref 31.5–36.5)
MCV RBC AUTO: 87 FL (ref 78–100)
MONOCYTES # BLD AUTO: 0.7 10E3/UL (ref 0–1.3)
MONOCYTES NFR BLD AUTO: 9 %
NEUTROPHILS # BLD AUTO: 5.4 10E3/UL (ref 1.6–8.3)
NEUTROPHILS NFR BLD AUTO: 75 %
NRBC # BLD AUTO: 0 10E3/UL
NRBC BLD AUTO-RTO: 0 /100
PLATELET # BLD AUTO: 215 10E3/UL (ref 150–450)
POTASSIUM SERPL-SCNC: 4.4 MMOL/L (ref 3.4–5.3)
PROT SERPL-MCNC: 6.1 G/DL (ref 6.4–8.3)
RBC # BLD AUTO: 4.18 10E6/UL (ref 3.8–5.2)
SODIUM SERPL-SCNC: 140 MMOL/L (ref 135–145)
WBC # BLD AUTO: 7.3 10E3/UL (ref 4–11)

## 2025-05-12 PROCEDURE — 80053 COMPREHEN METABOLIC PANEL: CPT | Performed by: INTERNAL MEDICINE

## 2025-05-12 PROCEDURE — 250N000011 HC RX IP 250 OP 636: Performed by: INTERNAL MEDICINE

## 2025-05-12 PROCEDURE — G2211 COMPLEX E/M VISIT ADD ON: HCPCS | Performed by: INTERNAL MEDICINE

## 2025-05-12 PROCEDURE — 99214 OFFICE O/P EST MOD 30 MIN: CPT | Performed by: INTERNAL MEDICINE

## 2025-05-12 PROCEDURE — 36591 DRAW BLOOD OFF VENOUS DEVICE: CPT | Performed by: INTERNAL MEDICINE

## 2025-05-12 PROCEDURE — G0463 HOSPITAL OUTPT CLINIC VISIT: HCPCS | Performed by: INTERNAL MEDICINE

## 2025-05-12 PROCEDURE — 99215 OFFICE O/P EST HI 40 MIN: CPT | Performed by: INTERNAL MEDICINE

## 2025-05-12 PROCEDURE — 85025 COMPLETE CBC W/AUTO DIFF WBC: CPT | Performed by: INTERNAL MEDICINE

## 2025-05-12 PROCEDURE — 3074F SYST BP LT 130 MM HG: CPT | Performed by: INTERNAL MEDICINE

## 2025-05-12 PROCEDURE — 83615 LACTATE (LD) (LDH) ENZYME: CPT

## 2025-05-12 PROCEDURE — 3078F DIAST BP <80 MM HG: CPT | Performed by: INTERNAL MEDICINE

## 2025-05-12 RX ORDER — HEPARIN SODIUM (PORCINE) LOCK FLUSH IV SOLN 100 UNIT/ML 100 UNIT/ML
5 SOLUTION INTRAVENOUS
Status: ACTIVE | OUTPATIENT
Start: 2025-05-12

## 2025-05-12 RX ORDER — HEPARIN SODIUM (PORCINE) LOCK FLUSH IV SOLN 100 UNIT/ML 100 UNIT/ML
SOLUTION INTRAVENOUS
Status: DISPENSED
Start: 2025-05-12 | End: 2025-05-12

## 2025-05-12 RX ORDER — HEPARIN SODIUM (PORCINE) LOCK FLUSH IV SOLN 100 UNIT/ML 100 UNIT/ML
5 SOLUTION INTRAVENOUS
OUTPATIENT
Start: 2025-05-12

## 2025-05-12 RX ORDER — WARFARIN SODIUM 2.5 MG/1
2.5 TABLET ORAL
COMMUNITY
Start: 2025-03-19

## 2025-05-12 RX ADMIN — HEPARIN 5 ML: 100 SYRINGE at 10:44

## 2025-05-12 ASSESSMENT — PAIN SCALES - GENERAL: PAINLEVEL_OUTOF10: NO PAIN (0)

## 2025-05-12 NOTE — PROGRESS NOTES
[facility] Hematology and Oncology Progress Note    Patient: Kacie Hermosillo  MRN: [id number]  Date of Service: May 12, 2025             ECOG Performance    2 - Ambulatory and independent in all ADLs; cannot work; up > 50% of the time          ______________________________________________________________________________  Oncologic history  Metastatic breast cancer with bone mets only ER negative GA negative HER2/amarilis 2+ and negative by FISH.  HER2/amarilis low    Remote diagnosis of breast cancer in 1995 treated with surgery chemotherapy radiation and 5 years of hormonal therapy.  Pathology report not available.    Treatment  Patient started on fam-trastuzumab on 8/19/2024  Complete response by PET criteria in  Nov 2024  Dose of fam-trastuzumab decreased by 25% in November 2024 due to severe diarrhea  Fam-trastuzumab held in February 2025 due to development of cardiomyopathy  Fam-trastuzumab restarted on 4/21/2025 after patient cleared by cardiology  Ejection fraction 35 to 40% on 5/12.  Fam-trastuzumab held    History of Present Illness    Ms. Kacie Hermosillo is here in follow-up.  She is here for her next dose of fam-trastuzumab.  She was cleared by cardiology 3 weeks ago.  She did had an echo today.  She has otherwise been feeling well she has no other concerns.    Review of systems  A comprehensive 12 point review of system was done that was negative except what is mentioned in the history of present illness    Past History    Past Medical History:   Diagnosis Date    Breast cancer (H) 01/01/1999    Heart valve replaced     Hx antineoplastic chemotherapy 01/01/1999    Hx of radiation therapy 01/01/1999    Hypertension        Past Surgical History:   Procedure Laterality Date    BIOPSY BREAST      HYSTERECTOMY      IR CHEST PORT PLACEMENT > 5 YRS OF AGE  7/30/2024    LUMPECTOMY BREAST Left 1999    OOPHORECTOMY Bilateral        Physical Exam    /78 (BP Location: Left arm, Patient Position: Sitting,  "Cuff Size: Adult Regular)   Pulse 74   Temp 97.8  F (36.6  C) (Tympanic)   Resp 18   Ht 1.6 m (5' 2.99\")   Wt 62.9 kg (138 lb 11.2 oz)   SpO2 97%   BMI 24.58 kg/m      General: alert, awake, not in acute distress  HEENT: Head: Normal, normocephalic, atraumatic.  Eye: Normal external eye, conjunctiva, lids cornea, MARI.  Nose: Normal external nose, mucus membranes and septum.  Pharynx: Normal buccal mucosa. Normal pharynx.  Neck / Thyroid: Supple, no masses, nodes, nodules or enlargement.  Lymphatics: No abnormally enlarged lymph nodes.  Chest: Normal chest wall and respirations. Clear to auscultation.  No crackles or rhonchi's  Heart: S1 S2 RRR, no murmur.   Abdomen: abdomen is soft without significant tenderness, masses, organomegaly or guarding  Extremities: normal strength, tone, and muscle mass  Skin: normal. no rash or abnormalities  CNS: non focal.    Lab Results    Recent Results (from the past 240 hours)   Potassium    Collection Time: 05/08/25  7:56 AM   Result Value Ref Range    Potassium 4.5 3.4 - 5.3 mmol/L   Echocardiogram Complete    Collection Time: 05/08/25 11:25 AM   Result Value Ref Range    LVEF  35-40% (moderately reduced)    Comprehensive metabolic panel    Collection Time: 05/12/25  9:34 AM   Result Value Ref Range    Sodium 140 135 - 145 mmol/L    Potassium 4.4 3.4 - 5.3 mmol/L    Carbon Dioxide (CO2) 19 (L) 22 - 29 mmol/L    Anion Gap 12 7 - 15 mmol/L    Urea Nitrogen 24.7 (H) 8.0 - 23.0 mg/dL    Creatinine 1.22 (H) 0.51 - 0.95 mg/dL    GFR Estimate 43 (L) >60 mL/min/1.73m2    Calcium 9.0 8.8 - 10.4 mg/dL    Chloride 109 (H) 98 - 107 mmol/L    Glucose 93 70 - 99 mg/dL    Alkaline Phosphatase 65 40 - 150 U/L    AST 25 0 - 45 U/L    ALT 17 0 - 50 U/L    Protein Total 6.1 (L) 6.4 - 8.3 g/dL    Albumin 3.7 3.5 - 5.2 g/dL    Bilirubin Total 0.3 <=1.2 mg/dL   Lactate Dehydrogenase    Collection Time: 05/12/25  9:34 AM   Result Value Ref Range    Lactate Dehydrogenase 272 (H) 0 - 250 U/L "   CBC with platelets and differential    Collection Time: 25  9:34 AM   Result Value Ref Range    WBC Count 7.3 4.0 - 11.0 10e3/uL    RBC Count 4.18 3.80 - 5.20 10e6/uL    Hemoglobin 12.3 11.7 - 15.7 g/dL    Hematocrit 36.5 35.0 - 47.0 %    MCV 87 78 - 100 fL    MCH 29.4 26.5 - 33.0 pg    MCHC 33.7 31.5 - 36.5 g/dL    RDW 15.4 (H) 10.0 - 15.0 %    Platelet Count 215 150 - 450 10e3/uL    % Neutrophils 75 %    % Lymphocytes 13 %    % Monocytes 9 %    % Eosinophils 2 %    % Basophils 1 %    % Immature Granulocytes 1 %    NRBCs per 100 WBC 0 <1 /100    Absolute Neutrophils 5.4 1.6 - 8.3 10e3/uL    Absolute Lymphocytes 0.9 0.8 - 5.3 10e3/uL    Absolute Monocytes 0.7 0.0 - 1.3 10e3/uL    Absolute Eosinophils 0.2 0.0 - 0.7 10e3/uL    Absolute Basophils 0.0 0.0 - 0.2 10e3/uL    Absolute Immature Granulocytes 0.0 <=0.4 10e3/uL    Absolute NRBCs 0.0 10e3/uL         Imaging    Echocardiogram Complete  Result Date: 2025  [id number]^ARY^[place] [address]  Name: URIAH OLIVEROS MRN: [id number] : 1940 Study Date: 2025 10:49 AM Age: 85 yrs Gender: Female Patient Location: [facility] Reason For Study: Acute systolic heart failure (H) Ordering Physician: MATY MCALLISTER Referring Physician: MATY MCALLISTER Performed By: GALINDO  BSA: 1.6 m2 Height: 62 in Weight: 139 lb HR: 69 BP: 125/70 mmHg ______________________________________________________________________________ Procedure Echocardiogram with two-dimensional, color and spectral Doppler. Definity (NDC #78470-142) given intravenously. Technically difficult study. Compared to the prior study dated 10/28/24, there are changes as noted. ______________________________________________________________________________ Interpretation Summary  1. Left ventricular function is decreased. The ejection fraction is 35-40% (moderately reduced). - There is moderate global hypokinesis 2.  Normal right ventricle size and systolic function. 3. The left atrium is mildly dilated. 4. There is a documented 23 mm Wray RYAN 3 bioprosthetic valve present in aortic valve position. The peak and mean forward gradients are 16 and 10 mmHg, respectively. No regurgitation identified. 5. Compared to the prior study dated 2/20/25, the LVEF has modestly improved.  ______________________________________________________________________________ Left Ventricle The left ventricle is normal in size. Left ventricular function is decreased. The ejection fraction is 35-40% (moderately reduced). There is normal left ventricular wall thickness. Left ventricular diastolic function is abnormal. There is global hypokinesis that is worse in the posterior, inferior, and inferoseptal walls.  Right Ventricle Normal right ventricle size and systolic function.  Atria The left atrium is mildly dilated. Right atrial size is normal. There is no color Doppler evidence of an atrial shunt.  Mitral Valve Mitral valve leaflets appear normal. There is mild (1+) mitral regurgitation.  Tricuspid Valve Tricuspid valve leaflets appear normal. There is mild to moderate (1-2+) tricuspid regurgitation. Right ventricle systolic pressure estimate normal. The right ventricular systolic pressure is approximated at 23.1 mmHg plus the right atrial pressure.  Aortic Valve There is a RYAN 3 bioprosthetic valve present in aortic valve position. There is no paravalvular regurgitation present. The prosthetic valve gradients are normal .  Pulmonic Valve The pulmonic valve is not well seen, but is grossly normal. This degree of valvular regurgitation is within normal limits. There is trace pulmonic valvular regurgitation.  Vessels Normal size ascending aorta. IVC diameter <2.1 cm collapsing >50% with sniff suggests a normal RA pressure of 3 mmHg.  Pericardium There is no pericardial effusion.   ______________________________________________________________________________ MMode/2D Measurements & Calculations IVSd: 0.84 cm LVIDd: 4.3 cm LVIDs: 3.4 cm LVPWd: 0.76 cm FS: 20.8 % LV mass(C)d: 106.9 grams LV mass(C)dI: 65.3 grams/m2  Ao root diam: 3.1 cm LA dimension: 3.8 cm asc Aorta Diam: 3.7 cm LA/Ao: 1.2 LVOT diam: 1.8 cm LVOT area: 2.5 cm2 Ao root diam index Ht(cm/m): 2.0 Ao root diam index BSA (cm/m2): 1.9 Asc Ao diam index BSA (cm/m2): 2.3 Asc Ao diam index Ht(cm/m): 2.4 LA Volume (BP): 46.9 ml LA Volume Indexed (AL/bp): 30.6 ml/m2 RV Base: 3.7 cm  RWT: 0.35 TAPSE: 1.4 cm  Time Measurements MM HR: 65.0 BPM  Doppler Measurements & Calculations MV E max frantz: 80.4 cm/sec MV A max frantz: 126.9 cm/sec MV E/A: 0.63 MV max P.4 mmHg MV mean P.5 mmHg MV V2 VTI: 36.4 cm MVA(VTI): 1.4 cm2 MV dec slope: 346.0 cm/sec2 MV dec time: 0.23 sec Ao V2 max: 202.0 cm/sec Ao max P.0 mmHg Ao V2 mean: 150.3 cm/sec Ao mean PG: 10.2 mmHg Ao V2 VTI: 46.1 cm CARYL(I,D): 1.1 cm2 CARYL(V,D): 1.2 cm2 Ao acc time: 0.05 sec LV V1 max PG: 3.4 mmHg LV V1 max: 92.4 cm/sec LV V1 VTI: 19.6 cm SV(LVOT): 49.8 ml SI(LVOT): 30.4 ml/m2 PA acc time: 0.11 sec PI end-d frantz: 80.1 cm/sec  TR max frantz: 240.3 cm/sec TR max P.1 mmHg AV Frantz Ratio (DI): 0.46 CARYL Index (cm2/m2): 0.66 E/E' av.7 Lateral E/e': 12.3 Medial E/e': 17.1 RV S Frantz: 8.5 cm/sec  ______________________________________________________________________________ Report approved by: Abelardo Kowalski MD on 2025 04:07 PM             Assessment and Plan    Metastatic breast cancer with bone mets HER2/amarilis low  Patient is here in follow-up.  She was cleared by cardiology 3 weeks ago to receive her fam-trastuzumab.  She did receive it and tolerated it well.  She is here for her next dose.  We had done an echo prior to this visit and unfortunately her ejection fraction is 35 to 40% with global hypokinesia.  I do not feel comfortable proceeding with the fam-trastuzumab at the  current ejection fraction as per guidelines.  I will refer her back to cardiology I explained to her daughter that we will need either improvement in the ejection fraction or maybe a repeat MRI of her heart to get more information.  I will defer that to my cardiology colleagues.  Patient and her daughter had a number of questions that were answered    Bone metastases  Patient Zometa last dose given to her on 4/21/2025 next dose will be in 3 months    Cardiac monitoring  Patient had an echo which showed an EF of 35 to 40% and global hypokinesia.  See discussion above    Anticoagulation  Patient has been on anticoagulation with warfarin.  Continue as planned this is being managed by his other physicians.    Signed by: Hadley Gann MD        CC: Irina Francisco PA-C

## 2025-05-12 NOTE — PROGRESS NOTES
"Oncology Rooming Note    May 12, 2025 9:55 AM   Kacie Hermosillo is a 85 year old female who presents for:    Chief Complaint   Patient presents with    Oncology Clinic Visit     Malignant neoplasm metastatic to bone     Initial Vitals: /78 (BP Location: Left arm, Patient Position: Sitting, Cuff Size: Adult Regular)   Pulse 74   Temp 97.8  F (36.6  C) (Tympanic)   Resp 18   Ht 1.6 m (5' 2.99\")   Wt 62.9 kg (138 lb 11.2 oz)   SpO2 97%   BMI 24.58 kg/m   Estimated body mass index is 24.58 kg/m  as calculated from the following:    Height as of this encounter: 1.6 m (5' 2.99\").    Weight as of this encounter: 62.9 kg (138 lb 11.2 oz). Body surface area is 1.67 meters squared.  No Pain (0) Comment: Data Unavailable   No LMP recorded. Patient is postmenopausal.  Allergies reviewed: Yes  Medications reviewed: Yes    Medications: Medication refills not needed today.  Pharmacy name entered into EPIC:    [id number] - [place], [place] - [address] [place] [address]  [facility] - [place], [place] - [id number] [place] [address]    Frailty Screening:   Is the patient here for a new oncology consult visit in cancer care? 2. No    PHQ9:  Did this patient require a PHQ9?: No      Clinical concerns:   Follow up labs.   Pt reported - one week of diarrhea post last chemo tx. Dizziness off/on x4-5 days, injured left knee. Denies - loss of consciousness.   [Provider at residence reported - high potassium, pt noticed lower blood pressure readings.       Lucila Braga MA            "

## 2025-05-12 NOTE — PROGRESS NOTES
HEART CARE NOTE          Assessment/Recommendations   1. Severe HFrEF  Assessment / Plan  New decline in the setting of chemo/rad (tx held by oncology for now); Near euvolemia; denies HF symptoms of orthopnea, PND, fluid retention or edema - no changes to regimen at this time  Patient is high risk for adverse cardiac events 2/2 advanced age, frailty, and decline in systolic function  GDMT as detailed below; oncology with concerns for possible further decline in LVSF - will get STAT repeat MRI in order to compare similar studies (repeat echo with EF lower than cardiac MRI is improved compared to prior TTE) and discuss findings with cardiac-oncology team     Current Pharmacotherapy AHA Guideline-Directed Medical Therapy   Lisinopril 2.5 mg daily Lisinopril 20 mg twice daily   Metoprolol succinate 25 mg daily Carvedilol 25 mg twice daily   Spironolactone 12.5 mg Spironolactone 25 mg once daily   Hydralazine NA Hydralazine 100 mg three times daily   Isosorbide dinitrate NA Isosorbide dinitrate 40 mg three times daily   SGLT2 inhibitor: Dapagliflozin 10 mg daily Dapagliflozin or Empagliflozin 10 mg daily      2. CKD  Assessment / Plan  Renal function stable/wnl     3. HTN  Assessment / Plan  Adequate control - no changes to regimen at this time      4. Valvular heart disease  Assessment / Plan  S/p AVR; warfarin per pharmacy management     5. HLP  Assessment / Plan  Currently on atorvastatin    6. Syncopal events   Assessment / Plan  Patient denies associated lightheadedness or dizziness, but does associate episodes with sitting out in the warm weather - will get cardiac monitor to r/o arrhythmia; patient encouraged to increase fluid intake to at least 50 oz per day; will continue to monitor for need to reduce/stop cardiac meds    45 minutes spent reviewing prior records (including documentation, laboratory studies, cardiac testing/imaging), history and physical exam, planning, and subsequent documentation.    The  longitudinal plan of care for HFrEF was addressed during this visit. Due to the added complexity in care, I will continue to support Ms. Kacie Hermosillo in the subsequent management of this condition(s) and with the ongoing continuity of care of this condition(s).      History of Present Illness/Subjective    Ms. Kacie Hermosillo is a 84 year old female with a PMHx significant for (per Epic notation) medical history documented above presented to the ER for evaluation of increased weakness and fatigue as well as wheezing and worsening shortness of breath ongoing for the past few days in the setting of recent hospitalization for electrolyte abnormalities      Today,  Mrs. Hermosillo denies acute HF complaints, but does endorse recent episodes of syncope since spending time in the warmer weather; Management plan as detailed above     ECG: Personally reviewed. normal sinus rhythm, nonspecific ST and T waves changes.     ECHO (personally reviewed 3/6/25):   1. Left ventricular function is decreased. The ejection fraction is 30-35%  (moderately reduced).  - There is global hypokinesis that is worse in the posterior, inferior, and  inferoseptal walls  2. Normal right ventricle size and systolic function.  3. The left atrium is mildly dilated.  4. There is a documented 23 mm Wray RYAN 3 bioprosthetic valve present in  aortic valve position. The peak and mean forward gradients are 24 and 14 mmHg,  respectively. No regurgitation identified.  5. Compared to the prior study dated 10/28/24, there are changes as noted.  Notably, there has been a decline in LV systolic function.    Lab results: personally reviewed May 12, 2025; notable for resolving ALTAGRACIA    Medical history and pertinent documents reviewed in Care Everywhere please where applicable see details above        Physical Examination Review of Systems   BP 98/64 (BP Location: Left arm, Patient Position: Sitting, Cuff Size: Adult Regular)   Pulse 72   Resp 16   Ht  "1.6 m (5' 3\")   Wt 63.5 kg (140 lb)   BMI 24.80 kg/m    Body mass index is 24.8 kg/m .  Wt Readings from Last 3 Encounters:   05/12/25 63.5 kg (140 lb)   05/12/25 62.9 kg (138 lb 11.2 oz)   04/21/25 63.2 kg (139 lb 4.8 oz)     General Appearance:   no distress, normal body habitus   ENT/Mouth: membranes moist, no oral lesions or bleeding gums.      EYES:  no scleral icterus, normal conjunctivae   Neck: no carotid bruits or thyromegaly   Chest/Lungs:   lungs are clear to auscultation, no rales or wheezing,  equal chest wall expansion    Cardiovascular:   Regular. Normal first and second heart sounds with no murmurs, rubs, or gallops; the carotid, radial and posterior tibial pulses are intact, no JVD or LEedema bilaterally    Abdomen:  no organomegaly, masses, bruits, or tenderness; bowel sounds are present   Extremities: no cyanosis or clubbing   Skin: no xanthelasma, warm.    Neurologic: NAD     Psychiatric: alert and oriented x3, calm     A complete 10 systems ROS was reviewed  And is negative except what is listed in the HPI.          Medical History  Surgical History Family History Social History   Past Medical History:   Diagnosis Date    Breast cancer (H) 01/01/1999    Heart valve replaced     Hx antineoplastic chemotherapy 01/01/1999    Hx of radiation therapy 01/01/1999    Hypertension     Past Surgical History:   Procedure Laterality Date    BIOPSY BREAST      HYSTERECTOMY      IR CHEST PORT PLACEMENT > 5 YRS OF AGE  7/30/2024    LUMPECTOMY BREAST Left 1999    OOPHORECTOMY Bilateral     no family history of premature coronary artery disease Social History     Socioeconomic History    Marital status:      Spouse name: Not on file    Number of children: Not on file    Years of education: Not on file    Highest education level: Not on file   Occupational History    Not on file   Tobacco Use    Smoking status: Never    Smokeless tobacco: Never   Vaping Use    Vaping status: Never Used   Substance and " Sexual Activity    Alcohol use: Yes     Comment: Rarely    Drug use: Not on file    Sexual activity: Not on file   Other Topics Concern    Not on file   Social History Narrative    Not on file     Social Drivers of Health     Financial Resource Strain: Low Risk  (10/29/2024)    Financial Resource Strain     Within the past 12 months, have you or your family members you live with been unable to get utilities (heat, electricity) when it was really needed?: No   Food Insecurity: Low Risk  (10/29/2024)    Food Insecurity     Within the past 12 months, did you worry that your food would run out before you got money to buy more?: No     Within the past 12 months, did the food you bought just not last and you didn t have money to get more?: No   Transportation Needs: Low Risk  (10/29/2024)    Transportation Needs     Within the past 12 months, has lack of transportation kept you from medical appointments, getting your medicines, non-medical meetings or appointments, work, or from getting things that you need?: No   Physical Activity: Unknown (5/24/2024)    Exercise Vital Sign     Days of Exercise per Week: 0 days     Minutes of Exercise per Session: Not on file   Stress: No Stress Concern Present (5/24/2024)    [employer] - Occupational Stress Questionnaire     Feeling of Stress : Only a little   Social Connections: Unknown (5/24/2024)    Social Connection and Isolation Panel [NHANES]     Frequency of Communication with Friends and Family: Not on file     Frequency of Social Gatherings with Friends and Family: Twice a week     Attends Confucianist Services: Not on file     Active Member of Clubs or Organizations: Not on file     Attends Club or Organization Meetings: Not on file     Marital Status: Not on file   Interpersonal Safety: Low Risk  (10/29/2024)    Interpersonal Safety     Do you feel physically and emotionally safe where you currently live?: Yes     Within the past 12 months, have you  "been hit, slapped, kicked or otherwise physically hurt by someone?: No     Within the past 12 months, have you been humiliated or emotionally abused in other ways by your partner or ex-partner?: No   Recent Concern: Interpersonal Safety - High Risk (10/7/2024)    Interpersonal Safety     Do you feel physically and emotionally safe where you currently live?: No     Within the past 12 months, have you been hit, slapped, kicked or otherwise physically hurt by someone?: No     Within the past 12 months, have you been humiliated or emotionally abused in other ways by your partner or ex-partner?: No   Housing Stability: Low Risk  (10/29/2024)    Housing Stability     Do you have housing? : Yes     Are you worried about losing your housing?: No           Lab Results    Chemistry/lipid CBC Cardiac Enzymes/BNP/TSH/INR   Lab Results   Component Value Date    CHOL 126 05/24/2024    HDL 62 05/24/2024    TRIG 64 05/24/2024    BUN 24.7 (H) 05/12/2025     05/12/2025    CO2 19 (L) 05/12/2025    Lab Results   Component Value Date    WBC 7.3 05/12/2025    HGB 12.3 05/12/2025    HCT 36.5 05/12/2025    MCV 87 05/12/2025     05/12/2025    Lab Results   Component Value Date    TSH 3.54 11/14/2024    INR 1.92 (H) 05/01/2025     No results found for: \"CKTOTAL\", \"CKMB\", \"TROPONINI\"       Weight:    Wt Readings from Last 3 Encounters:   05/12/25 63.5 kg (140 lb)   05/12/25 62.9 kg (138 lb 11.2 oz)   04/21/25 63.2 kg (139 lb 4.8 oz)       Allergies  No Known Allergies      Surgical History  Past Surgical History:   Procedure Laterality Date    BIOPSY BREAST      HYSTERECTOMY      IR CHEST PORT PLACEMENT > 5 YRS OF AGE  7/30/2024    LUMPECTOMY BREAST Left 1999    OOPHORECTOMY Bilateral        Social History  Tobacco:   History   Smoking Status    Never   Smokeless Tobacco    Never    Alcohol:   Social History    Substance and Sexual Activity      Alcohol use: Yes        Comment: Rarely   Illicit Drugs:   History   Drug Use Not on " file       Family History  Family History   Problem Relation Age of Onset    Breast Cancer Maternal Aunt     Hereditary Breast and Ovarian Cancer Syndrome No family hx of           Jose Nagy MD on 5/12/2025      cc: Carlyle Zapata

## 2025-05-12 NOTE — TELEPHONE ENCOUNTER
Return call placed to Ariella, per Dr. Gann only each cycle of dexamethasone can be given at a time. Ariella says that the facility would need a new prescription then with each cycle but she wanted to check with another nurse. Call resumed with a new staff member Gunjan. She reports that they do use [facility] and that they need a new prescription with each cycle. The facility cannot use a prescription with refills as it does not work with their medication administration policy and having unlicensed personnel passing medications since it is not given on the same day.    Tonya Peacock RN

## 2025-05-12 NOTE — TELEPHONE ENCOUNTER
Called Sulma (Kacie's daughter). Recent echo shows EF 35-40 percent. Plan was if echo showed LVEF <48 percent, repeat cMRI in about 1 month.     Will plan for MRI on June 20th. Follow-up with Joanna pending MRI.     Message sent to pt on [employer] for prep and instructions.

## 2025-05-12 NOTE — LETTER
5/12/2025    Carlyle Zapata PA-C  [facility] [address]  [facility] [address]    RE: Kacie Hermosillo       Dear Colleague,     I had the pleasure of seeing Kacie Hermosillo in the [facility].    HEART CARE NOTE          Assessment/Recommendations   1. Severe HFrEF  Assessment / Plan  New decline in the setting of chemo/rad (tx held by oncology for now); Near euvolemia; denies HF symptoms of orthopnea, PND, fluid retention or edema - no changes to regimen at this time  Patient is high risk for adverse cardiac events 2/2 advanced age, frailty, and decline in systolic function  GDMT as detailed below; oncology with concerns for possible further decline in LVSF - will get STAT repeat MRI in order to compare similar studies (repeat echo with EF lower than cardiac MRI is improved compared to prior TTE) and discuss findings with cardiac-oncology team     Current Pharmacotherapy AHA Guideline-Directed Medical Therapy   Lisinopril 2.5 mg daily Lisinopril 20 mg twice daily   Metoprolol succinate 25 mg daily Carvedilol 25 mg twice daily   Spironolactone 12.5 mg Spironolactone 25 mg once daily   Hydralazine NA Hydralazine 100 mg three times daily   Isosorbide dinitrate NA Isosorbide dinitrate 40 mg three times daily   SGLT2 inhibitor: Dapagliflozin 10 mg daily Dapagliflozin or Empagliflozin 10 mg daily      2. CKD  Assessment / Plan  Renal function stable/wnl     3. HTN  Assessment / Plan  Adequate control - no changes to regimen at this time      4. Valvular heart disease  Assessment / Plan  S/p AVR; warfarin per pharmacy management     5. HLP  Assessment / Plan  Currently on atorvastatin    6. Syncopal events   Assessment / Plan  Patient denies associated lightheadedness or dizziness, but does associate episodes with sitting out in the warm weather - will get cardiac monitor to r/o arrhythmia; patient encouraged to increase fluid intake to at least 50 oz per day; will continue to  monitor for need to reduce/stop cardiac meds    45 minutes spent reviewing prior records (including documentation, laboratory studies, cardiac testing/imaging), history and physical exam, planning, and subsequent documentation.    The longitudinal plan of care for HFrEF was addressed during this visit. Due to the added complexity in care, I will continue to support Ms. Kacie Hermosillo in the subsequent management of this condition(s) and with the ongoing continuity of care of this condition(s).      History of Present Illness/Subjective    Ms. Kacie Hermosillo is a 84 year old female with a PMHx significant for (per Epic notation) medical history documented above presented to the ER for evaluation of increased weakness and fatigue as well as wheezing and worsening shortness of breath ongoing for the past few days in the setting of recent hospitalization for electrolyte abnormalities      Today,  Mrs. Hermosillo denies acute HF complaints, but does endorse recent episodes of syncope since spending time in the warmer weather; Management plan as detailed above     ECG: Personally reviewed. normal sinus rhythm, nonspecific ST and T waves changes.     ECHO (personally reviewed 3/6/25):   1. Left ventricular function is decreased. The ejection fraction is 30-35%  (moderately reduced).  - There is global hypokinesis that is worse in the posterior, inferior, and  inferoseptal walls  2. Normal right ventricle size and systolic function.  3. The left atrium is mildly dilated.  4. There is a documented 23 mm Wray RYAN 3 bioprosthetic valve present in  aortic valve position. The peak and mean forward gradients are 24 and 14 mmHg,  respectively. No regurgitation identified.  5. Compared to the prior study dated 10/28/24, there are changes as noted.  Notably, there has been a decline in LV systolic function.    Lab results: personally reviewed May 12, 2025; notable for resolving ALTAGRACIA    Medical history and pertinent documents  "reviewed in Care Everywhere please where applicable see details above        Physical Examination Review of Systems   BP 98/64 (BP Location: Left arm, Patient Position: Sitting, Cuff Size: Adult Regular)   Pulse 72   Resp 16   Ht 1.6 m (5' 3\")   Wt 63.5 kg (140 lb)   BMI 24.80 kg/m    Body mass index is 24.8 kg/m .  Wt Readings from Last 3 Encounters:   05/12/25 63.5 kg (140 lb)   05/12/25 62.9 kg (138 lb 11.2 oz)   04/21/25 63.2 kg (139 lb 4.8 oz)     General Appearance:   no distress, normal body habitus   ENT/Mouth: membranes moist, no oral lesions or bleeding gums.      EYES:  no scleral icterus, normal conjunctivae   Neck: no carotid bruits or thyromegaly   Chest/Lungs:   lungs are clear to auscultation, no rales or wheezing,  equal chest wall expansion    Cardiovascular:   Regular. Normal first and second heart sounds with no murmurs, rubs, or gallops; the carotid, radial and posterior tibial pulses are intact, no JVD or LEedema bilaterally    Abdomen:  no organomegaly, masses, bruits, or tenderness; bowel sounds are present   Extremities: no cyanosis or clubbing   Skin: no xanthelasma, warm.    Neurologic: NAD     Psychiatric: alert and oriented x3, calm     A complete 10 systems ROS was reviewed  And is negative except what is listed in the HPI.          Medical History  Surgical History Family History Social History   Past Medical History:   Diagnosis Date     Breast cancer (H) 01/01/1999     Heart valve replaced      Hx antineoplastic chemotherapy 01/01/1999     Hx of radiation therapy 01/01/1999     Hypertension     Past Surgical History:   Procedure Laterality Date     BIOPSY BREAST       HYSTERECTOMY       IR CHEST PORT PLACEMENT > 5 YRS OF AGE  7/30/2024     LUMPECTOMY BREAST Left 1999     OOPHORECTOMY Bilateral     no family history of premature coronary artery disease Social History     Socioeconomic History     Marital status:      Spouse name: Not on file     Number of children: Not on " file     Years of education: Not on file     Highest education level: Not on file   Occupational History     Not on file   Tobacco Use     Smoking status: Never     Smokeless tobacco: Never   Vaping Use     Vaping status: Never Used   Substance and Sexual Activity     Alcohol use: Yes     Comment: Rarely     Drug use: Not on file     Sexual activity: Not on file   Other Topics Concern     Not on file   Social History Narrative     Not on file     Social Drivers of Health     Financial Resource Strain: Low Risk  (10/29/2024)    Financial Resource Strain      Within the past 12 months, have you or your family members you live with been unable to get utilities (heat, electricity) when it was really needed?: No   Food Insecurity: Low Risk  (10/29/2024)    Food Insecurity      Within the past 12 months, did you worry that your food would run out before you got money to buy more?: No      Within the past 12 months, did the food you bought just not last and you didn t have money to get more?: No   Transportation Needs: Low Risk  (10/29/2024)    Transportation Needs      Within the past 12 months, has lack of transportation kept you from medical appointments, getting your medicines, non-medical meetings or appointments, work, or from getting things that you need?: No   Physical Activity: Unknown (5/24/2024)    Exercise Vital Sign      Days of Exercise per Week: 0 days      Minutes of Exercise per Session: Not on file   Stress: No Stress Concern Present (5/24/2024)    [employer] - Occupational Stress Questionnaire      Feeling of Stress : Only a little   Social Connections: Unknown (5/24/2024)    Social Connection and Isolation Panel [NHANES]      Frequency of Communication with Friends and Family: Not on file      Frequency of Social Gatherings with Friends and Family: Twice a week      Attends Church Services: Not on file      Active Member of Clubs or Organizations: Not on file       "Attends Club or Organization Meetings: Not on file      Marital Status: Not on file   Interpersonal Safety: Low Risk  (10/29/2024)    Interpersonal Safety      Do you feel physically and emotionally safe where you currently live?: Yes      Within the past 12 months, have you been hit, slapped, kicked or otherwise physically hurt by someone?: No      Within the past 12 months, have you been humiliated or emotionally abused in other ways by your partner or ex-partner?: No   Recent Concern: Interpersonal Safety - High Risk (10/7/2024)    Interpersonal Safety      Do you feel physically and emotionally safe where you currently live?: No      Within the past 12 months, have you been hit, slapped, kicked or otherwise physically hurt by someone?: No      Within the past 12 months, have you been humiliated or emotionally abused in other ways by your partner or ex-partner?: No   Housing Stability: Low Risk  (10/29/2024)    Housing Stability      Do you have housing? : Yes      Are you worried about losing your housing?: No           Lab Results    Chemistry/lipid CBC Cardiac Enzymes/BNP/TSH/INR   Lab Results   Component Value Date    CHOL 126 05/24/2024    HDL 62 05/24/2024    TRIG 64 05/24/2024    BUN 24.7 (H) 05/12/2025     05/12/2025    CO2 19 (L) 05/12/2025    Lab Results   Component Value Date    WBC 7.3 05/12/2025    HGB 12.3 05/12/2025    HCT 36.5 05/12/2025    MCV 87 05/12/2025     05/12/2025    Lab Results   Component Value Date    TSH 3.54 11/14/2024    INR 1.92 (H) 05/01/2025     No results found for: \"CKTOTAL\", \"CKMB\", \"TROPONINI\"       Weight:    Wt Readings from Last 3 Encounters:   05/12/25 63.5 kg (140 lb)   05/12/25 62.9 kg (138 lb 11.2 oz)   04/21/25 63.2 kg (139 lb 4.8 oz)       Allergies  No Known Allergies      Surgical History  Past Surgical History:   Procedure Laterality Date     BIOPSY BREAST       HYSTERECTOMY       IR CHEST PORT PLACEMENT > 5 YRS OF AGE  7/30/2024     LUMPECTOMY BREAST " Left 1999     OOPHORECTOMY Bilateral        Social History  Tobacco:   History   Smoking Status     Never   Smokeless Tobacco     Never    Alcohol:   Social History    Substance and Sexual Activity      Alcohol use: Yes        Comment: Rarely   Illicit Drugs:   History   Drug Use Not on file       Family History  Family History   Problem Relation Age of Onset     Breast Cancer Maternal Aunt      Hereditary Breast and Ovarian Cancer Syndrome No family hx of           Jose Nagy MD on 5/12/2025      cc: Carlyle Zapata      Thank you for allowing me to participate in the care of your patient.      Sincerely,     Jose Nagy MD     [facility]  cc:   Jose Nagy MD  [address] [address]

## 2025-05-12 NOTE — LETTER
"5/12/2025      Kacie Hermosillo  [address]  [address]  [facility] [address]      Dear Colleague,    Thank you for referring your patient, Kacie Hermosillo, to the [facility]. Please see a copy of my visit note below.    Oncology Rooming Note    May 12, 2025 9:55 AM   Kacie Hermosillo is a 85 year old female who presents for:    Chief Complaint   Patient presents with     Oncology Clinic Visit     Malignant neoplasm metastatic to bone     Initial Vitals: /78 (BP Location: Left arm, Patient Position: Sitting, Cuff Size: Adult Regular)   Pulse 74   Temp 97.8  F (36.6  C) (Tympanic)   Resp 18   Ht 1.6 m (5' 2.99\")   Wt 62.9 kg (138 lb 11.2 oz)   SpO2 97%   BMI 24.58 kg/m   Estimated body mass index is 24.58 kg/m  as calculated from the following:    Height as of this encounter: 1.6 m (5' 2.99\").    Weight as of this encounter: 62.9 kg (138 lb 11.2 oz). Body surface area is 1.67 meters squared.  No Pain (0) Comment: Data Unavailable   No LMP recorded. Patient is postmenopausal.  Allergies reviewed: Yes  Medications reviewed: Yes    Medications: Medication refills not needed today.  Pharmacy name entered into EPIC:    [id number] - [place], [place] - [address]  [facility] [address]    Frailty Screening:   Is the patient here for a new oncology consult visit in cancer care? 2. No    PHQ9:  Did this patient require a PHQ9?: No      Clinical concerns:   Follow up labs.   Pt reported - one week of diarrhea post last chemo tx. Dizziness off/on x4-5 days, injured left knee. Denies - loss of consciousness.   [Provider at residence reported - high potassium, pt noticed lower blood pressure readings.       Lucila Braga MA              [facility] Progress Note    Patient: Kacie Hermosillo  MRN: [id number]  Date of Service: May 12, 2025             ECOG Performance    2 - " "Ambulatory and independent in all ADLs; cannot work; up > 50% of the time          ______________________________________________________________________________  Oncologic history  Metastatic breast cancer with bone mets only ER negative ID negative HER2/amarilis 2+ and negative by FISH.  HER2/amarilis low    Remote diagnosis of breast cancer in 1995 treated with surgery chemotherapy radiation and 5 years of hormonal therapy.  Pathology report not available.    Treatment  Patient started on fam-trastuzumab on 8/19/2024  Complete response by PET criteria in  Nov 2024  Dose of fam-trastuzumab decreased by 25% in November 2024 due to severe diarrhea  Fam-trastuzumab held in February 2025 due to development of cardiomyopathy  Fam-trastuzumab restarted on 4/21/2025 after patient cleared by cardiology  Ejection fraction 35 to 40% on 5/12.  Fam-trastuzumab held    History of Present Illness    Ms. Kacie Hermosillo is here in follow-up.  She is here for her next dose of fam-trastuzumab.  She was cleared by cardiology 3 weeks ago.  She did had an echo today.  She has otherwise been feeling well she has no other concerns.    Review of systems  A comprehensive 12 point review of system was done that was negative except what is mentioned in the history of present illness    Past History    Past Medical History:   Diagnosis Date     Breast cancer (H) 01/01/1999     Heart valve replaced      Hx antineoplastic chemotherapy 01/01/1999     Hx of radiation therapy 01/01/1999     Hypertension        Past Surgical History:   Procedure Laterality Date     BIOPSY BREAST       HYSTERECTOMY       IR CHEST PORT PLACEMENT > 5 YRS OF AGE  7/30/2024     LUMPECTOMY BREAST Left 1999     OOPHORECTOMY Bilateral        Physical Exam    /78 (BP Location: Left arm, Patient Position: Sitting, Cuff Size: Adult Regular)   Pulse 74   Temp 97.8  F (36.6  C) (Tympanic)   Resp 18   Ht 1.6 m (5' 2.99\")   Wt 62.9 kg (138 lb 11.2 oz)   SpO2 97%   BMI " 24.58 kg/m      General: alert, awake, not in acute distress  HEENT: Head: Normal, normocephalic, atraumatic.  Eye: Normal external eye, conjunctiva, lids cornea, MARI.  Nose: Normal external nose, mucus membranes and septum.  Pharynx: Normal buccal mucosa. Normal pharynx.  Neck / Thyroid: Supple, no masses, nodes, nodules or enlargement.  Lymphatics: No abnormally enlarged lymph nodes.  Chest: Normal chest wall and respirations. Clear to auscultation.  No crackles or rhonchi's  Heart: S1 S2 RRR, no murmur.   Abdomen: abdomen is soft without significant tenderness, masses, organomegaly or guarding  Extremities: normal strength, tone, and muscle mass  Skin: normal. no rash or abnormalities  CNS: non focal.    Lab Results    Recent Results (from the past 240 hours)   Potassium    Collection Time: 05/08/25  7:56 AM   Result Value Ref Range    Potassium 4.5 3.4 - 5.3 mmol/L   Echocardiogram Complete    Collection Time: 05/08/25 11:25 AM   Result Value Ref Range    LVEF  35-40% (moderately reduced)    Comprehensive metabolic panel    Collection Time: 05/12/25  9:34 AM   Result Value Ref Range    Sodium 140 135 - 145 mmol/L    Potassium 4.4 3.4 - 5.3 mmol/L    Carbon Dioxide (CO2) 19 (L) 22 - 29 mmol/L    Anion Gap 12 7 - 15 mmol/L    Urea Nitrogen 24.7 (H) 8.0 - 23.0 mg/dL    Creatinine 1.22 (H) 0.51 - 0.95 mg/dL    GFR Estimate 43 (L) >60 mL/min/1.73m2    Calcium 9.0 8.8 - 10.4 mg/dL    Chloride 109 (H) 98 - 107 mmol/L    Glucose 93 70 - 99 mg/dL    Alkaline Phosphatase 65 40 - 150 U/L    AST 25 0 - 45 U/L    ALT 17 0 - 50 U/L    Protein Total 6.1 (L) 6.4 - 8.3 g/dL    Albumin 3.7 3.5 - 5.2 g/dL    Bilirubin Total 0.3 <=1.2 mg/dL   Lactate Dehydrogenase    Collection Time: 05/12/25  9:34 AM   Result Value Ref Range    Lactate Dehydrogenase 272 (H) 0 - 250 U/L   CBC with platelets and differential    Collection Time: 05/12/25  9:34 AM   Result Value Ref Range    WBC Count 7.3 4.0 - 11.0 10e3/uL    RBC Count 4.18 3.80 -  5.20 10e6/uL    Hemoglobin 12.3 11.7 - 15.7 g/dL    Hematocrit 36.5 35.0 - 47.0 %    MCV 87 78 - 100 fL    MCH 29.4 26.5 - 33.0 pg    MCHC 33.7 31.5 - 36.5 g/dL    RDW 15.4 (H) 10.0 - 15.0 %    Platelet Count 215 150 - 450 10e3/uL    % Neutrophils 75 %    % Lymphocytes 13 %    % Monocytes 9 %    % Eosinophils 2 %    % Basophils 1 %    % Immature Granulocytes 1 %    NRBCs per 100 WBC 0 <1 /100    Absolute Neutrophils 5.4 1.6 - 8.3 10e3/uL    Absolute Lymphocytes 0.9 0.8 - 5.3 10e3/uL    Absolute Monocytes 0.7 0.0 - 1.3 10e3/uL    Absolute Eosinophils 0.2 0.0 - 0.7 10e3/uL    Absolute Basophils 0.0 0.0 - 0.2 10e3/uL    Absolute Immature Granulocytes 0.0 <=0.4 10e3/uL    Absolute NRBCs 0.0 10e3/uL         Imaging    Echocardiogram Complete  Result Date: 2025  [id number]^ARY^[place] [address]  Name: URIAH OLIVEROS MRN: [id number] : 1940 Study Date: 2025 10:49 AM Age: 85 yrs Gender: Female Patient Location: [facility] Reason For Study: Acute systolic heart failure (H) Ordering Physician: MATY MCALLISTER Referring Physician: MATY MCALLISTER Performed By:   BSA: 1.6 m2 Height: 62 in Weight: 139 lb HR: 69 BP: 125/70 mmHg ______________________________________________________________________________ Procedure Echocardiogram with two-dimensional, color and spectral Doppler. Definity (NDC #53881-467) given intravenously. Technically difficult study. Compared to the prior study dated 10/28/24, there are changes as noted. ______________________________________________________________________________ Interpretation Summary  1. Left ventricular function is decreased. The ejection fraction is 35-40% (moderately reduced). - There is moderate global hypokinesis 2. Normal right ventricle size and systolic function. 3. The left atrium is mildly dilated. 4. There is a documented 23 mm Wray RYAN 3 bioprosthetic valve  present in aortic valve position. The peak and mean forward gradients are 16 and 10 mmHg, respectively. No regurgitation identified. 5. Compared to the prior study dated 2/20/25, the LVEF has modestly improved.  ______________________________________________________________________________ Left Ventricle The left ventricle is normal in size. Left ventricular function is decreased. The ejection fraction is 35-40% (moderately reduced). There is normal left ventricular wall thickness. Left ventricular diastolic function is abnormal. There is global hypokinesis that is worse in the posterior, inferior, and inferoseptal walls.  Right Ventricle Normal right ventricle size and systolic function.  Atria The left atrium is mildly dilated. Right atrial size is normal. There is no color Doppler evidence of an atrial shunt.  Mitral Valve Mitral valve leaflets appear normal. There is mild (1+) mitral regurgitation.  Tricuspid Valve Tricuspid valve leaflets appear normal. There is mild to moderate (1-2+) tricuspid regurgitation. Right ventricle systolic pressure estimate normal. The right ventricular systolic pressure is approximated at 23.1 mmHg plus the right atrial pressure.  Aortic Valve There is a RYAN 3 bioprosthetic valve present in aortic valve position. There is no paravalvular regurgitation present. The prosthetic valve gradients are normal .  Pulmonic Valve The pulmonic valve is not well seen, but is grossly normal. This degree of valvular regurgitation is within normal limits. There is trace pulmonic valvular regurgitation.  Vessels Normal size ascending aorta. IVC diameter <2.1 cm collapsing >50% with sniff suggests a normal RA pressure of 3 mmHg.  Pericardium There is no pericardial effusion.  ______________________________________________________________________________ MMode/2D Measurements & Calculations IVSd: 0.84 cm LVIDd: 4.3 cm LVIDs: 3.4 cm LVPWd: 0.76 cm FS: 20.8 % LV mass(C)d: 106.9 grams LV mass(C)dI:  65.3 grams/m2  Ao root diam: 3.1 cm LA dimension: 3.8 cm asc Aorta Diam: 3.7 cm LA/Ao: 1.2 LVOT diam: 1.8 cm LVOT area: 2.5 cm2 Ao root diam index Ht(cm/m): 2.0 Ao root diam index BSA (cm/m2): 1.9 Asc Ao diam index BSA (cm/m2): 2.3 Asc Ao diam index Ht(cm/m): 2.4 LA Volume (BP): 46.9 ml LA Volume Indexed (AL/bp): 30.6 ml/m2 RV Base: 3.7 cm  RWT: 0.35 TAPSE: 1.4 cm  Time Measurements MM HR: 65.0 BPM  Doppler Measurements & Calculations MV E max frantz: 80.4 cm/sec MV A max frantz: 126.9 cm/sec MV E/A: 0.63 MV max P.4 mmHg MV mean P.5 mmHg MV V2 VTI: 36.4 cm MVA(VTI): 1.4 cm2 MV dec slope: 346.0 cm/sec2 MV dec time: 0.23 sec Ao V2 max: 202.0 cm/sec Ao max P.0 mmHg Ao V2 mean: 150.3 cm/sec Ao mean PG: 10.2 mmHg Ao V2 VTI: 46.1 cm CARYL(I,D): 1.1 cm2 CARYL(V,D): 1.2 cm2 Ao acc time: 0.05 sec LV V1 max PG: 3.4 mmHg LV V1 max: 92.4 cm/sec LV V1 VTI: 19.6 cm SV(LVOT): 49.8 ml SI(LVOT): 30.4 ml/m2 PA acc time: 0.11 sec PI end-d frantz: 80.1 cm/sec  TR max frantz: 240.3 cm/sec TR max P.1 mmHg AV Frantz Ratio (DI): 0.46 CARYL Index (cm2/m2): 0.66 E/E' av.7 Lateral E/e': 12.3 Medial E/e': 17.1 RV S Frantz: 8.5 cm/sec  ______________________________________________________________________________ Report approved by: Abelardo Kowalski MD on 2025 04:07 PM             Assessment and Plan    Metastatic breast cancer with bone mets HER2/amarilis low  Patient is here in follow-up.  She was cleared by cardiology 3 weeks ago to receive her fam-trastuzumab.  She did receive it and tolerated it well.  She is here for her next dose.  We had done an echo prior to this visit and unfortunately her ejection fraction is 35 to 40% with global hypokinesia.  I do not feel comfortable proceeding with the fam-trastuzumab at the current ejection fraction as per guidelines.  I will refer her back to cardiology I explained to her daughter that we will need either improvement in the ejection fraction or maybe a repeat MRI of her heart to get more  information.  I will defer that to my cardiology colleagues.  Patient and her daughter had a number of questions that were answered    Bone metastases  Patient Zometa last dose given to her on 4/21/2025 next dose will be in 3 months    Cardiac monitoring  Patient had an echo which showed an EF of 35 to 40% and global hypokinesia.  See discussion above    Anticoagulation  Patient has been on anticoagulation with warfarin.  Continue as planned this is being managed by his other physicians.    Signed by: Hadley Gann MD        CC: Irina Francisco PA-C     Again, thank you for allowing me to participate in the care of your patient.        Sincerely,        Hadley Gann MD    Electronically signed

## 2025-05-12 NOTE — PROGRESS NOTES
[facility]: Cancer Care                                                                                          Called Nurse Line  t [facility], [phone], and spoke with Autumn. Shared that Kacie did not have her chemo infusion today as scheduled and she does not need her dexamethasone administered the next 3 days as was anticipated. Will also fax a letter with this information in printed format but wanted to alert them as not certain how quickly they see their faxes. She stated understanding.   Letter faxed to  [facility] [phone] on 5/12/24 and received per fax right at [phone]    Signature:  Mihaela Koo RN

## 2025-05-12 NOTE — TELEPHONE ENCOUNTER
Call received from Ariella,staff at [facility]. She is requesting that the prescription for dexamethasone be for more than one cycle of treatment. They would prefer to have medication on hand as it can take awhile to get a refill from the pharmacy. The facility uses [facility]. Ariella's direct number is [phone], nurse's line is [phone].    Tonya Peacock RN

## 2025-05-13 ENCOUNTER — LAB REQUISITION (OUTPATIENT)
Dept: LAB | Facility: CLINIC | Age: 85
End: 2025-05-13
Payer: COMMERCIAL

## 2025-05-13 ENCOUNTER — MYC MEDICAL ADVICE (OUTPATIENT)
Dept: ONCOLOGY | Facility: HOSPITAL | Age: 85
End: 2025-05-13
Payer: COMMERCIAL

## 2025-05-13 DIAGNOSIS — C79.51 MALIGNANT NEOPLASM METASTATIC TO BONE (H): Primary | ICD-10-CM

## 2025-05-13 DIAGNOSIS — Z95.2 PRESENCE OF PROSTHETIC HEART VALVE: ICD-10-CM

## 2025-05-13 NOTE — TELEPHONE ENCOUNTER
[facility]: Cancer Care                                                                                          Called Sulma, daughter of Kacie, to follow up on her My Chart message. Shared that writer sees that the MRI that was ordered was placed as STAT and is scheduled >2 weeks away, on 5/28/25. She relayed that she had been on the phone for over 20 minutes as they were working to find a sooner spot than a July apt that was first offered. Shared that she should message the Cardiology team to make sure that they are aware of the timing of the MRI to see if they are  comfortable  with this and/or able to get her in sooner. If not , will discuss  update with Dr. Gann/ discuss rescheduling. She will contact writer when she hears back from cardiac team.    ADDENDUM  If the MRI remains scheduled on 5/28, the 5/19 follow up and  chemo will need to be rescheduled to a date after the MRI is complete and  patient will need labs drawn as well and orders placed(CBC CMP and LDH)    Signature:  Mihaela Koo RN

## 2025-05-14 ENCOUNTER — TELEPHONE (OUTPATIENT)
Dept: PHARMACY | Facility: OTHER | Age: 85
End: 2025-05-14
Payer: COMMERCIAL

## 2025-05-14 NOTE — TELEPHONE ENCOUNTER
[facility] Recruitment: [facility] insurance     Referral outreach attempt #2 on May 14, 2025      Outcome: patient opted out    Lynad Metzger [facility]  -[facility]  [phone]

## 2025-05-14 NOTE — TELEPHONE ENCOUNTER
[facility]: Cancer Care                                                                                          Called Sulma, daughter of Kacie, to follow up on her My Chart message. MRI was unable to be advanced due to an earlier date. Shared that writer thought that may be the case but glad that she was able to talk with the cardiology team about the timing of the imaging scan/ if they were comfortable with the date, as it was ordered stat. Shared that writer spoke with Dr. Gann in anticipation of MRI not being rescheduled to an earlier day ant he relayed that the 5/19 follow up and  chemo will need to be rescheduled to a date after the MRI is complete and  patient will need labs drawn as well and orders placed(CBC CMP and LDH). Shared that Dr. Gann is out of of office on 5/29 and 6/2 and offered her first available of 6/3. She was warm transferred to scheduling line to arrange future apts, she elected 6/5/25  for next treatment date.    Shared that writer will follow up with [facility] about Dexamethasone prescription for next treatment.    Lab orders placed for 6/5/25 (CBC CMP and LDH)    Signature:  Mihaela Koo RN

## 2025-05-15 LAB
INR PPP: 2.37 (ref 0.85–1.15)
PROTHROMBIN TIME: 25.4 SECONDS (ref 11.8–14.8)

## 2025-05-15 PROCEDURE — P9604 ONE-WAY ALLOW PRORATED TRIP: HCPCS | Mod: ORL | Performed by: PHYSICIAN ASSISTANT

## 2025-05-15 PROCEDURE — 36415 COLL VENOUS BLD VENIPUNCTURE: CPT | Mod: ORL | Performed by: PHYSICIAN ASSISTANT

## 2025-05-15 PROCEDURE — 85610 PROTHROMBIN TIME: CPT | Mod: ORL | Performed by: PHYSICIAN ASSISTANT

## 2025-05-19 ENCOUNTER — PRE VISIT (OUTPATIENT)
Dept: CARDIOLOGY | Facility: CLINIC | Age: 85
End: 2025-05-19
Payer: COMMERCIAL

## 2025-05-28 ENCOUNTER — HOSPITAL ENCOUNTER (OUTPATIENT)
Dept: MRI IMAGING | Facility: CLINIC | Age: 85
Discharge: HOME | End: 2025-05-28
Attending: INTERNAL MEDICINE
Payer: COMMERCIAL

## 2025-05-28 DIAGNOSIS — I42.9 SECONDARY CARDIOMYOPATHY (H): ICD-10-CM

## 2025-05-28 DIAGNOSIS — I50.21 ACUTE SYSTOLIC HEART FAILURE (H): ICD-10-CM

## 2025-05-28 PROCEDURE — 255N000002 HC RX 255 OP 636: Performed by: INTERNAL MEDICINE

## 2025-05-28 PROCEDURE — 75561 CARDIAC MRI FOR MORPH W/DYE: CPT

## 2025-05-28 PROCEDURE — A9585 GADOBUTROL INJECTION: HCPCS | Performed by: INTERNAL MEDICINE

## 2025-05-28 PROCEDURE — 999N000248 HC STATISTIC IV INSERT WITH US BY RN

## 2025-05-28 RX ORDER — GADOBUTROL 604.72 MG/ML
7.5 INJECTION INTRAVENOUS ONCE
Status: COMPLETED | OUTPATIENT
Start: 2025-05-28 | End: 2025-05-28

## 2025-05-28 RX ADMIN — GADOBUTROL 7.5 ML: 604.72 INJECTION INTRAVENOUS at 16:00

## 2025-05-29 ENCOUNTER — TELEPHONE (OUTPATIENT)
Dept: CARDIOLOGY | Facility: CLINIC | Age: 85
End: 2025-05-29
Payer: COMMERCIAL

## 2025-05-29 ENCOUNTER — TRANSFERRED RECORDS (OUTPATIENT)
Dept: HEALTH INFORMATION MANAGEMENT | Facility: CLINIC | Age: 85
End: 2025-05-29

## 2025-05-29 ENCOUNTER — DOCUMENTATION ONLY (OUTPATIENT)
Dept: CARDIOLOGY | Facility: CLINIC | Age: 85
End: 2025-05-29
Payer: COMMERCIAL

## 2025-05-29 NOTE — PROGRESS NOTES
Cardiology Note - 05/29/2025:    I reviewed the Cardiac MRI performed yesterday and note that her LVEF is unchanged from her last cardiac MRI of 04/11/2025. 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    I feel it is OK to resume her trastuzumab based on this, and since I do not believe she has any trastuzumab-related cardiotoxicity.     Since her LVEFs on echocardiograms have been misleadingly low, I recommend she have a cardiac MRI without contrast for monitoring of her LVEF as per the schedule for cardiac monitoring for trastuzumab therapy. I recommend the cardiac MRIs be performed at the [facility] for consistency in imaging protocols between scans.    Isaias Marshall MD  Cardiology

## 2025-05-29 NOTE — TELEPHONE ENCOUNTER
Called pt's daughter to go over results of recent MRI. Please see Dr. Marshall's note dated 05/29 regarding recommendations and approval to move forward with chemo.

## 2025-06-03 ENCOUNTER — PATIENT OUTREACH (OUTPATIENT)
Dept: ONCOLOGY | Facility: HOSPITAL | Age: 85
End: 2025-06-03
Payer: COMMERCIAL

## 2025-06-03 NOTE — PROGRESS NOTES
[facility]: Cancer Care                                                                                          Called Sulma, daughter or Kacie, Consent to communicate on file dated 10/2/2024, to follow up. Shared that Dr. Gann reviewed the results of the Cardiac MRI and he relayed that Kacie Should keep appt, will proceed with (Enhertu) treatment .   Per the new policy of [facility], prescription for Dexamethasone to take for 3 days post chemo will be sent to their  facility on the day that Kacie is here for her treatments.  She expressed appreciation for the followup      Signature:  Mihaela Koo RN

## 2025-06-05 ENCOUNTER — INFUSION THERAPY VISIT (OUTPATIENT)
Dept: INFUSION THERAPY | Facility: HOSPITAL | Age: 85
End: 2025-06-05
Attending: INTERNAL MEDICINE
Payer: COMMERCIAL

## 2025-06-05 ENCOUNTER — PATIENT OUTREACH (OUTPATIENT)
Dept: ONCOLOGY | Facility: HOSPITAL | Age: 85
End: 2025-06-05

## 2025-06-05 ENCOUNTER — ONCOLOGY VISIT (OUTPATIENT)
Dept: ONCOLOGY | Facility: HOSPITAL | Age: 85
End: 2025-06-05
Attending: INTERNAL MEDICINE
Payer: COMMERCIAL

## 2025-06-05 VITALS
OXYGEN SATURATION: 99 % | WEIGHT: 141 LBS | BODY MASS INDEX: 24.98 KG/M2 | HEIGHT: 63 IN | TEMPERATURE: 97.7 F | RESPIRATION RATE: 18 BRPM | HEART RATE: 66 BPM | DIASTOLIC BLOOD PRESSURE: 70 MMHG | SYSTOLIC BLOOD PRESSURE: 124 MMHG

## 2025-06-05 DIAGNOSIS — C79.51 MALIGNANT NEOPLASM METASTATIC TO BONE (H): ICD-10-CM

## 2025-06-05 DIAGNOSIS — C79.51 MALIGNANT NEOPLASM METASTATIC TO BONE (H): Primary | ICD-10-CM

## 2025-06-05 DIAGNOSIS — I50.32 CHRONIC HEART FAILURE WITH PRESERVED EJECTION FRACTION (H): ICD-10-CM

## 2025-06-05 LAB
ALBUMIN SERPL BCG-MCNC: 4 G/DL (ref 3.5–5.2)
ALP SERPL-CCNC: 55 U/L (ref 40–150)
ALT SERPL W P-5'-P-CCNC: 16 U/L (ref 0–50)
ANION GAP SERPL CALCULATED.3IONS-SCNC: 11 MMOL/L (ref 7–15)
AST SERPL W P-5'-P-CCNC: 28 U/L (ref 0–45)
BASOPHILS # BLD AUTO: 0.1 10E3/UL (ref 0–0.2)
BASOPHILS NFR BLD AUTO: 1 %
BILIRUB SERPL-MCNC: 0.4 MG/DL
BUN SERPL-MCNC: 19 MG/DL (ref 8–23)
CALCIUM SERPL-MCNC: 9.4 MG/DL (ref 8.8–10.4)
CHLORIDE SERPL-SCNC: 108 MMOL/L (ref 98–107)
CREAT SERPL-MCNC: 1.06 MG/DL (ref 0.51–0.95)
EGFRCR SERPLBLD CKD-EPI 2021: 51 ML/MIN/1.73M2
EOSINOPHIL # BLD AUTO: 0.1 10E3/UL (ref 0–0.7)
EOSINOPHIL NFR BLD AUTO: 1 %
ERYTHROCYTE [DISTWIDTH] IN BLOOD BY AUTOMATED COUNT: 16.2 % (ref 10–15)
GLUCOSE SERPL-MCNC: 87 MG/DL (ref 70–99)
HCO3 SERPL-SCNC: 21 MMOL/L (ref 22–29)
HCT VFR BLD AUTO: 38.3 % (ref 35–47)
HGB BLD-MCNC: 12.6 G/DL (ref 11.7–15.7)
IMM GRANULOCYTES # BLD: 0 10E3/UL
IMM GRANULOCYTES NFR BLD: 1 %
LDH SERPL L TO P-CCNC: 284 U/L (ref 0–250)
LYMPHOCYTES # BLD AUTO: 1 10E3/UL (ref 0.8–5.3)
LYMPHOCYTES NFR BLD AUTO: 14 %
MCH RBC QN AUTO: 29.3 PG (ref 26.5–33)
MCHC RBC AUTO-ENTMCNC: 32.9 G/DL (ref 31.5–36.5)
MCV RBC AUTO: 89 FL (ref 78–100)
MONOCYTES # BLD AUTO: 0.8 10E3/UL (ref 0–1.3)
MONOCYTES NFR BLD AUTO: 11 %
NEUTROPHILS # BLD AUTO: 5.6 10E3/UL (ref 1.6–8.3)
NEUTROPHILS NFR BLD AUTO: 73 %
NRBC # BLD AUTO: 0 10E3/UL
NRBC BLD AUTO-RTO: 0 /100
PLATELET # BLD AUTO: 203 10E3/UL (ref 150–450)
POTASSIUM SERPL-SCNC: 4.3 MMOL/L (ref 3.4–5.3)
PROT SERPL-MCNC: 6 G/DL (ref 6.4–8.3)
RBC # BLD AUTO: 4.3 10E6/UL (ref 3.8–5.2)
SODIUM SERPL-SCNC: 140 MMOL/L (ref 135–145)
WBC # BLD AUTO: 7.7 10E3/UL (ref 4–11)

## 2025-06-05 PROCEDURE — 258N000003 HC RX IP 258 OP 636: Performed by: INTERNAL MEDICINE

## 2025-06-05 PROCEDURE — 83615 LACTATE (LD) (LDH) ENZYME: CPT

## 2025-06-05 PROCEDURE — 36591 DRAW BLOOD OFF VENOUS DEVICE: CPT

## 2025-06-05 PROCEDURE — 250N000011 HC RX IP 250 OP 636: Mod: JW | Performed by: INTERNAL MEDICINE

## 2025-06-05 PROCEDURE — 82040 ASSAY OF SERUM ALBUMIN: CPT

## 2025-06-05 PROCEDURE — 85025 COMPLETE CBC W/AUTO DIFF WBC: CPT

## 2025-06-05 PROCEDURE — G0463 HOSPITAL OUTPT CLINIC VISIT: HCPCS | Performed by: INTERNAL MEDICINE

## 2025-06-05 RX ORDER — PALONOSETRON 0.05 MG/ML
0.25 INJECTION, SOLUTION INTRAVENOUS ONCE
Status: CANCELLED | OUTPATIENT
Start: 2025-06-05

## 2025-06-05 RX ORDER — EPINEPHRINE 1 MG/ML
0.3 INJECTION, SOLUTION INTRAMUSCULAR; SUBCUTANEOUS EVERY 5 MIN PRN
Status: CANCELLED | OUTPATIENT
Start: 2025-06-05

## 2025-06-05 RX ORDER — DEXAMETHASONE 4 MG/1
8 TABLET ORAL DAILY
Qty: 6 TABLET | Refills: 0 | Status: SHIPPED | OUTPATIENT
Start: 2025-06-05

## 2025-06-05 RX ORDER — MEPERIDINE HYDROCHLORIDE 50 MG/ML
25 INJECTION INTRAMUSCULAR; INTRAVENOUS; SUBCUTANEOUS
Status: CANCELLED | OUTPATIENT
Start: 2025-06-05

## 2025-06-05 RX ORDER — EPINEPHRINE 1 MG/ML
0.3 INJECTION, SOLUTION INTRAMUSCULAR; SUBCUTANEOUS EVERY 5 MIN PRN
OUTPATIENT
Start: 2025-07-14

## 2025-06-05 RX ORDER — DIPHENHYDRAMINE HYDROCHLORIDE 50 MG/ML
25 INJECTION, SOLUTION INTRAMUSCULAR; INTRAVENOUS
Status: CANCELLED
Start: 2025-06-05

## 2025-06-05 RX ORDER — EPINEPHRINE 1 MG/ML
0.3 INJECTION, SOLUTION INTRAMUSCULAR; SUBCUTANEOUS EVERY 5 MIN PRN
Status: DISCONTINUED | OUTPATIENT
Start: 2025-06-05 | End: 2025-06-05 | Stop reason: HOSPADM

## 2025-06-05 RX ORDER — DIPHENHYDRAMINE HYDROCHLORIDE 50 MG/ML
50 INJECTION, SOLUTION INTRAMUSCULAR; INTRAVENOUS
Start: 2025-07-14

## 2025-06-05 RX ORDER — ALBUTEROL SULFATE 90 UG/1
1-2 INHALANT RESPIRATORY (INHALATION)
Status: DISCONTINUED | OUTPATIENT
Start: 2025-06-05 | End: 2025-06-05 | Stop reason: HOSPADM

## 2025-06-05 RX ORDER — ALBUTEROL SULFATE 0.83 MG/ML
2.5 SOLUTION RESPIRATORY (INHALATION)
Status: CANCELLED | OUTPATIENT
Start: 2025-06-05

## 2025-06-05 RX ORDER — ALBUTEROL SULFATE 0.83 MG/ML
2.5 SOLUTION RESPIRATORY (INHALATION)
Status: DISCONTINUED | OUTPATIENT
Start: 2025-06-05 | End: 2025-06-05 | Stop reason: HOSPADM

## 2025-06-05 RX ORDER — PALONOSETRON 0.05 MG/ML
0.25 INJECTION, SOLUTION INTRAVENOUS ONCE
Status: COMPLETED | OUTPATIENT
Start: 2025-06-05 | End: 2025-06-05

## 2025-06-05 RX ORDER — ALBUTEROL SULFATE 90 UG/1
1-2 INHALANT RESPIRATORY (INHALATION)
Status: CANCELLED
Start: 2025-06-05

## 2025-06-05 RX ORDER — METHYLPREDNISOLONE SODIUM SUCCINATE 40 MG/ML
40 INJECTION INTRAMUSCULAR; INTRAVENOUS
Status: CANCELLED
Start: 2025-06-05

## 2025-06-05 RX ORDER — MEPERIDINE HYDROCHLORIDE 50 MG/ML
25 INJECTION INTRAMUSCULAR; INTRAVENOUS; SUBCUTANEOUS EVERY 30 MIN PRN
OUTPATIENT
Start: 2025-07-14

## 2025-06-05 RX ORDER — DIPHENHYDRAMINE HYDROCHLORIDE 50 MG/ML
50 INJECTION, SOLUTION INTRAMUSCULAR; INTRAVENOUS
Status: CANCELLED
Start: 2025-06-05

## 2025-06-05 RX ORDER — METHYLPREDNISOLONE SODIUM SUCCINATE 125 MG/2ML
125 INJECTION INTRAMUSCULAR; INTRAVENOUS
Start: 2025-07-14

## 2025-06-05 RX ORDER — DIPHENHYDRAMINE HYDROCHLORIDE 50 MG/ML
50 INJECTION, SOLUTION INTRAMUSCULAR; INTRAVENOUS
Status: DISCONTINUED | OUTPATIENT
Start: 2025-06-05 | End: 2025-06-05 | Stop reason: HOSPADM

## 2025-06-05 RX ORDER — LORAZEPAM 2 MG/ML
0.5 INJECTION INTRAMUSCULAR EVERY 4 HOURS PRN
Status: CANCELLED | OUTPATIENT
Start: 2025-06-05

## 2025-06-05 RX ORDER — METHYLPREDNISOLONE SODIUM SUCCINATE 40 MG/ML
40 INJECTION INTRAMUSCULAR; INTRAVENOUS
Status: DISCONTINUED | OUTPATIENT
Start: 2025-06-05 | End: 2025-06-05 | Stop reason: HOSPADM

## 2025-06-05 RX ORDER — ALBUTEROL SULFATE 0.83 MG/ML
2.5 SOLUTION RESPIRATORY (INHALATION)
OUTPATIENT
Start: 2025-07-14

## 2025-06-05 RX ORDER — HEPARIN SODIUM (PORCINE) LOCK FLUSH IV SOLN 100 UNIT/ML 100 UNIT/ML
5 SOLUTION INTRAVENOUS
Status: DISCONTINUED | OUTPATIENT
Start: 2025-06-05 | End: 2025-06-05 | Stop reason: HOSPADM

## 2025-06-05 RX ORDER — METHYLPREDNISOLONE SODIUM SUCCINATE 125 MG/2ML
125 INJECTION INTRAMUSCULAR; INTRAVENOUS
Status: DISCONTINUED | OUTPATIENT
Start: 2025-06-05 | End: 2025-06-05 | Stop reason: HOSPADM

## 2025-06-05 RX ORDER — ALBUTEROL SULFATE 90 UG/1
1-2 INHALANT RESPIRATORY (INHALATION)
Start: 2025-07-14

## 2025-06-05 RX ORDER — MEPERIDINE HYDROCHLORIDE 50 MG/ML
25 INJECTION INTRAMUSCULAR; INTRAVENOUS; SUBCUTANEOUS EVERY 30 MIN PRN
Status: DISCONTINUED | OUTPATIENT
Start: 2025-06-05 | End: 2025-06-05 | Stop reason: HOSPADM

## 2025-06-05 RX ORDER — HEPARIN SODIUM,PORCINE 10 UNIT/ML
5-20 VIAL (ML) INTRAVENOUS DAILY PRN
OUTPATIENT
Start: 2025-07-14

## 2025-06-05 RX ORDER — HEPARIN SODIUM,PORCINE 10 UNIT/ML
5-20 VIAL (ML) INTRAVENOUS DAILY PRN
Status: CANCELLED | OUTPATIENT
Start: 2025-06-05

## 2025-06-05 RX ORDER — HEPARIN SODIUM (PORCINE) LOCK FLUSH IV SOLN 100 UNIT/ML 100 UNIT/ML
5 SOLUTION INTRAVENOUS
Status: CANCELLED | OUTPATIENT
Start: 2025-06-05

## 2025-06-05 RX ORDER — MEPERIDINE HYDROCHLORIDE 50 MG/ML
25 INJECTION INTRAMUSCULAR; INTRAVENOUS; SUBCUTANEOUS
Status: DISCONTINUED | OUTPATIENT
Start: 2025-06-05 | End: 2025-06-05 | Stop reason: HOSPADM

## 2025-06-05 RX ORDER — HEPARIN SODIUM (PORCINE) LOCK FLUSH IV SOLN 100 UNIT/ML 100 UNIT/ML
5 SOLUTION INTRAVENOUS
OUTPATIENT
Start: 2025-07-14

## 2025-06-05 RX ORDER — DIPHENHYDRAMINE HYDROCHLORIDE 50 MG/ML
25 INJECTION, SOLUTION INTRAMUSCULAR; INTRAVENOUS
Status: DISCONTINUED | OUTPATIENT
Start: 2025-06-05 | End: 2025-06-05 | Stop reason: HOSPADM

## 2025-06-05 RX ADMIN — DEXAMETHASONE SODIUM PHOSPHATE: 100 INJECTION INTRAMUSCULAR; INTRAVENOUS at 11:35

## 2025-06-05 RX ADMIN — ZOLEDRONIC ACID 3 MG: 4 INJECTION, SOLUTION, CONCENTRATE INTRAVENOUS at 12:25

## 2025-06-05 RX ADMIN — FAM-TRASTUZUMAB DERUXTECAN-NXKI 242 MG: 100 INJECTION, POWDER, LYOPHILIZED, FOR SOLUTION INTRAVENOUS at 12:43

## 2025-06-05 RX ADMIN — HEPARIN 5 ML: 100 SYRINGE at 12:49

## 2025-06-05 RX ADMIN — PALONOSETRON 0.25 MG: 0.05 INJECTION, SOLUTION INTRAVENOUS at 11:36

## 2025-06-05 RX ADMIN — DEXTROSE MONOHYDRATE 250 ML: 50 INJECTION, SOLUTION INTRAVENOUS at 12:41

## 2025-06-05 RX ADMIN — SODIUM CHLORIDE 250 ML: 0.9 INJECTION, SOLUTION INTRAVENOUS at 11:34

## 2025-06-05 ASSESSMENT — PAIN SCALES - GENERAL: PAINLEVEL_OUTOF10: NO PAIN (0)

## 2025-06-05 NOTE — PROGRESS NOTES
[facility] Hematology and Oncology Progress Note    Patient: Kacie Hermosillo  MRN: [id number]  Date of Service: Jun 5, 2025             ECOG Performance    2 - Ambulatory and independent in all ADLs; cannot work; up > 50% of the time          ______________________________________________________________________________  Oncologic history  Metastatic breast cancer with bone mets only ER negative OR negative HER2/amarilis 2+ and negative by FISH.  HER2/amarilis low    Remote diagnosis of breast cancer in 1995 treated with surgery chemotherapy radiation and 5 years of hormonal therapy.  Pathology report not available.    Treatment  Patient started on fam-trastuzumab on 8/19/2024  Complete response by PET criteria in  Nov 2024  Dose of fam-trastuzumab decreased by 25% in November 2024 due to severe diarrhea  Fam-trastuzumab held in February 2025 due to development of cardiomyopathy  Fam-trastuzumab restarted on 4/21/2025 after patient cleared by cardiology  Ejection fraction 35 to 40% on 5/12.  Fam-trastuzumab held  Repeat MRI of the heart again showed normal ejection fraction on 5/28/2025    History of Present Illness    Ms. Kacie Hermosillo is here in follow-up.  Patient had another MRI of her heart done and her EF was found to be fine.  She is also seeing cardiology and has been cleared to restart her treatment.  She otherwise feels great she has no other concerns she has no new symptoms    Review of systems  A comprehensive 12 point review of system was done that was negative except what is mentioned in the history of present illness    Past History    Past Medical History:   Diagnosis Date    Breast cancer (H) 01/01/1999    Heart valve replaced     Hx antineoplastic chemotherapy 01/01/1999    Hx of radiation therapy 01/01/1999    Hypertension        Past Surgical History:   Procedure Laterality Date    BIOPSY BREAST      HYSTERECTOMY      IR CHEST PORT PLACEMENT > 5 YRS OF AGE  7/30/2024    LUMPECTOMY BREAST Left  "1999    OOPHORECTOMY Bilateral        Physical Exam    /70 (BP Location: Left arm, Patient Position: Sitting, Cuff Size: Adult Regular)   Pulse 66   Temp 97.7  F (36.5  C) (Temporal)   Resp 18   Ht 1.6 m (5' 2.99\")   Wt 64 kg (141 lb)   SpO2 99%   BMI 24.98 kg/m      General: alert, awake, not in acute distress  HEENT: Head: Normal, normocephalic, atraumatic.  Eye: Normal external eye, conjunctiva, lids cornea, MARI.  Nose: Normal external nose, mucus membranes and septum.  Pharynx: Normal buccal mucosa. Normal pharynx.  Neck / Thyroid: Supple, no masses, nodes, nodules or enlargement.  Lymphatics: No abnormally enlarged lymph nodes.  Chest: Normal chest wall and respirations. Clear to auscultation.  No crackles or rhonchi's  Heart: S1 S2 RRR, no murmur.   Abdomen: abdomen is soft without significant tenderness, masses, organomegaly or guarding  Extremities: normal strength, tone, and muscle mass  Skin: normal. no rash or abnormalities  CNS: non focal.    Lab Results    Recent Results (from the past 240 hours)   Comprehensive metabolic panel (BMP + Alb, Alk Phos, ALT, AST, Total. Bili, TP)    Collection Time: 06/05/25 10:12 AM   Result Value Ref Range    Sodium 140 135 - 145 mmol/L    Potassium 4.3 3.4 - 5.3 mmol/L    Carbon Dioxide (CO2) 21 (L) 22 - 29 mmol/L    Anion Gap 11 7 - 15 mmol/L    Urea Nitrogen 19.0 8.0 - 23.0 mg/dL    Creatinine 1.06 (H) 0.51 - 0.95 mg/dL    GFR Estimate 51 (L) >60 mL/min/1.73m2    Calcium 9.4 8.8 - 10.4 mg/dL    Chloride 108 (H) 98 - 107 mmol/L    Glucose 87 70 - 99 mg/dL    Alkaline Phosphatase 55 40 - 150 U/L    AST 28 0 - 45 U/L    ALT 16 0 - 50 U/L    Protein Total 6.0 (L) 6.4 - 8.3 g/dL    Albumin 4.0 3.5 - 5.2 g/dL    Bilirubin Total 0.4 <=1.2 mg/dL   Lactate Dehydrogenase    Collection Time: 06/05/25 10:12 AM   Result Value Ref Range    Lactate Dehydrogenase 284 (H) 0 - 250 U/L   CBC with platelets and differential    Collection Time: 06/05/25 10:12 AM   Result " Value Ref Range    WBC Count 7.7 4.0 - 11.0 10e3/uL    RBC Count 4.30 3.80 - 5.20 10e6/uL    Hemoglobin 12.6 11.7 - 15.7 g/dL    Hematocrit 38.3 35.0 - 47.0 %    MCV 89 78 - 100 fL    MCH 29.3 26.5 - 33.0 pg    MCHC 32.9 31.5 - 36.5 g/dL    RDW 16.2 (H) 10.0 - 15.0 %    Platelet Count 203 150 - 450 10e3/uL    % Neutrophils 73 %    % Lymphocytes 14 %    % Monocytes 11 %    % Eosinophils 1 %    % Basophils 1 %    % Immature Granulocytes 1 %    NRBCs per 100 WBC 0 <1 /100    Absolute Neutrophils 5.6 1.6 - 8.3 10e3/uL    Absolute Lymphocytes 1.0 0.8 - 5.3 10e3/uL    Absolute Monocytes 0.8 0.0 - 1.3 10e3/uL    Absolute Eosinophils 0.1 0.0 - 0.7 10e3/uL    Absolute Basophils 0.1 0.0 - 0.2 10e3/uL    Absolute Immature Granulocytes 0.0 <=0.4 10e3/uL    Absolute NRBCs 0.0 10e3/uL         Imaging    MRI Cardiac w/contrast  Result Date: 2025                                                   [facility]                                                  CMR Report   MRN:                 [id number]                                Name:        URIAH OLIVEROS                                :                1940-Mar-28                                Scan Date:          2025-May-28                              Electronically signed by Lyn Francis 2025-May-29 12:33:18 SUMMARY  ========================================================================================================== Clinical history: 85F with metastatic breast cancer s/p trastuzumab, TAVR prosthesis, and LV dysfunction who presents for interval evaluation. Comparison CMR: 2025 1. The left ventricle is normal in cavity size and wall thickness. The global systolic function is normal. The LVEF is 55%. There is mild septal dyssynchrony from conduction delay. 2. The right ventricle is normal in cavity size. The global systolic function is normal. The RVEF is 68%. 3. Both atria are normal in size. 4. There is a well seated Cecilia bioprosthetic valve present  in the aortic position. There is no other significant valvular disease. 5. Late gadolinium enhancement imaging shows no replacement fibrosis. 6. There is no pericardial effusion. CONCLUSIONS: Normal biventricular function with LVEF 55% and RVEF 68% without evidence of fibrosis. Compared to prior CMR on 4/11/25, LV function has improved. CORE EXAM  ========================================================================================================== MEASUREMENTS  ----------------------------------------------------------------------------------------     VOLUMETRIC ANALYSIS      ---------------------------------------------- .---------------------------------------------------------.                  LV     Reference  RV     Reference  +-----+-----------+-------+-----------+-------+-----------+  EDV  ml         125.9             113.9                   ml/m^2      75.7              68.6              ESV  ml          56.2              36.9                   ml/m^2      33.8              22.2              CO   L/min       4.53              5.01                   L/min/m^2   2.72              3.01              SV   ml          69.7              77.0                   ml/m^2      41.9              46.3              EF   %           55.3              67.6             '-----+-----------+-------+-----------+-------+-----------'         CARDIAC OUTPUT HR:  65 BPM SCAN INFO  ========================================================================================================== GENERAL  ----------------------------------------------------------------------------------------     CONTRAST AGENT      ----------------------------------------------         TYPE:  Gadavist         GD CONCENTRATION:  0.5 M         VOLUME ADMINISTERED:  10 ml         DOSAGE:  0.08 mmol/kg     VITALS      ----------------------------------------------         HEIGHT:  63.0 in          HEIGHT:  160.0 cm         WEIGHT:  140.0 lbs         WEIGHT:  63.5 kgs         BSA:  1.66 m^2     PULSE SEQUENCES      ----------------------------------------------         SSFP cine, 2D LGE segmented, 2D LGE single-shot     SETUP      ----------------------------------------------         SCAN TYPE:  Clinical         PATIENT TYPE:  Outpatient         INCOMPLETE SCAN:  No         REASON(S) FOR SCAN:  Chemo (known/suspect)         REFERRING PHYSICIAN:  MIGUEL WELCH         ATTENDING PHYSICIAN:  MIGUEL FUNEZ  ----------------------------------------------------------------------------------------     ICD10 Codes         I50.21, I42.9 Report generated by [employer], a product of [employer]    Echocardiogram Complete  Result Date: 2025  [id number] [address]  Name: URIAH OLIVEROS MRN: [id number] : 1940 Study Date: 2025 10:49 AM Age: 85 yrs Gender: Female Patient Location: [facility] Reason For Study: Acute systolic heart failure (H) Ordering Physician: MATY MCALLISTER Referring Physician: MATY MCALLISTER Performed By: GALINDO  BSA: 1.6 m2 Height: 62 in Weight: 139 lb HR: 69 BP: 125/70 mmHg ______________________________________________________________________________ Procedure Echocardiogram with two-dimensional, color and spectral Doppler. Definity (NDC #22919-967) given intravenously. Technically difficult study. Compared to the prior study dated 10/28/24, there are changes as noted. ______________________________________________________________________________ Interpretation Summary  1. Left ventricular function is decreased. The ejection fraction is 35-40% (moderately reduced). - There is moderate global hypokinesis 2. Normal right ventricle size and systolic function. 3. The left atrium is mildly dilated. 4. There is a documented 23 mm Wray RYAN 3 bioprosthetic  valve present in aortic valve position. The peak and mean forward gradients are 16 and 10 mmHg, respectively. No regurgitation identified. 5. Compared to the prior study dated 2/20/25, the LVEF has modestly improved.  ______________________________________________________________________________ Left Ventricle The left ventricle is normal in size. Left ventricular function is decreased. The ejection fraction is 35-40% (moderately reduced). There is normal left ventricular wall thickness. Left ventricular diastolic function is abnormal. There is global hypokinesis that is worse in the posterior, inferior, and inferoseptal walls.  Right Ventricle Normal right ventricle size and systolic function.  Atria The left atrium is mildly dilated. Right atrial size is normal. There is no color Doppler evidence of an atrial shunt.  Mitral Valve Mitral valve leaflets appear normal. There is mild (1+) mitral regurgitation.  Tricuspid Valve Tricuspid valve leaflets appear normal. There is mild to moderate (1-2+) tricuspid regurgitation. Right ventricle systolic pressure estimate normal. The right ventricular systolic pressure is approximated at 23.1 mmHg plus the right atrial pressure.  Aortic Valve There is a RYAN 3 bioprosthetic valve present in aortic valve position. There is no paravalvular regurgitation present. The prosthetic valve gradients are normal .  Pulmonic Valve The pulmonic valve is not well seen, but is grossly normal. This degree of valvular regurgitation is within normal limits. There is trace pulmonic valvular regurgitation.  Vessels Normal size ascending aorta. IVC diameter <2.1 cm collapsing >50% with sniff suggests a normal RA pressure of 3 mmHg.  Pericardium There is no pericardial effusion.  ______________________________________________________________________________ MMode/2D Measurements & Calculations IVSd: 0.84 cm LVIDd: 4.3 cm LVIDs: 3.4 cm LVPWd: 0.76 cm FS: 20.8 % LV mass(C)d: 106.9 grams LV  mass(C)dI: 65.3 grams/m2  Ao root diam: 3.1 cm LA dimension: 3.8 cm asc Aorta Diam: 3.7 cm LA/Ao: 1.2 LVOT diam: 1.8 cm LVOT area: 2.5 cm2 Ao root diam index Ht(cm/m): 2.0 Ao root diam index BSA (cm/m2): 1.9 Asc Ao diam index BSA (cm/m2): 2.3 Asc Ao diam index Ht(cm/m): 2.4 LA Volume (BP): 46.9 ml LA Volume Indexed (AL/bp): 30.6 ml/m2 RV Base: 3.7 cm  RWT: 0.35 TAPSE: 1.4 cm  Time Measurements MM HR: 65.0 BPM  Doppler Measurements & Calculations MV E max frantz: 80.4 cm/sec MV A max frantz: 126.9 cm/sec MV E/A: 0.63 MV max P.4 mmHg MV mean P.5 mmHg MV V2 VTI: 36.4 cm MVA(VTI): 1.4 cm2 MV dec slope: 346.0 cm/sec2 MV dec time: 0.23 sec Ao V2 max: 202.0 cm/sec Ao max P.0 mmHg Ao V2 mean: 150.3 cm/sec Ao mean PG: 10.2 mmHg Ao V2 VTI: 46.1 cm CARYL(I,D): 1.1 cm2 CARYL(V,D): 1.2 cm2 Ao acc time: 0.05 sec LV V1 max PG: 3.4 mmHg LV V1 max: 92.4 cm/sec LV V1 VTI: 19.6 cm SV(LVOT): 49.8 ml SI(LVOT): 30.4 ml/m2 PA acc time: 0.11 sec PI end-d frantz: 80.1 cm/sec  TR max frantz: 240.3 cm/sec TR max P.1 mmHg AV Frantz Ratio (DI): 0.46 CARYL Index (cm2/m2): 0.66 E/E' av.7 Lateral E/e': 12.3 Medial E/e': 17.1 RV S Frantz: 8.5 cm/sec  ______________________________________________________________________________ Report approved by: Abelardo Kowalski MD on 2025 04:07 PM             Assessment and Plan    Metastatic breast cancer with bone mets HER2/amarilis low  Patient is here in follow-up.  She had another MRI of her heart that showed a normal ejection fraction and has seen by cardiology and has been cleared to start treatment again.  She will get her fam-trastuzumab today and I will see her in 3 weeks for her next visit and next infusion.  I will plan to repeat a PET scan after the 3 doses.    Bone metastases  Patient Zometa last dose given to her on 2025 next dose will be in 3 months    Cardiac monitoring  Patient has been having cardiac echoes for monitoring but the echoes do not seem to be the right test for her because it  has happened twice that the echo shows a decreased ejection fraction and the need to get an MRI of her heart done which shows a well-preserved ejection fraction.  She is also seeing cardiology who also agree that moving forward she should be monitored by an MRI without contrast.  Cardiology also believe that she has not had any trastuzumab related cardiotoxicity and all of her previous low EF for because the echo does not seem to be the right test for monitoring her cardiac function.  I would now plan to repeat an MRI of the heart without contrast after 3 doses.    Anticoagulation  Patient has been on anticoagulation with warfarin.  Continue as planned this is being managed by his other physicians.    Signed by: Hadley Gann MD        CC: Irina Francisco PA-C

## 2025-06-05 NOTE — PROGRESS NOTES
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    Weight as of 5/12/25: 63.5 kg (140 lb). Body surface area is 1.68 meters squared.  Data Unavailable Comment: Data Unavailable   No LMP recorded. Patient is postmenopausal.  Allergies reviewed: Yes  Medications reviewed: Yes    Medications: MEDICATION REFILLS NEEDED TODAY. Provider was notified.  Pharmacy name entered into EPIC:    [id number] - [place], [place] - [address]  MEDICATION MANAGEMENT [facility] - [place], [place] - [id number] [place] [address]    Frailty Screening:   Is the patient here for a new oncology consult visit in cancer care? 2. No    PHQ9:  Did this patient require a PHQ9?: No      Clinical concerns: MD follow up , review labs, infusion       Lamar Andrew MA            "

## 2025-06-05 NOTE — PROGRESS NOTES
Prior to visit with Provider, port accessed without difficulty today, labs drawn, and saline locked. Lorraine Powers RN

## 2025-06-05 NOTE — PROGRESS NOTES
[facility]: Cancer Care Follow-Up Note                                    Discussion with Patient:                                                      Kacie was at clinic today for  follow up with Dr. Gann and Enhertu infusion.   Per the policy of [facility], new  printed and signed prescription for dexamethasone  for this cycle, along with letter/orders for the dexamethasone/update from the clinic visit, was  faxed to nursing fax at [phone], received per right fax at [phone].    Called [facility] Nurse Line [phone],  and spoke with Ariella, who confirmed they received the fax and will send the dexamethasone to their local pharmacy to fill so that it will be ready for administration on 6/6/25.    Met with Kacie and her daughter, Sulma, in the infusion bay and relayed the above. She shared update that her town house will be going up for sale this next week, she is at peace with this. She is liking her assisted living facility. There is a good  in the cafeteria and she is able to order off of a menu and she is able to find things that she likes to eat. She has made friends and is enjoying the activities that are offered. She has tolerated treatment well    She will keep her future apts as scheduled. She will call if any questions or concerns arise.               Dates of Treatment:                                                      Infusion given in last 28 days       None            Assessment:                                                      FAM-trastuzumab D1C9- see above        Intervention/Education provided during outreach:                                                       See above    Confirmed patient has clinic and triage numbers    Signature:  Mihaela Koo, RN

## 2025-06-05 NOTE — LETTER
"6/5/2025      Kacie Hermosillo  [address]  [address]  [facility] [address]      Dear Colleague,    Thank you for referring your patient, Kacie Hermosillo, to the [facility]. Please see a copy of my visit note below.    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    Weight as of 5/12/25: 63.5 kg (140 lb). Body surface area is 1.68 meters squared.  Data Unavailable Comment: Data Unavailable   No LMP recorded. Patient is postmenopausal.  Allergies reviewed: Yes  Medications reviewed: Yes    Medications: MEDICATION REFILLS NEEDED TODAY. Provider was notified.  Pharmacy name entered into EPIC:    [facility] - [place], [place] - [address] [facility] [facility]  [facility] - [address]    Frailty Screening:   Is the patient here for a new oncology consult visit in cancer care? 2. No    PHQ9:  Did this patient require a PHQ9?: No      Clinical concerns: MD follow up , review labs, infusion       Lamar Andrew MA              [facility] Progress Note    Patient: Kacie Hermosillo  MRN: [id number]  Date of Service: Jun 5, 2025             ECOG Performance    2 - Ambulatory and independent in all ADLs; cannot work; up > 50% of the time          ______________________________________________________________________________  Oncologic history  Metastatic breast cancer with bone mets only ER negative ME negative HER2/amarilis 2+ and negative by FISH.  HER2/amarilis low    Remote diagnosis of breast cancer in " "1995 treated with surgery chemotherapy radiation and 5 years of hormonal therapy.  Pathology report not available.    Treatment  Patient started on fam-trastuzumab on 8/19/2024  Complete response by PET criteria in  Nov 2024  Dose of fam-trastuzumab decreased by 25% in November 2024 due to severe diarrhea  Fam-trastuzumab held in February 2025 due to development of cardiomyopathy  Fam-trastuzumab restarted on 4/21/2025 after patient cleared by cardiology  Ejection fraction 35 to 40% on 5/12.  Fam-trastuzumab held  Repeat MRI of the heart again showed normal ejection fraction on 5/28/2025    History of Present Illness    Ms. Kacie Hermosillo is here in follow-up.  Patient had another MRI of her heart done and her EF was found to be fine.  She is also seeing cardiology and has been cleared to restart her treatment.  She otherwise feels great she has no other concerns she has no new symptoms    Review of systems  A comprehensive 12 point review of system was done that was negative except what is mentioned in the history of present illness    Past History    Past Medical History:   Diagnosis Date     Breast cancer (H) 01/01/1999     Heart valve replaced      Hx antineoplastic chemotherapy 01/01/1999     Hx of radiation therapy 01/01/1999     Hypertension        Past Surgical History:   Procedure Laterality Date     BIOPSY BREAST       HYSTERECTOMY       IR CHEST PORT PLACEMENT > 5 YRS OF AGE  7/30/2024     LUMPECTOMY BREAST Left 1999     OOPHORECTOMY Bilateral        Physical Exam    /70 (BP Location: Left arm, Patient Position: Sitting, Cuff Size: Adult Regular)   Pulse 66   Temp 97.7  F (36.5  C) (Temporal)   Resp 18   Ht 1.6 m (5' 2.99\")   Wt 64 kg (141 lb)   SpO2 99%   BMI 24.98 kg/m      General: alert, awake, not in acute distress  HEENT: Head: Normal, normocephalic, atraumatic.  Eye: Normal external eye, conjunctiva, lids cornea, MARI.  Nose: Normal external nose, mucus membranes and " septum.  Pharynx: Normal buccal mucosa. Normal pharynx.  Neck / Thyroid: Supple, no masses, nodes, nodules or enlargement.  Lymphatics: No abnormally enlarged lymph nodes.  Chest: Normal chest wall and respirations. Clear to auscultation.  No crackles or rhonchi's  Heart: S1 S2 RRR, no murmur.   Abdomen: abdomen is soft without significant tenderness, masses, organomegaly or guarding  Extremities: normal strength, tone, and muscle mass  Skin: normal. no rash or abnormalities  CNS: non focal.    Lab Results    Recent Results (from the past 240 hours)   Comprehensive metabolic panel (BMP + Alb, Alk Phos, ALT, AST, Total. Bili, TP)    Collection Time: 06/05/25 10:12 AM   Result Value Ref Range    Sodium 140 135 - 145 mmol/L    Potassium 4.3 3.4 - 5.3 mmol/L    Carbon Dioxide (CO2) 21 (L) 22 - 29 mmol/L    Anion Gap 11 7 - 15 mmol/L    Urea Nitrogen 19.0 8.0 - 23.0 mg/dL    Creatinine 1.06 (H) 0.51 - 0.95 mg/dL    GFR Estimate 51 (L) >60 mL/min/1.73m2    Calcium 9.4 8.8 - 10.4 mg/dL    Chloride 108 (H) 98 - 107 mmol/L    Glucose 87 70 - 99 mg/dL    Alkaline Phosphatase 55 40 - 150 U/L    AST 28 0 - 45 U/L    ALT 16 0 - 50 U/L    Protein Total 6.0 (L) 6.4 - 8.3 g/dL    Albumin 4.0 3.5 - 5.2 g/dL    Bilirubin Total 0.4 <=1.2 mg/dL   Lactate Dehydrogenase    Collection Time: 06/05/25 10:12 AM   Result Value Ref Range    Lactate Dehydrogenase 284 (H) 0 - 250 U/L   CBC with platelets and differential    Collection Time: 06/05/25 10:12 AM   Result Value Ref Range    WBC Count 7.7 4.0 - 11.0 10e3/uL    RBC Count 4.30 3.80 - 5.20 10e6/uL    Hemoglobin 12.6 11.7 - 15.7 g/dL    Hematocrit 38.3 35.0 - 47.0 %    MCV 89 78 - 100 fL    MCH 29.3 26.5 - 33.0 pg    MCHC 32.9 31.5 - 36.5 g/dL    RDW 16.2 (H) 10.0 - 15.0 %    Platelet Count 203 150 - 450 10e3/uL    % Neutrophils 73 %    % Lymphocytes 14 %    % Monocytes 11 %    % Eosinophils 1 %    % Basophils 1 %    % Immature Granulocytes 1 %    NRBCs per 100 WBC 0 <1 /100    Absolute  Neutrophils 5.6 1.6 - 8.3 10e3/uL    Absolute Lymphocytes 1.0 0.8 - 5.3 10e3/uL    Absolute Monocytes 0.8 0.0 - 1.3 10e3/uL    Absolute Eosinophils 0.1 0.0 - 0.7 10e3/uL    Absolute Basophils 0.1 0.0 - 0.2 10e3/uL    Absolute Immature Granulocytes 0.0 <=0.4 10e3/uL    Absolute NRBCs 0.0 10e3/uL         Imaging    MRI Cardiac w/contrast  Result Date: 2025                                                   [facility]                                                  CMR Report   MRN:                 [id number]                                Name:        URIAH OLIVEROS                                :                1940-Mar-28                                Scan Date:          2025-May-28                              Electronically signed by Lyn Francis 2025-May-29 12:33:18 SUMMARY  ========================================================================================================== Clinical history: 85F with metastatic breast cancer s/p trastuzumab, TAVR prosthesis, and LV dysfunction who presents for interval evaluation. Comparison CMR: 2025 1. The left ventricle is normal in cavity size and wall thickness. The global systolic function is normal. The LVEF is 55%. There is mild septal dyssynchrony from conduction delay. 2. The right ventricle is normal in cavity size. The global systolic function is normal. The RVEF is 68%. 3. Both atria are normal in size. 4. There is a well seated Cecilia bioprosthetic valve present in the aortic position. There is no other significant valvular disease. 5. Late gadolinium enhancement imaging shows no replacement fibrosis. 6. There is no pericardial effusion. CONCLUSIONS: Normal biventricular function with LVEF 55% and RVEF 68% without evidence of fibrosis. Compared to prior CMR on 25, LV function has improved. CORE EXAM  ========================================================================================================== MEASUREMENTS   ----------------------------------------------------------------------------------------     VOLUMETRIC ANALYSIS      ---------------------------------------------- .---------------------------------------------------------.                  LV     Reference  RV     Reference  +-----+-----------+-------+-----------+-------+-----------+  EDV  ml         125.9             113.9                   ml/m^2      75.7              68.6              ESV  ml          56.2              36.9                   ml/m^2      33.8              22.2              CO   L/min       4.53              5.01                   L/min/m^2   2.72              3.01              SV   ml          69.7              77.0                   ml/m^2      41.9              46.3              EF   %           55.3              67.6             '-----+-----------+-------+-----------+-------+-----------'         CARDIAC OUTPUT HR:  65 BPM SCAN INFO  ========================================================================================================== GENERAL  ----------------------------------------------------------------------------------------     CONTRAST AGENT      ----------------------------------------------         TYPE:  Gadavist         GD CONCENTRATION:  0.5 M         VOLUME ADMINISTERED:  10 ml         DOSAGE:  0.08 mmol/kg     VITALS      ----------------------------------------------         HEIGHT:  63.0 in         HEIGHT:  160.0 cm         WEIGHT:  140.0 lbs         WEIGHT:  63.5 kgs         BSA:  1.66 m^2     PULSE SEQUENCES      ----------------------------------------------         SSFP cine, 2D LGE segmented, 2D LGE single-shot     SETUP      ----------------------------------------------         SCAN TYPE:  Clinical         PATIENT TYPE:  Outpatient         INCOMPLETE SCAN:  No         REASON(S) FOR SCAN:  Chemo (known/suspect)         REFERRING PHYSICIAN:  MIGUEL  REBEKAH         ATTENDING PHYSICIAN:  MIGUEL FUNEZ  ----------------------------------------------------------------------------------------     ICD10 Codes         I50.21, I42.9 Report generated by [employer], a product of [employer]    Echocardiogram Complete  Result Date: 2025  [id number]^ARY^[place] [address]  Name: URIAH OLIVEROS MRN: [id number] : 1940 Study Date: 2025 10:49 AM Age: 85 yrs Gender: Female Patient Location: [facility] Reason For Study: Acute systolic heart failure (H) Ordering Physician: MATY MCALLISTER Referring Physician: MATY MCALLISTER Performed By:   BSA: 1.6 m2 Height: 62 in Weight: 139 lb HR: 69 BP: 125/70 mmHg ______________________________________________________________________________ Procedure Echocardiogram with two-dimensional, color and spectral Doppler. Definity (NDC #32988-678) given intravenously. Technically difficult study. Compared to the prior study dated 10/28/24, there are changes as noted. ______________________________________________________________________________ Interpretation Summary  1. Left ventricular function is decreased. The ejection fraction is 35-40% (moderately reduced). - There is moderate global hypokinesis 2. Normal right ventricle size and systolic function. 3. The left atrium is mildly dilated. 4. There is a documented 23 mm Wray RYAN 3 bioprosthetic valve present in aortic valve position. The peak and mean forward gradients are 16 and 10 mmHg, respectively. No regurgitation identified. 5. Compared to the prior study dated 25, the LVEF has modestly improved.  ______________________________________________________________________________ Left Ventricle The left ventricle is normal in size. Left ventricular function is decreased. The ejection fraction is 35-40% (moderately reduced). There is normal left  ventricular wall thickness. Left ventricular diastolic function is abnormal. There is global hypokinesis that is worse in the posterior, inferior, and inferoseptal walls.  Right Ventricle Normal right ventricle size and systolic function.  Atria The left atrium is mildly dilated. Right atrial size is normal. There is no color Doppler evidence of an atrial shunt.  Mitral Valve Mitral valve leaflets appear normal. There is mild (1+) mitral regurgitation.  Tricuspid Valve Tricuspid valve leaflets appear normal. There is mild to moderate (1-2+) tricuspid regurgitation. Right ventricle systolic pressure estimate normal. The right ventricular systolic pressure is approximated at 23.1 mmHg plus the right atrial pressure.  Aortic Valve There is a RYAN 3 bioprosthetic valve present in aortic valve position. There is no paravalvular regurgitation present. The prosthetic valve gradients are normal .  Pulmonic Valve The pulmonic valve is not well seen, but is grossly normal. This degree of valvular regurgitation is within normal limits. There is trace pulmonic valvular regurgitation.  Vessels Normal size ascending aorta. IVC diameter <2.1 cm collapsing >50% with sniff suggests a normal RA pressure of 3 mmHg.  Pericardium There is no pericardial effusion.  ______________________________________________________________________________ MMode/2D Measurements & Calculations IVSd: 0.84 cm LVIDd: 4.3 cm LVIDs: 3.4 cm LVPWd: 0.76 cm FS: 20.8 % LV mass(C)d: 106.9 grams LV mass(C)dI: 65.3 grams/m2  Ao root diam: 3.1 cm LA dimension: 3.8 cm asc Aorta Diam: 3.7 cm LA/Ao: 1.2 LVOT diam: 1.8 cm LVOT area: 2.5 cm2 Ao root diam index Ht(cm/m): 2.0 Ao root diam index BSA (cm/m2): 1.9 Asc Ao diam index BSA (cm/m2): 2.3 Asc Ao diam index Ht(cm/m): 2.4 LA Volume (BP): 46.9 ml LA Volume Indexed (AL/bp): 30.6 ml/m2 RV Base: 3.7 cm  RWT: 0.35 TAPSE: 1.4 cm  Time Measurements MM HR: 65.0 BPM  Doppler Measurements & Calculations MV E max danuta: 80.4  cm/sec MV A max frantz: 126.9 cm/sec MV E/A: 0.63 MV max P.4 mmHg MV mean P.5 mmHg MV V2 VTI: 36.4 cm MVA(VTI): 1.4 cm2 MV dec slope: 346.0 cm/sec2 MV dec time: 0.23 sec Ao V2 max: 202.0 cm/sec Ao max P.0 mmHg Ao V2 mean: 150.3 cm/sec Ao mean PG: 10.2 mmHg Ao V2 VTI: 46.1 cm CARYL(I,D): 1.1 cm2 CARYL(V,D): 1.2 cm2 Ao acc time: 0.05 sec LV V1 max PG: 3.4 mmHg LV V1 max: 92.4 cm/sec LV V1 VTI: 19.6 cm SV(LVOT): 49.8 ml SI(LVOT): 30.4 ml/m2 PA acc time: 0.11 sec PI end-d frantz: 80.1 cm/sec  TR max frantz: 240.3 cm/sec TR max P.1 mmHg AV Frantz Ratio (DI): 0.46 CARYL Index (cm2/m2): 0.66 E/E' av.7 Lateral E/e': 12.3 Medial E/e': 17.1 RV S Frantz: 8.5 cm/sec  ______________________________________________________________________________ Report approved by: Abelardo Kowalski MD on 2025 04:07 PM             Assessment and Plan    Metastatic breast cancer with bone mets HER2/amarilis low  Patient is here in follow-up.  She had another MRI of her heart that showed a normal ejection fraction and has seen by cardiology and has been cleared to start treatment again.  She will get her fam-trastuzumab today and I will see her in 3 weeks for her next visit and next infusion.  I will plan to repeat a PET scan after the 3 doses.    Bone metastases  Patient Zometa last dose given to her on 2025 next dose will be in 3 months    Cardiac monitoring  Patient has been having cardiac echoes for monitoring but the echoes do not seem to be the right test for her because it has happened twice that the echo shows a decreased ejection fraction and the need to get an MRI of her heart done which shows a well-preserved ejection fraction.  She is also seeing cardiology who also agree that moving forward she should be monitored by an MRI without contrast.  Cardiology also believe that she has not had any trastuzumab related cardiotoxicity and all of her previous low EF for because the echo does not seem to be the right test for monitoring her  cardiac function.  I would now plan to repeat an MRI of the heart without contrast after 3 doses.    Anticoagulation  Patient has been on anticoagulation with warfarin.  Continue as planned this is being managed by his other physicians.    Signed by: Hadley Gann MD        CC: Irina Francisco PA-C     Again, thank you for allowing me to participate in the care of your patient.        Sincerely,        Hadley Gann MD    Electronically signed

## 2025-06-05 NOTE — PROGRESS NOTES
Infusion Nursing Note:  Kacie Hermosillo presents today for D1C9 [place].  Zometa today also  Patient seen by provider today: Yes: Dr Gann   present during visit today: Not Applicable.    Note: pt has urgency with her bladder and took 4 trips to the bathroom while she was here.    Premeds Given: aloxi, dexamethasone IV, and emend    Intravenous Access:  Implanted Port.    Treatment Conditions:  Lab Results   Component Value Date    HGB 12.6 06/05/2025    WBC 7.7 06/05/2025    ANEU 5.6 06/05/2025     06/05/2025            Results reviewed, labs MET treatment parameters, ok to proceed with treatment.      Post Infusion Assessment:  Patient tolerated infusion without incident.  Blood return noted pre and post infusion.  Site patent and intact, free from redness, edema or discomfort.  Access discontinued per protocol.       Discharge Plan:   Patient and/or family verbalized understanding of discharge instructions and all questions answered.      Kiersten Silva RN

## 2025-06-06 ENCOUNTER — RESULTS FOLLOW-UP (OUTPATIENT)
Dept: CARDIOLOGY | Facility: CLINIC | Age: 85
End: 2025-06-06

## 2025-06-06 DIAGNOSIS — I47.10 SVT (SUPRAVENTRICULAR TACHYCARDIA): ICD-10-CM

## 2025-06-06 DIAGNOSIS — I50.21 ACUTE SYSTOLIC HEART FAILURE (H): Primary | ICD-10-CM

## 2025-06-06 RX ORDER — METOPROLOL SUCCINATE 50 MG/1
50 TABLET, EXTENDED RELEASE ORAL DAILY
Qty: 90 TABLET | Refills: 2 | Status: SHIPPED | OUTPATIENT
Start: 2025-06-06 | End: 2025-07-10 | Stop reason: DRUGHIGH

## 2025-06-09 ENCOUNTER — LAB REQUISITION (OUTPATIENT)
Dept: LAB | Facility: CLINIC | Age: 85
End: 2025-06-09
Payer: COMMERCIAL

## 2025-06-09 DIAGNOSIS — Z95.2 PRESENCE OF PROSTHETIC HEART VALVE: ICD-10-CM

## 2025-06-12 ENCOUNTER — TELEPHONE (OUTPATIENT)
Dept: CARDIOLOGY | Facility: CLINIC | Age: 85
End: 2025-06-12

## 2025-06-12 ENCOUNTER — OFFICE VISIT (OUTPATIENT)
Dept: CARDIOLOGY | Facility: CLINIC | Age: 85
End: 2025-06-12
Payer: COMMERCIAL

## 2025-06-12 VITALS
SYSTOLIC BLOOD PRESSURE: 97 MMHG | HEART RATE: 77 BPM | WEIGHT: 138 LBS | BODY MASS INDEX: 24.45 KG/M2 | DIASTOLIC BLOOD PRESSURE: 62 MMHG | RESPIRATION RATE: 14 BRPM

## 2025-06-12 DIAGNOSIS — I47.10 SVT (SUPRAVENTRICULAR TACHYCARDIA): ICD-10-CM

## 2025-06-12 DIAGNOSIS — I50.21 ACUTE SYSTOLIC HEART FAILURE (H): ICD-10-CM

## 2025-06-12 DIAGNOSIS — I47.10 PAROXYSMAL SUPRAVENTRICULAR TACHYCARDIA: Primary | ICD-10-CM

## 2025-06-12 LAB
INR PPP: 2.51 (ref 0.85–1.15)
PROTHROMBIN TIME: 26.5 SECONDS (ref 11.8–14.8)

## 2025-06-12 PROCEDURE — 85610 PROTHROMBIN TIME: CPT | Mod: ORL | Performed by: PHYSICIAN ASSISTANT

## 2025-06-12 PROCEDURE — P9604 ONE-WAY ALLOW PRORATED TRIP: HCPCS | Mod: ORL | Performed by: PHYSICIAN ASSISTANT

## 2025-06-12 PROCEDURE — 36415 COLL VENOUS BLD VENIPUNCTURE: CPT | Mod: ORL | Performed by: PHYSICIAN ASSISTANT

## 2025-06-12 NOTE — LETTER
2025    Carlyle Zapata PA-C  [facility] [address]  [facility] [address]    RE: Kacie Hermosillo       Dear Colleague,     I had the pleasure of seeing Kacie Herm[facility]illo in the [facility].     [facility]  Cardiac Electrophysiology  [address]  [place] [address]   Office: [phone]  Fax: [phone]     Patient: Kacie Hermosillo   : 1940       CHIEF COMPLAINT/REASON FOR VISIT  Supraventricular tachycardia      Assessment/Recommendations     Supraventricular tachycardia - unclear if symptomatic.  Likely AT (query if unifocal vs multifocal), though other mechanisms possible.  We reviewed SVT and reviewed treatment options including electrophysiology study and ablation (possibly limited success given maximum duration recorded 1m 43s) vs medical therapy - limited options in the setting of hypotension vs observation.    She is not interested in ablation.    Flecainide may an option if LV function remains normal and no ischemia.  Note of borderline IVCD.  She would prefer the following:  - MPI  - if normal, can trial flecainide, ECG 3 days thereafter, Zio monitoring thereafter  - continue metoprolol XL at current dose - clarify if taking 25 vs 50mg daily - limited ability to titrate given low BP    Pre-syncope - sound orthostatic.  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 Nagy for consultation regarding SVT.    Mrs.  Bay notes episodes of pre-syncope since around 2023, occurring unpredictably - some of these have been after arising from sitting.  Zio monitoring 5/2025 showed 208 episodes of SVT longest 1m 43s avg 184, fastest 29.3s max 222bpm.     She denies syncope.  She resides at an assisted living facility.       Physical Examination  Review of Systems   VITALS: BP 97/62 (BP Location: Right arm, Patient Position: Sitting, Cuff Size: Adult Regular)   Pulse 77   Resp 14   Wt 62.6 kg (138 lb)   BMI 24.45 kg/m    Wt Readings from Last 3 Encounters:   06/05/25 64 kg (141 lb)   05/12/25 63.5 kg (140 lb)   05/12/25 62.9 kg (138 lb 11.2 oz)     CONSTITUTIONAL: well nourished, comfortable, no distress  EYES:  Conjunctivae pink, sclerae clear.    E/N/T:  Oral mucosa pink  RESPIRATORY:  Respiratory effort is normal  CARDIOVASCULAR:  normal S1 and S2  GASTROINTESTINAL:  Abdomen without masses or tenderness  EXTREMITIES:  No clubbing or cyanosis.    MUSCULOSKELETAL:  Overall grossly normal muscle strength  SKIN:  Overall, skin warm and dry, no lesions.  NEURO/PSYCH:  Oriented x 3 with normal affect.   Constitutional:  No weight loss or loss of appetite    Eyes:  No difficulty with vision, no double vision, no dry eyes  ENT:  No sore throat, difficulty swallowing; changes in hearing or tinnitus  Cardiovascular: As detailed above  Respiratory:  No cough  Musculoskeletal  No joint pain, muscle aches  Neurologic:  No syncope, lightheadedness, fainting spells   Hematologic: No easy bruising, excessive bleeding tendency   Gastrointestinal:  No jaundice, abdominal pain or abdominal bloating  Genitourinary: No changes in urinary habits, no trouble urinating    Psychiatric: No anxiety or depression      Medical History  Surgical History   Past Medical History:   Diagnosis Date     Breast cancer (H) 01/01/1999     Heart valve replaced      Hx antineoplastic chemotherapy 01/01/1999     Hx of radiation therapy 01/01/1999     Hypertension      Past Surgical History:   Procedure Laterality Date     BIOPSY BREAST       HYSTERECTOMY       IR CHEST PORT PLACEMENT > 5 YRS OF AGE  7/30/2024     LUMPECTOMY BREAST Left 1999     OOPHORECTOMY Bilateral          Family History Social History   Family History   Problem Relation Age of Onset     Breast Cancer Maternal Aunt      Hereditary Breast and Ovarian Cancer Syndrome No family hx of         Social History     Tobacco Use     Smoking status: Never     Smokeless tobacco: Never   Vaping Use     Vaping status: Never Used   Substance Use Topics     Alcohol use: Yes     Comment: Rarely         Medications  Allergies     Current Outpatient Medications:      acetaminophen (TYLENOL) 325 MG tablet, Take 650 mg by mouth every 8 hours as needed for mild pain, Disp: , Rfl:      aspirin (ASA) 81 MG EC tablet, Take 81 mg by mouth every morning., Disp: , Rfl:      atorvastatin (LIPITOR) 40 MG tablet, TAKE 1 TAB BY MOUTH ONCE DAILY, Disp: 90 tablet, Rfl: 1     calcium carbonate (OS-MIGUEL) 1500 (600 Ca) MG tablet, Take 1 tablet by mouth daily at 2 pm., Disp: , Rfl:      chlorhexidine (PERIDEX) 0.12 % solution, Take 15 mLs by mouth daily as needed., Disp: , Rfl:      CITRUCEL 500 MG TABS tablet, TAKE 1 TAB BY MOUTH EVERY OTHER DAY FOR CONSTIPATION (Patient not taking: Reported on 6/5/2025), Disp: 15 tablet, Rfl: 0     D-Mannose 500 MG CAPS, Take 500 mg by mouth daily., Disp: 90 capsule, Rfl: 3     dapagliflozin (FARXIGA) 10 MG TABS tablet, Take 1 tablet (10 mg) by mouth daily., Disp: 90 tablet, Rfl: 1     dexAMETHasone (DECADRON) 4 MG tablet, Take 2 tablets (8 mg) by mouth daily. Take for 3 days, starting the day after chemo. Take with food., Disp: 6 tablet, Rfl: 0     ESTRADIOL PO, Take 0.5 mg by mouth 2 times daily., Disp: , Rfl:      ferrous sulfate (FEROSUL) 325 (65 Fe) MG tablet, Take 1 tablet (325 mg) by mouth daily (with breakfast)., Disp: 90 tablet, Rfl: 3     furosemide (LASIX) 20 MG tablet, TAKE 1 TAB BY MOUTH ONCE DAILY,  Disp: 90 tablet, Rfl: 1     lidocaine-prilocaine (EMLA) 2.5-2.5 % external cream, Apply topically as needed for other (Apply small amount to port site 30-60 minutes prior to port access to minimize discomfort), Disp: 30 g, Rfl: 2     lisinopril (ZESTRIL) 2.5 MG tablet, TAKE 1 TAB BY MOUTH ONCE DAILY, Disp: 90 tablet, Rfl: 0     loperamide (IMODIUM A-D) 2 MG tablet, Take 1 tablet (2 mg) by mouth 4 times daily as needed for diarrhea., Disp: 60 tablet, Rfl: 1     metoprolol succinate ER (TOPROL XL) 50 MG 24 hr tablet, Take 1 tablet (50 mg) by mouth daily., Disp: 90 tablet, Rfl: 2     multivitamin w/minerals (MULTI-VITAMIN) tablet, Take 1 tablet by mouth daily., Disp: , Rfl:      spironolactone (ALDACTONE) 25 MG tablet, Take 0.5 tablets (12.5 mg) by mouth daily., Disp: 45 tablet, Rfl: 3     warfarin ANTICOAGULANT (COUMADIN) 2.5 MG tablet, Take 2.5 mg by mouth. Take on Tuesday & Saturday, Disp: , Rfl:      warfarin ANTICOAGULANT (COUMADIN) 5 MG tablet, Take 10 mg today 1/2/25 and 7.5 mg tomorrow 1/3/25, then continue taking 2.5 mg on Sat/Tues and 5 mg all the other days of the week.  Recheck INR on 1/9/25 (Patient taking differently: Take 5 mg by mouth. Take Monday, Wednesday, Thursday, Friday, and Sunday), Disp: 15 tablet, Rfl: 0  No current facility-administered medications for this visit.    Facility-Administered Medications Ordered in Other Visits:      alteplase (CATHFLO ACTIVASE) injection 2 mg, 2 mg, Intravenous, Once PRN, Neymar Gann MD     heparin lock flush 100 unit/mL injection 5 mL, 5 mL, Intracatheter, Once PRN, Neymar Gann MD, 5 mL at 05/12/25 1044     heparin lock flush 100 unit/mL injection 5 mL, 5 mL, Intracatheter, Once PRN, Neymar Gann MD, 5 mL at 02/20/25 1328     heparin lock flush 100 unit/mL injection 5 mL, 5 mL, Intracatheter, Q30 Days, Sanjuanita, Neymar Butcher MD, 5 mL at 10/03/24 1031     sodium chloride (PF) 0.9% PF flush 3-20 mL, 3-20 mL,  "Intracatheter, q1 min prn, Neymar Gann MD     sodium chloride (PF) 0.9% PF flush 3-20 mL, 3-20 mL, Intracatheter, q1 min prn, Neymar Gann MD, 20 mL at 02/20/25 1328   No Known Allergies       Lab Results    Chemistry CBC Cardiac Enzymes/BNP/TSH/INR   Recent Labs   Lab Test 06/05/25  1012      POTASSIUM 4.3   CHLORIDE 108*   CO2 21*   GLC 87   BUN 19.0   CR 1.06*   GFRESTIMATED 51*   MIGUEL 9.4     Recent Labs   Lab Test 06/05/25  1012 05/12/25  0934 04/21/25  0858   CR 1.06* 1.22* 1.32*          Recent Labs   Lab Test 06/05/25  1012   WBC 7.7   HGB 12.6   HCT 38.3   MCV 89        Recent Labs   Lab Test 06/05/25  1012 05/12/25  0934 04/21/25  0858   HGB 12.6 12.3 12.9    No results for input(s): \"TROPONINI\" in the last 55420 hours.  Recent Labs   Lab Test 03/06/25  1315 11/26/24  1444 10/27/24  1426 10/07/24  0518   NTBNPI  --   --  1,682 495   NTBNP 1,475 1,367  --   --      Recent Labs   Lab Test 11/14/24  0900   TSH 3.54     Recent Labs   Lab Test 05/15/25  0726 05/01/25  0720 04/17/25  0958   INR 2.37* 1.92* 2.45*         Data Review    ECGs (tracings independently reviewed)  10/27/2024 - SR 73bpm, SD 168ms, borderline IVCD QRS 108ms, PRWP    Zio monitoring from 5/16/2025 to 5/28/2025 (duration 12d 3h) (independently reviewed)  Predominant rhythm was sinus rhythm, 49 to 108bpm, average 70bpm.  208 episode(s) of supraventricular tachycardia - longest 1m 43s avg 184, fastest 29.3s max 222bpm.  1 episode(s) of nonsustained ventricular tachycardia - 5 beats, avg 132bpm.  No atrial fibrillation.  IVCD present.  There were no pauses of greater than 3 seconds.  Rare premature atrial contractions beats (isolated <1%).  Rare premature ventricular contractions (isolated <1%).  Symptom triggers and diary entries (2) correlated to sinus rhythm, SVT, PACs, PVCs.      5/28/2025 cardiac MRI  Normal biventricular function with LVEF 55% and RVEF 68% without evidence of fibrosis.  Compared " to prior CMR on 4/11/25, LV function has improved.     4/11/2025 cardiac MRI  Non ischemic cardiomyopathy with LVEF of 48% with no LGE. The cause of cardiomyopathy is  likely related to left bundle branch block and some degree of basal septal tethering related to the  prosthetic aortic valve.       Cc: Jose Nagy MD, Carlyle Zapata PA-C Amila Dilusha William, MD  6/12/2025  11:14 AM        Thank you for allowing me to participate in the care of your patient.      Sincerely,     Kameron Majano MD     [facility]  cc:   Jose Nagy MD  [address]  [place] [address]

## 2025-06-12 NOTE — PROGRESS NOTES
[facility]  Cardiac Electrophysiology  [address]  [place] [address]   Office: [phone]  Fax: [phone]     Patient: Kacie Hermosillo   : 1940       CHIEF COMPLAINT/REASON FOR VISIT  Supraventricular tachycardia      Assessment/Recommendations     Supraventricular tachycardia - unclear if symptomatic.  Likely AT (query if unifocal vs multifocal), though other mechanisms possible.  We reviewed SVT and reviewed treatment options including electrophysiology study and ablation (possibly limited success given maximum duration recorded 1m 43s) vs medical therapy - limited options in the setting of hypotension vs observation.    She is not interested in ablation.    Flecainide may an option if LV function remains normal and no ischemia.  Note of borderline IVCD.  She would prefer the following:  - MPI  - if normal, can trial flecainide, ECG 3 days thereafter, Zio monitoring thereafter  - continue metoprolol XL at current dose - clarify if taking 25 vs 50mg daily - limited ability to titrate given low BP    Pre-syncope - sound orthostatic.  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 Nagy for consultation regarding SVT.    Mrs. Hermosillo notes episodes of pre-syncope since around , occurring unpredictably - some of these have been after arising from sitting.  Zio monitoring 2025 showed 208 episodes of SVT longest 1m 43s avg 184, fastest 29.3s max 222bpm.      She denies syncope.  She resides at an assisted living facility.       Physical Examination  Review of Systems   VITALS: BP 97/62 (BP Location: Right arm, Patient Position: Sitting, Cuff Size: Adult Regular)   Pulse 77   Resp 14   Wt 62.6 kg (138 lb)   BMI 24.45 kg/m    Wt Readings from Last 3 Encounters:   06/05/25 64 kg (141 lb)   05/12/25 63.5 kg (140 lb)   05/12/25 62.9 kg (138 lb 11.2 oz)     CONSTITUTIONAL: well nourished, comfortable, no distress  EYES:  Conjunctivae pink, sclerae clear.    E/N/T:  Oral mucosa pink  RESPIRATORY:  Respiratory effort is normal  CARDIOVASCULAR:  normal S1 and S2  GASTROINTESTINAL:  Abdomen without masses or tenderness  EXTREMITIES:  No clubbing or cyanosis.    MUSCULOSKELETAL:  Overall grossly normal muscle strength  SKIN:  Overall, skin warm and dry, no lesions.  NEURO/PSYCH:  Oriented x 3 with normal affect.   Constitutional:  No weight loss or loss of appetite    Eyes:  No difficulty with vision, no double vision, no dry eyes  ENT:  No sore throat, difficulty swallowing; changes in hearing or tinnitus  Cardiovascular: As detailed above  Respiratory:  No cough  Musculoskeletal  No joint pain, muscle aches  Neurologic:  No syncope, lightheadedness, fainting spells   Hematologic: No easy bruising, excessive bleeding tendency   Gastrointestinal:  No jaundice, abdominal pain or abdominal bloating  Genitourinary: No changes in urinary habits, no trouble urinating    Psychiatric: No anxiety or depression      Medical History  Surgical History   Past Medical History:   Diagnosis Date    Breast cancer (H) 01/01/1999    Heart valve replaced     Hx antineoplastic chemotherapy 01/01/1999    Hx of radiation therapy 01/01/1999    Hypertension     Past Surgical History:   Procedure Laterality Date    BIOPSY BREAST      HYSTERECTOMY      IR CHEST PORT PLACEMENT > 5 YRS OF AGE  7/30/2024    LUMPECTOMY BREAST Left 1999    OOPHORECTOMY Bilateral          Family History Social History    Family History   Problem Relation Age of Onset    Breast Cancer Maternal Aunt     Hereditary Breast and Ovarian Cancer Syndrome No family hx of         Social History     Tobacco Use    Smoking status: Never    Smokeless tobacco: Never   Vaping Use    Vaping status: Never Used   Substance Use Topics    Alcohol use: Yes     Comment: Rarely         Medications  Allergies     Current Outpatient Medications:     acetaminophen (TYLENOL) 325 MG tablet, Take 650 mg by mouth every 8 hours as needed for mild pain, Disp: , Rfl:     aspirin (ASA) 81 MG EC tablet, Take 81 mg by mouth every morning., Disp: , Rfl:     atorvastatin (LIPITOR) 40 MG tablet, TAKE 1 TAB BY MOUTH ONCE DAILY, Disp: 90 tablet, Rfl: 1    calcium carbonate (OS-MIGUEL) 1500 (600 Ca) MG tablet, Take 1 tablet by mouth daily at 2 pm., Disp: , Rfl:     chlorhexidine (PERIDEX) 0.12 % solution, Take 15 mLs by mouth daily as needed., Disp: , Rfl:     CITRUCEL 500 MG TABS tablet, TAKE 1 TAB BY MOUTH EVERY OTHER DAY FOR CONSTIPATION (Patient not taking: Reported on 6/5/2025), Disp: 15 tablet, Rfl: 0    D-Mannose 500 MG CAPS, Take 500 mg by mouth daily., Disp: 90 capsule, Rfl: 3    dapagliflozin (FARXIGA) 10 MG TABS tablet, Take 1 tablet (10 mg) by mouth daily., Disp: 90 tablet, Rfl: 1    dexAMETHasone (DECADRON) 4 MG tablet, Take 2 tablets (8 mg) by mouth daily. Take for 3 days, starting the day after chemo. Take with food., Disp: 6 tablet, Rfl: 0    ESTRADIOL PO, Take 0.5 mg by mouth 2 times daily., Disp: , Rfl:     ferrous sulfate (FEROSUL) 325 (65 Fe) MG tablet, Take 1 tablet (325 mg) by mouth daily (with breakfast)., Disp: 90 tablet, Rfl: 3    furosemide (LASIX) 20 MG tablet, TAKE 1 TAB BY MOUTH ONCE DAILY, Disp: 90 tablet, Rfl: 1    lidocaine-prilocaine (EMLA) 2.5-2.5 % external cream, Apply topically as needed for other (Apply small amount to port site 30-60 minutes prior to port access to minimize discomfort), Disp: 30 g, Rfl: 2    lisinopril (ZESTRIL) 2.5 MG  tablet, TAKE 1 TAB BY MOUTH ONCE DAILY, Disp: 90 tablet, Rfl: 0    loperamide (IMODIUM A-D) 2 MG tablet, Take 1 tablet (2 mg) by mouth 4 times daily as needed for diarrhea., Disp: 60 tablet, Rfl: 1    metoprolol succinate ER (TOPROL XL) 50 MG 24 hr tablet, Take 1 tablet (50 mg) by mouth daily., Disp: 90 tablet, Rfl: 2    multivitamin w/minerals (MULTI-VITAMIN) tablet, Take 1 tablet by mouth daily., Disp: , Rfl:     spironolactone (ALDACTONE) 25 MG tablet, Take 0.5 tablets (12.5 mg) by mouth daily., Disp: 45 tablet, Rfl: 3    warfarin ANTICOAGULANT (COUMADIN) 2.5 MG tablet, Take 2.5 mg by mouth. Take on Tuesday & Saturday, Disp: , Rfl:     warfarin ANTICOAGULANT (COUMADIN) 5 MG tablet, Take 10 mg today 1/2/25 and 7.5 mg tomorrow 1/3/25, then continue taking 2.5 mg on Sat/Tues and 5 mg all the other days of the week.  Recheck INR on 1/9/25 (Patient taking differently: Take 5 mg by mouth. Take Monday, Wednesday, Thursday, Friday, and Sunday), Disp: 15 tablet, Rfl: 0  No current facility-administered medications for this visit.    Facility-Administered Medications Ordered in Other Visits:     alteplase (CATHFLO ACTIVASE) injection 2 mg, 2 mg, Intravenous, Once PRN, Neymar Gann MD    heparin lock flush 100 unit/mL injection 5 mL, 5 mL, Intracatheter, Once PRN, Neymar Gann MD, 5 mL at 05/12/25 1044    heparin lock flush 100 unit/mL injection 5 mL, 5 mL, Intracatheter, Once PRN, Neymar Gann MD, 5 mL at 02/20/25 1328    heparin lock flush 100 unit/mL injection 5 mL, 5 mL, Intracatheter, Q30 Days, Neymar Gann MD, 5 mL at 10/03/24 1031    sodium chloride (PF) 0.9% PF flush 3-20 mL, 3-20 mL, Intracatheter, q1 min prn, Neymar Gann MD    sodium chloride (PF) 0.9% PF flush 3-20 mL, 3-20 mL, Intracatheter, q1 min prn, Sanjuanita, Neymar Butcher MD, 20 mL at 02/20/25 1328   No Known Allergies       Lab Results    Chemistry CBC Cardiac  "Enzymes/BNP/TSH/INR   Recent Labs   Lab Test 06/05/25  1012      POTASSIUM 4.3   CHLORIDE 108*   CO2 21*   GLC 87   BUN 19.0   CR 1.06*   GFRESTIMATED 51*   MIGUEL 9.4     Recent Labs   Lab Test 06/05/25  1012 05/12/25  0934 04/21/25  0858   CR 1.06* 1.22* 1.32*          Recent Labs   Lab Test 06/05/25  1012   WBC 7.7   HGB 12.6   HCT 38.3   MCV 89        Recent Labs   Lab Test 06/05/25  1012 05/12/25  0934 04/21/25  0858   HGB 12.6 12.3 12.9    No results for input(s): \"TROPONINI\" in the last 38684 hours.  Recent Labs   Lab Test 03/06/25  1315 11/26/24  1444 10/27/24  1426 10/07/24  0518   NTBNPI  --   --  1,682 495   NTBNP 1,475 1,367  --   --      Recent Labs   Lab Test 11/14/24  0900   TSH 3.54     Recent Labs   Lab Test 05/15/25  0726 05/01/25  0720 04/17/25  0958   INR 2.37* 1.92* 2.45*         Data Review    ECGs (tracings independently reviewed)  10/27/2024 - SR 73bpm, NM 168ms, borderline IVCD QRS 108ms, PRWP    Zio monitoring from 5/16/2025 to 5/28/2025 (duration 12d 3h) (independently reviewed)  Predominant rhythm was sinus rhythm, 49 to 108bpm, average 70bpm.  208 episode(s) of supraventricular tachycardia - longest 1m 43s avg 184, fastest 29.3s max 222bpm.  1 episode(s) of nonsustained ventricular tachycardia - 5 beats, avg 132bpm.  No atrial fibrillation.  IVCD present.  There were no pauses of greater than 3 seconds.  Rare premature atrial contractions beats (isolated <1%).  Rare premature ventricular contractions (isolated <1%).  Symptom triggers and diary entries (2) correlated to sinus rhythm, SVT, PACs, PVCs.      5/28/2025 cardiac MRI  Normal biventricular function with LVEF 55% and RVEF 68% without evidence of fibrosis.  Compared to prior CMR on 4/11/25, LV function has improved.     4/11/2025 cardiac MRI  Non ischemic cardiomyopathy with LVEF of 48% with no LGE. The cause of cardiomyopathy is  likely related to left bundle branch block and some degree of basal septal tethering related " to the  prosthetic aortic valve.       Cc: Jose Nagy MD, Carlyle Zapata PA-C Amila Dilusha William, MD  6/12/2025  11:14 AM

## 2025-06-12 NOTE — TELEPHONE ENCOUNTER
PC from daughter in law while pt was still in clinic  Daughter in law clarified that she was taking Metoprolol 50mg Daily, that was changed a week ago by Dr Nagy  Discussed with pt and daughter in law pt should stay at current dose of Metoprolol  Per Dr Majano's OV:  - continue metoprolol XL at current dose - clarify if taking 25 vs 50mg daily - limited ability to titrate given low BP   Contreras in law verbalized understanding, has no further questions or concerns at this time, and has our contact information if needed.  6/12/2025 12:09 PM  Ximena Nicole RN

## 2025-06-12 NOTE — PATIENT INSTRUCTIONS
[facility]  Cardiac Electrophysiology  [address]  [place] [address]   Office: [phone]  Fax: [phone]     Thank you for seeing us in clinic today - it is a pleasure to be a part of your care team.  Below is a summary of our plan from today's visit.       You have supraventricular tachycardia (SVT) as well as episode of near syncope.  The relationshipo between these is not known.      We reviewed SVT and reviewed treatment options including electrophysiology study and ablation vs medical therapy vs observation.      At present, you are not inclined towards ablation.  Medical therapy options are limited in the setting of your low blood pressures.    We will plan for the following:  - continue your current medications -- clarify if taking 25 vs 50mg daily   - stress testing to evaluate for candidacy for use of flecainide which would be used in addition to your current medications for SVT suppression  - if we initiate flecainide, we would plan for repeat Zio monitoring on therapy to assess SVT episode frequency       Please do not hesitate to be in touch with our office at [phone] with any questions that may arise.       Thank you for trusting us with your care,    Kameron Majano MD  Clinical Cardiac Electrophysiology  [facility]  [address]  [place] [address]   Office: [phone]  Fax: [phone]

## 2025-06-19 ENCOUNTER — TRANSFERRED RECORDS (OUTPATIENT)
Dept: HEALTH INFORMATION MANAGEMENT | Facility: CLINIC | Age: 85
End: 2025-06-19
Payer: COMMERCIAL

## 2025-06-24 ENCOUNTER — LAB REQUISITION (OUTPATIENT)
Dept: LAB | Facility: CLINIC | Age: 85
End: 2025-06-24
Payer: COMMERCIAL

## 2025-06-24 DIAGNOSIS — Z95.2 PRESENCE OF PROSTHETIC HEART VALVE: ICD-10-CM

## 2025-06-29 ENCOUNTER — HEALTH MAINTENANCE LETTER (OUTPATIENT)
Age: 85
End: 2025-06-29

## 2025-07-03 LAB
INR PPP: 3.38 (ref 0.85–1.15)
PROTHROMBIN TIME: 33.2 SECONDS (ref 11.8–14.8)

## 2025-07-03 PROCEDURE — 85610 PROTHROMBIN TIME: CPT | Mod: ORL | Performed by: PHYSICIAN ASSISTANT

## 2025-07-03 PROCEDURE — P9604 ONE-WAY ALLOW PRORATED TRIP: HCPCS | Mod: ORL | Performed by: PHYSICIAN ASSISTANT

## 2025-07-03 PROCEDURE — 36415 COLL VENOUS BLD VENIPUNCTURE: CPT | Mod: ORL | Performed by: PHYSICIAN ASSISTANT

## 2025-07-09 ENCOUNTER — OFFICE VISIT (OUTPATIENT)
Dept: CARDIOLOGY | Facility: CLINIC | Age: 85
End: 2025-07-09
Payer: COMMERCIAL

## 2025-07-09 VITALS
RESPIRATION RATE: 16 BRPM | HEART RATE: 66 BPM | BODY MASS INDEX: 24.2 KG/M2 | HEIGHT: 63 IN | SYSTOLIC BLOOD PRESSURE: 92 MMHG | DIASTOLIC BLOOD PRESSURE: 60 MMHG | WEIGHT: 136.6 LBS

## 2025-07-09 DIAGNOSIS — I50.21 ACUTE SYSTOLIC HEART FAILURE (H): Primary | ICD-10-CM

## 2025-07-09 PROCEDURE — 3078F DIAST BP <80 MM HG: CPT | Performed by: INTERNAL MEDICINE

## 2025-07-09 PROCEDURE — G2211 COMPLEX E/M VISIT ADD ON: HCPCS | Performed by: INTERNAL MEDICINE

## 2025-07-09 PROCEDURE — 99214 OFFICE O/P EST MOD 30 MIN: CPT | Performed by: INTERNAL MEDICINE

## 2025-07-09 PROCEDURE — 3074F SYST BP LT 130 MM HG: CPT | Performed by: INTERNAL MEDICINE

## 2025-07-09 RX ORDER — METOPROLOL SUCCINATE 25 MG/1
25 TABLET, EXTENDED RELEASE ORAL DAILY
COMMUNITY
Start: 2025-07-09 | End: 2025-07-10

## 2025-07-09 RX ORDER — ESTRADIOL 0.1 MG/G
CREAM VAGINAL
COMMUNITY

## 2025-07-09 NOTE — LETTER
7/9/2025    Carlyle Zapata PA-C  [facility] [address]  [facility] [address]    RE: Kacie Hermosillo       Dear Colleague,     I had the pleasure of seeing Kacie Herm[facility]illo in the [facility].    HEART CARE NOTE          Assessment/Recommendations     1. HFimpEF  Assessment / Plan  Repeat MRI significant for sustained, improved systolic-BiV function - will use MRI moving forward for follow-up assessments of cardiac function  Near euvolemia on physical exam; denies HF symptoms - no changes at this time  Patient is high risk for adverse cardiac events 2/2 advanced age, frailty, renal dysfunction  GDMT as detailed below; current recommendations are to continue HFrEF therapy despite interval improvement in LVSF (COR 1) as long as not otherwise contraindicated.  Reduce metoprolol to 25 mg daily given fatigue     Current Pharmacotherapy AHA Guideline-Directed Medical Therapy   Lisinopril 2.5 mg daily Lisinopril 20 mg twice daily   Metoprolol succinate 25 mg daily Carvedilol 25 mg twice daily   Spironolactone 12.5 mg Spironolactone 25 mg once daily   Hydralazine NA Hydralazine 100 mg three times daily   Isosorbide dinitrate NA Isosorbide dinitrate 40 mg three times daily   SGLT2 inhibitor: Dapagliflozin 10 mg daily Dapagliflozin or Empagliflozin 10 mg daily      2. CKD  Assessment / Plan  Stable renal function - no additional recommendations at this time     3. HTN  Assessment / Plan  Borderline BP - patient denies associated symptoms     4. Valvular heart disease  Assessment / Plan  S/p TAVR; warfarin per PMD and coumadin clinic     5. HLP  Assessment / Plan  Currently on atorvastatin     6. SVT  Assessment / Plan  Follows with Dr. Majano in EP - please see detailed notes in chart re: plan of care    30 minutes spent reviewing prior records (including documentation, laboratory studies, cardiac testing/imaging), history and physical exam, planning, and subsequent  documentation.    The longitudinal plan of care for HFimpEF was addressed during this visit. Due to the added complexity in care, I will continue to support Ms. Kacie Hermosillo in the subsequent management of this condition(s) and with the ongoing continuity of care of this condition(s).      History of Present Illness/Subjective    Ms. Kacie Hermosillo is a 84 year old female with a PMHx significant for (per Epic notation) medical history documented above presented to the ER for evaluation of increased weakness and fatigue as well as wheezing and worsening shortness of breath ongoing for the past few days in the setting of recent hospitalization for electrolyte abnormalities      Today,  Mrs. Hermosillo denies acute HF complaints; Management plan as detailed above     ECG: Personally reviewed. normal sinus rhythm, nonspecific ST and T waves changes.     Cardiac MRI  (personally reviewed 7/9/25:   Clinical history: 85F with metastatic breast cancer s/p trastuzumab, TAVR prosthesis, and LV dysfunction  who presents for interval evaluation.      Comparison CMR: 4/11/2025      1. The left ventricle is normal in cavity size and wall thickness. The global systolic function is normal.  The LVEF is 55%. There is mild septal dyssynchrony from conduction delay.     2. The right ventricle is normal in cavity size. The global systolic function is normal. The RVEF is 68%.      3. Both atria are normal in size.     4. There is a well seated Cecilia bioprosthetic valve present in the aortic position. There is no other  significant valvular disease.     5. Late gadolinium enhancement imaging shows no replacement fibrosis.      6. There is no pericardial effusion.     CONCLUSIONS: Normal biventricular function with LVEF 55% and RVEF 68% without evidence of fibrosis.  Compared to prior CMR on 4/11/25, LV function has improved.     Lab results: personally reviewed July 9, 2025; notable for CKD stage 3    Medical history and pertinent  "documents reviewed in Care Everywhere please where applicable see details above        Physical Examination Review of Systems   BP 92/60 (BP Location: Left arm, Patient Position: Sitting, Cuff Size: Adult Regular)   Pulse 66   Resp 16   Ht 1.6 m (5' 3\")   Wt 62 kg (136 lb 9.6 oz)   BMI 24.20 kg/m    Body mass index is 24.2 kg/m .  Wt Readings from Last 3 Encounters:   07/09/25 62 kg (136 lb 9.6 oz)   06/27/25 63 kg (139 lb)   06/12/25 62.6 kg (138 lb)     General Appearance:   no distress, normal body habitus   ENT/Mouth: membranes moist, no oral lesions or bleeding gums.      EYES:  no scleral icterus, normal conjunctivae   Neck: no carotid bruits or thyromegaly   Chest/Lungs:   lungs are clear to auscultation, no rales or wheezing, equal chest wall expansion    Cardiovascular:   Regular. Normal first and second heart sounds with no murmurs, rubs, or gallops; the carotid, radial and posterior tibial pulses are intact, no JVD or LE edema bilaterally    Abdomen:  no organomegaly, masses, bruits, or tenderness; bowel sounds are present   Extremities: no cyanosis or clubbing   Skin: no xanthelasma, warm.    Neurologic: NAD     Psychiatric: alert and oriented x3, calm     A complete 10 systems ROS was reviewed  And is negative except what is listed in the HPI.          Medical History  Surgical History Family History Social History   Past Medical History:   Diagnosis Date     Breast cancer (H) 01/01/1999     Heart valve replaced      Hx antineoplastic chemotherapy 01/01/1999     Hx of radiation therapy 01/01/1999     Hypertension     Past Surgical History:   Procedure Laterality Date     BIOPSY BREAST       HYSTERECTOMY       IR CHEST PORT PLACEMENT > 5 YRS OF AGE  7/30/2024     LUMPECTOMY BREAST Left 1999     OOPHORECTOMY Bilateral     no family history of premature coronary artery disease Social History     Socioeconomic History     Marital status:      Spouse name: Not on file     Number of children: Not " on file     Years of education: Not on file     Highest education level: Not on file   Occupational History     Not on file   Tobacco Use     Smoking status: Never     Smokeless tobacco: Never   Vaping Use     Vaping status: Never Used   Substance and Sexual Activity     Alcohol use: Yes     Comment: Rarely     Drug use: Not on file     Sexual activity: Not on file   Other Topics Concern     Not on file   Social History Narrative     Not on file     Social Drivers of Health     Financial Resource Strain: Low Risk  (10/29/2024)    Financial Resource Strain      Within the past 12 months, have you or your family members you live with been unable to get utilities (heat, electricity) when it was really needed?: No   Food Insecurity: Low Risk  (10/29/2024)    Food Insecurity      Within the past 12 months, did you worry that your food would run out before you got money to buy more?: No      Within the past 12 months, did the food you bought just not last and you didn t have money to get more?: No   Transportation Needs: Low Risk  (10/29/2024)    Transportation Needs      Within the past 12 months, has lack of transportation kept you from medical appointments, getting your medicines, non-medical meetings or appointments, work, or from getting things that you need?: No   Physical Activity: Unknown (5/24/2024)    Exercise Vital Sign      Days of Exercise per Week: 0 days      Minutes of Exercise per Session: Not on file   Stress: No Stress Concern Present (5/24/2024)    [employer] - Occupational Stress Questionnaire      Feeling of Stress : Only a little   Social Connections: Unknown (5/24/2024)    Social Connection and Isolation Panel [NHANES]      Frequency of Communication with Friends and Family: Not on file      Frequency of Social Gatherings with Friends and Family: Twice a week      Attends Yazidism Services: Not on file      Active Member of Clubs or Organizations: Not on file       "Attends Club or Organization Meetings: Not on file      Marital Status: Not on file   Interpersonal Safety: Low Risk  (10/29/2024)    Interpersonal Safety      Do you feel physically and emotionally safe where you currently live?: Yes      Within the past 12 months, have you been hit, slapped, kicked or otherwise physically hurt by someone?: No      Within the past 12 months, have you been humiliated or emotionally abused in other ways by your partner or ex-partner?: No   Recent Concern: Interpersonal Safety - High Risk (10/7/2024)    Interpersonal Safety      Do you feel physically and emotionally safe where you currently live?: No      Within the past 12 months, have you been hit, slapped, kicked or otherwise physically hurt by someone?: No      Within the past 12 months, have you been humiliated or emotionally abused in other ways by your partner or ex-partner?: No   Housing Stability: Low Risk  (10/29/2024)    Housing Stability      Do you have housing? : Yes      Are you worried about losing your housing?: No           Lab Results    Chemistry/lipid CBC Cardiac Enzymes/BNP/TSH/INR   Lab Results   Component Value Date    CHOL 126 05/24/2024    HDL 62 05/24/2024    TRIG 64 05/24/2024    BUN 23.1 (H) 06/27/2025     06/27/2025    CO2 23 06/27/2025    Lab Results   Component Value Date    WBC 6.9 06/27/2025    HGB 13.2 06/27/2025    HCT 39.7 06/27/2025    MCV 90 06/27/2025     06/27/2025    Lab Results   Component Value Date    TSH 3.54 11/14/2024    INR 3.38 (H) 07/03/2025     No results found for: \"CKTOTAL\", \"CKMB\", \"TROPONINI\"       Weight:    Wt Readings from Last 3 Encounters:   07/09/25 62 kg (136 lb 9.6 oz)   06/27/25 63 kg (139 lb)   06/12/25 62.6 kg (138 lb)       Allergies  No Known Allergies      Surgical History  Past Surgical History:   Procedure Laterality Date     BIOPSY BREAST       HYSTERECTOMY       IR CHEST PORT PLACEMENT > 5 YRS OF AGE  7/30/2024     LUMPECTOMY BREAST Left 1999     " OOPHORECTOMY Bilateral        Social History  Tobacco:   History   Smoking Status     Never   Smokeless Tobacco     Never    Alcohol:   Social History    Substance and Sexual Activity      Alcohol use: Yes        Comment: Rarely   Illicit Drugs:   History   Drug Use Not on file       Family History  Family History   Problem Relation Age of Onset     Breast Cancer Maternal Aunt      Hereditary Breast and Ovarian Cancer Syndrome No family hx of           Jose Nagy MD on 7/9/2025      cc: Carlyle Zapata      Thank you for allowing me to participate in the care of your patient.      Sincerely,     Jose Nagy MD     [facility]  cc:   Jose Nagy MD  [address] [id number]

## 2025-07-09 NOTE — PROGRESS NOTES
HEART CARE NOTE          Assessment/Recommendations     1. HFimpEF  Assessment / Plan  Repeat MRI significant for sustained, improved systolic-BiV function - will use MRI moving forward for follow-up assessments of cardiac function  Near euvolemia on physical exam; denies HF symptoms - no changes at this time  Patient is high risk for adverse cardiac events 2/2 advanced age, frailty, renal dysfunction  GDMT as detailed below; current recommendations are to continue HFrEF therapy despite interval improvement in LVSF (COR 1) as long as not otherwise contraindicated.  Reduce metoprolol to 25 mg daily given fatigue     Current Pharmacotherapy AHA Guideline-Directed Medical Therapy   Lisinopril 2.5 mg daily Lisinopril 20 mg twice daily   Metoprolol succinate 25 mg daily Carvedilol 25 mg twice daily   Spironolactone 12.5 mg Spironolactone 25 mg once daily   Hydralazine NA Hydralazine 100 mg three times daily   Isosorbide dinitrate NA Isosorbide dinitrate 40 mg three times daily   SGLT2 inhibitor: Dapagliflozin 10 mg daily Dapagliflozin or Empagliflozin 10 mg daily      2. CKD  Assessment / Plan  Stable renal function - no additional recommendations at this time     3. HTN  Assessment / Plan  Borderline BP - patient denies associated symptoms     4. Valvular heart disease  Assessment / Plan  S/p TAVR; warfarin per PMD and coumadin clinic     5. HLP  Assessment / Plan  Currently on atorvastatin     6. SVT  Assessment / Plan  Follows with Dr. Majano in EP - please see detailed notes in chart re: plan of care    30 minutes spent reviewing prior records (including documentation, laboratory studies, cardiac testing/imaging), history and physical exam, planning, and subsequent documentation.    The longitudinal plan of care for HFimpEF was addressed during this visit. Due to the added complexity in care, I will continue to support Ms. Kacie Hermosillo in the subsequent management of this condition(s) and with the ongoing  continuity of care of this condition(s).      History of Present Illness/Subjective    Ms. Kacie Hermosillo is a 84 year old female with a PMHx significant for (per Epic notation) medical history documented above presented to the ER for evaluation of increased weakness and fatigue as well as wheezing and worsening shortness of breath ongoing for the past few days in the setting of recent hospitalization for electrolyte abnormalities      Today,  Mrs. Hermosillo denies acute HF complaints; Management plan as detailed above     ECG: Personally reviewed. normal sinus rhythm, nonspecific ST and T waves changes.     Cardiac MRI  (personally reviewed 7/9/25:   Clinical history: 85F with metastatic breast cancer s/p trastuzumab, TAVR prosthesis, and LV dysfunction  who presents for interval evaluation.      Comparison CMR: 4/11/2025      1. The left ventricle is normal in cavity size and wall thickness. The global systolic function is normal.  The LVEF is 55%. There is mild septal dyssynchrony from conduction delay.     2. The right ventricle is normal in cavity size. The global systolic function is normal. The RVEF is 68%.      3. Both atria are normal in size.     4. There is a well seated Cecilia bioprosthetic valve present in the aortic position. There is no other  significant valvular disease.     5. Late gadolinium enhancement imaging shows no replacement fibrosis.      6. There is no pericardial effusion.     CONCLUSIONS: Normal biventricular function with LVEF 55% and RVEF 68% without evidence of fibrosis.  Compared to prior CMR on 4/11/25, LV function has improved.     Lab results: personally reviewed July 9, 2025; notable for CKD stage 3    Medical history and pertinent documents reviewed in Care Everywhere please where applicable see details above        Physical Examination Review of Systems   BP 92/60 (BP Location: Left arm, Patient Position: Sitting, Cuff Size: Adult Regular)   Pulse 66   Resp 16   Ht 1.6 m  "(5' 3\")   Wt 62 kg (136 lb 9.6 oz)   BMI 24.20 kg/m    Body mass index is 24.2 kg/m .  Wt Readings from Last 3 Encounters:   07/09/25 62 kg (136 lb 9.6 oz)   06/27/25 63 kg (139 lb)   06/12/25 62.6 kg (138 lb)     General Appearance:   no distress, normal body habitus   ENT/Mouth: membranes moist, no oral lesions or bleeding gums.      EYES:  no scleral icterus, normal conjunctivae   Neck: no carotid bruits or thyromegaly   Chest/Lungs:   lungs are clear to auscultation, no rales or wheezing, equal chest wall expansion    Cardiovascular:   Regular. Normal first and second heart sounds with no murmurs, rubs, or gallops; the carotid, radial and posterior tibial pulses are intact, no JVD or LE edema bilaterally    Abdomen:  no organomegaly, masses, bruits, or tenderness; bowel sounds are present   Extremities: no cyanosis or clubbing   Skin: no xanthelasma, warm.    Neurologic: NAD     Psychiatric: alert and oriented x3, calm     A complete 10 systems ROS was reviewed  And is negative except what is listed in the HPI.          Medical History  Surgical History Family History Social History   Past Medical History:   Diagnosis Date    Breast cancer (H) 01/01/1999    Heart valve replaced     Hx antineoplastic chemotherapy 01/01/1999    Hx of radiation therapy 01/01/1999    Hypertension     Past Surgical History:   Procedure Laterality Date    BIOPSY BREAST      HYSTERECTOMY      IR CHEST PORT PLACEMENT > 5 YRS OF AGE  7/30/2024    LUMPECTOMY BREAST Left 1999    OOPHORECTOMY Bilateral     no family history of premature coronary artery disease Social History     Socioeconomic History    Marital status:      Spouse name: Not on file    Number of children: Not on file    Years of education: Not on file    Highest education level: Not on file   Occupational History    Not on file   Tobacco Use    Smoking status: Never    Smokeless tobacco: Never   Vaping Use    Vaping status: Never Used   Substance and Sexual Activity "    Alcohol use: Yes     Comment: Rarely    Drug use: Not on file    Sexual activity: Not on file   Other Topics Concern    Not on file   Social History Narrative    Not on file     Social Drivers of Health     Financial Resource Strain: Low Risk  (10/29/2024)    Financial Resource Strain     Within the past 12 months, have you or your family members you live with been unable to get utilities (heat, electricity) when it was really needed?: No   Food Insecurity: Low Risk  (10/29/2024)    Food Insecurity     Within the past 12 months, did you worry that your food would run out before you got money to buy more?: No     Within the past 12 months, did the food you bought just not last and you didn t have money to get more?: No   Transportation Needs: Low Risk  (10/29/2024)    Transportation Needs     Within the past 12 months, has lack of transportation kept you from medical appointments, getting your medicines, non-medical meetings or appointments, work, or from getting things that you need?: No   Physical Activity: Unknown (5/24/2024)    Exercise Vital Sign     Days of Exercise per Week: 0 days     Minutes of Exercise per Session: Not on file   Stress: No Stress Concern Present (5/24/2024)    [employer] [place] [employer] - Occupational Stress Questionnaire     Feeling of Stress : Only a little   Social Connections: Unknown (5/24/2024)    Social Connection and Isolation Panel [NHANES]     Frequency of Communication with Friends and Family: Not on file     Frequency of Social Gatherings with Friends and Family: Twice a week     Attends Congregational Services: Not on file     Active Member of Clubs or Organizations: Not on file     Attends Club or Organization Meetings: Not on file     Marital Status: Not on file   Interpersonal Safety: Low Risk  (10/29/2024)    Interpersonal Safety     Do you feel physically and emotionally safe where you currently live?: Yes     Within the past 12 months, have you been hit,  "slapped, kicked or otherwise physically hurt by someone?: No     Within the past 12 months, have you been humiliated or emotionally abused in other ways by your partner or ex-partner?: No   Recent Concern: Interpersonal Safety - High Risk (10/7/2024)    Interpersonal Safety     Do you feel physically and emotionally safe where you currently live?: No     Within the past 12 months, have you been hit, slapped, kicked or otherwise physically hurt by someone?: No     Within the past 12 months, have you been humiliated or emotionally abused in other ways by your partner or ex-partner?: No   Housing Stability: Low Risk  (10/29/2024)    Housing Stability     Do you have housing? : Yes     Are you worried about losing your housing?: No           Lab Results    Chemistry/lipid CBC Cardiac Enzymes/BNP/TSH/INR   Lab Results   Component Value Date    CHOL 126 05/24/2024    HDL 62 05/24/2024    TRIG 64 05/24/2024    BUN 23.1 (H) 06/27/2025     06/27/2025    CO2 23 06/27/2025    Lab Results   Component Value Date    WBC 6.9 06/27/2025    HGB 13.2 06/27/2025    HCT 39.7 06/27/2025    MCV 90 06/27/2025     06/27/2025    Lab Results   Component Value Date    TSH 3.54 11/14/2024    INR 3.38 (H) 07/03/2025     No results found for: \"CKTOTAL\", \"CKMB\", \"TROPONINI\"       Weight:    Wt Readings from Last 3 Encounters:   07/09/25 62 kg (136 lb 9.6 oz)   06/27/25 63 kg (139 lb)   06/12/25 62.6 kg (138 lb)       Allergies  No Known Allergies      Surgical History  Past Surgical History:   Procedure Laterality Date    BIOPSY BREAST      HYSTERECTOMY      IR CHEST PORT PLACEMENT > 5 YRS OF AGE  7/30/2024    LUMPECTOMY BREAST Left 1999    OOPHORECTOMY Bilateral        Social History  Tobacco:   History   Smoking Status    Never   Smokeless Tobacco    Never    Alcohol:   Social History    Substance and Sexual Activity      Alcohol use: Yes        Comment: Rarely   Illicit Drugs:   History   Drug Use Not on file       Family " History  Family History   Problem Relation Age of Onset    Breast Cancer Maternal Aunt     Hereditary Breast and Ovarian Cancer Syndrome No family hx of           Jose Nagy MD on 7/9/2025      cc: Carlyle Zapata

## 2025-07-10 ENCOUNTER — PATIENT OUTREACH (OUTPATIENT)
Dept: ONCOLOGY | Facility: HOSPITAL | Age: 85
End: 2025-07-10
Payer: COMMERCIAL

## 2025-07-10 ENCOUNTER — TELEPHONE (OUTPATIENT)
Dept: CARDIOLOGY | Facility: CLINIC | Age: 85
End: 2025-07-10
Payer: COMMERCIAL

## 2025-07-10 DIAGNOSIS — I50.21 ACUTE SYSTOLIC HEART FAILURE (H): ICD-10-CM

## 2025-07-10 RX ORDER — METOPROLOL SUCCINATE 25 MG/1
25 TABLET, EXTENDED RELEASE ORAL DAILY
Qty: 90 TABLET | Refills: 3 | Status: SHIPPED | OUTPATIENT
Start: 2025-07-10

## 2025-07-10 NOTE — TELEPHONE ENCOUNTER
"  Per Dr Nagy's clinic note, \"Reduce metoprolol to 25 mg daily given fatigue\".  MAR reflects previous Metoprolol Succinate ER 50 mg/day, and Metoprolol Succinate 25 mg/day.  The 50 mg/day is now discontinued.    Called Ariella at facility confirmed fax number.  It was reported facility only checks weights, BP, and HR once a month.  Patient can ask staff for assistance in getting these values daily at the RN station.  Called  patient's daughter Sulma (primary) and relayed staff can assist with BP and HR monitoring daily at patient's request.  It is reported patient is weighing her self daily at this time.    Thank you    Thomas Lazcano RN      "

## 2025-07-10 NOTE — TELEPHONE ENCOUNTER
[facility] Call Center    Phone Message    May a detailed message be left on voicemail: yes     Reason for Call: Other: Ariella called from [facility] about pt's recent after visit notes. Ariella is requesting signed orders be faxed to [phone] about the change in the metoprolol to 25mg once daily.      Ariella also advised that they do not do daily BP and HR checks, only monthly for vitals. She states she can advise pt to monitor her vitals herself if she wishes to do so.     Action Taken: Message routed to:  Other: [facility] [[id number]]    Travel Screening: Not Applicable     Date of Service:

## 2025-07-10 NOTE — PROGRESS NOTES
"[facility]: Cancer Care                                                                                           Called Sulma as she had cancelled and rescheduled Kacie's follow up and enhertu infusion that had been scheduled 7/16/25 with the cancellation comment \"She has been sick for 2 weeks after last infusion. She needs a little break. \"   Sulma relayed that starting 2 days post Kacie's  last enhertu infusion on 6/27, and for 2 weeks following,  Kacie has been fatigued, had stomach cramps and then had diarrhea the second week post her infusion. Kacie has been eating  and drinking well (getting meals in her room), and they are aware of how to take imodium but Kacie does not want to take as often as she should as she has concerns about getting constipated. No nausea or vomiting.   Her metoprolol had been increased last month by the cardiologist due to SVT (heart monitor captured 204 episodes in 12 days) Kacie did see the cardiologist on 7/9 and the dose of metoprolol was decrease as side effect of this  drug is fatigue. Kacie was able to go out for pizza and a beer on 7/9 and feels much better today. Kacie does not want to have treatment on 7/16 as she wants to have a break and enjoy some quality of life.  Did encourage them to call triage in the future if having sx from treatment so that we can help her better manage. Reviewed how to take imodium. She had rescheduled the follow up  and infusion on 8/7. Did offer 7.25 or 7.28 as options. She will talk with her family about ability to bring Kacie. Sulma will be gone at this time. She will call if they can come sooner.    Signature:  Mihaela Koo RN  "

## 2025-07-14 ENCOUNTER — LAB REQUISITION (OUTPATIENT)
Dept: LAB | Facility: CLINIC | Age: 85
End: 2025-07-14
Payer: COMMERCIAL

## 2025-07-14 DIAGNOSIS — Z95.2 PRESENCE OF PROSTHETIC HEART VALVE: ICD-10-CM

## 2025-07-17 LAB
INR PPP: 2.08 (ref 0.85–1.15)
PROTHROMBIN TIME: 23.5 SECONDS (ref 11.8–14.8)

## 2025-07-17 PROCEDURE — 36415 COLL VENOUS BLD VENIPUNCTURE: CPT | Mod: ORL | Performed by: PHYSICIAN ASSISTANT

## 2025-07-17 PROCEDURE — 85610 PROTHROMBIN TIME: CPT | Mod: ORL | Performed by: PHYSICIAN ASSISTANT

## 2025-07-17 PROCEDURE — P9604 ONE-WAY ALLOW PRORATED TRIP: HCPCS | Mod: ORL | Performed by: PHYSICIAN ASSISTANT

## 2025-07-21 ENCOUNTER — PATIENT OUTREACH (OUTPATIENT)
Dept: ONCOLOGY | Facility: HOSPITAL | Age: 85
End: 2025-07-21
Payer: COMMERCIAL

## 2025-07-24 ENCOUNTER — PATIENT OUTREACH (OUTPATIENT)
Dept: ONCOLOGY | Facility: HOSPITAL | Age: 85
End: 2025-07-24

## 2025-07-24 ENCOUNTER — ONCOLOGY VISIT (OUTPATIENT)
Dept: ONCOLOGY | Facility: HOSPITAL | Age: 85
End: 2025-07-24
Attending: INTERNAL MEDICINE
Payer: COMMERCIAL

## 2025-07-24 ENCOUNTER — INFUSION THERAPY VISIT (OUTPATIENT)
Dept: INFUSION THERAPY | Facility: HOSPITAL | Age: 85
End: 2025-07-24
Attending: INTERNAL MEDICINE
Payer: COMMERCIAL

## 2025-07-24 VITALS
RESPIRATION RATE: 16 BRPM | WEIGHT: 141 LBS | SYSTOLIC BLOOD PRESSURE: 130 MMHG | TEMPERATURE: 97.9 F | OXYGEN SATURATION: 98 % | HEIGHT: 63 IN | DIASTOLIC BLOOD PRESSURE: 70 MMHG | BODY MASS INDEX: 24.98 KG/M2 | HEART RATE: 64 BPM

## 2025-07-24 DIAGNOSIS — R19.7 DIARRHEA, UNSPECIFIED TYPE: ICD-10-CM

## 2025-07-24 DIAGNOSIS — I50.30 DIASTOLIC HEART FAILURE, UNSPECIFIED HF CHRONICITY (H): ICD-10-CM

## 2025-07-24 DIAGNOSIS — C79.51 MALIGNANT NEOPLASM METASTATIC TO BONE (H): Primary | ICD-10-CM

## 2025-07-24 DIAGNOSIS — C79.51 MALIGNANT NEOPLASM METASTATIC TO BONE (H): ICD-10-CM

## 2025-07-24 LAB
ALBUMIN SERPL BCG-MCNC: 3.8 G/DL (ref 3.5–5.2)
ALP SERPL-CCNC: 66 U/L (ref 40–150)
ALT SERPL W P-5'-P-CCNC: 16 U/L (ref 0–50)
ANION GAP SERPL CALCULATED.3IONS-SCNC: 11 MMOL/L (ref 7–15)
AST SERPL W P-5'-P-CCNC: 26 U/L (ref 0–45)
BASOPHILS # BLD AUTO: 0.1 10E3/UL (ref 0–0.2)
BASOPHILS NFR BLD AUTO: 1 %
BILIRUB SERPL-MCNC: 0.3 MG/DL
BUN SERPL-MCNC: 21.5 MG/DL (ref 8–23)
CALCIUM SERPL-MCNC: 9 MG/DL (ref 8.8–10.4)
CHLORIDE SERPL-SCNC: 110 MMOL/L (ref 98–107)
CREAT SERPL-MCNC: 1.15 MG/DL (ref 0.51–0.95)
EGFRCR SERPLBLD CKD-EPI 2021: 46 ML/MIN/1.73M2
EOSINOPHIL # BLD AUTO: 0.3 10E3/UL (ref 0–0.7)
EOSINOPHIL NFR BLD AUTO: 3 %
ERYTHROCYTE [DISTWIDTH] IN BLOOD BY AUTOMATED COUNT: 17.2 % (ref 10–15)
GLUCOSE SERPL-MCNC: 97 MG/DL (ref 70–99)
HCO3 SERPL-SCNC: 20 MMOL/L (ref 22–29)
HCT VFR BLD AUTO: 37.5 % (ref 35–47)
HGB BLD-MCNC: 12.4 G/DL (ref 11.7–15.7)
IMM GRANULOCYTES # BLD: 0 10E3/UL
IMM GRANULOCYTES NFR BLD: 1 %
LYMPHOCYTES # BLD AUTO: 1 10E3/UL (ref 0.8–5.3)
LYMPHOCYTES NFR BLD AUTO: 13 %
MCH RBC QN AUTO: 30 PG (ref 26.5–33)
MCHC RBC AUTO-ENTMCNC: 33.1 G/DL (ref 31.5–36.5)
MCV RBC AUTO: 91 FL (ref 78–100)
MONOCYTES # BLD AUTO: 0.9 10E3/UL (ref 0–1.3)
MONOCYTES NFR BLD AUTO: 11 %
NEUTROPHILS # BLD AUTO: 5.8 10E3/UL (ref 1.6–8.3)
NEUTROPHILS NFR BLD AUTO: 72 %
NRBC # BLD AUTO: 0 10E3/UL
NRBC BLD AUTO-RTO: 0 /100
PLATELET # BLD AUTO: 263 10E3/UL (ref 150–450)
POTASSIUM SERPL-SCNC: 4.1 MMOL/L (ref 3.4–5.3)
PROT SERPL-MCNC: 5.9 G/DL (ref 6.4–8.3)
RBC # BLD AUTO: 4.14 10E6/UL (ref 3.8–5.2)
SODIUM SERPL-SCNC: 141 MMOL/L (ref 135–145)
WBC # BLD AUTO: 8 10E3/UL (ref 4–11)

## 2025-07-24 PROCEDURE — 258N000003 HC RX IP 258 OP 636: Performed by: INTERNAL MEDICINE

## 2025-07-24 PROCEDURE — 36591 DRAW BLOOD OFF VENOUS DEVICE: CPT

## 2025-07-24 PROCEDURE — 80053 COMPREHEN METABOLIC PANEL: CPT

## 2025-07-24 PROCEDURE — 85004 AUTOMATED DIFF WBC COUNT: CPT

## 2025-07-24 PROCEDURE — 250N000011 HC RX IP 250 OP 636: Performed by: INTERNAL MEDICINE

## 2025-07-24 PROCEDURE — G0463 HOSPITAL OUTPT CLINIC VISIT: HCPCS | Performed by: INTERNAL MEDICINE

## 2025-07-24 RX ORDER — ALBUTEROL SULFATE 0.83 MG/ML
2.5 SOLUTION RESPIRATORY (INHALATION)
Status: DISCONTINUED | OUTPATIENT
Start: 2025-07-24 | End: 2025-07-24 | Stop reason: HOSPADM

## 2025-07-24 RX ORDER — DEXAMETHASONE 4 MG/1
8 TABLET ORAL DAILY
Qty: 6 TABLET | Refills: 0 | Status: SHIPPED | OUTPATIENT
Start: 2025-07-24

## 2025-07-24 RX ORDER — ALBUTEROL SULFATE 90 UG/1
1-2 INHALANT RESPIRATORY (INHALATION)
Status: DISCONTINUED | OUTPATIENT
Start: 2025-07-24 | End: 2025-07-24 | Stop reason: HOSPADM

## 2025-07-24 RX ORDER — MEPERIDINE HYDROCHLORIDE 50 MG/ML
25 INJECTION INTRAMUSCULAR; INTRAVENOUS; SUBCUTANEOUS
Status: CANCELLED | OUTPATIENT
Start: 2025-07-24

## 2025-07-24 RX ORDER — HEPARIN SODIUM,PORCINE 10 UNIT/ML
5-20 VIAL (ML) INTRAVENOUS DAILY PRN
OUTPATIENT
Start: 2025-08-28

## 2025-07-24 RX ORDER — PALONOSETRON 0.05 MG/ML
0.25 INJECTION, SOLUTION INTRAVENOUS ONCE
Status: CANCELLED | OUTPATIENT
Start: 2025-07-24

## 2025-07-24 RX ORDER — EPINEPHRINE 1 MG/ML
0.3 INJECTION, SOLUTION INTRAMUSCULAR; SUBCUTANEOUS EVERY 5 MIN PRN
Status: CANCELLED | OUTPATIENT
Start: 2025-07-24

## 2025-07-24 RX ORDER — METHYLPREDNISOLONE SODIUM SUCCINATE 40 MG/ML
40 INJECTION INTRAMUSCULAR; INTRAVENOUS
Status: DISCONTINUED | OUTPATIENT
Start: 2025-07-24 | End: 2025-07-24 | Stop reason: HOSPADM

## 2025-07-24 RX ORDER — EPINEPHRINE 1 MG/ML
0.3 INJECTION, SOLUTION INTRAMUSCULAR; SUBCUTANEOUS EVERY 5 MIN PRN
Status: DISCONTINUED | OUTPATIENT
Start: 2025-07-24 | End: 2025-07-24 | Stop reason: HOSPADM

## 2025-07-24 RX ORDER — PALONOSETRON 0.05 MG/ML
0.25 INJECTION, SOLUTION INTRAVENOUS ONCE
Status: COMPLETED | OUTPATIENT
Start: 2025-07-24 | End: 2025-07-24

## 2025-07-24 RX ORDER — METHYLPREDNISOLONE SODIUM SUCCINATE 40 MG/ML
40 INJECTION INTRAMUSCULAR; INTRAVENOUS
Status: CANCELLED
Start: 2025-07-24

## 2025-07-24 RX ORDER — MEPERIDINE HYDROCHLORIDE 50 MG/ML
25 INJECTION INTRAMUSCULAR; INTRAVENOUS; SUBCUTANEOUS
Status: DISCONTINUED | OUTPATIENT
Start: 2025-07-24 | End: 2025-07-24 | Stop reason: HOSPADM

## 2025-07-24 RX ORDER — DIPHENHYDRAMINE HYDROCHLORIDE 50 MG/ML
50 INJECTION, SOLUTION INTRAMUSCULAR; INTRAVENOUS
Start: 2025-08-28

## 2025-07-24 RX ORDER — EPINEPHRINE 1 MG/ML
0.3 INJECTION, SOLUTION INTRAMUSCULAR; SUBCUTANEOUS EVERY 5 MIN PRN
OUTPATIENT
Start: 2025-08-28

## 2025-07-24 RX ORDER — DIPHENHYDRAMINE HYDROCHLORIDE 50 MG/ML
25 INJECTION, SOLUTION INTRAMUSCULAR; INTRAVENOUS
Status: CANCELLED
Start: 2025-07-24

## 2025-07-24 RX ORDER — MEPERIDINE HYDROCHLORIDE 50 MG/ML
25 INJECTION INTRAMUSCULAR; INTRAVENOUS; SUBCUTANEOUS EVERY 30 MIN PRN
OUTPATIENT
Start: 2025-08-28

## 2025-07-24 RX ORDER — DIPHENHYDRAMINE HYDROCHLORIDE 50 MG/ML
50 INJECTION, SOLUTION INTRAMUSCULAR; INTRAVENOUS
Status: DISCONTINUED | OUTPATIENT
Start: 2025-07-24 | End: 2025-07-24 | Stop reason: HOSPADM

## 2025-07-24 RX ORDER — ALBUTEROL SULFATE 0.83 MG/ML
2.5 SOLUTION RESPIRATORY (INHALATION)
OUTPATIENT
Start: 2025-08-28

## 2025-07-24 RX ORDER — ALBUTEROL SULFATE 90 UG/1
1-2 INHALANT RESPIRATORY (INHALATION)
Start: 2025-08-28

## 2025-07-24 RX ORDER — LORAZEPAM 2 MG/ML
0.5 INJECTION INTRAMUSCULAR EVERY 4 HOURS PRN
Status: CANCELLED | OUTPATIENT
Start: 2025-07-24

## 2025-07-24 RX ORDER — ALBUTEROL SULFATE 0.83 MG/ML
2.5 SOLUTION RESPIRATORY (INHALATION)
Status: CANCELLED | OUTPATIENT
Start: 2025-07-24

## 2025-07-24 RX ORDER — DIPHENHYDRAMINE HYDROCHLORIDE 50 MG/ML
50 INJECTION, SOLUTION INTRAMUSCULAR; INTRAVENOUS
Status: CANCELLED
Start: 2025-07-24

## 2025-07-24 RX ORDER — DIPHENHYDRAMINE HYDROCHLORIDE 50 MG/ML
25 INJECTION, SOLUTION INTRAMUSCULAR; INTRAVENOUS
Status: DISCONTINUED | OUTPATIENT
Start: 2025-07-24 | End: 2025-07-24 | Stop reason: HOSPADM

## 2025-07-24 RX ORDER — HEPARIN SODIUM (PORCINE) LOCK FLUSH IV SOLN 100 UNIT/ML 100 UNIT/ML
5 SOLUTION INTRAVENOUS
OUTPATIENT
Start: 2025-08-28

## 2025-07-24 RX ORDER — HEPARIN SODIUM (PORCINE) LOCK FLUSH IV SOLN 100 UNIT/ML 100 UNIT/ML
5 SOLUTION INTRAVENOUS
Status: CANCELLED | OUTPATIENT
Start: 2025-07-24

## 2025-07-24 RX ORDER — HEPARIN SODIUM,PORCINE 10 UNIT/ML
5-20 VIAL (ML) INTRAVENOUS DAILY PRN
Status: CANCELLED | OUTPATIENT
Start: 2025-07-24

## 2025-07-24 RX ORDER — ALBUTEROL SULFATE 90 UG/1
1-2 INHALANT RESPIRATORY (INHALATION)
Status: CANCELLED
Start: 2025-07-24

## 2025-07-24 RX ORDER — METHYLPREDNISOLONE SODIUM SUCCINATE 125 MG/2ML
125 INJECTION INTRAMUSCULAR; INTRAVENOUS
Start: 2025-08-28

## 2025-07-24 RX ORDER — HEPARIN SODIUM (PORCINE) LOCK FLUSH IV SOLN 100 UNIT/ML 100 UNIT/ML
5 SOLUTION INTRAVENOUS
Status: DISCONTINUED | OUTPATIENT
Start: 2025-07-24 | End: 2025-07-24 | Stop reason: HOSPADM

## 2025-07-24 RX ADMIN — HEPARIN 5 ML: 100 SYRINGE at 12:10

## 2025-07-24 RX ADMIN — DEXTROSE 250 ML: 5 SOLUTION INTRAVENOUS at 11:36

## 2025-07-24 RX ADMIN — PALONOSETRON 0.25 MG: 0.05 INJECTION, SOLUTION INTRAVENOUS at 10:38

## 2025-07-24 RX ADMIN — DEXAMETHASONE SODIUM PHOSPHATE: 10 INJECTION, SOLUTION INTRAMUSCULAR; INTRAVENOUS at 10:40

## 2025-07-24 RX ADMIN — ZOLEDRONIC ACID 3 MG: 4 INJECTION, SOLUTION, CONCENTRATE INTRAVENOUS at 11:02

## 2025-07-24 RX ADMIN — SODIUM CHLORIDE 250 ML: 0.9 INJECTION, SOLUTION INTRAVENOUS at 10:37

## 2025-07-24 RX ADMIN — FAM-TRASTUZUMAB DERUXTECAN-NXKI 242 MG: 100 INJECTION, POWDER, LYOPHILIZED, FOR SOLUTION INTRAVENOUS at 11:36

## 2025-07-24 ASSESSMENT — PAIN SCALES - GENERAL: PAINLEVEL_OUTOF10: NO PAIN (0)

## 2025-07-24 NOTE — LETTER
"7/24/2025      Kacie Hermosillo  [address]  [address]  [facility] [address]      Dear Colleague,    Thank you for referring your patient, Kacie Hermosillo, to the [facility]. Please see a copy of my visit note below.    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    Weight as of this encounter: 64 kg (141 lb). Body surface area is 1.69 meters squared.  Data Unavailable Comment: Data Unavailable   No LMP recorded. Patient is postmenopausal.  Allergies reviewed: Yes  Medications reviewed: Yes    Medications: Medication refills not needed today.  Pharmacy name entered into EPIC:    [id number] - [facility] [address] [facility]  [facility] - [address]    PHQ9:  Did this patient require a PHQ9?: No      Clinical concerns: Review labs, follow up,infusion       Lamar Andrew MA              [facility] Hematology and Oncology Progress Note    Patient: Kacie Hermosillo  MRN: [id number]  Date of Service: Jul 24, 2025             ECOG Performance    2 - Ambulatory and independent in all ADLs; cannot work; up > 50% of the time          ______________________________________________________________________________  Oncologic history  Metastatic breast cancer with bone mets only ER negative [place] negative HER2/amarilis 2+ and negative by FISH.  HER2/amarilis low    Remote diagnosis of breast cancer in 1995 treated with surgery chemotherapy radiation and 5 years of hormonal therapy.  " "Pathology report not available.    Treatment  Patient started on fam-trastuzumab on 8/19/2024  Complete response by PET criteria in  Nov 2024  Dose of fam-trastuzumab decreased by 25% in November 2024 due to severe diarrhea  Fam-trastuzumab held in February 2025 due to development of cardiomyopathy  Fam-trastuzumab restarted on 4/21/2025 after patient cleared by cardiology  Ejection fraction 35 to 40% on 5/12.  Fam-trastuzumab held  Repeat MRI of the heart again showed normal ejection fraction on 5/28/2025    History of Present Illness    Ms. Kacie Hermosillo is here in follow-up.  She feels fine today.  She did feel really weak in the first week after her last treatment.  The weakness is more than what she had felt previously.  She also had diarrhea but that was controlled with the antidiarrheals that she uses.  She denies having any significant nausea and vomiting she denies having any fevers or any pain issues.  She does report that for the last week to 10 days she has been feeling pretty good.    Review of systems  A comprehensive 12 point review of system was done that was negative except what is mentioned in the history of present illness    Past History    Past Medical History:   Diagnosis Date     Breast cancer (H) 01/01/1999     Heart valve replaced      Hx antineoplastic chemotherapy 01/01/1999     Hx of radiation therapy 01/01/1999     Hypertension        Past Surgical History:   Procedure Laterality Date     BIOPSY BREAST       HYSTERECTOMY       IR CHEST PORT PLACEMENT > 5 YRS OF AGE  7/30/2024     LUMPECTOMY BREAST Left 1999     OOPHORECTOMY Bilateral        Physical Exam    /70 (BP Location: Left arm, Patient Position: Sitting, Cuff Size: Adult Regular)   Pulse 64   Temp 97.9  F (36.6  C) (Temporal)   Resp 16   Ht 1.6 m (5' 2.99\")   Wt 64 kg (141 lb)   SpO2 98%   BMI 24.98 kg/m      General: alert, awake, not in acute distress  HEENT: Head: Normal, normocephalic, atraumatic.  Eye: Normal " external eye, conjunctiva, lids cornea, MARI.  Nose: Normal external nose, mucus membranes and septum.  Pharynx: Normal buccal mucosa. Normal pharynx.  Neck / Thyroid: Supple, no masses, nodes, nodules or enlargement.  Lymphatics: No abnormally enlarged lymph nodes.  Chest: Normal chest wall and respirations. Clear to auscultation.  No crackles or rhonchi's  Heart: S1 S2 RRR, no murmur.   Abdomen: abdomen is soft without significant tenderness, masses, organomegaly or guarding  Extremities: normal strength, tone, and muscle mass  Skin: normal. no rash or abnormalities  CNS: non focal.    Lab Results    Recent Results (from the past 240 hours)   INR    Collection Time: 07/17/25  9:23 AM   Result Value Ref Range    INR 2.08 (H) 0.85 - 1.15    PT 23.5 (H) 11.8 - 14.8 Seconds   CBC with platelets and differential    Collection Time: 07/24/25  8:49 AM   Result Value Ref Range    WBC Count 8.0 4.0 - 11.0 10e3/uL    RBC Count 4.14 3.80 - 5.20 10e6/uL    Hemoglobin 12.4 11.7 - 15.7 g/dL    Hematocrit 37.5 35.0 - 47.0 %    MCV 91 78 - 100 fL    MCH 30.0 26.5 - 33.0 pg    MCHC 33.1 31.5 - 36.5 g/dL    RDW 17.2 (H) 10.0 - 15.0 %    Platelet Count 263 150 - 450 10e3/uL    % Neutrophils 72 %    % Lymphocytes 13 %    % Monocytes 11 %    % Eosinophils 3 %    % Basophils 1 %    % Immature Granulocytes 1 %    NRBCs per 100 WBC 0 <1 /100    Absolute Neutrophils 5.8 1.6 - 8.3 10e3/uL    Absolute Lymphocytes 1.0 0.8 - 5.3 10e3/uL    Absolute Monocytes 0.9 0.0 - 1.3 10e3/uL    Absolute Eosinophils 0.3 0.0 - 0.7 10e3/uL    Absolute Basophils 0.1 0.0 - 0.2 10e3/uL    Absolute Immature Granulocytes 0.0 <=0.4 10e3/uL    Absolute NRBCs 0.0 10e3/uL         Imaging    No results found.          Assessment and Plan    Metastatic breast cancer with bone mets HER2/amarilis low  Patient is here in follow-up.  She continues on fam-trastuzumab and is tolerating it well.  I will proceed with the next dose today.  This is her third dose from her last set  of scans I will plan to do a PET scan after this particular dose.  Further treatment will depend on the results of the PET scan and if she is responding well she will continue the fam-trastuzumab as it is keeping her disease in remission    Bone metastases  Patient Zometa last dose given to her on 4/21/2025 .  Will plan to give her next dose today  .  Cardiac monitoring  Patient has been having cardiac echoes for monitoring but the echoes do not seem to be the right test for her because it has happened twice that the echo shows a decreased ejection fraction and the need to get an MRI of her heart done which shows a well-preserved ejection fraction.  Cardiology has recommended that her cardiac function be monitored by cardiac MRIs.  A cardiac MRI would be done prior to her next visit as she has had 3 doses of fam-trastuzumab since her last MRI.    Diarrhea  Patient usually gets diarrhea after every dose of fam-trastuzumab.  It is well-controlled by the use of Imodium.  She is aware that she should call us if Imodium is not working and we will add Lomotil to her management plan.    Anticoagulation  Patient has been on anticoagulation with warfarin.  Continue as planned this is being managed by his other physicians.    Signed by: Hadley Gann MD        CC: Irina Francisco PA-C     Again, thank you for allowing me to participate in the care of your patient.        Sincerely,        Hadley Gann MD    Electronically signed

## 2025-07-24 NOTE — PROGRESS NOTES
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    Weight as of this encounter: 64 kg (141 lb). Body surface area is 1.69 meters squared.  Data Unavailable Comment: Data Unavailable   No LMP recorded. Patient is postmenopausal.  Allergies reviewed: Yes  Medications reviewed: Yes    Medications: Medication refills not needed today.  Pharmacy name entered into EPIC:    [facility] #[id number] - [place], [place] - [address] [facility]  [facility] - [address]    PHQ9:  Did this patient require a PHQ9?: No      Clinical concerns: Review labs, follow up,infusion       Lamar Andrew MA            "

## 2025-07-24 NOTE — PROGRESS NOTES
Prior to visit with Provider, port accessed without difficulty today, labs drawn and saline locked. Lorraine Powers RN

## 2025-07-24 NOTE — PROGRESS NOTES
[facility] Hematology and Oncology Progress Note    Patient: Kacie Hermosillo  MRN: [id number]  Date of Service: Jul 24, 2025             ECOG Performance    2 - Ambulatory and independent in all ADLs; cannot work; up > 50% of the time          ______________________________________________________________________________  Oncologic history  Metastatic breast cancer with bone mets only ER negative [place] negative HER2/amarilis 2+ and negative by FISH.  HER2/amarilis low    Remote diagnosis of breast cancer in 1995 treated with surgery chemotherapy radiation and 5 years of hormonal therapy.  Pathology report not available.    Treatment  Patient started on fam-trastuzumab on 8/19/2024  Complete response by PET criteria in  Nov 2024  Dose of fam-trastuzumab decreased by 25% in November 2024 due to severe diarrhea  Fam-trastuzumab held in February 2025 due to development of cardiomyopathy  Fam-trastuzumab restarted on 4/21/2025 after patient cleared by cardiology  Ejection fraction 35 to 40% on 5/12.  Fam-trastuzumab held  Repeat MRI of the heart again showed normal ejection fraction on 5/28/2025    History of Present Illness    Ms. Kacie Hermosillo is here in follow-up.  She feels fine today.  She did feel really weak in the first week after her last treatment.  The weakness is more than what she had felt previously.  She also had diarrhea but that was controlled with the antidiarrheals that she uses.  She denies having any significant nausea and vomiting she denies having any fevers or any pain issues.  She does report that for the last week to 10 days she has been feeling pretty good.    Review of systems  A comprehensive 12 point review of system was done that was negative except what is mentioned in the history of present illness    Past History    Past Medical History:   Diagnosis Date    Breast cancer (H) 01/01/1999    Heart valve replaced     Hx antineoplastic chemotherapy 01/01/1999    Hx of radiation therapy  "01/01/1999    Hypertension        Past Surgical History:   Procedure Laterality Date    BIOPSY BREAST      HYSTERECTOMY      IR CHEST PORT PLACEMENT > 5 YRS OF AGE  7/30/2024    LUMPECTOMY BREAST Left 1999    OOPHORECTOMY Bilateral        Physical Exam    /70 (BP Location: Left arm, Patient Position: Sitting, Cuff Size: Adult Regular)   Pulse 64   Temp 97.9  F (36.6  C) (Temporal)   Resp 16   Ht 1.6 m (5' 2.99\")   Wt 64 kg (141 lb)   SpO2 98%   BMI 24.98 kg/m      General: alert, awake, not in acute distress  HEENT: Head: Normal, normocephalic, atraumatic.  Eye: Normal external eye, conjunctiva, lids cornea, MARI.  Nose: Normal external nose, mucus membranes and septum.  Pharynx: Normal buccal mucosa. Normal pharynx.  Neck / Thyroid: Supple, no masses, nodes, nodules or enlargement.  Lymphatics: No abnormally enlarged lymph nodes.  Chest: Normal chest wall and respirations. Clear to auscultation.  No crackles or rhonchi's  Heart: S1 S2 RRR, no murmur.   Abdomen: abdomen is soft without significant tenderness, masses, organomegaly or guarding  Extremities: normal strength, tone, and muscle mass  Skin: normal. no rash or abnormalities  CNS: non focal.    Lab Results    Recent Results (from the past 240 hours)   INR    Collection Time: 07/17/25  9:23 AM   Result Value Ref Range    INR 2.08 (H) 0.85 - 1.15    PT 23.5 (H) 11.8 - 14.8 Seconds   CBC with platelets and differential    Collection Time: 07/24/25  8:49 AM   Result Value Ref Range    WBC Count 8.0 4.0 - 11.0 10e3/uL    RBC Count 4.14 3.80 - 5.20 10e6/uL    Hemoglobin 12.4 11.7 - 15.7 g/dL    Hematocrit 37.5 35.0 - 47.0 %    MCV 91 78 - 100 fL    MCH 30.0 26.5 - 33.0 pg    MCHC 33.1 31.5 - 36.5 g/dL    RDW 17.2 (H) 10.0 - 15.0 %    Platelet Count 263 150 - 450 10e3/uL    % Neutrophils 72 %    % Lymphocytes 13 %    % Monocytes 11 %    % Eosinophils 3 %    % Basophils 1 %    % Immature Granulocytes 1 %    NRBCs per 100 WBC 0 <1 /100    Absolute " Neutrophils 5.8 1.6 - 8.3 10e3/uL    Absolute Lymphocytes 1.0 0.8 - 5.3 10e3/uL    Absolute Monocytes 0.9 0.0 - 1.3 10e3/uL    Absolute Eosinophils 0.3 0.0 - 0.7 10e3/uL    Absolute Basophils 0.1 0.0 - 0.2 10e3/uL    Absolute Immature Granulocytes 0.0 <=0.4 10e3/uL    Absolute NRBCs 0.0 10e3/uL         Imaging    No results found.          Assessment and Plan    Metastatic breast cancer with bone mets HER2/amarilis low  Patient is here in follow-up.  She continues on fam-trastuzumab and is tolerating it well.  I will proceed with the next dose today.  This is her third dose from her last set of scans I will plan to do a PET scan after this particular dose.  Further treatment will depend on the results of the PET scan and if she is responding well she will continue the fam-trastuzumab as it is keeping her disease in remission    Bone metastases  Patient Zometa last dose given to her on 4/21/2025 .  Will plan to give her next dose today  .  Cardiac monitoring  Patient has been having cardiac echoes for monitoring but the echoes do not seem to be the right test for her because it has happened twice that the echo shows a decreased ejection fraction and the need to get an MRI of her heart done which shows a well-preserved ejection fraction.  Cardiology has recommended that her cardiac function be monitored by cardiac MRIs.  A cardiac MRI would be done prior to her next visit as she has had 3 doses of fam-trastuzumab since her last MRI.    Diarrhea  Patient usually gets diarrhea after every dose of fam-trastuzumab.  It is well-controlled by the use of Imodium.  She is aware that she should call us if Imodium is not working and we will add Lomotil to her management plan.    Anticoagulation  Patient has been on anticoagulation with warfarin.  Continue as planned this is being managed by his other physicians.    Signed by: Hadley Gann MD        CC: Irina Francisco PA-C

## 2025-07-24 NOTE — PROGRESS NOTES
[facility]: Cancer Care Follow-Up Note                                    Discussion with Patient:                                                       Met with Kacie when she came to clinic for follow up and enhertu infusion. She understands that the infusion finance group will be working on an appeal for her infusions to be approved after 8/7/25.  She is feeling much better this week and felt ready for her treatment today. She recently sold her house and she feels relief that this has been done. She continues to feel settled in her assisted living facility and enjoys the social aspects.   She understands that writer will send new script to her facility to fill and administer to her as prescribed. Letter with update will also be faxed to them.  She is scheduled for imaging and follow up with NP. Dr. Gann is aware of this and ok'd the scheduling in his absence.      Per the policy of [facility], new  printed and signed prescription for dexamethasone  for this cycle, along with letter/orders for the dexamethasone/update from the clinic visit, was  faxed to nursing fax at [phone], received per right fax at  [phone]  Called [facility] Nurse Line [phone],  and spoke with Dana who confirmed they received the fax and will send the dexamethasone to their local pharmacy to fill so that it will be ready for administration on 7/25/25.             Dates of Treatment:                                                      Infusion given in last 28 days       Administered MAR Action Medication Dose Rate Visit    06/27/2025 11:27 New Bag FAM-trastuzumab deruxtecan-nxki (ENHERTU) 242 mg in D5W 122.1 mL infusion 242 mg 244.2 mL/hr Infusion Therapy Visit on 06/27/2025 in [facility]    07/24/2025 11:36 New Bag FAM-trastuzumab deruxtecan-nxki (ENHERTU) 242 mg in D5W 122.1 mL infusion 242 mg 244.2 mL/hr Infusion Therapy Visit on 07/24/2025 in [facility]  [facility] [place]            Assessment:                                                      D1C12  Enhertu. See above        Intervention/Education provided during outreach:                                                       She will keep her future apts as scheduled.    Confirmed patient has clinic and triage numbers    Signature:  Miheala Koo RN

## 2025-07-24 NOTE — PROGRESS NOTES
Infusion Nursing Note:  Kacie Hermosillo presents today for Enhertu and Zometa.    Patient seen by provider today: Yes: Dr. Gann   present during visit today: Not Applicable.    Note: Kacie was premedicated with Aloxi, Emend and Dexamethasone. She tolerated zometa and Enhertu infusions well.      Intravenous Access:  Labs drawn without difficulty.  Implanted Port.    Treatment Conditions:  Lab Results   Component Value Date    HGB 12.4 07/24/2025    WBC 8.0 07/24/2025    ANEU 5.8 07/24/2025     07/24/2025        Lab Results   Component Value Date     07/24/2025    POTASSIUM 4.1 07/24/2025    MAG 2.2 10/28/2024    CR 1.15 (H) 07/24/2025    MIGUEL 9.0 07/24/2025    BILITOTAL 0.3 07/24/2025    ALBUMIN 3.8 07/24/2025    ALT 16 07/24/2025    AST 26 07/24/2025       Results reviewed, labs MET treatment parameters, ok to proceed with treatment.      Post Infusion Assessment:  Patient tolerated infusion without incident.  Blood return noted pre and post infusion.  Site patent and intact, free from redness, edema or discomfort.  No evidence of extravasations.  Access discontinued per protocol.       Discharge Plan:   Patient and/or family verbalized understanding of discharge instructions and all questions answered.  AVS to patient via [employer].  Patient discharged in stable condition accompanied by: daughter.  Departure Mode: Ambulatory.      Lilli Hanley RN

## 2025-07-29 ENCOUNTER — LAB REQUISITION (OUTPATIENT)
Dept: LAB | Facility: CLINIC | Age: 85
End: 2025-07-29
Payer: COMMERCIAL

## 2025-07-29 DIAGNOSIS — Z86.73 PERSONAL HISTORY OF TRANSIENT ISCHEMIC ATTACK (TIA), AND CEREBRAL INFARCTION WITHOUT RESIDUAL DEFICITS: ICD-10-CM

## 2025-07-29 DIAGNOSIS — Z95.2 PRESENCE OF PROSTHETIC HEART VALVE: ICD-10-CM

## 2025-07-31 LAB
INR PPP: 2.65 (ref 0.85–1.15)
PROTHROMBIN TIME: 27.6 SECONDS (ref 11.8–14.8)

## 2025-07-31 PROCEDURE — 85610 PROTHROMBIN TIME: CPT | Mod: ORL | Performed by: PHYSICIAN ASSISTANT

## 2025-07-31 PROCEDURE — P9604 ONE-WAY ALLOW PRORATED TRIP: HCPCS | Mod: ORL | Performed by: PHYSICIAN ASSISTANT

## 2025-07-31 PROCEDURE — 36415 COLL VENOUS BLD VENIPUNCTURE: CPT | Mod: ORL | Performed by: PHYSICIAN ASSISTANT

## 2025-08-03 ENCOUNTER — LAB REQUISITION (OUTPATIENT)
Dept: LAB | Facility: CLINIC | Age: 85
End: 2025-08-03
Payer: COMMERCIAL

## 2025-08-15 ENCOUNTER — HOSPITAL ENCOUNTER (OUTPATIENT)
Dept: MRI IMAGING | Facility: HOSPITAL | Age: 85
Discharge: HOME | End: 2025-08-15
Attending: INTERNAL MEDICINE
Payer: COMMERCIAL

## 2025-08-15 ENCOUNTER — HOSPITAL ENCOUNTER (OUTPATIENT)
Dept: PET IMAGING | Facility: HOSPITAL | Age: 85
Discharge: HOME | End: 2025-08-15
Attending: INTERNAL MEDICINE
Payer: COMMERCIAL

## 2025-08-15 DIAGNOSIS — I50.30 DIASTOLIC HEART FAILURE, UNSPECIFIED HF CHRONICITY (H): ICD-10-CM

## 2025-08-15 DIAGNOSIS — R19.7 DIARRHEA, UNSPECIFIED TYPE: ICD-10-CM

## 2025-08-15 DIAGNOSIS — C79.51 MALIGNANT NEOPLASM METASTATIC TO BONE (H): ICD-10-CM

## 2025-08-15 LAB — GLUCOSE BLDC GLUCOMTR-MCNC: 85 MG/DL (ref 70–99)

## 2025-08-15 PROCEDURE — 343N000001 HC RX 343 MED OP 636: Performed by: INTERNAL MEDICINE

## 2025-08-15 PROCEDURE — 75561 CARDIAC MRI FOR MORPH W/DYE: CPT

## 2025-08-15 PROCEDURE — 255N000002 HC RX 255 OP 636: Performed by: INTERNAL MEDICINE

## 2025-08-15 PROCEDURE — A9585 GADOBUTROL INJECTION: HCPCS | Performed by: INTERNAL MEDICINE

## 2025-08-15 PROCEDURE — 78816 PET IMAGE W/CT FULL BODY: CPT | Mod: PS

## 2025-08-15 PROCEDURE — 999N000122 MR MYOCARDIUM  OVERREAD

## 2025-08-15 PROCEDURE — A9552 F18 FDG: HCPCS | Performed by: INTERNAL MEDICINE

## 2025-08-15 PROCEDURE — 75561 CARDIAC MRI FOR MORPH W/DYE: CPT | Mod: 26 | Performed by: INTERNAL MEDICINE

## 2025-08-15 PROCEDURE — 82962 GLUCOSE BLOOD TEST: CPT

## 2025-08-15 RX ORDER — GADOBUTROL 604.72 MG/ML
12 INJECTION INTRAVENOUS ONCE
Status: COMPLETED | OUTPATIENT
Start: 2025-08-15 | End: 2025-08-15

## 2025-08-15 RX ORDER — HEPARIN SODIUM (PORCINE) LOCK FLUSH IV SOLN 100 UNIT/ML 100 UNIT/ML
500 SOLUTION INTRAVENOUS ONCE
Status: COMPLETED | OUTPATIENT
Start: 2025-08-15 | End: 2025-08-15

## 2025-08-15 RX ADMIN — HEPARIN SODIUM (PORCINE) LOCK FLUSH IV SOLN 100 UNIT/ML 500 UNITS: 100 SOLUTION at 12:41

## 2025-08-15 RX ADMIN — FLUDEOXYGLUCOSE F 18 10.19 MILLICURIE: 200 INJECTION, SOLUTION INTRAVENOUS at 11:19

## 2025-08-15 RX ADMIN — GADOBUTROL 12 ML: 604.72 INJECTION INTRAVENOUS at 09:24

## 2025-08-18 ENCOUNTER — VIRTUAL VISIT (OUTPATIENT)
Dept: ONCOLOGY | Facility: CLINIC | Age: 85
End: 2025-08-18
Attending: INTERNAL MEDICINE
Payer: COMMERCIAL

## 2025-08-18 DIAGNOSIS — C79.51 MALIGNANT NEOPLASM METASTATIC TO BONE (H): ICD-10-CM

## 2025-08-18 DIAGNOSIS — C50.012 MALIGNANT NEOPLASM OF AREOLA OF LEFT BREAST IN FEMALE, ESTROGEN RECEPTOR NEGATIVE (H): Primary | ICD-10-CM

## 2025-08-18 DIAGNOSIS — Z17.1 MALIGNANT NEOPLASM OF AREOLA OF LEFT BREAST IN FEMALE, ESTROGEN RECEPTOR NEGATIVE (H): Primary | ICD-10-CM

## 2025-08-19 ENCOUNTER — LAB REQUISITION (OUTPATIENT)
Dept: LAB | Facility: CLINIC | Age: 85
End: 2025-08-19
Payer: COMMERCIAL

## 2025-08-19 DIAGNOSIS — I25.10 ATHEROSCLEROTIC HEART DISEASE OF NATIVE CORONARY ARTERY WITHOUT ANGINA PECTORIS: ICD-10-CM

## 2025-08-19 DIAGNOSIS — Z86.73 PERSONAL HISTORY OF TRANSIENT ISCHEMIC ATTACK (TIA), AND CEREBRAL INFARCTION WITHOUT RESIDUAL DEFICITS: ICD-10-CM

## 2025-08-20 ENCOUNTER — PATIENT OUTREACH (OUTPATIENT)
Dept: ONCOLOGY | Facility: HOSPITAL | Age: 85
End: 2025-08-20
Payer: COMMERCIAL

## 2025-08-21 ENCOUNTER — TELEPHONE (OUTPATIENT)
Dept: CARDIOLOGY | Facility: CLINIC | Age: 85
End: 2025-08-21
Payer: COMMERCIAL

## 2025-08-21 DIAGNOSIS — I50.21 ACUTE SYSTOLIC HEART FAILURE (H): Primary | ICD-10-CM

## 2025-08-21 LAB
INR PPP: 2.67 (ref 0.85–1.15)
PROTHROMBIN TIME: 28.4 SECONDS (ref 11.8–14.8)

## 2025-08-21 RX ORDER — DAPAGLIFLOZIN 10 MG/1
10 TABLET, FILM COATED ORAL DAILY
Qty: 90 TABLET | Refills: 4 | Status: SHIPPED | OUTPATIENT
Start: 2025-08-21

## 2025-08-25 ENCOUNTER — LAB REQUISITION (OUTPATIENT)
Dept: LAB | Facility: CLINIC | Age: 85
End: 2025-08-25
Payer: COMMERCIAL

## 2025-08-25 DIAGNOSIS — Z86.73 PERSONAL HISTORY OF TRANSIENT ISCHEMIC ATTACK (TIA), AND CEREBRAL INFARCTION WITHOUT RESIDUAL DEFICITS: ICD-10-CM

## 2025-08-28 ENCOUNTER — INFUSION THERAPY VISIT (OUTPATIENT)
Dept: INFUSION THERAPY | Facility: HOSPITAL | Age: 85
End: 2025-08-28
Attending: INTERNAL MEDICINE
Payer: COMMERCIAL

## 2025-08-28 ENCOUNTER — ONCOLOGY VISIT (OUTPATIENT)
Dept: ONCOLOGY | Facility: HOSPITAL | Age: 85
End: 2025-08-28
Attending: INTERNAL MEDICINE
Payer: COMMERCIAL

## 2025-08-28 ENCOUNTER — PATIENT OUTREACH (OUTPATIENT)
Dept: ONCOLOGY | Facility: HOSPITAL | Age: 85
End: 2025-08-28

## 2025-08-28 VITALS
BODY MASS INDEX: 25.18 KG/M2 | RESPIRATION RATE: 18 BRPM | HEIGHT: 63 IN | SYSTOLIC BLOOD PRESSURE: 107 MMHG | WEIGHT: 142.1 LBS | OXYGEN SATURATION: 96 % | DIASTOLIC BLOOD PRESSURE: 73 MMHG | TEMPERATURE: 96.9 F | HEART RATE: 85 BPM

## 2025-08-28 DIAGNOSIS — C79.51 MALIGNANT NEOPLASM METASTATIC TO BONE (H): ICD-10-CM

## 2025-08-28 DIAGNOSIS — I50.30 DIASTOLIC HEART FAILURE, UNSPECIFIED HF CHRONICITY (H): ICD-10-CM

## 2025-08-28 DIAGNOSIS — R11.0 NAUSEA: ICD-10-CM

## 2025-08-28 DIAGNOSIS — C50.012 MALIGNANT NEOPLASM OF AREOLA OF LEFT BREAST IN FEMALE, ESTROGEN RECEPTOR NEGATIVE (H): Primary | ICD-10-CM

## 2025-08-28 DIAGNOSIS — R19.7 DIARRHEA, UNSPECIFIED TYPE: ICD-10-CM

## 2025-08-28 DIAGNOSIS — R53.82 CHRONIC FATIGUE: ICD-10-CM

## 2025-08-28 DIAGNOSIS — Z17.1 MALIGNANT NEOPLASM OF AREOLA OF LEFT BREAST IN FEMALE, ESTROGEN RECEPTOR NEGATIVE (H): Primary | ICD-10-CM

## 2025-08-28 DIAGNOSIS — C79.51 MALIGNANT NEOPLASM METASTATIC TO BONE (H): Primary | ICD-10-CM

## 2025-08-28 LAB
ALBUMIN SERPL BCG-MCNC: 3.8 G/DL (ref 3.5–5.2)
ALP SERPL-CCNC: 64 U/L (ref 40–150)
ALT SERPL W P-5'-P-CCNC: 16 U/L (ref 0–50)
ANION GAP SERPL CALCULATED.3IONS-SCNC: 11 MMOL/L (ref 7–15)
AST SERPL W P-5'-P-CCNC: 25 U/L (ref 0–45)
BASOPHILS # BLD AUTO: 0.07 10E3/UL (ref 0–0.2)
BASOPHILS NFR BLD AUTO: 1 %
BILIRUB SERPL-MCNC: 0.4 MG/DL
BUN SERPL-MCNC: 23.6 MG/DL (ref 8–23)
CALCIUM SERPL-MCNC: 9.2 MG/DL (ref 8.8–10.4)
CHLORIDE SERPL-SCNC: 107 MMOL/L (ref 98–107)
CREAT SERPL-MCNC: 1.07 MG/DL (ref 0.51–0.95)
EGFRCR SERPLBLD CKD-EPI 2021: 51 ML/MIN/1.73M2
EOSINOPHIL # BLD AUTO: 0.26 10E3/UL (ref 0–0.7)
EOSINOPHIL NFR BLD AUTO: 3.6 %
ERYTHROCYTE [DISTWIDTH] IN BLOOD BY AUTOMATED COUNT: 16.2 % (ref 10–15)
GLUCOSE SERPL-MCNC: 87 MG/DL (ref 70–99)
HCO3 SERPL-SCNC: 24 MMOL/L (ref 22–29)
HCT VFR BLD AUTO: 38 % (ref 35–47)
HGB BLD-MCNC: 12.4 G/DL (ref 11.7–15.7)
IMM GRANULOCYTES # BLD: 0.03 10E3/UL
IMM GRANULOCYTES NFR BLD: 0.4 %
LDH SERPL L TO P-CCNC: 280 U/L (ref 0–250)
LYMPHOCYTES # BLD AUTO: 0.94 10E3/UL (ref 0.8–5.3)
LYMPHOCYTES NFR BLD AUTO: 13 %
MCH RBC QN AUTO: 29.9 PG (ref 26.5–33)
MCHC RBC AUTO-ENTMCNC: 32.6 G/DL (ref 31.5–36.5)
MCV RBC AUTO: 91.6 FL (ref 78–100)
MONOCYTES # BLD AUTO: 0.76 10E3/UL (ref 0–1.3)
MONOCYTES NFR BLD AUTO: 10.5 %
NEUTROPHILS # BLD AUTO: 5.18 10E3/UL (ref 1.6–8.3)
NEUTROPHILS NFR BLD AUTO: 71.5 %
NRBC # BLD AUTO: <0.03 10E3/UL
NRBC BLD AUTO-RTO: 0 /100
PLATELET # BLD AUTO: 217 10E3/UL (ref 150–450)
POTASSIUM SERPL-SCNC: 3.6 MMOL/L (ref 3.4–5.3)
PROT SERPL-MCNC: 5.8 G/DL (ref 6.4–8.3)
RBC # BLD AUTO: 4.15 10E6/UL (ref 3.8–5.2)
SODIUM SERPL-SCNC: 142 MMOL/L (ref 135–145)
WBC # BLD AUTO: 7.24 10E3/UL (ref 4–11)

## 2025-08-28 PROCEDURE — 84155 ASSAY OF PROTEIN SERUM: CPT

## 2025-08-28 PROCEDURE — 250N000011 HC RX IP 250 OP 636

## 2025-08-28 PROCEDURE — 83615 LACTATE (LD) (LDH) ENZYME: CPT

## 2025-08-28 PROCEDURE — 85025 COMPLETE CBC W/AUTO DIFF WBC: CPT

## 2025-08-28 PROCEDURE — 258N000003 HC RX IP 258 OP 636

## 2025-08-28 PROCEDURE — 36591 DRAW BLOOD OFF VENOUS DEVICE: CPT

## 2025-08-28 PROCEDURE — G0463 HOSPITAL OUTPT CLINIC VISIT: HCPCS

## 2025-08-28 RX ORDER — HEPARIN SODIUM,PORCINE 10 UNIT/ML
5-20 VIAL (ML) INTRAVENOUS DAILY PRN
Status: CANCELLED | OUTPATIENT
Start: 2025-08-28

## 2025-08-28 RX ORDER — MEPERIDINE HYDROCHLORIDE 50 MG/ML
25 INJECTION INTRAMUSCULAR; INTRAVENOUS; SUBCUTANEOUS
Status: DISCONTINUED | OUTPATIENT
Start: 2025-08-28 | End: 2025-08-28 | Stop reason: HOSPADM

## 2025-08-28 RX ORDER — MEPERIDINE HYDROCHLORIDE 50 MG/ML
25 INJECTION INTRAMUSCULAR; INTRAVENOUS; SUBCUTANEOUS
Status: CANCELLED | OUTPATIENT
Start: 2025-08-28

## 2025-08-28 RX ORDER — ALBUTEROL SULFATE 90 UG/1
1-2 INHALANT RESPIRATORY (INHALATION)
Status: CANCELLED | OUTPATIENT
Start: 2025-08-28

## 2025-08-28 RX ORDER — DIPHENHYDRAMINE HYDROCHLORIDE 50 MG/ML
50 INJECTION, SOLUTION INTRAMUSCULAR; INTRAVENOUS
Status: DISCONTINUED | OUTPATIENT
Start: 2025-08-28 | End: 2025-08-28 | Stop reason: HOSPADM

## 2025-08-28 RX ORDER — LORAZEPAM 2 MG/ML
0.5 INJECTION INTRAMUSCULAR EVERY 4 HOURS PRN
Status: CANCELLED | OUTPATIENT
Start: 2025-08-28

## 2025-08-28 RX ORDER — HEPARIN SODIUM (PORCINE) LOCK FLUSH IV SOLN 100 UNIT/ML 100 UNIT/ML
5 SOLUTION INTRAVENOUS
Status: DISCONTINUED | OUTPATIENT
Start: 2025-08-28 | End: 2025-08-28 | Stop reason: HOSPADM

## 2025-08-28 RX ORDER — HEPARIN SODIUM (PORCINE) LOCK FLUSH IV SOLN 100 UNIT/ML 100 UNIT/ML
5 SOLUTION INTRAVENOUS
Status: CANCELLED | OUTPATIENT
Start: 2025-08-28

## 2025-08-28 RX ORDER — ALBUTEROL SULFATE 0.83 MG/ML
2.5 SOLUTION RESPIRATORY (INHALATION)
Status: CANCELLED | OUTPATIENT
Start: 2025-08-28

## 2025-08-28 RX ORDER — PALONOSETRON 0.05 MG/ML
0.25 INJECTION, SOLUTION INTRAVENOUS ONCE
Status: COMPLETED | OUTPATIENT
Start: 2025-08-28 | End: 2025-08-28

## 2025-08-28 RX ORDER — METHYLPREDNISOLONE SODIUM SUCCINATE 40 MG/ML
40 INJECTION INTRAMUSCULAR; INTRAVENOUS
Status: DISCONTINUED | OUTPATIENT
Start: 2025-08-28 | End: 2025-08-28 | Stop reason: HOSPADM

## 2025-08-28 RX ORDER — ALBUTEROL SULFATE 90 UG/1
1-2 INHALANT RESPIRATORY (INHALATION)
Status: DISCONTINUED | OUTPATIENT
Start: 2025-08-28 | End: 2025-08-28 | Stop reason: HOSPADM

## 2025-08-28 RX ORDER — ALBUTEROL SULFATE 0.83 MG/ML
2.5 SOLUTION RESPIRATORY (INHALATION)
Status: DISCONTINUED | OUTPATIENT
Start: 2025-08-28 | End: 2025-08-28 | Stop reason: HOSPADM

## 2025-08-28 RX ORDER — METHYLPREDNISOLONE SODIUM SUCCINATE 40 MG/ML
40 INJECTION INTRAMUSCULAR; INTRAVENOUS
Status: CANCELLED | OUTPATIENT
Start: 2025-08-28

## 2025-08-28 RX ORDER — DEXAMETHASONE 4 MG/1
8 TABLET ORAL DAILY
Qty: 6 TABLET | Refills: 0 | Status: SHIPPED | OUTPATIENT
Start: 2025-08-28

## 2025-08-28 RX ORDER — DIPHENHYDRAMINE HYDROCHLORIDE 50 MG/ML
25 INJECTION, SOLUTION INTRAMUSCULAR; INTRAVENOUS
Status: DISCONTINUED | OUTPATIENT
Start: 2025-08-28 | End: 2025-08-28 | Stop reason: HOSPADM

## 2025-08-28 RX ORDER — EPINEPHRINE 1 MG/ML
0.3 INJECTION, SOLUTION INTRAMUSCULAR; SUBCUTANEOUS EVERY 5 MIN PRN
Status: CANCELLED | OUTPATIENT
Start: 2025-08-28

## 2025-08-28 RX ORDER — PROCHLORPERAZINE MALEATE 10 MG
10 TABLET ORAL EVERY 6 HOURS PRN
Qty: 30 TABLET | Refills: 1 | Status: SHIPPED | OUTPATIENT
Start: 2025-08-28

## 2025-08-28 RX ORDER — DIPHENHYDRAMINE HYDROCHLORIDE 50 MG/ML
50 INJECTION, SOLUTION INTRAMUSCULAR; INTRAVENOUS
Status: CANCELLED | OUTPATIENT
Start: 2025-08-28

## 2025-08-28 RX ORDER — PALONOSETRON 0.05 MG/ML
0.25 INJECTION, SOLUTION INTRAVENOUS ONCE
Status: CANCELLED | OUTPATIENT
Start: 2025-08-28

## 2025-08-28 RX ORDER — DIPHENHYDRAMINE HYDROCHLORIDE 50 MG/ML
25 INJECTION, SOLUTION INTRAMUSCULAR; INTRAVENOUS
Status: CANCELLED | OUTPATIENT
Start: 2025-08-28

## 2025-08-28 RX ORDER — EPINEPHRINE 1 MG/ML
0.3 INJECTION, SOLUTION INTRAMUSCULAR; SUBCUTANEOUS EVERY 5 MIN PRN
Status: DISCONTINUED | OUTPATIENT
Start: 2025-08-28 | End: 2025-08-28 | Stop reason: HOSPADM

## 2025-08-28 RX ADMIN — DEXTROSE 250 ML: 5 SOLUTION INTRAVENOUS at 11:08

## 2025-08-28 RX ADMIN — DEXAMETHASONE SODIUM PHOSPHATE: 10 INJECTION, SOLUTION INTRAMUSCULAR; INTRAVENOUS at 11:08

## 2025-08-28 RX ADMIN — PALONOSETRON 0.25 MG: 0.05 INJECTION, SOLUTION INTRAVENOUS at 11:08

## 2025-08-28 RX ADMIN — FAM-TRASTUZUMAB DERUXTECAN-NXKI 242 MG: 100 INJECTION, POWDER, LYOPHILIZED, FOR SOLUTION INTRAVENOUS at 11:43

## 2025-08-28 RX ADMIN — HEPARIN 5 ML: 100 SYRINGE at 12:17

## 2025-08-28 ASSESSMENT — PAIN SCALES - GENERAL: PAINLEVEL_OUTOF10: NO PAIN (0)

## 2025-09-04 LAB
INR PPP: 2.52 (ref 0.85–1.15)
PROTHROMBIN TIME: 26.6 SECONDS (ref 11.8–14.8)

## (undated) RX ORDER — CEFAZOLIN SODIUM/WATER 2 G/20 ML
SYRINGE (ML) INTRAVENOUS
Status: DISPENSED
Start: 2024-07-30

## (undated) RX ORDER — FENTANYL CITRATE 50 UG/ML
INJECTION, SOLUTION INTRAMUSCULAR; INTRAVENOUS
Status: DISPENSED
Start: 2024-07-11

## (undated) RX ORDER — FENTANYL CITRATE 50 UG/ML
INJECTION, SOLUTION INTRAMUSCULAR; INTRAVENOUS
Status: DISPENSED
Start: 2024-07-30

## (undated) RX ORDER — HEPARIN SODIUM (PORCINE) LOCK FLUSH IV SOLN 100 UNIT/ML 100 UNIT/ML
SOLUTION INTRAVENOUS
Status: DISPENSED
Start: 2025-08-15